# Patient Record
Sex: FEMALE | Race: WHITE | NOT HISPANIC OR LATINO
[De-identification: names, ages, dates, MRNs, and addresses within clinical notes are randomized per-mention and may not be internally consistent; named-entity substitution may affect disease eponyms.]

---

## 2019-09-23 ENCOUNTER — APPOINTMENT (OUTPATIENT)
Dept: INTERNAL MEDICINE | Facility: CLINIC | Age: 34
End: 2019-09-23

## 2020-08-10 PROBLEM — Z00.00 ENCOUNTER FOR PREVENTIVE HEALTH EXAMINATION: Status: ACTIVE | Noted: 2020-08-10

## 2020-08-20 ENCOUNTER — APPOINTMENT (OUTPATIENT)
Dept: FAMILY MEDICINE | Facility: CLINIC | Age: 35
End: 2020-08-20
Payer: COMMERCIAL

## 2020-08-20 ENCOUNTER — NON-APPOINTMENT (OUTPATIENT)
Age: 35
End: 2020-08-20

## 2020-08-20 VITALS
SYSTOLIC BLOOD PRESSURE: 100 MMHG | WEIGHT: 160.56 LBS | TEMPERATURE: 99.1 F | RESPIRATION RATE: 17 BRPM | DIASTOLIC BLOOD PRESSURE: 74 MMHG | HEIGHT: 62 IN | HEART RATE: 93 BPM | BODY MASS INDEX: 29.55 KG/M2 | OXYGEN SATURATION: 98 %

## 2020-08-20 DIAGNOSIS — E66.3 OVERWEIGHT: ICD-10-CM

## 2020-08-20 DIAGNOSIS — J30.2 OTHER SEASONAL ALLERGIC RHINITIS: ICD-10-CM

## 2020-08-20 DIAGNOSIS — Z78.9 OTHER SPECIFIED HEALTH STATUS: ICD-10-CM

## 2020-08-20 PROCEDURE — 93000 ELECTROCARDIOGRAM COMPLETE: CPT

## 2020-08-20 PROCEDURE — 99385 PREV VISIT NEW AGE 18-39: CPT | Mod: 25

## 2020-08-20 NOTE — HEALTH RISK ASSESSMENT
[Very Good] : ~his/her~ current health as very good [] : Yes [11-15] : 11-15 [2 - 4 times a month (2 pts)] : 2-4 times a month (2 points) [Yes] : Yes [1 or 2 (0 pts)] : 1 or 2 (0 points) [Never (0 pts)] : Never (0 points) [No falls in past year] : Patient reported no falls in the past year [0] : 2) Feeling down, depressed, or hopeless: Not at all (0) [Patient reported mammogram was normal] : Patient reported mammogram was normal [Patient reported PAP Smear was abnormal] : Patient reported PAP Smear was abnormal [HIV test declined] : HIV test declined [Hepatitis C test declined] : Hepatitis C test declined [None] : None [Employed] : employed [With Family] : lives with family [Single] : single [Unemployed] : unemployed [] :  [# Of Children ___] : has [unfilled] children [Sexually Active] : sexually active [Feels Safe at Home] : Feels safe at home [Fully functional (bathing, dressing, toileting, transferring, walking, feeding)] : Fully functional (bathing, dressing, toileting, transferring, walking, feeding) [Fully functional (using the telephone, shopping, preparing meals, housekeeping, doing laundry, using] : Fully functional and needs no help or supervision to perform IADLs (using the telephone, shopping, preparing meals, housekeeping, doing laundry, using transportation, managing medications and managing finances) [Smoke Detector] : smoke detector [Carbon Monoxide Detector] : carbon monoxide detector [Seat Belt] :  uses seat belt [Sunscreen] : uses sunscreen [With Patient/Caregiver] : With Patient/Caregiver [Aggressive treatment] : aggressive treatment [de-identified] : No [FreeTextEntry1] : none [de-identified] : Quit 11/19  1ppd - for 15 years  [Audit-CScore] : 2 [de-identified] : None [IUN1Dgvak] : 0 [de-identified] : walking [de-identified] : regular  [Change in mental status noted] : No change in mental status noted [Language] : denies difficulty with language [Reports changes in vision] : Reports no changes in vision [Reports changes in hearing] : Reports no changes in hearing [MammogramDate] : 04/19 [Reports changes in dental health] : Reports no changes in dental health [BoneDensityDate] : NA [PapSmearComments] : close f/u  [PapSmearDate] : 03/19 [ColonoscopyDate] : NA [AdvancecareDate] : 08/20

## 2020-08-20 NOTE — PHYSICAL EXAM
[No Edema] : there was no peripheral edema [Declined Breast Exam] : declined breast exam  [Normal] : normal gait, coordination grossly intact, no focal deficits and deep tendon reflexes were 2+ and symmetric [de-identified] : done by GYN

## 2020-08-20 NOTE — HISTORY OF PRESENT ILLNESS
[de-identified] : Patient came to establish herself as a pt, needs physical. Denies CP,SOB,Abd pain, no N,V,C,D.Pt moved to  from [facility]  [FreeTextEntry1] : cc: new pt, physical

## 2020-09-01 LAB
25(OH)D3 SERPL-MCNC: 55 NG/ML
ALBUMIN SERPL ELPH-MCNC: 4.1 G/DL
ALP BLD-CCNC: 65 U/L
ALT SERPL-CCNC: 15 U/L
ANION GAP SERPL CALC-SCNC: 12 MMOL/L
APPEARANCE: CLEAR
AST SERPL-CCNC: 16 U/L
BASOPHILS # BLD AUTO: 0.03 K/UL
BASOPHILS NFR BLD AUTO: 0.4 %
BILIRUB SERPL-MCNC: 0.6 MG/DL
BILIRUBIN URINE: NEGATIVE
BLOOD URINE: NEGATIVE
BUN SERPL-MCNC: 14 MG/DL
CALCIUM SERPL-MCNC: 9.2 MG/DL
CHLORIDE SERPL-SCNC: 102 MMOL/L
CHOLEST SERPL-MCNC: 206 MG/DL
CHOLEST/HDLC SERPL: 6.1 RATIO
CO2 SERPL-SCNC: 23 MMOL/L
COLOR: COLORLESS
CREAT SERPL-MCNC: 0.65 MG/DL
EOSINOPHIL # BLD AUTO: 0.09 K/UL
EOSINOPHIL NFR BLD AUTO: 1.2 %
ESTIMATED AVERAGE GLUCOSE: 105 MG/DL
FOLATE SERPL-MCNC: >20 NG/ML
GLUCOSE QUALITATIVE U: NEGATIVE
GLUCOSE SERPL-MCNC: 115 MG/DL
HBA1C MFR BLD HPLC: 5.3 %
HCT VFR BLD CALC: 39.9 %
HDLC SERPL-MCNC: 34 MG/DL
HGB BLD-MCNC: 13.1 G/DL
IMM GRANULOCYTES NFR BLD AUTO: 0.4 %
KETONES URINE: NEGATIVE
LDLC SERPL CALC-MCNC: 134 MG/DL
LEUKOCYTE ESTERASE URINE: NEGATIVE
LYMPHOCYTES # BLD AUTO: 2.38 K/UL
LYMPHOCYTES NFR BLD AUTO: 30.7 %
MAN DIFF?: NORMAL
MCHC RBC-ENTMCNC: 32 PG
MCHC RBC-ENTMCNC: 32.8 GM/DL
MCV RBC AUTO: 97.3 FL
MONOCYTES # BLD AUTO: 0.62 K/UL
MONOCYTES NFR BLD AUTO: 8 %
NEUTROPHILS # BLD AUTO: 4.61 K/UL
NEUTROPHILS NFR BLD AUTO: 59.3 %
NITRITE URINE: NEGATIVE
PH URINE: 7
PLATELET # BLD AUTO: 290 K/UL
POTASSIUM SERPL-SCNC: 4.6 MMOL/L
PROT SERPL-MCNC: 6.4 G/DL
PROTEIN URINE: NEGATIVE
RBC # BLD: 4.1 M/UL
RBC # FLD: 12.4 %
SARS-COV-2 IGG SERPL IA-ACNC: 0.08 INDEX
SARS-COV-2 IGG SERPL QL IA: NEGATIVE
SODIUM SERPL-SCNC: 138 MMOL/L
SPECIFIC GRAVITY URINE: 1.01
TRIGL SERPL-MCNC: 192 MG/DL
TSH SERPL-ACNC: 3.16 UIU/ML
UROBILINOGEN URINE: NORMAL
VIT B12 SERPL-MCNC: 414 PG/ML
WBC # FLD AUTO: 7.76 K/UL

## 2021-10-19 ENCOUNTER — NON-APPOINTMENT (OUTPATIENT)
Age: 36
End: 2021-10-19

## 2021-10-19 ENCOUNTER — APPOINTMENT (OUTPATIENT)
Dept: FAMILY MEDICINE | Facility: CLINIC | Age: 36
End: 2021-10-19
Payer: COMMERCIAL

## 2021-10-19 VITALS
DIASTOLIC BLOOD PRESSURE: 90 MMHG | SYSTOLIC BLOOD PRESSURE: 122 MMHG | BODY MASS INDEX: 29.81 KG/M2 | TEMPERATURE: 97.8 F | OXYGEN SATURATION: 98 % | WEIGHT: 162 LBS | HEIGHT: 62 IN | RESPIRATION RATE: 16 BRPM | HEART RATE: 85 BPM

## 2021-10-19 DIAGNOSIS — Z23 ENCOUNTER FOR IMMUNIZATION: ICD-10-CM

## 2021-10-19 PROCEDURE — 99395 PREV VISIT EST AGE 18-39: CPT | Mod: 25

## 2021-10-19 PROCEDURE — 99406 BEHAV CHNG SMOKING 3-10 MIN: CPT

## 2021-10-19 PROCEDURE — G0444 DEPRESSION SCREEN ANNUAL: CPT | Mod: 59

## 2021-10-19 PROCEDURE — 93000 ELECTROCARDIOGRAM COMPLETE: CPT | Mod: 59

## 2021-10-19 NOTE — HEALTH RISK ASSESSMENT
[Very Good] : ~his/her~  mood as very good [] : Yes [Yes] : Yes [2 - 4 times a month (2 pts)] : 2-4 times a month (2 points) [1 or 2 (0 pts)] : 1 or 2 (0 points) [Never (0 pts)] : Never (0 points) [No falls in past year] : Patient reported no falls in the past year [0] : 2) Feeling down, depressed, or hopeless: Not at all (0) [Patient reported mammogram was normal] : Patient reported mammogram was normal [Patient reported PAP Smear was abnormal] : Patient reported PAP Smear was abnormal [HIV test declined] : HIV test declined [Hepatitis C test declined] : Hepatitis C test declined [None] : None [With Family] : lives with family [Employed] : employed [Unemployed] : unemployed [Single] : single [] :  [# Of Children ___] : has [unfilled] children [Sexually Active] : sexually active [Feels Safe at Home] : Feels safe at home [Fully functional (bathing, dressing, toileting, transferring, walking, feeding)] : Fully functional (bathing, dressing, toileting, transferring, walking, feeding) [Fully functional (using the telephone, shopping, preparing meals, housekeeping, doing laundry, using] : Fully functional and needs no help or supervision to perform IADLs (using the telephone, shopping, preparing meals, housekeeping, doing laundry, using transportation, managing medications and managing finances) [Smoke Detector] : smoke detector [Carbon Monoxide Detector] : carbon monoxide detector [Seat Belt] :  uses seat belt [Sunscreen] : uses sunscreen [With Patient/Caregiver] : , with patient/caregiver [Aggressive treatment] : aggressive treatment [PHQ-2 Negative - No further assessment needed] : PHQ-2 Negative - No further assessment needed [FreeTextEntry1] : none [de-identified] : No [de-identified] : None [de-identified] : Quit 11/19  1ppd - for 15 years , 1 year ag started to smoke again 0.5 - 1PPD  [de-identified] : walking [Audit-CScore] : 2 [de-identified] : regular  [RJI2Csvil] : 0 [Change in mental status noted] : No change in mental status noted [Language] : denies difficulty with language [Learning/Retaining New Information] : denies difficulty learning/retaining new information [Reports changes in hearing] : Reports no changes in hearing [Reports changes in vision] : Reports no changes in vision [Reports changes in dental health] : Reports no changes in dental health [MammogramDate] : 04/19 [PapSmearDate] : 03/19 [PapSmearComments] : close f/u  [BoneDensityDate] : NA [ColonoscopyDate] : NA [AdvancecareDate] : 10/21

## 2021-10-19 NOTE — COUNSELING
[Good understanding] : Patient has a good understanding of lifestyle changes and steps needed to achieve self management goal [Risk of tobacco use and health benefits of smoking cessation discussed] : Risk of tobacco use and health benefits of smoking cessation discussed [Cessation strategies including cessation program discussed] : Cessation strategies including cessation program discussed [Use of nicotine replacement therapies and other medications discussed] : Use of nicotine replacement therapies and other medications discussed [Encouraged to pick a quit date and identify support needed to quit] : Encouraged to pick a quit date and identify support needed to quit [Willing to Quit Smoking] : Willing to quit smoking [Tobacco Use Cessation Intermediate Greater Than 3 Minutes Up to 10 Minutes] : Tobacco Use Cessation Intermediate Greater Than 3 Minutes Up to 10 Minutes [FreeTextEntry1] : 4

## 2021-10-19 NOTE — PHYSICAL EXAM
[No Edema] : there was no peripheral edema [Declined Breast Exam] : declined breast exam  [Normal] : affect was normal and insight and judgment were intact [de-identified] : done by GYN

## 2021-10-19 NOTE — HISTORY OF PRESENT ILLNESS
[FreeTextEntry1] : cc: physical  [de-identified] : Patient presented for  physical. She denies CP,SOB,Abd pain, no N,V,C,D.Pt refused FLu and Tdap.Pt smokes, smoking cessation completed - she wants to quit,Chantix was RXed \par

## 2021-10-19 NOTE — DATA REVIEWED
[FreeTextEntry1] : last blood work d/w the pt at length\par \par \par EKG - NSR at 78 bpm, no new changes

## 2021-10-26 LAB
25(OH)D3 SERPL-MCNC: 32.9 NG/ML
ALBUMIN SERPL ELPH-MCNC: 4.5 G/DL
ALP BLD-CCNC: 83 U/L
ALT SERPL-CCNC: 16 U/L
ANION GAP SERPL CALC-SCNC: 13 MMOL/L
APPEARANCE: ABNORMAL
AST SERPL-CCNC: 20 U/L
BASOPHILS # BLD AUTO: 0.06 K/UL
BASOPHILS NFR BLD AUTO: 0.5 %
BILIRUB SERPL-MCNC: 0.4 MG/DL
BILIRUBIN URINE: NEGATIVE
BLOOD URINE: NEGATIVE
BUN SERPL-MCNC: 8 MG/DL
CALCIUM SERPL-MCNC: 9.5 MG/DL
CHLORIDE SERPL-SCNC: 101 MMOL/L
CHOLEST SERPL-MCNC: 222 MG/DL
CO2 SERPL-SCNC: 23 MMOL/L
COLOR: NORMAL
CREAT SERPL-MCNC: 0.49 MG/DL
EOSINOPHIL # BLD AUTO: 0.03 K/UL
EOSINOPHIL NFR BLD AUTO: 0.3 %
FOLATE SERPL-MCNC: 18.2 NG/ML
GLUCOSE QUALITATIVE U: NEGATIVE
GLUCOSE SERPL-MCNC: 91 MG/DL
HCT VFR BLD CALC: 43.8 %
HDLC SERPL-MCNC: 46 MG/DL
HGB BLD-MCNC: 14.7 G/DL
IMM GRANULOCYTES NFR BLD AUTO: 0.3 %
KETONES URINE: NEGATIVE
LDLC SERPL CALC-MCNC: 130 MG/DL
LEUKOCYTE ESTERASE URINE: NEGATIVE
LYMPHOCYTES # BLD AUTO: 3.69 K/UL
LYMPHOCYTES NFR BLD AUTO: 31.3 %
MAN DIFF?: NORMAL
MCHC RBC-ENTMCNC: 33.6 GM/DL
MCHC RBC-ENTMCNC: 33.6 PG
MCV RBC AUTO: 100 FL
MONOCYTES # BLD AUTO: 0.61 K/UL
MONOCYTES NFR BLD AUTO: 5.2 %
NEUTROPHILS # BLD AUTO: 7.37 K/UL
NEUTROPHILS NFR BLD AUTO: 62.4 %
NITRITE URINE: NEGATIVE
NONHDLC SERPL-MCNC: 176 MG/DL
PH URINE: 6
PLATELET # BLD AUTO: 335 K/UL
POTASSIUM SERPL-SCNC: 4.2 MMOL/L
PROT SERPL-MCNC: 7.1 G/DL
PROTEIN URINE: NEGATIVE
RBC # BLD: 4.38 M/UL
RBC # FLD: 12.9 %
SODIUM SERPL-SCNC: 136 MMOL/L
SPECIFIC GRAVITY URINE: 1.01
TRIGL SERPL-MCNC: 226 MG/DL
TSH SERPL-ACNC: 2.65 UIU/ML
URATE SERPL-MCNC: 3.7 MG/DL
UROBILINOGEN URINE: NORMAL
VIT B12 SERPL-MCNC: 360 PG/ML
WBC # FLD AUTO: 11.8 K/UL

## 2021-11-16 ENCOUNTER — APPOINTMENT (OUTPATIENT)
Dept: FAMILY MEDICINE | Facility: CLINIC | Age: 36
End: 2021-11-16
Payer: COMMERCIAL

## 2021-11-16 ENCOUNTER — TRANSCRIPTION ENCOUNTER (OUTPATIENT)
Age: 36
End: 2021-11-16

## 2021-11-16 VITALS
HEART RATE: 108 BPM | WEIGHT: 161 LBS | OXYGEN SATURATION: 98 % | RESPIRATION RATE: 15 BRPM | HEIGHT: 62 IN | SYSTOLIC BLOOD PRESSURE: 122 MMHG | DIASTOLIC BLOOD PRESSURE: 86 MMHG | BODY MASS INDEX: 29.63 KG/M2 | TEMPERATURE: 99 F

## 2021-11-16 DIAGNOSIS — R51.9 HEADACHE, UNSPECIFIED: ICD-10-CM

## 2021-11-16 PROCEDURE — 99214 OFFICE O/P EST MOD 30 MIN: CPT

## 2021-11-16 NOTE — HISTORY OF PRESENT ILLNESS
[Cold Symptoms] : cold symptoms [Severe] : severe [___ Days ago] : [unfilled] days ago [Gradual] : gradually [Constant] : constant [Congestion] : congestion [Cough] : cough [Fatigue] : fatigue [Headache] : headache [At Night] : at night [In Morning] : in the morning [Worsening] : worsening [Sore Throat] : no sore throat [Wheezing] : no wheezing [Chills] : no chills [Anorexia] : no anorexia [Shortness Of Breath] : no shortness of breath [Earache] : no earache [Fever] : no fever [de-identified] : ear full ness [FreeTextEntry2] : ear fullness [FreeTextEntry5] : flonase, zyrtec d, mucinex [FreeTextEntry8] : Ms Kellee Arcos is a 36 female presents today for symptoms listed below, continues to worsen. Pt tested COVID-19 which was negative.

## 2021-11-16 NOTE — REVIEW OF SYSTEMS
[Fatigue] : fatigue [Shortness Of Breath] : no shortness of breath [Wheezing] : no wheezing [Cough] : cough [Dyspnea on Exertion] : no dyspnea on exertion [Negative] : Heme/Lymph [FreeTextEntry4] : nasal sinus congestion, headaches

## 2021-11-16 NOTE — PHYSICAL EXAM
[No Acute Distress] : no acute distress [Well Nourished] : well nourished [EOMI] : extraocular movements intact [No Respiratory Distress] : no respiratory distress  [Clear to Auscultation] : lungs were clear to auscultation bilaterally [Normal S1, S2] : normal S1 and S2 [Non Tender] : non-tender [No Rash] : no rash [Normal Gait] : normal gait [Normal Affect] : the affect was normal [de-identified] : edematous boggy nasal turbinates, B/L TM WNL, sinus pressure and tenderness in ethmoidal and maxillary sinuses

## 2021-11-16 NOTE — ASSESSMENT
[FreeTextEntry1] : advised to start the Augmentin BID for 10 days and flonase prescribed by Urgent Care\par will Medrol dose pack for the immense congestion, headaches, slight dizziness\par continue with symptomatic management\par plenty of warm hydration, warm humidified air\par COVID-19 vaccine up to date. Advised to get the booster vaccine when available and feeling better. \par

## 2022-08-18 ENCOUNTER — APPOINTMENT (OUTPATIENT)
Dept: FAMILY MEDICINE | Facility: CLINIC | Age: 37
End: 2022-08-18

## 2023-02-01 ENCOUNTER — APPOINTMENT (OUTPATIENT)
Dept: FAMILY MEDICINE | Facility: CLINIC | Age: 38
End: 2023-02-01
Payer: COMMERCIAL

## 2023-02-01 VITALS
RESPIRATION RATE: 16 BRPM | OXYGEN SATURATION: 98 % | DIASTOLIC BLOOD PRESSURE: 90 MMHG | HEIGHT: 62 IN | WEIGHT: 165 LBS | BODY MASS INDEX: 30.36 KG/M2 | SYSTOLIC BLOOD PRESSURE: 126 MMHG | HEART RATE: 100 BPM | TEMPERATURE: 97 F

## 2023-02-01 VITALS — DIASTOLIC BLOOD PRESSURE: 90 MMHG | SYSTOLIC BLOOD PRESSURE: 118 MMHG

## 2023-02-01 DIAGNOSIS — F17.200 NICOTINE DEPENDENCE, UNSPECIFIED, UNCOMPLICATED: ICD-10-CM

## 2023-02-01 PROCEDURE — 99406 BEHAV CHNG SMOKING 3-10 MIN: CPT

## 2023-02-01 PROCEDURE — G0444 DEPRESSION SCREEN ANNUAL: CPT | Mod: 59

## 2023-02-01 PROCEDURE — 99215 OFFICE O/P EST HI 40 MIN: CPT | Mod: 25

## 2023-02-01 RX ORDER — METHYLPREDNISOLONE 4 MG/1
4 TABLET ORAL
Qty: 1 | Refills: 0 | Status: COMPLETED | COMMUNITY
Start: 2021-11-16 | End: 2023-02-01

## 2023-02-01 NOTE — COUNSELING
[Fall prevention counseling provided] : Fall prevention counseling provided [Adequate lighting] : Adequate lighting [No throw rugs] : No throw rugs [Use proper foot wear] : Use proper foot wear [Use recommended devices] : Use recommended devices [Cessation strategies including cessation program discussed] : Cessation strategies including cessation program discussed [Use of nicotine replacement therapies and other medications discussed] : Use of nicotine replacement therapies and other medications discussed [Smoking Cessation Program Referral] : Smoking Cessation Program Referral  [Encouraged to pick a quit date and identify support needed to quit] : Encouraged to pick a quit date and identify support needed to quit [Yes] : Willing to quit smoking [FreeTextEntry1] : 3 [Potential consequences of obesity discussed] : Potential consequences of obesity discussed [Benefits of weight loss discussed] : Benefits of weight loss discussed [Encouraged to maintain food diary] : Encouraged to maintain food diary [Encouraged to increase physical activity] : Encouraged to increase physical activity [Encouraged to use exercise tracking device] : Encouraged to use exercise tracking device [Weigh Self Weekly] : weigh self weekly [Decrease Portions] : decrease portions [____ min/wk Activity] : [unfilled] min/wk activity [Keep Food Diary] : keep food diary [Good understanding] : Patient has a good understanding of lifestyle changes and steps needed to achieve self management goal

## 2023-02-01 NOTE — PHYSICAL EXAM
[No Acute Distress] : no acute distress [No Edema] : there was no peripheral edema [Normal] : no joint swelling and grossly normal strength and tone

## 2023-02-01 NOTE — HEALTH RISK ASSESSMENT
[Yes] : Yes [2 - 3 times a week (3 pts)] : 2 - 3  times a week (3 points) [3 or 4 (1 pt)] : 3 or 4  (1 point) [Never (0 pts)] : Never (0 points) [No] : In the past 12 months have you used drugs other than those required for medical reasons? No [No falls in past year] : Patient reported no falls in the past year [0] : 2) Feeling down, depressed, or hopeless: Not at all (0) [PHQ-2 Negative - No further assessment needed] : PHQ-2 Negative - No further assessment needed [Current] : Current [Audit-CScore] : 4 [de-identified] : Rosalinda  [de-identified] : regular  [VVD1Jdwpn] : 0 [de-identified] : 3/4 ppd

## 2023-02-01 NOTE — HISTORY OF PRESENT ILLNESS
[FreeTextEntry1] : cc: f/u pressure, thyroid, quit smoking  [de-identified] : Pt cmae to f/u on her BP - no HTN , last visit was elevated - today also pt just had stressful conversation with her .She denies CP,SOB,abd pain, Pt just stopped trying to get pregnant with help og fertility clinic - was on thyroid hormones,Now she stopped, started smoking , wants to quit again She [place] CP,SOB,abd pain. ,

## 2023-03-08 LAB
ALBUMIN SERPL ELPH-MCNC: 4.2 G/DL
ALP BLD-CCNC: 82 U/L
ALT SERPL-CCNC: 23 U/L
ANION GAP SERPL CALC-SCNC: 10 MMOL/L
APPEARANCE: CLEAR
AST SERPL-CCNC: 27 U/L
BASOPHILS # BLD AUTO: 0.05 K/UL
BASOPHILS NFR BLD AUTO: 0.4 %
BILIRUB SERPL-MCNC: 0.3 MG/DL
BILIRUBIN URINE: NEGATIVE
BLOOD URINE: NEGATIVE
BUN SERPL-MCNC: 7 MG/DL
CALCIUM SERPL-MCNC: 9.7 MG/DL
CHLORIDE SERPL-SCNC: 104 MMOL/L
CHOLEST SERPL-MCNC: 196 MG/DL
CO2 SERPL-SCNC: 24 MMOL/L
COLOR: NORMAL
CREAT SERPL-MCNC: 0.6 MG/DL
EGFR: 118 ML/MIN/1.73M2
EOSINOPHIL # BLD AUTO: 0.06 K/UL
EOSINOPHIL NFR BLD AUTO: 0.4 %
ESTIMATED AVERAGE GLUCOSE: 103 MG/DL
GLUCOSE QUALITATIVE U: NEGATIVE
GLUCOSE SERPL-MCNC: 104 MG/DL
HBA1C MFR BLD HPLC: 5.2 %
HCT VFR BLD CALC: 41.9 %
HDLC SERPL-MCNC: 31 MG/DL
HGB BLD-MCNC: 13.9 G/DL
IMM GRANULOCYTES NFR BLD AUTO: 0.3 %
KETONES URINE: NEGATIVE
LDLC SERPL CALC-MCNC: 135 MG/DL
LEUKOCYTE ESTERASE URINE: NEGATIVE
LYMPHOCYTES # BLD AUTO: 2.19 K/UL
LYMPHOCYTES NFR BLD AUTO: 15.9 %
MAN DIFF?: NORMAL
MCHC RBC-ENTMCNC: 33.2 GM/DL
MCHC RBC-ENTMCNC: 34 PG
MCV RBC AUTO: 102.4 FL
MONOCYTES # BLD AUTO: 0.88 K/UL
MONOCYTES NFR BLD AUTO: 6.4 %
NEUTROPHILS # BLD AUTO: 10.52 K/UL
NEUTROPHILS NFR BLD AUTO: 76.6 %
NITRITE URINE: NEGATIVE
NONHDLC SERPL-MCNC: 165 MG/DL
PH URINE: 6
PLATELET # BLD AUTO: 362 K/UL
POTASSIUM SERPL-SCNC: 5 MMOL/L
PROT SERPL-MCNC: 7.5 G/DL
PROTEIN URINE: NEGATIVE
RBC # BLD: 4.09 M/UL
RBC # FLD: 12.9 %
SODIUM SERPL-SCNC: 138 MMOL/L
SPECIFIC GRAVITY URINE: 1.01
TRIGL SERPL-MCNC: 148 MG/DL
TSH SERPL-ACNC: 2.39 UIU/ML
UROBILINOGEN URINE: NORMAL
WBC # FLD AUTO: 13.74 K/UL

## 2023-04-03 ENCOUNTER — APPOINTMENT (OUTPATIENT)
Dept: FAMILY MEDICINE | Facility: CLINIC | Age: 38
End: 2023-04-03
Payer: COMMERCIAL

## 2023-04-03 ENCOUNTER — NON-APPOINTMENT (OUTPATIENT)
Age: 38
End: 2023-04-03

## 2023-04-03 VITALS
SYSTOLIC BLOOD PRESSURE: 112 MMHG | HEIGHT: 62 IN | BODY MASS INDEX: 30.73 KG/M2 | RESPIRATION RATE: 15 BRPM | OXYGEN SATURATION: 96 % | WEIGHT: 167 LBS | TEMPERATURE: 97.3 F | HEART RATE: 86 BPM | DIASTOLIC BLOOD PRESSURE: 80 MMHG

## 2023-04-03 DIAGNOSIS — F17.200 NICOTINE DEPENDENCE, UNSPECIFIED, UNCOMPLICATED: ICD-10-CM

## 2023-04-03 PROCEDURE — 93000 ELECTROCARDIOGRAM COMPLETE: CPT

## 2023-04-03 PROCEDURE — 99395 PREV VISIT EST AGE 18-39: CPT | Mod: 25

## 2023-04-03 NOTE — HISTORY OF PRESENT ILLNESS
[FreeTextEntry1] : cc: physical  [de-identified] : Patient presented for  physical. She denies CP,SOB,Abd pain, no N,V,C,D, Elevated WBC  Asx

## 2023-04-03 NOTE — DATA REVIEWED
[FreeTextEntry1] : last blood work d/w the pt at length\par \par \par EKG - NSR at 86 bpm, no acute changes

## 2023-04-03 NOTE — PHYSICAL EXAM
[No Edema] : there was no peripheral edema [Normal] : affect was normal and insight and judgment were intact [Normal Appearance] : normal in appearance [No Masses] : no palpable masses [No Nipple Discharge] : no nipple discharge [No Axillary Lymphadenopathy] : no axillary lymphadenopathy

## 2023-04-03 NOTE — COUNSELING
[Good understanding] : Patient has a good understanding of lifestyle changes and steps needed to achieve self management goal [FreeTextEntry1] : 4

## 2023-04-04 LAB
BASOPHILS # BLD AUTO: 0.05 K/UL
BASOPHILS NFR BLD AUTO: 0.5 %
EOSINOPHIL # BLD AUTO: 0.08 K/UL
EOSINOPHIL NFR BLD AUTO: 0.7 %
FOLATE SERPL-MCNC: >20 NG/ML
HCT VFR BLD CALC: 41.8 %
HGB BLD-MCNC: 14 G/DL
IMM GRANULOCYTES NFR BLD AUTO: 0.4 %
LYMPHOCYTES # BLD AUTO: 2.66 K/UL
LYMPHOCYTES NFR BLD AUTO: 24.4 %
MAN DIFF?: NORMAL
MCHC RBC-ENTMCNC: 32.9 PG
MCHC RBC-ENTMCNC: 33.5 GM/DL
MCV RBC AUTO: 98.4 FL
MONOCYTES # BLD AUTO: 0.73 K/UL
MONOCYTES NFR BLD AUTO: 6.7 %
NEUTROPHILS # BLD AUTO: 7.33 K/UL
NEUTROPHILS NFR BLD AUTO: 67.3 %
PLATELET # BLD AUTO: 335 K/UL
RBC # BLD: 4.25 M/UL
RBC # FLD: 12.5 %
VIT B12 SERPL-MCNC: 554 PG/ML
WBC # FLD AUTO: 10.89 K/UL

## 2023-06-05 ENCOUNTER — TRANSCRIPTION ENCOUNTER (OUTPATIENT)
Age: 38
End: 2023-06-05

## 2023-06-06 ENCOUNTER — INPATIENT (INPATIENT)
Facility: HOSPITAL | Age: 38
LOS: 19 days | Discharge: NURSING, REHAB OR LONG-TERM CARE | DRG: 23 | End: 2023-06-26
Attending: NEUROLOGICAL SURGERY | Admitting: NEUROLOGICAL SURGERY
Payer: COMMERCIAL

## 2023-06-06 ENCOUNTER — TRANSCRIPTION ENCOUNTER (OUTPATIENT)
Age: 38
End: 2023-06-06

## 2023-06-06 ENCOUNTER — RESULT REVIEW (OUTPATIENT)
Age: 38
End: 2023-06-06

## 2023-06-06 VITALS
SYSTOLIC BLOOD PRESSURE: 135 MMHG | OXYGEN SATURATION: 100 % | DIASTOLIC BLOOD PRESSURE: 112 MMHG | TEMPERATURE: 99 F | RESPIRATION RATE: 20 BRPM | HEART RATE: 153 BPM

## 2023-06-06 DIAGNOSIS — I61.9 NONTRAUMATIC INTRACEREBRAL HEMORRHAGE, UNSPECIFIED: ICD-10-CM

## 2023-06-06 LAB
ALBUMIN SERPL ELPH-MCNC: 3.5 G/DL — SIGNIFICANT CHANGE UP (ref 3.3–5)
ALBUMIN SERPL ELPH-MCNC: 4.5 G/DL — SIGNIFICANT CHANGE UP (ref 3.3–5)
ALP SERPL-CCNC: 55 U/L — SIGNIFICANT CHANGE UP (ref 40–120)
ALP SERPL-CCNC: 73 U/L — SIGNIFICANT CHANGE UP (ref 40–120)
ALT FLD-CCNC: 11 U/L — SIGNIFICANT CHANGE UP (ref 10–45)
ALT FLD-CCNC: 14 U/L — SIGNIFICANT CHANGE UP (ref 10–45)
ANION GAP SERPL CALC-SCNC: 14 MMOL/L — SIGNIFICANT CHANGE UP (ref 5–17)
ANION GAP SERPL CALC-SCNC: 19 MMOL/L — HIGH (ref 5–17)
APAP SERPL-MCNC: <15 UG/ML — SIGNIFICANT CHANGE UP (ref 10–30)
APTT BLD: 25.8 SEC — LOW (ref 27.5–35.5)
APTT BLD: 26.5 SEC — LOW (ref 27.5–35.5)
AST SERPL-CCNC: 23 U/L — SIGNIFICANT CHANGE UP (ref 10–40)
AST SERPL-CCNC: 34 U/L — SIGNIFICANT CHANGE UP (ref 10–40)
BASE EXCESS BLDV CALC-SCNC: -7.9 MMOL/L — LOW (ref -2–3)
BASOPHILS # BLD AUTO: 0 K/UL — SIGNIFICANT CHANGE UP (ref 0–0.2)
BASOPHILS NFR BLD AUTO: 0 % — SIGNIFICANT CHANGE UP (ref 0–2)
BILIRUB SERPL-MCNC: 0.2 MG/DL — SIGNIFICANT CHANGE UP (ref 0.2–1.2)
BILIRUB SERPL-MCNC: 0.4 MG/DL — SIGNIFICANT CHANGE UP (ref 0.2–1.2)
BLD GP AB SCN SERPL QL: NEGATIVE — SIGNIFICANT CHANGE UP
BUN SERPL-MCNC: 10 MG/DL — SIGNIFICANT CHANGE UP (ref 7–23)
BUN SERPL-MCNC: 6 MG/DL — LOW (ref 7–23)
CA-I SERPL-SCNC: 1.18 MMOL/L — SIGNIFICANT CHANGE UP (ref 1.15–1.33)
CALCIUM SERPL-MCNC: 7.7 MG/DL — LOW (ref 8.4–10.5)
CALCIUM SERPL-MCNC: 9.2 MG/DL — SIGNIFICANT CHANGE UP (ref 8.4–10.5)
CHLORIDE BLDV-SCNC: 100 MMOL/L — SIGNIFICANT CHANGE UP (ref 96–108)
CHLORIDE SERPL-SCNC: 100 MMOL/L — SIGNIFICANT CHANGE UP (ref 96–108)
CHLORIDE SERPL-SCNC: 103 MMOL/L — SIGNIFICANT CHANGE UP (ref 96–108)
CO2 BLDV-SCNC: 20 MMOL/L — LOW (ref 22–26)
CO2 SERPL-SCNC: 16 MMOL/L — LOW (ref 22–31)
CO2 SERPL-SCNC: 18 MMOL/L — LOW (ref 22–31)
CREAT SERPL-MCNC: 0.42 MG/DL — LOW (ref 0.5–1.3)
CREAT SERPL-MCNC: 0.52 MG/DL — SIGNIFICANT CHANGE UP (ref 0.5–1.3)
EGFR: 121 ML/MIN/1.73M2 — SIGNIFICANT CHANGE UP
EGFR: 128 ML/MIN/1.73M2 — SIGNIFICANT CHANGE UP
EOSINOPHIL # BLD AUTO: 0 K/UL — SIGNIFICANT CHANGE UP (ref 0–0.5)
EOSINOPHIL NFR BLD AUTO: 0 % — SIGNIFICANT CHANGE UP (ref 0–6)
ETHANOL SERPL-MCNC: <10 MG/DL — SIGNIFICANT CHANGE UP (ref 0–10)
GAS PNL BLDA: SIGNIFICANT CHANGE UP
GAS PNL BLDV: 133 MMOL/L — LOW (ref 136–145)
GAS PNL BLDV: SIGNIFICANT CHANGE UP
GAS PNL BLDV: SIGNIFICANT CHANGE UP
GLUCOSE BLDV-MCNC: 214 MG/DL — HIGH (ref 70–99)
GLUCOSE SERPL-MCNC: 190 MG/DL — HIGH (ref 70–99)
GLUCOSE SERPL-MCNC: 215 MG/DL — HIGH (ref 70–99)
HCG SERPL-ACNC: <2 MIU/ML — SIGNIFICANT CHANGE UP
HCO3 BLDV-SCNC: 19 MMOL/L — LOW (ref 22–29)
HCT VFR BLD CALC: 29.5 % — LOW (ref 34.5–45)
HCT VFR BLD CALC: 39 % — SIGNIFICANT CHANGE UP (ref 34.5–45)
HCT VFR BLDA CALC: 41 % — SIGNIFICANT CHANGE UP (ref 34.5–46.5)
HGB BLD CALC-MCNC: 13.5 G/DL — SIGNIFICANT CHANGE UP (ref 11.7–16.1)
HGB BLD-MCNC: 10.3 G/DL — LOW (ref 11.5–15.5)
HGB BLD-MCNC: 13.3 G/DL — SIGNIFICANT CHANGE UP (ref 11.5–15.5)
INR BLD: 1.03 RATIO — SIGNIFICANT CHANGE UP (ref 0.88–1.16)
INR BLD: 1.11 RATIO — SIGNIFICANT CHANGE UP (ref 0.88–1.16)
LACTATE BLDV-MCNC: 4.5 MMOL/L — CRITICAL HIGH (ref 0.5–2)
LYMPHOCYTES # BLD AUTO: 0.84 K/UL — LOW (ref 1–3.3)
LYMPHOCYTES # BLD AUTO: 3.3 % — LOW (ref 13–44)
MAGNESIUM SERPL-MCNC: 1.5 MG/DL — LOW (ref 1.6–2.6)
MANUAL SMEAR VERIFICATION: SIGNIFICANT CHANGE UP
MCHC RBC-ENTMCNC: 32.9 PG — SIGNIFICANT CHANGE UP (ref 27–34)
MCHC RBC-ENTMCNC: 33.3 PG — SIGNIFICANT CHANGE UP (ref 27–34)
MCHC RBC-ENTMCNC: 34.1 GM/DL — SIGNIFICANT CHANGE UP (ref 32–36)
MCHC RBC-ENTMCNC: 34.9 GM/DL — SIGNIFICANT CHANGE UP (ref 32–36)
MCV RBC AUTO: 95.5 FL — SIGNIFICANT CHANGE UP (ref 80–100)
MCV RBC AUTO: 96.5 FL — SIGNIFICANT CHANGE UP (ref 80–100)
MONOCYTES # BLD AUTO: 0.61 K/UL — SIGNIFICANT CHANGE UP (ref 0–0.9)
MONOCYTES NFR BLD AUTO: 2.4 % — SIGNIFICANT CHANGE UP (ref 2–14)
NEUTROPHILS # BLD AUTO: 23.87 K/UL — HIGH (ref 1.8–7.4)
NEUTROPHILS NFR BLD AUTO: 91 % — HIGH (ref 43–77)
NEUTS BAND # BLD: 3.3 % — SIGNIFICANT CHANGE UP (ref 0–8)
NRBC # BLD: 0 /100 WBCS — SIGNIFICANT CHANGE UP (ref 0–0)
OSMOLALITY SERPL: 290 MOSMOL/KG — SIGNIFICANT CHANGE UP (ref 275–300)
PCO2 BLDV: 42 MMHG — SIGNIFICANT CHANGE UP (ref 39–42)
PH BLDV: 7.26 — LOW (ref 7.32–7.43)
PHOSPHATE SERPL-MCNC: 3.8 MG/DL — SIGNIFICANT CHANGE UP (ref 2.5–4.5)
PLAT MORPH BLD: NORMAL — SIGNIFICANT CHANGE UP
PLATELET # BLD AUTO: 288 K/UL — SIGNIFICANT CHANGE UP (ref 150–400)
PLATELET # BLD AUTO: 305 K/UL — SIGNIFICANT CHANGE UP (ref 150–400)
PO2 BLDV: 55 MMHG — HIGH (ref 25–45)
POTASSIUM BLDV-SCNC: 3 MMOL/L — LOW (ref 3.5–5.1)
POTASSIUM SERPL-MCNC: 3.1 MMOL/L — LOW (ref 3.5–5.3)
POTASSIUM SERPL-MCNC: 3.8 MMOL/L — SIGNIFICANT CHANGE UP (ref 3.5–5.3)
POTASSIUM SERPL-SCNC: 3.1 MMOL/L — LOW (ref 3.5–5.3)
POTASSIUM SERPL-SCNC: 3.8 MMOL/L — SIGNIFICANT CHANGE UP (ref 3.5–5.3)
PROT SERPL-MCNC: 5.7 G/DL — LOW (ref 6–8.3)
PROT SERPL-MCNC: 7.5 G/DL — SIGNIFICANT CHANGE UP (ref 6–8.3)
PROTHROM AB SERPL-ACNC: 11.8 SEC — SIGNIFICANT CHANGE UP (ref 10.5–13.4)
PROTHROM AB SERPL-ACNC: 12.9 SEC — SIGNIFICANT CHANGE UP (ref 10.5–13.4)
RAPID RVP RESULT: SIGNIFICANT CHANGE UP
RBC # BLD: 3.09 M/UL — LOW (ref 3.8–5.2)
RBC # BLD: 4.04 M/UL — SIGNIFICANT CHANGE UP (ref 3.8–5.2)
RBC # FLD: 12.5 % — SIGNIFICANT CHANGE UP (ref 10.3–14.5)
RBC # FLD: 12.6 % — SIGNIFICANT CHANGE UP (ref 10.3–14.5)
RBC BLD AUTO: SIGNIFICANT CHANGE UP
RH IG SCN BLD-IMP: POSITIVE — SIGNIFICANT CHANGE UP
SALICYLATES SERPL-MCNC: <2 MG/DL — LOW (ref 15–30)
SAO2 % BLDV: 84.7 % — SIGNIFICANT CHANGE UP (ref 67–88)
SARS-COV-2 RNA SPEC QL NAA+PROBE: SIGNIFICANT CHANGE UP
SODIUM SERPL-SCNC: 135 MMOL/L — SIGNIFICANT CHANGE UP (ref 135–145)
SODIUM SERPL-SCNC: 135 MMOL/L — SIGNIFICANT CHANGE UP (ref 135–145)
TROPONIN T, HIGH SENSITIVITY RESULT: 91 NG/L — HIGH (ref 0–51)
TSH SERPL-MCNC: 1.18 UIU/ML — SIGNIFICANT CHANGE UP (ref 0.27–4.2)
WBC # BLD: 24.52 K/UL — HIGH (ref 3.8–10.5)
WBC # BLD: 25.31 K/UL — HIGH (ref 3.8–10.5)
WBC # FLD AUTO: 24.52 K/UL — HIGH (ref 3.8–10.5)
WBC # FLD AUTO: 25.31 K/UL — HIGH (ref 3.8–10.5)

## 2023-06-06 PROCEDURE — 31500 INSERT EMERGENCY AIRWAY: CPT

## 2023-06-06 PROCEDURE — 61322 CRNEC/CRNOT DCMPRV W/O LOBEC: CPT

## 2023-06-06 PROCEDURE — 99291 CRITICAL CARE FIRST HOUR: CPT

## 2023-06-06 PROCEDURE — 99291 CRITICAL CARE FIRST HOUR: CPT | Mod: 25

## 2023-06-06 PROCEDURE — 72125 CT NECK SPINE W/O DYE: CPT | Mod: 26

## 2023-06-06 PROCEDURE — 88342 IMHCHEM/IMCYTCHM 1ST ANTB: CPT | Mod: 26

## 2023-06-06 PROCEDURE — 70450 CT HEAD/BRAIN W/O DYE: CPT | Mod: 26

## 2023-06-06 PROCEDURE — 88304 TISSUE EXAM BY PATHOLOGIST: CPT | Mod: 26

## 2023-06-06 PROCEDURE — 70250 X-RAY EXAM OF SKULL: CPT | Mod: 26

## 2023-06-06 PROCEDURE — 71045 X-RAY EXAM CHEST 1 VIEW: CPT | Mod: 26

## 2023-06-06 DEVICE — CATH VENT LG: Type: IMPLANTABLE DEVICE | Site: RIGHT | Status: FUNCTIONAL

## 2023-06-06 DEVICE — MATRIX DURAGEN PLUS DURAL REGENERATION 4X5IN: Type: IMPLANTABLE DEVICE | Site: RIGHT | Status: FUNCTIONAL

## 2023-06-06 DEVICE — SURGIFOAM PAD 8CM X 12.5CM X 10MM (100): Type: IMPLANTABLE DEVICE | Site: RIGHT | Status: FUNCTIONAL

## 2023-06-06 DEVICE — SURGIFLO MATRIX WITH THROMBIN KIT: Type: IMPLANTABLE DEVICE | Site: RIGHT | Status: FUNCTIONAL

## 2023-06-06 RX ORDER — PROPOFOL 10 MG/ML
20 INJECTION, EMULSION INTRAVENOUS ONCE
Refills: 0 | Status: COMPLETED | OUTPATIENT
Start: 2023-06-06 | End: 2023-06-06

## 2023-06-06 RX ORDER — PHENYLEPHRINE HYDROCHLORIDE 10 MG/ML
0.1 INJECTION INTRAVENOUS
Qty: 40 | Refills: 0 | Status: DISCONTINUED | OUTPATIENT
Start: 2023-06-06 | End: 2023-06-09

## 2023-06-06 RX ORDER — SUGAMMADEX 100 MG/ML
800 INJECTION, SOLUTION INTRAVENOUS ONCE
Refills: 0 | Status: COMPLETED | OUTPATIENT
Start: 2023-06-06 | End: 2023-06-06

## 2023-06-06 RX ORDER — NICARDIPINE HYDROCHLORIDE 30 MG/1
5 CAPSULE, EXTENDED RELEASE ORAL
Qty: 40 | Refills: 0 | Status: DISCONTINUED | OUTPATIENT
Start: 2023-06-06 | End: 2023-06-06

## 2023-06-06 RX ORDER — PANTOPRAZOLE SODIUM 20 MG/1
40 TABLET, DELAYED RELEASE ORAL DAILY
Refills: 0 | Status: DISCONTINUED | OUTPATIENT
Start: 2023-06-06 | End: 2023-06-14

## 2023-06-06 RX ORDER — SENNA PLUS 8.6 MG/1
2 TABLET ORAL AT BEDTIME
Refills: 0 | Status: DISCONTINUED | OUTPATIENT
Start: 2023-06-06 | End: 2023-06-06

## 2023-06-06 RX ORDER — CHLORHEXIDINE GLUCONATE 213 G/1000ML
15 SOLUTION TOPICAL EVERY 12 HOURS
Refills: 0 | Status: DISCONTINUED | OUTPATIENT
Start: 2023-06-06 | End: 2023-06-13

## 2023-06-06 RX ORDER — POLYETHYLENE GLYCOL 3350 17 G/17G
17 POWDER, FOR SOLUTION ORAL DAILY
Refills: 0 | Status: DISCONTINUED | OUTPATIENT
Start: 2023-06-06 | End: 2023-06-08

## 2023-06-06 RX ORDER — SODIUM CHLORIDE 5 G/100ML
1000 INJECTION, SOLUTION INTRAVENOUS
Refills: 0 | Status: DISCONTINUED | OUTPATIENT
Start: 2023-06-06 | End: 2023-06-07

## 2023-06-06 RX ORDER — DEXMEDETOMIDINE HYDROCHLORIDE IN 0.9% SODIUM CHLORIDE 4 UG/ML
0.2 INJECTION INTRAVENOUS
Qty: 200 | Refills: 0 | Status: DISCONTINUED | OUTPATIENT
Start: 2023-06-06 | End: 2023-06-06

## 2023-06-06 RX ORDER — LEVETIRACETAM 250 MG/1
1000 TABLET, FILM COATED ORAL ONCE
Refills: 0 | Status: COMPLETED | OUTPATIENT
Start: 2023-06-06 | End: 2023-06-06

## 2023-06-06 RX ORDER — FENTANYL CITRATE 50 UG/ML
0.5 INJECTION INTRAVENOUS
Qty: 2500 | Refills: 0 | Status: DISCONTINUED | OUTPATIENT
Start: 2023-06-06 | End: 2023-06-06

## 2023-06-06 RX ORDER — SODIUM CHLORIDE 9 MG/ML
1000 INJECTION INTRAMUSCULAR; INTRAVENOUS; SUBCUTANEOUS ONCE
Refills: 0 | Status: COMPLETED | OUTPATIENT
Start: 2023-06-06 | End: 2023-06-06

## 2023-06-06 RX ORDER — CEFAZOLIN SODIUM 1 G
2000 VIAL (EA) INJECTION EVERY 8 HOURS
Refills: 0 | Status: COMPLETED | OUTPATIENT
Start: 2023-06-06 | End: 2023-06-07

## 2023-06-06 RX ORDER — INSULIN LISPRO 100/ML
VIAL (ML) SUBCUTANEOUS EVERY 6 HOURS
Refills: 0 | Status: DISCONTINUED | OUTPATIENT
Start: 2023-06-06 | End: 2023-06-08

## 2023-06-06 RX ORDER — CHLORHEXIDINE GLUCONATE 213 G/1000ML
15 SOLUTION TOPICAL EVERY 12 HOURS
Refills: 0 | Status: DISCONTINUED | OUTPATIENT
Start: 2023-06-06 | End: 2023-06-06

## 2023-06-06 RX ORDER — MIDAZOLAM HYDROCHLORIDE 1 MG/ML
2 INJECTION, SOLUTION INTRAMUSCULAR; INTRAVENOUS ONCE
Refills: 0 | Status: DISCONTINUED | OUTPATIENT
Start: 2023-06-06 | End: 2023-06-06

## 2023-06-06 RX ORDER — ROCURONIUM BROMIDE 10 MG/ML
100 VIAL (ML) INTRAVENOUS ONCE
Refills: 0 | Status: COMPLETED | OUTPATIENT
Start: 2023-06-06 | End: 2023-06-06

## 2023-06-06 RX ORDER — SODIUM CHLORIDE 9 MG/ML
1000 INJECTION, SOLUTION INTRAVENOUS
Refills: 0 | Status: DISCONTINUED | OUTPATIENT
Start: 2023-06-06 | End: 2023-06-06

## 2023-06-06 RX ORDER — ETOMIDATE 2 MG/ML
20 INJECTION INTRAVENOUS ONCE
Refills: 0 | Status: COMPLETED | OUTPATIENT
Start: 2023-06-06 | End: 2023-06-06

## 2023-06-06 RX ORDER — FENTANYL CITRATE 50 UG/ML
0.5 INJECTION INTRAVENOUS
Qty: 5000 | Refills: 0 | Status: DISCONTINUED | OUTPATIENT
Start: 2023-06-06 | End: 2023-06-06

## 2023-06-06 RX ORDER — PROPOFOL 10 MG/ML
15 INJECTION, EMULSION INTRAVENOUS
Qty: 1000 | Refills: 0 | Status: DISCONTINUED | OUTPATIENT
Start: 2023-06-06 | End: 2023-06-06

## 2023-06-06 RX ORDER — SENNA PLUS 8.6 MG/1
2 TABLET ORAL AT BEDTIME
Refills: 0 | Status: DISCONTINUED | OUTPATIENT
Start: 2023-06-06 | End: 2023-06-15

## 2023-06-06 RX ORDER — LEVETIRACETAM 250 MG/1
500 TABLET, FILM COATED ORAL EVERY 12 HOURS
Refills: 0 | Status: DISCONTINUED | OUTPATIENT
Start: 2023-06-06 | End: 2023-06-08

## 2023-06-06 RX ORDER — MANNITOL
75 POWDER (GRAM) MISCELLANEOUS ONCE
Refills: 0 | Status: DISCONTINUED | OUTPATIENT
Start: 2023-06-06 | End: 2023-06-06

## 2023-06-06 RX ORDER — ONDANSETRON 8 MG/1
4 TABLET, FILM COATED ORAL EVERY 6 HOURS
Refills: 0 | Status: DISCONTINUED | OUTPATIENT
Start: 2023-06-06 | End: 2023-06-26

## 2023-06-06 RX ORDER — SUGAMMADEX 100 MG/ML
1200 INJECTION, SOLUTION INTRAVENOUS ONCE
Refills: 0 | Status: DISCONTINUED | OUTPATIENT
Start: 2023-06-06 | End: 2023-06-06

## 2023-06-06 RX ORDER — DEXMEDETOMIDINE HYDROCHLORIDE IN 0.9% SODIUM CHLORIDE 4 UG/ML
0.2 INJECTION INTRAVENOUS
Qty: 200 | Refills: 0 | Status: DISCONTINUED | OUTPATIENT
Start: 2023-06-06 | End: 2023-06-09

## 2023-06-06 RX ORDER — FENTANYL CITRATE 50 UG/ML
50 INJECTION INTRAVENOUS ONCE
Refills: 0 | Status: DISCONTINUED | OUTPATIENT
Start: 2023-06-06 | End: 2023-06-06

## 2023-06-06 RX ORDER — MANNITOL
75 POWDER (GRAM) MISCELLANEOUS ONCE
Refills: 0 | Status: COMPLETED | OUTPATIENT
Start: 2023-06-06 | End: 2023-06-06

## 2023-06-06 RX ADMIN — FENTANYL CITRATE 1.63 MICROGRAM(S)/KG/HR: 50 INJECTION INTRAVENOUS at 15:45

## 2023-06-06 RX ADMIN — ETOMIDATE 20 MILLIGRAM(S): 2 INJECTION INTRAVENOUS at 14:09

## 2023-06-06 RX ADMIN — FENTANYL CITRATE 50 MICROGRAM(S): 50 INJECTION INTRAVENOUS at 14:20

## 2023-06-06 RX ADMIN — PROPOFOL 20 MILLIGRAM(S): 10 INJECTION, EMULSION INTRAVENOUS at 14:56

## 2023-06-06 RX ADMIN — SODIUM CHLORIDE 2000 MILLILITER(S): 9 INJECTION INTRAMUSCULAR; INTRAVENOUS; SUBCUTANEOUS at 19:13

## 2023-06-06 RX ADMIN — MIDAZOLAM HYDROCHLORIDE 2 MILLIGRAM(S): 1 INJECTION, SOLUTION INTRAMUSCULAR; INTRAVENOUS at 14:14

## 2023-06-06 RX ADMIN — DEXMEDETOMIDINE HYDROCHLORIDE IN 0.9% SODIUM CHLORIDE 3.82 MICROGRAM(S)/KG/HR: 4 INJECTION INTRAVENOUS at 23:37

## 2023-06-06 RX ADMIN — PROPOFOL 5.85 MICROGRAM(S)/KG/MIN: 10 INJECTION, EMULSION INTRAVENOUS at 15:46

## 2023-06-06 RX ADMIN — Medication 100 MILLIGRAM(S): at 14:09

## 2023-06-06 RX ADMIN — SUGAMMADEX 800 MILLIGRAM(S): 100 INJECTION, SOLUTION INTRAVENOUS at 14:54

## 2023-06-06 RX ADMIN — Medication 100 MILLIGRAM(S): at 22:49

## 2023-06-06 RX ADMIN — SODIUM CHLORIDE 1000 MILLILITER(S): 9 INJECTION INTRAMUSCULAR; INTRAVENOUS; SUBCUTANEOUS at 22:51

## 2023-06-06 RX ADMIN — SODIUM CHLORIDE 50 MILLILITER(S): 5 INJECTION, SOLUTION INTRAVENOUS at 23:39

## 2023-06-06 RX ADMIN — NICARDIPINE HYDROCHLORIDE 25 MG/HR: 30 CAPSULE, EXTENDED RELEASE ORAL at 15:46

## 2023-06-06 RX ADMIN — LEVETIRACETAM 1000 MILLIGRAM(S): 250 TABLET, FILM COATED ORAL at 14:55

## 2023-06-06 RX ADMIN — Medication 1125 GRAM(S): at 15:11

## 2023-06-06 NOTE — ED PROVIDER NOTE - OBJECTIVE STATEMENT
39 yo F presenting to ED via [employer], found on ground obtunded by family at home. Unknown down time. Intubation attempted in field w/o success. VS stable during transport, tachycardic, adequate O2 sats per EMS. Obtunded on arrival to ED. S/p etomidate and 10 mg versed in field. Hx limited per pt and due to acuity of illness.

## 2023-06-06 NOTE — DISCHARGE NOTE NURSING/CASE MANAGEMENT/SOCIAL WORK - NSDCPEFALRISK_GEN_ALL_CORE
For information on Fall & Injury Prevention, visit: [email or web address] OR  [email or web address] OR  https://www.cdc.gov/steadi/patient.html

## 2023-06-06 NOTE — ED PROVIDER NOTE - PROGRESS NOTE DETAILS
Mallampati 2
dw wife ands sister severity of prognosis - not on ivf cureently no drug use- lmp current

## 2023-06-06 NOTE — DISCHARGE NOTE NURSING/CASE MANAGEMENT/SOCIAL WORK - PATIENT PORTAL LINK FT
You can access the FollowMyHealth Patient Portal offered by [facility] by registering at the following website: [email or web address]. By joining "[employer]’s FollowMyHealth portal, you will also be able to view your health information using other applications (apps) compatible with our system.

## 2023-06-06 NOTE — DISCHARGE NOTE NURSING/CASE MANAGEMENT/SOCIAL WORK - NSDCPECAREEDUMAT _GEN_ALL_CORE
Spoke with mother about needing appointment  One refill given    Adriana Casanova,RN BSN  [facility]  [phone]     Stroke

## 2023-06-06 NOTE — ED PROVIDER NOTE - PHYSICAL EXAMINATION
Gen: Obtunded, sonorous respirations.   HEENT: Scant blood to anterior oropharynx, no active bleeding. Otherwise atraumatic. Mucous membranes moist.  CV: Tachy. No significant LE edema.   Resp: Spontaneous respirations. CTAB. No tracheal deviation.   GI: Abdomen non distended, soft.  Skin/MSK: Thorax atraumatic. T and L spine w/o deformity/step off. Pelvis stable. No crepitus on exam. No obvious extremity trauma. DP/PT and radial pulses 2+ bilat.   Neuro: Pupils ERRL. Unable to follow commands. Extensor posturing. No response to painful stimuli. GCS 4.  Psych: Obtunded.

## 2023-06-06 NOTE — ED ADULT NURSE NOTE - OBJECTIVE STATEMENT
39 y/o female brought to [facility] viz helicopter. As per EMS patient LKW was 11pm on 6/5/2023, found down by her family around 1pm on 6/6. As per EMS 10 of versed and 20 of etomidate was given by EMS prior to helicopter arrival, patient was unresponsive and had posturing noted at scene, code stroke activated at 1401. Unknown PMH. No obvious trauma to patient's body. PERRL. Patient unresponsive to pain.     Intubated at 1411, 20 at the lip, positive color change, b/l breath sounds.

## 2023-06-06 NOTE — DISCHARGE NOTE NURSING/CASE MANAGEMENT/SOCIAL WORK - NSTOBACCONEVERSMOKERY/N_GEN_A
Pediatric Protocol: Asthma Assessment      Patient  Dewayne Sommer     12 y.o.   female     [birth year]2018  4:14 AM    Breath Sounds Pre Procedure: Right Breath Sounds: Coarse                               Left Breath Sounds: Coarse    Breath Sounds Post Procedure: Right Breath Sounds: Coarse                                 Left Breath Sounds: Coarse    Breathing pattern: Pre procedure Breathing Pattern: Regular          Post procedure Breathing Pattern: Regular    Heart Rate: Pre procedure Pulse: 104           Post procedure Pulse: 110    Resp Rate: Pre procedure Respirations: 20           Post procedure Respirations: 22    MCAS Score: ASSESSMENT  Assessment : MCAS  Air Exchange: Normal  Accessory Muscle: None  Wheeze: None  Dyspnea: None  I:E Ratio (MCAS Only): Normal  Total: 0      Peak Flow: Pre bronchodilator             Post bronchodilator       Incentive Spirometry:             Cough: Pre procedure Cough: Non-productive               Post procedure Cough: Non-productive    Suctioned: NO    Sputum: Pre procedure                   Post procedure      Oxygen: . O2 Device: Room air   FiO2 (%) 21%     Changed: NO    SpO2: Pre procedure SpO2: 99 %   without oxygen              Post procedure SpO2: 96 %  without oxygen    Nebulizer Therapy: Current medications Aerosolized Medications: Albuterol      Changed: NO    Problem List:   Patient Active Problem List   Diagnosis Code    Asthma with status asthmaticus J45.902    Hypokalemia E87.6         Respiratory Therapist: Felipe Rosenberg RT Yes

## 2023-06-06 NOTE — PROGRESS NOTE ADULT - SUBJECTIVE AND OBJECTIVE BOX
HPI:38F unknown PMHx found down at home. LKN possibly 11AM when family spoke with pt via phone. Code aviation and code stroke called for unresponsiveness and ?Right gaze palsy in the field. Pt immediately intubated and sedated upon arrival. Pt received Versed and rocuronium prior to intubation. CTH revealed massive Right-sided hemorrhage.    she was taken for emergent decompressive craniectomy     SURGERY: Decompressive craniectomy        ICU Vital Signs Last 24 Hrs  T(C): 36.5 (06 Jun 2023 18:30), Max: 37.2 (06 Jun 2023 14:01)  T(F): 97.7 (06 Jun 2023 18:30), Max: 99 (06 Jun 2023 14:01)  HR: 77 (06 Jun 2023 18:47) (75 - 153)  BP: 148/130 (06 Jun 2023 16:13) (135/112 - 148/130)  BP(mean): --  ABP: 117/68 (06 Jun 2023 18:45) (116/67 - 122/72)  ABP(mean): 84 (06 Jun 2023 18:45) (84 - 92)  RR: 12 (06 Jun 2023 18:45) (12 - 23)  SpO2: 100% (06 Jun 2023 18:47) (91% - 100%)    O2 Parameters below as of 06 Jun 2023 14:16  Patient On (Oxygen Delivery Method): ventilator           06-06 @ 07:01  -  06-06 @ 19:38  --------------------------------------------------------  IN: 75 mL / OUT: 140 mL / NET: -65 mL                             13.3   25.31 )-----------( 305      ( 06 Jun 2023 14:24 )             39.0    06-06    135  |  100  |  10  ----------------------------<  215<H>  3.1<L>   |  16<L>  |  0.52    Ca    9.2      06 Jun 2023 14:24    TPro  7.5  /  Alb  4.5  /  TBili  0.4  /  DBili  x   /  AST  23  /  ALT  14  /  AlkPhos  73  06-06   ABG - ( 06 Jun 2023 16:01 )  pH, Arterial: 7.40  pH, Blood: x     /  pCO2: 33    /  pO2: 230   / HCO3: 20    / Base Excess: -3.6  /  SaO2: 99.8                     PHYSICAL EXAM:    General: No Acute Distress     Neurological: SERGIO, has gag and cough, eyes midline     Pulmonary: Clear to Auscultation, No Rales, No Rhonchi, No Wheezes     Cardiovascular: S1, S2, Regular Rate and Rhythm     Gastrointestinal: Soft, Nontender, Nondistended     Extremities: No calf tenderness     Incision:       MEDICATIONS:  Antibiotics:   ceFAZolin   IVPB 2000 milliGRAM(s) IV Intermittent every 8 hours     Neurological:   levETIRAcetam  IVPB 500 milliGRAM(s) IV Intermittent every 12 hours  ondansetron Injectable 4 milliGRAM(s) IV Push every 6 hours PRN    Cardiac:   phenylephrine    Infusion 0.1 MICROgram(s)/kG/Min IV Continuous <Continuous>    Pulm:    Heme:     Other:   bisacodyl 5 milliGRAM(s) Oral daily PRN Constipation  bisacodyl Suppository 10 milliGRAM(s) Rectal once PRN Constipation  chlorhexidine 0.12% Liquid 15 milliLiter(s) Oral Mucosa every 12 hours  lactated ringers. 1000 milliLiter(s) IV Continuous <Continuous>  pantoprazole  Injectable 40 milliGRAM(s) IV Push daily  polyethylene glycol 3350 17 Gram(s) Oral daily  senna 2 Tablet(s) Oral at bedtime       DEVICES: [] Restraints [] HAROON/HMV []LD [] ET tube [] Trach [] Chest Tube [] A-line [] Trevino [] NGT [] Rectal Tube   Mode: AC/ CMV (Assist Control/ Continuous Mandatory Ventilation)  RR (machine): 12  TV (machine): 450  FiO2: 40  PEEP: 5  ITime: 1  MAP: 8  PIP: 18      LABS:  Na: 135 (06-06 @ 14:24)  K: 3.1 (06-06 @ 14:24)  Cl: 100 (06-06 @ 14:24)  CO2: 16 (06-06 @ 14:24)  BUN: 10 (06-06 @ 14:24)  Cr: 0.52 (06-06 @ 14:24)  Glu: 215(06-06 @ 14:24)    Hgb: 13.3 (06-06 @ 14:24)  Hct: 39.0 (06-06 @ 14:24)  WBC: 25.31 (06-06 @ 14:24)  Plt: 305 (06-06 @ 14:24)    INR: 1.03 06-06-23 @ 14:24  PTT: 26.5 06-06-23 @ 14:24            A/P:  right ICH with midline shift and brain compression s/p emergent decompressive craniectomy POD 0  CTA no vascular malformation, might need an angiogram   ICH score 4  Neuro: neuro checks q 1 hr  EVD at      , keep ICP< 22 mmhg, CPP> 60   brain edema start 2% at 50 ml /hr, BMP q 6 hr   CT post op improved midline shift   keppra 500 mg BID for seizure prophylaxis   Respiratory: acute respiratory failure, intubated   will get chest xray   Mode: AC/ CMV (Assist Control/ Continuous Mandatory Ventilation)  RR (machine): 12  TV (machine): 450  FiO2: 40  PEEP: 5  ITime: 1  MAP: 8  PIP: 18  CV: SBP goal 100-150 mmhg  Endocrine: sugar goal 120-180  Heme/Onc:    will get cbc post-op   Renal: see neuro  ID: perio - op ABX   GI: NG, NPO, pantoprazole while intubated   Social/Family: no family at bedside   Discharge planning:     Code Status: [x] Full Code [] DNR [] DNI [] Goals of Care:   Disposition: [x] ICU [] Stroke Unit [] RCU []PCU []Floor [] Discharge Home     Patient at high risk for neurologic deterioration, critical care time, excluding procedures: 45 minutes                    HPI:38F unknown PMHx found down at home. LKN possibly 11AM when family spoke with pt via phone. Code aviation and code stroke called for unresponsiveness and ?Right gaze palsy in the field. Pt immediately intubated and sedated upon arrival. Pt received Versed and rocuronium prior to intubation. CTH revealed massive Right-sided hemorrhage.    she was taken for emergent decompressive craniectomy     SURGERY: Decompressive craniectomy        ICU Vital Signs Last 24 Hrs  T(C): 36.5 (06 Jun 2023 18:30), Max: 37.2 (06 Jun 2023 14:01)  T(F): 97.7 (06 Jun 2023 18:30), Max: 99 (06 Jun 2023 14:01)  HR: 77 (06 Jun 2023 18:47) (75 - 153)  BP: 148/130 (06 Jun 2023 16:13) (135/112 - 148/130)  BP(mean): --  ABP: 117/68 (06 Jun 2023 18:45) (116/67 - 122/72)  ABP(mean): 84 (06 Jun 2023 18:45) (84 - 92)  RR: 12 (06 Jun 2023 18:45) (12 - 23)  SpO2: 100% (06 Jun 2023 18:47) (91% - 100%)    O2 Parameters below as of 06 Jun 2023 14:16  Patient On (Oxygen Delivery Method): ventilator           06-06 @ 07:01  -  06-06 @ 19:38  --------------------------------------------------------  IN: 75 mL / OUT: 140 mL / NET: -65 mL                             13.3   25.31 )-----------( 305      ( 06 Jun 2023 14:24 )             39.0    06-06    135  |  100  |  10  ----------------------------<  215<H>  3.1<L>   |  16<L>  |  0.52    Ca    9.2      06 Jun 2023 14:24    TPro  7.5  /  Alb  4.5  /  TBili  0.4  /  DBili  x   /  AST  23  /  ALT  14  /  AlkPhos  73  06-06   ABG - ( 06 Jun 2023 16:01 )  pH, Arterial: 7.40  pH, Blood: x     /  pCO2: 33    /  pO2: 230   / HCO3: 20    / Base Excess: -3.6  /  SaO2: 99.8                     PHYSICAL EXAM:    General: No Acute Distress     Neurological: SERGIO, has gag and cough, eyes midline , localizes in EU, extends in LE     Pulmonary: Clear to Auscultation, No Rales, No Rhonchi, No Wheezes     Cardiovascular: S1, S2, Regular Rate and Rhythm     Gastrointestinal: Soft, Nontender, Nondistended     Extremities: No calf tenderness     Incision:       MEDICATIONS:  Antibiotics:   ceFAZolin   IVPB 2000 milliGRAM(s) IV Intermittent every 8 hours     Neurological:   levETIRAcetam  IVPB 500 milliGRAM(s) IV Intermittent every 12 hours  ondansetron Injectable 4 milliGRAM(s) IV Push every 6 hours PRN    Cardiac:   phenylephrine    Infusion 0.1 MICROgram(s)/kG/Min IV Continuous <Continuous>    Pulm:    Heme:     Other:   bisacodyl 5 milliGRAM(s) Oral daily PRN Constipation  bisacodyl Suppository 10 milliGRAM(s) Rectal once PRN Constipation  chlorhexidine 0.12% Liquid 15 milliLiter(s) Oral Mucosa every 12 hours  lactated ringers. 1000 milliLiter(s) IV Continuous <Continuous>  pantoprazole  Injectable 40 milliGRAM(s) IV Push daily  polyethylene glycol 3350 17 Gram(s) Oral daily  senna 2 Tablet(s) Oral at bedtime       DEVICES: [] Restraints [] HAROON/HMV []LD [] ET tube [] Trach [] Chest Tube [] A-line [] Trevino [] NGT [] Rectal Tube   Mode: AC/ CMV (Assist Control/ Continuous Mandatory Ventilation)  RR (machine): 12  TV (machine): 450  FiO2: 40  PEEP: 5  ITime: 1  MAP: 8  PIP: 18      LABS:  Na: 135 (06-06 @ 14:24)  K: 3.1 (06-06 @ 14:24)  Cl: 100 (06-06 @ 14:24)  CO2: 16 (06-06 @ 14:24)  BUN: 10 (06-06 @ 14:24)  Cr: 0.52 (06-06 @ 14:24)  Glu: 215(06-06 @ 14:24)    Hgb: 13.3 (06-06 @ 14:24)  Hct: 39.0 (06-06 @ 14:24)  WBC: 25.31 (06-06 @ 14:24)  Plt: 305 (06-06 @ 14:24)    INR: 1.03 06-06-23 @ 14:24  PTT: 26.5 06-06-23 @ 14:24            A/P:  right ICH with midline shift and brain compression s/p emergent decompressive craniectomy POD 0  CTA no vascular malformation, might need an angiogram   ICH score 4  Neuro: neuro checks q 1 hr  EVD at 10 cm water, minimal output      , keep ICP< 22 mmhg, CPP> 60   brain edema start 2% at 50 ml /hr, BMP q 6 hr , sodium goal 140-145 in the coming 24 hrs   CT post op improved midline shift   keppra 500 mg BID for seizure prophylaxis   has a cervical collar, CT cervical spine pending official report , has a cervical collar, needs to be cleared by NS   CT head tomorrow   precedex for vent synchrony   Respiratory: acute respiratory failure, intubated   chest xray ET, NG in good position   Mode: AC/ CMV (Assist Control/ Continuous Mandatory Ventilation)  RR (machine): 12  TV (machine): 450  FiO2: 40  PEEP: 5  ITime: 1  MAP: 8  PIP: 18  CV: SBP goal 100-160 mmhg  on Phenylephine  will NS 1 L bolus   ECG    Endocrine: sugar goal 120-180  finger sticks and ISS   HBA1 c   Heme/Onc:   hgb slightly dropped will monitor   Renal: see neuro  ID: perio - op ABX   GI: NG, NPO, pantoprazole while intubated   Social/Family: no family at bedside   Discharge planning:     Code Status: [x] Full Code [] DNR [] DNI [] Goals of Care:   Disposition: [x] ICU [] Stroke Unit [] RCU []PCU []Floor [] Discharge Home     Patient at high risk for neurologic deterioration, critical care time, excluding procedures: 45 minutes                    HPI:38F unknown PMHx found down at home. LKN possibly 11AM when family spoke with pt via phone. Code aviation and code stroke called for unresponsiveness and ?Right gaze palsy in the field. Pt immediately intubated and sedated upon arrival. Pt received Versed and rocuronium prior to intubation. CTH revealed massive Right-sided hemorrhage.    she was taken for emergent decompressive craniectomy     SURGERY: Decompressive craniectomy        ICU Vital Signs Last 24 Hrs  T(C): 36.5 (06 Jun 2023 18:30), Max: 37.2 (06 Jun 2023 14:01)  T(F): 97.7 (06 Jun 2023 18:30), Max: 99 (06 Jun 2023 14:01)  HR: 77 (06 Jun 2023 18:47) (75 - 153)  BP: 148/130 (06 Jun 2023 16:13) (135/112 - 148/130)  BP(mean): --  ABP: 117/68 (06 Jun 2023 18:45) (116/67 - 122/72)  ABP(mean): 84 (06 Jun 2023 18:45) (84 - 92)  RR: 12 (06 Jun 2023 18:45) (12 - 23)  SpO2: 100% (06 Jun 2023 18:47) (91% - 100%)    O2 Parameters below as of 06 Jun 2023 14:16  Patient On (Oxygen Delivery Method): ventilator           06-06 @ 07:01  -  06-06 @ 19:38  --------------------------------------------------------  IN: 75 mL / OUT: 140 mL / NET: -65 mL                             13.3   25.31 )-----------( 305      ( 06 Jun 2023 14:24 )             39.0    06-06    135  |  100  |  10  ----------------------------<  215<H>  3.1<L>   |  16<L>  |  0.52    Ca    9.2      06 Jun 2023 14:24    TPro  7.5  /  Alb  4.5  /  TBili  0.4  /  DBili  x   /  AST  23  /  ALT  14  /  AlkPhos  73  06-06   ABG - ( 06 Jun 2023 16:01 )  pH, Arterial: 7.40  pH, Blood: x     /  pCO2: 33    /  pO2: 230   / HCO3: 20    / Base Excess: -3.6  /  SaO2: 99.8                     PHYSICAL EXAM:    General: No Acute Distress     Neurological: SERGIO, has gag and cough, eyes midline , localizes in right UE, extends left UE, , extends in LE     Pulmonary: Clear to Auscultation, No Rales, No Rhonchi, No Wheezes     Cardiovascular: S1, S2, Regular Rate and Rhythm     Gastrointestinal: Soft, Nontender, Nondistended     Extremities: No calf tenderness     Incision:       MEDICATIONS:  Antibiotics:   ceFAZolin   IVPB 2000 milliGRAM(s) IV Intermittent every 8 hours     Neurological:   levETIRAcetam  IVPB 500 milliGRAM(s) IV Intermittent every 12 hours  ondansetron Injectable 4 milliGRAM(s) IV Push every 6 hours PRN    Cardiac:   phenylephrine    Infusion 0.1 MICROgram(s)/kG/Min IV Continuous <Continuous>    Pulm:    Heme:     Other:   bisacodyl 5 milliGRAM(s) Oral daily PRN Constipation  bisacodyl Suppository 10 milliGRAM(s) Rectal once PRN Constipation  chlorhexidine 0.12% Liquid 15 milliLiter(s) Oral Mucosa every 12 hours  lactated ringers. 1000 milliLiter(s) IV Continuous <Continuous>  pantoprazole  Injectable 40 milliGRAM(s) IV Push daily  polyethylene glycol 3350 17 Gram(s) Oral daily  senna 2 Tablet(s) Oral at bedtime       DEVICES: [] Restraints [] HAROON/HMV []LD [] ET tube [] Trach [] Chest Tube [] A-line [] Trevino [] NGT [] Rectal Tube   Mode: AC/ CMV (Assist Control/ Continuous Mandatory Ventilation)  RR (machine): 12  TV (machine): 450  FiO2: 40  PEEP: 5  ITime: 1  MAP: 8  PIP: 18      LABS:  Na: 135 (06-06 @ 14:24)  K: 3.1 (06-06 @ 14:24)  Cl: 100 (06-06 @ 14:24)  CO2: 16 (06-06 @ 14:24)  BUN: 10 (06-06 @ 14:24)  Cr: 0.52 (06-06 @ 14:24)  Glu: 215(06-06 @ 14:24)    Hgb: 13.3 (06-06 @ 14:24)  Hct: 39.0 (06-06 @ 14:24)  WBC: 25.31 (06-06 @ 14:24)  Plt: 305 (06-06 @ 14:24)    INR: 1.03 06-06-23 @ 14:24  PTT: 26.5 06-06-23 @ 14:24            A/P:  right ICH with midline shift and brain compression s/p emergent decompressive craniectomy POD 0  CTA no vascular malformation, might need an angiogram   ICH score 4  Neuro: neuro checks q 1 hr  EVD at 10 cm water, minimal output      , keep ICP< 22 mmhg, CPP> 60   brain edema start 2% at 50 ml /hr, BMP q 6 hr , sodium goal 140-145 in the coming 24 hrs   CT post op improved midline shift   keppra 500 mg BID for seizure prophylaxis   has a cervical collar, CT cervical spine pending official report , has a cervical collar, needs to be cleared by NS   CT head tomorrow   precedex for vent synchrony   Respiratory: acute respiratory failure, intubated   chest xray ET, NG in good position   Mode: AC/ CMV (Assist Control/ Continuous Mandatory Ventilation)  RR (machine): 12  TV (machine): 450  FiO2: 40  PEEP: 5  ITime: 1  MAP: 8  PIP: 18  CV: SBP goal 100-160 mmhg  on Phenylephine  will NS 1 L bolus   ECG    Endocrine: sugar goal 120-180  finger sticks and ISS   HBA1 c   Heme/Onc:   hgb slightly dropped will monitor   Renal: see neuro  ID: perio - op ABX   GI: NG, NPO, pantoprazole while intubated   Social/Family: no family at bedside   Discharge planning:     Code Status: [x] Full Code [] DNR [] DNI [] Goals of Care:   Disposition: [x] ICU [] Stroke Unit [] RCU []PCU []Floor [] Discharge Home     Patient at high risk for neurologic deterioration, critical care time, excluding procedures: 45 minutes

## 2023-06-06 NOTE — CONSULT NOTE ADULT - ASSESSMENT
LKN unknown but possibly 1100 6/6/23   NIHSS 19  ICH SCORE 4  mRS 0    IMPRESSION:    RECOMMENDATIONS:   38F unknown PMHx found down at home. CBC notable for WBC 25.31. CMP showing potassium 3.1. Lactate 4.5. CTH showing very large Right frontal parenchymal hemorrhage with intraventricular extension and mild left-sided hydrocephalus, midline shift to Left. CTA w/o aneurysms or AVM. Pt not a candidate for tenecteplase due to hemorrhage and not a candidate for thrombectomy due to no LVO see on CTA H/N.     LKN unknown but possibly 1100 6/6/23   NIHSS 19  ICH score 4  mRS 0    IMPRESSION: Acute change in level of consciousness after being found down at home likely 2/2 massive right intraparenchymal hemorrhage due to unknown etiology. Consider vascular etiology such as aneurysms, AVM, hemorrhagic stroke     RECOMMENDATIONS:  [] Strict BP control goal <160/90 w/ Cardene drip if needed  [] NSx consult  [] Repeat CTH in 24hrs or prn for worsening mental status or neuro exam  [] Neurochecks and vitals Q2H  [] Hold AC/AP, use mechanical DVT prophylaxis for now  [] Tele-monitoring  [] MRI brain w/ and w/o cont to look for underlying lesions, malformations  [] MRA brain w/o contrast MRA neck w/ contrast  [] Check HgA1c, Lipid profile  [] PT/OT  [] TTE  [] Monitor for signs of increased intracranial pressure or herniation (stupor/coma, nausea, vomiting, acute hypertension with bradycardia, unilateral dilated pupil)  [] Keep T< 38.0° C (100.1° F) with acetaminophen and/or cooling blanket  [] BG  ml/dL  [] Hypertonic saline (2% or 3%) to target serum sodium 145-155mEq/L  [] Elevate head of bed to 30º      Case d/w stroke fellow.

## 2023-06-06 NOTE — ED PROCEDURE NOTE - NS ED PROCEUDURE1 POST INTUBATION REVIEW
Appropriate capnography/Breath sounds bilateral/Positive end tidal Co2 noted
Appropriate capnography/Breath sounds bilateral/Positive end tidal Co2 noted

## 2023-06-06 NOTE — H&P ADULT - ASSESSMENT
Critical, Ramila  38F Hx hypothyroid, smoker, undergoing in vitro fertilization found down [facility] 11PM 6/5/2023. Got rocuronium for intubation. CTH w/large R IPH w/IVH, CTA negative for aneurysms. Propofol paused and sugammadex ordered for better exam, currently: pupils 3RR b/l, no dolls eyes, no cough, no gag, no overbreath, no corneals, flaccid paralysis x4  -stat sugammadex was given to assess for neurologic function, upon re-assessment the patient was found to have 3RR pupils, +cough/gag/overbreaths and was extensor posturing x 4  -discussion was had with family regarding the high likelihood of a poor neurologic outcome including the risks and benefits and the family decided to proceed with emergent decompressive hemicraniectomy

## 2023-06-06 NOTE — ED ADULT NURSE NOTE - NSFALLRISKINTERV_ED_ALL_ED

## 2023-06-06 NOTE — ED PROVIDER NOTE - CLINICAL SUMMARY MEDICAL DECISION MAKING FREE TEXT BOX
dyllan - 39 f found unrespoinsiove in vomit at home as per ems attempt failed for intubatiuon in field arrived code aviatoin- informed that there was gaze prefoerence and posturing in field pt received versed and etomidate in attempt - no trauma no recent illness recent ivf- - on exam unresponisve gcs - postures to noxxious timuli no gaze preference no pupilary asymtery- - intubated with glidescope on 1st attemtp started on propofol for sewdation direct ot ct - with large ich with intreaventric invt- nsx at bedside wants ramiro reversed fro exam - -likely operative intervention

## 2023-06-06 NOTE — ED PROCEDURE NOTE - CPROC ED INDICATIONS1
airway protection/critical patient/mental status change
airway protection/critical patient/respiratory failure

## 2023-06-06 NOTE — CONSULT NOTE ADULT - SUBJECTIVE AND OBJECTIVE BOX
Neurology - Consult Note    Spectra: [phone] ([facility]), [phone] ([facility])    HPI:  38F unknown PMHx found down at home. LKN possibly 11AM when family spoke with pt. Code aviation and code stroke called for unresponsiveness and ?Right gaze palsy in the field.     Review of Systems: unable to obtain due to pt mental status       Allergies:  Allergy Status Unknown      PMHx/PSHx/Family Hx: As above, otherwise see below       Social Hx:  Never smoker; no current use of tobacco, alcohol, or illicit drugs  Lives with ***; occupation ***, baseline functional status is ***    Medications:  MEDICATIONS  (STANDING):    MEDICATIONS  (PRN):      Vitals:  T(C): 37.2 (06-06-23 @ 14:16), Max: 37.2 (06-06-23 @ 14:16)  HR: 126 (06-06-23 @ 14:10) (126 - 153)  BP: 148/130 (06-06-23 @ 14:16) (148/130 - 148/130)  RR: 23 (06-06-23 @ 14:10) (14 - 23)  SpO2: 100% (06-06-23 @ 14:16) (91% - 100%)    Physical Examination: INCOMPLETE  General - non-toxic appearing male/female in no acute distress  Cardiovascular - peripheral pulses palpable, no edema  Respiratory - breathing comfortably with no increased work of breathing    Neurologic Exam:  Mental status - Awake, Alert, Oriented to person, place, and time. Speech fluent, repetition and naming intact. Follows simple and complex commands. Attention/concentration, recent and remote memory (including registration 3/3 and recall 3/3), and fund of knowledge intact    Cranial nerves - PERRLA (4mm -> 3mm b/l), VFF, EOMI, face sensation (V1-V3) intact b/l, facial strength intact without asymmetry b/l, hearing intact b/l, palate with symmetric elevation, trapezius OR sternocleidomastiod 5/5 strength b/l, tongue midline on protrusion with full lateral movement    Motor - Normal bulk and tone throughout. No pronator drift.    Strength testing            Deltoid      Biceps      Triceps     Wrist Extension    Wrist Flexion     Interossei         R            5                 5               5                     5                              5                        5                 5  L             5                 5               5                     5                              5                        5                 5              Hip Flexion    Hip Extension    Knee Flexion    Knee Extension    Dorsiflexion    Plantar Flexion  R              5                         5                       5                           5                            5                          5  L              5                         5                        5                           5                            5                          5    Sensation - Light touch/temperature OR pain/vibration intact throughout    DTR's -             Biceps      Triceps     Brachioradialis      Patellar    Ankle    Toes/plantar response  R             2+             2+                  2+                       2+            2+                 Down  L              2+             2+                 2+                        2+           2+                 Down    Coordination - Finger to Nose intact b/l. No tremors appreciated    Gait and station - Normal casual gait. Romberg (-)    Labs:          CAPILLARY BLOOD GLUCOSE      POCT Blood Glucose.: 200 mg/dL (06 Jun 2023 14:08)          CSF:                  Radiology:     Neurology - Consult Note    Spectra: [phone] ([facility]), [phone] ([facility])    HPI:  38F unknown PMHx found down at home. LKN possibly 11AM when family spoke with pt. Code aviation and code stroke called for unresponsiveness and ?Right gaze palsy in the field.     Review of Systems: unable to obtain due to pt mental status     Allergies:  Allergy Status Unknown    PMHx/PSHx/Family Hx: As above, otherwise see below       Social Hx:  unknown     Medications:  MEDICATIONS  (STANDING):    MEDICATIONS  (PRN):    Vitals:  T(C): 37.2 (06-06-23 @ 14:16), Max: 37.2 (06-06-23 @ 14:16)  HR: 126 (06-06-23 @ 14:10) (126 - 153)  BP: 148/130 (06-06-23 @ 14:16) (148/130 - 148/130)  RR: 23 (06-06-23 @ 14:10) (14 - 23)  SpO2: 100% (06-06-23 @ 14:16) (91% - 100%)    Physical Examination:  General - intubated and sedated   Cardiovascular - peripheral pulses palpable, no edema  Respiratory - intubated on ventilator     Neurologic Exam:  Mental status - Eyes closed and does not respond to verbal, tactile, and noxious stimuli. Currently sedated and intubated.     Cranial nerves - PERRL, VFF, EOMI, face sensation (V1-V3) intact b/l, facial strength intact without asymmetry b/l, hearing intact b/l, palate with symmetric elevation, trapezius OR sternocleidomastiod 5/5 strength b/l, tongue midline on protrusion with full lateral movement    Motor - Normal bulk and tone throughout. No pronator drift.    Strength testing            Deltoid      Biceps      Triceps     Wrist Extension    Wrist Flexion     Interossei         R            5                 5               5                     5                              5                        5                 5  L             5                 5               5                     5                              5                        5                 5              Hip Flexion    Hip Extension    Knee Flexion    Knee Extension    Dorsiflexion    Plantar Flexion  R              5                         5                       5                           5                            5                          5  L              5                         5                        5                           5                            5                          5    Sensation - Light touch/temperature OR pain/vibration intact throughout    DTR's -             Biceps      Triceps     Brachioradialis      Patellar    Ankle    Toes/plantar response  R             2+             2+                  2+                       2+            2+                 Down  L              2+             2+                 2+                        2+           2+                 Down    Coordination - Finger to Nose intact b/l. No tremors appreciated    Gait and station - Normal casual gait. Romberg (-)    Labs:          CAPILLARY BLOOD GLUCOSE      POCT Blood Glucose.: 200 mg/dL (06 Jun 2023 14:08)          CSF:                  Radiology:     Neurology - Consult Note    Spectra: [phone] ([facility]), [phone] ([facility])    HPI:  38F unknown PMHx found down at home. LKN possibly 11AM when family spoke with pt via phone. Code aviation and code stroke called for unresponsiveness and ?Right gaze palsy in the field. Pt immediately intubated and sedated upon arrival. Pt received Versed and rocuronium prior to intubation. CTH revealed massive Right-sided hemorrhage.        Allergies:  Allergy Status Unknown    PMHx/PSHx/Family Hx: As above, otherwise see below     Social Hx:  unknown     Medications:  MEDICATIONS  (STANDING):    MEDICATIONS  (PRN):    Vitals:  T(C): 37.2 (06-06-23 @ 14:16), Max: 37.2 (06-06-23 @ 14:16)  HR: 126 (06-06-23 @ 14:10) (126 - 153)  BP: 148/130 (06-06-23 @ 14:16) (148/130 - 148/130)  RR: 23 (06-06-23 @ 14:10) (14 - 23)  SpO2: 100% (06-06-23 @ 14:16) (91% - 100%)    Physical Examination:  General - intubated and sedated   Cardiovascular - peripheral pulses palpable, no edema  Respiratory - intubated on ventilator     Neurologic Exam:  Mental status - Eyes closed and does not respond to verbal, tactile, and noxious stimuli. Currently sedated and intubated.     Cranial nerves - PERRL (sluggishly reactive), no BTT, no overt facial asymmetry     Motor - Normal bulk but flaccid tone throughout     Sensation - Does not grimace to painful stimuli     Coordination - unable to assess due to pt mental status     Gait and station - unable to assess due to pt mental status       Labs:                       13.3   25.31 )-----------( 305      ( 06 Jun 2023 14:24 )             39.0     06-06    135  |  100  |  10  ----------------------------<  215<H>  3.1<L>   |  16<L>  |  0.52    Ca    9.2      06 Jun 2023 14:24    TPro  7.5  /  Alb  4.5  /  TBili  0.4  /  DBili  x   /  AST  23  /  ALT  14  /  AlkPhos  73  06-06    PT/INR - ( 06 Jun 2023 14:24 )   PT: 11.8 sec;   INR: 1.03 ratio      PTT - ( 06 Jun 2023 14:24 )  PTT:26.5 sec    CAPILLARY BLOOD GLUCOSE  POCT Blood Glucose.: 200 mg/dL (06 Jun 2023 14:08)    Radiology:  < from: CT Brain Stroke Protocol (06.06.23 @ 14:53) >  IMPRESSION: Very large right frontal parenchymal hemorrhage with   intraventricular extension and mild left-sided hydrocephalus, midline   shift to the left. CTA of the head and neck reveals no aneurysms or AVM.   However the large parenchymal hemorrhage may compress an underlying   vascular malformation or fistula.    < end of copied text >

## 2023-06-07 LAB
A1C WITH ESTIMATED AVERAGE GLUCOSE RESULT: 5.3 % — SIGNIFICANT CHANGE UP (ref 4–5.6)
ANION GAP SERPL CALC-SCNC: 11 MMOL/L — SIGNIFICANT CHANGE UP (ref 5–17)
APPEARANCE UR: ABNORMAL
BACTERIA # UR AUTO: NEGATIVE — SIGNIFICANT CHANGE UP
BILIRUB UR-MCNC: NEGATIVE — SIGNIFICANT CHANGE UP
BUN SERPL-MCNC: 4 MG/DL — LOW (ref 7–23)
BUN SERPL-MCNC: 5 MG/DL — LOW (ref 7–23)
BUN SERPL-MCNC: 5 MG/DL — LOW (ref 7–23)
CALCIUM SERPL-MCNC: 7.8 MG/DL — LOW (ref 8.4–10.5)
CALCIUM SERPL-MCNC: 7.9 MG/DL — LOW (ref 8.4–10.5)
CALCIUM SERPL-MCNC: 8.1 MG/DL — LOW (ref 8.4–10.5)
CHLORIDE SERPL-SCNC: 107 MMOL/L — SIGNIFICANT CHANGE UP (ref 96–108)
CHLORIDE SERPL-SCNC: 108 MMOL/L — SIGNIFICANT CHANGE UP (ref 96–108)
CHLORIDE SERPL-SCNC: 115 MMOL/L — HIGH (ref 96–108)
CO2 SERPL-SCNC: 20 MMOL/L — LOW (ref 22–31)
CO2 SERPL-SCNC: 20 MMOL/L — LOW (ref 22–31)
CO2 SERPL-SCNC: 21 MMOL/L — LOW (ref 22–31)
COLOR SPEC: SIGNIFICANT CHANGE UP
CREAT SERPL-MCNC: 0.41 MG/DL — LOW (ref 0.5–1.3)
CREAT SERPL-MCNC: 0.43 MG/DL — LOW (ref 0.5–1.3)
CREAT SERPL-MCNC: 0.46 MG/DL — LOW (ref 0.5–1.3)
DIFF PNL FLD: ABNORMAL
EGFR: 125 ML/MIN/1.73M2 — SIGNIFICANT CHANGE UP
EGFR: 127 ML/MIN/1.73M2 — SIGNIFICANT CHANGE UP
EGFR: 128 ML/MIN/1.73M2 — SIGNIFICANT CHANGE UP
EPI CELLS # UR: 4 — SIGNIFICANT CHANGE UP
ESTIMATED AVERAGE GLUCOSE: 105 MG/DL — SIGNIFICANT CHANGE UP (ref 68–114)
GLUCOSE BLDC GLUCOMTR-MCNC: 117 MG/DL — HIGH (ref 70–99)
GLUCOSE BLDC GLUCOMTR-MCNC: 136 MG/DL — HIGH (ref 70–99)
GLUCOSE BLDC GLUCOMTR-MCNC: 142 MG/DL — HIGH (ref 70–99)
GLUCOSE BLDC GLUCOMTR-MCNC: 169 MG/DL — HIGH (ref 70–99)
GLUCOSE SERPL-MCNC: 134 MG/DL — HIGH (ref 70–99)
GLUCOSE SERPL-MCNC: 134 MG/DL — HIGH (ref 70–99)
GLUCOSE SERPL-MCNC: 150 MG/DL — HIGH (ref 70–99)
GLUCOSE UR QL: ABNORMAL
HYALINE CASTS # UR AUTO: 4 /LPF — HIGH (ref 0–2)
KETONES UR-MCNC: SIGNIFICANT CHANGE UP
LEUKOCYTE ESTERASE UR-ACNC: NEGATIVE — SIGNIFICANT CHANGE UP
MAGNESIUM SERPL-MCNC: 1.6 MG/DL — SIGNIFICANT CHANGE UP (ref 1.6–2.6)
MAGNESIUM SERPL-MCNC: 2 MG/DL — SIGNIFICANT CHANGE UP (ref 1.6–2.6)
MAGNESIUM SERPL-MCNC: 2.2 MG/DL — SIGNIFICANT CHANGE UP (ref 1.6–2.6)
MRSA PCR RESULT.: SIGNIFICANT CHANGE UP
NITRITE UR-MCNC: NEGATIVE — SIGNIFICANT CHANGE UP
PH UR: 8.5 — HIGH (ref 5–8)
PHOSPHATE SERPL-MCNC: 1 MG/DL — CRITICAL LOW (ref 2.5–4.5)
PHOSPHATE SERPL-MCNC: 1.9 MG/DL — LOW (ref 2.5–4.5)
PHOSPHATE SERPL-MCNC: 2.7 MG/DL — SIGNIFICANT CHANGE UP (ref 2.5–4.5)
POTASSIUM SERPL-MCNC: 3.6 MMOL/L — SIGNIFICANT CHANGE UP (ref 3.5–5.3)
POTASSIUM SERPL-SCNC: 3.6 MMOL/L — SIGNIFICANT CHANGE UP (ref 3.5–5.3)
PROT UR-MCNC: ABNORMAL
RBC CASTS # UR COMP ASSIST: 8 /HPF — SIGNIFICANT CHANGE UP (ref 0–4)
S AUREUS DNA NOSE QL NAA+PROBE: SIGNIFICANT CHANGE UP
SODIUM SERPL-SCNC: 139 MMOL/L — SIGNIFICANT CHANGE UP (ref 135–145)
SODIUM SERPL-SCNC: 139 MMOL/L — SIGNIFICANT CHANGE UP (ref 135–145)
SODIUM SERPL-SCNC: 146 MMOL/L — HIGH (ref 135–145)
SP GR SPEC: 1.02 — SIGNIFICANT CHANGE UP (ref 1.01–1.02)
UROBILINOGEN FLD QL: NEGATIVE — SIGNIFICANT CHANGE UP
WBC UR QL: 5 /HPF — SIGNIFICANT CHANGE UP (ref 0–5)

## 2023-06-07 PROCEDURE — 93970 EXTREMITY STUDY: CPT | Mod: 26

## 2023-06-07 PROCEDURE — 99291 CRITICAL CARE FIRST HOUR: CPT

## 2023-06-07 PROCEDURE — 99223 1ST HOSP IP/OBS HIGH 75: CPT

## 2023-06-07 PROCEDURE — 70450 CT HEAD/BRAIN W/O DYE: CPT | Mod: 26

## 2023-06-07 RX ORDER — SODIUM CHLORIDE 5 G/100ML
1000 INJECTION, SOLUTION INTRAVENOUS
Refills: 0 | Status: DISCONTINUED | OUTPATIENT
Start: 2023-06-07 | End: 2023-06-07

## 2023-06-07 RX ORDER — ACETAMINOPHEN 500 MG
1000 TABLET ORAL ONCE
Refills: 0 | Status: COMPLETED | OUTPATIENT
Start: 2023-06-07 | End: 2023-06-07

## 2023-06-07 RX ORDER — CHLORHEXIDINE GLUCONATE 213 G/1000ML
1 SOLUTION TOPICAL DAILY
Refills: 0 | Status: DISCONTINUED | OUTPATIENT
Start: 2023-06-07 | End: 2023-06-19

## 2023-06-07 RX ORDER — POTASSIUM CHLORIDE 20 MEQ
40 PACKET (EA) ORAL ONCE
Refills: 0 | Status: COMPLETED | OUTPATIENT
Start: 2023-06-07 | End: 2023-06-07

## 2023-06-07 RX ORDER — MAGNESIUM SULFATE 500 MG/ML
2 VIAL (ML) INJECTION ONCE
Refills: 0 | Status: COMPLETED | OUTPATIENT
Start: 2023-06-07 | End: 2023-06-07

## 2023-06-07 RX ORDER — LEVOTHYROXINE SODIUM 125 MCG
75 TABLET ORAL DAILY
Refills: 0 | Status: DISCONTINUED | OUTPATIENT
Start: 2023-06-07 | End: 2023-06-26

## 2023-06-07 RX ORDER — NICOTINE POLACRILEX 2 MG
1 GUM BUCCAL DAILY
Refills: 0 | Status: DISCONTINUED | OUTPATIENT
Start: 2023-06-07 | End: 2023-06-26

## 2023-06-07 RX ORDER — OXYCODONE HYDROCHLORIDE 5 MG/1
5 TABLET ORAL EVERY 4 HOURS
Refills: 0 | Status: DISCONTINUED | OUTPATIENT
Start: 2023-06-07 | End: 2023-06-13

## 2023-06-07 RX ORDER — CALCIUM GLUCONATE 100 MG/ML
2 VIAL (ML) INTRAVENOUS ONCE
Refills: 0 | Status: COMPLETED | OUTPATIENT
Start: 2023-06-07 | End: 2023-06-07

## 2023-06-07 RX ORDER — POTASSIUM PHOSPHATE, MONOBASIC POTASSIUM PHOSPHATE, DIBASIC 236; 224 MG/ML; MG/ML
30 INJECTION, SOLUTION INTRAVENOUS ONCE
Refills: 0 | Status: COMPLETED | OUTPATIENT
Start: 2023-06-07 | End: 2023-06-07

## 2023-06-07 RX ORDER — SODIUM CHLORIDE 5 G/100ML
1000 INJECTION, SOLUTION INTRAVENOUS
Refills: 0 | Status: DISCONTINUED | OUTPATIENT
Start: 2023-06-07 | End: 2023-06-08

## 2023-06-07 RX ADMIN — Medication 40 MILLIEQUIVALENT(S): at 03:07

## 2023-06-07 RX ADMIN — POLYETHYLENE GLYCOL 3350 17 GRAM(S): 17 POWDER, FOR SOLUTION ORAL at 12:07

## 2023-06-07 RX ADMIN — OXYCODONE HYDROCHLORIDE 5 MILLIGRAM(S): 5 TABLET ORAL at 21:46

## 2023-06-07 RX ADMIN — Medication 25 GRAM(S): at 03:07

## 2023-06-07 RX ADMIN — OXYCODONE HYDROCHLORIDE 5 MILLIGRAM(S): 5 TABLET ORAL at 22:30

## 2023-06-07 RX ADMIN — SODIUM CHLORIDE 50 MILLILITER(S): 5 INJECTION, SOLUTION INTRAVENOUS at 11:00

## 2023-06-07 RX ADMIN — Medication 1: at 17:23

## 2023-06-07 RX ADMIN — Medication 1 PATCH: at 12:08

## 2023-06-07 RX ADMIN — CHLORHEXIDINE GLUCONATE 15 MILLILITER(S): 213 SOLUTION TOPICAL at 05:47

## 2023-06-07 RX ADMIN — Medication 400 MILLIGRAM(S): at 11:00

## 2023-06-07 RX ADMIN — DEXMEDETOMIDINE HYDROCHLORIDE IN 0.9% SODIUM CHLORIDE 3.82 MICROGRAM(S)/KG/HR: 4 INJECTION INTRAVENOUS at 07:13

## 2023-06-07 RX ADMIN — POTASSIUM PHOSPHATE, MONOBASIC POTASSIUM PHOSPHATE, DIBASIC 83.33 MILLIMOLE(S): 236; 224 INJECTION, SOLUTION INTRAVENOUS at 15:00

## 2023-06-07 RX ADMIN — LEVETIRACETAM 400 MILLIGRAM(S): 250 TABLET, FILM COATED ORAL at 05:47

## 2023-06-07 RX ADMIN — Medication 400 MILLIGRAM(S): at 18:00

## 2023-06-07 RX ADMIN — PHENYLEPHRINE HYDROCHLORIDE 2.87 MICROGRAM(S)/KG/MIN: 10 INJECTION INTRAVENOUS at 07:13

## 2023-06-07 RX ADMIN — SENNA PLUS 2 TABLET(S): 8.6 TABLET ORAL at 21:46

## 2023-06-07 RX ADMIN — LEVETIRACETAM 400 MILLIGRAM(S): 250 TABLET, FILM COATED ORAL at 17:22

## 2023-06-07 RX ADMIN — PANTOPRAZOLE SODIUM 40 MILLIGRAM(S): 20 TABLET, DELAYED RELEASE ORAL at 12:11

## 2023-06-07 RX ADMIN — CHLORHEXIDINE GLUCONATE 1 APPLICATION(S): 213 SOLUTION TOPICAL at 12:01

## 2023-06-07 RX ADMIN — Medication 1 PATCH: at 19:00

## 2023-06-07 RX ADMIN — Medication 200 GRAM(S): at 03:07

## 2023-06-07 RX ADMIN — Medication 100 MILLIGRAM(S): at 05:47

## 2023-06-07 RX ADMIN — CHLORHEXIDINE GLUCONATE 15 MILLILITER(S): 213 SOLUTION TOPICAL at 17:23

## 2023-06-07 RX ADMIN — SODIUM CHLORIDE 50 MILLILITER(S): 5 INJECTION, SOLUTION INTRAVENOUS at 07:13

## 2023-06-07 NOTE — PROGRESS NOTE ADULT - SUBJECTIVE AND OBJECTIVE BOX
Patient seen and examined at bedside.    --Anticoagulation--    T(C): 36.6 (06-07-23 @ 03:00), Max: 37.2 (06-06-23 @ 14:01)  HR: 71 (06-07-23 @ 04:15) (61 - 153)  BP: 98/68 (06-06-23 @ 19:00) (98/68 - 148/130)  RR: 14 (06-07-23 @ 04:15) (12 - 23)  SpO2: 100% (06-07-23 @ 04:15) (91% - 100%)  Wt(kg): --    Exam: Intubated, no EO, pupils 4R, has cough/gag/corneals, RUE localizes, LUE extends, BLE withdraw

## 2023-06-07 NOTE — SPEECH LANGUAGE PATHOLOGY EVALUATION - COMMENTS
hx con't  6/6 s/p emergent hemicraniectomy with placement of EVD. Pt intubated for surgery.  6/7 plan for cerebral angiogram in AM. Per neuro rounds, awaiting helmet, and moving R side.     CTH 6/7  This exams compare prior head CT performed on June 6, 2023. This exam is somewhat limited by motion and streak artifact from staples.. Postop changes compatible with a right temporal frontal parietal  craniectomy is identified. There is evidence of right frontal shunt catheter again seen and unchanged in position. Right frontal extra-axial air is identified which is related postop changes. Acute subarachnoid hemorrhage in the postoperative region as well as the interpeduncular cistern region are again identified and unchanged. The overall size and configuration of ventricles appear unchanged. No hydrocephalus seen. Intraventricular hemorrhage is seen. Extra-axial soft tissue swelling and small foci of air seen in postoperative region. Small acute subdural hematoma seen in postoperative region which measures approximately 0.5 cm widest diameter. This finding is new when compared prior exam. Extra calvarial staples seen involving the high right temporal/posterior fossa and right frontal and parietal region.    No prior SLP exams in [facility] or [facility] in PACS

## 2023-06-07 NOTE — CHART NOTE - NSCHARTNOTEFT_GEN_A_CORE
CAPRINI SCORE [CLOT] Score on Admission for     AGE RELATED RISK FACTORS                                                       MOBILITY RELATED FACTORS  [ ] Age 41-60 years                                            (1 Point)                  [ ] Bed rest                                                        (1 Point)  [ ] Age: 61-74 years                                           (2 Points)                 [ ] Plaster cast                                                   (2 Points)  [ ] Age= 75 years                                              (3 Points)                 [ ] Bed bound for more than 72 hours                 (2 Points)    DISEASE RELATED RISK FACTORS                                               GENDER SPECIFIC FACTORS  [ ] Edema in the lower extremities                       (1 Point)                  [ ] Pregnancy                                                     (1 Point)  [ ] Varicose veins                                               (1 Point)                  [ ] Post-partum < 6 weeks                                   (1 Point)             [x ] BMI > 25 Kg/m2                                            (1 Point)                  [ ] Hormonal therapy  or oral contraception          (1 Point)                 [ ] Sepsis (in the previous month)                        (1 Point)                  [ ] History of pregnancy complications                 (1 point)  [ ] Pneumonia or serious lung disease                                               [ ] Unexplained or recurrent                     (1 Point)           (in the previous month)                               (1 Point)  [ ] Abnormal pulmonary function test                     (1 Point)                 SURGERY RELATED RISK FACTORS (include planned surgeries)  [ ] Acute myocardial infarction                              (1 Point)                 [ ]  Section                                             (1 Point)  [ ] Congestive heart failure (in the previous month)  (1 Point)         [ ] Minor surgery                                                  (1 Point)   [ ] Inflammatory bowel disease                             (1 Point)                 [ ] Arthroscopic surgery                                        (2 Points)  [ ] Central venous access                                      (2 Points)                [x ] General surgery lasting more than 45 minutes   (2 Points)       [ ] Stroke (in the previous month)                          (5 Points)               [ ] Elective arthroplasty                                         (5 Points)            [ ] current or past malignancy                              (2 Points)                                                                                                       HEMATOLOGY RELATED FACTORS                                                 TRAUMA RELATED RISK FACTORS  [ ] Prior episodes of VTE                                     (3 Points)                [ ] Fracture of the hip, pelvis, or leg                       (5 Points)  [ ] Positive family history for VTE                         (3 Points)                 [ ] Acute spinal cord injury (in the previous month)  (5 Points)  [ ] Prothrombin 55093 A                                     (3 Points)                 [ ] Paralysis  (less than 1 month)                             (5 Points)  [ ] Factor V Leiden                                             (3 Points)                  [ ] Multiple Trauma within 1 month                        (5 Points)  [ ] Lupus anticoagulants                                     (3 Points)                                                           [ ] Anticardiolipin antibodies                               (3 Points)                                                       [ ] High homocysteine in the blood                      (3 Points)                                             [ ] Other congenital or acquired thrombophilia      (3 Points)                                                [ ] Heparin induced thrombocytopenia                  (3 Points)                                          Total Score [     3     ]    Risk:  Very low 0   Low 1 to 2   Moderate 3 to 4   High =5       VTE Prophylasix Recommednations:  [ x] mechanical pneumatic compression devices                                      [ ] contraindicated: _____________________  [ ] chemo prophylasix                                                                                   [ x] contraindicated _____________________    **** HIGH LIKELIHOOD DVT PRESENT ON ADMISSION  [ ] (please order LE dopplers within 24 hours of admission)

## 2023-06-07 NOTE — PROGRESS NOTE ADULT - ASSESSMENT
right ICH with midline shift and brain compression s/p emergent decompressive craniectomy POD 0  CTA no vascular malformation, might need an angiogram   ICH score 4  Neuro: neuro checks q 1 hr  EVD at 10 cm water, minimal output      , keep ICP< 22 mmhg, CPP> 60   brain edema start 3% at 5-/hr BMP q 6 hr , sodium goal 145-155   CT post op improved midline shift but significant edema   keppra 500 mg BID for seizure prophylaxis   has a cervical collar, CT cervical spine pending official report , has a cervical collar, needs to be cleared by NS   CT head tomorrow   precedex for vent synchrony   Respiratory: acute respiratory failure, intubated   chest xray ET, NG in good position   Endocrine: sugar goal 120-180  finger sticks and ISS   HBA1 c   Heme/Onc:   hgb slightly dropped will monitor   Renal: see neuro  ID: perio - op ABX   GI: NG, NPO, pantoprazole while intubated   Social/Family: no family at bedside   Discharge planning:     Code Status: [x] Full Code [] DNR [] DNI [] Goals of Care:   Disposition: [x] ICU [] Stroke Unit [] RCU []PCU []Floor [] Discharge Home

## 2023-06-07 NOTE — PROGRESS NOTE ADULT - ASSESSMENT
Patient requesting a Covid swab so he can be cleared to travel. Patient denies all symptoms. -POD1 right hemicrani w/ EVD placement  -EVD at 10  -CT in AM  -Preop for Angio in AM

## 2023-06-07 NOTE — PROGRESS NOTE ADULT - SUBJECTIVE AND OBJECTIVE BOX
HPI:    SURGERY: Decompressive craniectomy      PAST MEDICAL HISTORY:   PAST SURGICAL HISTORY:   FAMILY HISTORY:    ALLERGIES: No Known Allergies    **************************************  **************************************    OVERNIGHT EVENTS: [] None    ROS  Unobtainable due to mental status[] Negative []  Positives:    ADMISSION SCORES: GCS: HH: MF: NIHSS: RASS: CAM-ICU: ICP:    ICU Vital Signs Last 24 Hrs  T(C): 38 (07 Jun 2023 16:00), Max: 38.7 (07 Jun 2023 11:00)  T(F): 100.4 (07 Jun 2023 16:00), Max: 101.7 (07 Jun 2023 11:00)  HR: 73 (07 Jun 2023 16:00) (61 - 97)  BP: 98/68 (06 Jun 2023 19:00) (98/68 - 114/98)  BP(mean): 75 (06 Jun 2023 19:00) (75 - 102)  ABP: 137/81 (07 Jun 2023 16:00) (70/36 - 144/80)  ABP(mean): 104 (07 Jun 2023 16:00) (47 - 107)  RR: 17 (07 Jun 2023 16:00) (12 - 23)  SpO2: 100% (07 Jun 2023 16:00) (98% - 100%)    O2 Parameters below as of 07 Jun 2023 07:00  Patient On (Oxygen Delivery Method): ventilator           06-06 @ 07:01  -  06-07 @ 07:00  --------------------------------------------------------  IN: 2178 mL / OUT: 1659 mL / NET: 519 mL    06-07 @ 07:01 - 06-07 @ 16:23  --------------------------------------------------------  IN: 713.9 mL / OUT: 746 mL / NET: -32.1 mL       Mode: CPAP with PS  FiO2: 40  PEEP: 5  PS: 10  MAP: 8  PIP: 18      DEVICES: [] Restraints [] HAROON/HMV []LD [] ET tube [] Trach [] Chest Tube [] A-line [] Trevino [] NGT [] Rectal Tube [] EVD [] CVL  [] ICP/LiCOx    NEUROIMAGING:     EEG REPORT:     MEDICATIONS:  bisacodyl Suppository 10 milliGRAM(s) Rectal once PRN  chlorhexidine 0.12% Liquid 15 milliLiter(s) Oral Mucosa every 12 hours  chlorhexidine 4% Liquid 1 Application(s) Topical daily  dexMEDEtomidine Infusion 0.2 MICROgram(s)/kG/Hr IV Continuous <Continuous>  insulin lispro (ADMELOG) corrective regimen sliding scale   SubCutaneous every 6 hours  levETIRAcetam  IVPB 500 milliGRAM(s) IV Intermittent every 12 hours  levothyroxine 75 MICROGram(s) Oral daily  nicotine - 21 mG/24Hr(s) Patch 1 Patch Transdermal daily  ondansetron Injectable 4 milliGRAM(s) IV Push every 6 hours PRN  pantoprazole  Injectable 40 milliGRAM(s) IV Push daily  phenylephrine    Infusion 0.1 MICROgram(s)/kG/Min IV Continuous <Continuous>  polyethylene glycol 3350 17 Gram(s) Oral daily  senna 2 Tablet(s) Oral at bedtime  sodium chloride 3% + sodium acetate 50:50 1000 milliLiter(s) IV Continuous <Continuous>      PHYSICAL EXAM:    General: No Acute Distress     Neurological: SERGIO, has gag and cough, eyes midline , localizes in right UE-to command squeezes alma rosa hand and wiggles right toes, extends left UE, , extends in LE     Pulmonary: Clear to Auscultation, No Rales, No Rhonchi, No Wheezes     Cardiovascular: S1, S2, Regular Rate and Rhythm     Gastrointestinal: Soft, Nontender, Nondistended     Extremities: No calf tenderness       LABS:                        10.3   24.52 )-----------( 288      ( 06 Jun 2023 20:14 )             29.5    06-07    139  |  108  |  4<L>  ----------------------------<  150<H>  3.6   |  20<L>  |  0.46<L>    Ca    7.8<L>      07 Jun 2023 10:18  Phos  1.9     06-07  Mg     2.0     06-07    TPro  5.7<L>  /  Alb  3.5  /  TBili  0.2  /  DBili  x   /  AST  34  /  ALT  11  /  AlkPhos  55  06-06   ABG - ( 06 Jun 2023 16:01 )  pH, Arterial: 7.40  pH, Blood: x     /  pCO2: 33    /  pO2: 230   / HCO3: 20    / Base Excess: -3.6  /  SaO2: 99.8             Lipids and LFTs 06-06 @ 20:14  --  --  --  --  --  11  3.5  55  34  --  0.2  --  5.7

## 2023-06-07 NOTE — PROVIDER CONTACT NOTE (OTHER) - ACTION/TREATMENT ORDERED:
No further interventions at this time as per MD Arzola, continue to monitor. No further interventions at this time as per PA Ness, continue to monitor.

## 2023-06-07 NOTE — SPEECH LANGUAGE PATHOLOGY EVALUATION - SLP DIAGNOSIS
Orders received and appreciated. Per neuro rounds and chart review, pt currently intubated, awaiting cerebral angio, and not cleared for evaluation at this time. Will continue to follow and evaluate when medically appropriate/cleared. Speech-language exam order received and appreciated. Per neuro rounds and chart review, pt currently intubated, s/p emergent R hemicrani w/ EVD, awaiting cerebral angio, and not cleared for evaluation at this time. Will continue to follow as medically appropriate.

## 2023-06-08 LAB
ANION GAP SERPL CALC-SCNC: 10 MMOL/L — SIGNIFICANT CHANGE UP (ref 5–17)
ANION GAP SERPL CALC-SCNC: 11 MMOL/L — SIGNIFICANT CHANGE UP (ref 5–17)
ANION GAP SERPL CALC-SCNC: 12 MMOL/L — SIGNIFICANT CHANGE UP (ref 5–17)
ANION GAP SERPL CALC-SCNC: 9 MMOL/L — SIGNIFICANT CHANGE UP (ref 5–17)
ANION GAP SERPL CALC-SCNC: 9 MMOL/L — SIGNIFICANT CHANGE UP (ref 5–17)
APTT BLD: 30 SEC — SIGNIFICANT CHANGE UP (ref 27.5–35.5)
BASE EXCESS BLDV CALC-SCNC: 1.3 MMOL/L — SIGNIFICANT CHANGE UP (ref -2–3)
BUN SERPL-MCNC: 6 MG/DL — LOW (ref 7–23)
BUN SERPL-MCNC: 7 MG/DL — SIGNIFICANT CHANGE UP (ref 7–23)
BUN SERPL-MCNC: 7 MG/DL — SIGNIFICANT CHANGE UP (ref 7–23)
CALCIUM SERPL-MCNC: 7.6 MG/DL — LOW (ref 8.4–10.5)
CALCIUM SERPL-MCNC: 7.6 MG/DL — LOW (ref 8.4–10.5)
CALCIUM SERPL-MCNC: 7.8 MG/DL — LOW (ref 8.4–10.5)
CALCIUM SERPL-MCNC: 7.9 MG/DL — LOW (ref 8.4–10.5)
CALCIUM SERPL-MCNC: 8.2 MG/DL — LOW (ref 8.4–10.5)
CHLORIDE SERPL-SCNC: 118 MMOL/L — HIGH (ref 96–108)
CHLORIDE SERPL-SCNC: 120 MMOL/L — HIGH (ref 96–108)
CHLORIDE SERPL-SCNC: 121 MMOL/L — HIGH (ref 96–108)
CHLORIDE SERPL-SCNC: 122 MMOL/L — HIGH (ref 96–108)
CHLORIDE SERPL-SCNC: 123 MMOL/L — HIGH (ref 96–108)
CO2 BLDV-SCNC: 27 MMOL/L — HIGH (ref 22–26)
CO2 SERPL-SCNC: 20 MMOL/L — LOW (ref 22–31)
CO2 SERPL-SCNC: 20 MMOL/L — LOW (ref 22–31)
CO2 SERPL-SCNC: 21 MMOL/L — LOW (ref 22–31)
CO2 SERPL-SCNC: 22 MMOL/L — SIGNIFICANT CHANGE UP (ref 22–31)
CO2 SERPL-SCNC: 23 MMOL/L — SIGNIFICANT CHANGE UP (ref 22–31)
CREAT SERPL-MCNC: 0.35 MG/DL — LOW (ref 0.5–1.3)
CREAT SERPL-MCNC: 0.36 MG/DL — LOW (ref 0.5–1.3)
CREAT SERPL-MCNC: 0.37 MG/DL — LOW (ref 0.5–1.3)
CREAT SERPL-MCNC: 0.4 MG/DL — LOW (ref 0.5–1.3)
CREAT SERPL-MCNC: 0.4 MG/DL — LOW (ref 0.5–1.3)
EGFR: 129 ML/MIN/1.73M2 — SIGNIFICANT CHANGE UP
EGFR: 129 ML/MIN/1.73M2 — SIGNIFICANT CHANGE UP
EGFR: 131 ML/MIN/1.73M2 — SIGNIFICANT CHANGE UP
EGFR: 132 ML/MIN/1.73M2 — SIGNIFICANT CHANGE UP
EGFR: 133 ML/MIN/1.73M2 — SIGNIFICANT CHANGE UP
FIBRINOGEN PPP-MCNC: 742 MG/DL — HIGH (ref 200–445)
GAS PNL BLDA: SIGNIFICANT CHANGE UP
GLUCOSE BLDC GLUCOMTR-MCNC: 149 MG/DL — HIGH (ref 70–99)
GLUCOSE BLDC GLUCOMTR-MCNC: 163 MG/DL — HIGH (ref 70–99)
GLUCOSE BLDC GLUCOMTR-MCNC: 171 MG/DL — HIGH (ref 70–99)
GLUCOSE BLDC GLUCOMTR-MCNC: 231 MG/DL — HIGH (ref 70–99)
GLUCOSE SERPL-MCNC: 153 MG/DL — HIGH (ref 70–99)
GLUCOSE SERPL-MCNC: 159 MG/DL — HIGH (ref 70–99)
GLUCOSE SERPL-MCNC: 181 MG/DL — HIGH (ref 70–99)
GLUCOSE SERPL-MCNC: 186 MG/DL — HIGH (ref 70–99)
GLUCOSE SERPL-MCNC: 219 MG/DL — HIGH (ref 70–99)
HCO3 BLDV-SCNC: 26 MMOL/L — SIGNIFICANT CHANGE UP (ref 22–29)
HCT VFR BLD CALC: 18.5 % — CRITICAL LOW (ref 34.5–45)
HCT VFR BLD CALC: 22.6 % — LOW (ref 34.5–45)
HCT VFR BLD CALC: 22.9 % — LOW (ref 34.5–45)
HCT VFR BLD CALC: 22.9 % — LOW (ref 34.5–45)
HGB BLD-MCNC: 6.2 G/DL — CRITICAL LOW (ref 11.5–15.5)
HGB BLD-MCNC: 7.6 G/DL — LOW (ref 11.5–15.5)
HGB BLD-MCNC: 7.7 G/DL — LOW (ref 11.5–15.5)
HGB BLD-MCNC: 7.7 G/DL — LOW (ref 11.5–15.5)
INR BLD: 1.21 RATIO — HIGH (ref 0.88–1.16)
LDH SERPL L TO P-CCNC: 171 U/L — SIGNIFICANT CHANGE UP (ref 50–242)
MAGNESIUM SERPL-MCNC: 2.1 MG/DL — SIGNIFICANT CHANGE UP (ref 1.6–2.6)
MAGNESIUM SERPL-MCNC: 2.1 MG/DL — SIGNIFICANT CHANGE UP (ref 1.6–2.6)
MAGNESIUM SERPL-MCNC: 2.2 MG/DL — SIGNIFICANT CHANGE UP (ref 1.6–2.6)
MCHC RBC-ENTMCNC: 32.9 PG — SIGNIFICANT CHANGE UP (ref 27–34)
MCHC RBC-ENTMCNC: 33.2 PG — SIGNIFICANT CHANGE UP (ref 27–34)
MCHC RBC-ENTMCNC: 33.5 GM/DL — SIGNIFICANT CHANGE UP (ref 32–36)
MCHC RBC-ENTMCNC: 33.5 PG — SIGNIFICANT CHANGE UP (ref 27–34)
MCHC RBC-ENTMCNC: 33.6 GM/DL — SIGNIFICANT CHANGE UP (ref 32–36)
MCHC RBC-ENTMCNC: 33.7 PG — SIGNIFICANT CHANGE UP (ref 27–34)
MCV RBC AUTO: 100.5 FL — HIGH (ref 80–100)
MCV RBC AUTO: 97.9 FL — SIGNIFICANT CHANGE UP (ref 80–100)
MCV RBC AUTO: 98.7 FL — SIGNIFICANT CHANGE UP (ref 80–100)
MCV RBC AUTO: 99.6 FL — SIGNIFICANT CHANGE UP (ref 80–100)
NRBC # BLD: 0 /100 WBCS — SIGNIFICANT CHANGE UP (ref 0–0)
PCO2 BLDV: 40 MMHG — SIGNIFICANT CHANGE UP (ref 39–42)
PH BLDV: 7.42 — SIGNIFICANT CHANGE UP (ref 7.32–7.43)
PHOSPHATE SERPL-MCNC: 1 MG/DL — CRITICAL LOW (ref 2.5–4.5)
PHOSPHATE SERPL-MCNC: 1.7 MG/DL — LOW (ref 2.5–4.5)
PHOSPHATE SERPL-MCNC: 2.3 MG/DL — LOW (ref 2.5–4.5)
PHOSPHATE SERPL-MCNC: 2.6 MG/DL — SIGNIFICANT CHANGE UP (ref 2.5–4.5)
PHOSPHATE SERPL-MCNC: 2.9 MG/DL — SIGNIFICANT CHANGE UP (ref 2.5–4.5)
PLATELET # BLD AUTO: 166 K/UL — SIGNIFICANT CHANGE UP (ref 150–400)
PLATELET # BLD AUTO: 212 K/UL — SIGNIFICANT CHANGE UP (ref 150–400)
PLATELET # BLD AUTO: 213 K/UL — SIGNIFICANT CHANGE UP (ref 150–400)
PLATELET # BLD AUTO: 221 K/UL — SIGNIFICANT CHANGE UP (ref 150–400)
PO2 BLDV: 33 MMHG — SIGNIFICANT CHANGE UP (ref 25–45)
POTASSIUM SERPL-MCNC: 3.5 MMOL/L — SIGNIFICANT CHANGE UP (ref 3.5–5.3)
POTASSIUM SERPL-MCNC: 3.5 MMOL/L — SIGNIFICANT CHANGE UP (ref 3.5–5.3)
POTASSIUM SERPL-MCNC: 3.6 MMOL/L — SIGNIFICANT CHANGE UP (ref 3.5–5.3)
POTASSIUM SERPL-MCNC: 3.7 MMOL/L — SIGNIFICANT CHANGE UP (ref 3.5–5.3)
POTASSIUM SERPL-MCNC: 4 MMOL/L — SIGNIFICANT CHANGE UP (ref 3.5–5.3)
POTASSIUM SERPL-SCNC: 3.5 MMOL/L — SIGNIFICANT CHANGE UP (ref 3.5–5.3)
POTASSIUM SERPL-SCNC: 3.5 MMOL/L — SIGNIFICANT CHANGE UP (ref 3.5–5.3)
POTASSIUM SERPL-SCNC: 3.6 MMOL/L — SIGNIFICANT CHANGE UP (ref 3.5–5.3)
POTASSIUM SERPL-SCNC: 3.7 MMOL/L — SIGNIFICANT CHANGE UP (ref 3.5–5.3)
POTASSIUM SERPL-SCNC: 4 MMOL/L — SIGNIFICANT CHANGE UP (ref 3.5–5.3)
PROTHROM AB SERPL-ACNC: 14 SEC — HIGH (ref 10.5–13.4)
RBC # BLD: 1.84 M/UL — LOW (ref 3.8–5.2)
RBC # BLD: 2.27 M/UL — LOW (ref 3.8–5.2)
RBC # BLD: 2.32 M/UL — LOW (ref 3.8–5.2)
RBC # BLD: 2.34 M/UL — LOW (ref 3.8–5.2)
RBC # FLD: 13.1 % — SIGNIFICANT CHANGE UP (ref 10.3–14.5)
RBC # FLD: 13.2 % — SIGNIFICANT CHANGE UP (ref 10.3–14.5)
RBC # FLD: 13.2 % — SIGNIFICANT CHANGE UP (ref 10.3–14.5)
RBC # FLD: 13.6 % — SIGNIFICANT CHANGE UP (ref 10.3–14.5)
SAO2 % BLDV: 62.2 % — LOW (ref 67–88)
SODIUM SERPL-SCNC: 150 MMOL/L — HIGH (ref 135–145)
SODIUM SERPL-SCNC: 151 MMOL/L — HIGH (ref 135–145)
SODIUM SERPL-SCNC: 152 MMOL/L — HIGH (ref 135–145)
SODIUM SERPL-SCNC: 153 MMOL/L — HIGH (ref 135–145)
SODIUM SERPL-SCNC: 155 MMOL/L — HIGH (ref 135–145)
WBC # BLD: 14.04 K/UL — HIGH (ref 3.8–10.5)
WBC # BLD: 18.24 K/UL — HIGH (ref 3.8–10.5)
WBC # BLD: 18.55 K/UL — HIGH (ref 3.8–10.5)
WBC # BLD: 19 K/UL — HIGH (ref 3.8–10.5)
WBC # FLD AUTO: 14.04 K/UL — HIGH (ref 3.8–10.5)
WBC # FLD AUTO: 18.24 K/UL — HIGH (ref 3.8–10.5)
WBC # FLD AUTO: 18.55 K/UL — HIGH (ref 3.8–10.5)
WBC # FLD AUTO: 19 K/UL — HIGH (ref 3.8–10.5)

## 2023-06-08 PROCEDURE — 36410 VNPNXR 3YR/> PHY/QHP DX/THER: CPT

## 2023-06-08 PROCEDURE — 74176 CT ABD & PELVIS W/O CONTRAST: CPT | Mod: 26

## 2023-06-08 PROCEDURE — 70450 CT HEAD/BRAIN W/O DYE: CPT | Mod: 26

## 2023-06-08 PROCEDURE — 71045 X-RAY EXAM CHEST 1 VIEW: CPT | Mod: 26

## 2023-06-08 PROCEDURE — 76937 US GUIDE VASCULAR ACCESS: CPT | Mod: 26

## 2023-06-08 PROCEDURE — 71250 CT THORAX DX C-: CPT | Mod: 26

## 2023-06-08 PROCEDURE — 99291 CRITICAL CARE FIRST HOUR: CPT

## 2023-06-08 PROCEDURE — 93306 TTE W/DOPPLER COMPLETE: CPT | Mod: 26

## 2023-06-08 RX ORDER — INSULIN LISPRO 100/ML
VIAL (ML) SUBCUTANEOUS EVERY 6 HOURS
Refills: 0 | Status: DISCONTINUED | OUTPATIENT
Start: 2023-06-08 | End: 2023-06-19

## 2023-06-08 RX ORDER — ACETAMINOPHEN 500 MG
1000 TABLET ORAL ONCE
Refills: 0 | Status: COMPLETED | OUTPATIENT
Start: 2023-06-08 | End: 2023-06-08

## 2023-06-08 RX ORDER — SODIUM CHLORIDE 5 G/100ML
1000 INJECTION, SOLUTION INTRAVENOUS
Refills: 0 | Status: DISCONTINUED | OUTPATIENT
Start: 2023-06-08 | End: 2023-06-09

## 2023-06-08 RX ORDER — POLYETHYLENE GLYCOL 3350 17 G/17G
17 POWDER, FOR SOLUTION ORAL EVERY 12 HOURS
Refills: 0 | Status: DISCONTINUED | OUTPATIENT
Start: 2023-06-08 | End: 2023-06-15

## 2023-06-08 RX ORDER — LEVETIRACETAM 250 MG/1
500 TABLET, FILM COATED ORAL
Refills: 0 | Status: DISCONTINUED | OUTPATIENT
Start: 2023-06-08 | End: 2023-06-09

## 2023-06-08 RX ORDER — ACETAMINOPHEN 500 MG
650 TABLET ORAL EVERY 6 HOURS
Refills: 0 | Status: DISCONTINUED | OUTPATIENT
Start: 2023-06-08 | End: 2023-06-08

## 2023-06-08 RX ORDER — POTASSIUM CHLORIDE 20 MEQ
40 PACKET (EA) ORAL ONCE
Refills: 0 | Status: COMPLETED | OUTPATIENT
Start: 2023-06-08 | End: 2023-06-08

## 2023-06-08 RX ORDER — POLYETHYLENE GLYCOL 3350 17 G/17G
17 POWDER, FOR SOLUTION ORAL ONCE
Refills: 0 | Status: DISCONTINUED | OUTPATIENT
Start: 2023-06-08 | End: 2023-06-09

## 2023-06-08 RX ORDER — SODIUM CHLORIDE 5 G/100ML
1000 INJECTION, SOLUTION INTRAVENOUS
Refills: 0 | Status: DISCONTINUED | OUTPATIENT
Start: 2023-06-08 | End: 2023-06-08

## 2023-06-08 RX ORDER — ENOXAPARIN SODIUM 100 MG/ML
40 INJECTION SUBCUTANEOUS
Refills: 0 | Status: DISCONTINUED | OUTPATIENT
Start: 2023-06-08 | End: 2023-06-17

## 2023-06-08 RX ORDER — ACETAMINOPHEN 500 MG
650 TABLET ORAL EVERY 6 HOURS
Refills: 0 | Status: DISCONTINUED | OUTPATIENT
Start: 2023-06-08 | End: 2023-06-11

## 2023-06-08 RX ORDER — POTASSIUM PHOSPHATE, MONOBASIC POTASSIUM PHOSPHATE, DIBASIC 236; 224 MG/ML; MG/ML
30 INJECTION, SOLUTION INTRAVENOUS ONCE
Refills: 0 | Status: COMPLETED | OUTPATIENT
Start: 2023-06-08 | End: 2023-06-08

## 2023-06-08 RX ADMIN — POTASSIUM PHOSPHATE, MONOBASIC POTASSIUM PHOSPHATE, DIBASIC 83.33 MILLIMOLE(S): 236; 224 INJECTION, SOLUTION INTRAVENOUS at 05:33

## 2023-06-08 RX ADMIN — CHLORHEXIDINE GLUCONATE 1 APPLICATION(S): 213 SOLUTION TOPICAL at 13:19

## 2023-06-08 RX ADMIN — Medication 1000 MILLIGRAM(S): at 01:00

## 2023-06-08 RX ADMIN — DEXMEDETOMIDINE HYDROCHLORIDE IN 0.9% SODIUM CHLORIDE 3.82 MICROGRAM(S)/KG/HR: 4 INJECTION INTRAVENOUS at 19:46

## 2023-06-08 RX ADMIN — Medication 2: at 06:06

## 2023-06-08 RX ADMIN — Medication 1 PATCH: at 11:00

## 2023-06-08 RX ADMIN — LEVETIRACETAM 400 MILLIGRAM(S): 250 TABLET, FILM COATED ORAL at 05:34

## 2023-06-08 RX ADMIN — Medication 650 MILLIGRAM(S): at 09:43

## 2023-06-08 RX ADMIN — Medication 650 MILLIGRAM(S): at 16:58

## 2023-06-08 RX ADMIN — Medication 2: at 11:59

## 2023-06-08 RX ADMIN — PHENYLEPHRINE HYDROCHLORIDE 2.87 MICROGRAM(S)/KG/MIN: 10 INJECTION INTRAVENOUS at 19:46

## 2023-06-08 RX ADMIN — OXYCODONE HYDROCHLORIDE 5 MILLIGRAM(S): 5 TABLET ORAL at 14:10

## 2023-06-08 RX ADMIN — LEVETIRACETAM 500 MILLIGRAM(S): 250 TABLET, FILM COATED ORAL at 17:29

## 2023-06-08 RX ADMIN — POLYETHYLENE GLYCOL 3350 17 GRAM(S): 17 POWDER, FOR SOLUTION ORAL at 21:38

## 2023-06-08 RX ADMIN — ENOXAPARIN SODIUM 40 MILLIGRAM(S): 100 INJECTION SUBCUTANEOUS at 17:30

## 2023-06-08 RX ADMIN — SODIUM CHLORIDE 50 MILLILITER(S): 5 INJECTION, SOLUTION INTRAVENOUS at 19:46

## 2023-06-08 RX ADMIN — SENNA PLUS 2 TABLET(S): 8.6 TABLET ORAL at 21:38

## 2023-06-08 RX ADMIN — DEXMEDETOMIDINE HYDROCHLORIDE IN 0.9% SODIUM CHLORIDE 3.82 MICROGRAM(S)/KG/HR: 4 INJECTION INTRAVENOUS at 07:19

## 2023-06-08 RX ADMIN — Medication 75 MICROGRAM(S): at 05:34

## 2023-06-08 RX ADMIN — Medication 1: at 00:26

## 2023-06-08 RX ADMIN — POTASSIUM PHOSPHATE, MONOBASIC POTASSIUM PHOSPHATE, DIBASIC 83.33 MILLIMOLE(S): 236; 224 INJECTION, SOLUTION INTRAVENOUS at 16:20

## 2023-06-08 RX ADMIN — Medication 40 MILLIEQUIVALENT(S): at 17:28

## 2023-06-08 RX ADMIN — PANTOPRAZOLE SODIUM 40 MILLIGRAM(S): 20 TABLET, DELAYED RELEASE ORAL at 11:01

## 2023-06-08 RX ADMIN — CHLORHEXIDINE GLUCONATE 15 MILLILITER(S): 213 SOLUTION TOPICAL at 17:29

## 2023-06-08 RX ADMIN — PHENYLEPHRINE HYDROCHLORIDE 2.87 MICROGRAM(S)/KG/MIN: 10 INJECTION INTRAVENOUS at 07:20

## 2023-06-08 RX ADMIN — Medication 40 MILLIEQUIVALENT(S): at 05:34

## 2023-06-08 RX ADMIN — SODIUM CHLORIDE 50 MILLILITER(S): 5 INJECTION, SOLUTION INTRAVENOUS at 07:20

## 2023-06-08 RX ADMIN — Medication 1 PATCH: at 19:58

## 2023-06-08 RX ADMIN — Medication 650 MILLIGRAM(S): at 16:28

## 2023-06-08 RX ADMIN — OXYCODONE HYDROCHLORIDE 5 MILLIGRAM(S): 5 TABLET ORAL at 13:40

## 2023-06-08 RX ADMIN — Medication 400 MILLIGRAM(S): at 00:19

## 2023-06-08 RX ADMIN — CHLORHEXIDINE GLUCONATE 15 MILLILITER(S): 213 SOLUTION TOPICAL at 05:34

## 2023-06-08 RX ADMIN — Medication 650 MILLIGRAM(S): at 09:13

## 2023-06-08 RX ADMIN — Medication 1 PATCH: at 07:36

## 2023-06-08 NOTE — DIETITIAN INITIAL EVALUATION ADULT - NS FNS DIET ORDER
Diet, NPO with Tube Feed:   Tube Feeding Modality: Nasogastric  Starting Tube Feed Rate {mL per Hour}: 20  Increase Tube Feed Rate by (mL): 10     Every 4 hours  Until Goal Tube Feed Rate (mL per Hour): 65  Tube Feed Duration (in Hours): 24 (06-07-23 @ 10:24)

## 2023-06-08 NOTE — PROGRESS NOTE ADULT - ASSESSMENT
right ICH with midline shift and brain compression s/p emergent decompressive craniectomy  CTA no vascular malformation  ICH score 4      Neuro:   neuro checks q 2 hr  EVD at 10 cm water, minimal output, keep ICP< 22 mmhg, CPP> 60   brain edema 3% at 5-/hr BMP q 6 hr, sodium goal 140-150    keppra 500 mg BID for seizure prophylaxis   precedex for vent synchrony   Angio tomorrow    Respiratory:   acute respiratory failure, intubated   chest xray ET daily  ABG  CPAP 8/5 tolerating    Endocrine:   sugar goal 120-180  finger sticks and ISS     Heme/Onc:     hgb slightly dropped will monitor   DVT PPX   h/h trending down ~6 point drop, though HDS with no s/s active bleeding, will repeat cbc now, ig Hgb stable, will repeat tomorrow, if continues to downtrend will order transfusion +/- CTAP to r/o heme. no melena noted    Renal:   see neuro    ID:   monitor for fevers  f/u panculture    GI:   NG, NPO MN for angio, pantoprazole while intubated

## 2023-06-08 NOTE — PHYSICAL THERAPY INITIAL EVALUATION ADULT - GENERAL OBSERVATIONS, REHAB EVAL
Pt rec'd in supine, in NAD, family at bedside. +IV, +NGT, +ETT, +tele, +purewick, +rad A-line, +EVD clamped by RN, +sequentials BLE, +B wrist restraints.

## 2023-06-08 NOTE — CHART NOTE - NSCHARTNOTEFT_GEN_A_CORE
Due to difficult access PIV was placed under US guidance, patient tolerated procedure w/o complications, no significant blood loss, venous sample sent for further confirmatio of placement.

## 2023-06-08 NOTE — PROGRESS NOTE ADULT - SUBJECTIVE AND OBJECTIVE BOX
NSCU EVENING NOTE -- ADDITIONAL PROGRESS NOTE    Nighttime rounds were performed -- please refer to earlier Progress Note for HPI details.    T(C): 37.6 (06-07-23 @ 23:00), Max: 38.7 (06-07-23 @ 11:00)  HR: 69 (06-07-23 @ 23:00) (56 - 97)  BP: --  RR: 18 (06-07-23 @ 23:00) (12 - 21)  SpO2: 100% (06-07-23 @ 23:00) (94% - 100%)  Wt(kg): --    Relevant labwork and imaging reviewed.    Patient remains critically ill.      Additional 30 minutes of critical care time.

## 2023-06-08 NOTE — DIETITIAN INITIAL EVALUATION ADULT - PERTINENT LABORATORY DATA
06-08    151<H>  |  120<H>  |  6<L>  ----------------------------<  219<H>  3.6   |  20<L>  |  0.35<L>    Ca    7.9<L>      08 Jun 2023 05:51  Phos  1.0     06-08  Mg     2.2     06-08    TPro  5.7<L>  /  Alb  3.5  /  TBili  0.2  /  DBili  x   /  AST  34  /  ALT  11  /  AlkPhos  55  06-06  POCT Blood Glucose.: 231 mg/dL (06-08-23 @ 05:37)  A1C with Estimated Average Glucose Result: 5.3 % (06-07-23 @ 05:09)

## 2023-06-08 NOTE — PHYSICAL THERAPY INITIAL EVALUATION ADULT - PERTINENT HX OF CURRENT PROBLEM, REHAB EVAL
Pt is a 37 y/o F w/ right ICH with midline shift and brain compression s/p emergent decompressive craniectomy . CTA no vascular malformation, might need an angiogram.    Imagin/8 CT Head: Right hemicraniectomy. Right sided ventricular catheter with its tip in the left basal ganglia region. Right frontal parenchymal hematoma with air and edema . No significant interval change compared with the prior allowing for slight better visualization of the ventricles. No   hydrocephalus   Duplex BLE: No evidence of deep venous thrombosis in either lower extremity.    CTA Brain: Very large right frontal parenchymal hemorrhage with intraventricular extension and mild left-sided hydrocephalus, midline shift to the left. CTA of the head and neck reveals no aneurysms or AVM. However the large parenchymal hemorrhage may compress an underlying vascular malformation or fistula.   CT cervical: No acute fracture or traumatic subluxation. No prevertebral soft tissue swelling. No appreciable spinal canal stenosis or neural foraminal narrowing  XRC: Clear lungs.

## 2023-06-08 NOTE — PROGRESS NOTE ADULT - ASSESSMENT
right ICH with midline shift and brain compression s/p emergent decompressive craniectomy POD 1  CTA no vascular malformation, might need an angiogram   ICH score 4      Neuro:   neuro checks q 1 hr  EVD at 10 cm water, minimal output, keep ICP< 22 mmhg, CPP> 60   brain edema 3% at 5-/hr BMP q 6 hr, sodium goal 140-150    keppra 500 mg BID for seizure prophylaxis   CT head tomorrow   precedex for vent synchrony     Respiratory:   acute respiratory failure, intubated   chest xray ET daily  ABG    Endocrine:   sugar goal 120-180  finger sticks and ISS     Heme/Onc:     hgb slightly dropped will monitor   start DVT PPX tomorrow per nsg    Renal:   see neuro    ID:   monitor for fevers  f/u panculture    GI:   NG, NPO, pantoprazole while intubated    right ICH with midline shift and brain compression s/p emergent decompressive craniectomy POD 1  CTA no vascular malformation, might need an angiogram   ICH score 4      Neuro:   neuro checks q 1 hr  EVD at 10 cm water, minimal output, keep ICP< 22 mmhg, CPP> 60   brain edema 3% at 5-/hr BMP q 6 hr, sodium goal 140-150    keppra 500 mg BID for seizure prophylaxis   CT head tomorrow   precedex for vent synchrony     Respiratory:   acute respiratory failure, intubated   chest xray ET daily  ABG    Endocrine:   sugar goal 120-180  finger sticks and ISS     Heme/Onc:     hgb slightly dropped will monitor   start DVT PPX tomorrow per nsg if h/h stable  h/h trending down ~6 point drop, though HDS with no s/s active bleeding, will repeat cbc now, ig Hgb stable, will repeat tomorrow, if continues to downtrend will order transfusion +/- CTAP to r/o heme. no melena noted    Renal:   see neuro    ID:   monitor for fevers  f/u panculture    GI:   NG, NPO, pantoprazole while intubated

## 2023-06-08 NOTE — DIETITIAN INITIAL EVALUATION ADULT - PERTINENT MEDS FT
MEDICATIONS  (STANDING):  chlorhexidine 0.12% Liquid 15 milliLiter(s) Oral Mucosa every 12 hours  chlorhexidine 4% Liquid 1 Application(s) Topical daily  dexMEDEtomidine Infusion 0.2 MICROgram(s)/kG/Hr (3.82 mL/Hr) IV Continuous <Continuous>  insulin lispro (ADMELOG) corrective regimen sliding scale   SubCutaneous every 6 hours  levETIRAcetam  IVPB 500 milliGRAM(s) IV Intermittent every 12 hours  levothyroxine 75 MICROGram(s) Oral daily  nicotine - 21 mG/24Hr(s) Patch 1 Patch Transdermal daily  pantoprazole  Injectable 40 milliGRAM(s) IV Push daily  phenylephrine    Infusion 0.1 MICROgram(s)/kG/Min (2.87 mL/Hr) IV Continuous <Continuous>  polyethylene glycol 3350 17 Gram(s) Oral daily  senna 2 Tablet(s) Oral at bedtime  sodium chloride 3% + sodium acetate 50:50 1000 milliLiter(s) (50 mL/Hr) IV Continuous <Continuous>    MEDICATIONS  (PRN):  acetaminophen     Tablet .. 650 milliGRAM(s) Oral every 6 hours PRN Mild Pain (1 - 3)  bisacodyl Suppository 10 milliGRAM(s) Rectal once PRN Constipation  ondansetron Injectable 4 milliGRAM(s) IV Push every 6 hours PRN Nausea and/or Vomiting  oxyCODONE    Solution 5 milliGRAM(s) Oral every 4 hours PRN Moderate Pain (4 - 6)

## 2023-06-08 NOTE — PROGRESS NOTE ADULT - SUBJECTIVE AND OBJECTIVE BOX
NSCU EVENING NOTE -- ADDITIONAL PROGRESS NOTE    Nighttime rounds were performed -- please refer to earlier Progress Note for HPI details.    24 HOUR INTERVAL:  - h/h trending down, CTAP neg for heme; pressor requirements decreasing      Vitals/Labs reviewed    Relevant labwork and imaging reviewed.    Patient remains critically ill.      Additional 30 minutes of critical care time.

## 2023-06-08 NOTE — DIETITIAN INITIAL EVALUATION ADULT - ETIOLOGY
humana referral was made for dr romero, who is in the same office as dr cortez. This shouldn't have been a problem but I will make new referral and send it in   increased demand for nutrient in the setting of brain injury, neurosurgical intervention, wound healing

## 2023-06-08 NOTE — DIETITIAN INITIAL EVALUATION ADULT - ORAL INTAKE PTA/DIET HISTORY
-Pt intubated/sedated with no family present at bedside at time of RD visit. Baseline tolerance to chewing/swallowing, and baseline provision of energy/protein intake unclear. No documented food allergies. No indication of prior vitamins/supplement use, however unclear.   -Team trending electrolytes and repleting PRN. Currently ordered for potassium phosphate; s/p magnesium sulfate 6/7.

## 2023-06-08 NOTE — DIETITIAN INITIAL EVALUATION ADULT - ADD RECOMMEND
1. Obtain subjective wt/diet history from pt/family PRN.  2. Monitor GI tolerance. RD to remain available to adjust EN formulary, volume/rate PRN.   3. Recommend multivitamin (if no medication contraindications) to aid in prevention of micronutrient deficiencies.   4. Monitor wt trends/labs/skin integrity/hydration status/bowel regularity.   5. Team to trend electrolytes and replete PRN.

## 2023-06-08 NOTE — DIETITIAN INITIAL EVALUATION ADULT - REASON FOR ADMISSION
Pt is 37 yo F admitted with right ICH with midline shift and brain compression s/p emergent decompressive craniectomy. EVD in place. Intubated. CTA with no vascular malformation.

## 2023-06-08 NOTE — PHYSICAL THERAPY INITIAL EVALUATION ADULT - ADDITIONAL COMMENTS
Pt non-verbal at this time. per wife, pt lives in a house w/ wife, 2 steps to enter (+) L handrail, 1st fl set-up. Pt reports being independent w/ all ADLs/IADLs, indep w/ amb w/o AD. Pt denies history of falls in past 12 months.

## 2023-06-08 NOTE — PHYSICAL THERAPY INITIAL EVALUATION ADULT - MANUAL MUSCLE TESTING RESULTS, REHAB EVAL
pt not able to follow commands. Pt noted to spontaneously moving BUE(elbow flex/ext, knee flex/ext)/BLE, able to squeeze R hand however, unable to release on command, able to wiggle R toes intermittently/not tested due to

## 2023-06-08 NOTE — DIETITIAN INITIAL EVALUATION ADULT - FACTORS AFF FOOD INTAKE
EN feeds prescribed via NT: Jevity 1.5 at 65ml/hr x 24 hrs. Initiated 6/7 and infusing at goal rate. See below for updated EN recommendations.

## 2023-06-08 NOTE — DIETITIAN INITIAL EVALUATION ADULT - NSFNSGIIOFT_GEN_A_CORE
-HAROON drain output (6/7): 100ml.  -Continues on NaCl 3% with sodium acetate 50:50 in setting of brain edema; infusing at 50ml/hr.   -Prescribed bowel regimen (senna, Miralax, Dulcolax). No documented BM's recorded thus far.   -Receiving insulin lispro sliding scale to aid in management of BG. A1c 5.3%.   -Remains sedated on Precedex.   -Prescribed oral Synthroid.

## 2023-06-08 NOTE — PROGRESS NOTE ADULT - SUBJECTIVE AND OBJECTIVE BOX
HPI:    SURGERY: Decompressive craniectomy      PAST MEDICAL HISTORY:   PAST SURGICAL HISTORY:   FAMILY HISTORY:    ALLERGIES: No Known Allergies    **************************************  **************************************    OVERNIGHT EVENTS: [] None    ROS  Unobtainable due to mental status[] Negative []  Positives:    ADMISSION SCORES: GCS: HH: MF: NIHSS: RASS: CAM-ICU: ICP:    ICU Vital Signs Last 24 Hrs  T(C): 37.7 (08 Jun 2023 11:00), Max: 38.4 (07 Jun 2023 18:00)  T(F): 99.9 (08 Jun 2023 11:00), Max: 101.1 (07 Jun 2023 18:00)  HR: 97 (08 Jun 2023 12:45) (56 - 101)  BP: 140/84 (08 Jun 2023 04:45) (130/80 - 158/95)  BP(mean): 97 (08 Jun 2023 04:45) (94 - 110)  ABP: 161/84 (08 Jun 2023 12:45) (98/51 - 172/101)  ABP(mean): 112 (08 Jun 2023 12:45) (70 - 133)  RR: 16 (08 Jun 2023 12:45) (12 - 21)  SpO2: 100% (08 Jun 2023 12:45) (94% - 100%)    O2 Parameters below as of 08 Jun 2023 07:00  Patient On (Oxygen Delivery Method): ventilator    O2 Concentration (%): 40       06-07 @ 07:01  -  06-08 @ 07:00  --------------------------------------------------------  IN: 3869.3 mL / OUT: 1633 mL / NET: 2236.3 mL    06-08 @ 07:01  -  06-08 @ 12:54  --------------------------------------------------------  IN: 692.7 mL / OUT: 222 mL / NET: 470.7 mL       Mode: CPAP with PS  FiO2: 40  PEEP: 5  PS: 10  MAP: 9  PIP: 17      DEVICES: [] Restraints [] HAROON/HMV []LD [] ET tube [] Trach [] Chest Tube [] A-line [] Trevino [] NGT [] Rectal Tube [] EVD [] CVL  [] ICP/LiCOx    NEUROIMAGING:     EEG REPORT:     MEDICATIONS:  acetaminophen     Tablet .. 650 milliGRAM(s) Oral every 6 hours PRN  bisacodyl Suppository 10 milliGRAM(s) Rectal once PRN  chlorhexidine 0.12% Liquid 15 milliLiter(s) Oral Mucosa every 12 hours  chlorhexidine 4% Liquid 1 Application(s) Topical daily  dexMEDEtomidine Infusion 0.2 MICROgram(s)/kG/Hr IV Continuous <Continuous>  enoxaparin Injectable 40 milliGRAM(s) SubCutaneous <User Schedule>  insulin lispro (ADMELOG) corrective regimen sliding scale   SubCutaneous every 6 hours  levETIRAcetam  Solution 500 milliGRAM(s) Oral two times a day  levothyroxine 75 MICROGram(s) Oral daily  nicotine - 21 mG/24Hr(s) Patch 1 Patch Transdermal daily  ondansetron Injectable 4 milliGRAM(s) IV Push every 6 hours PRN  oxyCODONE    Solution 5 milliGRAM(s) Oral every 4 hours PRN  pantoprazole  Injectable 40 milliGRAM(s) IV Push daily  phenylephrine    Infusion 0.1 MICROgram(s)/kG/Min IV Continuous <Continuous>  polyethylene glycol 3350 17 Gram(s) Oral once PRN  potassium phosphate IVPB 30 milliMole(s) IV Intermittent once  senna 2 Tablet(s) Oral at bedtime  sodium chloride 3% + sodium acetate 50:50 1000 milliLiter(s) IV Continuous <Continuous>      PHYSICAL EXAM:  General: No Acute Distress     Neurological: SERGIO, has gag and cough, eyes midline , localizes in right UE-to command squeezes alma rosa hand and wiggles right toes, extends left UE, , extends in LE     Pulmonary: Clear to Auscultation, No Rales, No Rhonchi, No Wheezes     Cardiovascular: S1, S2, Regular Rate and Rhythm     Gastrointestinal: Soft, Nontender, Nondistended     Extremities: No calf tenderness         LABS:                        7.6    18.24 )-----------( 213      ( 08 Jun 2023 05:51 )             22.6    06-08    152<H>  |  121<H>  |  6<L>  ----------------------------<  153<H>  3.5   |  21<L>  |  0.37<L>    Ca    8.2<L>      08 Jun 2023 11:38  Phos  2.3     06-08  Mg     2.1     06-08    TPro  5.7<L>  /  Alb  3.5  /  TBili  0.2  /  DBili  x   /  AST  34  /  ALT  11  /  AlkPhos  55  06-06   ABG - ( 06 Jun 2023 16:01 )  pH, Arterial: 7.40  pH, Blood: x     /  pCO2: 33    /  pO2: 230   / HCO3: 20    / Base Excess: -3.6  /  SaO2: 99.8

## 2023-06-08 NOTE — DIETITIAN INITIAL EVALUATION ADULT - ENTERAL
Recommend change EN feeds to reflect estimated energy and protein needs, considering PO Synthroid. Consider GOAL Jevity 1.5 at 85ml/hr x 18 hrs + No Carb Prosource TF 1x daily. Titrate as tolerated until goal rate met and tolerated. To provide (based on 76.4kg): 1530ml, 2335kcal (30.6kcak/kg) and 108g protein (1.4g protein/kg).

## 2023-06-08 NOTE — PROGRESS NOTE ADULT - ASSESSMENT
right ICH with midline shift and brain compression s/p emergent decompressive craniectomy POD 0  CTA no vascular malformation, angio tomorrow  ICH score 4  Neuro: neuro checks q 1 hr  EVD at 10 cm water, minimal output      , keep ICP< 22 mmhg, CPP> 60   brain edema start 3% at 5-/hr BMP q 6 hr , sodium goal 145-155   CT post op improved midline shift but significant edema   keppra 500 mg BID for seizure prophylaxis   has a cervical collar, CT cervical spine pending official report , has a cervical collar, needs to be cleared by NS   precedex for vent synchrony   Respiratory: acute respiratory failure, intubated   chest xray ET, NG in good position   Endocrine: sugar goal 120-180  finger sticks and ISS   HBA1 c   Heme/Onc:   hgb slightly dropped will monitor   Renal: see neuro  ID: perio - op ABX   GI: NG, NPO, pantoprazole while intubated   Social/Family: no family at bedside   Discharge planning:     Code Status: [x] Full Code [] DNR [] DNI [] Goals of Care:   Disposition: [x] ICU [] Stroke Unit [] RCU []PCU []Floor [] Discharge

## 2023-06-09 ENCOUNTER — APPOINTMENT (OUTPATIENT)
Dept: NEUROSURGERY | Facility: HOSPITAL | Age: 38
End: 2023-06-09

## 2023-06-09 LAB
ANION GAP SERPL CALC-SCNC: 11 MMOL/L — SIGNIFICANT CHANGE UP (ref 5–17)
ANION GAP SERPL CALC-SCNC: 11 MMOL/L — SIGNIFICANT CHANGE UP (ref 5–17)
ANION GAP SERPL CALC-SCNC: 12 MMOL/L — SIGNIFICANT CHANGE UP (ref 5–17)
ANION GAP SERPL CALC-SCNC: 9 MMOL/L — SIGNIFICANT CHANGE UP (ref 5–17)
APPEARANCE CSF: ABNORMAL
APPEARANCE UR: CLEAR — SIGNIFICANT CHANGE UP
BACTERIA # UR AUTO: NEGATIVE — SIGNIFICANT CHANGE UP
BASOPHILS # BLD AUTO: 0.13 K/UL — SIGNIFICANT CHANGE UP (ref 0–0.2)
BASOPHILS NFR BLD AUTO: 0.9 % — SIGNIFICANT CHANGE UP (ref 0–2)
BILIRUB UR-MCNC: NEGATIVE — SIGNIFICANT CHANGE UP
BUN SERPL-MCNC: 10 MG/DL — SIGNIFICANT CHANGE UP (ref 7–23)
BUN SERPL-MCNC: 7 MG/DL — SIGNIFICANT CHANGE UP (ref 7–23)
BUN SERPL-MCNC: 8 MG/DL — SIGNIFICANT CHANGE UP (ref 7–23)
BUN SERPL-MCNC: 8 MG/DL — SIGNIFICANT CHANGE UP (ref 7–23)
CA-I BLDA-SCNC: 1.18 MMOL/L — SIGNIFICANT CHANGE UP (ref 1.15–1.33)
CALCIUM SERPL-MCNC: 7.7 MG/DL — LOW (ref 8.4–10.5)
CALCIUM SERPL-MCNC: 7.8 MG/DL — LOW (ref 8.4–10.5)
CALCIUM SERPL-MCNC: 7.9 MG/DL — LOW (ref 8.4–10.5)
CALCIUM SERPL-MCNC: 8.2 MG/DL — LOW (ref 8.4–10.5)
CHLORIDE SERPL-SCNC: 124 MMOL/L — HIGH (ref 96–108)
CHLORIDE SERPL-SCNC: 124 MMOL/L — HIGH (ref 96–108)
CHLORIDE SERPL-SCNC: 125 MMOL/L — HIGH (ref 96–108)
CHLORIDE SERPL-SCNC: 126 MMOL/L — HIGH (ref 96–108)
CO2 SERPL-SCNC: 19 MMOL/L — LOW (ref 22–31)
CO2 SERPL-SCNC: 19 MMOL/L — LOW (ref 22–31)
CO2 SERPL-SCNC: 20 MMOL/L — LOW (ref 22–31)
CO2 SERPL-SCNC: 21 MMOL/L — LOW (ref 22–31)
COLOR CSF: ABNORMAL
COLOR SPEC: SIGNIFICANT CHANGE UP
CREAT SERPL-MCNC: 0.39 MG/DL — LOW (ref 0.5–1.3)
CREAT SERPL-MCNC: 0.4 MG/DL — LOW (ref 0.5–1.3)
CREAT SERPL-MCNC: 0.45 MG/DL — LOW (ref 0.5–1.3)
CREAT SERPL-MCNC: 0.46 MG/DL — LOW (ref 0.5–1.3)
D DIMER BLD IA.RAPID-MCNC: 237 NG/ML DDU — HIGH
DIFF PNL FLD: NEGATIVE — SIGNIFICANT CHANGE UP
EGFR: 125 ML/MIN/1.73M2 — SIGNIFICANT CHANGE UP
EGFR: 125 ML/MIN/1.73M2 — SIGNIFICANT CHANGE UP
EGFR: 129 ML/MIN/1.73M2 — SIGNIFICANT CHANGE UP
EGFR: 130 ML/MIN/1.73M2 — SIGNIFICANT CHANGE UP
EOSINOPHIL # BLD AUTO: 0 K/UL — SIGNIFICANT CHANGE UP (ref 0–0.5)
EOSINOPHIL NFR BLD AUTO: 0 % — SIGNIFICANT CHANGE UP (ref 0–6)
EPI CELLS # UR: 2 /HPF — SIGNIFICANT CHANGE UP
GAS PNL BLDA: SIGNIFICANT CHANGE UP
GAS PNL BLDA: SIGNIFICANT CHANGE UP
GIANT PLATELETS BLD QL SMEAR: PRESENT — SIGNIFICANT CHANGE UP
GLUCOSE BLDC GLUCOMTR-MCNC: 133 MG/DL — HIGH (ref 70–99)
GLUCOSE BLDC GLUCOMTR-MCNC: 133 MG/DL — HIGH (ref 70–99)
GLUCOSE BLDC GLUCOMTR-MCNC: 156 MG/DL — HIGH (ref 70–99)
GLUCOSE BLDC GLUCOMTR-MCNC: 158 MG/DL — HIGH (ref 70–99)
GLUCOSE BLDC GLUCOMTR-MCNC: 160 MG/DL — HIGH (ref 70–99)
GLUCOSE CSF-MCNC: 88 MG/DL — HIGH (ref 40–70)
GLUCOSE SERPL-MCNC: 140 MG/DL — HIGH (ref 70–99)
GLUCOSE SERPL-MCNC: 142 MG/DL — HIGH (ref 70–99)
GLUCOSE SERPL-MCNC: 147 MG/DL — HIGH (ref 70–99)
GLUCOSE SERPL-MCNC: 155 MG/DL — HIGH (ref 70–99)
GLUCOSE UR QL: ABNORMAL
GRAM STN FLD: SIGNIFICANT CHANGE UP
HAPTOGLOB SERPL-MCNC: 304 MG/DL — HIGH (ref 34–200)
HCT VFR BLD CALC: 22.3 % — LOW (ref 34.5–45)
HCT VFR BLD CALC: 22.3 % — LOW (ref 34.5–45)
HCT VFR BLD CALC: 26.3 % — LOW (ref 34.5–45)
HCT VFR BLD CALC: 26.4 % — LOW (ref 34.5–45)
HCT VFR BLD CALC: 27.1 % — LOW (ref 34.5–45)
HGB BLD-MCNC: 7.3 G/DL — LOW (ref 11.5–15.5)
HGB BLD-MCNC: 7.4 G/DL — LOW (ref 11.5–15.5)
HGB BLD-MCNC: 8.8 G/DL — LOW (ref 11.5–15.5)
HGB BLD-MCNC: 9.1 G/DL — LOW (ref 11.5–15.5)
HGB BLD-MCNC: 9.3 G/DL — LOW (ref 11.5–15.5)
HYALINE CASTS # UR AUTO: 1 /LPF — SIGNIFICANT CHANGE UP (ref 0–2)
KETONES UR-MCNC: ABNORMAL
LACTATE CSF-MCNC: 4.1 MMOL/L — HIGH (ref 1.1–2.4)
LEUKOCYTE ESTERASE UR-ACNC: NEGATIVE — SIGNIFICANT CHANGE UP
LYMPHOCYTES # BLD AUTO: 1.73 K/UL — SIGNIFICANT CHANGE UP (ref 1–3.3)
LYMPHOCYTES # BLD AUTO: 12.3 % — LOW (ref 13–44)
LYMPHOCYTES # CSF: 8 % — LOW (ref 40–80)
MAGNESIUM SERPL-MCNC: 2.2 MG/DL — SIGNIFICANT CHANGE UP (ref 1.6–2.6)
MAGNESIUM SERPL-MCNC: 2.3 MG/DL — SIGNIFICANT CHANGE UP (ref 1.6–2.6)
MAGNESIUM SERPL-MCNC: 2.3 MG/DL — SIGNIFICANT CHANGE UP (ref 1.6–2.6)
MANUAL SMEAR VERIFICATION: SIGNIFICANT CHANGE UP
MCHC RBC-ENTMCNC: 31.7 PG — SIGNIFICANT CHANGE UP (ref 27–34)
MCHC RBC-ENTMCNC: 32 PG — SIGNIFICANT CHANGE UP (ref 27–34)
MCHC RBC-ENTMCNC: 32.3 PG — SIGNIFICANT CHANGE UP (ref 27–34)
MCHC RBC-ENTMCNC: 32.6 PG — SIGNIFICANT CHANGE UP (ref 27–34)
MCHC RBC-ENTMCNC: 32.7 GM/DL — SIGNIFICANT CHANGE UP (ref 32–36)
MCHC RBC-ENTMCNC: 32.9 PG — SIGNIFICANT CHANGE UP (ref 27–34)
MCHC RBC-ENTMCNC: 33.2 GM/DL — SIGNIFICANT CHANGE UP (ref 32–36)
MCHC RBC-ENTMCNC: 33.3 GM/DL — SIGNIFICANT CHANGE UP (ref 32–36)
MCHC RBC-ENTMCNC: 34.3 GM/DL — SIGNIFICANT CHANGE UP (ref 32–36)
MCHC RBC-ENTMCNC: 34.6 GM/DL — SIGNIFICANT CHANGE UP (ref 32–36)
MCV RBC AUTO: 94.9 FL — SIGNIFICANT CHANGE UP (ref 80–100)
MCV RBC AUTO: 95 FL — SIGNIFICANT CHANGE UP (ref 80–100)
MCV RBC AUTO: 95.1 FL — SIGNIFICANT CHANGE UP (ref 80–100)
MCV RBC AUTO: 97.4 FL — SIGNIFICANT CHANGE UP (ref 80–100)
MCV RBC AUTO: 97.8 FL — SIGNIFICANT CHANGE UP (ref 80–100)
MONOCYTES # BLD AUTO: 0.24 K/UL — SIGNIFICANT CHANGE UP (ref 0–0.9)
MONOCYTES NFR BLD AUTO: 1.7 % — LOW (ref 2–14)
MONOS+MACROS NFR CSF: 7 % — LOW (ref 15–45)
NEUTROPHILS # BLD AUTO: 11.95 K/UL — HIGH (ref 1.8–7.4)
NEUTROPHILS # CSF: 85 % — HIGH (ref 0–6)
NEUTROPHILS NFR BLD AUTO: 85.1 % — HIGH (ref 43–77)
NIGHT BLUE STAIN TISS: SIGNIFICANT CHANGE UP
NITRITE UR-MCNC: NEGATIVE — SIGNIFICANT CHANGE UP
NRBC # BLD: 0 /100 WBCS — SIGNIFICANT CHANGE UP (ref 0–0)
NRBC NFR CSF: 67 /UL — HIGH (ref 0–5)
OB PNL STL: NEGATIVE — SIGNIFICANT CHANGE UP
PH UR: 8 — SIGNIFICANT CHANGE UP (ref 5–8)
PHOSPHATE SERPL-MCNC: 2 MG/DL — LOW (ref 2.5–4.5)
PHOSPHATE SERPL-MCNC: 2.2 MG/DL — LOW (ref 2.5–4.5)
PHOSPHATE SERPL-MCNC: 2.5 MG/DL — SIGNIFICANT CHANGE UP (ref 2.5–4.5)
PLAT MORPH BLD: NORMAL — SIGNIFICANT CHANGE UP
PLATELET # BLD AUTO: 146 K/UL — LOW (ref 150–400)
PLATELET # BLD AUTO: 146 K/UL — LOW (ref 150–400)
PLATELET # BLD AUTO: 167 K/UL — SIGNIFICANT CHANGE UP (ref 150–400)
PLATELET # BLD AUTO: 176 K/UL — SIGNIFICANT CHANGE UP (ref 150–400)
PLATELET # BLD AUTO: 178 K/UL — SIGNIFICANT CHANGE UP (ref 150–400)
POTASSIUM SERPL-MCNC: 3.7 MMOL/L — SIGNIFICANT CHANGE UP (ref 3.5–5.3)
POTASSIUM SERPL-MCNC: 3.7 MMOL/L — SIGNIFICANT CHANGE UP (ref 3.5–5.3)
POTASSIUM SERPL-MCNC: 3.9 MMOL/L — SIGNIFICANT CHANGE UP (ref 3.5–5.3)
POTASSIUM SERPL-MCNC: 4.2 MMOL/L — SIGNIFICANT CHANGE UP (ref 3.5–5.3)
POTASSIUM SERPL-SCNC: 3.7 MMOL/L — SIGNIFICANT CHANGE UP (ref 3.5–5.3)
POTASSIUM SERPL-SCNC: 3.7 MMOL/L — SIGNIFICANT CHANGE UP (ref 3.5–5.3)
POTASSIUM SERPL-SCNC: 3.9 MMOL/L — SIGNIFICANT CHANGE UP (ref 3.5–5.3)
POTASSIUM SERPL-SCNC: 4.2 MMOL/L — SIGNIFICANT CHANGE UP (ref 3.5–5.3)
PROT CSF-MCNC: 172 MG/DL — HIGH (ref 15–45)
PROT UR-MCNC: ABNORMAL
RBC # BLD: 2.28 M/UL — LOW (ref 3.8–5.2)
RBC # BLD: 2.29 M/UL — LOW (ref 3.8–5.2)
RBC # BLD: 2.77 M/UL — LOW (ref 3.8–5.2)
RBC # BLD: 2.78 M/UL — LOW (ref 3.8–5.2)
RBC # BLD: 2.85 M/UL — LOW (ref 3.8–5.2)
RBC # CSF: HIGH /UL (ref 0–0)
RBC # FLD: 14.6 % — HIGH (ref 10.3–14.5)
RBC # FLD: 14.6 % — HIGH (ref 10.3–14.5)
RBC # FLD: 15.6 % — HIGH (ref 10.3–14.5)
RBC # FLD: 15.8 % — HIGH (ref 10.3–14.5)
RBC # FLD: 15.9 % — HIGH (ref 10.3–14.5)
RBC BLD AUTO: SIGNIFICANT CHANGE UP
RBC CASTS # UR COMP ASSIST: 2 /HPF — SIGNIFICANT CHANGE UP (ref 0–4)
SODIUM SERPL-SCNC: 155 MMOL/L — HIGH (ref 135–145)
SODIUM SERPL-SCNC: 156 MMOL/L — HIGH (ref 135–145)
SP GR SPEC: 1.03 — HIGH (ref 1.01–1.02)
SPECIMEN SOURCE: SIGNIFICANT CHANGE UP
SPECIMEN SOURCE: SIGNIFICANT CHANGE UP
SURGICAL PATHOLOGY STUDY: SIGNIFICANT CHANGE UP
TUBE TYPE: SIGNIFICANT CHANGE UP
UROBILINOGEN FLD QL: NEGATIVE — SIGNIFICANT CHANGE UP
WBC # BLD: 11.91 K/UL — HIGH (ref 3.8–10.5)
WBC # BLD: 12.19 K/UL — HIGH (ref 3.8–10.5)
WBC # BLD: 12.34 K/UL — HIGH (ref 3.8–10.5)
WBC # BLD: 13.64 K/UL — HIGH (ref 3.8–10.5)
WBC # BLD: 13.8 K/UL — HIGH (ref 3.8–10.5)
WBC # FLD AUTO: 11.91 K/UL — HIGH (ref 3.8–10.5)
WBC # FLD AUTO: 12.19 K/UL — HIGH (ref 3.8–10.5)
WBC # FLD AUTO: 12.34 K/UL — HIGH (ref 3.8–10.5)
WBC # FLD AUTO: 13.64 K/UL — HIGH (ref 3.8–10.5)
WBC # FLD AUTO: 13.8 K/UL — HIGH (ref 3.8–10.5)
WBC UR QL: 4 /HPF — SIGNIFICANT CHANGE UP (ref 0–5)

## 2023-06-09 PROCEDURE — 71045 X-RAY EXAM CHEST 1 VIEW: CPT | Mod: 26

## 2023-06-09 PROCEDURE — 99291 CRITICAL CARE FIRST HOUR: CPT

## 2023-06-09 PROCEDURE — 71045 X-RAY EXAM CHEST 1 VIEW: CPT | Mod: 26,77

## 2023-06-09 PROCEDURE — 36227 PLACE CATH XTRNL CAROTID: CPT | Mod: 50

## 2023-06-09 PROCEDURE — 70450 CT HEAD/BRAIN W/O DYE: CPT | Mod: 26

## 2023-06-09 PROCEDURE — 36226 PLACE CATH VERTEBRAL ART: CPT | Mod: 50,78

## 2023-06-09 PROCEDURE — 36224 PLACE CATH CAROTD ART: CPT | Mod: 50

## 2023-06-09 RX ORDER — CALCIUM GLUCONATE 100 MG/ML
1 VIAL (ML) INTRAVENOUS ONCE
Refills: 0 | Status: COMPLETED | OUTPATIENT
Start: 2023-06-09 | End: 2023-06-09

## 2023-06-09 RX ORDER — POTASSIUM CHLORIDE 20 MEQ
40 PACKET (EA) ORAL ONCE
Refills: 0 | Status: COMPLETED | OUTPATIENT
Start: 2023-06-09 | End: 2023-06-09

## 2023-06-09 RX ORDER — ACETAMINOPHEN 500 MG
1000 TABLET ORAL ONCE
Refills: 0 | Status: COMPLETED | OUTPATIENT
Start: 2023-06-09 | End: 2023-06-09

## 2023-06-09 RX ORDER — POTASSIUM PHOSPHATE, MONOBASIC POTASSIUM PHOSPHATE, DIBASIC 236; 224 MG/ML; MG/ML
30 INJECTION, SOLUTION INTRAVENOUS ONCE
Refills: 0 | Status: COMPLETED | OUTPATIENT
Start: 2023-06-09 | End: 2023-06-09

## 2023-06-09 RX ORDER — POTASSIUM CHLORIDE 20 MEQ
40 PACKET (EA) ORAL EVERY 4 HOURS
Refills: 0 | Status: COMPLETED | OUTPATIENT
Start: 2023-06-09 | End: 2023-06-09

## 2023-06-09 RX ORDER — FENTANYL CITRATE 50 UG/ML
25 INJECTION INTRAVENOUS ONCE
Refills: 0 | Status: DISCONTINUED | OUTPATIENT
Start: 2023-06-09 | End: 2023-06-09

## 2023-06-09 RX ORDER — DEXMEDETOMIDINE HYDROCHLORIDE IN 0.9% SODIUM CHLORIDE 4 UG/ML
0.2 INJECTION INTRAVENOUS
Qty: 200 | Refills: 0 | Status: DISCONTINUED | OUTPATIENT
Start: 2023-06-09 | End: 2023-06-14

## 2023-06-09 RX ORDER — HYDRALAZINE HCL 50 MG
5 TABLET ORAL ONCE
Refills: 0 | Status: COMPLETED | OUTPATIENT
Start: 2023-06-09 | End: 2023-06-09

## 2023-06-09 RX ORDER — FENTANYL CITRATE 50 UG/ML
25 INJECTION INTRAVENOUS
Refills: 0 | Status: DISCONTINUED | OUTPATIENT
Start: 2023-06-09 | End: 2023-06-13

## 2023-06-09 RX ORDER — LEVETIRACETAM 250 MG/1
1000 TABLET, FILM COATED ORAL
Refills: 0 | Status: DISCONTINUED | OUTPATIENT
Start: 2023-06-09 | End: 2023-06-14

## 2023-06-09 RX ORDER — DEXMEDETOMIDINE HYDROCHLORIDE IN 0.9% SODIUM CHLORIDE 4 UG/ML
0.2 INJECTION INTRAVENOUS
Qty: 200 | Refills: 0 | Status: DISCONTINUED | OUTPATIENT
Start: 2023-06-09 | End: 2023-06-09

## 2023-06-09 RX ORDER — LEVETIRACETAM 250 MG/1
1500 TABLET, FILM COATED ORAL ONCE
Refills: 0 | Status: COMPLETED | OUTPATIENT
Start: 2023-06-09 | End: 2023-06-09

## 2023-06-09 RX ORDER — SODIUM CHLORIDE 9 MG/ML
1000 INJECTION INTRAMUSCULAR; INTRAVENOUS; SUBCUTANEOUS
Refills: 0 | Status: DISCONTINUED | OUTPATIENT
Start: 2023-06-09 | End: 2023-06-11

## 2023-06-09 RX ADMIN — Medication 2: at 06:03

## 2023-06-09 RX ADMIN — DEXMEDETOMIDINE HYDROCHLORIDE IN 0.9% SODIUM CHLORIDE 3.82 MICROGRAM(S)/KG/HR: 4 INJECTION INTRAVENOUS at 19:00

## 2023-06-09 RX ADMIN — Medication 1 PATCH: at 11:10

## 2023-06-09 RX ADMIN — DEXMEDETOMIDINE HYDROCHLORIDE IN 0.9% SODIUM CHLORIDE 3.82 MICROGRAM(S)/KG/HR: 4 INJECTION INTRAVENOUS at 07:36

## 2023-06-09 RX ADMIN — SODIUM CHLORIDE 30 MILLILITER(S): 5 INJECTION, SOLUTION INTRAVENOUS at 00:27

## 2023-06-09 RX ADMIN — Medication 100 GRAM(S): at 17:41

## 2023-06-09 RX ADMIN — Medication 40 MILLIEQUIVALENT(S): at 01:23

## 2023-06-09 RX ADMIN — FENTANYL CITRATE 25 MICROGRAM(S): 50 INJECTION INTRAVENOUS at 03:40

## 2023-06-09 RX ADMIN — Medication 650 MILLIGRAM(S): at 17:34

## 2023-06-09 RX ADMIN — SENNA PLUS 2 TABLET(S): 8.6 TABLET ORAL at 22:55

## 2023-06-09 RX ADMIN — FENTANYL CITRATE 25 MICROGRAM(S): 50 INJECTION INTRAVENOUS at 21:15

## 2023-06-09 RX ADMIN — SODIUM CHLORIDE 30 MILLILITER(S): 5 INJECTION, SOLUTION INTRAVENOUS at 19:00

## 2023-06-09 RX ADMIN — FENTANYL CITRATE 25 MICROGRAM(S): 50 INJECTION INTRAVENOUS at 21:55

## 2023-06-09 RX ADMIN — FENTANYL CITRATE 25 MICROGRAM(S): 50 INJECTION INTRAVENOUS at 17:05

## 2023-06-09 RX ADMIN — Medication 2 MILLIGRAM(S): at 21:14

## 2023-06-09 RX ADMIN — LEVETIRACETAM 500 MILLIGRAM(S): 250 TABLET, FILM COATED ORAL at 17:38

## 2023-06-09 RX ADMIN — Medication 650 MILLIGRAM(S): at 17:04

## 2023-06-09 RX ADMIN — POLYETHYLENE GLYCOL 3350 17 GRAM(S): 17 POWDER, FOR SOLUTION ORAL at 05:20

## 2023-06-09 RX ADMIN — CHLORHEXIDINE GLUCONATE 15 MILLILITER(S): 213 SOLUTION TOPICAL at 17:38

## 2023-06-09 RX ADMIN — Medication 2: at 00:17

## 2023-06-09 RX ADMIN — Medication 75 MICROGRAM(S): at 05:20

## 2023-06-09 RX ADMIN — SODIUM CHLORIDE 30 MILLILITER(S): 5 INJECTION, SOLUTION INTRAVENOUS at 07:35

## 2023-06-09 RX ADMIN — Medication 650 MILLIGRAM(S): at 01:24

## 2023-06-09 RX ADMIN — OXYCODONE HYDROCHLORIDE 5 MILLIGRAM(S): 5 TABLET ORAL at 05:20

## 2023-06-09 RX ADMIN — FENTANYL CITRATE 25 MICROGRAM(S): 50 INJECTION INTRAVENOUS at 03:25

## 2023-06-09 RX ADMIN — Medication 1 PATCH: at 07:00

## 2023-06-09 RX ADMIN — LEVETIRACETAM 500 MILLIGRAM(S): 250 TABLET, FILM COATED ORAL at 05:20

## 2023-06-09 RX ADMIN — Medication 1000 MILLIGRAM(S): at 23:10

## 2023-06-09 RX ADMIN — OXYCODONE HYDROCHLORIDE 5 MILLIGRAM(S): 5 TABLET ORAL at 06:20

## 2023-06-09 RX ADMIN — Medication 400 MILLIGRAM(S): at 22:55

## 2023-06-09 RX ADMIN — FENTANYL CITRATE 25 MICROGRAM(S): 50 INJECTION INTRAVENOUS at 17:20

## 2023-06-09 RX ADMIN — FENTANYL CITRATE 25 MICROGRAM(S): 50 INJECTION INTRAVENOUS at 22:10

## 2023-06-09 RX ADMIN — SODIUM CHLORIDE 70 MILLILITER(S): 9 INJECTION INTRAMUSCULAR; INTRAVENOUS; SUBCUTANEOUS at 21:00

## 2023-06-09 RX ADMIN — FENTANYL CITRATE 25 MICROGRAM(S): 50 INJECTION INTRAVENOUS at 21:00

## 2023-06-09 RX ADMIN — Medication 1 PATCH: at 13:24

## 2023-06-09 RX ADMIN — Medication 1 PATCH: at 19:00

## 2023-06-09 RX ADMIN — FENTANYL CITRATE 50 MICROGRAM(S): 50 INJECTION INTRAVENOUS at 19:00

## 2023-06-09 RX ADMIN — Medication 5 MILLIGRAM(S): at 20:17

## 2023-06-09 RX ADMIN — PANTOPRAZOLE SODIUM 40 MILLIGRAM(S): 20 TABLET, DELAYED RELEASE ORAL at 13:24

## 2023-06-09 RX ADMIN — Medication 40 MILLIEQUIVALENT(S): at 17:04

## 2023-06-09 RX ADMIN — ENOXAPARIN SODIUM 40 MILLIGRAM(S): 100 INJECTION SUBCUTANEOUS at 17:39

## 2023-06-09 RX ADMIN — LEVETIRACETAM 400 MILLIGRAM(S): 250 TABLET, FILM COATED ORAL at 21:15

## 2023-06-09 RX ADMIN — CHLORHEXIDINE GLUCONATE 15 MILLILITER(S): 213 SOLUTION TOPICAL at 05:20

## 2023-06-09 RX ADMIN — CHLORHEXIDINE GLUCONATE 1 APPLICATION(S): 213 SOLUTION TOPICAL at 14:30

## 2023-06-09 RX ADMIN — Medication 40 MILLIEQUIVALENT(S): at 13:56

## 2023-06-09 NOTE — PROGRESS NOTE ADULT - SUBJECTIVE AND OBJECTIVE BOX
NSCU EVENING NOTE -- ADDITIONAL PROGRESS NOTE    Nighttime rounds were performed -- please refer to earlier Progress Note for HPI details.    24 HOUR INTERVAL:  - h/h trending down, CTAP neg for heme; pressor requirements decreasing  - s/p 1uprbc overnight, responded appropriately  - s/p angio today, negative for vascular abnormalities        Vitals/Labs reviewed    Relevant labwork and imaging reviewed.    Patient remains critically ill.      Additional 30 minutes of critical care time. NSCU EVENING NOTE -- ADDITIONAL PROGRESS NOTE    Nighttime rounds were performed -- please refer to earlier Progress Note for HPI details.    24 HOUR INTERVAL:  - h/h trending down, CTAP neg for heme; pressor requirements decreasing  - s/p 1uprbc overnight, responded appropriately  - s/p angio today, negative for vascular abnormalities  - during rounds, Lt gaze, stopped following commands, given 2mg ativan and loaded 1500 keppra and increased dose; NSG aware, ordered for CTH and plan to place on eeg. post ativan FC and midline gaze        Vitals/Labs reviewed    Relevant labwork and imaging reviewed.    Patient remains critically ill.      Additional 30 minutes of critical care time.

## 2023-06-09 NOTE — PROGRESS NOTE ADULT - SUBJECTIVE AND OBJECTIVE BOX
HPI:    SURGERY: Decompressive craniectomy      PAST MEDICAL HISTORY:   PAST SURGICAL HISTORY:   FAMILY HISTORY:    ALLERGIES: No Known Allergies    **************************************  **************************************    OVERNIGHT EVENTS: [] None    ROS  Unobtainable due to mental status[] Negative []  Positives:    ADMISSION SCORES: GCS: HH: MF: NIHSS: RASS: CAM-ICU: ICP:    ICU Vital Signs Last 24 Hrs  T(C): 37.3 (09 Jun 2023 11:10), Max: 38.3 (09 Jun 2023 01:00)  T(F): 99.1 (09 Jun 2023 11:10), Max: 100.9 (09 Jun 2023 01:00)  HR: 79 (09 Jun 2023 14:01) (58 - 99)  BP: 140/84 (09 Jun 2023 10:18) (140/84 - 140/84)  BP(mean): 97 (09 Jun 2023 10:18) (97 - 97)  ABP: 163/85 (09 Jun 2023 14:00) (103/59 - 192/112)  ABP(mean): 117 (09 Jun 2023 14:00) (79 - 146)  RR: 14 (09 Jun 2023 14:00) (12 - 20)  SpO2: 100% (09 Jun 2023 14:01) (95% - 100%)    O2 Parameters below as of 09 Jun 2023 10:18      O2 Concentration (%): 30       06-08 @ 07:01  -  06-09 @ 07:00  --------------------------------------------------------  IN: 3370.4 mL / OUT: 1199 mL / NET: 2171.4 mL    06-09 @ 07:01  -  06-09 @ 14:50  --------------------------------------------------------  IN: 331.8 mL / OUT: 227 mL / NET: 104.8 mL       Mode: CPAP with PS  FiO2: 40  PEEP: 5  PS: 10  MAP: 8  PIP: 16      DEVICES: [] Restraints [] HAROON/HMV []LD [] ET tube [] Trach [] Chest Tube [] A-line [] Trevino [] NGT [] Rectal Tube [] EVD [] CVL  [] ICP/LiCOx    NEUROIMAGING:     EEG REPORT:     MEDICATIONS:  acetaminophen     Tablet .. 650 milliGRAM(s) Oral every 6 hours PRN  bisacodyl Suppository 10 milliGRAM(s) Rectal once PRN  chlorhexidine 0.12% Liquid 15 milliLiter(s) Oral Mucosa every 12 hours  chlorhexidine 4% Liquid 1 Application(s) Topical daily  dexMEDEtomidine Infusion 0.2 MICROgram(s)/kG/Hr IV Continuous <Continuous>  enoxaparin Injectable 40 milliGRAM(s) SubCutaneous <User Schedule>  insulin lispro (ADMELOG) corrective regimen sliding scale   SubCutaneous every 6 hours  levETIRAcetam  Solution 500 milliGRAM(s) Oral two times a day  levothyroxine 75 MICROGram(s) Oral daily  nicotine - 21 mG/24Hr(s) Patch 1 Patch Transdermal daily  ondansetron Injectable 4 milliGRAM(s) IV Push every 6 hours PRN  oxyCODONE    Solution 5 milliGRAM(s) Oral every 4 hours PRN  pantoprazole  Injectable 40 milliGRAM(s) IV Push daily  phenylephrine    Infusion 0.1 MICROgram(s)/kG/Min IV Continuous <Continuous>  polyethylene glycol 3350 17 Gram(s) Oral once PRN  polyethylene glycol 3350 17 Gram(s) Oral every 12 hours  potassium chloride   Solution 40 milliEquivalent(s) Oral every 4 hours  senna 2 Tablet(s) Oral at bedtime  sodium chloride 2% + sodium acetate 50:50 1000 milliLiter(s) IV Continuous <Continuous>      PHYSICAL EXAM:  General: No Acute Distress     Neurological: SERGIO, has gag and cough, eyes midline , localizes in right UE-to command squeezes alma rosa hand and wiggles right toes, extends left UE, , extends in LE     Pulmonary: Clear to Auscultation, No Rales, No Rhonchi, No Wheezes     Cardiovascular: S1, S2, Regular Rate and Rhythm     Gastrointestinal: Soft, Nontender, Nondistended     Extremities: No calf tenderness       LABS:                        8.8    12.34 )-----------( 167      ( 09 Jun 2023 13:15 )             26.4    06-09    155<H>  |  124<H>  |  8   ----------------------------<  147<H>  3.7   |  20<L>  |  0.40<L>    Ca    7.8<L>      09 Jun 2023 13:15  Phos  2.2     06-09  Mg     2.3     06-09     ABG - ( 08 Jun 2023 22:14 )  pH, Arterial: 7.45  pH, Blood: x     /  pCO2: 33    /  pO2: 194   / HCO3: 23    / Base Excess: -1.0  /  SaO2: 100.0

## 2023-06-09 NOTE — ADVANCED PRACTICE NURSE CONSULT - ASSESSMENT
arrived on unit, patient was found lying in a low air loss pressure redistribution support surface style bed. Patient sedated with endotracheal tube . patient  is unable to turn independently and staff assistance x 2 was provided. Once turned,  urinary incontinence was apparent and pericare was provided. Purewick external urinary catheter was place.   On bilateral  buttocks/sacral skin there is red discoloration with hyperpigmentation noted to measure approximately 6cm x 4cm x 0cm.  which   is consistent with deep tissue injury  . present on admission  right heel  there is  non- blanchable red  discoloration and hyperpigmentation  noted to measure approximately 3cm x 3cm x 0cm.  which  is consistent with deep tissue injuries. present on admission  left  heel  there is  non- blanchable red  discoloration and hyperpigmentation  noted to measure approximately 3cm x 3cm x 0cm.  which  is consistent with deep tissue injuries. present on admission.  plan of care reviewed with JASWANT Pardo arrived on unit, patient was found lying in a low air loss pressure redistribution support surface style bed. Patient sedated with endotracheal tube . patient  is unable to turn independently and staff assistance x 2 was provided. Once turned,  urinary incontinence was apparent and pericare was provided. Purewick external urinary catheter was place.   On bilateral  buttocks/sacral skin there is red discoloration with hyperpigmentation noted to measure approximately 6cm x 4cm x 0cm.  which   is consistent with deep tissue injury  . present on admission  right heel  there is  non- blanchable red  discoloration and hyperpigmentation  noted to measure approximately 3cm x 3cm x 0cm.  which  is consistent with deep tissue injuries. present on admission  left  heel  there is  non- blanchable red  discoloration and hyperpigmentation  noted to measure approximately 3cm x 3cm x 0cm.  which  is consistent with deep tissue injuries. present on admission.  plan of care reviewed with JASWANT silva

## 2023-06-09 NOTE — PROVIDER CONTACT NOTE (OTHER) - ACTION/TREATMENT ORDERED:
No intervention at this time,. swelling is expected post op, and post trauma, repeat cbc at 5am and continue to monitor pupils and eye throughout the shift

## 2023-06-09 NOTE — ADVANCED PRACTICE NURSE CONSULT - RECOMMEDATIONS
impression   bilateral sacral / buttocks deep tissue injury present on admission   right heel tissue injury present on admission   left heel tissue injury present on admission      impression   bilateral sacral / buttocks deep tissue injury present on admission   right heel tissue injury present on admission   left heel tissue injury present on admission     Recommendations   1. Bilateral  , sacral / buttocks  deep tissue injury  with evolutionTopical therapy- sacral/bilateral buttocks- cleanse w/incontinent cleanser, pat dry & apply dary moisture barrier cream   twice daily & prn soiling .  monitor    for changes .  2. Right and left heel  deep tissue injury( A).  cleans  with normal saline pat dry and apply no sting barrier film ( Cavilon ) daily and monitor for changes .  (B)Elevate heels; apply Complete Cair air fluidized boots; ensure that the soles of the feet are not resting on the foot board of the bed.  3  Incontinent management - incontinent cleanser, pads,  chelly care  BID  4. Maintain on an alternating air with low air loss surface   5. Turn & reposition every 2 hr; Use positioning pillow to turn and reposition, soft pillow between bony prominences; continue measures to decrease friction/shear/pressure.  6. Nutrition optimization.  7.  waffle cushion when out of bed to chair .   plan of care reviewed with JASWANT silva     impression   bilateral sacral / buttocks deep tissue injury present on admission   right heel tissue injury present on admission   left heel tissue injury present on admission     Recommendations   1. Bilateral  , sacral / buttocks  deep tissue injury  with Topical therapy- sacral/bilateral buttocks- cleanse w/incontinent cleanser, pat dry & apply dary moisture barrier cream   twice daily & prn soiling .  monitor    for changes .  2. Right and left heel  deep tissue injury( A).  cleans  with normal saline pat dry and apply no sting barrier film ( Cavilon ) daily and monitor for changes .  (B)Elevate heels; apply Complete Cair air fluidized boots; ensure that the soles of the feet are not resting on the foot board of the bed.  3  Incontinent management - incontinent cleanser, pads,  chelly care  BID  4. Maintain on an alternating air with low air loss surface   5. Turn & reposition every 2 hr; Use positioning pillow to turn and reposition, soft pillow between bony prominences; continue measures to decrease friction/shear/pressure.  6. Nutrition optimization.  7.  waffle cushion when out of bed to chair .   plan of care reviewed with JASWANT silva

## 2023-06-09 NOTE — CHART NOTE - NSCHARTNOTEFT_GEN_A_CORE
Interventional Neuro- Radiology   Procedure Note      Procedure: Selective Cerebral Angiography   Pre- Procedure Diagnosis: right ICA   Post- Procedure Diagnosis: no vascular malformation     : Dr. Anette MD  Fellow: Dr. Elan MD   Physician Assistant: Mojgan Barnes PA-C    RN: Tucker   Tech: Louis/ Jose Francisco     Anesthesia: Dr. Millard    (general anesthesia)    I/Os:  Fluids: 100cc   Trevino: DTV   Contrast: 96cc   Estimated Blood Loss: <10cc    Preliminary Report:  Under general anesthesia, using a 5Fr short sheath to the right femoral artery examination of left vertebral artery/ left internal carotid artery/ left external carotid artery/ right vertebral artery/ right internal carotid artery/ right external carotid artery via selective cerebral angiography demonstrates no vascular malformation. ( Official note to follow).    Patient tolerated procedure well, vital signs stable, hemodynamically stable, no change in neurological status compared to baseline. Results discussed with neurosurgery/ patient's family. Groin sheath d/c'ed, manual compression held to hemostasis, no active bleeding, no hematoma, Vascade device applied, quick clot and safeguard balloon dressing applied at 1040h. Patient transferred to NSCU for further care/ monitoring.     Mojgan Barnes PA-C  [phone]

## 2023-06-09 NOTE — PROGRESS NOTE ADULT - ASSESSMENT
right ICH with midline shift and brain compression s/p emergent decompressive craniectomy  CTA no vascular malformation  ICH score 4      Neuro:   neuro checks q2 hr  EVD at 10 cm water, minimal output, keep ICP< 22 mmhg, CPP> 60   brain edema hold HTS today  keppra 500 mg BID for seizure prophylaxis   precedex for vent synchrony       Respiratory:   acute respiratory failure, intubated   chest xray ET daily  ABG  CPAP 10/5 tolerating  wean to to extubate    Endocrine:   sugar goal 120-180  finger sticks and ISS   synthroid    Heme/Onc:     hgb slightly dropped will monitor   DVT PPX   h/h trending down ~6 point drop, though HDS with no s/s active bleeding, will repeat cbc now, ig Hgb stable, will repeat tomorrow, if continues to downtrend will order transfusion +/- CTAP to r/o heme. no melena noted    Renal:   NS 75cc/h while NPO, DC HTS today  see neuro  Na goal 145-155    ID:   monitor for fevers  f/u panculture  febrile today, pan cultured    GI:   NG, NPO MN for angio, pantoprazole while intubated    right ICH with midline shift and brain compression s/p emergent decompressive craniectomy  CTA no vascular malformation  ICH score 4      Neuro:   neuro checks q2 hr  EVD at 10 cm water, minimal output, keep ICP< 22 mmhg, CPP> 60   brain edema hold HTS today  Keppra load 1500 + 1g BID (6/9) for clinical seizure (Lt gaze stopped FC)  placed on EEG for sz  precedex for vent synchrony       Respiratory:   acute respiratory failure, intubated   chest xray ET daily  ABG  CPAP 10/5 tolerating  wean to to extubate    Endocrine:   sugar goal 120-180  finger sticks and ISS   synthroid    Heme/Onc:     hgb slightly dropped will monitor   DVT PPX   h/h trending down ~6 point drop, though HDS with no s/s active bleeding, will repeat cbc now, ig Hgb stable, will repeat tomorrow, if continues to downtrend will order transfusion +/- CTAP to r/o heme. no melena noted    Renal:   NS 75cc/h while NPO, DC HTS today  see neuro  Na goal 145-155    ID:   monitor for fevers  f/u panculture  febrile today, pan cultured    GI:   NG, NPO MN for angio, pantoprazole while intubated

## 2023-06-09 NOTE — PROGRESS NOTE ADULT - ASSESSMENT
right ICH with midline shift and brain compression s/p emergent decompressive craniectomy POD 0  Angio today negative  ICH score 4  Neuro: neuro checks q 1 hr  EVD at 10 cm water, minimal output      , keep ICP< 22 mmhg, CPP> 60   brain edema start 3% at 5-/hr BMP q 6 hr , sodium goal 145-155   CT post op improved midline shift but significant edema   keppra 500 mg BID for seizure prophylaxis   has a cervical collar, CT cervical spine pending official report , has a cervical collar, needs to be cleared by NS   precedex for vent synchrony   Respiratory: acute respiratory failure, intubated -will wean to extubate as tolerated  chest xray ET, NG in good position   Endocrine: sugar goal 120-180  finger sticks and ISS   HBA1 c   Heme/Onc:   hgb dropping-was tx 1 uprbcs. will f/u . CT was done without conttrast negative for bleeding. if continues to drop will repeat with contrast  Renal: see neuro  ID: febrile. will check csf  GI: NG, , pantoprazole while intubated   Social/Family: family updated  Discharge planning:     Code Status: [x] Full Code [] DNR [] DNI [] Goals of Care:   Disposition: [x] ICU [] Stroke Unit [] RCU []PCU []Floor [] Discharge

## 2023-06-09 NOTE — CHART NOTE - NSCHARTNOTEFT_GEN_A_CORE
Interventional Neuro Radiology  Pre-Procedure Note    This is a 37yo female right ICH with midline shift and brain compression s/p emergent decompressive craniectomy POD 2, CTA no vascular malformation.     Neurological: SERGIO, has gag and cough, eyes midline , localizes in right UE-to command squeezes alma rosa hand and wiggles right toes, extends left UE, , extends in LE     PAST MEDICAL & SURGICAL HISTORY:  IVF     Social History:   Denies tobacco use    FAMILY HISTORY:  No pertinent family history    Allergies:   No Known Allergies      Current Medications:   acetaminophen     Tablet .. 650 milliGRAM(s) Oral every 6 hours PRN  bisacodyl Suppository 10 milliGRAM(s) Rectal once PRN  chlorhexidine 0.12% Liquid 15 milliLiter(s) Oral Mucosa every 12 hours  chlorhexidine 4% Liquid 1 Application(s) Topical daily  dexMEDEtomidine Infusion 0.2 MICROgram(s)/kG/Hr IV Continuous <Continuous>  enoxaparin Injectable 40 milliGRAM(s) SubCutaneous <User Schedule>  insulin lispro (ADMELOG) corrective regimen sliding scale   SubCutaneous every 6 hours  levETIRAcetam  Solution 500 milliGRAM(s) Oral two times a day  levothyroxine 75 MICROGram(s) Oral daily  nicotine - 21 mG/24Hr(s) Patch 1 Patch Transdermal daily  ondansetron Injectable 4 milliGRAM(s) IV Push every 6 hours PRN  oxyCODONE    Solution 5 milliGRAM(s) Oral every 4 hours PRN  pantoprazole  Injectable 40 milliGRAM(s) IV Push daily  phenylephrine    Infusion 0.1 MICROgram(s)/kG/Min IV Continuous <Continuous>  polyethylene glycol 3350 17 Gram(s) Oral once PRN  polyethylene glycol 3350 17 Gram(s) Oral every 12 hours  potassium chloride   Solution 40 milliEquivalent(s) Oral every 4 hours  senna 2 Tablet(s) Oral at bedtime  sodium chloride 2% + sodium acetate 50:50 1000 milliLiter(s) IV Continuous <Continuous>      Labs:                         7.4    12.19 )-----------( 146      ( 09 Jun 2023 05:20 )             22.3       06-09    155<H>  |  125<H>  |  7   ----------------------------<  142<H>  3.7   |  21<L>  |  0.39<L>    Ca    7.7<L>      09 Jun 2023 05:20  Phos  2.9     06-08  Mg     2.2     06-08    TPro  5.7<L>  /  Alb  3.5  /  TBili  0.2  /  DBili  x   /  AST  34  /  ALT  11  /  AlkPhos  55  06-06      HCG: negative     Blood Bank: 06-08-23  O  --  Positive      Assessment/Plan:   This is a 37yo female  presents with right ICH. Patient presents to neuro-IR for selective cerebral angiography. Procedure/ risks/ benefits/ goals/ alternatives were explained. Risks include but are not limited to stroke/ vessel injury/ hemorrhage/ groin hematoma. All questions answered. Informed content obtained from patient's wife. Consent placed in chart.     Mojgan Barnes PA-C  [phone]

## 2023-06-10 LAB
ANION GAP SERPL CALC-SCNC: 11 MMOL/L — SIGNIFICANT CHANGE UP (ref 5–17)
ANION GAP SERPL CALC-SCNC: 12 MMOL/L — SIGNIFICANT CHANGE UP (ref 5–17)
ANION GAP SERPL CALC-SCNC: 13 MMOL/L — SIGNIFICANT CHANGE UP (ref 5–17)
BUN SERPL-MCNC: 10 MG/DL — SIGNIFICANT CHANGE UP (ref 7–23)
BUN SERPL-MCNC: 10 MG/DL — SIGNIFICANT CHANGE UP (ref 7–23)
BUN SERPL-MCNC: 13 MG/DL — SIGNIFICANT CHANGE UP (ref 7–23)
CALCIUM SERPL-MCNC: 8.2 MG/DL — LOW (ref 8.4–10.5)
CALCIUM SERPL-MCNC: 8.2 MG/DL — LOW (ref 8.4–10.5)
CALCIUM SERPL-MCNC: 8.3 MG/DL — LOW (ref 8.4–10.5)
CHLORIDE SERPL-SCNC: 122 MMOL/L — HIGH (ref 96–108)
CHLORIDE SERPL-SCNC: 122 MMOL/L — HIGH (ref 96–108)
CHLORIDE SERPL-SCNC: 124 MMOL/L — HIGH (ref 96–108)
CO2 SERPL-SCNC: 18 MMOL/L — LOW (ref 22–31)
CO2 SERPL-SCNC: 18 MMOL/L — LOW (ref 22–31)
CO2 SERPL-SCNC: 19 MMOL/L — LOW (ref 22–31)
CREAT SERPL-MCNC: 0.43 MG/DL — LOW (ref 0.5–1.3)
CREAT SERPL-MCNC: 0.44 MG/DL — LOW (ref 0.5–1.3)
CREAT SERPL-MCNC: 0.47 MG/DL — LOW (ref 0.5–1.3)
EGFR: 124 ML/MIN/1.73M2 — SIGNIFICANT CHANGE UP
EGFR: 126 ML/MIN/1.73M2 — SIGNIFICANT CHANGE UP
EGFR: 127 ML/MIN/1.73M2 — SIGNIFICANT CHANGE UP
GAS PNL BLDA: SIGNIFICANT CHANGE UP
GAS PNL BLDA: SIGNIFICANT CHANGE UP
GLUCOSE BLDC GLUCOMTR-MCNC: 115 MG/DL — HIGH (ref 70–99)
GLUCOSE BLDC GLUCOMTR-MCNC: 138 MG/DL — HIGH (ref 70–99)
GLUCOSE BLDC GLUCOMTR-MCNC: 141 MG/DL — HIGH (ref 70–99)
GLUCOSE BLDC GLUCOMTR-MCNC: 142 MG/DL — HIGH (ref 70–99)
GLUCOSE SERPL-MCNC: 140 MG/DL — HIGH (ref 70–99)
GLUCOSE SERPL-MCNC: 143 MG/DL — HIGH (ref 70–99)
GLUCOSE SERPL-MCNC: 145 MG/DL — HIGH (ref 70–99)
MAGNESIUM SERPL-MCNC: 2.1 MG/DL — SIGNIFICANT CHANGE UP (ref 1.6–2.6)
MAGNESIUM SERPL-MCNC: 2.3 MG/DL — SIGNIFICANT CHANGE UP (ref 1.6–2.6)
PHOSPHATE SERPL-MCNC: 2.8 MG/DL — SIGNIFICANT CHANGE UP (ref 2.5–4.5)
PHOSPHATE SERPL-MCNC: 3.6 MG/DL — SIGNIFICANT CHANGE UP (ref 2.5–4.5)
POTASSIUM SERPL-MCNC: 3.7 MMOL/L — SIGNIFICANT CHANGE UP (ref 3.5–5.3)
POTASSIUM SERPL-MCNC: 4 MMOL/L — SIGNIFICANT CHANGE UP (ref 3.5–5.3)
POTASSIUM SERPL-MCNC: 4.1 MMOL/L — SIGNIFICANT CHANGE UP (ref 3.5–5.3)
POTASSIUM SERPL-SCNC: 3.7 MMOL/L — SIGNIFICANT CHANGE UP (ref 3.5–5.3)
POTASSIUM SERPL-SCNC: 4 MMOL/L — SIGNIFICANT CHANGE UP (ref 3.5–5.3)
POTASSIUM SERPL-SCNC: 4.1 MMOL/L — SIGNIFICANT CHANGE UP (ref 3.5–5.3)
SODIUM SERPL-SCNC: 152 MMOL/L — HIGH (ref 135–145)
SODIUM SERPL-SCNC: 153 MMOL/L — HIGH (ref 135–145)
SODIUM SERPL-SCNC: 154 MMOL/L — HIGH (ref 135–145)

## 2023-06-10 PROCEDURE — 95720 EEG PHY/QHP EA INCR W/VEEG: CPT

## 2023-06-10 PROCEDURE — 99291 CRITICAL CARE FIRST HOUR: CPT

## 2023-06-10 RX ORDER — LACOSAMIDE 50 MG/1
100 TABLET ORAL
Refills: 0 | Status: DISCONTINUED | OUTPATIENT
Start: 2023-06-10 | End: 2023-06-11

## 2023-06-10 RX ORDER — LACOSAMIDE 50 MG/1
200 TABLET ORAL ONCE
Refills: 0 | Status: DISCONTINUED | OUTPATIENT
Start: 2023-06-10 | End: 2023-06-10

## 2023-06-10 RX ORDER — HYDRALAZINE HCL 50 MG
5 TABLET ORAL ONCE
Refills: 0 | Status: COMPLETED | OUTPATIENT
Start: 2023-06-10 | End: 2023-06-10

## 2023-06-10 RX ADMIN — POLYETHYLENE GLYCOL 3350 17 GRAM(S): 17 POWDER, FOR SOLUTION ORAL at 17:02

## 2023-06-10 RX ADMIN — SENNA PLUS 2 TABLET(S): 8.6 TABLET ORAL at 22:20

## 2023-06-10 RX ADMIN — Medication 1 PATCH: at 07:00

## 2023-06-10 RX ADMIN — FENTANYL CITRATE 25 MICROGRAM(S): 50 INJECTION INTRAVENOUS at 14:15

## 2023-06-10 RX ADMIN — FENTANYL CITRATE 25 MICROGRAM(S): 50 INJECTION INTRAVENOUS at 14:00

## 2023-06-10 RX ADMIN — Medication 1 PATCH: at 11:43

## 2023-06-10 RX ADMIN — Medication 5 MILLIGRAM(S): at 22:20

## 2023-06-10 RX ADMIN — Medication 650 MILLIGRAM(S): at 19:00

## 2023-06-10 RX ADMIN — Medication 1 PATCH: at 19:00

## 2023-06-10 RX ADMIN — DEXMEDETOMIDINE HYDROCHLORIDE IN 0.9% SODIUM CHLORIDE 3.82 MICROGRAM(S)/KG/HR: 4 INJECTION INTRAVENOUS at 07:30

## 2023-06-10 RX ADMIN — Medication 5 MILLIGRAM(S): at 23:30

## 2023-06-10 RX ADMIN — DEXMEDETOMIDINE HYDROCHLORIDE IN 0.9% SODIUM CHLORIDE 3.82 MICROGRAM(S)/KG/HR: 4 INJECTION INTRAVENOUS at 14:27

## 2023-06-10 RX ADMIN — ENOXAPARIN SODIUM 40 MILLIGRAM(S): 100 INJECTION SUBCUTANEOUS at 17:02

## 2023-06-10 RX ADMIN — LACOSAMIDE 100 MILLIGRAM(S): 50 TABLET ORAL at 17:02

## 2023-06-10 RX ADMIN — LACOSAMIDE 200 MILLIGRAM(S): 50 TABLET ORAL at 09:25

## 2023-06-10 RX ADMIN — Medication 1 PATCH: at 12:30

## 2023-06-10 RX ADMIN — FENTANYL CITRATE 25 MICROGRAM(S): 50 INJECTION INTRAVENOUS at 18:20

## 2023-06-10 RX ADMIN — Medication 650 MILLIGRAM(S): at 18:30

## 2023-06-10 RX ADMIN — POTASSIUM PHOSPHATE, MONOBASIC POTASSIUM PHOSPHATE, DIBASIC 83.33 MILLIMOLE(S): 236; 224 INJECTION, SOLUTION INTRAVENOUS at 01:30

## 2023-06-10 RX ADMIN — SODIUM CHLORIDE 70 MILLILITER(S): 9 INJECTION INTRAMUSCULAR; INTRAVENOUS; SUBCUTANEOUS at 19:00

## 2023-06-10 RX ADMIN — SODIUM CHLORIDE 70 MILLILITER(S): 9 INJECTION INTRAMUSCULAR; INTRAVENOUS; SUBCUTANEOUS at 07:30

## 2023-06-10 RX ADMIN — LEVETIRACETAM 1000 MILLIGRAM(S): 250 TABLET, FILM COATED ORAL at 05:50

## 2023-06-10 RX ADMIN — PANTOPRAZOLE SODIUM 40 MILLIGRAM(S): 20 TABLET, DELAYED RELEASE ORAL at 11:42

## 2023-06-10 RX ADMIN — CHLORHEXIDINE GLUCONATE 1 APPLICATION(S): 213 SOLUTION TOPICAL at 11:42

## 2023-06-10 RX ADMIN — Medication 5 MILLIGRAM(S): at 00:20

## 2023-06-10 RX ADMIN — POLYETHYLENE GLYCOL 3350 17 GRAM(S): 17 POWDER, FOR SOLUTION ORAL at 05:50

## 2023-06-10 RX ADMIN — LEVETIRACETAM 1000 MILLIGRAM(S): 250 TABLET, FILM COATED ORAL at 17:02

## 2023-06-10 RX ADMIN — CHLORHEXIDINE GLUCONATE 15 MILLILITER(S): 213 SOLUTION TOPICAL at 05:50

## 2023-06-10 RX ADMIN — DEXMEDETOMIDINE HYDROCHLORIDE IN 0.9% SODIUM CHLORIDE 3.82 MICROGRAM(S)/KG/HR: 4 INJECTION INTRAVENOUS at 19:00

## 2023-06-10 RX ADMIN — CHLORHEXIDINE GLUCONATE 15 MILLILITER(S): 213 SOLUTION TOPICAL at 17:02

## 2023-06-10 RX ADMIN — Medication 75 MICROGRAM(S): at 05:50

## 2023-06-10 RX ADMIN — FENTANYL CITRATE 25 MICROGRAM(S): 50 INJECTION INTRAVENOUS at 18:05

## 2023-06-10 NOTE — PROGRESS NOTE ADULT - SUBJECTIVE AND OBJECTIVE BOX
Patient seen and examined at bedside.    --Anticoagulation--  enoxaparin Injectable 40 milliGRAM(s) SubCutaneous <User Schedule>    T(C): 37.1 (06-09-23 @ 23:00), Max: 38.3 (06-09-23 @ 01:00)  HR: 70 (06-10-23 @ 00:00) (58 - 117)  BP: 140/84 (06-09-23 @ 10:18) (140/84 - 140/84)  RR: 17 (06-10-23 @ 00:00) (12 - 24)  SpO2: 98% (06-10-23 @ 00:00) (95% - 100%)  Wt(kg): --    Exam: Intubated, PERRL, Left gaze cannot overcome, Right side stopped FC, Right side withdraws, LUE extends, LLE withdraws

## 2023-06-10 NOTE — EEG REPORT - NS EEG TEXT BOX
BROOKE CAIN [id number]   Location: [place] [address]  Provider: Jean Carlos Bernstein      Study Start Date: 0519 6/10/23   Study End Date:  0800 6/10/23	  Duration x Hours:  1 hr 52 min   --------------------------------------------------------------------------------------------------    History:  CC/ HPI Patient is a 38y old  Female who presents with a chief complaint of IPH (10 Adrian 2023 00:44)    MEDICATIONS  (STANDING):  dexMEDEtomidine Infusion 0.2 MICROgram(s)/kG/Hr (3.82 mL/Hr) IV Continuous <Continuous>, 06-09 @ 02:17  levETIRAcetam  Solution 1000 milliGRAM(s) Oral two times a day, 06-09 @ 21:22      Medication Changes 24hours   fentaNYL    Injectable 25 MICROGram(s) every 2 hours, Routine PRN  levETIRAcetam  Solution 1000 milliGRAM(s) two times a day,       --------------------------------------------------------------------------------------------------  Study Interpretation:    [[[Abbreviation Key:  PDR=alpha rhythm/posterior dominant rhythm. A-P=anterior posterior.  Amplitude: ‘very low’:<20; ‘low’:20-49; ‘medium’:; ‘high’:>150uV.  Persistence for periodic/rhythmic patterns (% of epoch) ‘rare’:<1%; ‘occasional’:1-10%; ‘frequent’:10-50%; ‘abundant’:50-90%; ‘continuous’:>90%.  Persistence for sporadic discharges: ‘rare’:<1/hr; ‘occasional’:1/min-1/hr; ‘frequent’:>1/min; ‘abundant’:>1/10 sec.  RPP=rhythmic and periodic patterns; GRDA=generalized rhythmic delta activity; FIRDA=frontal intermittent GRDA; LRDA=lateralized rhythmic delta activity; TIRDA=temporal intermittent rhythmic delta activity;  LPD=PLED=lateralized periodic discharges; GPD=generalized periodic discharges; BIPDs =bilateral independent periodic discharges; Mf=multifocal; SIRPDs=stimulus induced rhythmic, periodic, or ictal appearing discharges; BIRDs=brief potentially ictal rhythmic discharges >4 Hz, lasting .5-10s; PFA (paroxysmal bursts >13 Hz or =8 Hz <10s).  Modifiers: +F=with fast component; +S=with spike component; +R=with rhythmic component.  S-B=burst suppression pattern.  Max=maximal. N1-drowsy; N2-stage II sleep; N3-slow wave sleep. SSS/BETS=small sharp spikes/benign epileptiform transients of sleep. HV=hyperventilation; PS=photic stimulation]]]    Daily EEG Visual Analysis    FINDINGS:      Background:  Continuous: continuous  Symmetry: asymmetric  PDR: none   Reactivity: present  Voltage: normal, mostly 20-150uV  Anterior Posterior Gradient: present  Other background findings: none  Breach: right hemisphere     Background Slowing:  Generalized slowing: Diffuse theta/ polymorphic delta slowing.   Focal slowing: Continuous polymorphic delta (with or w/o theta) slowing, focal, right hemisphere     State Changes:   Present but without N2 sleep sleep transients.     Sporadic Epileptiform Discharges:    Continuous lateralized periodic discharges from the frontal midline (Fz) fluctuating <0.5 - 1 Hz   Continuous independent lateralized periodic dischrages from the right temporal region (T8 max) <0.5-1 Hz     Rhythmic and Periodic Patterns (RPPs):  None     Electrographic and Electroclinical seizures:    Five electrographic seizures from the right posterior region, typical in onset & evolution, characterized by evolving mixture of frequencies over the right posterior region gaining amplitude and evolving into 2-4 Hz sharps spreading to right central and parietal regions decelerating and offsetting in the right central area. No gross clinical correlate, forced left gaze & unresponsiveness documented by bedside observers. Duration 2-4 minutes.         d1 06:15:23		Seizure onset  d1 06:15:24		right parietal-occipital quasiperiodic evolving sharp theta & faster frequencies  d1 06:16:16		gaining amplitude  d1 06:16:20		RN care  d1 06:16:22		2 -4 Hz sharps right temporoparietal T8 / P8  d1 06:16:24		brief bilateral leg extention  d1 06:16:47		propagating to centroparietal region (C4/P4)  d1 06:16:50		RN suction, coughing  d1 06:17:12		decreasing amplitude  d1 06:17:28		decelaration of frequencies   d1 06:17:49		Patient Event  d1 06:17:57		Patient event button due to persyst  d1 06:18:16		offset at C4  d1 06:18:48		offset (electrographic)  d1 06:29:54		sz #2  d1 06:55:33		sz #3  d1 07:15:08		Sz #4  d1 07:38:48		Sz #5      Other Clinical Events:  None    Activation Procedures:   Hyperventilation was not performed.    Photic stimulation was not performed.    Artifacts:  Intermittent myogenic and movement artifacts were noted.    ECG:  Off     ---------------------------------------------------------------------------------------------------------------------------------------------------------      EEG Classification / Summary:  Abnormal  EEG in a comatose patient      - Five electrographic seizures, focal, originating from the right posterior region, subclinical.   - LPDs, focal, independent, right temporal  - LPDs, focal, independent, frontal midline   - Continuous polymorphic delta (with or w/o theta) slowing, focal, right hemisphere   - Background slowing, generalized, moderate   - Breach right hemisphere      ---------------------------------------------------------------------------------------------------------------------------------------------------------      Clinical Impression:  Abnormal EEG study.    Five focal subclinical seizures from the right parietal-occipital region, as described above last captured 07:38 AM. There is also evidence of cortical hyperexcitability and structural abnormality within this region.  Superimposed mild to moderate nonspecific diffuse or multifocal cerebral dysfunction.       ---------------------------------------------------------------------------------------------------------------------------------------------------------    This is a preliminary report     Dana Hernandez M.D.   Epilepsy fellow    -------------------------------------------------------------------------------------------------------  [facility] EEG Reading Room Ph#: [phone]  Epilepsy Answering Service after 5PM and before 8:30AM: Ph#: [phone]     BROOKE CAIN [id number]   Location: [place] [address]  Provider: Jean Carlos Bernstein      Study Start Date: 0519 6/10/23   Study End Date:  0800 6/10/23	  Duration x Hours:  1 hr 52 min   --------------------------------------------------------------------------------------------------    History:  CC/ HPI Patient is a 38y old  Female who presents with a chief complaint of IPH (10 Adrian 2023 00:44)    MEDICATIONS  (STANDING):  dexMEDEtomidine Infusion 0.2 MICROgram(s)/kG/Hr (3.82 mL/Hr) IV Continuous <Continuous>, 06-09 @ 02:17  levETIRAcetam  Solution 1000 milliGRAM(s) Oral two times a day, 06-09 @ 21:22      Medication Changes 24hours   fentaNYL    Injectable 25 MICROGram(s) every 2 hours, Routine PRN  levETIRAcetam  Solution 1000 milliGRAM(s) two times a day,       --------------------------------------------------------------------------------------------------  Study Interpretation:    [[[Abbreviation Key:  PDR=alpha rhythm/posterior dominant rhythm. A-P=anterior posterior.  Amplitude: ‘very low’:<20; ‘low’:20-49; ‘medium’:; ‘high’:>150uV.  Persistence for periodic/rhythmic patterns (% of epoch) ‘rare’:<1%; ‘occasional’:1-10%; ‘frequent’:10-50%; ‘abundant’:50-90%; ‘continuous’:>90%.  Persistence for sporadic discharges: ‘rare’:<1/hr; ‘occasional’:1/min-1/hr; ‘frequent’:>1/min; ‘abundant’:>1/10 sec.  RPP=rhythmic and periodic patterns; GRDA=generalized rhythmic delta activity; FIRDA=frontal intermittent GRDA; LRDA=lateralized rhythmic delta activity; TIRDA=temporal intermittent rhythmic delta activity;  LPD=PLED=lateralized periodic discharges; GPD=generalized periodic discharges; BIPDs =bilateral independent periodic discharges; Mf=multifocal; SIRPDs=stimulus induced rhythmic, periodic, or ictal appearing discharges; BIRDs=brief potentially ictal rhythmic discharges >4 Hz, lasting .5-10s; PFA (paroxysmal bursts >13 Hz or =8 Hz <10s).  Modifiers: +F=with fast component; +S=with spike component; +R=with rhythmic component.  S-B=burst suppression pattern.  Max=maximal. N1-drowsy; N2-stage II sleep; N3-slow wave sleep. SSS/BETS=small sharp spikes/benign epileptiform transients of sleep. HV=hyperventilation; PS=photic stimulation]]]    Daily EEG Visual Analysis    FINDINGS:      Background:  Continuous: continuous  Symmetry: asymmetric  PDR: none   Reactivity: present  Voltage: normal, mostly 20-150uV  Anterior Posterior Gradient: present  Other background findings: none  Breach: right hemisphere     Background Slowing:  Generalized slowing: Diffuse theta/ polymorphic delta slowing.   Focal slowing: Continuous polymorphic delta (with or w/o theta) slowing, focal, right hemisphere     State Changes:   Present but without N2 sleep sleep transients.     Sporadic Epileptiform Discharges:    Continuous lateralized periodic discharges from the frontal midline (Fz) fluctuating <0.5 - 1 Hz   Continuous independent lateralized periodic dischrages from the right temporal region (T8 max) <0.5-1 Hz     Rhythmic and Periodic Patterns (RPPs):  None     Electrographic and Electroclinical seizures:    Five electrographic seizures from the right posterior region, typical in onset & evolution, characterized by evolving mixture of frequencies over the right posterior region gaining amplitude and evolving into 2-4 Hz sharps spreading to right central and parietal regions decelerating and offsetting in the right central area. No gross clinical correlate, forced left gaze & unresponsiveness documented by bedside observers. Duration 2-4 minutes.         d1 06:15:23		Seizure onset  d1 06:15:24		right parietal-occipital quasiperiodic evolving sharp theta & faster frequencies  d1 06:16:16		gaining amplitude  d1 06:16:20		RN care  d1 06:16:22		2 -4 Hz sharps right temporoparietal T8 / P8  d1 06:16:24		brief bilateral leg extention  d1 06:16:47		propagating to centroparietal region (C4/P4)  d1 06:16:50		RN suction, coughing  d1 06:17:12		decreasing amplitude  d1 06:17:28		decelaration of frequencies   d1 06:17:49		Patient Event  d1 06:17:57		Patient event button due to persyst  d1 06:18:16		offset at C4  d1 06:18:48		offset (electrographic)  d1 06:29:54		sz #2  d1 06:55:33		sz #3  d1 07:15:08		Sz #4  d1 07:38:48		Sz #5      Other Clinical Events:  None    Activation Procedures:   Hyperventilation was not performed.    Photic stimulation was not performed.    Artifacts:  Intermittent myogenic and movement artifacts were noted.    ECG:  Off     ---------------------------------------------------------------------------------------------------------------------------------------------------------      EEG Classification / Summary:  Abnormal  EEG in a comatose patient      - Five electrographic seizures, focal, originating from the right posterior region, subclinical.   - LPDs, focal, independent, right temporal  - LPDs, focal, independent, frontal midline   - Continuous polymorphic delta (with or w/o theta) slowing, focal, right hemisphere   - Background slowing, generalized, moderate   - Breach right hemisphere      ---------------------------------------------------------------------------------------------------------------------------------------------------------      Clinical Impression:  Abnormal EEG study.    Five focal subclinical seizures from the right parietal-occipital region, as described above last captured 07:38 AM. There is also evidence of cortical hyperexcitability and structural abnormality within this region.  Superimposed mild to moderate nonspecific diffuse or multifocal cerebral dysfunction.       Primary team notified.     ---------------------------------------------------------------------------------------------------------------------------------------------------------    This is a preliminary report     Dana Hernandez M.D.   Epilepsy fellow    -------------------------------------------------------------------------------------------------------  [facility] EEG Reading Room Ph#: [phone]  Epilepsy Answering Service after 5PM and before 8:30AM: Ph#: [phone]     BROOKE CAIN [id number]   Location: [place] [address]  Provider: eJan Carlos Bernstein      Study Start Date: 0519 6/10/23   Study End Date:  0800 6/10/23	  Duration x Hours:  1 hr 52 min   --------------------------------------------------------------------------------------------------    History:  CC/ HPI Patient is a 38y old  Female who presents with a chief complaint of IPH (10 Adrian 2023 00:44)    MEDICATIONS  (STANDING):  dexMEDEtomidine Infusion 0.2 MICROgram(s)/kG/Hr (3.82 mL/Hr) IV Continuous <Continuous>, 06-09 @ 02:17  levETIRAcetam  Solution 1000 milliGRAM(s) Oral two times a day, 06-09 @ 21:22    Medication Changes 24hours   fentaNYL    Injectable 25 MICROGram(s) every 2 hours, Routine PRN  levETIRAcetam  Solution 1000 milliGRAM(s) two times a day,     --------------------------------------------------------------------------------------------------  Study Interpretation:    [[[Abbreviation Key:  PDR=alpha rhythm/posterior dominant rhythm. A-P=anterior posterior.  Amplitude: ‘very low’:<20; ‘low’:20-49; ‘medium’:; ‘high’:>150uV.  Persistence for periodic/rhythmic patterns (% of epoch) ‘rare’:<1%; ‘occasional’:1-10%; ‘frequent’:10-50%; ‘abundant’:50-90%; ‘continuous’:>90%.  Persistence for sporadic discharges: ‘rare’:<1/hr; ‘occasional’:1/min-1/hr; ‘frequent’:>1/min; ‘abundant’:>1/10 sec.  RPP=rhythmic and periodic patterns; GRDA=generalized rhythmic delta activity; FIRDA=frontal intermittent GRDA; LRDA=lateralized rhythmic delta activity; TIRDA=temporal intermittent rhythmic delta activity;  LPD=PLED=lateralized periodic discharges; GPD=generalized periodic discharges; BIPDs =bilateral independent periodic discharges; Mf=multifocal; SIRPDs=stimulus induced rhythmic, periodic, or ictal appearing discharges; BIRDs=brief potentially ictal rhythmic discharges >4 Hz, lasting .5-10s; PFA (paroxysmal bursts >13 Hz or =8 Hz <10s).  Modifiers: +F=with fast component; +S=with spike component; +R=with rhythmic component.  S-B=burst suppression pattern.  Max=maximal. N1-drowsy; N2-stage II sleep; N3-slow wave sleep. SSS/BETS=small sharp spikes/benign epileptiform transients of sleep. HV=hyperventilation; PS=photic stimulation]]]    Daily EEG Visual Analysis    FINDINGS:      Background:  Continuous: continuous  Symmetry: asymmetric  PDR: none   Reactivity: present  Voltage: normal, mostly 20-150uV  Anterior Posterior Gradient: present  Other background findings: none  Breach: right hemisphere     Background Slowing:  Generalized slowing: Diffuse theta/ polymorphic delta slowing.   Focal slowing: Continuous polymorphic delta (with or w/o theta) slowing, focal, right hemisphere     State Changes:   Present but without N2 sleep sleep transients.     Sporadic Epileptiform Discharges:    Continuous lateralized periodic discharges from the frontal midline (Fz) fluctuating <0.5 - 1 Hz   Continuous independent lateralized periodic dischrages from the right temporal region (T8 max) <0.5-1 Hz     Rhythmic and Periodic Patterns (RPPs):  None     Electrographic and Electroclinical seizures:    Five electrographic seizures from the right posterior region, typical in onset & evolution, characterized by evolving mixture of frequencies over the right posterior region gaining amplitude and evolving into 2-4 Hz sharps spreading to right central and parietal regions decelerating and offsetting in the right central area. No gross clinical correlate, forced left gaze & unresponsiveness documented by bedside observers. Duration 2-4 minutes.       d1 06:15:23		Seizure onset  d1 06:15:24		right parietal-occipital quasiperiodic evolving sharp theta & faster frequencies  d1 06:16:16		gaining amplitude  d1 06:16:20		RN care  d1 06:16:22		2 -4 Hz sharps right temporoparietal T8 / P8  d1 06:16:24		brief bilateral leg extention  d1 06:16:47		propagating to centroparietal region (C4/P4)  d1 06:16:50		RN suction, coughing  d1 06:17:12		decreasing amplitude  d1 06:17:28		decelaration of frequencies   d1 06:17:49		Patient Event  d1 06:17:57		Patient event button due to persyst  d1 06:18:16		offset at C4  d1 06:18:48		offset (electrographic)    d1 06:29:54		sz #2  d1 06:55:33		sz #3  d1 07:15:08		Sz #4  d1 07:38:48		Sz #5      Other Clinical Events:  None    Activation Procedures:   Hyperventilation was not performed.    Photic stimulation was not performed.    Artifacts:  Intermittent myogenic and movement artifacts were noted.    ECG:  Off     ---------------------------------------------------------------------------------------------------------------------------------------------------------      EEG Classification / Summary:  Abnormal  EEG in a comatose patient  - Five electrographic seizures, focal, originating from the right posterior region, subclinical.   - LPDs, focal, independent, right temporal  - LPDs, focal, independent, frontal midline   - Continuous polymorphic delta (with or w/o theta) slowing, focal, right hemisphere   - Background slowing, generalized, moderate   - Breach right hemisphere      ---------------------------------------------------------------------------------------------------------------------------------------------------------      Clinical Impression:  Abnormal EEG study.    Five focal subclinical seizures from the right parietal-occipital region, as described above last captured 07:38 AM. There is also evidence of cortical hyperexcitability and structural abnormality within this region.  Superimposed mild to moderate nonspecific diffuse or multifocal cerebral dysfunction.       Primary team notified.     ---------------------------------------------------------------------------------------------------------------------------------------------------------      Dana Hernandez M.D.   Epilepsy fellow    -------------------------------------------------------------------------------------------------------  [facility] EEG Reading Room Ph#: [phone]  Epilepsy Answering Service after 5PM and before 8:30AM: Ph#: [phone]

## 2023-06-10 NOTE — CHART NOTE - NSCHARTNOTEFT_GEN_A_CORE
BROOKE [id number]      Drain type: []SD [x]SG [] HAROON [] HMV [] Lumbar drain [] EVD [] ICP [place] [] Abd drain [] Central Line _________    Patient's position while drain removed: Supine    [x] Patient tolerated well [x] No complications [] complications:     Exit Site secured with: [x] _3_ staples [] __ suture (please specify how many of each): [] occlusive dressing    Additional Info:

## 2023-06-10 NOTE — PROGRESS NOTE ADULT - SUBJECTIVE AND OBJECTIVE BOX
HPI:    SURGERY: Decompressive craniectomy      PAST MEDICAL HISTORY:   PAST SURGICAL HISTORY:   FAMILY HISTORY:    ALLERGIES: No Known Allergies    **************************************  **************************************    OVERNIGHT EVENTS: [] None    ROS  Unobtainable due to mental status[] Negative []  Positives:    ADMISSION SCORES: GCS: HH: MF: NIHSS: RASS: CAM-ICU: ICP:    ICU Vital Signs Last 24 Hrs  T(C): 37.5 (10 Adrian 2023 11:00), Max: 38.3 (09 Jun 2023 17:00)  T(F): 99.5 (10 Adrian 2023 11:00), Max: 100.9 (09 Jun 2023 17:00)  HR: 65 (10 Adrian 2023 14:00) (63 - 117)  BP: --  BP(mean): --  ABP: 159/89 (10 Adrian 2023 14:00) (139/71 - 183/101)  ABP(mean): 116 (10 Adrian 2023 14:00) (102 - 338)  RR: 17 (10 Adrian 2023 14:00) (14 - 32)  SpO2: 96% (10 Adrian 2023 14:00) (96% - 100%)    O2 Parameters below as of 10 Adrian 2023 07:00  Patient On (Oxygen Delivery Method): ventilator    O2 Concentration (%): 30       06-09 @ 07:01  -  06-10 @ 07:00  --------------------------------------------------------  IN: 2432.2 mL / OUT: 1381 mL / NET: 1051.2 mL    06-10 @ 07:01  -  06-10 @ 14:19  --------------------------------------------------------  IN: 428.3 mL / OUT: 23 mL / NET: 405.3 mL       Mode: AC/ CMV (Assist Control/ Continuous Mandatory Ventilation)  RR (machine): 12  TV (machine): 450  FiO2: 30  PEEP: 5  ITime: 0.9  MAP: 10  PIP: 20      DEVICES: [] Restraints [] HAROON/HMV []LD [] ET tube [] Trach [] Chest Tube [] A-line [] Trevino [] NGT [] Rectal Tube [] EVD [] CVL  [] ICP/LiCOx    NEUROIMAGING:     EEG REPORT:     MEDICATIONS:  acetaminophen     Tablet .. 650 milliGRAM(s) Oral every 6 hours PRN  bisacodyl Suppository 10 milliGRAM(s) Rectal once PRN  chlorhexidine 0.12% Liquid 15 milliLiter(s) Oral Mucosa every 12 hours  chlorhexidine 4% Liquid 1 Application(s) Topical daily  dexMEDEtomidine Infusion 0.2 MICROgram(s)/kG/Hr IV Continuous <Continuous>  enoxaparin Injectable 40 milliGRAM(s) SubCutaneous <User Schedule>  fentaNYL    Injectable 25 MICROGram(s) IV Push every 2 hours PRN  insulin lispro (ADMELOG) corrective regimen sliding scale   SubCutaneous every 6 hours  lacosamide 100 milliGRAM(s) Oral two times a day  levETIRAcetam  Solution 1000 milliGRAM(s) Oral two times a day  levothyroxine 75 MICROGram(s) Oral daily  nicotine - 21 mG/24Hr(s) Patch 1 Patch Transdermal daily  ondansetron Injectable 4 milliGRAM(s) IV Push every 6 hours PRN  oxyCODONE    Solution 5 milliGRAM(s) Oral every 4 hours PRN  pantoprazole  Injectable 40 milliGRAM(s) IV Push daily  polyethylene glycol 3350 17 Gram(s) Oral every 12 hours  senna 2 Tablet(s) Oral at bedtime  sodium chloride 0.9%. 1000 milliLiter(s) IV Continuous <Continuous>      PHYSICAL EXAM:  intubated, pupils=;  +cough/gag  follows on the righ-shows two fingers, wiggles toesleft arm flexes      LABS:                        9.1    13.80 )-----------( 178      ( 09 Jun 2023 21:29 )             26.3    06-10    152<H>  |  122<H>  |  10  ----------------------------<  140<H>  4.0   |  18<L>  |  0.43<L>    Ca    8.2<L>      10 Adrian 2023 05:57  Phos  3.6     06-10  Mg     2.1     06-10     ABG - ( 10 Adrian 2023 05:50 )  pH, Arterial: 7.49  pH, Blood: x     /  pCO2: 28    /  pO2: 110   / HCO3: 21    / Base Excess: -1.4  /  SaO2: 99.8               [facility] 06-09 @ 21:02  --  [id number]  88  172  4.1

## 2023-06-10 NOTE — PROGRESS NOTE ADULT - ASSESSMENT
ASSESSMENT/PLAN:  on EEg pt with 5 subclinical seziures since placement at 5am. will add vimpat in addition to keppra. will cont EEG  hold on extubation until further seizures are absent. if seizures recur will consider additon of VPA.  family at bedside and updated    [] Patient is at high risk of neurologic deterioration/death due to:     Time seen:  Time spent: ___ [] critical care minutes

## 2023-06-11 LAB
ANION GAP SERPL CALC-SCNC: 14 MMOL/L — SIGNIFICANT CHANGE UP (ref 5–17)
ANION GAP SERPL CALC-SCNC: 14 MMOL/L — SIGNIFICANT CHANGE UP (ref 5–17)
APTT BLD: 31.2 SEC — SIGNIFICANT CHANGE UP (ref 27.5–35.5)
BUN SERPL-MCNC: 11 MG/DL — SIGNIFICANT CHANGE UP (ref 7–23)
BUN SERPL-MCNC: 14 MG/DL — SIGNIFICANT CHANGE UP (ref 7–23)
CALCIUM SERPL-MCNC: 7.4 MG/DL — LOW (ref 8.4–10.5)
CALCIUM SERPL-MCNC: 8 MG/DL — LOW (ref 8.4–10.5)
CHLORIDE SERPL-SCNC: 122 MMOL/L — HIGH (ref 96–108)
CHLORIDE SERPL-SCNC: 123 MMOL/L — HIGH (ref 96–108)
CO2 SERPL-SCNC: 14 MMOL/L — LOW (ref 22–31)
CO2 SERPL-SCNC: 17 MMOL/L — LOW (ref 22–31)
CREAT SERPL-MCNC: 0.31 MG/DL — LOW (ref 0.5–1.3)
CREAT SERPL-MCNC: 0.42 MG/DL — LOW (ref 0.5–1.3)
EGFR: 128 ML/MIN/1.73M2 — SIGNIFICANT CHANGE UP
EGFR: 137 ML/MIN/1.73M2 — SIGNIFICANT CHANGE UP
GAS PNL BLDA: SIGNIFICANT CHANGE UP
GAS PNL BLDA: SIGNIFICANT CHANGE UP
GLUCOSE BLDC GLUCOMTR-MCNC: 117 MG/DL — HIGH (ref 70–99)
GLUCOSE BLDC GLUCOMTR-MCNC: 119 MG/DL — HIGH (ref 70–99)
GLUCOSE BLDC GLUCOMTR-MCNC: 119 MG/DL — HIGH (ref 70–99)
GLUCOSE BLDC GLUCOMTR-MCNC: 144 MG/DL — HIGH (ref 70–99)
GLUCOSE BLDC GLUCOMTR-MCNC: 182 MG/DL — HIGH (ref 70–99)
GLUCOSE SERPL-MCNC: 102 MG/DL — HIGH (ref 70–99)
GLUCOSE SERPL-MCNC: 122 MG/DL — HIGH (ref 70–99)
HCT VFR BLD CALC: 24.6 % — LOW (ref 34.5–45)
HGB BLD-MCNC: 8 G/DL — LOW (ref 11.5–15.5)
INR BLD: 1.28 RATIO — HIGH (ref 0.88–1.16)
MAGNESIUM SERPL-MCNC: 1.9 MG/DL — SIGNIFICANT CHANGE UP (ref 1.6–2.6)
MAGNESIUM SERPL-MCNC: 2.2 MG/DL — SIGNIFICANT CHANGE UP (ref 1.6–2.6)
MCHC RBC-ENTMCNC: 31.7 PG — SIGNIFICANT CHANGE UP (ref 27–34)
MCHC RBC-ENTMCNC: 32.5 GM/DL — SIGNIFICANT CHANGE UP (ref 32–36)
MCV RBC AUTO: 97.6 FL — SIGNIFICANT CHANGE UP (ref 80–100)
NRBC # BLD: 0 /100 WBCS — SIGNIFICANT CHANGE UP (ref 0–0)
PHOSPHATE SERPL-MCNC: 2.7 MG/DL — SIGNIFICANT CHANGE UP (ref 2.5–4.5)
PHOSPHATE SERPL-MCNC: 3.2 MG/DL — SIGNIFICANT CHANGE UP (ref 2.5–4.5)
PLATELET # BLD AUTO: 212 K/UL — SIGNIFICANT CHANGE UP (ref 150–400)
POTASSIUM SERPL-MCNC: 3.2 MMOL/L — LOW (ref 3.5–5.3)
POTASSIUM SERPL-MCNC: 3.6 MMOL/L — SIGNIFICANT CHANGE UP (ref 3.5–5.3)
POTASSIUM SERPL-SCNC: 3.2 MMOL/L — LOW (ref 3.5–5.3)
POTASSIUM SERPL-SCNC: 3.6 MMOL/L — SIGNIFICANT CHANGE UP (ref 3.5–5.3)
PROTHROM AB SERPL-ACNC: 14.7 SEC — HIGH (ref 10.5–13.4)
RBC # BLD: 2.52 M/UL — LOW (ref 3.8–5.2)
RBC # FLD: 15.3 % — HIGH (ref 10.3–14.5)
SODIUM SERPL-SCNC: 151 MMOL/L — HIGH (ref 135–145)
SODIUM SERPL-SCNC: 153 MMOL/L — HIGH (ref 135–145)
WBC # BLD: 11 K/UL — HIGH (ref 3.8–10.5)
WBC # FLD AUTO: 11 K/UL — HIGH (ref 3.8–10.5)

## 2023-06-11 PROCEDURE — 95720 EEG PHY/QHP EA INCR W/VEEG: CPT

## 2023-06-11 PROCEDURE — 99291 CRITICAL CARE FIRST HOUR: CPT

## 2023-06-11 PROCEDURE — 71045 X-RAY EXAM CHEST 1 VIEW: CPT | Mod: 26

## 2023-06-11 PROCEDURE — 99231 SBSQ HOSP IP/OBS SF/LOW 25: CPT

## 2023-06-11 RX ORDER — ACETAMINOPHEN 500 MG
1000 TABLET ORAL ONCE
Refills: 0 | Status: COMPLETED | OUTPATIENT
Start: 2023-06-11 | End: 2023-06-11

## 2023-06-11 RX ORDER — DEXAMETHASONE 0.5 MG/5ML
4 ELIXIR ORAL EVERY 6 HOURS
Refills: 0 | Status: COMPLETED | OUTPATIENT
Start: 2023-06-11 | End: 2023-06-12

## 2023-06-11 RX ORDER — MAGNESIUM SULFATE 500 MG/ML
1 VIAL (ML) INJECTION ONCE
Refills: 0 | Status: COMPLETED | OUTPATIENT
Start: 2023-06-11 | End: 2023-06-11

## 2023-06-11 RX ORDER — POTASSIUM CHLORIDE 20 MEQ
40 PACKET (EA) ORAL EVERY 4 HOURS
Refills: 0 | Status: COMPLETED | OUTPATIENT
Start: 2023-06-11 | End: 2023-06-11

## 2023-06-11 RX ORDER — NICARDIPINE HYDROCHLORIDE 30 MG/1
5 CAPSULE, EXTENDED RELEASE ORAL
Qty: 40 | Refills: 0 | Status: DISCONTINUED | OUTPATIENT
Start: 2023-06-11 | End: 2023-06-11

## 2023-06-11 RX ORDER — POTASSIUM CHLORIDE 20 MEQ
30 PACKET (EA) ORAL ONCE
Refills: 0 | Status: COMPLETED | OUTPATIENT
Start: 2023-06-11 | End: 2023-06-11

## 2023-06-11 RX ORDER — LACOSAMIDE 50 MG/1
100 TABLET ORAL EVERY 12 HOURS
Refills: 0 | Status: DISCONTINUED | OUTPATIENT
Start: 2023-06-11 | End: 2023-06-12

## 2023-06-11 RX ORDER — ACETAMINOPHEN 500 MG
650 TABLET ORAL EVERY 6 HOURS
Refills: 0 | Status: DISCONTINUED | OUTPATIENT
Start: 2023-06-11 | End: 2023-06-26

## 2023-06-11 RX ADMIN — DEXMEDETOMIDINE HYDROCHLORIDE IN 0.9% SODIUM CHLORIDE 3.82 MICROGRAM(S)/KG/HR: 4 INJECTION INTRAVENOUS at 07:49

## 2023-06-11 RX ADMIN — LACOSAMIDE 100 MILLIGRAM(S): 50 TABLET ORAL at 05:30

## 2023-06-11 RX ADMIN — Medication 1 PATCH: at 11:21

## 2023-06-11 RX ADMIN — LEVETIRACETAM 1000 MILLIGRAM(S): 250 TABLET, FILM COATED ORAL at 05:30

## 2023-06-11 RX ADMIN — PANTOPRAZOLE SODIUM 40 MILLIGRAM(S): 20 TABLET, DELAYED RELEASE ORAL at 11:07

## 2023-06-11 RX ADMIN — Medication 40 MILLIEQUIVALENT(S): at 14:32

## 2023-06-11 RX ADMIN — FENTANYL CITRATE 25 MICROGRAM(S): 50 INJECTION INTRAVENOUS at 21:15

## 2023-06-11 RX ADMIN — Medication 1000 MILLIGRAM(S): at 01:15

## 2023-06-11 RX ADMIN — Medication 400 MILLIGRAM(S): at 01:00

## 2023-06-11 RX ADMIN — Medication 7.5 MILLIGRAM(S): at 05:30

## 2023-06-11 RX ADMIN — Medication 10 MILLIGRAM(S): at 21:42

## 2023-06-11 RX ADMIN — FENTANYL CITRATE 25 MICROGRAM(S): 50 INJECTION INTRAVENOUS at 16:09

## 2023-06-11 RX ADMIN — ENOXAPARIN SODIUM 40 MILLIGRAM(S): 100 INJECTION SUBCUTANEOUS at 18:01

## 2023-06-11 RX ADMIN — FENTANYL CITRATE 25 MICROGRAM(S): 50 INJECTION INTRAVENOUS at 16:30

## 2023-06-11 RX ADMIN — FENTANYL CITRATE 25 MICROGRAM(S): 50 INJECTION INTRAVENOUS at 21:00

## 2023-06-11 RX ADMIN — Medication 100 GRAM(S): at 12:59

## 2023-06-11 RX ADMIN — Medication 40 MILLIEQUIVALENT(S): at 17:36

## 2023-06-11 RX ADMIN — DEXMEDETOMIDINE HYDROCHLORIDE IN 0.9% SODIUM CHLORIDE 3.82 MICROGRAM(S)/KG/HR: 4 INJECTION INTRAVENOUS at 12:59

## 2023-06-11 RX ADMIN — Medication 75 MICROGRAM(S): at 05:30

## 2023-06-11 RX ADMIN — LEVETIRACETAM 1000 MILLIGRAM(S): 250 TABLET, FILM COATED ORAL at 17:37

## 2023-06-11 RX ADMIN — Medication 2: at 23:50

## 2023-06-11 RX ADMIN — SODIUM CHLORIDE 70 MILLILITER(S): 9 INJECTION INTRAMUSCULAR; INTRAVENOUS; SUBCUTANEOUS at 07:50

## 2023-06-11 RX ADMIN — Medication 1 PATCH: at 12:14

## 2023-06-11 RX ADMIN — Medication 650 MILLIGRAM(S): at 11:03

## 2023-06-11 RX ADMIN — Medication 2.5 MILLIGRAM(S): at 01:50

## 2023-06-11 RX ADMIN — POLYETHYLENE GLYCOL 3350 17 GRAM(S): 17 POWDER, FOR SOLUTION ORAL at 17:38

## 2023-06-11 RX ADMIN — CHLORHEXIDINE GLUCONATE 1 APPLICATION(S): 213 SOLUTION TOPICAL at 11:07

## 2023-06-11 RX ADMIN — POLYETHYLENE GLYCOL 3350 17 GRAM(S): 17 POWDER, FOR SOLUTION ORAL at 05:30

## 2023-06-11 RX ADMIN — Medication 1 PATCH: at 19:30

## 2023-06-11 RX ADMIN — LACOSAMIDE 120 MILLIGRAM(S): 50 TABLET ORAL at 18:01

## 2023-06-11 RX ADMIN — CHLORHEXIDINE GLUCONATE 15 MILLILITER(S): 213 SOLUTION TOPICAL at 17:39

## 2023-06-11 RX ADMIN — Medication 4 MILLIGRAM(S): at 17:39

## 2023-06-11 RX ADMIN — NICARDIPINE HYDROCHLORIDE 25 MG/HR: 30 CAPSULE, EXTENDED RELEASE ORAL at 12:58

## 2023-06-11 RX ADMIN — Medication 1 PATCH: at 07:00

## 2023-06-11 RX ADMIN — SENNA PLUS 2 TABLET(S): 8.6 TABLET ORAL at 21:47

## 2023-06-11 RX ADMIN — Medication 30 MILLIEQUIVALENT(S): at 01:25

## 2023-06-11 RX ADMIN — Medication 4 MILLIGRAM(S): at 11:04

## 2023-06-11 RX ADMIN — CHLORHEXIDINE GLUCONATE 15 MILLILITER(S): 213 SOLUTION TOPICAL at 05:30

## 2023-06-11 NOTE — EEG REPORT - NS EEG TEXT BOX
BROOKE CAIN MRN-[id number]   Location: [place] [address]  Provider: Jean Carlos Bernstein      Study Start Date: 0800 6/10/23   Study End Date:  0800 6/11/23	  Duration x Hours:  1 hr 52 min   --------------------------------------------------------------------------------------------------    History:  CC/ HPI Patient is a 38y old  Female who presents with a chief complaint of IPH (10 Adrian 2023 00:44)    MEDICATIONS  (STANDING):  dexMEDEtomidine Infusion 0.2 MICROgram(s)/kG/Hr (3.82 mL/Hr) IV Continuous <Continuous>, 06-09 @ 02:17  lacosamide 100 milliGRAM(s) Oral two times a day, 06-10 @ 09:12  levETIRAcetam  Solution 1000 milliGRAM(s) Oral two times a day, 06-09 @ 21:22      Medication Changes 24hours   lacosamide 100 milliGRAM(s) two times a day, Routine, Stop order after: 7 Days    --------------------------------------------------------------------------------------------------  Study Interpretation:    [[[Abbreviation Key:  PDR=alpha rhythm/posterior dominant rhythm. A-P=anterior posterior.  Amplitude: ‘very low’:<20; ‘low’:20-49; ‘medium’:; ‘high’:>150uV.  Persistence for periodic/rhythmic patterns (% of epoch) ‘rare’:<1%; ‘occasional’:1-10%; ‘frequent’:10-50%; ‘abundant’:50-90%; ‘continuous’:>90%.  Persistence for sporadic discharges: ‘rare’:<1/hr; ‘occasional’:1/min-1/hr; ‘frequent’:>1/min; ‘abundant’:>1/10 sec.  RPP=rhythmic and periodic patterns; GRDA=generalized rhythmic delta activity; FIRDA=frontal intermittent GRDA; LRDA=lateralized rhythmic delta activity; TIRDA=temporal intermittent rhythmic delta activity;  LPD=PLED=lateralized periodic discharges; GPD=generalized periodic discharges; BIPDs =bilateral independent periodic discharges; Mf=multifocal; SIRPDs=stimulus induced rhythmic, periodic, or ictal appearing discharges; BIRDs=brief potentially ictal rhythmic discharges >4 Hz, lasting .5-10s; PFA (paroxysmal bursts >13 Hz or =8 Hz <10s).  Modifiers: +F=with fast component; +S=with spike component; +R=with rhythmic component.  S-B=burst suppression pattern.  Max=maximal. N1-drowsy; N2-stage II sleep; N3-slow wave sleep. SSS/BETS=small sharp spikes/benign epileptiform transients of sleep. HV=hyperventilation; PS=photic stimulation]]]    Daily EEG Visual Analysis    FINDINGS:      Background:  Continuous: continuous  Symmetry: asymmetric  PDR: none   Reactivity: present  Voltage: normal, mostly 20-150uV  Anterior Posterior Gradient: present  Other background findings: none  Breach: right hemisphere     Background Slowing:  Generalized slowing: Diffuse theta/ polymorphic delta slowing.   Focal slowing: Continuous polymorphic delta (with or w/o theta) slowing, focal, right hemisphere     State Changes:   Present but without N2 sleep sleep transients.     Sporadic Epileptiform Discharges:    Continuous lateralized periodic discharges from the frontal midline (Fz) fluctuating <0.5 - 1 Hz   Continuous independent lateralized periodic dischrages from the right temporal region (T8 max) <0.5-1 Hz   Abundant sharp wave discharges from the right mid-temporal region (T8 max) at times seen brief 1 Hz clusters       Rhythmic and Periodic Patterns (RPPs):  None     Electrographic and Electroclinical seizures:    Four electrographic seizures from the right posterior region, different at onset,     Seizure #1 & 2 are characterized with evolving sharp wave discharges from the right posterior temporal (P8 maximal) region gaining amplitude and evolving into 2-4 Hz sharps spreading to right central and parietal regions decelerating and offsetting in the right central area. During seizure #2, occasional low amplitude generalized myoclonic movements are seen. Duration 2-4 minutes.     Seizure #3 & 4 are characterized by 1-2 hz evolving sharp wave activity at Fz evolving into sharp theta & alpha as seizure spreads to right frontal chains, low amplitude myoclonic movements of left lower extremity is seen, seizures subside and offset over the right frontal chains. Duration 3-4 minutes.       d1 08:06:20		Seizure onset #1  d1 08:06:21		evolving sharp waves from the right posterior temporal (P8 maximal)  d1 08:06:35		Accelarating & spreading to entire right temporal chains  d1 08:06:51		propagating to right central  d1 08:07:07		Spread to entire right hemisphere with frontal sparing  d1 08:08:35		offset at C4  d1 08:08:41		offset (electrographic)    d1 08:39:54		Seizure onset #2  d1 08:39:54		Onset P8 maximal  d1 08:39:55		Similar to seizure #1    d1 08:40:09		Seizure onset #3  d1 16:35:26		Fz onset  d1 16:35:33		Spread to right frontal  d1 16:35:33		Occasional generalized myoclonus is seen.     d1 19:24:17		LPDs C4, T8, FZ/Cz    d2 02:33:02		Seizure onset #4  d2 02:33:03		1-2 hz evolving sharp wave activity at Fz  d2 02:33:09		evolving into sharp theta & alpha  d2 02:33:14		Spreading right frontal chains  d2 02:33:18		left leg twitch  d2 02:33:48		Left frontal rhythmic delta  d2 02:34:38		offset Fz  d2 02:35:31		RN Care  d2 02:35:46		ongoing ictal at F4  d2 02:36:50		decelerating  d2 02:37:06		offset (electrographic)      Other Clinical Events:  None    Activation Procedures:   Hyperventilation was not performed.    Photic stimulation was not performed.    Artifacts:  Intermittent myogenic and movement artifacts were noted.    ECG:  Off     ---------------------------------------------------------------------------------------------------------------------------------------------------------      EEG Classification / Summary:  Abnormal  EEG in a comatose patient  - Two electrographic seizures, focal, right posterior temporal region, generalized myoclonus   -  Two electrographic seizures, focal, midline frontal region, left leg myoclonus   - LPDs, focal, independent, right temporal  - LPDs, focal, independent, frontal midline   - Sharp waves, focal, right mid-temporal  - Continuous polymorphic delta (with or w/o theta) slowing, focal, right hemisphere   - Background slowing, generalized, moderate   - Breach right hemisphere      ---------------------------------------------------------------------------------------------------------------------------------------------------------      Clinical Impression:  Abnormal EEG study.    Four focal motor seizures originating from two distinctive seizure focuses, right posterior temporal & mildine frontal, at times clinically manifesting as generalized myoclonus or left leg myoclonus there is also evidence of cortical irritability and structural abnormalities within these regions. Superimposed mild to moderate nonspecific diffuse or multifocal cerebral dysfunction.  Last seizure was captured 02:37 6/11.     ---------------------------------------------------------------------------------------------------------------------------------------------------------      aDna Hernandez M.D.   Epilepsy fellow    -------------------------------------------------------------------------------------------------------  [facility] EEG Reading Room Ph#: [phone]  Epilepsy Answering Service after 5PM and before 8:30AM: Ph#: [phone]     BROOKE CAIN [id number]   Location: [place] [address]  Provider: Jean Carlos Bernstein      Study Start Date: 0800 6/10/23   Study End Date:  0800 6/11/23	  Duration x Hours:  22 hr   --------------------------------------------------------------------------------------------------    History:  CC/ HPI Patient is a 38y old  Female who presents with a chief complaint of IPH (10 Adrian 2023 00:44)    MEDICATIONS  (STANDING):  dexMEDEtomidine Infusion 0.2 MICROgram(s)/kG/Hr (3.82 mL/Hr) IV Continuous <Continuous>, 06-09 @ 02:17  lacosamide 100 milliGRAM(s) Oral two times a day, 06-10 @ 09:12  levETIRAcetam  Solution 1000 milliGRAM(s) Oral two times a day, 06-09 @ 21:22      Medication Changes 24hours   lacosamide 100 milliGRAM(s) two times a day, Routine, Stop order after: 7 Days    --------------------------------------------------------------------------------------------------  Study Interpretation:    [[[Abbreviation Key:  PDR=alpha rhythm/posterior dominant rhythm. A-P=anterior posterior.  Amplitude: ‘very low’:<20; ‘low’:20-49; ‘medium’:; ‘high’:>150uV.  Persistence for periodic/rhythmic patterns (% of epoch) ‘rare’:<1%; ‘occasional’:1-10%; ‘frequent’:10-50%; ‘abundant’:50-90%; ‘continuous’:>90%.  Persistence for sporadic discharges: ‘rare’:<1/hr; ‘occasional’:1/min-1/hr; ‘frequent’:>1/min; ‘abundant’:>1/10 sec.  RPP=rhythmic and periodic patterns; GRDA=generalized rhythmic delta activity; FIRDA=frontal intermittent GRDA; LRDA=lateralized rhythmic delta activity; TIRDA=temporal intermittent rhythmic delta activity;  LPD=PLED=lateralized periodic discharges; GPD=generalized periodic discharges; BIPDs =bilateral independent periodic discharges; Mf=multifocal; SIRPDs=stimulus induced rhythmic, periodic, or ictal appearing discharges; BIRDs=brief potentially ictal rhythmic discharges >4 Hz, lasting .5-10s; PFA (paroxysmal bursts >13 Hz or =8 Hz <10s).  Modifiers: +F=with fast component; +S=with spike component; +R=with rhythmic component.  S-B=burst suppression pattern.  Max=maximal. N1-drowsy; N2-stage II sleep; N3-slow wave sleep. SSS/BETS=small sharp spikes/benign epileptiform transients of sleep. HV=hyperventilation; PS=photic stimulation]]]    Daily EEG Visual Analysis    FINDINGS:      Background:  Continuous: continuous  Symmetry: asymmetric  PDR: none   Reactivity: present  Voltage: normal, mostly 20-150uV  Anterior Posterior Gradient: present  Other background findings: none  Breach: right hemisphere     Background Slowing:  Generalized slowing: Diffuse theta/ polymorphic delta slowing.   Focal slowing: Continuous polymorphic delta (with or w/o theta) slowing, focal, right hemisphere     State Changes:   Present but without N2 sleep sleep transients.     Sporadic Epileptiform Discharges:    Continuous lateralized periodic discharges from the frontal midline (Fz) fluctuating <0.5 - 1 Hz   Continuous independent lateralized periodic dischrages from the right temporal region (T8 max) <0.5-1 Hz   Abundant sharp wave discharges from the right mid-temporal region (T8 max) at times seen brief 1 Hz clusters       Rhythmic and Periodic Patterns (RPPs):  None     Electrographic and Electroclinical seizures:    Four electrographic seizures from the right posterior region, different at onset,     Seizure #1 & 2 are characterized with evolving sharp wave discharges from the right posterior temporal (P8 maximal) region gaining amplitude and evolving into 2-4 Hz sharps spreading to right central and parietal regions decelerating and offsetting in the right central area. During seizure #2, occasional low amplitude generalized myoclonic movements are seen. Duration 2-4 minutes.     Seizure #3 & 4 are characterized by 1-2 hz evolving sharp wave activity at Fz evolving into sharp theta & alpha as seizure spreads to right frontal chains, low amplitude myoclonic movements of left lower extremity is seen, seizures subside and offset over the right frontal chains. Duration 3-4 minutes.       d1 08:06:20		Seizure onset #1  d1 08:06:21		evolving sharp waves from the right posterior temporal (P8 maximal)  d1 08:06:35		Accelarating & spreading to entire right temporal chains  d1 08:06:51		propagating to right central  d1 08:07:07		Spread to entire right hemisphere with frontal sparing  d1 08:08:35		offset at C4  d1 08:08:41		offset (electrographic)    d1 08:39:54		Seizure onset #2  d1 08:39:54		Onset P8 maximal  d1 08:39:55		Similar to seizure #1    d1 08:40:09		Seizure onset #3  d1 16:35:26		Fz onset  d1 16:35:33		Spread to right frontal  d1 16:35:33		Occasional generalized myoclonus is seen.     d1 19:24:17		LPDs C4, T8, FZ/Cz    d2 02:33:02		Seizure onset #4  d2 02:33:03		1-2 hz evolving sharp wave activity at Fz  d2 02:33:09		evolving into sharp theta & alpha  d2 02:33:14		Spreading right frontal chains  d2 02:33:18		left leg twitch  d2 02:33:48		Left frontal rhythmic delta  d2 02:34:38		offset Fz  d2 02:35:31		RN Care  d2 02:35:46		ongoing ictal at F4  d2 02:36:50		decelerating  d2 02:37:06		offset (electrographic)      Other Clinical Events:  None    Activation Procedures:   Hyperventilation was not performed.    Photic stimulation was not performed.    Artifacts:  Intermittent myogenic and movement artifacts were noted.    ECG:  Off     ---------------------------------------------------------------------------------------------------------------------------------------------------------      EEG Classification / Summary:  Abnormal  EEG in a comatose patient  - Two electrographic seizures, focal, right posterior temporal region, generalized myoclonus   -  Two electrographic seizures, focal, midline frontal region, left leg myoclonus   - LPDs, focal, independent, right temporal  - LPDs, focal, independent, frontal midline   - Sharp waves, focal, right mid-temporal  - Continuous polymorphic delta (with or w/o theta) slowing, focal, right hemisphere   - Background slowing, generalized, moderate   - Breach right hemisphere      ---------------------------------------------------------------------------------------------------------------------------------------------------------      Clinical Impression:  Abnormal EEG study.    Four focal motor seizures originating from two distinctive seizure focuses, right posterior temporal & mildine frontal, at times clinically manifesting as generalized myoclonus or left leg myoclonus there is also evidence of cortical irritability and structural abnormalities within these regions. Superimposed mild to moderate nonspecific diffuse or multifocal cerebral dysfunction.  Last seizure was captured 02:37 6/11.     ---------------------------------------------------------------------------------------------------------------------------------------------------------      Dana Hernandez M.D.   Epilepsy fellow    -------------------------------------------------------------------------------------------------------  [facility] EEG Reading Room Ph#: [phone]  Epilepsy Answering Service after 5PM and before 8:30AM: Ph#: [phone]     BROOKE CAIN [id number]   Location: [place] [address]  Provider: Jean Carlos Bernstein      Study Start Date: 0800 6/10/23   Study End Date:  0800 6/11/23	  Duration x Hours:  22 hr   --------------------------------------------------------------------------------------------------    History:  CC/ HPI Patient is a 38y old  Female who presents with a chief complaint of IPH (10 Adrian 2023 00:44)    MEDICATIONS  (STANDING):  dexMEDEtomidine Infusion 0.2 MICROgram(s)/kG/Hr (3.82 mL/Hr) IV Continuous <Continuous>, 06-09 @ 02:17  lacosamide 100 milliGRAM(s) Oral two times a day, 06-10 @ 09:12  levETIRAcetam  Solution 1000 milliGRAM(s) Oral two times a day, 06-09 @ 21:22      Medication Changes 24hours   lacosamide 100 milliGRAM(s) two times a day, Routine, Stop order after: 7 Days    --------------------------------------------------------------------------------------------------  Study Interpretation:    [[[Abbreviation Key:  PDR=alpha rhythm/posterior dominant rhythm. A-P=anterior posterior.  Amplitude: ‘very low’:<20; ‘low’:20-49; ‘medium’:; ‘high’:>150uV.  Persistence for periodic/rhythmic patterns (% of epoch) ‘rare’:<1%; ‘occasional’:1-10%; ‘frequent’:10-50%; ‘abundant’:50-90%; ‘continuous’:>90%.  Persistence for sporadic discharges: ‘rare’:<1/hr; ‘occasional’:1/min-1/hr; ‘frequent’:>1/min; ‘abundant’:>1/10 sec.  RPP=rhythmic and periodic patterns; GRDA=generalized rhythmic delta activity; FIRDA=frontal intermittent GRDA; LRDA=lateralized rhythmic delta activity; TIRDA=temporal intermittent rhythmic delta activity;  LPD=PLED=lateralized periodic discharges; GPD=generalized periodic discharges; BIPDs =bilateral independent periodic discharges; Mf=multifocal; SIRPDs=stimulus induced rhythmic, periodic, or ictal appearing discharges; BIRDs=brief potentially ictal rhythmic discharges >4 Hz, lasting .5-10s; PFA (paroxysmal bursts >13 Hz or =8 Hz <10s).  Modifiers: +F=with fast component; +S=with spike component; +R=with rhythmic component.  S-B=burst suppression pattern.  Max=maximal. N1-drowsy; N2-stage II sleep; N3-slow wave sleep. SSS/BETS=small sharp spikes/benign epileptiform transients of sleep. HV=hyperventilation; PS=photic stimulation]]]    Daily EEG Visual Analysis    FINDINGS:      Background:  Continuous: continuous  Symmetry: asymmetric  PDR: none   Reactivity: present  Voltage: normal, mostly 20-150uV  Anterior Posterior Gradient: present  Other background findings: none  Breach: right hemisphere     Background Slowing:  Generalized slowing: Diffuse theta/ polymorphic delta slowing.   Focal slowing: Continuous polymorphic delta (with or w/o theta) slowing, focal, right hemisphere     State Changes:   Present but without N2 sleep sleep transients.     Sporadic Epileptiform Discharges:    Continuous lateralized periodic discharges from the frontal midline (Fz) fluctuating <0.5 - 1 Hz   Continuous independent lateralized periodic dischrages from the right temporal region (T8 max) <0.5-1 Hz   Abundant sharp wave discharges from the right mid-temporal region (T8 max) at times seen brief 1 Hz clusters       Rhythmic and Periodic Patterns (RPPs):  None     Electrographic and Electroclinical seizures:    Four electrographic seizures from the right posterior region, different at onset,     Seizure #1 & 2 are characterized with evolving sharp wave discharges from the right posterior temporal (P8 maximal) region gaining amplitude and evolving into 2-4 Hz sharps spreading to right central and parietal regions decelerating and offsetting in the right central area. During seizure #2, occasional low amplitude generalized myoclonic movements are seen. Duration 2-4 minutes.     Seizure #3 & 4 are characterized by 1-2 hz evolving sharp wave activity in midline frontal region(Fz) evolving into sharp theta & alpha as seizure spreads to right frontal chains, low amplitude myoclonic movements of left lower extremity is seen, seizures subside and offset over the right frontal chains. Duration 3-4 minutes.       d1 08:06:20		Seizure onset #1  d1 08:06:21		evolving sharp waves from the right posterior temporal (P8 maximal)  d1 08:06:35		Accelarating & spreading to entire right temporal chains  d1 08:06:51		propagating to right central  d1 08:07:07		Spread to entire right hemisphere with frontal sparing  d1 08:08:35		offset at C4  d1 08:08:41		offset (electrographic)    d1 08:39:54		Seizure onset #2  d1 08:39:54		Onset P8 maximal  d1 08:39:55		Similar to seizure #1    d1 08:40:09		Seizure onset #3  d1 16:35:26		Fz onset  d1 16:35:33		Spread to right frontal  d1 16:35:33		Occasional generalized myoclonus is seen.     d1 19:24:17		LPDs C4, T8, FZ/Cz    d2 02:33:02		Seizure onset #4  d2 02:33:03		1-2 hz evolving sharp wave activity at Fz  d2 02:33:09		evolving into sharp theta & alpha  d2 02:33:14		Spreading right frontal chains  d2 02:33:18		left leg twitch  d2 02:33:48		Left frontal rhythmic delta  d2 02:34:38		offset Fz  d2 02:35:31		RN Care  d2 02:35:46		ongoing ictal at F4  d2 02:36:50		decelerating  d2 02:37:06		offset (electrographic)      Other Clinical Events:  None    Activation Procedures:   Hyperventilation was not performed.    Photic stimulation was not performed.    Artifacts:  Intermittent myogenic and movement artifacts were noted.    ECG:  Off     ---------------------------------------------------------------------------------------------------------------------------------------------------------      EEG Classification / Summary:  Abnormal  EEG in a comatose patient  - Two electrographic seizures, focal, right posterior temporal region, generalized myoclonus   -  Two electrographic seizures, focal, midline frontal region, left leg myoclonus   - LPDs, focal, independent, right temporal  - LPDs, focal, independent, frontal midline   - Sharp waves, focal, right mid-temporal  - Continuous polymorphic delta (with or w/o theta) slowing, focal, right hemisphere   - Background slowing, generalized, moderate   - Breach right hemisphere      ---------------------------------------------------------------------------------------------------------------------------------------------------------      Clinical Impression:  Abnormal EEG study.    Four focal motor seizures originating from two distinctive seizure focuses, right posterior temporal & mildine frontal, at times clinically manifesting as generalized myoclonus or left leg myoclonus there is also evidence of cortical irritability and structural abnormalities within these regions. Superimposed mild to moderate nonspecific diffuse or multifocal cerebral dysfunction.  Last seizure was captured 02:37 6/11.     ---------------------------------------------------------------------------------------------------------------------------------------------------------      Dana Hernandez M.D.   Epilepsy fellow    -------------------------------------------------------------------------------------------------------  [facility] EEG Reading Room Ph#: [phone]  Epilepsy Answering Service after 5PM and before 8:30AM: Ph#: [phone]     BROOKE CAIN MRN-[id number]   Location: [place] [address]  Provider: Jean Carlos Bernstein      Study Start Date: 0800 6/10/23   Study End Date:  0800 6/11/23	  Duration x Hours:  22 hr   --------------------------------------------------------------------------------------------------    History:  CC/ HPI Patient is a 38y old  Female who presents with a chief complaint of IPH (10 Adrian 2023 00:44)    MEDICATIONS  (STANDING):  dexMEDEtomidine Infusion 0.2 MICROgram(s)/kG/Hr (3.82 mL/Hr) IV Continuous <Continuous>, 06-09 @ 02:17  lacosamide 100 milliGRAM(s) Oral two times a day, 06-10 @ 09:12  levETIRAcetam  Solution 1000 milliGRAM(s) Oral two times a day, 06-09 @ 21:22      Medication Changes 24hours   lacosamide 100 milliGRAM(s) two times a day, Routine, Stop order after: 7 Days    --------------------------------------------------------------------------------------------------  Study Interpretation:    [[[Abbreviation Key:  PDR=alpha rhythm/posterior dominant rhythm. A-P=anterior posterior.  Amplitude: ‘very low’:<20; ‘low’:20-49; ‘medium’:; ‘high’:>150uV.  Persistence for periodic/rhythmic patterns (% of epoch) ‘rare’:<1%; ‘occasional’:1-10%; ‘frequent’:10-50%; ‘abundant’:50-90%; ‘continuous’:>90%.  Persistence for sporadic discharges: ‘rare’:<1/hr; ‘occasional’:1/min-1/hr; ‘frequent’:>1/min; ‘abundant’:>1/10 sec.  RPP=rhythmic and periodic patterns; GRDA=generalized rhythmic delta activity; FIRDA=frontal intermittent GRDA; LRDA=lateralized rhythmic delta activity; TIRDA=temporal intermittent rhythmic delta activity;  LPD=PLED=lateralized periodic discharges; GPD=generalized periodic discharges; BIPDs =bilateral independent periodic discharges; Mf=multifocal; SIRPDs=stimulus induced rhythmic, periodic, or ictal appearing discharges; BIRDs=brief potentially ictal rhythmic discharges >4 Hz, lasting .5-10s; PFA (paroxysmal bursts >13 Hz or =8 Hz <10s).  Modifiers: +F=with fast component; +S=with spike component; +R=with rhythmic component.  S-B=burst suppression pattern.  Max=maximal. N1-drowsy; N2-stage II sleep; N3-slow wave sleep. SSS/BETS=small sharp spikes/benign epileptiform transients of sleep. HV=hyperventilation; PS=photic stimulation]]]    Daily EEG Visual Analysis    FINDINGS:      Background:  Continuous: continuous  Symmetry: asymmetric  PDR: none   Reactivity: present  Voltage: normal, mostly 20-150uV  Anterior Posterior Gradient: present  Other background findings: none  Breach: right hemisphere     Background Slowing:  Generalized slowing: Diffuse theta/ polymorphic delta slowing.   Focal slowing: Continuous polymorphic delta (with or w/o theta) slowing, focal, right hemisphere     State Changes:   Present but without N2 sleep sleep transients.     Sporadic Epileptiform Discharges/Rhythmic and Periodic Patterns (RPPs):  Continuous lateralized periodic discharges from the frontal midline (Fz > F4) fluctuating <0.5 - 1 Hz   Continuous independent lateralized periodic dischrages from the right temporal region (T8 max) <0.5-1 Hz   Abundant sharp wave discharges from the right centro-temporal region (C4 T8 max) at times seen brief 1 Hz clusters     Electrographic and Electroclinical seizures:    Four electrographic seizures from the right posterior region, different at onset,     Seizure #1 & 2 are characterized with evolving sharp wave discharges from the right posterior temporal (P8 maximal) region gaining amplitude and evolving into 2-4 Hz sharps spreading to right central and parietal regions decelerating and offsetting in the right central area. During seizure #2, occasional low amplitude generalized myoclonic movements are seen. Duration 2-4 minutes.     Seizure #3 & 4 are characterized by 1-2 hz evolving sharp wave activity in midline frontal region(Fz) evolving into sharp theta & alpha as seizure spreads to right frontal chains, low amplitude myoclonic movements of left lower extremity is seen, seizures subside and offset over the right frontal chains. Duration 3-4 minutes.       d1 08:06:20		Seizure onset #1  d1 08:06:21		evolving sharp waves from the right posterior temporal (P8 maximal)  d1 08:06:35		Accelarating & spreading to entire right temporal chains  d1 08:06:51		propagating to right central  d1 08:07:07		Spread to entire right hemisphere with frontal sparing  d1 08:08:35		offset at C4  d1 08:08:41		offset (electrographic)    d1 08:39:54		Seizure onset #2  d1 08:39:54		Onset P8 maximal  d1 08:39:55		Similar to seizure #1    d1 08:40:09		Seizure onset #3  d1 16:35:26		Fz onset  d1 16:35:33		Spread to right frontal  d1 16:35:33		Occasional generalized myoclonus is seen.     d1 19:24:17		LPDs C4, T8, FZ/Cz    d2 02:33:02		Seizure onset #4  d2 02:33:03		1-2 hz evolving sharp wave activity at Fz  d2 02:33:09		evolving into sharp theta & alpha  d2 02:33:14		Spreading right frontal chains  d2 02:33:18		left leg twitch  d2 02:33:48		Left frontal rhythmic delta  d2 02:34:38		offset Fz  d2 02:35:31		RN Care  d2 02:35:46		ongoing ictal at F4  d2 02:36:50		decelerating  d2 02:37:06		offset (electrographic)      Other Clinical Events:  None    Activation Procedures:   Hyperventilation was not performed.    Photic stimulation was not performed.    Artifacts:  Intermittent myogenic and movement artifacts were noted.    ECG:  Off     ---------------------------------------------------------------------------------------------------------------------------------------------------------      EEG Classification / Summary:  Abnormal  EEG in a comatose patient  - Two electrographic seizures, focal, right posterior temporal region, generalized clonus   - Two electrographic seizures, focal, midline frontal region, left leg myoclonus   - LPDs, focal, independent, right anterior temporal  - LPDs, focal, independent, frontal midline   - Sharp waves, focal, right centro-temporal  - Continuous polymorphic delta (with or w/o theta) slowing, focal, right hemisphere   - Background slowing, generalized, moderate   - Breach right hemisphere      ---------------------------------------------------------------------------------------------------------------------------------------------------------      Clinical Impression:  Abnormal EEG study.  Four focal motor seizures originating from two distinctive seizure focuses, right posterior centrotemporal & midline frontal, at times clinically manifesting as generalized myoclonus or left leg myoclonus there is also evidence of multifocal hemispheric cortical irritability and functional abnormalities within these regions. Superimposed mild to moderate nonspecific diffuse or multifocal cerebral dysfunction.      Last seizure was captured 02:37 6/11.     ---------------------------------------------------------------------------------------------------------------------------------------------------------      Dana Hernandez M.D.   Epilepsy fellow    -------------------------------------------------------------------------------------------------------  [facility] EEG Reading Room Ph#: [phone]  Epilepsy Answering Service after 5PM and before 8:30AM: Ph#: [phone]

## 2023-06-11 NOTE — PROGRESS NOTE ADULT - ASSESSMENT
right ICH with midline shift and brain compression s/p emergent decompressive craniectomy  CTA no vascular malformation  ICH score 4      Neuro:   neuro checks q2 hr  EVD at 10 cm water, minimal output, keep ICP< 22 mmhg, CPP> 60   brain edema hold HTS today  Keppra load 1500 + 1g BID (6/9) for clinical seizure (Lt gaze stopped FC); c/w vimpa  EEG for sz  precedex for vent synchrony       Respiratory:   acute respiratory failure, intubated   chest xray ET daily  ABG  CPAP 10/5 tolerating  wean to to extubate today    Endocrine:   sugar goal 120-180  finger sticks and ISS   synthroid    Heme/Onc:     hgb slightly dropped will monitor   DVT PPX   h/h trending down, monitor, CTAP neg    Renal:   75cc/h NS while npo  see neuro  Na goal 145-155    ID:   monitor for fevers  f/u panculture      GI:   NG, NPO for possible extubation  PPI   right ICH with midline shift and brain compression s/p emergent decompressive craniectomy  CTA no vascular malformation  ICH score 4      Neuro:   neuro checks q2 hr  EVD at 10 cm water, minimal output, keep ICP< 22 mmhg, CPP> 60   brain edema hold HTS today  Keppra load 1g bid (6/9) for clinical seizure (Lt gaze stopped FC); c/w vimpat (6/10); if eeg with seizures will add additional agent today VPA  EEG for sz  precedex for vent synchrony       Respiratory:   acute respiratory failure, intubated   ABG  CPAP 10/5 tolerating  [id number] for no cufff leak on 6/11, wean to extubate after    Endocrine:   sugar goal 120-180  finger sticks and ISS   synthroid    Heme/Onc:     hgb slightly dropped will monitor   DVT PPX   h/h trending down, monitor, CTAP neg    Renal:   IVL  see neuro  Na goal 145-155    ID:   febrile 6/11  f/u panculture      GI:   NG, NPO MN for possible extubation  PPI

## 2023-06-11 NOTE — PROGRESS NOTE ADULT - SUBJECTIVE AND OBJECTIVE BOX
O: HAVING MULTIPLE ELECTROGRAPHIC SEZIURES     T(C): 37.4 (06-11-23 @ 19:00), Max: 38.2 (06-11-23 @ 11:00)  HR: 62 (06-11-23 @ 19:00) (56 - 122)  BP: --  RR: 18 (06-11-23 @ 19:00) (16 - 38)  SpO2: 99% (06-11-23 @ 19:00) (93% - 100%)  06-10-23 @ 07:01  -  06-11-23 @ 07:00  --------------------------------------------------------  IN: 2382.2 mL / OUT: 906 mL / NET: 1476.2 mL    06-11-23 @ 07:01  -  06-11-23 @ 19:38  --------------------------------------------------------  IN: 1160.6 mL / OUT: 927 mL / NET: 233.6 mL    acetaminophen   Oral Liquid .. 650 milliGRAM(s) Oral every 6 hours PRN  bisacodyl Suppository 10 milliGRAM(s) Rectal once PRN  chlorhexidine 0.12% Liquid 15 milliLiter(s) Oral Mucosa every 12 hours  chlorhexidine 4% Liquid 1 Application(s) Topical daily  dexAMETHasone  Injectable 4 milliGRAM(s) IV Push every 6 hours  dexMEDEtomidine Infusion 0.2 MICROgram(s)/kG/Hr IV Continuous <Continuous>  enalapril 10 milliGRAM(s) Oral daily  enalaprilat Injectable 2.5 milliGRAM(s) IV Push once  enoxaparin Injectable 40 milliGRAM(s) SubCutaneous <User Schedule>  fentaNYL    Injectable 25 MICROGram(s) IV Push every 2 hours PRN  insulin lispro (ADMELOG) corrective regimen sliding scale   SubCutaneous every 6 hours  lacosamide IVPB 100 milliGRAM(s) IV Intermittent every 12 hours  levETIRAcetam  Solution 1000 milliGRAM(s) Oral two times a day  levothyroxine 75 MICROGram(s) Oral daily  nicotine - 21 mG/24Hr(s) Patch 1 Patch Transdermal daily  ondansetron Injectable 4 milliGRAM(s) IV Push every 6 hours PRN  oxyCODONE    Solution 5 milliGRAM(s) Oral every 4 hours PRN  pantoprazole  Injectable 40 milliGRAM(s) IV Push daily  polyethylene glycol 3350 17 Gram(s) Oral every 12 hours  senna 2 Tablet(s) Oral at bedtime  Mode: AC/ CMV (Assist Control/ Continuous Mandatory Ventilation), RR (machine): 12, TV (machine): 450, FiO2: 30, PEEP: 5, ITime: 1, MAP: 8, PIP: 19  PHYSICAL EXAM:    General: No Acute Distress     Neurological: SERGIO, has gag and cough, eyes midline , localizes in right UE, extends left UE, , extends in LE     Pulmonary: Clear to Auscultation, No Rales, No Rhonchi, No Wheezes     Cardiovascular: S1, S2, Regular Rate and Rhythm     Gastrointestinal: Soft, Nontender, Nondistended     Extremities: No calf tenderness     Incision:       LABS:  Na: 151 (06-11 @ 08:22), 153 (06-11 @ 00:25), 153 (06-10 @ 17:52), 152 (06-10 @ 05:57), 154 (06-10 @ 00:06), 156 (06-09 @ 21:29), 155 (06-09 @ 17:37), 155 (06-09 @ 13:15), 155 (06-09 @ 05:20), 155 (06-08 @ 22:33)  K: 3.2 (06-11 @ 08:22), 3.6 (06-11 @ 00:25), 3.7 (06-10 @ 17:52), 4.0 (06-10 @ 05:57), 4.1 (06-10 @ 00:06), 4.2 (06-09 @ 21:29), 3.9 (06-09 @ 17:37), 3.7 (06-09 @ 13:15), 3.7 (06-09 @ 05:20), 3.5 (06-08 @ 22:33)  Cl: 123 (06-11 @ 08:22), 122 (06-11 @ 00:25), 122 (06-10 @ 17:52), 122 (06-10 @ 05:57), 124 (06-10 @ 00:06), 126 (06-09 @ 21:29), 124 (06-09 @ 17:37), 124 (06-09 @ 13:15), 125 (06-09 @ 05:20), 123 (06-08 @ 22:33)  CO2: 14 (06-11 @ 08:22), 17 (06-11 @ 00:25), 18 (06-10 @ 17:52), 18 (06-10 @ 05:57), 19 (06-10 @ 00:06), 19 (06-09 @ 21:29), 19 (06-09 @ 17:37), 20 (06-09 @ 13:15), 21 (06-09 @ 05:20), 23 (06-08 @ 22:33)  BUN: 11 (06-11 @ 08:22), 14 (06-11 @ 00:25), 13 (06-10 @ 17:52), 10 (06-10 @ 05:57), 10 (06-10 @ 00:06), 10 (06-09 @ 21:29), 8 (06-09 @ 17:37), 8 (06-09 @ 13:15), 7 (06-09 @ 05:20), 7 (06-08 @ 22:33)  Cr: 0.31 (06-11 @ 08:22), 0.42 (06-11 @ 00:25), 0.44 (06-10 @ 17:52), 0.43 (06-10 @ 05:57), 0.47 (06-10 @ 00:06), 0.46 (06-09 @ 21:29), 0.45 (06-09 @ 17:37), 0.40 (06-09 @ 13:15), 0.39 (06-09 @ 05:20), 0.40 (06-08 @ 22:33)  Glu: 102(06-11 @ 08:22), 122(06-11 @ 00:25), 143(06-10 @ 17:52), 140(06-10 @ 05:57), 145(06-10 @ 00:06), 155(06-09 @ 21:29), 140(06-09 @ 17:37), 147(06-09 @ 13:15), 142(06-09 @ 05:20), 181(06-08 @ 22:33)    Hgb: 8.0 (06-11 @ 00:25), 9.1 (06-09 @ 21:29), 9.3 (06-09 @ 15:09), 8.8 (06-09 @ 13:15), 7.4 (06-09 @ 05:20), 7.3 (06-09 @ 02:15), 6.2 (06-08 @ 22:33)  Hct: 24.6 (06-11 @ 00:25), 26.3 (06-09 @ 21:29), 27.1 (06-09 @ 15:09), 26.4 (06-09 @ 13:15), 22.3 (06-09 @ 05:20), 22.3 (06-09 @ 02:15), 18.5 (06-08 @ 22:33)  WBC: 11.00 (06-11 @ 00:25), 13.80 (06-09 @ 21:29), 13.64 (06-09 @ 15:09), 12.34 (06-09 @ 13:15), 12.19 (06-09 @ 05:20), 11.91 (06-09 @ 02:15), 14.04 (06-08 @ 22:33)  Plt: 212 (06-11 @ 00:25), 178 (06-09 @ 21:29), 176 (06-09 @ 15:09), 167 (06-09 @ 13:15), 146 (06-09 @ 05:20), 146 (06-09 @ 02:15), 166 (06-08 @ 22:33)    INR: 1.28 06-11-23 @ 00:25, 1.21 06-08-23 @ 22:33  PTT: 31.2 06-11-23 @ 00:25, 30.0 06-08-23 @ 22:33                  A/P:  right ICH with midline shift and brain compression s/p emergent decompressive craniectomy   Neuro: neuro checks q 1 hr  status epilepticus on vimpat 100 mg bid and keppra 1 g BID, monitor EEG and adjust antiepileptics accordingly   EVD at 10 cm water, 77 ml in 24 hr , would consider fast weaning    brain edema ssodium goal 145-155    Respiratory: acute respiratory failure, intubated   chest xray ET, NG in good position   Mode: AC/ CMV (Assist Control/ Continuous Mandatory Ventilation), RR (machine): 12, TV (machine): 450, FiO2: 30, PEEP: 5, ITime: 1, MAP: 8, PIP: 19  no cuff leak, started on decadron, will place on PS and attempt extubation once we have a cuff leak   CV: SBP goal 100-160 mmhg  Endocrine: sugar goal 120-180  Heme/Onc:   hgb stable    Renal: IVL   ID: afebrile , on n oABX   GI: NG, NPO afetr midnight for possible extubation , pantoprazole while intubated   Social/Family: no family at bedside   Discharge planning:     Code Status: [x] Full Code [] DNR [] DNI [] Goals of Care:   Disposition: [x] ICU [] Stroke Unit [] RCU []PCU []Floor [] Discharge Home     Patient at high risk for neurologic deterioration, critical care time, excluding procedures: 60  minutes   O: HAVING MULTIPLE ELECTROGRAPHIC SEZIURES     T(C): 37.4 (06-11-23 @ 19:00), Max: 38.2 (06-11-23 @ 11:00)  HR: 62 (06-11-23 @ 19:00) (56 - 122)  BP: --  RR: 18 (06-11-23 @ 19:00) (16 - 38)  SpO2: 99% (06-11-23 @ 19:00) (93% - 100%)  06-10-23 @ 07:01  -  06-11-23 @ 07:00  --------------------------------------------------------  IN: 2382.2 mL / OUT: 906 mL / NET: 1476.2 mL    06-11-23 @ 07:01  -  06-11-23 @ 19:38  --------------------------------------------------------  IN: 1160.6 mL / OUT: 927 mL / NET: 233.6 mL    acetaminophen   Oral Liquid .. 650 milliGRAM(s) Oral every 6 hours PRN  bisacodyl Suppository 10 milliGRAM(s) Rectal once PRN  chlorhexidine 0.12% Liquid 15 milliLiter(s) Oral Mucosa every 12 hours  chlorhexidine 4% Liquid 1 Application(s) Topical daily  dexAMETHasone  Injectable 4 milliGRAM(s) IV Push every 6 hours  dexMEDEtomidine Infusion 0.2 MICROgram(s)/kG/Hr IV Continuous <Continuous>  enalapril 10 milliGRAM(s) Oral daily  enalaprilat Injectable 2.5 milliGRAM(s) IV Push once  enoxaparin Injectable 40 milliGRAM(s) SubCutaneous <User Schedule>  fentaNYL    Injectable 25 MICROGram(s) IV Push every 2 hours PRN  insulin lispro (ADMELOG) corrective regimen sliding scale   SubCutaneous every 6 hours  lacosamide IVPB 100 milliGRAM(s) IV Intermittent every 12 hours  levETIRAcetam  Solution 1000 milliGRAM(s) Oral two times a day  levothyroxine 75 MICROGram(s) Oral daily  nicotine - 21 mG/24Hr(s) Patch 1 Patch Transdermal daily  ondansetron Injectable 4 milliGRAM(s) IV Push every 6 hours PRN  oxyCODONE    Solution 5 milliGRAM(s) Oral every 4 hours PRN  pantoprazole  Injectable 40 milliGRAM(s) IV Push daily  polyethylene glycol 3350 17 Gram(s) Oral every 12 hours  senna 2 Tablet(s) Oral at bedtime  Mode: AC/ CMV (Assist Control/ Continuous Mandatory Ventilation), RR (machine): 12, TV (machine): 450, FiO2: 30, PEEP: 5, ITime: 1, MAP: 8, PIP: 19  PHYSICAL EXAM:    General: No Acute Distress     Neurological: SERGIO, has gag and cough, eyes midline , follows commands on the right side, antigravity on the right side, left side Extension posturing      Pulmonary: Clear to Auscultation, No Rales, No Rhonchi, No Wheezes     Cardiovascular: S1, S2, Regular Rate and Rhythm     Gastrointestinal: Soft, Nontender, Nondistended     Extremities: No calf tenderness     Incision:       LABS:  Na: 151 (06-11 @ 08:22), 153 (06-11 @ 00:25), 153 (06-10 @ 17:52), 152 (06-10 @ 05:57), 154 (06-10 @ 00:06), 156 (06-09 @ 21:29), 155 (06-09 @ 17:37), 155 (06-09 @ 13:15), 155 (06-09 @ 05:20), 155 (06-08 @ 22:33)  K: 3.2 (06-11 @ 08:22), 3.6 (06-11 @ 00:25), 3.7 (06-10 @ 17:52), 4.0 (06-10 @ 05:57), 4.1 (06-10 @ 00:06), 4.2 (06-09 @ 21:29), 3.9 (06-09 @ 17:37), 3.7 (06-09 @ 13:15), 3.7 (06-09 @ 05:20), 3.5 (06-08 @ 22:33)  Cl: 123 (06-11 @ 08:22), 122 (06-11 @ 00:25), 122 (06-10 @ 17:52), 122 (06-10 @ 05:57), 124 (06-10 @ 00:06), 126 (06-09 @ 21:29), 124 (06-09 @ 17:37), 124 (06-09 @ 13:15), 125 (06-09 @ 05:20), 123 (06-08 @ 22:33)  CO2: 14 (06-11 @ 08:22), 17 (06-11 @ 00:25), 18 (06-10 @ 17:52), 18 (06-10 @ 05:57), 19 (06-10 @ 00:06), 19 (06-09 @ 21:29), 19 (06-09 @ 17:37), 20 (06-09 @ 13:15), 21 (06-09 @ 05:20), 23 (06-08 @ 22:33)  BUN: 11 (06-11 @ 08:22), 14 (06-11 @ 00:25), 13 (06-10 @ 17:52), 10 (06-10 @ 05:57), 10 (06-10 @ 00:06), 10 (06-09 @ 21:29), 8 (06-09 @ 17:37), 8 (06-09 @ 13:15), 7 (06-09 @ 05:20), 7 (06-08 @ 22:33)  Cr: 0.31 (06-11 @ 08:22), 0.42 (06-11 @ 00:25), 0.44 (06-10 @ 17:52), 0.43 (06-10 @ 05:57), 0.47 (06-10 @ 00:06), 0.46 (06-09 @ 21:29), 0.45 (06-09 @ 17:37), 0.40 (06-09 @ 13:15), 0.39 (06-09 @ 05:20), 0.40 (06-08 @ 22:33)  Glu: 102(06-11 @ 08:22), 122(06-11 @ 00:25), 143(06-10 @ 17:52), 140(06-10 @ 05:57), 145(06-10 @ 00:06), 155(06-09 @ 21:29), 140(06-09 @ 17:37), 147(06-09 @ 13:15), 142(06-09 @ 05:20), 181(06-08 @ 22:33)    Hgb: 8.0 (06-11 @ 00:25), 9.1 (06-09 @ 21:29), 9.3 (06-09 @ 15:09), 8.8 (06-09 @ 13:15), 7.4 (06-09 @ 05:20), 7.3 (06-09 @ 02:15), 6.2 (06-08 @ 22:33)  Hct: 24.6 (06-11 @ 00:25), 26.3 (06-09 @ 21:29), 27.1 (06-09 @ 15:09), 26.4 (06-09 @ 13:15), 22.3 (06-09 @ 05:20), 22.3 (06-09 @ 02:15), 18.5 (06-08 @ 22:33)  WBC: 11.00 (06-11 @ 00:25), 13.80 (06-09 @ 21:29), 13.64 (06-09 @ 15:09), 12.34 (06-09 @ 13:15), 12.19 (06-09 @ 05:20), 11.91 (06-09 @ 02:15), 14.04 (06-08 @ 22:33)  Plt: 212 (06-11 @ 00:25), 178 (06-09 @ 21:29), 176 (06-09 @ 15:09), 167 (06-09 @ 13:15), 146 (06-09 @ 05:20), 146 (06-09 @ 02:15), 166 (06-08 @ 22:33)    INR: 1.28 06-11-23 @ 00:25, 1.21 06-08-23 @ 22:33  PTT: 31.2 06-11-23 @ 00:25, 30.0 06-08-23 @ 22:33                  A/P:  right ICH with midline shift and brain compression s/p emergent decompressive craniectomy   Neuro: neuro checks q 1 hr  status epilepticus on vimpat 100 mg bid and keppra 1 g BID, monitor EEG and adjust antiepileptics accordingly   EVD at 10 cm water, 77 ml in 24 hr , would consider fast weaning    brain edema sodium goal 145-155    Respiratory: acute respiratory failure, intubated   chest xray ET, NG in good position   Mode: AC/ CMV (Assist Control/ Continuous Mandatory Ventilation), RR (machine): 12, TV (machine): 450, FiO2: 30, PEEP: 5, ITime: 1, MAP: 8, PIP: 19  no cuff leak, started on decadron, will place on PS and attempt extubation once we have a cuff leak   PS in am   CV: SBP goal 100-160 mmhg  HTN on enalapril 10  Endocrine: sugar goal 120-180  Heme/Onc:   hgb stable    Renal: IVL   ID: afebrile , on no ABX   GI: NG, NPO after midnight for possible extubation , pantoprazole while intubated   Social/Family: no family at bedside   Discharge planning:     Code Status: [x] Full Code [] DNR [] DNI [] Goals of Care:   Disposition: [x] ICU [] Stroke Unit [] RCU []PCU []Floor [] Discharge Home     Patient at high risk for neurologic deterioration, critical care time, excluding procedures: 60  minutes

## 2023-06-11 NOTE — PROGRESS NOTE ADULT - SUBJECTIVE AND OBJECTIVE BOX
NSCU Progress Note    Assessment/Hospital Course:        24 Hour Events/Subjective:  -       REVIEW OF SYSTEMS:  - negative except as above    VITALS:   - T(C): 37.4 (06-11-23 @ 07:00), Max: 38.3 (06-10-23 @ 18:30)  T(F): 99.3 (06-11-23 @ 07:00), Max: 100.9 (06-10-23 @ 18:30)  HR: 93 (06-11-23 @ 08:00) (56 - 93)  BP: --  ABP: 200/112 (06-11-23 @ 08:00) (140/82 - 200/112)  ABP(mean): 146 (06-11-23 @ 08:00) (105 - 146)  RR: 27 (06-11-23 @ 08:00) (16 - 27)  SpO2: 98% (06-11-23 @ 08:00) (95% - 100%)      IMAGING/DATA:   - Reviewed          PHYSICAL EXAM:    General: calm  CVS: RRR  Pulm: CTAB  GI: Soft, NTND  Extremities: No LE Edema  Neuro: intubated, pupils 3mm reactive, fc on Rt side   NSCU Progress Note    Assessment/Hospital Course:        24 Hour Events/Subjective:  - Last seizure was captured 02:37 6/11, continued same AED  - no cuff leak, started on ccs      REVIEW OF SYSTEMS:  - negative except as above    VITALS:   - T(C): 37.4 (06-11-23 @ 07:00), Max: 38.3 (06-10-23 @ 18:30)  T(F): 99.3 (06-11-23 @ 07:00), Max: 100.9 (06-10-23 @ 18:30)  HR: 93 (06-11-23 @ 08:00) (56 - 93)  BP: --  ABP: 200/112 (06-11-23 @ 08:00) (140/82 - 200/112)  ABP(mean): 146 (06-11-23 @ 08:00) (105 - 146)  RR: 27 (06-11-23 @ 08:00) (16 - 27)  SpO2: 98% (06-11-23 @ 08:00) (95% - 100%)      IMAGING/DATA:   - Reviewed          PHYSICAL EXAM:    General: calm  CVS: RRR  Pulm: CTAB  GI: Soft, NTND  Extremities: No LE Edema  Neuro: intubated, pupils 3mm reactive, fc on Rt side

## 2023-06-12 LAB
ANION GAP SERPL CALC-SCNC: 11 MMOL/L — SIGNIFICANT CHANGE UP (ref 5–17)
ANION GAP SERPL CALC-SCNC: 12 MMOL/L — SIGNIFICANT CHANGE UP (ref 5–17)
ANION GAP SERPL CALC-SCNC: 12 MMOL/L — SIGNIFICANT CHANGE UP (ref 5–17)
BUN SERPL-MCNC: 11 MG/DL — SIGNIFICANT CHANGE UP (ref 7–23)
BUN SERPL-MCNC: 11 MG/DL — SIGNIFICANT CHANGE UP (ref 7–23)
BUN SERPL-MCNC: 14 MG/DL — SIGNIFICANT CHANGE UP (ref 7–23)
CALCIUM SERPL-MCNC: 8.2 MG/DL — LOW (ref 8.4–10.5)
CALCIUM SERPL-MCNC: 8.5 MG/DL — SIGNIFICANT CHANGE UP (ref 8.4–10.5)
CALCIUM SERPL-MCNC: 8.5 MG/DL — SIGNIFICANT CHANGE UP (ref 8.4–10.5)
CHLORIDE SERPL-SCNC: 112 MMOL/L — HIGH (ref 96–108)
CHLORIDE SERPL-SCNC: 112 MMOL/L — HIGH (ref 96–108)
CHLORIDE SERPL-SCNC: 115 MMOL/L — HIGH (ref 96–108)
CO2 SERPL-SCNC: 18 MMOL/L — LOW (ref 22–31)
CO2 SERPL-SCNC: 20 MMOL/L — LOW (ref 22–31)
CO2 SERPL-SCNC: 20 MMOL/L — LOW (ref 22–31)
CREAT SERPL-MCNC: 0.34 MG/DL — LOW (ref 0.5–1.3)
CREAT SERPL-MCNC: 0.35 MG/DL — LOW (ref 0.5–1.3)
CREAT SERPL-MCNC: 0.43 MG/DL — LOW (ref 0.5–1.3)
CULTURE RESULTS: SIGNIFICANT CHANGE UP
CULTURE RESULTS: SIGNIFICANT CHANGE UP
EGFR: 127 ML/MIN/1.73M2 — SIGNIFICANT CHANGE UP
EGFR: 133 ML/MIN/1.73M2 — SIGNIFICANT CHANGE UP
EGFR: 134 ML/MIN/1.73M2 — SIGNIFICANT CHANGE UP
GLUCOSE BLDC GLUCOMTR-MCNC: 141 MG/DL — HIGH (ref 70–99)
GLUCOSE BLDC GLUCOMTR-MCNC: 158 MG/DL — HIGH (ref 70–99)
GLUCOSE BLDC GLUCOMTR-MCNC: 172 MG/DL — HIGH (ref 70–99)
GLUCOSE BLDC GLUCOMTR-MCNC: 192 MG/DL — HIGH (ref 70–99)
GLUCOSE SERPL-MCNC: 148 MG/DL — HIGH (ref 70–99)
GLUCOSE SERPL-MCNC: 156 MG/DL — HIGH (ref 70–99)
GLUCOSE SERPL-MCNC: 206 MG/DL — HIGH (ref 70–99)
HCT VFR BLD CALC: 26.8 % — LOW (ref 34.5–45)
HGB BLD-MCNC: 8.9 G/DL — LOW (ref 11.5–15.5)
MAGNESIUM SERPL-MCNC: 2.1 MG/DL — SIGNIFICANT CHANGE UP (ref 1.6–2.6)
MAGNESIUM SERPL-MCNC: 2.3 MG/DL — SIGNIFICANT CHANGE UP (ref 1.6–2.6)
MAGNESIUM SERPL-MCNC: 2.3 MG/DL — SIGNIFICANT CHANGE UP (ref 1.6–2.6)
MCHC RBC-ENTMCNC: 31.4 PG — SIGNIFICANT CHANGE UP (ref 27–34)
MCHC RBC-ENTMCNC: 33.2 GM/DL — SIGNIFICANT CHANGE UP (ref 32–36)
MCV RBC AUTO: 94.7 FL — SIGNIFICANT CHANGE UP (ref 80–100)
NRBC # BLD: 0 /100 WBCS — SIGNIFICANT CHANGE UP (ref 0–0)
PHOSPHATE SERPL-MCNC: 3.2 MG/DL — SIGNIFICANT CHANGE UP (ref 2.5–4.5)
PHOSPHATE SERPL-MCNC: 3.6 MG/DL — SIGNIFICANT CHANGE UP (ref 2.5–4.5)
PHOSPHATE SERPL-MCNC: 3.8 MG/DL — SIGNIFICANT CHANGE UP (ref 2.5–4.5)
PLATELET # BLD AUTO: 231 K/UL — SIGNIFICANT CHANGE UP (ref 150–400)
POTASSIUM SERPL-MCNC: 3.8 MMOL/L — SIGNIFICANT CHANGE UP (ref 3.5–5.3)
POTASSIUM SERPL-MCNC: 4 MMOL/L — SIGNIFICANT CHANGE UP (ref 3.5–5.3)
POTASSIUM SERPL-MCNC: 4.2 MMOL/L — SIGNIFICANT CHANGE UP (ref 3.5–5.3)
POTASSIUM SERPL-SCNC: 3.8 MMOL/L — SIGNIFICANT CHANGE UP (ref 3.5–5.3)
POTASSIUM SERPL-SCNC: 4 MMOL/L — SIGNIFICANT CHANGE UP (ref 3.5–5.3)
POTASSIUM SERPL-SCNC: 4.2 MMOL/L — SIGNIFICANT CHANGE UP (ref 3.5–5.3)
RBC # BLD: 2.83 M/UL — LOW (ref 3.8–5.2)
RBC # FLD: 14 % — SIGNIFICANT CHANGE UP (ref 10.3–14.5)
SODIUM SERPL-SCNC: 143 MMOL/L — SIGNIFICANT CHANGE UP (ref 135–145)
SODIUM SERPL-SCNC: 144 MMOL/L — SIGNIFICANT CHANGE UP (ref 135–145)
SODIUM SERPL-SCNC: 145 MMOL/L — SIGNIFICANT CHANGE UP (ref 135–145)
SPECIMEN SOURCE: SIGNIFICANT CHANGE UP
SPECIMEN SOURCE: SIGNIFICANT CHANGE UP
WBC # BLD: 9.37 K/UL — SIGNIFICANT CHANGE UP (ref 3.8–10.5)
WBC # FLD AUTO: 9.37 K/UL — SIGNIFICANT CHANGE UP (ref 3.8–10.5)

## 2023-06-12 PROCEDURE — 99291 CRITICAL CARE FIRST HOUR: CPT

## 2023-06-12 PROCEDURE — 93010 ELECTROCARDIOGRAM REPORT: CPT

## 2023-06-12 PROCEDURE — 71045 X-RAY EXAM CHEST 1 VIEW: CPT | Mod: 26,76

## 2023-06-12 PROCEDURE — 95720 EEG PHY/QHP EA INCR W/VEEG: CPT

## 2023-06-12 RX ORDER — HYDRALAZINE HCL 50 MG
5 TABLET ORAL ONCE
Refills: 0 | Status: COMPLETED | OUTPATIENT
Start: 2023-06-12 | End: 2023-06-12

## 2023-06-12 RX ORDER — DEXAMETHASONE 0.5 MG/5ML
10 ELIXIR ORAL EVERY 8 HOURS
Refills: 0 | Status: DISCONTINUED | OUTPATIENT
Start: 2023-06-12 | End: 2023-06-12

## 2023-06-12 RX ORDER — LISINOPRIL 2.5 MG/1
40 TABLET ORAL DAILY
Refills: 0 | Status: DISCONTINUED | OUTPATIENT
Start: 2023-06-13 | End: 2023-06-21

## 2023-06-12 RX ORDER — LACOSAMIDE 50 MG/1
150 TABLET ORAL EVERY 12 HOURS
Refills: 0 | Status: DISCONTINUED | OUTPATIENT
Start: 2023-06-12 | End: 2023-06-26

## 2023-06-12 RX ORDER — LACOSAMIDE 50 MG/1
100 TABLET ORAL ONCE
Refills: 0 | Status: DISCONTINUED | OUTPATIENT
Start: 2023-06-12 | End: 2023-06-12

## 2023-06-12 RX ORDER — POTASSIUM CHLORIDE 20 MEQ
30 PACKET (EA) ORAL ONCE
Refills: 0 | Status: COMPLETED | OUTPATIENT
Start: 2023-06-12 | End: 2023-06-13

## 2023-06-12 RX ORDER — DEXAMETHASONE 0.5 MG/5ML
10 ELIXIR ORAL EVERY 8 HOURS
Refills: 0 | Status: COMPLETED | OUTPATIENT
Start: 2023-06-12 | End: 2023-06-13

## 2023-06-12 RX ADMIN — FENTANYL CITRATE 25 MICROGRAM(S): 50 INJECTION INTRAVENOUS at 00:45

## 2023-06-12 RX ADMIN — Medication 4 MILLIGRAM(S): at 00:24

## 2023-06-12 RX ADMIN — Medication 2: at 17:56

## 2023-06-12 RX ADMIN — Medication 1 PATCH: at 19:15

## 2023-06-12 RX ADMIN — CHLORHEXIDINE GLUCONATE 15 MILLILITER(S): 213 SOLUTION TOPICAL at 04:45

## 2023-06-12 RX ADMIN — ENOXAPARIN SODIUM 40 MILLIGRAM(S): 100 INJECTION SUBCUTANEOUS at 17:53

## 2023-06-12 RX ADMIN — CHLORHEXIDINE GLUCONATE 15 MILLILITER(S): 213 SOLUTION TOPICAL at 17:53

## 2023-06-12 RX ADMIN — LEVETIRACETAM 1000 MILLIGRAM(S): 250 TABLET, FILM COATED ORAL at 17:53

## 2023-06-12 RX ADMIN — FENTANYL CITRATE 25 MICROGRAM(S): 50 INJECTION INTRAVENOUS at 16:50

## 2023-06-12 RX ADMIN — Medication 1 PATCH: at 11:58

## 2023-06-12 RX ADMIN — FENTANYL CITRATE 25 MICROGRAM(S): 50 INJECTION INTRAVENOUS at 01:00

## 2023-06-12 RX ADMIN — LACOSAMIDE 120 MILLIGRAM(S): 50 TABLET ORAL at 05:19

## 2023-06-12 RX ADMIN — Medication 10 MILLIGRAM(S): at 04:45

## 2023-06-12 RX ADMIN — Medication 10 MILLIGRAM(S): at 17:52

## 2023-06-12 RX ADMIN — PANTOPRAZOLE SODIUM 40 MILLIGRAM(S): 20 TABLET, DELAYED RELEASE ORAL at 11:48

## 2023-06-12 RX ADMIN — Medication 102 MILLIGRAM(S): at 14:05

## 2023-06-12 RX ADMIN — Medication 75 MICROGRAM(S): at 04:42

## 2023-06-12 RX ADMIN — FENTANYL CITRATE 25 MICROGRAM(S): 50 INJECTION INTRAVENOUS at 12:18

## 2023-06-12 RX ADMIN — CHLORHEXIDINE GLUCONATE 1 APPLICATION(S): 213 SOLUTION TOPICAL at 11:50

## 2023-06-12 RX ADMIN — Medication 4 MILLIGRAM(S): at 04:45

## 2023-06-12 RX ADMIN — FENTANYL CITRATE 25 MICROGRAM(S): 50 INJECTION INTRAVENOUS at 16:35

## 2023-06-12 RX ADMIN — FENTANYL CITRATE 25 MICROGRAM(S): 50 INJECTION INTRAVENOUS at 14:31

## 2023-06-12 RX ADMIN — Medication 1 PATCH: at 11:48

## 2023-06-12 RX ADMIN — Medication 1 PATCH: at 07:30

## 2023-06-12 RX ADMIN — Medication 2: at 05:04

## 2023-06-12 RX ADMIN — LACOSAMIDE 120 MILLIGRAM(S): 50 TABLET ORAL at 12:25

## 2023-06-12 RX ADMIN — LEVETIRACETAM 1000 MILLIGRAM(S): 250 TABLET, FILM COATED ORAL at 04:42

## 2023-06-12 RX ADMIN — LACOSAMIDE 130 MILLIGRAM(S): 50 TABLET ORAL at 17:53

## 2023-06-12 RX ADMIN — FENTANYL CITRATE 25 MICROGRAM(S): 50 INJECTION INTRAVENOUS at 12:03

## 2023-06-12 RX ADMIN — FENTANYL CITRATE 25 MICROGRAM(S): 50 INJECTION INTRAVENOUS at 14:46

## 2023-06-12 RX ADMIN — Medication 2: at 23:25

## 2023-06-12 RX ADMIN — Medication 102 MILLIGRAM(S): at 21:37

## 2023-06-12 RX ADMIN — DEXMEDETOMIDINE HYDROCHLORIDE IN 0.9% SODIUM CHLORIDE 3.82 MICROGRAM(S)/KG/HR: 4 INJECTION INTRAVENOUS at 07:43

## 2023-06-12 RX ADMIN — Medication 5 MILLIGRAM(S): at 10:06

## 2023-06-12 NOTE — OCCUPATIONAL THERAPY INITIAL EVALUATION ADULT - PAIN SENSATION, RLE, REHAB EVAL
Attended delivery of this infant. No resuscitation was necessary according to NRP guidelines. I  Have evaluated and examined Baby Richard Kwan and I agree with the history, exam and medical decision making as documented by the  nurse practitioner.     Ida Wang MD I reviewed plan of care with mom  Anticipate discharge in 1 day(s). Electronically signed by: Tacy Fabry Isom Snell, NNP-BC 01/11/18 8:12 AM absent sensation within normal limits

## 2023-06-12 NOTE — OCCUPATIONAL THERAPY INITIAL EVALUATION ADULT - SHORT TERM MEMORY, REHAB EVAL
not appropriate to assess at this time not appropriate to assess at this time / SBT not assessed 2* not appropriate/ETT not appropriate to assess at this time / SBT not assessed 2* not appropriate

## 2023-06-12 NOTE — OCCUPATIONAL THERAPY INITIAL EVALUATION ADULT - VISUAL ACUITY
eyes closed throughout session despite max verbal cues pt able to open L eye with pain and with increased cues

## 2023-06-12 NOTE — CHART NOTE - NSCHARTNOTEFT_GEN_A_CORE
EEG preliminary read (not final) on the initial recording hour(s) = x 11 hrs    No seizures recorded.  Moderate slowing noted, nonspecific.  Final report to follow tomorrow morning after completion of study.    [facility] EEG Reading Room Ph#: [phone]  Epilepsy Answering Service after 5PM and before 8:30AM: Ph#: [phone]

## 2023-06-12 NOTE — OCCUPATIONAL THERAPY INITIAL EVALUATION ADULT - ADL RETRAINING, OT EVAL
GOAL: pt will completed LB dressing tasks with mod A in 4 weeks to increase independence in daily tasks.

## 2023-06-12 NOTE — OCCUPATIONAL THERAPY INITIAL EVALUATION ADULT - GENERAL OBSERVATIONS, REHAB EVAL
pt received semisupine in bed+ICU monitoring, NG tube, ETT to vent, a-line, IV, tiffany wrist restraints, EVD(clamped by RN) pt received semisupine in bed+ICU monitoring, NG tube, HFNC, a-line, IV, tiffany wrist restraints, EVD(clamped by RN)

## 2023-06-12 NOTE — PROGRESS NOTE ADULT - SUBJECTIVE AND OBJECTIVE BOX
NSCU ATTENDING -- ADDITIONAL PROGRESS NOTE    Nighttime rounds were performed -- please refer to earlier Progress Note for HPI details.    T(C): 37.6 (06-12-23 @ 19:00), Max: 37.6 (06-12-23 @ 19:00)  HR: 69 (06-12-23 @ 20:00) (53 - 117)  BP: --  RR: 15 (06-12-23 @ 20:00) (12 - 20)  SpO2: 100% (06-12-23 @ 20:00) (98% - 100%)  Wt(kg): --    Relevant labwork and imaging reviewed.    CHIEF COMPLAINT: ICH    NPO after midnight, CPAP in AM, on decadron for no cuff leak.  EVD open @ 10, ICPs wnl, single digits output for the most part.

## 2023-06-12 NOTE — OCCUPATIONAL THERAPY INITIAL EVALUATION ADULT - NS ASR FOLLOW COMMAND OT EVAL
non-verbal 2* ETT, Pt able to follow commands RUE/LE and respond to Y/N questions via hand squeeze (Y=1, N=2 squeezes)/100% of the time Pt able to follow commands RUE/LE and respond to Y/N questions via hand squeeze (Y=1, N=2 squeezes)/100% of the time

## 2023-06-12 NOTE — OCCUPATIONAL THERAPY INITIAL EVALUATION ADULT - LIVES WITH, PROFILE
spouse as per chart, Pt non-verbal at this time. per wife, pt lives in a house w/ wife, 2 steps to enter (+) L handrail, 1st fl set-up. Pt reports being independent w/ all ADLs/IADLs, indep w/ amb w/o AD. Pt denies history of falls in past 12 months./spouse

## 2023-06-12 NOTE — OCCUPATIONAL THERAPY INITIAL EVALUATION ADULT - ASR WT BEARING STATUS EVAL
(no helmet received yet)/no weight-bearing restrictions OOB with helmet/no weight-bearing restrictions

## 2023-06-12 NOTE — OCCUPATIONAL THERAPY INITIAL EVALUATION ADULT - COGNITIVE, VISUAL PERCEPTUAL, OT EVAL
pt will follow 100% single-step commands to complete ADL/task of choice in 4 weeks / pt will be A+Ox4 75% of the time in 4 weeks

## 2023-06-12 NOTE — ADVANCED PRACTICE NURSE CONSULT - ASSESSMENT
PICC Line Insertion Note  Patient or patient representative educated about central line associated blood stream infection prevention practices.  Catheter type: 5 F,  DL Picc  : Bard  Power injectable: Yes  LOT# [id number]    Informed consent obtained by covering floor team.  Procedure assisted by: MARIA EUGENIA Toro RN  Time out was preformed, confirming the patient's first and last name, date of birth, procedure, and correct site prior to start of procedure.    Patient was placed with HOB 30 degrees. Patient placement site was prepped with chlorhexidine solution, then draped using maximum sterile barrier protection. The area was injected with 2 ml of 1% lidocaine. Using the Bard Site Rite 8, the catheter was placed using the Modified Seldinger Technique. Strict adherence to outline aseptic technique including handwashing, glove and gown, utilizing mask and cap, plus draping the patient with a sterile drape was observed. Upon completion of line placement, the insertion site was covered with a sterile CHG dressing. Pt tolerated procedure well.   Pre procedure v/s: /98, , TEMP 37.3C, O2sat 100%  Post procedure v/s: /100, , TEMP 37.3C, O2sat 100%    All materials used for catheter insertion, including the intact guide wires, were accounted for at the end of the procedure.  Number of attempts: 1  Complications/Comments: None    Emergency Placement: No  Site: New  Anatomical Site of insertion: R Basilic vein  Catheter size/length: 5F, 37cm  US guided Bard Double lumen power picc placed    Post procedure verification with chest Xray as per orders.

## 2023-06-12 NOTE — PROGRESS NOTE ADULT - SUBJECTIVE AND OBJECTIVE BOX
Interval events:  EVD output  Cuff leak     VITALS:  T(C): , Max: 38.2 (06-11-23 @ 11:00)  HR:  (55 - 122)  BP: --  ABP:  (95/53 - 200/112)  RR:  (16 - 38)  SpO2:  (93% - 100%)  Wt(kg): --  Device: Avea, Mode: AC/ CMV (Assist Control/ Continuous Mandatory Ventilation), RR (machine): 12, TV (machine): 450, FiO2: 30, PEEP: 5, ITime: 1, MAP: 9, PIP: 16    06-11-23 @ 07:01  -  06-12-23 @ 07:00  --------------------------------------------------------  IN: 1978 mL / OUT: 1587 mL / NET: 391 mL      LABS:  Na: 145 (06-12 @ 04:20), 151 (06-11 @ 08:22), 153 (06-11 @ 00:25), 153 (06-10 @ 17:52), 152 (06-10 @ 05:57), 154 (06-10 @ 00:06), 156 (06-09 @ 21:29), 155 (06-09 @ 17:37), 155 (06-09 @ 13:15)  K: 4.2 (06-12 @ 04:20), 3.2 (06-11 @ 08:22), 3.6 (06-11 @ 00:25), 3.7 (06-10 @ 17:52), 4.0 (06-10 @ 05:57), 4.1 (06-10 @ 00:06), 4.2 (06-09 @ 21:29), 3.9 (06-09 @ 17:37), 3.7 (06-09 @ 13:15)  Cl: 115 (06-12 @ 04:20), 123 (06-11 @ 08:22), 122 (06-11 @ 00:25), 122 (06-10 @ 17:52), 122 (06-10 @ 05:57), 124 (06-10 @ 00:06), 126 (06-09 @ 21:29), 124 (06-09 @ 17:37), 124 (06-09 @ 13:15)  CO2: 18 (06-12 @ 04:20), 14 (06-11 @ 08:22), 17 (06-11 @ 00:25), 18 (06-10 @ 17:52), 18 (06-10 @ 05:57), 19 (06-10 @ 00:06), 19 (06-09 @ 21:29), 19 (06-09 @ 17:37), 20 (06-09 @ 13:15)  BUN: 11 (06-12 @ 04:20), 11 (06-11 @ 08:22), 14 (06-11 @ 00:25), 13 (06-10 @ 17:52), 10 (06-10 @ 05:57), 10 (06-10 @ 00:06), 10 (06-09 @ 21:29), 8 (06-09 @ 17:37), 8 (06-09 @ 13:15)  Cr: 0.35 (06-12 @ 04:20), 0.31 (06-11 @ 08:22), 0.42 (06-11 @ 00:25), 0.44 (06-10 @ 17:52), 0.43 (06-10 @ 05:57), 0.47 (06-10 @ 00:06), 0.46 (06-09 @ 21:29), 0.45 (06-09 @ 17:37), 0.40 (06-09 @ 13:15)  Glu: 148(06-12 @ 04:20), 102(06-11 @ 08:22), 122(06-11 @ 00:25), 143(06-10 @ 17:52), 140(06-10 @ 05:57), 145(06-10 @ 00:06), 155(06-09 @ 21:29), 140(06-09 @ 17:37), 147(06-09 @ 13:15)    Hgb: 8.9 (06-12 @ 04:20), 8.0 (06-11 @ 00:25), 9.1 (06-09 @ 21:29), 9.3 (06-09 @ 15:09), 8.8 (06-09 @ 13:15)  Hct: 26.8 (06-12 @ 04:20), 24.6 (06-11 @ 00:25), 26.3 (06-09 @ 21:29), 27.1 (06-09 @ 15:09), 26.4 (06-09 @ 13:15)  WBC: 9.37 (06-12 @ 04:20), 11.00 (06-11 @ 00:25), 13.80 (06-09 @ 21:29), 13.64 (06-09 @ 15:09), 12.34 (06-09 @ 13:15)  Plt: 231 (06-12 @ 04:20), 212 (06-11 @ 00:25), 178 (06-09 @ 21:29), 176 (06-09 @ 15:09), 167 (06-09 @ 13:15)    INR: 1.28 06-11-23 @ 00:25  PTT: 31.2 06-11-23 @ 00:25    MEDICATIONS:  acetaminophen   Oral Liquid .. 650 milliGRAM(s) Oral every 6 hours PRN  bisacodyl Suppository 10 milliGRAM(s) Rectal once PRN  chlorhexidine 0.12% Liquid 15 milliLiter(s) Oral Mucosa every 12 hours  chlorhexidine 4% Liquid 1 Application(s) Topical daily  dexMEDEtomidine Infusion 0.2 MICROgram(s)/kG/Hr IV Continuous <Continuous>  enalapril 10 milliGRAM(s) Oral daily  enalaprilat Injectable 2.5 milliGRAM(s) IV Push once  enoxaparin Injectable 40 milliGRAM(s) SubCutaneous <User Schedule>  fentaNYL    Injectable 25 MICROGram(s) IV Push every 2 hours PRN  insulin lispro (ADMELOG) corrective regimen sliding scale   SubCutaneous every 6 hours  lacosamide IVPB 100 milliGRAM(s) IV Intermittent every 12 hours  levETIRAcetam  Solution 1000 milliGRAM(s) Oral two times a day  levothyroxine 75 MICROGram(s) Oral daily  nicotine - 21 mG/24Hr(s) Patch 1 Patch Transdermal daily  ondansetron Injectable 4 milliGRAM(s) IV Push every 6 hours PRN  oxyCODONE    Solution 5 milliGRAM(s) Oral every 4 hours PRN  pantoprazole  Injectable 40 milliGRAM(s) IV Push daily  polyethylene glycol 3350 17 Gram(s) Oral every 12 hours  senna 2 Tablet(s) Oral at bedtime    EXAMINATION:  General:  in NAD  HEENT:  MMM  Neuro:  awake, alert, oriented x 3, follows commands, EOMI, face symmetric, no PD, DF 5/5   Cards:  RRR  Respiratory:  no respiratory distress  Abdomen:  soft  Extremities:  no LE edema    Assessment/Plan:   39 yo woman with h/o HTN, admitted 5/6 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 CTA and angiogram 6/9 with no vascular malformation. ICH score 4.   Hospital course c/b seizures on VEEG.     Neuro:   neuro checks q2 hr  EVD at 10 cm water, minimal output, keep ICP< 22 mmhg, CPP> 60. EVD with minimal output???  brain edema hold HTS today  Keppra load 1g bid (6/9) for clinical seizure (Lt gaze stopped FC); c/w vimpat (6/10); if eeg with seizures will add additional agent today VPA  EEG for sz  precedex for vent synchrony       Respiratory:   acute respiratory failure, intubated   ABG  CPAP 10/5 tolerating  [id number] for no cufff leak on 6/11, wean to extubate after    Endocrine:   sugar goal 120-180  finger sticks and ISS   synthroid    Heme/Onc:     hgb slightly dropped will monitor   DVT PPX   h/h trending down, monitor, CTAP neg    Renal:   IVL  see neuro  Na goal 145-155    ID:   febrile 6/11  f/u panculture      GI:   NG, NPO MN for possible extubation  PPI    Patient seen and examined by attending on 6/12/2023.    Patient is critically ill due to ? and at high risk for neurological deterioration or death due to:   Interval events:  EVD output  Cuff leak     VITALS:  T(C): , Max: 38.2 (06-11-23 @ 11:00)  HR:  (55 - 122)  BP: --  ABP:  (95/53 - 200/112)  RR:  (16 - 38)  SpO2:  (93% - 100%)  Wt(kg): --  Device: Avea, Mode: AC/ CMV (Assist Control/ Continuous Mandatory Ventilation), RR (machine): 12, TV (machine): 450, FiO2: 30, PEEP: 5, ITime: 1, MAP: 9, PIP: 16    06-11-23 @ 07:01  -  06-12-23 @ 07:00  --------------------------------------------------------  IN: 1978 mL / OUT: 1587 mL / NET: 391 mL      LABS:  Na: 145 (06-12 @ 04:20), 151 (06-11 @ 08:22), 153 (06-11 @ 00:25), 153 (06-10 @ 17:52), 152 (06-10 @ 05:57), 154 (06-10 @ 00:06), 156 (06-09 @ 21:29), 155 (06-09 @ 17:37), 155 (06-09 @ 13:15)  K: 4.2 (06-12 @ 04:20), 3.2 (06-11 @ 08:22), 3.6 (06-11 @ 00:25), 3.7 (06-10 @ 17:52), 4.0 (06-10 @ 05:57), 4.1 (06-10 @ 00:06), 4.2 (06-09 @ 21:29), 3.9 (06-09 @ 17:37), 3.7 (06-09 @ 13:15)  Cl: 115 (06-12 @ 04:20), 123 (06-11 @ 08:22), 122 (06-11 @ 00:25), 122 (06-10 @ 17:52), 122 (06-10 @ 05:57), 124 (06-10 @ 00:06), 126 (06-09 @ 21:29), 124 (06-09 @ 17:37), 124 (06-09 @ 13:15)  CO2: 18 (06-12 @ 04:20), 14 (06-11 @ 08:22), 17 (06-11 @ 00:25), 18 (06-10 @ 17:52), 18 (06-10 @ 05:57), 19 (06-10 @ 00:06), 19 (06-09 @ 21:29), 19 (06-09 @ 17:37), 20 (06-09 @ 13:15)  BUN: 11 (06-12 @ 04:20), 11 (06-11 @ 08:22), 14 (06-11 @ 00:25), 13 (06-10 @ 17:52), 10 (06-10 @ 05:57), 10 (06-10 @ 00:06), 10 (06-09 @ 21:29), 8 (06-09 @ 17:37), 8 (06-09 @ 13:15)  Cr: 0.35 (06-12 @ 04:20), 0.31 (06-11 @ 08:22), 0.42 (06-11 @ 00:25), 0.44 (06-10 @ 17:52), 0.43 (06-10 @ 05:57), 0.47 (06-10 @ 00:06), 0.46 (06-09 @ 21:29), 0.45 (06-09 @ 17:37), 0.40 (06-09 @ 13:15)  Glu: 148(06-12 @ 04:20), 102(06-11 @ 08:22), 122(06-11 @ 00:25), 143(06-10 @ 17:52), 140(06-10 @ 05:57), 145(06-10 @ 00:06), 155(06-09 @ 21:29), 140(06-09 @ 17:37), 147(06-09 @ 13:15)    Hgb: 8.9 (06-12 @ 04:20), 8.0 (06-11 @ 00:25), 9.1 (06-09 @ 21:29), 9.3 (06-09 @ 15:09), 8.8 (06-09 @ 13:15)  Hct: 26.8 (06-12 @ 04:20), 24.6 (06-11 @ 00:25), 26.3 (06-09 @ 21:29), 27.1 (06-09 @ 15:09), 26.4 (06-09 @ 13:15)  WBC: 9.37 (06-12 @ 04:20), 11.00 (06-11 @ 00:25), 13.80 (06-09 @ 21:29), 13.64 (06-09 @ 15:09), 12.34 (06-09 @ 13:15)  Plt: 231 (06-12 @ 04:20), 212 (06-11 @ 00:25), 178 (06-09 @ 21:29), 176 (06-09 @ 15:09), 167 (06-09 @ 13:15)    INR: 1.28 06-11-23 @ 00:25  PTT: 31.2 06-11-23 @ 00:25    MEDICATIONS:  acetaminophen   Oral Liquid .. 650 milliGRAM(s) Oral every 6 hours PRN  bisacodyl Suppository 10 milliGRAM(s) Rectal once PRN  chlorhexidine 0.12% Liquid 15 milliLiter(s) Oral Mucosa every 12 hours  chlorhexidine 4% Liquid 1 Application(s) Topical daily  dexMEDEtomidine Infusion 0.2 MICROgram(s)/kG/Hr IV Continuous <Continuous>  enalapril 10 milliGRAM(s) Oral daily  enalaprilat Injectable 2.5 milliGRAM(s) IV Push once  enoxaparin Injectable 40 milliGRAM(s) SubCutaneous <User Schedule>  fentaNYL    Injectable 25 MICROGram(s) IV Push every 2 hours PRN  insulin lispro (ADMELOG) corrective regimen sliding scale   SubCutaneous every 6 hours  lacosamide IVPB 100 milliGRAM(s) IV Intermittent every 12 hours  levETIRAcetam  Solution 1000 milliGRAM(s) Oral two times a day  levothyroxine 75 MICROGram(s) Oral daily  nicotine - 21 mG/24Hr(s) Patch 1 Patch Transdermal daily  ondansetron Injectable 4 milliGRAM(s) IV Push every 6 hours PRN  oxyCODONE    Solution 5 milliGRAM(s) Oral every 4 hours PRN  pantoprazole  Injectable 40 milliGRAM(s) IV Push daily  polyethylene glycol 3350 17 Gram(s) Oral every 12 hours  senna 2 Tablet(s) Oral at bedtime    EXAMINATION:  General:  in NAD  HEENT:  MMM  Neuro:  awake, alert, oriented x 3, follows commands, EOMI, face symmetric, no PD, DF 5/5   Cards:  RRR  Respiratory:  no respiratory distress  Abdomen:  soft  Extremities:  no LE edema    Assessment/Plan:   39 yo woman with h/o HTN, admitted 5/6 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 CTA and angiogram 6/9 with no vascular malformation. ICH score 4.   Hospital course c/b seizures on VEEG.     Neuro:   neuro checks q2 hr  EVD at 10 cm water, minimal output, keep ICP< 22 mmhg, CPP> 60. EVD with minimal output???  brain edema hold HTS today  Keppra load 1g bid (6/9) for clinical seizure (Lt gaze stopped FC); c/w vimpat (6/10); if eeg with seizures will add additional agent today VPA  EEG for sz  precedex for vent synchrony   MRI brain w/wo    Respiratory:   acute respiratory failure, intubated   ABG  CPAP 10/5 tolerating  [id number] for no cufff leak on 6/11, wean to extubate after    Endocrine:   sugar goal 120-180  finger sticks and ISS   synthroid    Heme/Onc:     hgb slightly dropped will monitor   DVT PPX   h/h trending down, monitor, CTAP neg    Renal:   IVL  see neuro  Na goal 145-155    ID:   febrile 6/11  f/u panculture      GI:   NG, NPO MN for possible extubation  PPI    Patient seen and examined by attending on 6/12/2023.    Patient is critically ill due to ? and at high risk for neurological deterioration or death due to:   Interval events:  NAEO.   VEEG overnight with 1 electrographic focal seizure with onset in the right frontal region.   Afebrile.   EVD at 10cm H2O, output 137cc for 24 hours.   No cuff leak this AM.     VITALS:  T(C): , Max: 38.2 (06-11-23 @ 11:00)  HR:  (55 - 122)  BP: --  ABP:  (95/53 - 200/112)  RR:  (16 - 38)  SpO2:  (93% - 100%)  Wt(kg): --  Device: Avea, Mode: AC/ CMV (Assist Control/ Continuous Mandatory Ventilation), RR (machine): 12, TV (machine): 450, FiO2: 30, PEEP: 5, ITime: 1, MAP: 9, PIP: 16    06-11-23 @ 07:01  -  06-12-23 @ 07:00  --------------------------------------------------------  IN: 1978 mL / OUT: 1587 mL / NET: 391 mL      LABS:  Na: 145 (06-12 @ 04:20), 151 (06-11 @ 08:22), 153 (06-11 @ 00:25), 153 (06-10 @ 17:52), 152 (06-10 @ 05:57), 154 (06-10 @ 00:06), 156 (06-09 @ 21:29), 155 (06-09 @ 17:37), 155 (06-09 @ 13:15)  K: 4.2 (06-12 @ 04:20), 3.2 (06-11 @ 08:22), 3.6 (06-11 @ 00:25), 3.7 (06-10 @ 17:52), 4.0 (06-10 @ 05:57), 4.1 (06-10 @ 00:06), 4.2 (06-09 @ 21:29), 3.9 (06-09 @ 17:37), 3.7 (06-09 @ 13:15)  Cl: 115 (06-12 @ 04:20), 123 (06-11 @ 08:22), 122 (06-11 @ 00:25), 122 (06-10 @ 17:52), 122 (06-10 @ 05:57), 124 (06-10 @ 00:06), 126 (06-09 @ 21:29), 124 (06-09 @ 17:37), 124 (06-09 @ 13:15)  CO2: 18 (06-12 @ 04:20), 14 (06-11 @ 08:22), 17 (06-11 @ 00:25), 18 (06-10 @ 17:52), 18 (06-10 @ 05:57), 19 (06-10 @ 00:06), 19 (06-09 @ 21:29), 19 (06-09 @ 17:37), 20 (06-09 @ 13:15)  BUN: 11 (06-12 @ 04:20), 11 (06-11 @ 08:22), 14 (06-11 @ 00:25), 13 (06-10 @ 17:52), 10 (06-10 @ 05:57), 10 (06-10 @ 00:06), 10 (06-09 @ 21:29), 8 (06-09 @ 17:37), 8 (06-09 @ 13:15)  Cr: 0.35 (06-12 @ 04:20), 0.31 (06-11 @ 08:22), 0.42 (06-11 @ 00:25), 0.44 (06-10 @ 17:52), 0.43 (06-10 @ 05:57), 0.47 (06-10 @ 00:06), 0.46 (06-09 @ 21:29), 0.45 (06-09 @ 17:37), 0.40 (06-09 @ 13:15)  Glu: 148(06-12 @ 04:20), 102(06-11 @ 08:22), 122(06-11 @ 00:25), 143(06-10 @ 17:52), 140(06-10 @ 05:57), 145(06-10 @ 00:06), 155(06-09 @ 21:29), 140(06-09 @ 17:37), 147(06-09 @ 13:15)    Hgb: 8.9 (06-12 @ 04:20), 8.0 (06-11 @ 00:25), 9.1 (06-09 @ 21:29), 9.3 (06-09 @ 15:09), 8.8 (06-09 @ 13:15)  Hct: 26.8 (06-12 @ 04:20), 24.6 (06-11 @ 00:25), 26.3 (06-09 @ 21:29), 27.1 (06-09 @ 15:09), 26.4 (06-09 @ 13:15)  WBC: 9.37 (06-12 @ 04:20), 11.00 (06-11 @ 00:25), 13.80 (06-09 @ 21:29), 13.64 (06-09 @ 15:09), 12.34 (06-09 @ 13:15)  Plt: 231 (06-12 @ 04:20), 212 (06-11 @ 00:25), 178 (06-09 @ 21:29), 176 (06-09 @ 15:09), 167 (06-09 @ 13:15)    INR: 1.28 06-11-23 @ 00:25  PTT: 31.2 06-11-23 @ 00:25    MEDICATIONS:  acetaminophen   Oral Liquid .. 650 milliGRAM(s) Oral every 6 hours PRN  bisacodyl Suppository 10 milliGRAM(s) Rectal once PRN  chlorhexidine 0.12% Liquid 15 milliLiter(s) Oral Mucosa every 12 hours  chlorhexidine 4% Liquid 1 Application(s) Topical daily  dexMEDEtomidine Infusion 0.2 MICROgram(s)/kG/Hr IV Continuous <Continuous>  enalapril 10 milliGRAM(s) Oral daily  enalaprilat Injectable 2.5 milliGRAM(s) IV Push once  enoxaparin Injectable 40 milliGRAM(s) SubCutaneous <User Schedule>  fentaNYL    Injectable 25 MICROGram(s) IV Push every 2 hours PRN  insulin lispro (ADMELOG) corrective regimen sliding scale   SubCutaneous every 6 hours  lacosamide IVPB 100 milliGRAM(s) IV Intermittent every 12 hours  levETIRAcetam  Solution 1000 milliGRAM(s) Oral two times a day  levothyroxine 75 MICROGram(s) Oral daily  nicotine - 21 mG/24Hr(s) Patch 1 Patch Transdermal daily  ondansetron Injectable 4 milliGRAM(s) IV Push every 6 hours PRN  oxyCODONE    Solution 5 milliGRAM(s) Oral every 4 hours PRN  pantoprazole  Injectable 40 milliGRAM(s) IV Push daily  polyethylene glycol 3350 17 Gram(s) Oral every 12 hours  senna 2 Tablet(s) Oral at bedtime    EXAMINATION:  General:  in NAD  HEENT: intubated   Neuro: lethargic, with eyelid opening apraxia, follows commands including showing 2 fingers on the R, gaze midline, face symmetric, able to lift R arm AG to elbow, able to lift R leg if knee supported by examiner, L arm flexes, L leg withdraws   Cards:  RRR  Respiratory:  no respiratory distress  Abdomen:  soft  Extremities:  no LE edema    Assessment/Plan:   37 yo woman with h/o HTN, admitted 5/6 with 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 CTA and angiogram 6/9 with no vascular malformation. ICH score 4.   Hospital course c/b seizures on VEEG with onset from R frontal region.     Neuro:   neuro checks q2h  EVD at 10 cm water, minimal output, keep ICP< 22 mmhg, CPP> 60. Consider raising vs clamping EVD  c/w VEEG  c/w Keppra 1g BID and increase Vimpat to 150 mg BID (get EKG for AK interval)   precedex as needed for sedation   MRI brain w/wo    CV:  SBP goal 100-160, start lisinopril 40 mg in the AM    Respiratory:   CPAP 7/5 as tolerated   start dex 10 mg q8h for 3 doses for lack of cuff leak     GI:   NG, restart tube feeds, NPO after MN for potential extubation  PPI  senna and miralax, LBM 6/11    Renal:   IVL  Na goal normonatremia     Heme/Onc:     SCDs, Lov ppx     Endocrine:   FS goal 120-180, medium ISS  synthroid    ID: afebrile   monitor off abx     Patient seen and examined by attending on 6/12/2023.    Patient is critically ill due to ICH and at high risk for neurological deterioration or death due to: EVD for CSF diversion, respiratory failure 2/2 neurologic injury requiring mechanical ventilation.    Interval events:  NAEO.   VEEG overnight with 1 electrographic focal seizure with onset in the right frontal region.   Afebrile.   EVD at 10cm H2O, output 137cc for 24 hours.   No cuff leak this AM.     VITALS:  T(C): , Max: 38.2 (06-11-23 @ 11:00)  HR:  (55 - 122)  BP: --  ABP:  (95/53 - 200/112)  RR:  (16 - 38)  SpO2:  (93% - 100%)  Wt(kg): --  Device: Avea, Mode: AC/ CMV (Assist Control/ Continuous Mandatory Ventilation), RR (machine): 12, TV (machine): 450, FiO2: 30, PEEP: 5, ITime: 1, MAP: 9, PIP: 16    06-11-23 @ 07:01  -  06-12-23 @ 07:00  --------------------------------------------------------  IN: 1978 mL / OUT: 1587 mL / NET: 391 mL      LABS:  Na: 145 (06-12 @ 04:20), 151 (06-11 @ 08:22), 153 (06-11 @ 00:25), 153 (06-10 @ 17:52), 152 (06-10 @ 05:57), 154 (06-10 @ 00:06), 156 (06-09 @ 21:29), 155 (06-09 @ 17:37), 155 (06-09 @ 13:15)  K: 4.2 (06-12 @ 04:20), 3.2 (06-11 @ 08:22), 3.6 (06-11 @ 00:25), 3.7 (06-10 @ 17:52), 4.0 (06-10 @ 05:57), 4.1 (06-10 @ 00:06), 4.2 (06-09 @ 21:29), 3.9 (06-09 @ 17:37), 3.7 (06-09 @ 13:15)  Cl: 115 (06-12 @ 04:20), 123 (06-11 @ 08:22), 122 (06-11 @ 00:25), 122 (06-10 @ 17:52), 122 (06-10 @ 05:57), 124 (06-10 @ 00:06), 126 (06-09 @ 21:29), 124 (06-09 @ 17:37), 124 (06-09 @ 13:15)  CO2: 18 (06-12 @ 04:20), 14 (06-11 @ 08:22), 17 (06-11 @ 00:25), 18 (06-10 @ 17:52), 18 (06-10 @ 05:57), 19 (06-10 @ 00:06), 19 (06-09 @ 21:29), 19 (06-09 @ 17:37), 20 (06-09 @ 13:15)  BUN: 11 (06-12 @ 04:20), 11 (06-11 @ 08:22), 14 (06-11 @ 00:25), 13 (06-10 @ 17:52), 10 (06-10 @ 05:57), 10 (06-10 @ 00:06), 10 (06-09 @ 21:29), 8 (06-09 @ 17:37), 8 (06-09 @ 13:15)  Cr: 0.35 (06-12 @ 04:20), 0.31 (06-11 @ 08:22), 0.42 (06-11 @ 00:25), 0.44 (06-10 @ 17:52), 0.43 (06-10 @ 05:57), 0.47 (06-10 @ 00:06), 0.46 (06-09 @ 21:29), 0.45 (06-09 @ 17:37), 0.40 (06-09 @ 13:15)  Glu: 148(06-12 @ 04:20), 102(06-11 @ 08:22), 122(06-11 @ 00:25), 143(06-10 @ 17:52), 140(06-10 @ 05:57), 145(06-10 @ 00:06), 155(06-09 @ 21:29), 140(06-09 @ 17:37), 147(06-09 @ 13:15)    Hgb: 8.9 (06-12 @ 04:20), 8.0 (06-11 @ 00:25), 9.1 (06-09 @ 21:29), 9.3 (06-09 @ 15:09), 8.8 (06-09 @ 13:15)  Hct: 26.8 (06-12 @ 04:20), 24.6 (06-11 @ 00:25), 26.3 (06-09 @ 21:29), 27.1 (06-09 @ 15:09), 26.4 (06-09 @ 13:15)  WBC: 9.37 (06-12 @ 04:20), 11.00 (06-11 @ 00:25), 13.80 (06-09 @ 21:29), 13.64 (06-09 @ 15:09), 12.34 (06-09 @ 13:15)  Plt: 231 (06-12 @ 04:20), 212 (06-11 @ 00:25), 178 (06-09 @ 21:29), 176 (06-09 @ 15:09), 167 (06-09 @ 13:15)    INR: 1.28 06-11-23 @ 00:25  PTT: 31.2 06-11-23 @ 00:25    MEDICATIONS:  acetaminophen   Oral Liquid .. 650 milliGRAM(s) Oral every 6 hours PRN  bisacodyl Suppository 10 milliGRAM(s) Rectal once PRN  chlorhexidine 0.12% Liquid 15 milliLiter(s) Oral Mucosa every 12 hours  chlorhexidine 4% Liquid 1 Application(s) Topical daily  dexMEDEtomidine Infusion 0.2 MICROgram(s)/kG/Hr IV Continuous <Continuous>  enalapril 10 milliGRAM(s) Oral daily  enalaprilat Injectable 2.5 milliGRAM(s) IV Push once  enoxaparin Injectable 40 milliGRAM(s) SubCutaneous <User Schedule>  fentaNYL    Injectable 25 MICROGram(s) IV Push every 2 hours PRN  insulin lispro (ADMELOG) corrective regimen sliding scale   SubCutaneous every 6 hours  lacosamide IVPB 100 milliGRAM(s) IV Intermittent every 12 hours  levETIRAcetam  Solution 1000 milliGRAM(s) Oral two times a day  levothyroxine 75 MICROGram(s) Oral daily  nicotine - 21 mG/24Hr(s) Patch 1 Patch Transdermal daily  ondansetron Injectable 4 milliGRAM(s) IV Push every 6 hours PRN  oxyCODONE    Solution 5 milliGRAM(s) Oral every 4 hours PRN  pantoprazole  Injectable 40 milliGRAM(s) IV Push daily  polyethylene glycol 3350 17 Gram(s) Oral every 12 hours  senna 2 Tablet(s) Oral at bedtime    EXAMINATION:  General:  in NAD  HEENT: intubated   Neuro: lethargic, with eyelid opening apraxia, follows commands including showing 2 fingers on the R, gaze midline, face symmetric, able to lift R arm AG to elbow, able to lift R leg if knee supported by examiner, L arm flexes, L leg withdraws   Cards:  RRR  Respiratory:  no respiratory distress  Abdomen:  soft  Extremities:  no LE edema    Assessment/Plan:   37 yo woman with h/o HTN, admitted 6/6 with 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 CTA and angiogram 6/9 with no vascular malformation. ICH score 4.   Hospital course c/b seizures on VEEG with onset from R frontal region.     Neuro:   neuro checks q2h  EVD at 10 cm water, minimal output, keep ICP< 22 mmhg, CPP> 60. Consider raising vs clamping EVD  c/w VEEG  c/w Keppra 1g BID and increase Vimpat to 150 mg BID (get EKG for FL interval)   precedex as needed for sedation   MRI brain w/wo    CV:  SBP goal 100-160, start lisinopril 40 mg in the AM    Respiratory:   CPAP 7/5 as tolerated   start dex 10 mg q8h for 3 doses for lack of cuff leak     GI:   NG, restart tube feeds, NPO after MN for potential extubation  PPI  senna and miralax, LBM 6/11    Renal:   IVL  Na goal normonatremia     Heme/Onc:     SCDs, Lov ppx     Endocrine:   FS goal 120-180, medium ISS  synthroid    ID: afebrile   monitor off abx     Patient seen and examined by attending on 6/12/2023.    Patient is critically ill due to ICH and at high risk for neurological deterioration or death due to: EVD for CSF diversion, respiratory failure 2/2 neurologic injury requiring mechanical ventilation.

## 2023-06-12 NOTE — ADVANCED PRACTICE NURSE CONSULT - REASON FOR CONSULT
Bedside picc order placed.   Indication: DL picc placement for access.  
Consult to assess patient skin initiated by RN pressure injury recommendations   History of Present Illness:  Reason for Admission: IPH  · Subjective and Objective:   HPI:38F unknown PMHx found down at home. LKN possibly 11AM when family spoke with pt via phone. Code aviation and code stroke called for unresponsiveness and ?Right gaze palsy in the field. Pt immediately intubated and sedated upon arrival. Pt received Versed and rocuronium prior to intubation. CTH revealed massive Right-sided hemorrhage.    she was taken for emergent decompressive craniectomy     SURGERY: Decompressive craniectomy

## 2023-06-12 NOTE — OCCUPATIONAL THERAPY INITIAL EVALUATION ADULT - STRENGTHENING, PT EVAL
GOAL: Pt will increase strength by 1/2 muscle grade in 4 weeks to improve ability to complete ADLs and functional tasks.

## 2023-06-12 NOTE — PROGRESS NOTE ADULT - ASSESSMENT
-POD6 Right hemicrani w/ EVD placement  -POD3 negative Angio  -EVD at 10  -Na goal 140-145  -vEEG for c/f seizures, on Keppra 1000BID and Vimpat 100BID

## 2023-06-12 NOTE — PROGRESS NOTE ADULT - SUBJECTIVE AND OBJECTIVE BOX
Patient seen and examined at bedside.    --Anticoagulation--  enoxaparin Injectable 40 milliGRAM(s) SubCutaneous <User Schedule>    T(C): 37.4 (06-11-23 @ 19:00), Max: 38.2 (06-11-23 @ 11:00)  HR: 70 (06-12-23 @ 00:00) (56 - 122)  BP: --  RR: 18 (06-12-23 @ 00:00) (17 - 38)  SpO2: 100% (06-12-23 @ 00:00) (93% - 100%)  Wt(kg): --    Exam: Intubated, PERRL, has cough/gag, Right side shows 2 fingers and wiggles toes to command, LUE extends, LLE TF

## 2023-06-12 NOTE — OCCUPATIONAL THERAPY INITIAL EVALUATION ADULT - PLANNED THERAPY INTERVENTIONS, OT EVAL
balance training/bed mobility training/cognitive, visual perceptual ADL retraining/balance training/bed mobility training/cognitive, visual perceptual/strengthening

## 2023-06-12 NOTE — OCCUPATIONAL THERAPY INITIAL EVALUATION ADULT - PERTINENT HX OF CURRENT PROBLEM, REHAB EVAL
39 yo woman with h/o HTN, admitted 5/6 with 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 CTA and angiogram 6/9 with no vascular malformation. ICH score 4. Hospital course c/b seizures on VEEG with onset from R frontal region.

## 2023-06-12 NOTE — EEG REPORT - NS EEG TEXT BOX
[facility]   COMPREHENSIVE EPILEPSY CENTER   REPORT OF CONTINUOUS VIDEO EEG     [facility]: [address] , [address], [place] [address], Ph#: [phone]  [facility]: [address], [place] [address], Ph#: [phone]  Office: [address] [place] [address], [place] [address], [place] [address] Ph#: [phone]    Patient Name: BROOKE CAIN  Age and : 38y ([birth year])  MRN #: [id number]  Location: [place] [address]  Referring Physician: Jean Carlos Bernstein    Study Date: 23 - 23  Duration: 24 hr 28 min  _____________________________________________________________  STUDY INFORMATION    EEG Recording Technique:  The patient underwent continuous Video-EEG monitoring, using Telemetry System hardware on the XLTek Digital System. EEG and video data were stored on a computer hard drive with important events saved in digital archive files. The material was reviewed by a physician (electroencephalographer / epileptologist) on a daily basis. Alejandro and seizure detection algorithms were utilized and reviewed. An EEG Technician attended to the patient, and was available throughout daytime work hours.  The epilepsy center neurologist was available in person or on call 24-hours per day.    EEG Placement and Labeling of Electrodes:  The EEG was performed utilizing 20 channel referential EEG connections (coronal over temporal over parasagittal montage) using all standard 10-20 electrode placements with EKG, with additional electrodes placed in the inferior temporal region using the modified 10-10 montage electrode placements for elective admissions, or if deemed necessary. Recording was at a sampling rate of 256 samples per second per channel. Time synchronized digital video recording was done simultaneously with EEG recording. A low light infrared camera was used for low light recording.     _____________________________________________________________  HISTORY    Patient is a 38y old  Female who presents with a chief complaint of IPH (2023 07:48)      PERTINENT MEDICATION:  MEDICATIONS  (STANDING):  chlorhexidine 0.12% Liquid 15 milliLiter(s) Oral Mucosa every 12 hours  chlorhexidine 4% Liquid 1 Application(s) Topical daily  dexMEDEtomidine Infusion 0.2 MICROgram(s)/kG/Hr (3.82 mL/Hr) IV Continuous <Continuous>  enalapril 10 milliGRAM(s) Oral daily  enalaprilat Injectable 2.5 milliGRAM(s) IV Push once  enoxaparin Injectable 40 milliGRAM(s) SubCutaneous <User Schedule>  insulin lispro (ADMELOG) corrective regimen sliding scale   SubCutaneous every 6 hours  lacosamide IVPB 100 milliGRAM(s) IV Intermittent every 12 hours  levETIRAcetam  Solution 1000 milliGRAM(s) Oral two times a day  levothyroxine 75 MICROGram(s) Oral daily  nicotine - 21 mG/24Hr(s) Patch 1 Patch Transdermal daily  pantoprazole  Injectable 40 milliGRAM(s) IV Push daily  polyethylene glycol 3350 17 Gram(s) Oral every 12 hours  senna 2 Tablet(s) Oral at bedtime    _____________________________________________________________  INTERPRETATION    Findings: The background was continuous, spontaneously variable and reactive. During wakefulness, the posterior dominant rhythm consisted of an asymmetric (seen in left posterior quadrant), poorly-modulated 7-8 Hz activity, with amplitude to 30 uV, that attenuated to eye opening.  Low amplitude frontal beta was noted in wakefulness.    Background Slowing:  -Near continuous diffuse theta and polymorphic delta slowing.  -Posterior dominant background slowing    Focal Slowing:   -Continuous polymorphic delta and theta slowing in the right hemisphere    Sleep Background:  Stage II sleep transients were not recorded.  Drowsiness and stage II sleep transients were not recorded.    Other Non-Epileptiform Findings:  None were present.    Interictal Epileptiform Activity:   None were present.    Events:  -One electrographic focal seizure recorded with onset in the right frontal region (at 02:57 on 23, duration 2 min 22 sec), with rhythmic sharp waves developing maximum at F4/Fp2, frequency of 1-2 hz and evolving into rhythmic mixed frequencies in same region.  Patient was off of camera during seizure.    Artifacts:  Intermittent myogenic and movement artifacts were noted.    ECG:  The heart rate on single channel ECG was predominantly between 60-80 BPM.    _____________________________________________________________  EEG SUMMARY/CLASSIFICATION  Abnormal EEG in an encephalopathic patient.  -One electrographic focal seizure recorded with onset in the right frontal region  -Continuous polymorphic delta and theta slowing in the right hemisphere  -Near continuous diffuse theta and polymorphic delta slowing.  -Posterior dominant background slowing  _____________________________________________________________  EEG IMPRESSION/CLINICAL CORRELATE  Abnormal EEG study.  1. One electrographic focal seizure recorded with onset in the right frontal region (at 02:57 on 23, duration 2 min 22 sec), patient was off of camera during the seizure.  2. Increased risk of focal seizures with onset in the right frontal region.  3. Structural abnormality in the right hemisphere.  4. Mild-moderate nonspecific diffuse or multifocal cerebral dysfunction.     Jaylon Carty MD  Neurology Attending Physician

## 2023-06-13 DIAGNOSIS — J96.90 RESPIRATORY FAILURE, UNSPECIFIED, UNSPECIFIED WHETHER WITH HYPOXIA OR HYPERCAPNIA: ICD-10-CM

## 2023-06-13 DIAGNOSIS — J96.00 ACUTE RESPIRATORY FAILURE, UNSPECIFIED WHETHER WITH HYPOXIA OR HYPERCAPNIA: ICD-10-CM

## 2023-06-13 LAB
ANION GAP SERPL CALC-SCNC: 15 MMOL/L — SIGNIFICANT CHANGE UP (ref 5–17)
BUN SERPL-MCNC: 14 MG/DL — SIGNIFICANT CHANGE UP (ref 7–23)
CALCIUM SERPL-MCNC: 8.5 MG/DL — SIGNIFICANT CHANGE UP (ref 8.4–10.5)
CHLORIDE SERPL-SCNC: 112 MMOL/L — HIGH (ref 96–108)
CO2 SERPL-SCNC: 18 MMOL/L — LOW (ref 22–31)
CREAT SERPL-MCNC: 0.4 MG/DL — LOW (ref 0.5–1.3)
EGFR: 129 ML/MIN/1.73M2 — SIGNIFICANT CHANGE UP
GAS PNL BLDA: SIGNIFICANT CHANGE UP
GLUCOSE BLDC GLUCOMTR-MCNC: 134 MG/DL — HIGH (ref 70–99)
GLUCOSE BLDC GLUCOMTR-MCNC: 140 MG/DL — HIGH (ref 70–99)
GLUCOSE BLDC GLUCOMTR-MCNC: 149 MG/DL — HIGH (ref 70–99)
GLUCOSE BLDC GLUCOMTR-MCNC: 179 MG/DL — HIGH (ref 70–99)
GLUCOSE SERPL-MCNC: 161 MG/DL — HIGH (ref 70–99)
MAGNESIUM SERPL-MCNC: 2.2 MG/DL — SIGNIFICANT CHANGE UP (ref 1.6–2.6)
PHOSPHATE SERPL-MCNC: 3.8 MG/DL — SIGNIFICANT CHANGE UP (ref 2.5–4.5)
POTASSIUM SERPL-MCNC: 3.8 MMOL/L — SIGNIFICANT CHANGE UP (ref 3.5–5.3)
POTASSIUM SERPL-SCNC: 3.8 MMOL/L — SIGNIFICANT CHANGE UP (ref 3.5–5.3)
SODIUM SERPL-SCNC: 145 MMOL/L — SIGNIFICANT CHANGE UP (ref 135–145)

## 2023-06-13 PROCEDURE — ZZZZZ: CPT

## 2023-06-13 PROCEDURE — 70553 MRI BRAIN STEM W/O & W/DYE: CPT | Mod: 26

## 2023-06-13 PROCEDURE — 31575 DIAGNOSTIC LARYNGOSCOPY: CPT

## 2023-06-13 PROCEDURE — 71045 X-RAY EXAM CHEST 1 VIEW: CPT | Mod: 26

## 2023-06-13 PROCEDURE — 99291 CRITICAL CARE FIRST HOUR: CPT

## 2023-06-13 PROCEDURE — 70548 MR ANGIOGRAPHY NECK W/DYE: CPT | Mod: 26

## 2023-06-13 PROCEDURE — 70544 MR ANGIOGRAPHY HEAD W/O DYE: CPT | Mod: 26,59

## 2023-06-13 PROCEDURE — 95720 EEG PHY/QHP EA INCR W/VEEG: CPT

## 2023-06-13 PROCEDURE — 99024 POSTOP FOLLOW-UP VISIT: CPT

## 2023-06-13 RX ORDER — NICARDIPINE HYDROCHLORIDE 30 MG/1
5 CAPSULE, EXTENDED RELEASE ORAL
Qty: 40 | Refills: 0 | Status: DISCONTINUED | OUTPATIENT
Start: 2023-06-13 | End: 2023-06-14

## 2023-06-13 RX ORDER — OXYCODONE HYDROCHLORIDE 5 MG/1
10 TABLET ORAL EVERY 6 HOURS
Refills: 0 | Status: DISCONTINUED | OUTPATIENT
Start: 2023-06-13 | End: 2023-06-18

## 2023-06-13 RX ORDER — FENTANYL CITRATE 50 UG/ML
25 INJECTION INTRAVENOUS
Refills: 0 | Status: DISCONTINUED | OUTPATIENT
Start: 2023-06-13 | End: 2023-06-14

## 2023-06-13 RX ORDER — POTASSIUM CHLORIDE 20 MEQ
40 PACKET (EA) ORAL ONCE
Refills: 0 | Status: COMPLETED | OUTPATIENT
Start: 2023-06-13 | End: 2023-06-13

## 2023-06-13 RX ORDER — OXYCODONE HYDROCHLORIDE 5 MG/1
10 TABLET ORAL ONCE
Refills: 0 | Status: DISCONTINUED | OUTPATIENT
Start: 2023-06-13 | End: 2023-06-13

## 2023-06-13 RX ORDER — MAGNESIUM SULFATE 500 MG/ML
2 VIAL (ML) INJECTION ONCE
Refills: 0 | Status: COMPLETED | OUTPATIENT
Start: 2023-06-13 | End: 2023-06-13

## 2023-06-13 RX ORDER — IPRATROPIUM/ALBUTEROL SULFATE 18-103MCG
3 AEROSOL WITH ADAPTER (GRAM) INHALATION EVERY 6 HOURS
Refills: 0 | Status: DISCONTINUED | OUTPATIENT
Start: 2023-06-13 | End: 2023-06-18

## 2023-06-13 RX ORDER — HYDRALAZINE HCL 50 MG
10 TABLET ORAL ONCE
Refills: 0 | Status: COMPLETED | OUTPATIENT
Start: 2023-06-13 | End: 2023-06-13

## 2023-06-13 RX ORDER — FENTANYL CITRATE 50 UG/ML
25 INJECTION INTRAVENOUS ONCE
Refills: 0 | Status: DISCONTINUED | OUTPATIENT
Start: 2023-06-13 | End: 2023-06-13

## 2023-06-13 RX ORDER — ACETAMINOPHEN 500 MG
1000 TABLET ORAL ONCE
Refills: 0 | Status: COMPLETED | OUTPATIENT
Start: 2023-06-13 | End: 2023-06-13

## 2023-06-13 RX ORDER — SODIUM CHLORIDE 9 MG/ML
1000 INJECTION, SOLUTION INTRAVENOUS
Refills: 0 | Status: DISCONTINUED | OUTPATIENT
Start: 2023-06-13 | End: 2023-06-16

## 2023-06-13 RX ORDER — LABETALOL HCL 100 MG
10 TABLET ORAL ONCE
Refills: 0 | Status: COMPLETED | OUTPATIENT
Start: 2023-06-13 | End: 2023-06-13

## 2023-06-13 RX ADMIN — OXYCODONE HYDROCHLORIDE 10 MILLIGRAM(S): 5 TABLET ORAL at 21:00

## 2023-06-13 RX ADMIN — POLYETHYLENE GLYCOL 3350 17 GRAM(S): 17 POWDER, FOR SOLUTION ORAL at 17:41

## 2023-06-13 RX ADMIN — FENTANYL CITRATE 25 MICROGRAM(S): 50 INJECTION INTRAVENOUS at 04:00

## 2023-06-13 RX ADMIN — Medication 2: at 05:18

## 2023-06-13 RX ADMIN — LACOSAMIDE 130 MILLIGRAM(S): 50 TABLET ORAL at 05:17

## 2023-06-13 RX ADMIN — Medication 40 MILLIEQUIVALENT(S): at 17:40

## 2023-06-13 RX ADMIN — FENTANYL CITRATE 25 MICROGRAM(S): 50 INJECTION INTRAVENOUS at 22:15

## 2023-06-13 RX ADMIN — FENTANYL CITRATE 25 MICROGRAM(S): 50 INJECTION INTRAVENOUS at 01:40

## 2023-06-13 RX ADMIN — PANTOPRAZOLE SODIUM 40 MILLIGRAM(S): 20 TABLET, DELAYED RELEASE ORAL at 11:39

## 2023-06-13 RX ADMIN — CHLORHEXIDINE GLUCONATE 15 MILLILITER(S): 213 SOLUTION TOPICAL at 05:16

## 2023-06-13 RX ADMIN — OXYCODONE HYDROCHLORIDE 5 MILLIGRAM(S): 5 TABLET ORAL at 17:33

## 2023-06-13 RX ADMIN — Medication 1 PATCH: at 07:00

## 2023-06-13 RX ADMIN — Medication 1 PATCH: at 11:38

## 2023-06-13 RX ADMIN — OXYCODONE HYDROCHLORIDE 5 MILLIGRAM(S): 5 TABLET ORAL at 16:03

## 2023-06-13 RX ADMIN — FENTANYL CITRATE 25 MICROGRAM(S): 50 INJECTION INTRAVENOUS at 11:10

## 2023-06-13 RX ADMIN — LACOSAMIDE 130 MILLIGRAM(S): 50 TABLET ORAL at 17:41

## 2023-06-13 RX ADMIN — Medication 3 MILLILITER(S): at 17:05

## 2023-06-13 RX ADMIN — FENTANYL CITRATE 25 MICROGRAM(S): 50 INJECTION INTRAVENOUS at 19:45

## 2023-06-13 RX ADMIN — FENTANYL CITRATE 25 MICROGRAM(S): 50 INJECTION INTRAVENOUS at 04:15

## 2023-06-13 RX ADMIN — FENTANYL CITRATE 25 MICROGRAM(S): 50 INJECTION INTRAVENOUS at 01:25

## 2023-06-13 RX ADMIN — FENTANYL CITRATE 25 MICROGRAM(S): 50 INJECTION INTRAVENOUS at 22:00

## 2023-06-13 RX ADMIN — LISINOPRIL 40 MILLIGRAM(S): 2.5 TABLET ORAL at 05:16

## 2023-06-13 RX ADMIN — SODIUM CHLORIDE 50 MILLILITER(S): 9 INJECTION, SOLUTION INTRAVENOUS at 11:00

## 2023-06-13 RX ADMIN — Medication 3 MILLILITER(S): at 23:37

## 2023-06-13 RX ADMIN — FENTANYL CITRATE 25 MICROGRAM(S): 50 INJECTION INTRAVENOUS at 13:00

## 2023-06-13 RX ADMIN — NICARDIPINE HYDROCHLORIDE 25 MG/HR: 30 CAPSULE, EXTENDED RELEASE ORAL at 17:59

## 2023-06-13 RX ADMIN — Medication 1 PATCH: at 11:00

## 2023-06-13 RX ADMIN — Medication 25 GRAM(S): at 17:35

## 2023-06-13 RX ADMIN — FENTANYL CITRATE 25 MICROGRAM(S): 50 INJECTION INTRAVENOUS at 19:30

## 2023-06-13 RX ADMIN — Medication 10 MILLIGRAM(S): at 14:25

## 2023-06-13 RX ADMIN — LEVETIRACETAM 1000 MILLIGRAM(S): 250 TABLET, FILM COATED ORAL at 17:40

## 2023-06-13 RX ADMIN — FENTANYL CITRATE 25 MICROGRAM(S): 50 INJECTION INTRAVENOUS at 09:32

## 2023-06-13 RX ADMIN — Medication 10 MILLIGRAM(S): at 16:38

## 2023-06-13 RX ADMIN — Medication 400 MILLIGRAM(S): at 18:03

## 2023-06-13 RX ADMIN — Medication 1 PATCH: at 19:26

## 2023-06-13 RX ADMIN — LEVETIRACETAM 1000 MILLIGRAM(S): 250 TABLET, FILM COATED ORAL at 05:16

## 2023-06-13 RX ADMIN — Medication 75 MICROGRAM(S): at 05:16

## 2023-06-13 RX ADMIN — Medication 1000 MILLIGRAM(S): at 18:30

## 2023-06-13 RX ADMIN — OXYCODONE HYDROCHLORIDE 10 MILLIGRAM(S): 5 TABLET ORAL at 20:25

## 2023-06-13 RX ADMIN — Medication 30 MILLIEQUIVALENT(S): at 02:41

## 2023-06-13 RX ADMIN — ENOXAPARIN SODIUM 40 MILLIGRAM(S): 100 INJECTION SUBCUTANEOUS at 17:41

## 2023-06-13 RX ADMIN — FENTANYL CITRATE 25 MICROGRAM(S): 50 INJECTION INTRAVENOUS at 19:20

## 2023-06-13 RX ADMIN — CHLORHEXIDINE GLUCONATE 1 APPLICATION(S): 213 SOLUTION TOPICAL at 11:38

## 2023-06-13 RX ADMIN — Medication 102 MILLIGRAM(S): at 05:17

## 2023-06-13 NOTE — CONSULT NOTE ADULT - PROBLEM SELECTOR RECOMMENDATION 9
- clear to extubate from ENT standpoint.   - no further ENT intervention needed.   - please reconsult PRN. - Patient noted to have cuff leak when cuff deflated on laryngoscopy no gross swelling of vocal folds. If cleared from lower airway standpoint patient is clear to extubate from ENT standpoint.   - no further ENT intervention needed.   - please reconsult PRN.

## 2023-06-13 NOTE — CONSULT NOTE ADULT - SUBJECTIVE AND OBJECTIVE BOX
.CC: cuff leak eval    HPI: 38 year old, Female, with history of HTN, was found unresponsive by family at home, brought in via helicopter with CODE stroke activation.  Pt was found to have massive right ICH with midline shift, intubated at ED.  Status post emergent decompressive craniectomy and hematoma evacuation. Course complicated by new onset of seizure. Primary team plans to extubated pt today. ENT consulted to evaluate cuff leak. Pt is sedated. Unable to obtain further pertinent info.     PAST MEDICAL & SURGICAL HISTORY:    Allergies    No Known Allergies    Intolerances    MEDICATIONS  (STANDING):  chlorhexidine 0.12% Liquid 15 milliLiter(s) Oral Mucosa every 12 hours  chlorhexidine 4% Liquid 1 Application(s) Topical daily  dexMEDEtomidine Infusion 0.2 MICROgram(s)/kG/Hr (3.82 mL/Hr) IV Continuous <Continuous>  enoxaparin Injectable 40 milliGRAM(s) SubCutaneous <User Schedule>  insulin lispro (ADMELOG) corrective regimen sliding scale   SubCutaneous every 6 hours  lacosamide IVPB 150 milliGRAM(s) IV Intermittent every 12 hours  levETIRAcetam  Solution 1000 milliGRAM(s) Oral two times a day  levothyroxine 75 MICROGram(s) Oral daily  lisinopril 40 milliGRAM(s) Enteral Tube daily  multiple electrolytes Injection Type 1 1000 milliLiter(s) (50 mL/Hr) IV Continuous <Continuous>  nicotine - 21 mG/24Hr(s) Patch 1 Patch Transdermal daily  pantoprazole  Injectable 40 milliGRAM(s) IV Push daily  polyethylene glycol 3350 17 Gram(s) Oral every 12 hours  senna 2 Tablet(s) Oral at bedtime    MEDICATIONS  (PRN):  acetaminophen   Oral Liquid .. 650 milliGRAM(s) Oral every 6 hours PRN Temp greater or equal to 38C (100.4F), Mild Pain (1 - 3)  bisacodyl Suppository 10 milliGRAM(s) Rectal once PRN Constipation  fentaNYL    Injectable 25 MICROGram(s) IV Push every 2 hours PRN vent synchroncy  ondansetron Injectable 4 milliGRAM(s) IV Push every 6 hours PRN Nausea and/or Vomiting  oxyCODONE    Solution 5 milliGRAM(s) Oral every 4 hours PRN Moderate Pain (4 - 6)    Family history: no pertinent family history    ROS:   unable to obtain due to pt's mental status     Vital Signs Last 24 Hrs  T(C): 36.9 (13 Jun 2023 07:00), Max: 37.7 (12 Jun 2023 23:00)  T(F): 98.4 (13 Jun 2023 07:00), Max: 99.9 (12 Jun 2023 23:00)  HR: 59 (13 Jun 2023 10:00) (56 - 117)  BP: --  BP(mean): --  RR: 16 (13 Jun 2023 10:00) (14 - 20)  SpO2: 98% (13 Jun 2023 10:00) (98% - 100%)    Parameters below as of 13 Jun 2023 07:00  Patient On (Oxygen Delivery Method): ventilator                          8.9    9.37  )-----------( 231      ( 12 Jun 2023 04:20 )             26.8    06-13    145  |  112<H>  |  14  ----------------------------<  161<H>  3.8   |  18<L>  |  0.40<L>    Ca    8.5      13 Jun 2023 10:23  Phos  3.8     06-13  Mg     2.2     06-13      PHYSICAL EXAM:  Gen: NAD  Skin: No rashes, bruises, or lesions  Head: head wrapped in gauze.   Face: no edema, erythema, or fluctuance.   Eyes: close  Nose: Right NGT in place. Nares bilaterally patent, no discharge  Mouth: ET tube in place. Cuff deflated, positive cuff leak.   Neck: Flat, supple, no lymphadenopathy, trachea midline, no masses  Lymphatic: No lymphadenopathy  Resp: vented.   CV: no peripheral edema/cyanosis  GI: nondistended   Peripheral vascular: +1 edema in b/l LE   Neuro: sedated     .Fiberoptic Indirect Laryngoscopy (Ambu scope used):    Reason for Laryngoscopy: cuff leak evaluation     Patient was unable to cooperate with mirror.  NGT in right nare. pooling of secretions diffuse throughout larynx. Cuff deflated, positive cuff leak seen and heard. Nasopharynx, oropharynx, and hypopharynx clear, no bleeding. Tongue base, posterior pharyngeal wall, epiglottis, appear normal. No masses or lesions.   arytenoids,and aryepiglottic folds appear normal bilaterally.

## 2023-06-13 NOTE — PROGRESS NOTE ADULT - SUBJECTIVE AND OBJECTIVE BOX
NSCU ATTENDING -- ADDITIONAL PROGRESS NOTE    Nighttime rounds were performed -- please refer to earlier Progress Note for HPI details.    T(C): 37.3 (06-13-23 @ 19:00), Max: 37.7 (06-12-23 @ 23:00)  HR: 99 (06-13-23 @ 21:00) (56 - 135)  BP: --  RR: 17 (06-13-23 @ 21:00) (13 - 22)  SpO2: 100% (06-13-23 @ 21:00) (96% - 100%)  Wt(kg): --    Relevant labwork and imaging reviewed.    CHIEF COMPLAINT: ICH    extubated.  prolonged expiratory phase, s/p duonebs, decadron, on hi-flow.  repeat ABG now

## 2023-06-13 NOTE — AIRWAY REMOVAL NOTE  ADULT & PEDS - ARTIFICAL AIRWAY REMOVAL COMMENTS
Written order for extubation verified.The patient was identified by full name and birth date compared to the identification band. Present during the procedure was Geovanna Carmona)

## 2023-06-13 NOTE — PROGRESS NOTE ADULT - SUBJECTIVE AND OBJECTIVE BOX
Interval events:  NAEO.   VEEG overnight with 1 electrographic focal seizure with onset in the right frontal region.   Afebrile.   EVD at 10cm H2O, output 137cc for 24 hours.   No cuff leak this AM.     VITALS:  T(C): , Max: 37.7 (06-12-23 @ 23:00)  HR:  (53 - 117)  BP: --  ABP:  (107/60 - 185/108)  RR:  (12 - 20)  SpO2:  (98% - 100%)  Wt(kg): --  Device: Avea, Mode: AC/ CMV (Assist Control/ Continuous Mandatory Ventilation), RR (machine): 12, TV (machine): 450, FiO2: 30, PEEP: 5, ITime: 1, MAP: 8, PIP: 16    06-12-23 @ 07:01  -  06-13-23 @ 07:00  --------------------------------------------------------  IN: 1196.4 mL / OUT: 2482 mL / NET: -1285.6 mL      LABS:  Na: 144 (06-12 @ 21:12), 143 (06-12 @ 08:45), 145 (06-12 @ 04:20), 151 (06-11 @ 08:22), 153 (06-11 @ 00:25), 153 (06-10 @ 17:52)  K: 3.8 (06-12 @ 21:12), 4.0 (06-12 @ 08:45), 4.2 (06-12 @ 04:20), 3.2 (06-11 @ 08:22), 3.6 (06-11 @ 00:25), 3.7 (06-10 @ 17:52)  Cl: 112 (06-12 @ 21:12), 112 (06-12 @ 08:45), 115 (06-12 @ 04:20), 123 (06-11 @ 08:22), 122 (06-11 @ 00:25), 122 (06-10 @ 17:52)  CO2: 20 (06-12 @ 21:12), 20 (06-12 @ 08:45), 18 (06-12 @ 04:20), 14 (06-11 @ 08:22), 17 (06-11 @ 00:25), 18 (06-10 @ 17:52)  BUN: 14 (06-12 @ 21:12), 11 (06-12 @ 08:45), 11 (06-12 @ 04:20), 11 (06-11 @ 08:22), 14 (06-11 @ 00:25), 13 (06-10 @ 17:52)  Cr: 0.43 (06-12 @ 21:12), 0.34 (06-12 @ 08:45), 0.35 (06-12 @ 04:20), 0.31 (06-11 @ 08:22), 0.42 (06-11 @ 00:25), 0.44 (06-10 @ 17:52)  Glu: 206(06-12 @ 21:12), 156(06-12 @ 08:45), 148(06-12 @ 04:20), 102(06-11 @ 08:22), 122(06-11 @ 00:25), 143(06-10 @ 17:52)    Hgb: 8.9 (06-12 @ 04:20), 8.0 (06-11 @ 00:25)  Hct: 26.8 (06-12 @ 04:20), 24.6 (06-11 @ 00:25)  WBC: 9.37 (06-12 @ 04:20), 11.00 (06-11 @ 00:25)  Plt: 231 (06-12 @ 04:20), 212 (06-11 @ 00:25)    INR: 1.28 06-11-23 @ 00:25  PTT: 31.2 06-11-23 @ 00:25    MEDICATIONS:  acetaminophen   Oral Liquid .. 650 milliGRAM(s) Oral every 6 hours PRN  bisacodyl Suppository 10 milliGRAM(s) Rectal once PRN  chlorhexidine 0.12% Liquid 15 milliLiter(s) Oral Mucosa every 12 hours  chlorhexidine 4% Liquid 1 Application(s) Topical daily  dexMEDEtomidine Infusion 0.2 MICROgram(s)/kG/Hr IV Continuous <Continuous>  enoxaparin Injectable 40 milliGRAM(s) SubCutaneous <User Schedule>  fentaNYL    Injectable 25 MICROGram(s) IV Push every 2 hours PRN  insulin lispro (ADMELOG) corrective regimen sliding scale   SubCutaneous every 6 hours  lacosamide IVPB 150 milliGRAM(s) IV Intermittent every 12 hours  levETIRAcetam  Solution 1000 milliGRAM(s) Oral two times a day  levothyroxine 75 MICROGram(s) Oral daily  lisinopril 40 milliGRAM(s) Enteral Tube daily  nicotine - 21 mG/24Hr(s) Patch 1 Patch Transdermal daily  ondansetron Injectable 4 milliGRAM(s) IV Push every 6 hours PRN  oxyCODONE    Solution 5 milliGRAM(s) Oral every 4 hours PRN  pantoprazole  Injectable 40 milliGRAM(s) IV Push daily  polyethylene glycol 3350 17 Gram(s) Oral every 12 hours  senna 2 Tablet(s) Oral at bedtime    EXAMINATION:  General:  in NAD  HEENT: intubated   Neuro: lethargic, with eyelid opening apraxia, follows commands including showing 2 fingers on the R, gaze midline, face symmetric, able to lift R arm AG to elbow, able to lift R leg if knee supported by examiner, L arm flexes, L leg withdraws   Cards:  RRR  Respiratory:  no respiratory distress  Abdomen:  soft  Extremities:  no LE edema    Assessment/Plan:   39 yo woman with h/o HTN, admitted 6/6 with 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 CTA and angiogram 6/9 with no vascular malformation. ICH score 4.   Hospital course c/b seizures on VEEG with onset from R frontal region.     Neuro:   neuro checks q2h  EVD at 10 cm water, minimal output, keep ICP< 22 mmhg, CPP> 60. Consider raising vs clamping EVD  c/w VEEG  c/w Keppra 1g BID and increase Vimpat to 150 mg BID (get EKG for ID interval)   precedex as needed for sedation   MRI brain w/wo    CV:  SBP goal 100-160, start lisinopril 40 mg in the AM    Respiratory:   CPAP 7/5 as tolerated   start dex 10 mg q8h for 3 doses for lack of cuff leak     GI:   NG, restart tube feeds, NPO after MN for potential extubation  PPI  senna and miralax, LBM 6/11    Renal:   IVL  Na goal normonatremia     Heme/Onc:     SCDs, Lov ppx     Endocrine:   FS goal 120-180, medium ISS  synthroid    ID: afebrile   monitor off abx     Patient seen and examined by attending on 6/13/2023.    Patient is critically ill due to ICH and at high risk for neurological deterioration or death due to: EVD for CSF diversion, respiratory failure 2/2 neurologic injury requiring mechanical ventilation.    Interval events:  NAEO.   VEEG with no seizure overnight.   Afebrile.   EVD at 10cm H2O, output 83cc for 24 hours.   Minimal cuff leak this AM.     VITALS:  T(C): , Max: 37.7 (06-12-23 @ 23:00)  HR:  (53 - 117)  BP: --  ABP:  (107/60 - 185/108)  RR:  (12 - 20)  SpO2:  (98% - 100%)  Wt(kg): --  Device: Avea, Mode: AC/ CMV (Assist Control/ Continuous Mandatory Ventilation), RR (machine): 12, TV (machine): 450, FiO2: 30, PEEP: 5, ITime: 1, MAP: 8, PIP: 16    06-12-23 @ 07:01  -  06-13-23 @ 07:00  --------------------------------------------------------  IN: 1196.4 mL / OUT: 2482 mL / NET: -1285.6 mL      LABS:  Na: 144 (06-12 @ 21:12), 143 (06-12 @ 08:45), 145 (06-12 @ 04:20), 151 (06-11 @ 08:22), 153 (06-11 @ 00:25), 153 (06-10 @ 17:52)  K: 3.8 (06-12 @ 21:12), 4.0 (06-12 @ 08:45), 4.2 (06-12 @ 04:20), 3.2 (06-11 @ 08:22), 3.6 (06-11 @ 00:25), 3.7 (06-10 @ 17:52)  Cl: 112 (06-12 @ 21:12), 112 (06-12 @ 08:45), 115 (06-12 @ 04:20), 123 (06-11 @ 08:22), 122 (06-11 @ 00:25), 122 (06-10 @ 17:52)  CO2: 20 (06-12 @ 21:12), 20 (06-12 @ 08:45), 18 (06-12 @ 04:20), 14 (06-11 @ 08:22), 17 (06-11 @ 00:25), 18 (06-10 @ 17:52)  BUN: 14 (06-12 @ 21:12), 11 (06-12 @ 08:45), 11 (06-12 @ 04:20), 11 (06-11 @ 08:22), 14 (06-11 @ 00:25), 13 (06-10 @ 17:52)  Cr: 0.43 (06-12 @ 21:12), 0.34 (06-12 @ 08:45), 0.35 (06-12 @ 04:20), 0.31 (06-11 @ 08:22), 0.42 (06-11 @ 00:25), 0.44 (06-10 @ 17:52)  Glu: 206(06-12 @ 21:12), 156(06-12 @ 08:45), 148(06-12 @ 04:20), 102(06-11 @ 08:22), 122(06-11 @ 00:25), 143(06-10 @ 17:52)    Hgb: 8.9 (06-12 @ 04:20), 8.0 (06-11 @ 00:25)  Hct: 26.8 (06-12 @ 04:20), 24.6 (06-11 @ 00:25)  WBC: 9.37 (06-12 @ 04:20), 11.00 (06-11 @ 00:25)  Plt: 231 (06-12 @ 04:20), 212 (06-11 @ 00:25)    INR: 1.28 06-11-23 @ 00:25  PTT: 31.2 06-11-23 @ 00:25    MEDICATIONS:  acetaminophen   Oral Liquid .. 650 milliGRAM(s) Oral every 6 hours PRN  bisacodyl Suppository 10 milliGRAM(s) Rectal once PRN  chlorhexidine 0.12% Liquid 15 milliLiter(s) Oral Mucosa every 12 hours  chlorhexidine 4% Liquid 1 Application(s) Topical daily  dexMEDEtomidine Infusion 0.2 MICROgram(s)/kG/Hr IV Continuous <Continuous>  enoxaparin Injectable 40 milliGRAM(s) SubCutaneous <User Schedule>  fentaNYL    Injectable 25 MICROGram(s) IV Push every 2 hours PRN  insulin lispro (ADMELOG) corrective regimen sliding scale   SubCutaneous every 6 hours  lacosamide IVPB 150 milliGRAM(s) IV Intermittent every 12 hours  levETIRAcetam  Solution 1000 milliGRAM(s) Oral two times a day  levothyroxine 75 MICROGram(s) Oral daily  lisinopril 40 milliGRAM(s) Enteral Tube daily  nicotine - 21 mG/24Hr(s) Patch 1 Patch Transdermal daily  ondansetron Injectable 4 milliGRAM(s) IV Push every 6 hours PRN  oxyCODONE    Solution 5 milliGRAM(s) Oral every 4 hours PRN  pantoprazole  Injectable 40 milliGRAM(s) IV Push daily  polyethylene glycol 3350 17 Gram(s) Oral every 12 hours  senna 2 Tablet(s) Oral at bedtime    EXAMINATION: Precedex briefly off   General:  in NAD  HEENT: intubated   Neuro: lethargic, opens eyes to command, follows commands including showing 2 fingers on the R, gaze midline, face symmetric, able to lift R arm briefly AG, able to lift R leg if knee supported by examiner, L arm extends to noxious, L leg withdraws   Cards:  RRR  Respiratory:  no respiratory distress  Abdomen:  soft  Extremities:  no LE edema    Assessment/Plan:   39 yo woman with h/o HTN, admitted 6/6 with 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 CTA and angiogram 6/9 with no vascular malformation. ICH score 4.   Hospital course c/b seizures on VEEG with onset from R frontal region, now resolved.     Neuro:   neuro checks q2h  EVD at 10 cm H2O, minimal output, keep ICP< 22 mmhg, CPP> 60. Consider raising vs clamping EVD  c/w VEEG  c/w Keppra 1g BID and Vimpat to 150 mg BID  precedex as needed for sedation   MRI brain w/wo today     CV:  SBP goal 100-160, c/w lisinopril 40 mg daily     Respiratory:   CPAP 5/5 as tolerated  send ABG  c/w dex 10 mg q8h  ENT consulted to scope for lack of cuff leak     GI:   NG, NPO for potential extubation   PPI  senna and miralax, LBM 6/13    Renal:   start IVF while NPO  Na goal normonatremia     Heme/Onc:     SCDs, Lov ppx     Endocrine:   FS goal 120-180, medium ISS  synthroid    ID: afebrile   monitor off abx     Patient seen and examined by attending on 6/13/2023.    Patient is critically ill due to ICH and at high risk for neurological deterioration or death due to: EVD for CSF diversion, respiratory failure 2/2 neurologic injury requiring mechanical ventilation.

## 2023-06-13 NOTE — CHART NOTE - NSCHARTNOTEFT_GEN_A_CORE
Patient required MRI and needed constant observation and monitoring of vitals as in critical condition that required a provider for critical decision making.    REVIEW OF SYSTEMS:    VITALS: [x] Reviewed  IVF FLUIDS/MEDICATIONS: [x] Reviewed    EXAMINATION: Intubated, EO apraxia, purposeful and following commands on the Rt, LUE extending, LLE withdrawal      Plan:  Exam remained stable.  Vitals remained stable         Patient is critically ill, requiring critical care services.   I have personally and independently provided 60 minutes of critical care services and close monitoring of the patient and management of drips and airway.

## 2023-06-13 NOTE — EEG REPORT - NS EEG TEXT BOX
BROOKE CAIN [id number]   Location: [place] [address]  Provider: Jean Carlos Bernstein      Study Start Date: 0800 6/12/23   Study End Date:  0800 6/13/23	  Duration x Hours:  24 hr   --------------------------------------------------------------------------------------------------    History:  CC/ HPI Patient is a 38y old  Female who presents with a chief complaint of IPH (10 Adrian 2023 00:44)    MEDICATIONS  (STANDING):  dexMEDEtomidine Infusion 0.2 MICROgram(s)/kG/Hr (3.82 mL/Hr) IV Continuous <Continuous>, 06-09 @ 02:17  lacosamide 100 milliGRAM(s) Oral two times a day, 06-10 @ 09:12  levETIRAcetam  Solution 1000 milliGRAM(s) Oral two times a day, 06-09 @ 21:22      Medication Changes 24hours   lacosamide 100 milliGRAM(s) two times a day, Routine, Stop order after: 7 Days    --------------------------------------------------------------------------------------------------  Study Interpretation:    [[[Abbreviation Key:  PDR=alpha rhythm/posterior dominant rhythm. A-P=anterior posterior.  Amplitude: ‘very low’:<20; ‘low’:20-49; ‘medium’:; ‘high’:>150uV.  Persistence for periodic/rhythmic patterns (% of epoch) ‘rare’:<1%; ‘occasional’:1-10%; ‘frequent’:10-50%; ‘abundant’:50-90%; ‘continuous’:>90%.  Persistence for sporadic discharges: ‘rare’:<1/hr; ‘occasional’:1/min-1/hr; ‘frequent’:>1/min; ‘abundant’:>1/10 sec.  RPP=rhythmic and periodic patterns; GRDA=generalized rhythmic delta activity; FIRDA=frontal intermittent GRDA; LRDA=lateralized rhythmic delta activity; TIRDA=temporal intermittent rhythmic delta activity;  LPD=PLED=lateralized periodic discharges; GPD=generalized periodic discharges; BIPDs =bilateral independent periodic discharges; Mf=multifocal; SIRPDs=stimulus induced rhythmic, periodic, or ictal appearing discharges; BIRDs=brief potentially ictal rhythmic discharges >4 Hz, lasting .5-10s; PFA (paroxysmal bursts >13 Hz or =8 Hz <10s).  Modifiers: +F=with fast component; +S=with spike component; +R=with rhythmic component.  S-B=burst suppression pattern.  Max=maximal. N1-drowsy; N2-stage II sleep; N3-slow wave sleep. SSS/BETS=small sharp spikes/benign epileptiform transients of sleep. HV=hyperventilation; PS=photic stimulation]]]    Daily EEG Visual Analysis    FINDINGS:      Background:  Continuous: continuous  Symmetry: asymmetric  PDR: 7hz on left, poorly visualized on right  Reactivity: present  Voltage: normal, mostly 20-150uV  Anterior Posterior Gradient: present  Other background findings: none  Breach: none    Background Slowing:  Generalized slowing: Diffuse theta/ polymorphic delta slowing.   Focal slowing: Continuous polymorphic delta (with or w/o theta) slowing, focal, right hemisphere     State Changes:   Present but without N2 sleep sleep transients.     Sporadic Epileptiform Discharges/Rhythmic and Periodic Patterns (RPPs):    Occasional right central sharp wave discharges, max C4/P4  Occasional right parietotemporal sharp wave discharges, max P4/P8/T8    Electrographic and Electroclinical seizures:  None    Other Clinical Events:  None    Activation Procedures:   Hyperventilation was not performed.    Photic stimulation was not performed.    Artifacts:  Intermittent myogenic and movement artifacts were noted.    ECG:  Single channel EKG showed HR 70-80 BPM    ---------------------------------------------------------------------------------------------------------------------------------------------------------      EEG Classification / Summary:  Abnormal  EEG in a comatose patient  - Occasional right central sharp wave discharges, max C4/P4  - Occasional right parietotemporal sharp wave discharges, max P4/P8/T8  - Continuous right hemispheric slowing  - Background slowing, generalized, moderate    ---------------------------------------------------------------------------------------------------------------------------------------------------------      Clinical Impression:  Abnormal EEG study.    Evidence for increased risk for seizures from the right central and right parietotemporal regions  Evidence for right hemispheric structural/functional cerebral dysfunction  Moderate diffuse/multifocal cerebral dysfunction, not specific as to etiology  No seizures recorded. Last seizure 02:57 on 6/12/23    This is a prelim report only, pending review with attending prior to finalization.     Adan Goodman MD     Fellow, [facility]     ------------------------------------     EEG Reading Room: [phone]     On Call Service After Hours: [phone]      BROOKE CAIN [id number]   Location: [place] [address]  Provider: Jean Carlos Bernstein      Study Start Date: 0800 6/12/23   Study End Date:  0800 6/13/23	  Duration x Hours:  24 hr   --------------------------------------------------------------------------------------------------    History:  CC/ HPI Patient is a 38y old  Female who presents with a chief complaint of IPH (10 Adrian 2023 00:44)    MEDICATIONS  (STANDING):  dexMEDEtomidine Infusion 0.2 MICROgram(s)/kG/Hr (3.82 mL/Hr) IV Continuous <Continuous>, 06-09 @ 02:17  lacosamide 100 milliGRAM(s) Oral two times a day, 06-10 @ 09:12  levETIRAcetam  Solution 1000 milliGRAM(s) Oral two times a day, 06-09 @ 21:22      Medication Changes 24hours   lacosamide 100 milliGRAM(s) two times a day, Routine, Stop order after: 7 Days    --------------------------------------------------------------------------------------------------  Study Interpretation:    [[[Abbreviation Key:  PDR=alpha rhythm/posterior dominant rhythm. A-P=anterior posterior.  Amplitude: ‘very low’:<20; ‘low’:20-49; ‘medium’:; ‘high’:>150uV.  Persistence for periodic/rhythmic patterns (% of epoch) ‘rare’:<1%; ‘occasional’:1-10%; ‘frequent’:10-50%; ‘abundant’:50-90%; ‘continuous’:>90%.  Persistence for sporadic discharges: ‘rare’:<1/hr; ‘occasional’:1/min-1/hr; ‘frequent’:>1/min; ‘abundant’:>1/10 sec.  RPP=rhythmic and periodic patterns; GRDA=generalized rhythmic delta activity; FIRDA=frontal intermittent GRDA; LRDA=lateralized rhythmic delta activity; TIRDA=temporal intermittent rhythmic delta activity;  LPD=PLED=lateralized periodic discharges; GPD=generalized periodic discharges; BIPDs =bilateral independent periodic discharges; Mf=multifocal; SIRPDs=stimulus induced rhythmic, periodic, or ictal appearing discharges; BIRDs=brief potentially ictal rhythmic discharges >4 Hz, lasting .5-10s; PFA (paroxysmal bursts >13 Hz or =8 Hz <10s).  Modifiers: +F=with fast component; +S=with spike component; +R=with rhythmic component.  S-B=burst suppression pattern.  Max=maximal. N1-drowsy; N2-stage II sleep; N3-slow wave sleep. SSS/BETS=small sharp spikes/benign epileptiform transients of sleep. HV=hyperventilation; PS=photic stimulation]]]    Daily EEG Visual Analysis    FINDINGS:      Background:  Continuous: continuous  Symmetry: asymmetric  PDR: 7hz on left, poorly visualized on right  Reactivity: present  Voltage: normal, mostly 20-150uV  Anterior Posterior Gradient: present  Other background findings: none  Breach: none    Background Slowing:  Generalized slowing: Diffuse theta/ polymorphic delta slowing.   Focal slowing: Continuous polymorphic delta (with or w/o theta) slowing, focal, right hemisphere     State Changes:   Present but without N2 sleep sleep transients.     Sporadic Epileptiform Discharges/Rhythmic and Periodic Patterns (RPPs):  None    Electrographic and Electroclinical seizures:  None    Other Clinical Events:  None    Activation Procedures:   Hyperventilation was not performed.    Photic stimulation was not performed.    Artifacts:  Intermittent myogenic and movement artifacts were noted.    ECG:  Single channel EKG showed HR 70-80 BPM    ---------------------------------------------------------------------------------------------------------------------------------------------------------      EEG Classification / Summary:  Abnormal  EEG in a comatose patient  - Continuous right hemispheric slowing  - Background slowing, generalized, moderate    ---------------------------------------------------------------------------------------------------------------------------------------------------------      Clinical Impression:  Abnormal EEG study.    Evidence for right hemispheric structural/functional cerebral dysfunction  Moderate diffuse/multifocal cerebral dysfunction, not specific as to etiology  No seizures recorded. Last seizure 02:57 on 6/12/23    Adan Goodman MD     Fellow, [facility]     ------------------------------------     EEG Reading Room: [phone]     On Call Service After Hours: [phone]     Jaylon Carty MD  Neurology Attending Physician

## 2023-06-13 NOTE — PROGRESS NOTE ADULT - SUBJECTIVE AND OBJECTIVE BOX
Patient 38 Y F evaluated  measured and fitted  for protective cranial helmet as ordered by Neurosurgery  for OBB use delivered by [facility] [phone]

## 2023-06-13 NOTE — CONSULT NOTE ADULT - ASSESSMENT
38F, w/hx of uncontrolled HTN, found unresponsive by family at home, found to have massive right ICH with midline shift intubated at ED.  S/p emergent decompressive craniectomy and hematoma evacuation. Course complicated by new onset of seizure. Primary team plans to extubated with no cuff leak. Bedside indirect laryngoscopy was unremarkable. Cuff was deflated with positive leak.

## 2023-06-14 LAB
ANION GAP SERPL CALC-SCNC: 12 MMOL/L — SIGNIFICANT CHANGE UP (ref 5–17)
ANION GAP SERPL CALC-SCNC: 13 MMOL/L — SIGNIFICANT CHANGE UP (ref 5–17)
ANION GAP SERPL CALC-SCNC: 15 MMOL/L — SIGNIFICANT CHANGE UP (ref 5–17)
BUN SERPL-MCNC: 10 MG/DL — SIGNIFICANT CHANGE UP (ref 7–23)
BUN SERPL-MCNC: 11 MG/DL — SIGNIFICANT CHANGE UP (ref 7–23)
BUN SERPL-MCNC: 8 MG/DL — SIGNIFICANT CHANGE UP (ref 7–23)
CALCIUM SERPL-MCNC: 8.1 MG/DL — LOW (ref 8.4–10.5)
CALCIUM SERPL-MCNC: 8.2 MG/DL — LOW (ref 8.4–10.5)
CALCIUM SERPL-MCNC: 8.6 MG/DL — SIGNIFICANT CHANGE UP (ref 8.4–10.5)
CHLORIDE SERPL-SCNC: 107 MMOL/L — SIGNIFICANT CHANGE UP (ref 96–108)
CHLORIDE SERPL-SCNC: 108 MMOL/L — SIGNIFICANT CHANGE UP (ref 96–108)
CHLORIDE SERPL-SCNC: 108 MMOL/L — SIGNIFICANT CHANGE UP (ref 96–108)
CO2 SERPL-SCNC: 21 MMOL/L — LOW (ref 22–31)
CO2 SERPL-SCNC: 23 MMOL/L — SIGNIFICANT CHANGE UP (ref 22–31)
CO2 SERPL-SCNC: 23 MMOL/L — SIGNIFICANT CHANGE UP (ref 22–31)
CREAT SERPL-MCNC: 0.4 MG/DL — LOW (ref 0.5–1.3)
CREAT SERPL-MCNC: 0.43 MG/DL — LOW (ref 0.5–1.3)
CREAT SERPL-MCNC: 0.45 MG/DL — LOW (ref 0.5–1.3)
CULTURE RESULTS: SIGNIFICANT CHANGE UP
CULTURE RESULTS: SIGNIFICANT CHANGE UP
EGFR: 125 ML/MIN/1.73M2 — SIGNIFICANT CHANGE UP
EGFR: 127 ML/MIN/1.73M2 — SIGNIFICANT CHANGE UP
EGFR: 129 ML/MIN/1.73M2 — SIGNIFICANT CHANGE UP
GAS PNL BLDA: SIGNIFICANT CHANGE UP
GAS PNL BLDA: SIGNIFICANT CHANGE UP
GLUCOSE BLDC GLUCOMTR-MCNC: 118 MG/DL — HIGH (ref 70–99)
GLUCOSE BLDC GLUCOMTR-MCNC: 134 MG/DL — HIGH (ref 70–99)
GLUCOSE BLDC GLUCOMTR-MCNC: 138 MG/DL — HIGH (ref 70–99)
GLUCOSE BLDC GLUCOMTR-MCNC: 98 MG/DL — SIGNIFICANT CHANGE UP (ref 70–99)
GLUCOSE SERPL-MCNC: 114 MG/DL — HIGH (ref 70–99)
GLUCOSE SERPL-MCNC: 115 MG/DL — HIGH (ref 70–99)
GLUCOSE SERPL-MCNC: 142 MG/DL — HIGH (ref 70–99)
HCT VFR BLD CALC: 32.3 % — LOW (ref 34.5–45)
HGB BLD-MCNC: 10.5 G/DL — LOW (ref 11.5–15.5)
MAGNESIUM SERPL-MCNC: 2.1 MG/DL — SIGNIFICANT CHANGE UP (ref 1.6–2.6)
MAGNESIUM SERPL-MCNC: 2.2 MG/DL — SIGNIFICANT CHANGE UP (ref 1.6–2.6)
MAGNESIUM SERPL-MCNC: 2.4 MG/DL — SIGNIFICANT CHANGE UP (ref 1.6–2.6)
MCHC RBC-ENTMCNC: 32.1 PG — SIGNIFICANT CHANGE UP (ref 27–34)
MCHC RBC-ENTMCNC: 32.5 GM/DL — SIGNIFICANT CHANGE UP (ref 32–36)
MCV RBC AUTO: 98.8 FL — SIGNIFICANT CHANGE UP (ref 80–100)
NRBC # BLD: 0 /100 WBCS — SIGNIFICANT CHANGE UP (ref 0–0)
PHOSPHATE SERPL-MCNC: 2.8 MG/DL — SIGNIFICANT CHANGE UP (ref 2.5–4.5)
PHOSPHATE SERPL-MCNC: 2.9 MG/DL — SIGNIFICANT CHANGE UP (ref 2.5–4.5)
PHOSPHATE SERPL-MCNC: 2.9 MG/DL — SIGNIFICANT CHANGE UP (ref 2.5–4.5)
PLATELET # BLD AUTO: 423 K/UL — HIGH (ref 150–400)
POTASSIUM SERPL-MCNC: 3.5 MMOL/L — SIGNIFICANT CHANGE UP (ref 3.5–5.3)
POTASSIUM SERPL-MCNC: 3.5 MMOL/L — SIGNIFICANT CHANGE UP (ref 3.5–5.3)
POTASSIUM SERPL-MCNC: 4.1 MMOL/L — SIGNIFICANT CHANGE UP (ref 3.5–5.3)
POTASSIUM SERPL-SCNC: 3.5 MMOL/L — SIGNIFICANT CHANGE UP (ref 3.5–5.3)
POTASSIUM SERPL-SCNC: 3.5 MMOL/L — SIGNIFICANT CHANGE UP (ref 3.5–5.3)
POTASSIUM SERPL-SCNC: 4.1 MMOL/L — SIGNIFICANT CHANGE UP (ref 3.5–5.3)
RBC # BLD: 3.27 M/UL — LOW (ref 3.8–5.2)
RBC # FLD: 14.2 % — SIGNIFICANT CHANGE UP (ref 10.3–14.5)
SODIUM SERPL-SCNC: 143 MMOL/L — SIGNIFICANT CHANGE UP (ref 135–145)
SODIUM SERPL-SCNC: 143 MMOL/L — SIGNIFICANT CHANGE UP (ref 135–145)
SODIUM SERPL-SCNC: 144 MMOL/L — SIGNIFICANT CHANGE UP (ref 135–145)
SPECIMEN SOURCE: SIGNIFICANT CHANGE UP
SPECIMEN SOURCE: SIGNIFICANT CHANGE UP
WBC # BLD: 17.91 K/UL — HIGH (ref 3.8–10.5)
WBC # FLD AUTO: 17.91 K/UL — HIGH (ref 3.8–10.5)

## 2023-06-14 PROCEDURE — 99292 CRITICAL CARE ADDL 30 MIN: CPT

## 2023-06-14 PROCEDURE — 99232 SBSQ HOSP IP/OBS MODERATE 35: CPT

## 2023-06-14 PROCEDURE — 99291 CRITICAL CARE FIRST HOUR: CPT

## 2023-06-14 PROCEDURE — 95720 EEG PHY/QHP EA INCR W/VEEG: CPT

## 2023-06-14 PROCEDURE — 71045 X-RAY EXAM CHEST 1 VIEW: CPT | Mod: 26

## 2023-06-14 PROCEDURE — 93970 EXTREMITY STUDY: CPT | Mod: 26

## 2023-06-14 PROCEDURE — 70450 CT HEAD/BRAIN W/O DYE: CPT | Mod: 26

## 2023-06-14 RX ORDER — FENTANYL CITRATE 50 UG/ML
12.5 INJECTION INTRAVENOUS ONCE
Refills: 0 | Status: DISCONTINUED | OUTPATIENT
Start: 2023-06-14 | End: 2023-06-14

## 2023-06-14 RX ORDER — ACETYLCYSTEINE 200 MG/ML
4 VIAL (ML) MISCELLANEOUS EVERY 12 HOURS
Refills: 0 | Status: DISCONTINUED | OUTPATIENT
Start: 2023-06-14 | End: 2023-06-18

## 2023-06-14 RX ORDER — HYDROMORPHONE HYDROCHLORIDE 2 MG/ML
0.25 INJECTION INTRAMUSCULAR; INTRAVENOUS; SUBCUTANEOUS EVERY 4 HOURS
Refills: 0 | Status: DISCONTINUED | OUTPATIENT
Start: 2023-06-14 | End: 2023-06-16

## 2023-06-14 RX ORDER — BRIVARACETAM 25 MG/1
100 TABLET, FILM COATED ORAL
Refills: 0 | Status: DISCONTINUED | OUTPATIENT
Start: 2023-06-14 | End: 2023-06-20

## 2023-06-14 RX ORDER — CALCIUM GLUCONATE 100 MG/ML
1 VIAL (ML) INTRAVENOUS ONCE
Refills: 0 | Status: COMPLETED | OUTPATIENT
Start: 2023-06-14 | End: 2023-06-14

## 2023-06-14 RX ORDER — POTASSIUM CHLORIDE 20 MEQ
40 PACKET (EA) ORAL ONCE
Refills: 0 | Status: COMPLETED | OUTPATIENT
Start: 2023-06-14 | End: 2023-06-14

## 2023-06-14 RX ORDER — HYDRALAZINE HCL 50 MG
10 TABLET ORAL ONCE
Refills: 0 | Status: COMPLETED | OUTPATIENT
Start: 2023-06-14 | End: 2023-06-14

## 2023-06-14 RX ORDER — HYDROMORPHONE HYDROCHLORIDE 2 MG/ML
0.25 INJECTION INTRAMUSCULAR; INTRAVENOUS; SUBCUTANEOUS ONCE
Refills: 0 | Status: DISCONTINUED | OUTPATIENT
Start: 2023-06-14 | End: 2023-06-14

## 2023-06-14 RX ORDER — BROMOCRIPTINE MESYLATE 5 MG/1
5 CAPSULE ORAL EVERY 8 HOURS
Refills: 0 | Status: DISCONTINUED | OUTPATIENT
Start: 2023-06-14 | End: 2023-06-14

## 2023-06-14 RX ORDER — HYDROMORPHONE HYDROCHLORIDE 2 MG/ML
0.25 INJECTION INTRAMUSCULAR; INTRAVENOUS; SUBCUTANEOUS EVERY 4 HOURS
Refills: 0 | Status: DISCONTINUED | OUTPATIENT
Start: 2023-06-14 | End: 2023-06-14

## 2023-06-14 RX ORDER — ACETAMINOPHEN 500 MG
1000 TABLET ORAL ONCE
Refills: 0 | Status: COMPLETED | OUTPATIENT
Start: 2023-06-14 | End: 2023-06-14

## 2023-06-14 RX ADMIN — Medication 40 MILLIEQUIVALENT(S): at 18:42

## 2023-06-14 RX ADMIN — FENTANYL CITRATE 25 MICROGRAM(S): 50 INJECTION INTRAVENOUS at 05:00

## 2023-06-14 RX ADMIN — ENOXAPARIN SODIUM 40 MILLIGRAM(S): 100 INJECTION SUBCUTANEOUS at 18:43

## 2023-06-14 RX ADMIN — FENTANYL CITRATE 25 MICROGRAM(S): 50 INJECTION INTRAVENOUS at 04:45

## 2023-06-14 RX ADMIN — HYDROMORPHONE HYDROCHLORIDE 0.25 MILLIGRAM(S): 2 INJECTION INTRAMUSCULAR; INTRAVENOUS; SUBCUTANEOUS at 14:30

## 2023-06-14 RX ADMIN — Medication 400 MILLIGRAM(S): at 10:15

## 2023-06-14 RX ADMIN — FENTANYL CITRATE 25 MICROGRAM(S): 50 INJECTION INTRAVENOUS at 03:00

## 2023-06-14 RX ADMIN — Medication 100 GRAM(S): at 18:43

## 2023-06-14 RX ADMIN — Medication 10 MILLIGRAM(S): at 16:21

## 2023-06-14 RX ADMIN — OXYCODONE HYDROCHLORIDE 10 MILLIGRAM(S): 5 TABLET ORAL at 07:00

## 2023-06-14 RX ADMIN — Medication 3 MILLILITER(S): at 11:26

## 2023-06-14 RX ADMIN — HYDROMORPHONE HYDROCHLORIDE 0.25 MILLIGRAM(S): 2 INJECTION INTRAMUSCULAR; INTRAVENOUS; SUBCUTANEOUS at 05:15

## 2023-06-14 RX ADMIN — LEVETIRACETAM 1000 MILLIGRAM(S): 250 TABLET, FILM COATED ORAL at 05:30

## 2023-06-14 RX ADMIN — PANTOPRAZOLE SODIUM 40 MILLIGRAM(S): 20 TABLET, DELAYED RELEASE ORAL at 12:17

## 2023-06-14 RX ADMIN — LACOSAMIDE 130 MILLIGRAM(S): 50 TABLET ORAL at 05:30

## 2023-06-14 RX ADMIN — Medication 75 MICROGRAM(S): at 05:30

## 2023-06-14 RX ADMIN — LISINOPRIL 40 MILLIGRAM(S): 2.5 TABLET ORAL at 05:31

## 2023-06-14 RX ADMIN — SODIUM CHLORIDE 50 MILLILITER(S): 9 INJECTION, SOLUTION INTRAVENOUS at 19:29

## 2023-06-14 RX ADMIN — Medication 1 PATCH: at 12:17

## 2023-06-14 RX ADMIN — OXYCODONE HYDROCHLORIDE 10 MILLIGRAM(S): 5 TABLET ORAL at 00:06

## 2023-06-14 RX ADMIN — Medication 3 MILLILITER(S): at 18:27

## 2023-06-14 RX ADMIN — FENTANYL CITRATE 25 MICROGRAM(S): 50 INJECTION INTRAVENOUS at 02:45

## 2023-06-14 RX ADMIN — BROMOCRIPTINE MESYLATE 5 MILLIGRAM(S): 5 CAPSULE ORAL at 15:40

## 2023-06-14 RX ADMIN — BRIVARACETAM 100 MILLIGRAM(S): 25 TABLET, FILM COATED ORAL at 19:19

## 2023-06-14 RX ADMIN — OXYCODONE HYDROCHLORIDE 10 MILLIGRAM(S): 5 TABLET ORAL at 01:02

## 2023-06-14 RX ADMIN — Medication 100 GRAM(S): at 05:49

## 2023-06-14 RX ADMIN — OXYCODONE HYDROCHLORIDE 10 MILLIGRAM(S): 5 TABLET ORAL at 13:00

## 2023-06-14 RX ADMIN — OXYCODONE HYDROCHLORIDE 10 MILLIGRAM(S): 5 TABLET ORAL at 06:10

## 2023-06-14 RX ADMIN — OXYCODONE HYDROCHLORIDE 10 MILLIGRAM(S): 5 TABLET ORAL at 12:17

## 2023-06-14 RX ADMIN — HYDROMORPHONE HYDROCHLORIDE 0.25 MILLIGRAM(S): 2 INJECTION INTRAMUSCULAR; INTRAVENOUS; SUBCUTANEOUS at 05:45

## 2023-06-14 RX ADMIN — Medication 4 MILLILITER(S): at 18:28

## 2023-06-14 RX ADMIN — Medication 1000 MILLIGRAM(S): at 10:45

## 2023-06-14 RX ADMIN — FENTANYL CITRATE 12.5 MICROGRAM(S): 50 INJECTION INTRAVENOUS at 11:15

## 2023-06-14 RX ADMIN — Medication 40 MILLIEQUIVALENT(S): at 03:11

## 2023-06-14 RX ADMIN — LACOSAMIDE 130 MILLIGRAM(S): 50 TABLET ORAL at 18:43

## 2023-06-14 RX ADMIN — Medication 0.2 MILLIGRAM(S): at 18:42

## 2023-06-14 RX ADMIN — Medication 1 PATCH: at 10:12

## 2023-06-14 RX ADMIN — Medication 3 MILLILITER(S): at 05:51

## 2023-06-14 RX ADMIN — FENTANYL CITRATE 12.5 MICROGRAM(S): 50 INJECTION INTRAVENOUS at 11:30

## 2023-06-14 RX ADMIN — Medication 1 PATCH: at 19:30

## 2023-06-14 RX ADMIN — Medication 1 PATCH: at 11:00

## 2023-06-14 NOTE — PROGRESS NOTE ADULT - ASSESSMENT
Ms. Arcos is a 38F unknown PMHx found down at home. CBC notable for WBC 25.31. CMP showing potassium 3.1. Lactate 4.5. CTH showing very large Right frontal parenchymal hemorrhage with intraventricular extension and mild left-sided hydrocephalus, midline shift to Left. CTA w/o aneurysms or AVM. Pt not a candidate for tenecteplase due to hemorrhage and not a candidate for thrombectomy due to no LVO see on CTA H/N.     LKN unknown but possibly 1100 6/6/23   NIHSS 19  ICH score 4  mRS 0    Impression: Decreased level of consciousness due to large right intraparenchymal hemorrhage of uncertain etiology.     Recommendations:   [] CT chest, abdomen and pelvis w/ contrast   [] Continue LEV 1000mg BID & LAC 150mg BID as per NSCU  [x] MRI brain w/ and w/o cont: Large R IPH.  [x] MRA brain w/o contrast MRA neck w/ contrast: No underlying lesions/malformations. Patent vessels.  [x] rCTH x24H: s/p hemicrani, improved from initial, stable  [x] Strict BP goal <160/90  [x] Hypertonic saline (2% or 3%) to target serum sodium 145-155mEq/L  [] HgA1c (5.3), Lipid profile  [x] Hold AC/AP, use mechanical DVT prophylaxis for now  [/] PT/OT  [x] TTE 6/8: EF 57%, regional WMA c/w ischemic heart disease    Seen and discussed with vascular neurology attending.

## 2023-06-14 NOTE — PROGRESS NOTE ADULT - SUBJECTIVE AND OBJECTIVE BOX
Night rounds   Patient seen and examined    T(C): 36.9 (06-14-23 @ 12:00), Max: 37.4 (06-14-23 @ 03:00)  HR: 102 (06-14-23 @ 18:31) (66 - 127)  BP: --  RR: 17 (06-14-23 @ 17:00) (12 - 20)  SpO2: 100% (06-14-23 @ 18:31) (93% - 100%)  06-13-23 @ 07:01  -  06-14-23 @ 07:00  --------------------------------------------------------  IN: 1078.2 mL / OUT: 2464 mL / NET: -1385.8 mL    06-14-23 @ 07:01  -  06-14-23 @ 18:34  --------------------------------------------------------  IN: 400 mL / OUT: 19 mL / NET: 381 mL    acetaminophen   Oral Liquid .. 650 milliGRAM(s) Oral every 6 hours PRN  acetylcysteine 20%  Inhalation 4 milliLiter(s) Inhalation every 12 hours  albuterol/ipratropium for Nebulization 3 milliLiter(s) Nebulizer every 6 hours  bisacodyl Suppository 10 milliGRAM(s) Rectal once PRN  brivaracetam 100 milliGRAM(s) Oral two times a day  calcium gluconate IVPB 1 Gram(s) IV Intermittent once  chlorhexidine 4% Liquid 1 Application(s) Topical daily  cloNIDine 0.2 milliGRAM(s) Oral every 8 hours  enoxaparin Injectable 40 milliGRAM(s) SubCutaneous <User Schedule>  HYDROmorphone  Injectable 0.25 milliGRAM(s) IV Push every 4 hours PRN  insulin lispro (ADMELOG) corrective regimen sliding scale   SubCutaneous every 6 hours  lacosamide IVPB 150 milliGRAM(s) IV Intermittent every 12 hours  levothyroxine 75 MICROGram(s) Oral daily  lisinopril 40 milliGRAM(s) Enteral Tube daily  multiple electrolytes Injection Type 1 1000 milliLiter(s) IV Continuous <Continuous>  nicotine - 21 mG/24Hr(s) Patch 1 Patch Transdermal daily  ondansetron Injectable 4 milliGRAM(s) IV Push every 6 hours PRN  oxyCODONE    Solution 5 milliGRAM(s) Oral every 4 hours PRN  oxyCODONE    Solution 10 milliGRAM(s) Oral every 6 hours PRN  polyethylene glycol 3350 17 Gram(s) Oral every 12 hours  potassium chloride   Solution 40 milliEquivalent(s) Oral once  senna 2 Tablet(s) Oral at bedtime        Exam unchanged from day rounds     labs and imaging reviewed     Continue same management   no seizures   rhonchi  npo   EVD minimal jay Pham NSCU attending  Night rounds   Patient seen and examined    T(C): 36.9 (06-14-23 @ 12:00), Max: 37.4 (06-14-23 @ 03:00)  HR: 102 (06-14-23 @ 18:31) (66 - 127)  BP: --  RR: 17 (06-14-23 @ 17:00) (12 - 20)  SpO2: 100% (06-14-23 @ 18:31) (93% - 100%)  06-13-23 @ 07:01  -  06-14-23 @ 07:00  --------------------------------------------------------  IN: 1078.2 mL / OUT: 2464 mL / NET: -1385.8 mL    06-14-23 @ 07:01  -  06-14-23 @ 18:34  --------------------------------------------------------  IN: 400 mL / OUT: 19 mL / NET: 381 mL    acetaminophen   Oral Liquid .. 650 milliGRAM(s) Oral every 6 hours PRN  acetylcysteine 20%  Inhalation 4 milliLiter(s) Inhalation every 12 hours  albuterol/ipratropium for Nebulization 3 milliLiter(s) Nebulizer every 6 hours  bisacodyl Suppository 10 milliGRAM(s) Rectal once PRN  brivaracetam 100 milliGRAM(s) Oral two times a day  calcium gluconate IVPB 1 Gram(s) IV Intermittent once  chlorhexidine 4% Liquid 1 Application(s) Topical daily  cloNIDine 0.2 milliGRAM(s) Oral every 8 hours  enoxaparin Injectable 40 milliGRAM(s) SubCutaneous <User Schedule>  HYDROmorphone  Injectable 0.25 milliGRAM(s) IV Push every 4 hours PRN  insulin lispro (ADMELOG) corrective regimen sliding scale   SubCutaneous every 6 hours  lacosamide IVPB 150 milliGRAM(s) IV Intermittent every 12 hours  levothyroxine 75 MICROGram(s) Oral daily  lisinopril 40 milliGRAM(s) Enteral Tube daily  multiple electrolytes Injection Type 1 1000 milliLiter(s) IV Continuous <Continuous>  nicotine - 21 mG/24Hr(s) Patch 1 Patch Transdermal daily  ondansetron Injectable 4 milliGRAM(s) IV Push every 6 hours PRN  oxyCODONE    Solution 5 milliGRAM(s) Oral every 4 hours PRN  oxyCODONE    Solution 10 milliGRAM(s) Oral every 6 hours PRN  polyethylene glycol 3350 17 Gram(s) Oral every 12 hours  potassium chloride   Solution 40 milliEquivalent(s) Oral once  senna 2 Tablet(s) Oral at bedtime        Exam unchanged from day rounds     labs and imaging reviewed       LABS:  Na: 143 (06-14 @ 15:11), 144 (06-14 @ 02:14), 145 (06-13 @ 10:23), 144 (06-12 @ 21:12), 143 (06-12 @ 08:45), 145 (06-12 @ 04:20)  K: 3.5 (06-14 @ 15:11), 3.5 (06-14 @ 02:14), 3.8 (06-13 @ 10:23), 3.8 (06-12 @ 21:12), 4.0 (06-12 @ 08:45), 4.2 (06-12 @ 04:20)  Cl: 108 (06-14 @ 15:11), 108 (06-14 @ 02:14), 112 (06-13 @ 10:23), 112 (06-12 @ 21:12), 112 (06-12 @ 08:45), 115 (06-12 @ 04:20)  CO2: 23 (06-14 @ 15:11), 23 (06-14 @ 02:14), 18 (06-13 @ 10:23), 20 (06-12 @ 21:12), 20 (06-12 @ 08:45), 18 (06-12 @ 04:20)  BUN: 10 (06-14 @ 15:11), 11 (06-14 @ 02:14), 14 (06-13 @ 10:23), 14 (06-12 @ 21:12), 11 (06-12 @ 08:45), 11 (06-12 @ 04:20)  Cr: 0.43 (06-14 @ 15:11), 0.45 (06-14 @ 02:14), 0.40 (06-13 @ 10:23), 0.43 (06-12 @ 21:12), 0.34 (06-12 @ 08:45), 0.35 (06-12 @ 04:20)  Glu: 114(06-14 @ 15:11), 142(06-14 @ 02:14), 161(06-13 @ 10:23), 206(06-12 @ 21:12), 156(06-12 @ 08:45), 148(06-12 @ 04:20)    Hgb: 8.9 (06-12 @ 04:20)  Hct: 26.8 (06-12 @ 04:20)  WBC: 9.37 (06-12 @ 04:20)  Plt: 231 (06-12 @ 04:20)    INR:   PTT:               A/P:  right ICH with midline shift and brain compression s/p emergent decompressive craniectomy   Neuro: neuro checks q 2 hr  status epilepticus on vimpat 100 mg bid and breviact , EEG no seizures   EVD at 10 cm water, 44 ml in 24 hr , would consider fast weaning    brain edema sodium goal 145-155    CV SBP goal 100-160 mmh   HTN on clonidine 0.2 q 8 hr, lisinopril 40 mg   Pulm extubated yesterday has rhonchi  npo for possible reintubation         [facility] Pham [facility]  Night rounds   Patient seen and examined    T(C): 36.9 (06-14-23 @ 12:00), Max: 37.4 (06-14-23 @ 03:00)  HR: 102 (06-14-23 @ 18:31) (66 - 127)  BP: --  RR: 17 (06-14-23 @ 17:00) (12 - 20)  SpO2: 100% (06-14-23 @ 18:31) (93% - 100%)  06-13-23 @ 07:01  -  06-14-23 @ 07:00  --------------------------------------------------------  IN: 1078.2 mL / OUT: 2464 mL / NET: -1385.8 mL    06-14-23 @ 07:01  -  06-14-23 @ 18:34  --------------------------------------------------------  IN: 400 mL / OUT: 19 mL / NET: 381 mL    acetaminophen   Oral Liquid .. 650 milliGRAM(s) Oral every 6 hours PRN  acetylcysteine 20%  Inhalation 4 milliLiter(s) Inhalation every 12 hours  albuterol/ipratropium for Nebulization 3 milliLiter(s) Nebulizer every 6 hours  bisacodyl Suppository 10 milliGRAM(s) Rectal once PRN  brivaracetam 100 milliGRAM(s) Oral two times a day  calcium gluconate IVPB 1 Gram(s) IV Intermittent once  chlorhexidine 4% Liquid 1 Application(s) Topical daily  cloNIDine 0.2 milliGRAM(s) Oral every 8 hours  enoxaparin Injectable 40 milliGRAM(s) SubCutaneous <User Schedule>  HYDROmorphone  Injectable 0.25 milliGRAM(s) IV Push every 4 hours PRN  insulin lispro (ADMELOG) corrective regimen sliding scale   SubCutaneous every 6 hours  lacosamide IVPB 150 milliGRAM(s) IV Intermittent every 12 hours  levothyroxine 75 MICROGram(s) Oral daily  lisinopril 40 milliGRAM(s) Enteral Tube daily  multiple electrolytes Injection Type 1 1000 milliLiter(s) IV Continuous <Continuous>  nicotine - 21 mG/24Hr(s) Patch 1 Patch Transdermal daily  ondansetron Injectable 4 milliGRAM(s) IV Push every 6 hours PRN  oxyCODONE    Solution 5 milliGRAM(s) Oral every 4 hours PRN  oxyCODONE    Solution 10 milliGRAM(s) Oral every 6 hours PRN  polyethylene glycol 3350 17 Gram(s) Oral every 12 hours  potassium chloride   Solution 40 milliEquivalent(s) Oral once  senna 2 Tablet(s) Oral at bedtime        Exam unchanged from day rounds    lethargic, opens eyes to command, follows commands including showing 2 fingers on the R, gaze midline,  able to lift R arm briefly AG, able to lift R leg if knee supported by examiner, L arm extends to noxious, L leg withdraws    labs and imaging reviewed       LABS:  Na: 143 (06-14 @ 15:11), 144 (06-14 @ 02:14), 145 (06-13 @ 10:23), 144 (06-12 @ 21:12), 143 (06-12 @ 08:45), 145 (06-12 @ 04:20)  K: 3.5 (06-14 @ 15:11), 3.5 (06-14 @ 02:14), 3.8 (06-13 @ 10:23), 3.8 (06-12 @ 21:12), 4.0 (06-12 @ 08:45), 4.2 (06-12 @ 04:20)  Cl: 108 (06-14 @ 15:11), 108 (06-14 @ 02:14), 112 (06-13 @ 10:23), 112 (06-12 @ 21:12), 112 (06-12 @ 08:45), 115 (06-12 @ 04:20)  CO2: 23 (06-14 @ 15:11), 23 (06-14 @ 02:14), 18 (06-13 @ 10:23), 20 (06-12 @ 21:12), 20 (06-12 @ 08:45), 18 (06-12 @ 04:20)  BUN: 10 (06-14 @ 15:11), 11 (06-14 @ 02:14), 14 (06-13 @ 10:23), 14 (06-12 @ 21:12), 11 (06-12 @ 08:45), 11 (06-12 @ 04:20)  Cr: 0.43 (06-14 @ 15:11), 0.45 (06-14 @ 02:14), 0.40 (06-13 @ 10:23), 0.43 (06-12 @ 21:12), 0.34 (06-12 @ 08:45), 0.35 (06-12 @ 04:20)  Glu: 114(06-14 @ 15:11), 142(06-14 @ 02:14), 161(06-13 @ 10:23), 206(06-12 @ 21:12), 156(06-12 @ 08:45), 148(06-12 @ 04:20)    Hgb: 8.9 (06-12 @ 04:20)  Hct: 26.8 (06-12 @ 04:20)  WBC: 9.37 (06-12 @ 04:20)  Plt: 231 (06-12 @ 04:20)    INR:   PTT:               A/P:  right ICH with midline shift and brain compression s/p emergent decompressive craniectomy   Neuro: neuro checks q 2 hr  status epilepticus on vimpat 100 mg bid and breviact , EEG no seizures   EVD at 10 cm water, 44 ml in 24 hr , would consider fast weaning  , no ouput last few hrs, NS informed, would consider clamping EVD  , her exam has been stable , keep ICP< 22 mmhg, CPP 65-75   brain edema sodium goal 145-155    CV SBP goal 100-160 mmhg  HTN on clonidine 0.2 q 8 hr, lisinopril 40 mg   Pulm extubated yesterday has rhonchi,  respiratory distress is on high flow, chest PT aggressive suctioning   npo for possible reintubation   plasmalyte at 50 ml/hr while NPO   lovenox 40 mg sc qhs for chemoprophylaxis       60 critical care time   [facility] Ph[facility] attending

## 2023-06-14 NOTE — PROGRESS NOTE ADULT - SUBJECTIVE AND OBJECTIVE BOX
SUBJECTIVE: Patient seen and examined at bedside with vascular neurology attending and team. She was extubated yesterday. Wife at bedside this AM, and provides additional information. She states the patient has a history of hypothyroidism for which she took synthroid, but does not have a prior history of spontaneous bleeding or blood clots.     INTERVAL HISTORY:  S/p emergent decompressive craniectomy 6/6. Cerebral angio performed 6/9 did not show evidence of vascular malformation. NSCU course c/b seizures 6/9 for which EEG was placed and noted show electroclinical and electrographic seizures form the the R frontal and posterior temporal regions. Patient currently on LEV 1g BID and LAC 150mg BID with no seizures recorded since 6/12/23 02:57.       MEDICATIONS:  MEDICATIONS  (STANDING):  albuterol/ipratropium for Nebulization 3 milliLiter(s) Nebulizer every 6 hours  bromocriptine Capsule 5 milliGRAM(s) Oral every 8 hours  chlorhexidine 4% Liquid 1 Application(s) Topical daily  enoxaparin Injectable 40 milliGRAM(s) SubCutaneous <User Schedule>  insulin lispro (ADMELOG) corrective regimen sliding scale   SubCutaneous every 6 hours  lacosamide IVPB 150 milliGRAM(s) IV Intermittent every 12 hours  levETIRAcetam  Solution 1000 milliGRAM(s) Oral two times a day  levothyroxine 75 MICROGram(s) Oral daily  lisinopril 40 milliGRAM(s) Enteral Tube daily  multiple electrolytes Injection Type 1 1000 milliLiter(s) (50 mL/Hr) IV Continuous <Continuous>  nicotine - 21 mG/24Hr(s) Patch 1 Patch Transdermal daily  pantoprazole  Injectable 40 milliGRAM(s) IV Push daily  polyethylene glycol 3350 17 Gram(s) Oral every 12 hours  senna 2 Tablet(s) Oral at bedtime    MEDICATIONS  (PRN):  acetaminophen   Oral Liquid .. 650 milliGRAM(s) Oral every 6 hours PRN Temp greater or equal to 38C (100.4F), Mild Pain (1 - 3)  bisacodyl Suppository 10 milliGRAM(s) Rectal once PRN Constipation  HYDROmorphone  Injectable 0.25 milliGRAM(s) IV Push every 4 hours PRN Pain  ondansetron Injectable 4 milliGRAM(s) IV Push every 6 hours PRN Nausea and/or Vomiting  oxyCODONE    Solution 10 milliGRAM(s) Oral every 6 hours PRN Severe Pain (7 - 10)  oxyCODONE    Solution 5 milliGRAM(s) Oral every 4 hours PRN Moderate Pain (4 - 6)      VITALS & EXAMINATION:  Vital Signs Last 24 Hrs  T(C): 36.9 (14 Jun 2023 12:00), Max: 37.4 (14 Jun 2023 03:00)  T(F): 98.4 (14 Jun 2023 12:00), Max: 99.3 (14 Jun 2023 03:00)  HR: 68 (14 Jun 2023 15:00) (68 - 135)  RR: 14 (14 Jun 2023 15:00) (12 - 22)  SpO2: 98% (14 Jun 2023 15:00) (93% - 100%)    Parameters below as of 14 Jun 2023 11:37  Patient On (Oxygen Delivery Method): nasal cannula, high flow    General - Laying in bed, NAD.   Cardiovascular - peripheral pulses palpable, no edema  Respiratory - on HFNC.     Neurologic Exam:  Mental status - Eyes initially closed, open to voice, after which is awake and attentive to examiner. Follows verbal commands.   Cranial nerves - PERRL (4->3mm OU), EOMs grossly intact, no gross facial asymmetry however difficult to assess with HFNC, hearing grossly intact to conversation.   Motor - Moves all extremities to noxious stimulation.   Sensation - Grimaces to noxious stimulation.   Coordination/Gait - unable to assess.      LABS:  CBC   Chem 06-14    143  |  108  |  10  ----------------------------<  114<H>  3.5   |  23  |  0.43<L>    Ca    8.2<L>      14 Jun 2023 15:11  Phos  2.9     06-14  Mg     2.2     06-14        STUDIES & IMAGING:    CT Brain Stroke Protocol (06.06.23 @ 14:53): Very large right frontal parenchymal hemorrhage with intraventricular extension and mild left-sided hydrocephalus, midline shift to the left. CTA of the head and neck reveals no aneurysms or AVM. However the large parenchymal hemorrhage may compress an underlying vascular malformation or fistula.    CT Head No Cont:  (08 Jun 2023 08:39): Right hemicraniectomy. Right sided ventricular catheter with its tip in the left basal ganglia region. Right frontal parenchymal hematoma with air and edema . No significant interval change compared with the prior allowing for slight better visualization of the ventricles. No hydrocephalus    CT Chest, Abdomen and Pelvis No cont (6/8): No evidence of acute traumatic pathology in the chest, abdomen, or pelvis within the limitations of noncontrast exam.    CT Head No Cont:  (09 Jun 2023 21:56): Decrease in pneumocephalus. Otherwise No significant interval change    TTE (6/8): EF 57%, regional WMA c/w IHD (Basal and mid inferior septum, basal and mid inferior wall, and mid inferolateral segment are abnormal).    CTH (6/9): Similar-appearing R frontal IPH w/ surrounding edema and mildly decreased pneumocephalus. Redemonstration of layering IVH, predominantly seen in the occipital horn of the L lateral ventricle. Leftward midline shift measures 3mm, decreased from prior and measured 3.5mm.    MRA H/N 6/13: Patent cervical vasculature. Mild to moderate intracranial atherosclerosis of the PCAs.     MRI brain 6/13: Large R IPH s/p evacuation and craniectomy. Superficial surgical drain in place. Ventricular catheter in place. No adverse interval change CT head 6/9/2023.    CT Head No Cont:  (14 Jun 2023 08:57): Significantly limited by motion streak artifact. Interval resolution of small hemorrhage in the region of the left foramen of Alejandro and anterior third ventricle. Otherwise no significant interval change. Ventricles similar in size. No hydrocephalus.

## 2023-06-14 NOTE — PROGRESS NOTE ADULT - SUBJECTIVE AND OBJECTIVE BOX
Interval events:  NAEO.   VEEG with no seizure overnight.   Afebrile.   EVD at 10cm H2O, output 83cc for 24 hours.     VITALS:  T(C): , Max: 37.4 (06-14-23 @ 03:00)  HR:  (57 - 135)  BP: --  ABP:  (103/56 - 208/114)  RR:  (13 - 22)  SpO2:  (96% - 100%)  Wt(kg): --      06-13-23 @ 07:01  -  06-14-23 @ 07:00  --------------------------------------------------------  IN: 1078.2 mL / OUT: 2461 mL / NET: -1382.8 mL      LABS:  Na: 144 (06-14 @ 02:14), 145 (06-13 @ 10:23), 144 (06-12 @ 21:12), 143 (06-12 @ 08:45), 145 (06-12 @ 04:20), 151 (06-11 @ 08:22)  K: 3.5 (06-14 @ 02:14), 3.8 (06-13 @ 10:23), 3.8 (06-12 @ 21:12), 4.0 (06-12 @ 08:45), 4.2 (06-12 @ 04:20), 3.2 (06-11 @ 08:22)  Cl: 108 (06-14 @ 02:14), 112 (06-13 @ 10:23), 112 (06-12 @ 21:12), 112 (06-12 @ 08:45), 115 (06-12 @ 04:20), 123 (06-11 @ 08:22)  CO2: 23 (06-14 @ 02:14), 18 (06-13 @ 10:23), 20 (06-12 @ 21:12), 20 (06-12 @ 08:45), 18 (06-12 @ 04:20), 14 (06-11 @ 08:22)  BUN: 11 (06-14 @ 02:14), 14 (06-13 @ 10:23), 14 (06-12 @ 21:12), 11 (06-12 @ 08:45), 11 (06-12 @ 04:20), 11 (06-11 @ 08:22)  Cr: 0.45 (06-14 @ 02:14), 0.40 (06-13 @ 10:23), 0.43 (06-12 @ 21:12), 0.34 (06-12 @ 08:45), 0.35 (06-12 @ 04:20), 0.31 (06-11 @ 08:22)  Glu: 142(06-14 @ 02:14), 161(06-13 @ 10:23), 206(06-12 @ 21:12), 156(06-12 @ 08:45), 148(06-12 @ 04:20), 102(06-11 @ 08:22)    Hgb: 8.9 (06-12 @ 04:20)  Hct: 26.8 (06-12 @ 04:20)  WBC: 9.37 (06-12 @ 04:20)  Plt: 231 (06-12 @ 04:20)    MEDICATIONS:  acetaminophen   Oral Liquid .. 650 milliGRAM(s) Oral every 6 hours PRN  albuterol/ipratropium for Nebulization 3 milliLiter(s) Nebulizer every 6 hours  bisacodyl Suppository 10 milliGRAM(s) Rectal once PRN  chlorhexidine 4% Liquid 1 Application(s) Topical daily  dexMEDEtomidine Infusion 0.2 MICROgram(s)/kG/Hr IV Continuous <Continuous>  enoxaparin Injectable 40 milliGRAM(s) SubCutaneous <User Schedule>  insulin lispro (ADMELOG) corrective regimen sliding scale   SubCutaneous every 6 hours  lacosamide IVPB 150 milliGRAM(s) IV Intermittent every 12 hours  levETIRAcetam  Solution 1000 milliGRAM(s) Oral two times a day  levothyroxine 75 MICROGram(s) Oral daily  lisinopril 40 milliGRAM(s) Enteral Tube daily  multiple electrolytes Injection Type 1 1000 milliLiter(s) IV Continuous <Continuous>  niCARdipine Infusion 5 mG/Hr IV Continuous <Continuous>  nicotine - 21 mG/24Hr(s) Patch 1 Patch Transdermal daily  ondansetron Injectable 4 milliGRAM(s) IV Push every 6 hours PRN  oxyCODONE    Solution 10 milliGRAM(s) Oral every 6 hours PRN  oxyCODONE    Solution 5 milliGRAM(s) Oral every 4 hours PRN  pantoprazole  Injectable 40 milliGRAM(s) IV Push daily  polyethylene glycol 3350 17 Gram(s) Oral every 12 hours  senna 2 Tablet(s) Oral at bedtime    EXAMINATION: Precedex briefly off   General:  in NAD  HEENT: intubated   Neuro: lethargic, opens eyes to command, follows commands including showing 2 fingers on the R, gaze midline, face symmetric, able to lift R arm briefly AG, able to lift R leg if knee supported by examiner, L arm extends to noxious, L leg withdraws   Cards:  RRR  Respiratory:  no respiratory distress  Abdomen:  soft  Extremities:  no LE edema    Assessment/Plan:   37 yo woman with h/o HTN, admitted 6/6 with 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 CTA and angiogram 6/9 with no vascular malformation. ICH score 4.   Hospital course c/b seizures on VEEG with onset from R frontal region, now resolved.     Neuro:   neuro checks q2h  EVD at 10 cm H2O, minimal output, keep ICP< 22 mmhg, CPP> 60. Consider raising vs clamping EVD  c/w VEEG  c/w Keppra 1g BID and Vimpat to 150 mg BID  precedex as needed for sedation   MRI brain w/wo today     CV:  SBP goal 100-160, c/w lisinopril 40 mg daily     Respiratory:   CPAP 5/5 as tolerated  send ABG  c/w dex 10 mg q8h  ENT consulted to scope for lack of cuff leak     GI:   NG, NPO for potential extubation   PPI  senna and miralax, LBM 6/13    Renal:   start IVF while NPO  Na goal normonatremia     Heme/Onc:     SCDs, Lov ppx     Endocrine:   FS goal 120-180, medium ISS  synthroid    ID: afebrile   monitor off abx     Patient seen and examined by attending on 6/14/2023.    Patient is critically ill due to ICH and at high risk for neurological deterioration or death due to: EVD for CSF diversion, respiratory failure 2/2 neurologic injury requiring mechanical ventilation.    Interval events:  NAEO.   VEEG with no seizure overnight.   Afebrile but intermittently tachycardic and hypertensive.   EVD at 10cm H2O, output 64cc for 24 hours.     VITALS:  T(C): , Max: 37.4 (06-14-23 @ 03:00)  HR:  (57 - 135)  BP: --  ABP:  (103/56 - 208/114)  RR:  (13 - 22)  SpO2:  (96% - 100%)  Wt(kg): --      06-13-23 @ 07:01  -  06-14-23 @ 07:00  --------------------------------------------------------  IN: 1078.2 mL / OUT: 2461 mL / NET: -1382.8 mL      LABS:  Na: 144 (06-14 @ 02:14), 145 (06-13 @ 10:23), 144 (06-12 @ 21:12), 143 (06-12 @ 08:45), 145 (06-12 @ 04:20), 151 (06-11 @ 08:22)  K: 3.5 (06-14 @ 02:14), 3.8 (06-13 @ 10:23), 3.8 (06-12 @ 21:12), 4.0 (06-12 @ 08:45), 4.2 (06-12 @ 04:20), 3.2 (06-11 @ 08:22)  Cl: 108 (06-14 @ 02:14), 112 (06-13 @ 10:23), 112 (06-12 @ 21:12), 112 (06-12 @ 08:45), 115 (06-12 @ 04:20), 123 (06-11 @ 08:22)  CO2: 23 (06-14 @ 02:14), 18 (06-13 @ 10:23), 20 (06-12 @ 21:12), 20 (06-12 @ 08:45), 18 (06-12 @ 04:20), 14 (06-11 @ 08:22)  BUN: 11 (06-14 @ 02:14), 14 (06-13 @ 10:23), 14 (06-12 @ 21:12), 11 (06-12 @ 08:45), 11 (06-12 @ 04:20), 11 (06-11 @ 08:22)  Cr: 0.45 (06-14 @ 02:14), 0.40 (06-13 @ 10:23), 0.43 (06-12 @ 21:12), 0.34 (06-12 @ 08:45), 0.35 (06-12 @ 04:20), 0.31 (06-11 @ 08:22)  Glu: 142(06-14 @ 02:14), 161(06-13 @ 10:23), 206(06-12 @ 21:12), 156(06-12 @ 08:45), 148(06-12 @ 04:20), 102(06-11 @ 08:22)    Hgb: 8.9 (06-12 @ 04:20)  Hct: 26.8 (06-12 @ 04:20)  WBC: 9.37 (06-12 @ 04:20)  Plt: 231 (06-12 @ 04:20)    MEDICATIONS:  acetaminophen   Oral Liquid .. 650 milliGRAM(s) Oral every 6 hours PRN  albuterol/ipratropium for Nebulization 3 milliLiter(s) Nebulizer every 6 hours  bisacodyl Suppository 10 milliGRAM(s) Rectal once PRN  chlorhexidine 4% Liquid 1 Application(s) Topical daily  dexMEDEtomidine Infusion 0.2 MICROgram(s)/kG/Hr IV Continuous <Continuous>  enoxaparin Injectable 40 milliGRAM(s) SubCutaneous <User Schedule>  insulin lispro (ADMELOG) corrective regimen sliding scale   SubCutaneous every 6 hours  lacosamide IVPB 150 milliGRAM(s) IV Intermittent every 12 hours  levETIRAcetam  Solution 1000 milliGRAM(s) Oral two times a day  levothyroxine 75 MICROGram(s) Oral daily  lisinopril 40 milliGRAM(s) Enteral Tube daily  multiple electrolytes Injection Type 1 1000 milliLiter(s) IV Continuous <Continuous>  niCARdipine Infusion 5 mG/Hr IV Continuous <Continuous>  nicotine - 21 mG/24Hr(s) Patch 1 Patch Transdermal daily  ondansetron Injectable 4 milliGRAM(s) IV Push every 6 hours PRN  oxyCODONE    Solution 10 milliGRAM(s) Oral every 6 hours PRN  oxyCODONE    Solution 5 milliGRAM(s) Oral every 4 hours PRN  pantoprazole  Injectable 40 milliGRAM(s) IV Push daily  polyethylene glycol 3350 17 Gram(s) Oral every 12 hours  senna 2 Tablet(s) Oral at bedtime    EXAMINATION: Precedex briefly off   General:  in NAD  HEENT: intubated   Neuro: lethargic, opens eyes to command, follows commands including showing 2 fingers on the R, gaze midline, face symmetric, able to lift R arm briefly AG, able to lift R leg if knee supported by examiner, L arm extends to noxious, L leg withdraws   Cards:  RRR  Respiratory:  with slight tachypnea and rhonchi R>L   Abdomen:  soft  Extremities:  no LE edema    Assessment/Plan:   37 yo woman with h/o HTN, admitted 6/6 with 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 CTA and angiogram 6/9 with no vascular malformation. MRI brain 6/13 without any brain underlying brain mass. ICH score 4.   Hospital course c/b seizures on VEEG with onset from R frontal region, now resolved.   The tachycardia and hypertension could be 2/2 Precedex withdrawal, will start clonidine taper.     Neuro:   neuro checks q2h  EVD at 10 cm H2O, minimal output, keep ICP< 22 mmhg, CPP> 60. Consider raising vs clamping EVD  c/w VEEG  change Keppra to Briviact 100 gm BID for anxiety, c/w Vimpat to 150 mg BID  clonidine 0.2 gm q8h for concern for Precedex withdrawal   tylenol, oxy and Dilaudid for pain   CT CAP per stroke for CH w/u     CV:  SBP goal 100-160, c/w lisinopril 40 mg daily     Respiratory:   CPAP 5/5 as tolerated  send ABG  c/w dex 10 mg q8h  ENT consulted to scope for lack of cuff leak     GI:   NG, NPO until tomorrow for respiratory status   PPI  senna and miralax, LBM 6/13    Renal:   Ns at 50c/hr while NPO   Na goal normonatremia     Heme/Onc:     SCDs, Lov ppx     Endocrine:   FS goal 120-180, medium ISS  synthroid    ID: afebrile   monitor off abx     Patient seen and examined by attending on 6/14/2023.    Patient is critically ill due to ICH and at high risk for neurological deterioration or death due to: EVD for CSF diversion, respiratory failure 2/2 neurologic injury requiring mechanical ventilation.    Interval events:  NAEO.   VEEG with no seizure overnight.   Afebrile but intermittently tachycardic and hypertensive.   EVD at 10cm H2O, output 64cc for 24 hours.     VITALS:  T(C): , Max: 37.4 (06-14-23 @ 03:00)  HR:  (57 - 135)  BP: --  ABP:  (103/56 - 208/114)  RR:  (13 - 22)  SpO2:  (96% - 100%)  Wt(kg): --      06-13-23 @ 07:01  -  06-14-23 @ 07:00  --------------------------------------------------------  IN: 1078.2 mL / OUT: 2461 mL / NET: -1382.8 mL      LABS:  Na: 144 (06-14 @ 02:14), 145 (06-13 @ 10:23), 144 (06-12 @ 21:12), 143 (06-12 @ 08:45), 145 (06-12 @ 04:20), 151 (06-11 @ 08:22)  K: 3.5 (06-14 @ 02:14), 3.8 (06-13 @ 10:23), 3.8 (06-12 @ 21:12), 4.0 (06-12 @ 08:45), 4.2 (06-12 @ 04:20), 3.2 (06-11 @ 08:22)  Cl: 108 (06-14 @ 02:14), 112 (06-13 @ 10:23), 112 (06-12 @ 21:12), 112 (06-12 @ 08:45), 115 (06-12 @ 04:20), 123 (06-11 @ 08:22)  CO2: 23 (06-14 @ 02:14), 18 (06-13 @ 10:23), 20 (06-12 @ 21:12), 20 (06-12 @ 08:45), 18 (06-12 @ 04:20), 14 (06-11 @ 08:22)  BUN: 11 (06-14 @ 02:14), 14 (06-13 @ 10:23), 14 (06-12 @ 21:12), 11 (06-12 @ 08:45), 11 (06-12 @ 04:20), 11 (06-11 @ 08:22)  Cr: 0.45 (06-14 @ 02:14), 0.40 (06-13 @ 10:23), 0.43 (06-12 @ 21:12), 0.34 (06-12 @ 08:45), 0.35 (06-12 @ 04:20), 0.31 (06-11 @ 08:22)  Glu: 142(06-14 @ 02:14), 161(06-13 @ 10:23), 206(06-12 @ 21:12), 156(06-12 @ 08:45), 148(06-12 @ 04:20), 102(06-11 @ 08:22)    Hgb: 8.9 (06-12 @ 04:20)  Hct: 26.8 (06-12 @ 04:20)  WBC: 9.37 (06-12 @ 04:20)  Plt: 231 (06-12 @ 04:20)    MEDICATIONS:  acetaminophen   Oral Liquid .. 650 milliGRAM(s) Oral every 6 hours PRN  albuterol/ipratropium for Nebulization 3 milliLiter(s) Nebulizer every 6 hours  bisacodyl Suppository 10 milliGRAM(s) Rectal once PRN  chlorhexidine 4% Liquid 1 Application(s) Topical daily  dexMEDEtomidine Infusion 0.2 MICROgram(s)/kG/Hr IV Continuous <Continuous>  enoxaparin Injectable 40 milliGRAM(s) SubCutaneous <User Schedule>  insulin lispro (ADMELOG) corrective regimen sliding scale   SubCutaneous every 6 hours  lacosamide IVPB 150 milliGRAM(s) IV Intermittent every 12 hours  levETIRAcetam  Solution 1000 milliGRAM(s) Oral two times a day  levothyroxine 75 MICROGram(s) Oral daily  lisinopril 40 milliGRAM(s) Enteral Tube daily  multiple electrolytes Injection Type 1 1000 milliLiter(s) IV Continuous <Continuous>  niCARdipine Infusion 5 mG/Hr IV Continuous <Continuous>  nicotine - 21 mG/24Hr(s) Patch 1 Patch Transdermal daily  ondansetron Injectable 4 milliGRAM(s) IV Push every 6 hours PRN  oxyCODONE    Solution 10 milliGRAM(s) Oral every 6 hours PRN  oxyCODONE    Solution 5 milliGRAM(s) Oral every 4 hours PRN  pantoprazole  Injectable 40 milliGRAM(s) IV Push daily  polyethylene glycol 3350 17 Gram(s) Oral every 12 hours  senna 2 Tablet(s) Oral at bedtime    EXAMINATION: Precedex briefly off   General:  in NAD  HEENT: intubated   Neuro: lethargic, opens eyes to command, follows commands including showing 2 fingers on the R, gaze midline, face symmetric except for R eye ptosis, able to lift R arm briefly AG, able to lift R leg if knee supported by examiner, L arm extends to noxious, L leg withdraws   Cards:  RRR  Respiratory:  with slight tachypnea and rhonchi R>L   Abdomen:  soft  Extremities:  no LE edema    Assessment/Plan:   37 yo woman with h/o HTN, admitted 6/6 with 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 CTA and angiogram 6/9 with no vascular malformation. MRI brain 6/13 without any brain underlying brain mass. ICH score 4.   Hospital course c/b seizures on VEEG with onset from R frontal region, now resolved.   The tachycardia and hypertension could be 2/2 Precedex withdrawal, will start clonidine taper.     Neuro:   neuro checks q2h  EVD at 10 cm H2O, minimal output, keep ICP< 22 mmhg, CPP> 60. Consider raising vs clamping EVD tomorrow   d/c VEEG  change Keppra to Briviact 100 mg BID for anxiety, c/w Vimpat to 150 mg BID  clonidine 0.2 gm q8h for concern for Precedex withdrawal   tylenol, oxy and Dilaudid for pain   CT CAP per stroke for ICH w/u     CV:  SBP goal 100-160, c/w lisinopril 40 mg daily     Respiratory:   on HFNC, wean as tolerated   c/w duo nebs, add mucomyst     GI:   NG, NPO until tomorrow for respiratory status   d/c PPI  senna and miralax, LBM 6/13    Renal:   NS at 50c/hr while NPO   Na goal normonatremia     Heme/Onc:     SCDs, Lov ppx     Endocrine:   FS goal 120-180, medium ISS  synthroid    ID: afebrile   monitor off abx     I updated the patient's wife at bedside today.     Patient seen and examined by attending on 6/14/2023.    Patient is critically ill due to ICH and at high risk for neurological deterioration or death due to: EVD for CSF diversion, respiratory failure 2/2 neurologic injury requiring HFNC.

## 2023-06-14 NOTE — EEG REPORT - NS EEG TEXT BOX
[facility]   COMPREHENSIVE EPILEPSY CENTER   REPORT OF CONTINUOUS VIDEO EEG     [facility]: [address] , [address], [place] [address], Ph#: [phone]  [facility]: [id number], [place] [address], Ph#: [phone]  Office: [address] [place] [address], [place] [address], [place] [address] Ph#: [phone]    Patient Name: BROOKE CAIN  Age and : 38y ([birth year])  MRN #: [id number]  Location: [place] [address]  Referring Physician: Jean Carlos Bernstein    Study Date: 23 - 23  Duration: 20 hr 37 min  _____________________________________________________________  STUDY INFORMATION    EEG Recording Technique:  The patient underwent continuous Video-EEG monitoring, using Telemetry System hardware on the XLTek Digital System. EEG and video data were stored on a computer hard drive with important events saved in digital archive files. The material was reviewed by a physician (electroencephalographer / epileptologist) on a daily basis. Alejandro and seizure detection algorithms were utilized and reviewed. An EEG Technician attended to the patient, and was available throughout daytime work hours.  The epilepsy center neurologist was available in person or on call 24-hours per day.    EEG Placement and Labeling of Electrodes:  The EEG was performed utilizing 20 channel referential EEG connections (coronal over temporal over parasagittal montage) using all standard 10-20 electrode placements with EKG, with additional electrodes placed in the inferior temporal region using the modified 10-10 montage electrode placements for elective admissions, or if deemed necessary. Recording was at a sampling rate of 256 samples per second per channel. Time synchronized digital video recording was done simultaneously with EEG recording. A low light infrared camera was used for low light recording.     _____________________________________________________________  HISTORY    Patient is a 38y old  Female who presents with a chief complaint of IPH (2023 07:48)      PERTINENT MEDICATION:  MEDICATIONS  (STANDING):  chlorhexidine 0.12% Liquid 15 milliLiter(s) Oral Mucosa every 12 hours  chlorhexidine 4% Liquid 1 Application(s) Topical daily  dexMEDEtomidine Infusion 0.2 MICROgram(s)/kG/Hr (3.82 mL/Hr) IV Continuous <Continuous>  enalapril 10 milliGRAM(s) Oral daily  enalaprilat Injectable 2.5 milliGRAM(s) IV Push once  enoxaparin Injectable 40 milliGRAM(s) SubCutaneous <User Schedule>  insulin lispro (ADMELOG) corrective regimen sliding scale   SubCutaneous every 6 hours  lacosamide IVPB 100 milliGRAM(s) IV Intermittent every 12 hours  levETIRAcetam  Solution 1000 milliGRAM(s) Oral two times a day  levothyroxine 75 MICROGram(s) Oral daily  nicotine - 21 mG/24Hr(s) Patch 1 Patch Transdermal daily  pantoprazole  Injectable 40 milliGRAM(s) IV Push daily  polyethylene glycol 3350 17 Gram(s) Oral every 12 hours  senna 2 Tablet(s) Oral at bedtime    _____________________________________________________________  INTERPRETATION    Findings: The background was continuous, spontaneously variable and reactive. During wakefulness, the posterior dominant rhythm consisted of an asymmetric (seen in left posterior quadrant), poorly-modulated 7-8 Hz activity, with amplitude to 30 uV, that attenuated to eye opening.  Low amplitude frontal beta was noted in wakefulness.    Background Slowing:  -Posterior dominant background slowing    Focal Slowing:   -Continuous polymorphic delta and theta slowing in the right hemisphere    Sleep Background:  Stage II sleep transients were not recorded.  Drowsiness and stage II sleep transients were not recorded.    Other Non-Epileptiform Findings:  None were present.    Interictal Epileptiform Activity:   None were present.    Events:  None    Artifacts:  Intermittent myogenic and movement artifacts were noted.    ECG:  The heart rate on single channel ECG was predominantly between 60-80 BPM.    _____________________________________________________________  EEG SUMMARY/CLASSIFICATION  Abnormal EEG in an encephalopathic patient.  -Continuous polymorphic delta and theta slowing in the right hemisphere  -Posterior dominant background slowing  _____________________________________________________________  EEG IMPRESSION/CLINICAL CORRELATE  Abnormal EEG study.  1. Structural abnormality in the right hemisphere.  2. Mild-moderate nonspecific diffuse or multifocal cerebral dysfunction.   3. No seizures were recorded.      Jaylon Carty MD  Neurology Attending Physician

## 2023-06-15 LAB
ANION GAP SERPL CALC-SCNC: 14 MMOL/L — SIGNIFICANT CHANGE UP (ref 5–17)
BUN SERPL-MCNC: 7 MG/DL — SIGNIFICANT CHANGE UP (ref 7–23)
CALCIUM SERPL-MCNC: 8.4 MG/DL — SIGNIFICANT CHANGE UP (ref 8.4–10.5)
CHLORIDE SERPL-SCNC: 107 MMOL/L — SIGNIFICANT CHANGE UP (ref 96–108)
CO2 SERPL-SCNC: 23 MMOL/L — SIGNIFICANT CHANGE UP (ref 22–31)
CREAT SERPL-MCNC: 0.42 MG/DL — LOW (ref 0.5–1.3)
EGFR: 128 ML/MIN/1.73M2 — SIGNIFICANT CHANGE UP
GLUCOSE BLDC GLUCOMTR-MCNC: 113 MG/DL — HIGH (ref 70–99)
GLUCOSE BLDC GLUCOMTR-MCNC: 116 MG/DL — HIGH (ref 70–99)
GLUCOSE BLDC GLUCOMTR-MCNC: 118 MG/DL — HIGH (ref 70–99)
GLUCOSE BLDC GLUCOMTR-MCNC: 120 MG/DL — HIGH (ref 70–99)
GLUCOSE SERPL-MCNC: 123 MG/DL — HIGH (ref 70–99)
MAGNESIUM SERPL-MCNC: 2 MG/DL — SIGNIFICANT CHANGE UP (ref 1.6–2.6)
PHOSPHATE SERPL-MCNC: 3.1 MG/DL — SIGNIFICANT CHANGE UP (ref 2.5–4.5)
POTASSIUM SERPL-MCNC: 3.7 MMOL/L — SIGNIFICANT CHANGE UP (ref 3.5–5.3)
POTASSIUM SERPL-SCNC: 3.7 MMOL/L — SIGNIFICANT CHANGE UP (ref 3.5–5.3)
SODIUM SERPL-SCNC: 144 MMOL/L — SIGNIFICANT CHANGE UP (ref 135–145)

## 2023-06-15 PROCEDURE — 99291 CRITICAL CARE FIRST HOUR: CPT

## 2023-06-15 PROCEDURE — 95718 EEG PHYS/QHP 2-12 HR W/VEEG: CPT

## 2023-06-15 PROCEDURE — 71045 X-RAY EXAM CHEST 1 VIEW: CPT | Mod: 26

## 2023-06-15 PROCEDURE — 71260 CT THORAX DX C+: CPT | Mod: 26

## 2023-06-15 PROCEDURE — 74177 CT ABD & PELVIS W/CONTRAST: CPT | Mod: 26

## 2023-06-15 RX ORDER — AMLODIPINE BESYLATE 2.5 MG/1
5 TABLET ORAL DAILY
Refills: 0 | Status: DISCONTINUED | OUTPATIENT
Start: 2023-06-16 | End: 2023-06-21

## 2023-06-15 RX ORDER — LABETALOL HCL 100 MG
100 TABLET ORAL EVERY 8 HOURS
Refills: 0 | Status: DISCONTINUED | OUTPATIENT
Start: 2023-06-15 | End: 2023-06-15

## 2023-06-15 RX ORDER — LABETALOL HCL 100 MG
5 TABLET ORAL ONCE
Refills: 0 | Status: COMPLETED | OUTPATIENT
Start: 2023-06-15 | End: 2023-06-15

## 2023-06-15 RX ORDER — POTASSIUM CHLORIDE 20 MEQ
40 PACKET (EA) ORAL ONCE
Refills: 0 | Status: COMPLETED | OUTPATIENT
Start: 2023-06-15 | End: 2023-06-15

## 2023-06-15 RX ADMIN — Medication 3 MILLILITER(S): at 05:24

## 2023-06-15 RX ADMIN — BRIVARACETAM 100 MILLIGRAM(S): 25 TABLET, FILM COATED ORAL at 18:58

## 2023-06-15 RX ADMIN — BRIVARACETAM 100 MILLIGRAM(S): 25 TABLET, FILM COATED ORAL at 05:09

## 2023-06-15 RX ADMIN — Medication 0.2 MILLIGRAM(S): at 06:00

## 2023-06-15 RX ADMIN — OXYCODONE HYDROCHLORIDE 10 MILLIGRAM(S): 5 TABLET ORAL at 02:00

## 2023-06-15 RX ADMIN — OXYCODONE HYDROCHLORIDE 10 MILLIGRAM(S): 5 TABLET ORAL at 01:30

## 2023-06-15 RX ADMIN — Medication 0.2 MILLIGRAM(S): at 21:38

## 2023-06-15 RX ADMIN — Medication 0.2 MILLIGRAM(S): at 15:42

## 2023-06-15 RX ADMIN — Medication 3 MILLILITER(S): at 11:25

## 2023-06-15 RX ADMIN — LISINOPRIL 40 MILLIGRAM(S): 2.5 TABLET ORAL at 05:09

## 2023-06-15 RX ADMIN — Medication 75 MICROGRAM(S): at 05:09

## 2023-06-15 RX ADMIN — Medication 1 PATCH: at 19:20

## 2023-06-15 RX ADMIN — Medication 5 MILLIGRAM(S): at 23:33

## 2023-06-15 RX ADMIN — Medication 4 MILLILITER(S): at 05:30

## 2023-06-15 RX ADMIN — SODIUM CHLORIDE 50 MILLILITER(S): 9 INJECTION, SOLUTION INTRAVENOUS at 21:38

## 2023-06-15 RX ADMIN — OXYCODONE HYDROCHLORIDE 10 MILLIGRAM(S): 5 TABLET ORAL at 23:58

## 2023-06-15 RX ADMIN — CHLORHEXIDINE GLUCONATE 1 APPLICATION(S): 213 SOLUTION TOPICAL at 21:51

## 2023-06-15 RX ADMIN — Medication 3 MILLILITER(S): at 23:33

## 2023-06-15 RX ADMIN — Medication 3 MILLILITER(S): at 17:21

## 2023-06-15 RX ADMIN — LACOSAMIDE 130 MILLIGRAM(S): 50 TABLET ORAL at 18:55

## 2023-06-15 RX ADMIN — ENOXAPARIN SODIUM 40 MILLIGRAM(S): 100 INJECTION SUBCUTANEOUS at 18:53

## 2023-06-15 RX ADMIN — Medication 3 MILLILITER(S): at 00:03

## 2023-06-15 RX ADMIN — Medication 40 MILLIEQUIVALENT(S): at 19:20

## 2023-06-15 RX ADMIN — Medication 1 PATCH: at 12:59

## 2023-06-15 RX ADMIN — CHLORHEXIDINE GLUCONATE 1 APPLICATION(S): 213 SOLUTION TOPICAL at 01:30

## 2023-06-15 RX ADMIN — Medication 4 MILLILITER(S): at 17:21

## 2023-06-15 RX ADMIN — LACOSAMIDE 130 MILLIGRAM(S): 50 TABLET ORAL at 05:09

## 2023-06-15 RX ADMIN — Medication 1 PATCH: at 07:00

## 2023-06-15 RX ADMIN — Medication 1 PATCH: at 13:00

## 2023-06-15 NOTE — PROGRESS NOTE ADULT - SUBJECTIVE AND OBJECTIVE BOX
Interval events:  NAEO.   VEEG with no seizure overnight.   Afebrile but intermittently tachycardic and hypertensive.   EVD at 10cm H2O, output 64cc for 24 hours.     VITALS:  T(C): , Max: 37.3 (06-14-23 @ 19:00)  HR:  (66 - 122)  BP: --  ABP:  (107/64 - 195/104)  RR:  (12 - 20)  SpO2:  (93% - 100%)  Wt(kg): --      06-14-23 @ 07:01  -  06-15-23 @ 07:00  --------------------------------------------------------  IN: 1280 mL / OUT: 1882 mL / NET: -602 mL      LABS:  Na: 143 (06-14 @ 22:49), 143 (06-14 @ 15:11), 144 (06-14 @ 02:14), 145 (06-13 @ 10:23), 144 (06-12 @ 21:12), 143 (06-12 @ 08:45)  K: 4.1 (06-14 @ 22:49), 3.5 (06-14 @ 15:11), 3.5 (06-14 @ 02:14), 3.8 (06-13 @ 10:23), 3.8 (06-12 @ 21:12), 4.0 (06-12 @ 08:45)  Cl: 107 (06-14 @ 22:49), 108 (06-14 @ 15:11), 108 (06-14 @ 02:14), 112 (06-13 @ 10:23), 112 (06-12 @ 21:12), 112 (06-12 @ 08:45)  CO2: 21 (06-14 @ 22:49), 23 (06-14 @ 15:11), 23 (06-14 @ 02:14), 18 (06-13 @ 10:23), 20 (06-12 @ 21:12), 20 (06-12 @ 08:45)  BUN: 8 (06-14 @ 22:49), 10 (06-14 @ 15:11), 11 (06-14 @ 02:14), 14 (06-13 @ 10:23), 14 (06-12 @ 21:12), 11 (06-12 @ 08:45)  Cr: 0.40 (06-14 @ 22:49), 0.43 (06-14 @ 15:11), 0.45 (06-14 @ 02:14), 0.40 (06-13 @ 10:23), 0.43 (06-12 @ 21:12), 0.34 (06-12 @ 08:45)  Glu: 115(06-14 @ 22:49), 114(06-14 @ 15:11), 142(06-14 @ 02:14), 161(06-13 @ 10:23), 206(06-12 @ 21:12), 156(06-12 @ 08:45)    Hgb: 10.5 (06-14 @ 22:49)  Hct: 32.3 (06-14 @ 22:49)  WBC: 17.91 (06-14 @ 22:49)  Plt: 423 (06-14 @ 22:49)    MEDICATIONS:  acetaminophen   Oral Liquid .. 650 milliGRAM(s) Oral every 6 hours PRN  acetylcysteine 20%  Inhalation 4 milliLiter(s) Inhalation every 12 hours  albuterol/ipratropium for Nebulization 3 milliLiter(s) Nebulizer every 6 hours  bisacodyl Suppository 10 milliGRAM(s) Rectal once PRN  brivaracetam 100 milliGRAM(s) Oral two times a day  chlorhexidine 4% Liquid 1 Application(s) Topical daily  cloNIDine 0.2 milliGRAM(s) Oral every 8 hours  enoxaparin Injectable 40 milliGRAM(s) SubCutaneous <User Schedule>  HYDROmorphone  Injectable 0.25 milliGRAM(s) IV Push every 4 hours PRN  insulin lispro (ADMELOG) corrective regimen sliding scale   SubCutaneous every 6 hours  lacosamide IVPB 150 milliGRAM(s) IV Intermittent every 12 hours  levothyroxine 75 MICROGram(s) Oral daily  lisinopril 40 milliGRAM(s) Enteral Tube daily  multiple electrolytes Injection Type 1 1000 milliLiter(s) IV Continuous <Continuous>  nicotine - 21 mG/24Hr(s) Patch 1 Patch Transdermal daily  ondansetron Injectable 4 milliGRAM(s) IV Push every 6 hours PRN  oxyCODONE    Solution 10 milliGRAM(s) Oral every 6 hours PRN  polyethylene glycol 3350 17 Gram(s) Oral every 12 hours  senna 2 Tablet(s) Oral at bedtime    EXAMINATION: Precedex briefly off   General:  in NAD  HEENT: intubated   Neuro: lethargic, opens eyes to command, follows commands including showing 2 fingers on the R, gaze midline, face symmetric except for R eye ptosis, able to lift R arm briefly AG, able to lift R leg if knee supported by examiner, L arm extends to noxious, L leg withdraws   Cards:  RRR  Respiratory:  with slight tachypnea and rhonchi R>L   Abdomen:  soft  Extremities:  no LE edema    Assessment/Plan:   39 yo woman with h/o HTN, admitted 6/6 with 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 CTA and angiogram 6/9 with no vascular malformation. MRI brain 6/13 without any brain underlying brain mass. ICH score 4.   Hospital course c/b seizures on VEEG with onset from R frontal region, now resolved.   The tachycardia and hypertension could be 2/2 Precedex withdrawal, will start clonidine taper.     Neuro:   neuro checks q2h  EVD at 10 cm H2O, minimal output, keep ICP< 22 mmhg, CPP> 60. Consider raising vs clamping EVD tomorrow   d/c VEEG  change Keppra to Briviact 100 mg BID for anxiety, c/w Vimpat to 150 mg BID  clonidine 0.2 gm q8h for concern for Precedex withdrawal   tylenol, oxy and Dilaudid for pain   CT CAP per stroke for ICH w/u     CV:  SBP goal 100-160, c/w lisinopril 40 mg daily     Respiratory:   on HFNC, wean as tolerated   c/w duo nebs, add mucomyst     GI:   NG, NPO until tomorrow for respiratory status   d/c PPI  senna and miralax, LBM 6/13    Renal:   NS at 50c/hr while NPO   Na goal normonatremia     Heme/Onc:     SCDs, Lov ppx     Endocrine:   FS goal 120-180, medium ISS  synthroid    ID: afebrile   monitor off abx     I updated the patient's wife at bedside today.     Patient seen and examined by attending on 6/15/2023.    Patient is critically ill due to ICH and at high risk for neurological deterioration or death due to: EVD for CSF diversion, respiratory failure 2/2 neurologic injury requiring HFNC.      Interval events:  NAEO.   Afebrile. Tachycardia and hypertension improved with clonidine.   EVD clamped this AM.     VITALS:  T(C): , Max: 37.3 (06-14-23 @ 19:00)  HR:  (66 - 122)  BP: --  ABP:  (107/64 - 195/104)  RR:  (12 - 20)  SpO2:  (93% - 100%)  Wt(kg): --      06-14-23 @ 07:01  -  06-15-23 @ 07:00  --------------------------------------------------------  IN: 1280 mL / OUT: 1882 mL / NET: -602 mL      LABS:  Na: 143 (06-14 @ 22:49), 143 (06-14 @ 15:11), 144 (06-14 @ 02:14), 145 (06-13 @ 10:23), 144 (06-12 @ 21:12), 143 (06-12 @ 08:45)  K: 4.1 (06-14 @ 22:49), 3.5 (06-14 @ 15:11), 3.5 (06-14 @ 02:14), 3.8 (06-13 @ 10:23), 3.8 (06-12 @ 21:12), 4.0 (06-12 @ 08:45)  Cl: 107 (06-14 @ 22:49), 108 (06-14 @ 15:11), 108 (06-14 @ 02:14), 112 (06-13 @ 10:23), 112 (06-12 @ 21:12), 112 (06-12 @ 08:45)  CO2: 21 (06-14 @ 22:49), 23 (06-14 @ 15:11), 23 (06-14 @ 02:14), 18 (06-13 @ 10:23), 20 (06-12 @ 21:12), 20 (06-12 @ 08:45)  BUN: 8 (06-14 @ 22:49), 10 (06-14 @ 15:11), 11 (06-14 @ 02:14), 14 (06-13 @ 10:23), 14 (06-12 @ 21:12), 11 (06-12 @ 08:45)  Cr: 0.40 (06-14 @ 22:49), 0.43 (06-14 @ 15:11), 0.45 (06-14 @ 02:14), 0.40 (06-13 @ 10:23), 0.43 (06-12 @ 21:12), 0.34 (06-12 @ 08:45)  Glu: 115(06-14 @ 22:49), 114(06-14 @ 15:11), 142(06-14 @ 02:14), 161(06-13 @ 10:23), 206(06-12 @ 21:12), 156(06-12 @ 08:45)    Hgb: 10.5 (06-14 @ 22:49)  Hct: 32.3 (06-14 @ 22:49)  WBC: 17.91 (06-14 @ 22:49)  Plt: 423 (06-14 @ 22:49)    MEDICATIONS:  acetaminophen   Oral Liquid .. 650 milliGRAM(s) Oral every 6 hours PRN  acetylcysteine 20%  Inhalation 4 milliLiter(s) Inhalation every 12 hours  albuterol/ipratropium for Nebulization 3 milliLiter(s) Nebulizer every 6 hours  bisacodyl Suppository 10 milliGRAM(s) Rectal once PRN  brivaracetam 100 milliGRAM(s) Oral two times a day  chlorhexidine 4% Liquid 1 Application(s) Topical daily  cloNIDine 0.2 milliGRAM(s) Oral every 8 hours  enoxaparin Injectable 40 milliGRAM(s) SubCutaneous <User Schedule>  HYDROmorphone  Injectable 0.25 milliGRAM(s) IV Push every 4 hours PRN  insulin lispro (ADMELOG) corrective regimen sliding scale   SubCutaneous every 6 hours  lacosamide IVPB 150 milliGRAM(s) IV Intermittent every 12 hours  levothyroxine 75 MICROGram(s) Oral daily  lisinopril 40 milliGRAM(s) Enteral Tube daily  multiple electrolytes Injection Type 1 1000 milliLiter(s) IV Continuous <Continuous>  nicotine - 21 mG/24Hr(s) Patch 1 Patch Transdermal daily  ondansetron Injectable 4 milliGRAM(s) IV Push every 6 hours PRN  oxyCODONE    Solution 10 milliGRAM(s) Oral every 6 hours PRN  polyethylene glycol 3350 17 Gram(s) Oral every 12 hours  senna 2 Tablet(s) Oral at bedtime    EXAMINATION: off all sedation   General:  in NAD  HEENT: intubated   Neuro: lethargic, keeps eyes closed, follows commands including showing 2 fingers on the R, gaze midline, face symmetric except for R eye ptosis, able to lift R arm briefly AG to elbow, able to lift R leg if knee supported by examiner, L arm extends to noxious, L leg withdraws   Cards:  RRR  Respiratory: with wheezing in the R lung and diminished L lung sounds.   Abdomen:  soft  Extremities:  no LE edema    Assessment/Plan:   39 yo woman with h/o HTN, admitted 6/6 with 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 CTA and angiogram 6/9 with no vascular malformation. MRI brain 6/13 without any brain underlying brain mass. ICH score 4.   Hospital course c/b seizures on VEEG with onset from R frontal region, now resolved.   Tachycardia and hypertension improved with clonidine, likely 2/2 Precedex withdrawal.     Neuro:   neuro checks q1h  EVD clamped this AM, ICP goal < 22 mmhg.  d/c VEEG  c/w Briviact 100 mg BID and Vimpat to 150 mg BID  clonidine 0.2 gm q8h for concern for Precedex withdrawal, taper as tolerated   tylenol, oxy and Dilaudid for pain   CT CAP w/wo per stroke for ICH w/u     CV:  SBP goal 100-160, c/w lisinopril 40 mg daily     Respiratory:   wean  HFNC to NC  c/w duo nebs, Mucomyst and chest PT    GI:   NG, restart TF  senna and miralax, LBM 6/15    Renal:   NS at 50c/hr, d/c when TF at goal   Na goal normonatremia     Heme/Onc:     SCDs, Lov ppx     Endocrine:   FS goal 120-180, medium ISS  synthroid    ID: afebrile   monitor off abx     I updated the patient's wife at bedside today.     Patient seen and examined by attending on 6/15/2023.    Patient is critically ill due to ICH and at high risk for neurological deterioration or death due to: with EVD for CSF diversion, undergoing a clamp trial at risk for hydrocephalus.

## 2023-06-15 NOTE — PROVIDER CONTACT NOTE (OTHER) - SITUATION
Unable to assess pt R eye luis to sever pupil edema
Patient with visibly noticeable periorbital left eye swelling getting progressively worst throughout the shift
Patient had no output from the EVD for the hour

## 2023-06-15 NOTE — PROVIDER CONTACT NOTE (OTHER) - REASON
Unable to assess pt R eye luis to sever pupil edema
Left eye swelling
Patient had no output from the EVD for the hour

## 2023-06-15 NOTE — PROVIDER CONTACT NOTE (OTHER) - BACKGROUND
Pt s/p R craniectomy for IPH and IVH w/ R EVD
Admitted for IPH
Patient with history of hypothyroid, smoker, undergoing in vitro fertilization found down

## 2023-06-15 NOTE — SPEECH LANGUAGE PATHOLOGY EVALUATION - COMMENTS
Language: 1. Pt will improve expressive language skills for functional communication.  2. Pt will improve receptive language skills for functional communication.  3. Family/caregiver will demonstrate understanding/carryover of strategies to improve patient’s communication of wants/needs  Cognition: 1. Pt will improve cognitive-linguistic skills for functional communication. Pt non-verbal N/A; nonverbal N/A;nonverbal Pt able to follow directives to show 2x R fingers, initiate dry swallow, close mouth, and squeeze hands, and move R toes. Pt with reduced alertness Pt unable to move head. Does not move head toward auditory stimuli/startle. Does have visual startle to light with eyes blinking in response to bright light. hx con't  hx con't  6/6 intubated and s/p emergent hemicraniectomy with placement of EVD.   6/9 s/p cerebral angio with no evidence of vascular malformation  6/13 extubated (8 days) to HFNC 40L/40%  6/15 weaned to 6L NC. EVD clamped in AM. DUSTIN Jarrell reporting minimal secretions.     CTH 6/14 Redemonstration of right hemicraniectomy. Redemonstration of right frontal approach ventriculostomy catheter in unchanged position. Similar appearing right frontal pericatheter hemorrhage and edema. Similar hemorrhage in the left occipital horn. Interval resolution of small hemorrhage in the region of the left foramen  of Alejandro and anterior third ventricle.Ventricles are similar in size. No hydrocephalus. Similar leftward midline shift of 5 mm. Basal cisterns are well visualized.    No prior SLP exams in [facility] or [facility] in PACS

## 2023-06-15 NOTE — SPEECH LANGUAGE PATHOLOGY EVALUATION - SLP DIAGNOSIS
38F withPMHx of hypothyroid, smoker, undergoing in-vitro fertilization, found with large R IPH, s/p R hemicrani +EVD. Pt presents with reduced arousal. Eyes closed during exam, but noted with reflexive eye squeeze/blink in response to light stimuli. Pt with no audible verbal output/phonation, however, noted with attempts to voice as evidenced by attempts to breathe louder. Pt with no observed tracking or fixation to auditory stimuli. Pt able to follow some simple directives for lifting up R fingers, squeezing hand, moving R toes, closing mouth. Will continue to closely follow for ongoing communication assessment and therapy.

## 2023-06-15 NOTE — PROGRESS NOTE ADULT - ASSESSMENT
Assessment/Plan:   39 yo woman with h/o HTN, admitted 6/6 with 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 CTA and angiogram 6/9 with no vascular malformation. MRI brain 6/13 without any brain underlying brain mass. ICH score 4.   Hospital course c/b seizures on VEEG with onset from R frontal region, now resolved.   Tachycardia and hypertension improved with clonidine, likely 2/2 Precedex withdrawal.     Neuro:   - neuro checks q1h  - EVD clamped this AM, ICP goal < 22 mmhg.  - d/c VEEG  - c/w Briviact 100 mg BID and Vimpat to 150 mg BID  - clonidine 0.2 gm q8h for concern for Precedex withdrawal, taper as tolerated   - tylenol, oxy and Dilaudid for pain   - CT CAP w/wo per stroke for ICH w/u     CV:  - SBP goal 100-160  - c/w lisinopril 40 mg daily     Respiratory:   - NC 6L  - c/w duo nebs, Mucomyst and chest PT    GI:   - NG, restart TF  - senna and miralax, LBM 6/15    Renal:   - NS at 50c/hr, d/c when TF at goal   - Na goal normonatremia     Endocrine:   - FS goal 120-180, medium ISS  - synthroid    ID:   - afebrile   - monitor off abx     Heme/Onc:     - SCDs, Lov ppx     Dr. Whitt updated the patient's wife at bedside today.     Patient seen and examined by attending/PA on 6/15/2023.    Patient is critically ill due to ICH and at high risk for neurological deterioration or death due to: with EVD for CSF diversion, undergoing a clamp trial at risk for hydrocephalus.    Assessment/Plan:   39 yo woman with h/o HTN, admitted 6/6 with 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 CTA and angiogram 6/9 with no vascular malformation. MRI brain 6/13 without any brain underlying brain mass. ICH score 4.   Hospital course c/b seizures on VEEG with onset from R frontal region, now resolved.   Tachycardia and hypertension improved with clonidine, likely 2/2 Precedex withdrawal.     Neuro:   - neuro checks q1h  - EVD clamped this 0700 AM, ICP goal < 22 mmhg.  - d/c VEEG  - c/w Briviact 100 mg BID and Vimpat to 150 mg BID  - decreased clonidine 0.1 mg q8h for concern for Precedex withdrawal, taper as tolerated   - Tylenol oxy and Dilaudid for pain   - CT CAP w/wo- f/u final read per stroke for ICH w/u     CV:  - SBP goal 100-160  - c/w lisinopril 40 mg daily and clonidine 0.1mg q8hr  - started amlodipine 5mg daily     Respiratory:   - NC 4L  - c/w duo nebs, Mucomyst and chest PT    GI:   - NG, restart TF Glucerna 1.2  - senna and miralax, rectal tube LBM 6/15    Renal:   - plasma-lyte at 50c/hr, d/c when TF at goal   - Na goal normonatremia     Endocrine:   - FS goal 120-180, medium ISS  - synthroid    ID:   - afebrile   - monitor off abx     Heme/Onc:     - SCDs, Lov ppx     Dr. Whitt updated the patient's wife at bedside today.     Patient seen and examined by attending/PA on 6/15/2023.    Patient is critically ill due to ICH and at high risk for neurological deterioration or death due to: with EVD for CSF diversion, undergoing a clamp trial at risk for hydrocephalus.

## 2023-06-15 NOTE — SPEECH LANGUAGE PATHOLOGY EVALUATION - SPECIFY REASON(S)
to assess speech production, expressive and receptive language skills, and cognitive-communication skills
to assess speech production, expressive and receptive language skills, and cognitive-communication skills

## 2023-06-15 NOTE — SPEECH LANGUAGE PATHOLOGY EVALUATION - SLP GENERAL OBSERVATIONS
Pt received reclined in bed +NGT +6L O2 via NC. Spouse Ena, mother and friend present at bedside. Oral cavity noted with white cast on lingual surface, teeth intact, dry lips; JASWANT Horne informed (covering JASWANT Bah). Pt Aox0, able to follow some simple directives, nonverbal.

## 2023-06-15 NOTE — EEG REPORT - NS EEG TEXT BOX
[facility]   COMPREHENSIVE EPILEPSY CENTER   REPORT OF CONTINUOUS VIDEO EEG     [facility]: [address] , [address], [place] [address], Ph#: [phone]  [facility]: [address], [place] [address], Ph#: [phone]  Office: [address] [place] [address], [place] [address], [place] [address] Ph#: [phone]    Patient Name: BROOKE CAIN  Age and : 38y ([birth year])  MRN #: [id number]  Location: [place] [address]  Referring Physician: Jean Carlos Bernstein    Study Date: 23 - 06-15-23  Duration: 23 hr 51 min  _____________________________________________________________  STUDY INFORMATION    EEG Recording Technique:  The patient underwent continuous Video-EEG monitoring, using Telemetry System hardware on the XLTek Digital System. EEG and video data were stored on a computer hard drive with important events saved in digital archive files. The material was reviewed by a physician (electroencephalographer / epileptologist) on a daily basis. Alejandro and seizure detection algorithms were utilized and reviewed. An EEG Technician attended to the patient, and was available throughout daytime work hours.  The epilepsy center neurologist was available in person or on call 24-hours per day.    EEG Placement and Labeling of Electrodes:  The EEG was performed utilizing 20 channel referential EEG connections (coronal over temporal over parasagittal montage) using all standard 10-20 electrode placements with EKG, with additional electrodes placed in the inferior temporal region using the modified 10-10 montage electrode placements for elective admissions, or if deemed necessary. Recording was at a sampling rate of 256 samples per second per channel. Time synchronized digital video recording was done simultaneously with EEG recording. A low light infrared camera was used for low light recording.     _____________________________________________________________  HISTORY    Patient is a 38y old  Female who presents with a chief complaint of IPH (2023 07:48)      PERTINENT MEDICATION:  MEDICATIONS  (STANDING):  chlorhexidine 0.12% Liquid 15 milliLiter(s) Oral Mucosa every 12 hours  chlorhexidine 4% Liquid 1 Application(s) Topical daily  dexMEDEtomidine Infusion 0.2 MICROgram(s)/kG/Hr (3.82 mL/Hr) IV Continuous <Continuous>  enalapril 10 milliGRAM(s) Oral daily  enalaprilat Injectable 2.5 milliGRAM(s) IV Push once  enoxaparin Injectable 40 milliGRAM(s) SubCutaneous <User Schedule>  insulin lispro (ADMELOG) corrective regimen sliding scale   SubCutaneous every 6 hours  lacosamide IVPB 100 milliGRAM(s) IV Intermittent every 12 hours  levETIRAcetam  Solution 1000 milliGRAM(s) Oral two times a day  levothyroxine 75 MICROGram(s) Oral daily  nicotine - 21 mG/24Hr(s) Patch 1 Patch Transdermal daily  pantoprazole  Injectable 40 milliGRAM(s) IV Push daily  polyethylene glycol 3350 17 Gram(s) Oral every 12 hours  senna 2 Tablet(s) Oral at bedtime    _____________________________________________________________  INTERPRETATION    Findings: The background was continuous, spontaneously variable and reactive. During wakefulness, the posterior dominant rhythm consisted of an asymmetric (seen in left posterior quadrant), poorly-modulated 7-8 Hz activity, with amplitude to 30 uV, that attenuated to eye opening.  Low amplitude frontal beta was noted in wakefulness.    Background Slowing:  -Posterior dominant background slowing    Focal Slowing:   -Continuous polymorphic delta and theta slowing in the right hemisphere    Sleep Background:  Stage II sleep transients were not recorded.  Drowsiness and stage II sleep transients were not recorded.    Other Non-Epileptiform Findings:  None were present.    Interictal Epileptiform Activity:   None were present.    Events:  None    Artifacts:  Intermittent myogenic and movement artifacts were noted.    ECG:  The heart rate on single channel ECG was predominantly between 60-80 BPM.    _____________________________________________________________  EEG SUMMARY/CLASSIFICATION  Abnormal EEG in an encephalopathic patient.  -Continuous polymorphic delta and theta slowing in the right hemisphere  -Posterior dominant background slowing  _____________________________________________________________  EEG IMPRESSION/CLINICAL CORRELATE  Abnormal EEG study.  1. Structural abnormality in the right hemisphere.  2. Mild-moderate nonspecific diffuse or multifocal cerebral dysfunction.   3. No seizures were recorded.      Jaylon Carty MD  Neurology Attending Physician   [facility]   COMPREHENSIVE EPILEPSY CENTER   REPORT OF CONTINUOUS VIDEO EEG     [facility]: [address] , [address], [place] [address], Ph#: [phone]  [facility]: [address], [place] [address], Ph#: [phone]  Office: [address] [place] [address], [place] [address], [place] [address] Ph#: [phone]    Patient Name: BROOKE CAIN  Age and : 38y (83)  MRN #: [id number]  Location: [place] [address]  Referring Physician: Jean Carlos Bernstein    Study Date: 23 - 06-15-23  Duration: 28 hr 5 min  _____________________________________________________________  STUDY INFORMATION    EEG Recording Technique:  The patient underwent continuous Video-EEG monitoring, using Telemetry System hardware on the XLTek Digital System. EEG and video data were stored on a computer hard drive with important events saved in digital archive files. The material was reviewed by a physician (electroencephalographer / epileptologist) on a daily basis. Alejandro and seizure detection algorithms were utilized and reviewed. An EEG Technician attended to the patient, and was available throughout daytime work hours.  The epilepsy center neurologist was available in person or on call 24-hours per day.    EEG Placement and Labeling of Electrodes:  The EEG was performed utilizing 20 channel referential EEG connections (coronal over temporal over parasagittal montage) using all standard 10-20 electrode placements with EKG, with additional electrodes placed in the inferior temporal region using the modified 10-10 montage electrode placements for elective admissions, or if deemed necessary. Recording was at a sampling rate of 256 samples per second per channel. Time synchronized digital video recording was done simultaneously with EEG recording. A low light infrared camera was used for low light recording.     _____________________________________________________________  HISTORY    Patient is a 38y old  Female who presents with a chief complaint of IPH (2023 07:48)      PERTINENT MEDICATION:  MEDICATIONS  (STANDING):  chlorhexidine 0.12% Liquid 15 milliLiter(s) Oral Mucosa every 12 hours  chlorhexidine 4% Liquid 1 Application(s) Topical daily  dexMEDEtomidine Infusion 0.2 MICROgram(s)/kG/Hr (3.82 mL/Hr) IV Continuous <Continuous>  enalapril 10 milliGRAM(s) Oral daily  enalaprilat Injectable 2.5 milliGRAM(s) IV Push once  enoxaparin Injectable 40 milliGRAM(s) SubCutaneous <User Schedule>  insulin lispro (ADMELOG) corrective regimen sliding scale   SubCutaneous every 6 hours  lacosamide IVPB 100 milliGRAM(s) IV Intermittent every 12 hours  levETIRAcetam  Solution 1000 milliGRAM(s) Oral two times a day  levothyroxine 75 MICROGram(s) Oral daily  nicotine - 21 mG/24Hr(s) Patch 1 Patch Transdermal daily  pantoprazole  Injectable 40 milliGRAM(s) IV Push daily  polyethylene glycol 3350 17 Gram(s) Oral every 12 hours  senna 2 Tablet(s) Oral at bedtime    _____________________________________________________________  INTERPRETATION    Findings: The background was continuous, spontaneously variable and reactive. During wakefulness, the posterior dominant rhythm consisted of an asymmetric (seen in left posterior quadrant), poorly-modulated 7-8 Hz activity, with amplitude to 30 uV, that attenuated to eye opening.  Low amplitude frontal beta was noted in wakefulness.    Background Slowing:  -Posterior dominant background slowing    Focal Slowing:   -Continuous polymorphic delta and theta slowing in the right hemisphere    Sleep Background:  Stage II sleep transients were not recorded.  Drowsiness and stage II sleep transients were not recorded.    Other Non-Epileptiform Findings:  None were present.    Interictal Epileptiform Activity:   None were present.    Events:  None    Artifacts:  Intermittent myogenic and movement artifacts were noted.    ECG:  The heart rate on single channel ECG was predominantly between 60-80 BPM.    _____________________________________________________________  EEG SUMMARY/CLASSIFICATION  Abnormal EEG in an encephalopathic patient.  -Continuous polymorphic delta and theta slowing in the right hemisphere  -Posterior dominant background slowing  _____________________________________________________________  EEG IMPRESSION/CLINICAL CORRELATE  Abnormal EEG study.  1. Structural abnormality in the right hemisphere.  2. Mild-moderate nonspecific diffuse or multifocal cerebral dysfunction.   3. No seizures were recorded.      Jaylon Carty MD  Neurology Attending Physician

## 2023-06-15 NOTE — PROGRESS NOTE ADULT - SUBJECTIVE AND OBJECTIVE BOX
Interval events:  Day :NAEO. Afebrile. Tachycardia and hypertension improved with clonidine. EVD clamped this 6/15 AM.   Night: no acute events overnight    ICU Vital Signs Last 24 Hrs  T(C): 37.2 (15 Adrian 2023 19:00), Max: 37.8 (15 Adrian 2023 15:00)  T(F): 99 (15 Adrian 2023 19:00), Max: 100 (15 Adrian 2023 15:00)  HR: 90 (15 Adrian 2023 20:00) (77 - 111)  BP: --  BP(mean): --  ABP: 140/85 (15 Adrian 2023 20:00) (107/64 - 170/105)  ABP(mean): 108 (15 Adrian 2023 20:00) (81 - 133)  RR: 17 (15 Adrian 2023 20:00) (12 - 24)  SpO2: 95% (15 Adrian 2023 20:00) (93% - 100%)    O2 Parameters below as of 15 Adrian 2023 19:00  Patient On (Oxygen Delivery Method): nasal cannula  O2 Flow (L/min): 6      LABS:  Na: 143 (06-14 @ 22:49), 143 (06-14 @ 15:11), 144 (06-14 @ 02:14), 145 (06-13 @ 10:23), 144 (06-12 @ 21:12), 143 (06-12 @ 08:45)  K: 4.1 (06-14 @ 22:49), 3.5 (06-14 @ 15:11), 3.5 (06-14 @ 02:14), 3.8 (06-13 @ 10:23), 3.8 (06-12 @ 21:12), 4.0 (06-12 @ 08:45)  Cl: 107 (06-14 @ 22:49), 108 (06-14 @ 15:11), 108 (06-14 @ 02:14), 112 (06-13 @ 10:23), 112 (06-12 @ 21:12), 112 (06-12 @ 08:45)  CO2: 21 (06-14 @ 22:49), 23 (06-14 @ 15:11), 23 (06-14 @ 02:14), 18 (06-13 @ 10:23), 20 (06-12 @ 21:12), 20 (06-12 @ 08:45)  BUN: 8 (06-14 @ 22:49), 10 (06-14 @ 15:11), 11 (06-14 @ 02:14), 14 (06-13 @ 10:23), 14 (06-12 @ 21:12), 11 (06-12 @ 08:45)  Cr: 0.40 (06-14 @ 22:49), 0.43 (06-14 @ 15:11), 0.45 (06-14 @ 02:14), 0.40 (06-13 @ 10:23), 0.43 (06-12 @ 21:12), 0.34 (06-12 @ 08:45)  Glu: 115(06-14 @ 22:49), 114(06-14 @ 15:11), 142(06-14 @ 02:14), 161(06-13 @ 10:23), 206(06-12 @ 21:12), 156(06-12 @ 08:45)    Hgb: 10.5 (06-14 @ 22:49)  Hct: 32.3 (06-14 @ 22:49)  WBC: 17.91 (06-14 @ 22:49)  Plt: 423 (06-14 @ 22:49)    MEDICATIONS:  acetaminophen   Oral Liquid .. 650 milliGRAM(s) Oral every 6 hours PRN  acetylcysteine 20%  Inhalation 4 milliLiter(s) Inhalation every 12 hours  albuterol/ipratropium for Nebulization 3 milliLiter(s) Nebulizer every 6 hours  bisacodyl Suppository 10 milliGRAM(s) Rectal once PRN  brivaracetam 100 milliGRAM(s) Oral two times a day  chlorhexidine 4% Liquid 1 Application(s) Topical daily  cloNIDine 0.2 milliGRAM(s) Oral every 8 hours  enoxaparin Injectable 40 milliGRAM(s) SubCutaneous <User Schedule>  HYDROmorphone  Injectable 0.25 milliGRAM(s) IV Push every 4 hours PRN  insulin lispro (ADMELOG) corrective regimen sliding scale   SubCutaneous every 6 hours  lacosamide IVPB 150 milliGRAM(s) IV Intermittent every 12 hours  levothyroxine 75 MICROGram(s) Oral daily  lisinopril 40 milliGRAM(s) Enteral Tube daily  multiple electrolytes Injection Type 1 1000 milliLiter(s) IV Continuous <Continuous>  nicotine - 21 mG/24Hr(s) Patch 1 Patch Transdermal daily  ondansetron Injectable 4 milliGRAM(s) IV Push every 6 hours PRN  oxyCODONE    Solution 10 milliGRAM(s) Oral every 6 hours PRN  polyethylene glycol 3350 17 Gram(s) Oral every 12 hours  senna 2 Tablet(s) Oral at bedtime    EXAMINATION: off all sedation   General:  in NAD  HEENT: intubated   Neuro: lethargic, keeps eyes closed, follows commands including showing 2 fingers on the R, gaze midline, face symmetric except for R eye ptosis, able to lift R arm briefly AG to elbow, able to lift R leg if knee supported by examiner, L arm extends to noxious, L leg withdraws   Cards:  RRR  Respiratory: with wheezing in the R lung and diminished L lung sounds.   Abdomen:  soft  Extremities:  no LE edema     Interval events:  Day: NAEO. Afebrile. Tachycardia and hypertension improved with clonidine. EVD clamped this 6/15 AM.   Night: no acute events overnight    ICU Vital Signs Last 24 Hrs  T(C): 37.2 (15 Adrian 2023 19:00), Max: 37.8 (15 Adrian 2023 15:00)  T(F): 99 (15 Adrian 2023 19:00), Max: 100 (15 Adrian 2023 15:00)  HR: 90 (15 Adrian 2023 20:00) (77 - 111)  BP: --  BP(mean): --  ABP: 140/85 (15 Adrian 2023 20:00) (107/64 - 170/105)  ABP(mean): 108 (15 Adrian 2023 20:00) (81 - 133)  RR: 17 (15 Adrian 2023 20:00) (12 - 24)  SpO2: 95% (15 Adrian 2023 20:00) (93% - 100%)    O2 Parameters below as of 15 Adrian 2023 19:00  Patient On (Oxygen Delivery Method): nasal cannula  O2 Flow (L/min): 6      LABS:  Na: 143 (06-14 @ 22:49), 143 (06-14 @ 15:11), 144 (06-14 @ 02:14), 145 (06-13 @ 10:23), 144 (06-12 @ 21:12), 143 (06-12 @ 08:45)  K: 4.1 (06-14 @ 22:49), 3.5 (06-14 @ 15:11), 3.5 (06-14 @ 02:14), 3.8 (06-13 @ 10:23), 3.8 (06-12 @ 21:12), 4.0 (06-12 @ 08:45)  Cl: 107 (06-14 @ 22:49), 108 (06-14 @ 15:11), 108 (06-14 @ 02:14), 112 (06-13 @ 10:23), 112 (06-12 @ 21:12), 112 (06-12 @ 08:45)  CO2: 21 (06-14 @ 22:49), 23 (06-14 @ 15:11), 23 (06-14 @ 02:14), 18 (06-13 @ 10:23), 20 (06-12 @ 21:12), 20 (06-12 @ 08:45)  BUN: 8 (06-14 @ 22:49), 10 (06-14 @ 15:11), 11 (06-14 @ 02:14), 14 (06-13 @ 10:23), 14 (06-12 @ 21:12), 11 (06-12 @ 08:45)  Cr: 0.40 (06-14 @ 22:49), 0.43 (06-14 @ 15:11), 0.45 (06-14 @ 02:14), 0.40 (06-13 @ 10:23), 0.43 (06-12 @ 21:12), 0.34 (06-12 @ 08:45)  Glu: 115(06-14 @ 22:49), 114(06-14 @ 15:11), 142(06-14 @ 02:14), 161(06-13 @ 10:23), 206(06-12 @ 21:12), 156(06-12 @ 08:45)    Hgb: 10.5 (06-14 @ 22:49)  Hct: 32.3 (06-14 @ 22:49)  WBC: 17.91 (06-14 @ 22:49)  Plt: 423 (06-14 @ 22:49)    MEDICATIONS:  acetaminophen   Oral Liquid .. 650 milliGRAM(s) Oral every 6 hours PRN  acetylcysteine 20%  Inhalation 4 milliLiter(s) Inhalation every 12 hours  albuterol/ipratropium for Nebulization 3 milliLiter(s) Nebulizer every 6 hours  bisacodyl Suppository 10 milliGRAM(s) Rectal once PRN  brivaracetam 100 milliGRAM(s) Oral two times a day  chlorhexidine 4% Liquid 1 Application(s) Topical daily  cloNIDine 0.2 milliGRAM(s) Oral every 8 hours  enoxaparin Injectable 40 milliGRAM(s) SubCutaneous <User Schedule>  HYDROmorphone  Injectable 0.25 milliGRAM(s) IV Push every 4 hours PRN  insulin lispro (ADMELOG) corrective regimen sliding scale   SubCutaneous every 6 hours  lacosamide IVPB 150 milliGRAM(s) IV Intermittent every 12 hours  levothyroxine 75 MICROGram(s) Oral daily  lisinopril 40 milliGRAM(s) Enteral Tube daily  multiple electrolytes Injection Type 1 1000 milliLiter(s) IV Continuous <Continuous>  nicotine - 21 mG/24Hr(s) Patch 1 Patch Transdermal daily  ondansetron Injectable 4 milliGRAM(s) IV Push every 6 hours PRN  oxyCODONE    Solution 10 milliGRAM(s) Oral every 6 hours PRN  polyethylene glycol 3350 17 Gram(s) Oral every 12 hours  senna 2 Tablet(s) Oral at bedtime    EXAMINATION: off all sedation   General:  in NAD  HEENT: intubated   Neuro: lethargic, keeps eyes closed, follows commands including showing 2 fingers on the R, gaze midline, face symmetric except for R eye ptosis, able to lift R arm briefly AG to elbow, able to lift R leg if knee supported by examiner, L arm extends to noxious, L leg withdraws   Cards:  RRR  Respiratory: with wheezing in the R lung and diminished L lung sounds.   Abdomen:  soft  Extremities:  no LE edema

## 2023-06-15 NOTE — DIETITIAN NUTRITION RISK NOTIFICATION - TREATMENT: THE FOLLOWING DIET HAS BEEN RECOMMENDED
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                
Diet, NPO:   Except Medications (06-14-23 @ 16:39) [Active]

## 2023-06-15 NOTE — SPEECH LANGUAGE PATHOLOGY EVALUATION - H & P REVIEW
38F withPMHx  of hypothyroid, smoker, undergoing in-vitro fertilization, found down at home. CBC notable for WBC 25.31. CMP showing potassium 3.1. Lactate 4.5. CTH showing very large Right frontal parenchymal hemorrhage with intraventricular extension and mild left-sided hydrocephalus, midline shift to Left. CTA w/o aneurysms or AVM. Pt not a candidate for tenecteplase due to hemorrhage and not a candidate for thrombectomy due to no LVO see on CTA H/N.  LKN unknown but possibly 1100 6/6/23. NIHSS 19. ICH score 4. mRS 0. Neuro IMPRESSION: Acute change in level of consciousness after being found down at home likely 2/2 massive right intraparenchymal hemorrhage due to unknown etiology. Consider vascular etiology such as aneurysms, AVM, hemorrhagic stroke./yes
38F withPMHx of hypothyroid, smoker, undergoing in-vitro fertilization, found down at home. CTH showing very large Right frontal parenchymal hemorrhage with intraventricular extension and mild left-sided hydrocephalus, midline shift to Left. CTA w/o aneurysms or AVM. Pt not a candidate for tenecteplase due to hemorrhage and not a candidate for thrombectomy due to no LVO see on CTA H/N. NIHSS 19. ICH score 4. mRS 0. Neuro IMPRESSION: Acute change in level of consciousness after being found down at home likely 2/2 massive right intraparenchymal hemorrhage due to unknown etiology. Consider vascular etiology such as aneurysms, AVM, hemorrhagic stroke. Pt with concern for seizures post op, +vEEG, and now stable./yes

## 2023-06-15 NOTE — PROVIDER CONTACT NOTE (OTHER) - ASSESSMENT
Neuro checks unchanged. EVD unclamped and patent.
Unable to assess pt R eye luis to sever pupil edema. Pt remain neurological unchanged
Patient following commands on the right upper and lower extremity. Left periorbital eye swelling and dropping H/H s/p 1 unit of blood

## 2023-06-16 LAB
ANION GAP SERPL CALC-SCNC: 9 MMOL/L — SIGNIFICANT CHANGE UP (ref 5–17)
BUN SERPL-MCNC: 9 MG/DL — SIGNIFICANT CHANGE UP (ref 7–23)
CALCIUM SERPL-MCNC: 8.3 MG/DL — LOW (ref 8.4–10.5)
CHLORIDE SERPL-SCNC: 106 MMOL/L — SIGNIFICANT CHANGE UP (ref 96–108)
CO2 SERPL-SCNC: 24 MMOL/L — SIGNIFICANT CHANGE UP (ref 22–31)
CREAT SERPL-MCNC: 0.44 MG/DL — LOW (ref 0.5–1.3)
EGFR: 126 ML/MIN/1.73M2 — SIGNIFICANT CHANGE UP
GAS PNL BLDA: SIGNIFICANT CHANGE UP
GLUCOSE BLDC GLUCOMTR-MCNC: 113 MG/DL — HIGH (ref 70–99)
GLUCOSE BLDC GLUCOMTR-MCNC: 122 MG/DL — HIGH (ref 70–99)
GLUCOSE BLDC GLUCOMTR-MCNC: 125 MG/DL — HIGH (ref 70–99)
GLUCOSE BLDC GLUCOMTR-MCNC: 127 MG/DL — HIGH (ref 70–99)
GLUCOSE BLDC GLUCOMTR-MCNC: 141 MG/DL — HIGH (ref 70–99)
GLUCOSE SERPL-MCNC: 133 MG/DL — HIGH (ref 70–99)
HCT VFR BLD CALC: 30.8 % — LOW (ref 34.5–45)
HCT VFR BLD CALC: 35.6 % — SIGNIFICANT CHANGE UP (ref 34.5–45)
HGB BLD-MCNC: 10.1 G/DL — LOW (ref 11.5–15.5)
HGB BLD-MCNC: 11.6 G/DL — SIGNIFICANT CHANGE UP (ref 11.5–15.5)
MAGNESIUM SERPL-MCNC: 2.2 MG/DL — SIGNIFICANT CHANGE UP (ref 1.6–2.6)
MCHC RBC-ENTMCNC: 31.5 PG — SIGNIFICANT CHANGE UP (ref 27–34)
MCHC RBC-ENTMCNC: 32.2 PG — SIGNIFICANT CHANGE UP (ref 27–34)
MCHC RBC-ENTMCNC: 32.6 GM/DL — SIGNIFICANT CHANGE UP (ref 32–36)
MCHC RBC-ENTMCNC: 32.8 GM/DL — SIGNIFICANT CHANGE UP (ref 32–36)
MCV RBC AUTO: 96.7 FL — SIGNIFICANT CHANGE UP (ref 80–100)
MCV RBC AUTO: 98.1 FL — SIGNIFICANT CHANGE UP (ref 80–100)
NRBC # BLD: 0 /100 WBCS — SIGNIFICANT CHANGE UP (ref 0–0)
NRBC # BLD: 0 /100 WBCS — SIGNIFICANT CHANGE UP (ref 0–0)
PHOSPHATE SERPL-MCNC: 3.6 MG/DL — SIGNIFICANT CHANGE UP (ref 2.5–4.5)
PLATELET # BLD AUTO: 375 K/UL — SIGNIFICANT CHANGE UP (ref 150–400)
PLATELET # BLD AUTO: 486 K/UL — HIGH (ref 150–400)
POTASSIUM SERPL-MCNC: 4 MMOL/L — SIGNIFICANT CHANGE UP (ref 3.5–5.3)
POTASSIUM SERPL-SCNC: 4 MMOL/L — SIGNIFICANT CHANGE UP (ref 3.5–5.3)
RBC # BLD: 3.14 M/UL — LOW (ref 3.8–5.2)
RBC # BLD: 3.68 M/UL — LOW (ref 3.8–5.2)
RBC # FLD: 14.1 % — SIGNIFICANT CHANGE UP (ref 10.3–14.5)
RBC # FLD: 14.4 % — SIGNIFICANT CHANGE UP (ref 10.3–14.5)
SODIUM SERPL-SCNC: 139 MMOL/L — SIGNIFICANT CHANGE UP (ref 135–145)
WBC # BLD: 16.16 K/UL — HIGH (ref 3.8–10.5)
WBC # BLD: 21.35 K/UL — HIGH (ref 3.8–10.5)
WBC # FLD AUTO: 16.16 K/UL — HIGH (ref 3.8–10.5)
WBC # FLD AUTO: 21.35 K/UL — HIGH (ref 3.8–10.5)

## 2023-06-16 PROCEDURE — 99291 CRITICAL CARE FIRST HOUR: CPT

## 2023-06-16 PROCEDURE — 71045 X-RAY EXAM CHEST 1 VIEW: CPT | Mod: 26

## 2023-06-16 PROCEDURE — 99233 SBSQ HOSP IP/OBS HIGH 50: CPT

## 2023-06-16 RX ORDER — SODIUM CHLORIDE 9 MG/ML
500 INJECTION, SOLUTION INTRAVENOUS ONCE
Refills: 0 | Status: COMPLETED | OUTPATIENT
Start: 2023-06-16 | End: 2023-06-16

## 2023-06-16 RX ADMIN — Medication 3 MILLILITER(S): at 23:20

## 2023-06-16 RX ADMIN — Medication 1 PATCH: at 12:02

## 2023-06-16 RX ADMIN — BRIVARACETAM 100 MILLIGRAM(S): 25 TABLET, FILM COATED ORAL at 17:12

## 2023-06-16 RX ADMIN — LISINOPRIL 40 MILLIGRAM(S): 2.5 TABLET ORAL at 05:36

## 2023-06-16 RX ADMIN — Medication 1 PATCH: at 12:08

## 2023-06-16 RX ADMIN — SODIUM CHLORIDE 1000 MILLILITER(S): 9 INJECTION, SOLUTION INTRAVENOUS at 01:33

## 2023-06-16 RX ADMIN — Medication 3 MILLILITER(S): at 11:31

## 2023-06-16 RX ADMIN — Medication 75 MICROGRAM(S): at 05:36

## 2023-06-16 RX ADMIN — LACOSAMIDE 130 MILLIGRAM(S): 50 TABLET ORAL at 17:11

## 2023-06-16 RX ADMIN — Medication 4 MILLILITER(S): at 17:08

## 2023-06-16 RX ADMIN — Medication 1 PATCH: at 07:00

## 2023-06-16 RX ADMIN — LACOSAMIDE 130 MILLIGRAM(S): 50 TABLET ORAL at 05:35

## 2023-06-16 RX ADMIN — Medication 1 PATCH: at 19:00

## 2023-06-16 RX ADMIN — ENOXAPARIN SODIUM 40 MILLIGRAM(S): 100 INJECTION SUBCUTANEOUS at 18:06

## 2023-06-16 RX ADMIN — Medication 4 MILLILITER(S): at 05:22

## 2023-06-16 RX ADMIN — Medication 0.1 MILLIGRAM(S): at 21:17

## 2023-06-16 RX ADMIN — Medication 3 MILLILITER(S): at 05:22

## 2023-06-16 RX ADMIN — AMLODIPINE BESYLATE 5 MILLIGRAM(S): 2.5 TABLET ORAL at 05:38

## 2023-06-16 RX ADMIN — SODIUM CHLORIDE 50 MILLILITER(S): 9 INJECTION, SOLUTION INTRAVENOUS at 21:17

## 2023-06-16 RX ADMIN — OXYCODONE HYDROCHLORIDE 10 MILLIGRAM(S): 5 TABLET ORAL at 00:58

## 2023-06-16 RX ADMIN — BRIVARACETAM 100 MILLIGRAM(S): 25 TABLET, FILM COATED ORAL at 05:36

## 2023-06-16 RX ADMIN — CHLORHEXIDINE GLUCONATE 1 APPLICATION(S): 213 SOLUTION TOPICAL at 11:46

## 2023-06-16 RX ADMIN — Medication 3 MILLILITER(S): at 17:08

## 2023-06-16 RX ADMIN — Medication 0.1 MILLIGRAM(S): at 13:43

## 2023-06-16 NOTE — PROGRESS NOTE ADULT - ASSESSMENT
Assessment/Plan:   39 yo woman with h/o HTN, admitted 6/6 with 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 CTA and angiogram 6/9 with no vascular malformation. MRI brain 6/13 without any brain underlying brain mass. ICH score 4.   Hospital course c/b seizures on VEEG with onset from R frontal region, now resolved.   Tachycardia and hypertension improved with clonidine, likely 2/2 Precedex withdrawal.     Neuro:   - neuro checks q1h  - EVD clamped this 0700 AM, ICP goal < 22 mmhg.  - CTH AM for 48hr clamp trial ends 06/17 0700, possible d/c 06/17  - d/c VEEG  - c/w Briviact 100 mg BID and Vimpat to 150 mg BID  - decreased clonidine 0.1 mg q8h for concern for Precedex withdrawal, taper as tolerated   - Tylenol oxy and Dilaudid for pain   - CT CAP w/wo- f/u final read per stroke for ICH w/u     CV:  - SBP goal 100-160  - c/w lisinopril 40 mg daily and clonidine 0.1mg q8hr  - started amlodipine 5mg daily     Respiratory:   - NC 4L  - c/w duo nebs, Mucomyst and chest PT    GI:   - NG, restart TF Glucerna 1.2  - senna and miralax, rectal tube LBM 6/15    Renal:   - plasma-lyte at 50c/hr, d/c when TF at goal   - Na goal normonatremia     Endocrine:   - FS goal 120-180, medium ISS  - synthroid    ID:   - afebrile   - monitor off abx     Heme/Onc:     - SCDs, Lov ppx     Dr. Whitt updated the patient's wife at bedside 6/15     Patient seen and examined by attending/PA on 6/16/2023.    Patient is critically ill due to ICH and at high risk for neurological deterioration or death due to: with EVD for CSF diversion, undergoing a clamp trial at risk for hydrocephalus.    Assessment/Plan:   39 yo woman with h/o HTN, admitted 6/6 with 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 CTA and angiogram 6/9 with no vascular malformation. MRI brain 6/13 without any brain underlying brain mass. ICH score 4.   Hospital course c/b seizures on VEEG with onset from R frontal region, now resolved.   Tachycardia and hypertension improved with clonidine, likely 2/2 Precedex withdrawal.     Neuro:   - neuro checks q1h  - EVD clamped this 0700 AM, ICP goal < 22 mmhg.  - CTH AM for 48hr clamp trial ends 06/17 0700, possible d/c 06/17  - d/c VEEG  - c/w Briviact 100 mg BID and Vimpat to 150 mg BID  - decreased clonidine 0.1 mg q8h for concern for Precedex withdrawal, taper as tolerated   - Tylenol oxy and Dilaudid for pain   - CT CAP w/wo- no malignancy     CV:  - SBP goal 100-160  - c/w lisinopril 40 mg daily and clonidine 0.1mg q8hr  - c/w amlodipine 5mg daily     Respiratory:   - NC 4L  - c/w duo nebs, Mucomyst and chest PT    GI:   - NG, TF Glucerna 1.2  - senna and miralax, rectal tube LBM 6/15    Renal:   - IVL  - Na goal normonatremia     Endocrine:   - FS goal 120-180, medium ISS  - synthroid    ID:   - afebrile   - monitor off abx     Heme/Onc:     - SCDs, Lov ppx     Dr. Whitt updated the patient's wife at bedside 6/15     Patient seen and examined by attending/PA on 6/16/2023.    Patient is critically ill due to ICH and at high risk for neurological deterioration or death due to: with EVD for CSF diversion, undergoing a clamp trial at risk for hydrocephalus.

## 2023-06-16 NOTE — PROGRESS NOTE ADULT - SUBJECTIVE AND OBJECTIVE BOX
Interval events:  Day: no acute events  Night: no acute events overnight    ICU Vital Signs Last 24 Hrs  T(C): 37.2 (15 Adrian 2023 19:00), Max: 37.8 (15 Adrian 2023 15:00)  T(F): 99 (15 Adrian 2023 19:00), Max: 100 (15 Adrian 2023 15:00)  HR: 90 (15 Adrian 2023 20:00) (77 - 111)  BP: --  BP(mean): --  ABP: 140/85 (15 Adrian 2023 20:00) (107/64 - 170/105)  ABP(mean): 108 (15 Adrian 2023 20:00) (81 - 133)  RR: 17 (15 Adrian 2023 20:00) (12 - 24)  SpO2: 95% (15 Adrian 2023 20:00) (93% - 100%)    O2 Parameters below as of 15 Adrian 2023 19:00  Patient On (Oxygen Delivery Method): nasal cannula  O2 Flow (L/min): 6      LABS:  Na: 143 (06-14 @ 22:49), 143 (06-14 @ 15:11), 144 (06-14 @ 02:14), 145 (06-13 @ 10:23), 144 (06-12 @ 21:12), 143 (06-12 @ 08:45)  K: 4.1 (06-14 @ 22:49), 3.5 (06-14 @ 15:11), 3.5 (06-14 @ 02:14), 3.8 (06-13 @ 10:23), 3.8 (06-12 @ 21:12), 4.0 (06-12 @ 08:45)  Cl: 107 (06-14 @ 22:49), 108 (06-14 @ 15:11), 108 (06-14 @ 02:14), 112 (06-13 @ 10:23), 112 (06-12 @ 21:12), 112 (06-12 @ 08:45)  CO2: 21 (06-14 @ 22:49), 23 (06-14 @ 15:11), 23 (06-14 @ 02:14), 18 (06-13 @ 10:23), 20 (06-12 @ 21:12), 20 (06-12 @ 08:45)  BUN: 8 (06-14 @ 22:49), 10 (06-14 @ 15:11), 11 (06-14 @ 02:14), 14 (06-13 @ 10:23), 14 (06-12 @ 21:12), 11 (06-12 @ 08:45)  Cr: 0.40 (06-14 @ 22:49), 0.43 (06-14 @ 15:11), 0.45 (06-14 @ 02:14), 0.40 (06-13 @ 10:23), 0.43 (06-12 @ 21:12), 0.34 (06-12 @ 08:45)  Glu: 115(06-14 @ 22:49), 114(06-14 @ 15:11), 142(06-14 @ 02:14), 161(06-13 @ 10:23), 206(06-12 @ 21:12), 156(06-12 @ 08:45)    Hgb: 10.5 (06-14 @ 22:49)  Hct: 32.3 (06-14 @ 22:49)  WBC: 17.91 (06-14 @ 22:49)  Plt: 423 (06-14 @ 22:49)    MEDICATIONS:  acetaminophen   Oral Liquid .. 650 milliGRAM(s) Oral every 6 hours PRN  acetylcysteine 20%  Inhalation 4 milliLiter(s) Inhalation every 12 hours  albuterol/ipratropium for Nebulization 3 milliLiter(s) Nebulizer every 6 hours  bisacodyl Suppository 10 milliGRAM(s) Rectal once PRN  brivaracetam 100 milliGRAM(s) Oral two times a day  chlorhexidine 4% Liquid 1 Application(s) Topical daily  cloNIDine 0.2 milliGRAM(s) Oral every 8 hours  enoxaparin Injectable 40 milliGRAM(s) SubCutaneous <User Schedule>  HYDROmorphone  Injectable 0.25 milliGRAM(s) IV Push every 4 hours PRN  insulin lispro (ADMELOG) corrective regimen sliding scale   SubCutaneous every 6 hours  lacosamide IVPB 150 milliGRAM(s) IV Intermittent every 12 hours  levothyroxine 75 MICROGram(s) Oral daily  lisinopril 40 milliGRAM(s) Enteral Tube daily  multiple electrolytes Injection Type 1 1000 milliLiter(s) IV Continuous <Continuous>  nicotine - 21 mG/24Hr(s) Patch 1 Patch Transdermal daily  ondansetron Injectable 4 milliGRAM(s) IV Push every 6 hours PRN  oxyCODONE    Solution 10 milliGRAM(s) Oral every 6 hours PRN  polyethylene glycol 3350 17 Gram(s) Oral every 12 hours  senna 2 Tablet(s) Oral at bedtime    EXAMINATION: off all sedation   General:  in NAD  HEENT: intubated   Neuro: lethargic, keeps eyes closed, follows commands including showing 2 fingers on the R, gaze midline, face symmetric except for R eye ptosis, able to lift R arm briefly AG to elbow, able to lift R leg if knee supported by examiner, L arm extends to noxious, L leg withdraws   Cards:  RRR  Respiratory: with wheezing in the R lung and diminished L lung sounds.   Abdomen:  soft  Extremities:  no LE edema     Subjective:  Pt seen at bedside, complains of h/a.    Interval events:  Day: no acute events  Night: no acute events overnight    ICU Vital Signs Last 24 Hrs  T(C): 37.2 (15 Adrian 2023 19:00), Max: 37.8 (15 Adrian 2023 15:00)  T(F): 99 (15 Adrian 2023 19:00), Max: 100 (15 Adrian 2023 15:00)  HR: 90 (15 Adrian 2023 20:00) (77 - 111)  BP: --  BP(mean): --  ABP: 140/85 (15 Adrian 2023 20:00) (107/64 - 170/105)  ABP(mean): 108 (15 Adrian 2023 20:00) (81 - 133)  RR: 17 (15 Adrian 2023 20:00) (12 - 24)  SpO2: 95% (15 Adrian 2023 20:00) (93% - 100%)    O2 Parameters below as of 15 Adrian 2023 19:00  Patient On (Oxygen Delivery Method): nasal cannula  O2 Flow (L/min): 6      LABS:  Na: 143 (06-14 @ 22:49), 143 (06-14 @ 15:11), 144 (06-14 @ 02:14), 145 (06-13 @ 10:23), 144 (06-12 @ 21:12), 143 (06-12 @ 08:45)  K: 4.1 (06-14 @ 22:49), 3.5 (06-14 @ 15:11), 3.5 (06-14 @ 02:14), 3.8 (06-13 @ 10:23), 3.8 (06-12 @ 21:12), 4.0 (06-12 @ 08:45)  Cl: 107 (06-14 @ 22:49), 108 (06-14 @ 15:11), 108 (06-14 @ 02:14), 112 (06-13 @ 10:23), 112 (06-12 @ 21:12), 112 (06-12 @ 08:45)  CO2: 21 (06-14 @ 22:49), 23 (06-14 @ 15:11), 23 (06-14 @ 02:14), 18 (06-13 @ 10:23), 20 (06-12 @ 21:12), 20 (06-12 @ 08:45)  BUN: 8 (06-14 @ 22:49), 10 (06-14 @ 15:11), 11 (06-14 @ 02:14), 14 (06-13 @ 10:23), 14 (06-12 @ 21:12), 11 (06-12 @ 08:45)  Cr: 0.40 (06-14 @ 22:49), 0.43 (06-14 @ 15:11), 0.45 (06-14 @ 02:14), 0.40 (06-13 @ 10:23), 0.43 (06-12 @ 21:12), 0.34 (06-12 @ 08:45)  Glu: 115(06-14 @ 22:49), 114(06-14 @ 15:11), 142(06-14 @ 02:14), 161(06-13 @ 10:23), 206(06-12 @ 21:12), 156(06-12 @ 08:45)    Hgb: 10.5 (06-14 @ 22:49)  Hct: 32.3 (06-14 @ 22:49)  WBC: 17.91 (06-14 @ 22:49)  Plt: 423 (06-14 @ 22:49)    MEDICATIONS:  acetaminophen   Oral Liquid .. 650 milliGRAM(s) Oral every 6 hours PRN  acetylcysteine 20%  Inhalation 4 milliLiter(s) Inhalation every 12 hours  albuterol/ipratropium for Nebulization 3 milliLiter(s) Nebulizer every 6 hours  bisacodyl Suppository 10 milliGRAM(s) Rectal once PRN  brivaracetam 100 milliGRAM(s) Oral two times a day  chlorhexidine 4% Liquid 1 Application(s) Topical daily  cloNIDine 0.2 milliGRAM(s) Oral every 8 hours  enoxaparin Injectable 40 milliGRAM(s) SubCutaneous <User Schedule>  HYDROmorphone  Injectable 0.25 milliGRAM(s) IV Push every 4 hours PRN  insulin lispro (ADMELOG) corrective regimen sliding scale   SubCutaneous every 6 hours  lacosamide IVPB 150 milliGRAM(s) IV Intermittent every 12 hours  levothyroxine 75 MICROGram(s) Oral daily  lisinopril 40 milliGRAM(s) Enteral Tube daily  multiple electrolytes Injection Type 1 1000 milliLiter(s) IV Continuous <Continuous>  nicotine - 21 mG/24Hr(s) Patch 1 Patch Transdermal daily  ondansetron Injectable 4 milliGRAM(s) IV Push every 6 hours PRN  oxyCODONE    Solution 10 milliGRAM(s) Oral every 6 hours PRN  polyethylene glycol 3350 17 Gram(s) Oral every 12 hours  senna 2 Tablet(s) Oral at bedtime    EXAMINATION: off all sedation   General:  in NAD  HEENT: intubated   Neuro: lethargic, keeps eyes closed, follows commands including showing 2 fingers on the R, gaze midline, face symmetric except for R eye ptosis, able to lift R arm briefly AG to elbow, able to lift R leg if knee supported by examiner, L arm localize, L leg withdraws   Cards:  RRR  Respiratory: with wheezing in the R lung and diminished L lung sounds.   Abdomen:  soft  Extremities:  no LE edema

## 2023-06-16 NOTE — PROGRESS NOTE ADULT - SUBJECTIVE AND OBJECTIVE BOX
HPI:    SURGERY: Decompressive craniectomy      PAST MEDICAL HISTORY:   PAST SURGICAL HISTORY:   FAMILY HISTORY:    ALLERGIES: No Known Allergies    **************************************  **************************************    OVERNIGHT EVENTS: [] None    ROS  Unobtainable due to mental status[] Negative []  Positives:    ADMISSION SCORES: GCS: HH: MF: NIHSS: RASS: CAM-ICU: ICP:    ICU Vital Signs Last 24 Hrs  T(C): 37.2 (16 Jun 2023 11:00), Max: 37.8 (15 Adrian 2023 15:00)  T(F): 99 (16 Jun 2023 11:00), Max: 100 (15 Adrian 2023 15:00)  HR: 91 (16 Jun 2023 12:00) (67 - 109)  BP: --  BP(mean): --  ABP: 123/69 (16 Jun 2023 12:00) (96/58 - 177/109)  ABP(mean): 90 (16 Jun 2023 12:00) (73 - 136)  RR: 21 (16 Jun 2023 12:00) (11 - 24)  SpO2: 97% (16 Jun 2023 12:00) (94% - 100%)    O2 Parameters below as of 16 Jun 2023 11:30  Patient On (Oxygen Delivery Method): nasal cannula           06-15 @ 07:01  -  06-16 @ 07:00  --------------------------------------------------------  IN: 2200 mL / OUT: 2700 mL / NET: -500 mL    06-16 @ 07:01  -  06-16 @ 12:43  --------------------------------------------------------  IN: 480 mL / OUT: 950 mL / NET: -470 mL           DEVICES: [] Restraints [] HAROON/HMV []LD [] ET tube [] Trach [] Chest Tube [] A-line [] Trevino [] NGT [] Rectal Tube [] EVD [] CVL  [] ICP/LiCOx    NEUROIMAGING:     EEG REPORT:     MEDICATIONS:  acetaminophen   Oral Liquid .. 650 milliGRAM(s) Oral every 6 hours PRN  acetylcysteine 20%  Inhalation 4 milliLiter(s) Inhalation every 12 hours  albuterol/ipratropium for Nebulization 3 milliLiter(s) Nebulizer every 6 hours  amLODIPine   Tablet 5 milliGRAM(s) Oral daily  brivaracetam 100 milliGRAM(s) Oral two times a day  chlorhexidine 4% Liquid 1 Application(s) Topical daily  cloNIDine 0.1 milliGRAM(s) Oral every 8 hours  enoxaparin Injectable 40 milliGRAM(s) SubCutaneous <User Schedule>  insulin lispro (ADMELOG) corrective regimen sliding scale   SubCutaneous every 6 hours  lacosamide IVPB 150 milliGRAM(s) IV Intermittent every 12 hours  levothyroxine 75 MICROGram(s) Oral daily  lisinopril 40 milliGRAM(s) Enteral Tube daily  multiple electrolytes Injection Type 1 1000 milliLiter(s) IV Continuous <Continuous>  nicotine - 21 mG/24Hr(s) Patch 1 Patch Transdermal daily  ondansetron Injectable 4 milliGRAM(s) IV Push every 6 hours PRN  oxyCODONE    Solution 10 milliGRAM(s) Oral every 6 hours PRN      PHYSICAL EXAM:  aphasic, pupils= follows motor commands on right left HP      LABS:                        10.1   16.16 )-----------( 375      ( 16 Jun 2023 02:05 )             30.8    06-16    139  |  106  |  9   ----------------------------<  133<H>  4.0   |  24  |  0.44<L>    Ca    8.3<L>      16 Jun 2023 02:05  Phos  3.6     06-16  Mg     2.2     06-16     ABG - ( 14 Jun 2023 22:38 )  pH, Arterial: 7.46  pH, Blood: x     /  pCO2: 35    /  pO2: 111   / HCO3: 25    / Base Excess: 1.3   /  SaO2: 99.1

## 2023-06-16 NOTE — PROGRESS NOTE ADULT - ASSESSMENT
ASSESSMENT/PLAN:  EVD clamped for 24 hrs-possible DC tomorrow    [] Patient is at high risk of neurologic deterioration/death due to:     Time seen:  Time spent: ___ [] critical care minutes

## 2023-06-16 NOTE — PHARMACOTHERAPY INTERVENTION NOTE - INTERVENTION TYPE RECOOMEND
Route of Administration Change
Therapy Recommended - Drug indicated but not ordered

## 2023-06-16 NOTE — PHARMACOTHERAPY INTERVENTION NOTE - COMMENTS
Reviewed appropriate routes of medication administration   MEDICATIONS  (STANDING):  chlorhexidine 0.12% Liquid 15 milliLiter(s) Oral Mucosa every 12 hours  chlorhexidine 4% Liquid 1 Application(s) Topical daily  dexMEDEtomidine Infusion 0.2 MICROgram(s)/kG/Hr (3.82 mL/Hr) IV Continuous <Continuous>  enoxaparin Injectable 40 milliGRAM(s) SubCutaneous <User Schedule>  insulin lispro (ADMELOG) corrective regimen sliding scale   SubCutaneous every 6 hours  levETIRAcetam  Solution 500 milliGRAM(s) Oral two times a day  levothyroxine 75 MICROGram(s) Oral daily  nicotine - 21 mG/24Hr(s) Patch 1 Patch Transdermal daily  pantoprazole  Injectable 40 milliGRAM(s) IV Push daily  phenylephrine    Infusion 0.1 MICROgram(s)/kG/Min (2.87 mL/Hr) IV Continuous <Continuous>  senna 2 Tablet(s) Oral at bedtime  sodium chloride 3% + sodium acetate 50:50 1000 milliLiter(s) (50 mL/Hr) IV Continuous <Continuous>    MEDICATIONS  (PRN):  acetaminophen     Tablet .. 650 milliGRAM(s) Oral every 6 hours PRN Mild Pain (1 - 3)  bisacodyl Suppository 10 milliGRAM(s) Rectal once PRN Constipation  ondansetron Injectable 4 milliGRAM(s) IV Push every 6 hours PRN Nausea and/or Vomiting  oxyCODONE    Solution 5 milliGRAM(s) Oral every 4 hours PRN Moderate Pain (4 - 6)  polyethylene glycol 3350 17 Gram(s) Oral once PRN Constipation  
Reviewed appropriate routes of medication administration  MEDICATIONS  (STANDING):  chlorhexidine 0.12% Liquid 15 milliLiter(s) Oral Mucosa every 12 hours  chlorhexidine 4% Liquid 1 Application(s) Topical daily  dexMEDEtomidine Infusion 0.2 MICROgram(s)/kG/Hr (3.82 mL/Hr) IV Continuous <Continuous>  enoxaparin Injectable 40 milliGRAM(s) SubCutaneous <User Schedule>  insulin lispro (ADMELOG) corrective regimen sliding scale   SubCutaneous every 6 hours  levETIRAcetam  Solution 500 milliGRAM(s) Oral two times a day  levothyroxine 75 MICROGram(s) Oral daily  nicotine - 21 mG/24Hr(s) Patch 1 Patch Transdermal daily  pantoprazole  Injectable 40 milliGRAM(s) IV Push daily  phenylephrine    Infusion 0.1 MICROgram(s)/kG/Min (2.87 mL/Hr) IV Continuous <Continuous>  polyethylene glycol 3350 17 Gram(s) Oral every 12 hours  potassium chloride   Solution 40 milliEquivalent(s) Oral every 4 hours  senna 2 Tablet(s) Oral at bedtime  sodium chloride 2% + sodium acetate 50:50 1000 milliLiter(s) (30 mL/Hr) IV Continuous <Continuous>    MEDICATIONS  (PRN):  acetaminophen     Tablet .. 650 milliGRAM(s) Oral every 6 hours PRN Temp greater or equal to 38C (100.4F), Mild Pain (1 - 3)  bisacodyl Suppository 10 milliGRAM(s) Rectal once PRN Constipation  ondansetron Injectable 4 milliGRAM(s) IV Push every 6 hours PRN Nausea and/or Vomiting  oxyCODONE    Solution 5 milliGRAM(s) Oral every 4 hours PRN Moderate Pain (4 - 6)  polyethylene glycol 3350 17 Gram(s) Oral once PRN Constipation  
Confirmed with patient's spouse she is on chantix and levothyroxine at home. Confirmed NKDA 
Recommended to start nicotine patch 21 mg and levothyroxine 75 mcg daily. Per patient's spouse she spokes 1 packet of cigarettes per day and recently starting quitting 
Reviewed appropriate routes of medication administration  MEDICATIONS  (STANDING):  acetylcysteine 20%  Inhalation 4 milliLiter(s) Inhalation every 12 hours  albuterol/ipratropium for Nebulization 3 milliLiter(s) Nebulizer every 6 hours  amLODIPine   Tablet 5 milliGRAM(s) Oral daily  brivaracetam 100 milliGRAM(s) Oral two times a day  chlorhexidine 4% Liquid 1 Application(s) Topical daily  cloNIDine 0.1 milliGRAM(s) Oral every 8 hours  enoxaparin Injectable 40 milliGRAM(s) SubCutaneous <User Schedule>  insulin lispro (ADMELOG) corrective regimen sliding scale   SubCutaneous every 6 hours  lacosamide IVPB 150 milliGRAM(s) IV Intermittent every 12 hours  levothyroxine 75 MICROGram(s) Oral daily  lisinopril 40 milliGRAM(s) Enteral Tube daily  multiple electrolytes Injection Type 1 1000 milliLiter(s) (50 mL/Hr) IV Continuous <Continuous>  nicotine - 21 mG/24Hr(s) Patch 1 Patch Transdermal daily    MEDICATIONS  (PRN):  acetaminophen   Oral Liquid .. 650 milliGRAM(s) Oral every 6 hours PRN Temp greater or equal to 38C (100.4F), Mild Pain (1 - 3)  ondansetron Injectable 4 milliGRAM(s) IV Push every 6 hours PRN Nausea and/or Vomiting  oxyCODONE    Solution 10 milliGRAM(s) Oral every 6 hours PRN Severe Pain (7 - 10)  
Reviewed appropriate routes of medication administration  MEDICATIONS  (STANDING):  acetylcysteine 20%  Inhalation 4 milliLiter(s) Inhalation every 12 hours  albuterol/ipratropium for Nebulization 3 milliLiter(s) Nebulizer every 6 hours  brivaracetam 100 milliGRAM(s) Oral two times a day  chlorhexidine 4% Liquid 1 Application(s) Topical daily  cloNIDine 0.2 milliGRAM(s) Oral every 8 hours  enoxaparin Injectable 40 milliGRAM(s) SubCutaneous <User Schedule>  insulin lispro (ADMELOG) corrective regimen sliding scale   SubCutaneous every 6 hours  lacosamide IVPB 150 milliGRAM(s) IV Intermittent every 12 hours  levothyroxine 75 MICROGram(s) Oral daily  lisinopril 40 milliGRAM(s) Enteral Tube daily  multiple electrolytes Injection Type 1 1000 milliLiter(s) (50 mL/Hr) IV Continuous <Continuous>  nicotine - 21 mG/24Hr(s) Patch 1 Patch Transdermal daily    MEDICATIONS  (PRN):  acetaminophen   Oral Liquid .. 650 milliGRAM(s) Oral every 6 hours PRN Temp greater or equal to 38C (100.4F), Mild Pain (1 - 3)  HYDROmorphone  Injectable 0.25 milliGRAM(s) IV Push every 4 hours PRN Moderate Pain (4 - 6)  ondansetron Injectable 4 milliGRAM(s) IV Push every 6 hours PRN Nausea and/or Vomiting  oxyCODONE    Solution 10 milliGRAM(s) Oral every 6 hours PRN Severe Pain (7 - 10)

## 2023-06-17 LAB
ANION GAP SERPL CALC-SCNC: 14 MMOL/L — SIGNIFICANT CHANGE UP (ref 5–17)
BUN SERPL-MCNC: 11 MG/DL — SIGNIFICANT CHANGE UP (ref 7–23)
CALCIUM SERPL-MCNC: 8.8 MG/DL — SIGNIFICANT CHANGE UP (ref 8.4–10.5)
CHLORIDE SERPL-SCNC: 107 MMOL/L — SIGNIFICANT CHANGE UP (ref 96–108)
CO2 SERPL-SCNC: 23 MMOL/L — SIGNIFICANT CHANGE UP (ref 22–31)
CREAT SERPL-MCNC: 0.42 MG/DL — LOW (ref 0.5–1.3)
EGFR: 128 ML/MIN/1.73M2 — SIGNIFICANT CHANGE UP
GLUCOSE BLDC GLUCOMTR-MCNC: 124 MG/DL — HIGH (ref 70–99)
GLUCOSE BLDC GLUCOMTR-MCNC: 124 MG/DL — HIGH (ref 70–99)
GLUCOSE BLDC GLUCOMTR-MCNC: 137 MG/DL — HIGH (ref 70–99)
GLUCOSE SERPL-MCNC: 134 MG/DL — HIGH (ref 70–99)
MAGNESIUM SERPL-MCNC: 2.2 MG/DL — SIGNIFICANT CHANGE UP (ref 1.6–2.6)
PHOSPHATE SERPL-MCNC: 4.1 MG/DL — SIGNIFICANT CHANGE UP (ref 2.5–4.5)
POTASSIUM SERPL-MCNC: 4.1 MMOL/L — SIGNIFICANT CHANGE UP (ref 3.5–5.3)
POTASSIUM SERPL-SCNC: 4.1 MMOL/L — SIGNIFICANT CHANGE UP (ref 3.5–5.3)
SODIUM SERPL-SCNC: 144 MMOL/L — SIGNIFICANT CHANGE UP (ref 135–145)

## 2023-06-17 PROCEDURE — 99233 SBSQ HOSP IP/OBS HIGH 50: CPT

## 2023-06-17 PROCEDURE — 99024 POSTOP FOLLOW-UP VISIT: CPT

## 2023-06-17 PROCEDURE — 70450 CT HEAD/BRAIN W/O DYE: CPT | Mod: 26

## 2023-06-17 PROCEDURE — 70450 CT HEAD/BRAIN W/O DYE: CPT | Mod: 26,77

## 2023-06-17 RX ORDER — LABETALOL HCL 100 MG
5 TABLET ORAL ONCE
Refills: 0 | Status: COMPLETED | OUTPATIENT
Start: 2023-06-17 | End: 2023-06-17

## 2023-06-17 RX ADMIN — AMLODIPINE BESYLATE 5 MILLIGRAM(S): 2.5 TABLET ORAL at 05:32

## 2023-06-17 RX ADMIN — Medication 0.1 MILLIGRAM(S): at 13:45

## 2023-06-17 RX ADMIN — Medication 1 PATCH: at 12:30

## 2023-06-17 RX ADMIN — CHLORHEXIDINE GLUCONATE 1 APPLICATION(S): 213 SOLUTION TOPICAL at 12:20

## 2023-06-17 RX ADMIN — Medication 3 MILLILITER(S): at 23:30

## 2023-06-17 RX ADMIN — LACOSAMIDE 130 MILLIGRAM(S): 50 TABLET ORAL at 05:31

## 2023-06-17 RX ADMIN — Medication 75 MICROGRAM(S): at 05:30

## 2023-06-17 RX ADMIN — LISINOPRIL 40 MILLIGRAM(S): 2.5 TABLET ORAL at 05:30

## 2023-06-17 RX ADMIN — Medication 650 MILLIGRAM(S): at 05:31

## 2023-06-17 RX ADMIN — LACOSAMIDE 130 MILLIGRAM(S): 50 TABLET ORAL at 17:01

## 2023-06-17 RX ADMIN — Medication 1 PATCH: at 12:11

## 2023-06-17 RX ADMIN — Medication 4 MILLILITER(S): at 17:13

## 2023-06-17 RX ADMIN — Medication 5 MILLIGRAM(S): at 03:18

## 2023-06-17 RX ADMIN — Medication 3 MILLILITER(S): at 17:13

## 2023-06-17 RX ADMIN — Medication 4 MILLILITER(S): at 05:44

## 2023-06-17 RX ADMIN — Medication 0.1 MILLIGRAM(S): at 05:30

## 2023-06-17 RX ADMIN — Medication 650 MILLIGRAM(S): at 06:31

## 2023-06-17 RX ADMIN — OXYCODONE HYDROCHLORIDE 10 MILLIGRAM(S): 5 TABLET ORAL at 16:36

## 2023-06-17 RX ADMIN — BRIVARACETAM 100 MILLIGRAM(S): 25 TABLET, FILM COATED ORAL at 17:01

## 2023-06-17 RX ADMIN — BRIVARACETAM 100 MILLIGRAM(S): 25 TABLET, FILM COATED ORAL at 05:30

## 2023-06-17 RX ADMIN — Medication 3 MILLILITER(S): at 05:44

## 2023-06-17 RX ADMIN — Medication 0.1 MILLIGRAM(S): at 22:06

## 2023-06-17 RX ADMIN — Medication 1 PATCH: at 07:00

## 2023-06-17 RX ADMIN — OXYCODONE HYDROCHLORIDE 10 MILLIGRAM(S): 5 TABLET ORAL at 15:36

## 2023-06-17 RX ADMIN — Medication 1 PATCH: at 18:08

## 2023-06-17 RX ADMIN — Medication 3 MILLILITER(S): at 11:44

## 2023-06-17 NOTE — PROGRESS NOTE ADULT - SUBJECTIVE AND OBJECTIVE BOX
Patient seen and examined at bedside.    --Anticoagulation--    T(C): 36.8 (06-16-23 @ 23:00), Max: 37.7 (06-16-23 @ 07:00)  HR: 102 (06-17-23 @ 00:00) (67 - 106)  BP: --  RR: 20 (06-17-23 @ 00:00) (11 - 25)  SpO2: 97% (06-17-23 @ 00:00) (92% - 100%)  Wt(kg): --    Exam: AOx1, PERRL, Right side shows 2 fingers and lifts leg to command, LUE lodalize, LLE withdraws

## 2023-06-17 NOTE — PROGRESS NOTE ADULT - ASSESSMENT
ASSESSMENT/PLAN:    CT brain this am stable. will DC EVD. check post pull CT. Possible tx to floor today      [] Patient is at high risk of neurologic deterioration/death due to:     Time seen:  Time spent: ___ [] critical care minutes

## 2023-06-17 NOTE — PROVIDER CONTACT NOTE (CHANGE IN STATUS NOTIFICATION) - ASSESSMENT
Pt on precedex .7 mcg/kg/hr- pt tachycardic 120s. SBP 180s  - SBP goal 100-160- as per NSCU team titrate nicardipine gtt to SBP goal but only remains on nicardipine gtt short time before titrating the gtt off-
Lethargic able to state name only, garbled speech and not following on right lower extremity. Received OXy at 1536 and Lacrosomide evening time

## 2023-06-17 NOTE — PROGRESS NOTE ADULT - ASSESSMENT
Assessment/Plan:   37 yo woman with h/o HTN, admitted 6/6 with 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 CTA and angiogram 6/9 with no vascular malformation. MRI brain 6/13 without any brain underlying brain mass. ICH score 4.   Hospital course c/b seizures on VEEG with onset from R frontal region, now resolved.   Tachycardia and hypertension improved with clonidine, likely 2/2 Precedex withdrawal.     Neuro:   - neuro checks q1h  - EVD removed 6/17, post pull EVD CTH AM  - d/c VEEG  - c/w Briviact 100 mg BID and Vimpat to 150 mg BID  - decreased clonidine 0.1 mg q8h for concern for Precedex withdrawal, taper as tolerated   - Tylenol oxy and Dilaudid for pain   - CT CAP w/wo- no malignancy     CV:  - SBP goal 100-160  - c/w lisinopril 40 mg daily and clonidine 0.1mg q8hr  - c/w amlodipine 5mg daily     Respiratory:   - NC 4L  - c/w duo nebs, Mucomyst and chest PT    GI:   - NG, TF Glucerna 1.2  - senna and miralax, rectal tube LBM 6/15    Renal:   - IVL  - Na goal normonatremia     Endocrine:   - FS goal 120-180, medium ISS  - synthroid    ID:   - afebrile   - monitor off abx     Heme/Onc:     - SCDs, Lov ppx     Dr. Whitt updated the patient's wife at bedside 6/15     Patient seen and examined by attending/PA on 6/17/2023.    Patient is critically ill due to ICH and at high risk for neurological deterioration or death due to: with EVD for CSF diversion, undergoing a clamp trial at risk for hydrocephalus.    Assessment/Plan:   39 yo woman with h/o HTN, admitted 6/6 with 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 CTA and angiogram 6/9 with no vascular malformation. MRI brain 6/13 without any brain underlying brain mass. ICH score 4.   Hospital course c/b seizures on VEEG with onset from R frontal region, now resolved.   Tachycardia and hypertension improved with clonidine, likely 2/2 Precedex withdrawal.     Neuro:   - neuro checks q1h  - EVD removed 6/17, post pull EVD CTH AM  - d/c VEEG  - c/w Briviact 100 mg BID and Vimpat to 150 mg BID  - decreased clonidine 0.1 mg q8h for concern for Precedex withdrawal, taper as tolerated   - Tylenol oxy and Dilaudid for pain   - CT CAP w/wo- no malignancy     CV:  - SBP goal 100-160  - c/w lisinopril 40 mg daily and clonidine 0.1mg q8hr  - c/w amlodipine 5mg daily     Respiratory:   - NC 4L  - c/w duo nebs, Mucomyst and chest PT    GI:   - NG, TF Glucerna 1.2  - senna and miralax, rectal tube LBM 6/15    Renal:   - IVL  - Na goal normonatremia     Endocrine:   - FS goal 120-180, medium ISS  - synthroid    ID:   - afebrile   - monitor off abx     Heme/Onc:     - SCDs, Lov ppx     Dr. Whitt updated the patient's wife at bedside 6/15     Patient seen and examined by attending/PA on 6/17/2023.     Assessment/Plan:   37 yo woman with h/o HTN, admitted 6/6 with 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 CTA and angiogram 6/9 with no vascular malformation. MRI brain 6/13 without any brain underlying brain mass. ICH score 4.   Hospital course c/b seizures on VEEG with onset from R frontal region, now resolved.   Tachycardia and hypertension improved with clonidine, likely 2/2 Precedex withdrawal.     Neuro:   - neuro checks q1h  - CTH pending d/t being more lethargic and WD RLE  - EVD removed 6/17, post pull EVD CTH AM  - d/c VEEG  - c/w Briviact 100 mg BID and Vimpat to 150 mg BID  - decreased clonidine 0.1 mg q8h for concern for Precedex withdrawal, taper as tolerated   - Tylenol oxy and Dilaudid for pain   - CT CAP w/wo- no malignancy     CV:  - SBP goal 100-160  - c/w lisinopril 40 mg daily and clonidine 0.1mg q8hr  - c/w amlodipine 5mg daily     Respiratory:   - NC 4L  - c/w duo nebs, Mucomyst and chest PT    GI:   - NG, TF Glucerna 1.2  - senna and miralax, rectal tube LBM 6/15    Renal:   - IVL  - Na goal normonatremia     Endocrine:   - FS goal 120-180, medium ISS  - synthroid    ID:   - afebrile   - monitor off abx     Heme/Onc:     - SCDs, Lov ppx     Dr. Whitt updated the patient's wife at bedside 6/15     Patient seen and examined by attending/PA on 6/17/2023.

## 2023-06-17 NOTE — PROVIDER CONTACT NOTE (CHANGE IN STATUS NOTIFICATION) - ACTION/TREATMENT ORDERED:
Neurosurgery Max georges came to exam patient at the bedside, CT ordered pending transportation, continue to monitor exam at this time
As per NSCU DUSTIN Rivas, shut off nicardipine gtt - if pts SBP > 160 give 25 mcg fentanyl, continue to monitor

## 2023-06-17 NOTE — PROGRESS NOTE ADULT - SUBJECTIVE AND OBJECTIVE BOX
Subjective:  Pt seen and examined at bedside    Interval events:  Day: CTH stable, EVD removed  Night: no acute events overnight    ICU Vital Signs Last 24 Hrs  T(C): 37.2 (15 Adrian 2023 19:00), Max: 37.8 (15 Adrian 2023 15:00)  T(F): 99 (15 Adrian 2023 19:00), Max: 100 (15 Adrian 2023 15:00)  HR: 90 (15 Adrian 2023 20:00) (77 - 111)  BP: --  BP(mean): --  ABP: 140/85 (15 Adrian 2023 20:00) (107/64 - 170/105)  ABP(mean): 108 (15 Adrian 2023 20:00) (81 - 133)  RR: 17 (15 Adrian 2023 20:00) (12 - 24)  SpO2: 95% (15 Adrian 2023 20:00) (93% - 100%)    O2 Parameters below as of 15 Adrian 2023 19:00  Patient On (Oxygen Delivery Method): nasal cannula  O2 Flow (L/min): 6      LABS:  Na: 143 (06-14 @ 22:49), 143 (06-14 @ 15:11), 144 (06-14 @ 02:14), 145 (06-13 @ 10:23), 144 (06-12 @ 21:12), 143 (06-12 @ 08:45)  K: 4.1 (06-14 @ 22:49), 3.5 (06-14 @ 15:11), 3.5 (06-14 @ 02:14), 3.8 (06-13 @ 10:23), 3.8 (06-12 @ 21:12), 4.0 (06-12 @ 08:45)  Cl: 107 (06-14 @ 22:49), 108 (06-14 @ 15:11), 108 (06-14 @ 02:14), 112 (06-13 @ 10:23), 112 (06-12 @ 21:12), 112 (06-12 @ 08:45)  CO2: 21 (06-14 @ 22:49), 23 (06-14 @ 15:11), 23 (06-14 @ 02:14), 18 (06-13 @ 10:23), 20 (06-12 @ 21:12), 20 (06-12 @ 08:45)  BUN: 8 (06-14 @ 22:49), 10 (06-14 @ 15:11), 11 (06-14 @ 02:14), 14 (06-13 @ 10:23), 14 (06-12 @ 21:12), 11 (06-12 @ 08:45)  Cr: 0.40 (06-14 @ 22:49), 0.43 (06-14 @ 15:11), 0.45 (06-14 @ 02:14), 0.40 (06-13 @ 10:23), 0.43 (06-12 @ 21:12), 0.34 (06-12 @ 08:45)  Glu: 115(06-14 @ 22:49), 114(06-14 @ 15:11), 142(06-14 @ 02:14), 161(06-13 @ 10:23), 206(06-12 @ 21:12), 156(06-12 @ 08:45)    Hgb: 10.5 (06-14 @ 22:49)  Hct: 32.3 (06-14 @ 22:49)  WBC: 17.91 (06-14 @ 22:49)  Plt: 423 (06-14 @ 22:49)    MEDICATIONS:  acetaminophen   Oral Liquid .. 650 milliGRAM(s) Oral every 6 hours PRN  acetylcysteine 20%  Inhalation 4 milliLiter(s) Inhalation every 12 hours  albuterol/ipratropium for Nebulization 3 milliLiter(s) Nebulizer every 6 hours  bisacodyl Suppository 10 milliGRAM(s) Rectal once PRN  brivaracetam 100 milliGRAM(s) Oral two times a day  chlorhexidine 4% Liquid 1 Application(s) Topical daily  cloNIDine 0.2 milliGRAM(s) Oral every 8 hours  enoxaparin Injectable 40 milliGRAM(s) SubCutaneous <User Schedule>  HYDROmorphone  Injectable 0.25 milliGRAM(s) IV Push every 4 hours PRN  insulin lispro (ADMELOG) corrective regimen sliding scale   SubCutaneous every 6 hours  lacosamide IVPB 150 milliGRAM(s) IV Intermittent every 12 hours  levothyroxine 75 MICROGram(s) Oral daily  lisinopril 40 milliGRAM(s) Enteral Tube daily  multiple electrolytes Injection Type 1 1000 milliLiter(s) IV Continuous <Continuous>  nicotine - 21 mG/24Hr(s) Patch 1 Patch Transdermal daily  ondansetron Injectable 4 milliGRAM(s) IV Push every 6 hours PRN  oxyCODONE    Solution 10 milliGRAM(s) Oral every 6 hours PRN  polyethylene glycol 3350 17 Gram(s) Oral every 12 hours  senna 2 Tablet(s) Oral at bedtime    EXAMINATION: off all sedation   General:  in NAD  HEENT: intubated   Neuro: lethargic, keeps eyes closed, follows commands including showing 2 fingers on the R, gaze midline, face symmetric except for R eye ptosis, able to lift R arm briefly AG to elbow, able to lift R leg if knee supported by examiner, L arm localize, L leg withdraws   Cards:  RRR  Respiratory: with wheezing in the R lung and diminished L lung sounds.   Abdomen:  soft  Extremities:  no LE edema     Subjective:  Pt seen and examined at bedside, more lethargic    Interval events:  Day: CTH stable, EVD removed  Night: Pt more lethargic but FC, RLE WD, CTH pending    ICU Vital Signs Last 24 Hrs  T(C): 37.2 (15 Adrian 2023 19:00), Max: 37.8 (15 Adrian 2023 15:00)  T(F): 99 (15 Adrian 2023 19:00), Max: 100 (15 Adrian 2023 15:00)  HR: 90 (15 Adrian 2023 20:00) (77 - 111)  BP: --  BP(mean): --  ABP: 140/85 (15 Adrian 2023 20:00) (107/64 - 170/105)  ABP(mean): 108 (15 Adrian 2023 20:00) (81 - 133)  RR: 17 (15 Adrian 2023 20:00) (12 - 24)  SpO2: 95% (15 Adrian 2023 20:00) (93% - 100%)    O2 Parameters below as of 15 Adrian 2023 19:00  Patient On (Oxygen Delivery Method): nasal cannula  O2 Flow (L/min): 6      LABS:  Na: 143 (06-14 @ 22:49), 143 (06-14 @ 15:11), 144 (06-14 @ 02:14), 145 (06-13 @ 10:23), 144 (06-12 @ 21:12), 143 (06-12 @ 08:45)  K: 4.1 (06-14 @ 22:49), 3.5 (06-14 @ 15:11), 3.5 (06-14 @ 02:14), 3.8 (06-13 @ 10:23), 3.8 (06-12 @ 21:12), 4.0 (06-12 @ 08:45)  Cl: 107 (06-14 @ 22:49), 108 (06-14 @ 15:11), 108 (06-14 @ 02:14), 112 (06-13 @ 10:23), 112 (06-12 @ 21:12), 112 (06-12 @ 08:45)  CO2: 21 (06-14 @ 22:49), 23 (06-14 @ 15:11), 23 (06-14 @ 02:14), 18 (06-13 @ 10:23), 20 (06-12 @ 21:12), 20 (06-12 @ 08:45)  BUN: 8 (06-14 @ 22:49), 10 (06-14 @ 15:11), 11 (06-14 @ 02:14), 14 (06-13 @ 10:23), 14 (06-12 @ 21:12), 11 (06-12 @ 08:45)  Cr: 0.40 (06-14 @ 22:49), 0.43 (06-14 @ 15:11), 0.45 (06-14 @ 02:14), 0.40 (06-13 @ 10:23), 0.43 (06-12 @ 21:12), 0.34 (06-12 @ 08:45)  Glu: 115(06-14 @ 22:49), 114(06-14 @ 15:11), 142(06-14 @ 02:14), 161(06-13 @ 10:23), 206(06-12 @ 21:12), 156(06-12 @ 08:45)    Hgb: 10.5 (06-14 @ 22:49)  Hct: 32.3 (06-14 @ 22:49)  WBC: 17.91 (06-14 @ 22:49)  Plt: 423 (06-14 @ 22:49)    MEDICATIONS:  acetaminophen   Oral Liquid .. 650 milliGRAM(s) Oral every 6 hours PRN  acetylcysteine 20%  Inhalation 4 milliLiter(s) Inhalation every 12 hours  albuterol/ipratropium for Nebulization 3 milliLiter(s) Nebulizer every 6 hours  bisacodyl Suppository 10 milliGRAM(s) Rectal once PRN  brivaracetam 100 milliGRAM(s) Oral two times a day  chlorhexidine 4% Liquid 1 Application(s) Topical daily  cloNIDine 0.2 milliGRAM(s) Oral every 8 hours  enoxaparin Injectable 40 milliGRAM(s) SubCutaneous <User Schedule>  HYDROmorphone  Injectable 0.25 milliGRAM(s) IV Push every 4 hours PRN  insulin lispro (ADMELOG) corrective regimen sliding scale   SubCutaneous every 6 hours  lacosamide IVPB 150 milliGRAM(s) IV Intermittent every 12 hours  levothyroxine 75 MICROGram(s) Oral daily  lisinopril 40 milliGRAM(s) Enteral Tube daily  multiple electrolytes Injection Type 1 1000 milliLiter(s) IV Continuous <Continuous>  nicotine - 21 mG/24Hr(s) Patch 1 Patch Transdermal daily  ondansetron Injectable 4 milliGRAM(s) IV Push every 6 hours PRN  oxyCODONE    Solution 10 milliGRAM(s) Oral every 6 hours PRN  polyethylene glycol 3350 17 Gram(s) Oral every 12 hours  senna 2 Tablet(s) Oral at bedtime    EXAMINATION: off all sedation   General:  in NAD  HEENT: intubated   Neuro: lethargic, keeps eyes closed, follows commands including showing 2 fingers on the R, gaze midline, face symmetric except for R eye ptosis, able to lift R arm briefly AG to elbow, able to lift R leg if knee supported by examiner, L arm localize, L leg withdraws   Cards:  RRR  Respiratory: with wheezing in the R lung and diminished L lung sounds.   Abdomen:  soft  Extremities:  no LE edema

## 2023-06-17 NOTE — CHART NOTE - NSCHARTNOTEFT_GEN_A_CORE
EVD drain cleared for removal per neurosurgeon Dr. Bernstein. Drain was clamped for 48H prior and CT obtained before removal which was reviewed, stable exam.  EVD removed without complication. Maulik applied for hemostasis.  Patient tolerated procedure well.

## 2023-06-17 NOTE — PROVIDER CONTACT NOTE (CHANGE IN STATUS NOTIFICATION) - BACKGROUND
S/p right ICH with midline shift and brain compression s/p emergent decompressive craniectomy  CTA no vascular malformation
IVH and ICH S/P craniotomy and EVD placement

## 2023-06-17 NOTE — PROVIDER CONTACT NOTE (CHANGE IN STATUS NOTIFICATION) - SITUATION
Patient more lethargic up on assessment and no right lower extremity movement
Pt febrile 380.0, tachycardic 120s. SBP 180s  - SBP goal 100-160

## 2023-06-17 NOTE — PROGRESS NOTE ADULT - ASSESSMENT
-POD11 Right hemicrani w/ intraop EVD placement, POD8 from negative cerebral angiogram   -EVD clamped  -CT in AM before potentially clamp trial on 6/17 at 7AM

## 2023-06-18 LAB
ANION GAP SERPL CALC-SCNC: 13 MMOL/L — SIGNIFICANT CHANGE UP (ref 5–17)
ANION GAP SERPL CALC-SCNC: 14 MMOL/L — SIGNIFICANT CHANGE UP (ref 5–17)
APPEARANCE UR: ABNORMAL
BACTERIA # UR AUTO: NEGATIVE — SIGNIFICANT CHANGE UP
BILIRUB UR-MCNC: NEGATIVE — SIGNIFICANT CHANGE UP
BUN SERPL-MCNC: 16 MG/DL — SIGNIFICANT CHANGE UP (ref 7–23)
BUN SERPL-MCNC: 17 MG/DL — SIGNIFICANT CHANGE UP (ref 7–23)
CALCIUM SERPL-MCNC: 9.1 MG/DL — SIGNIFICANT CHANGE UP (ref 8.4–10.5)
CALCIUM SERPL-MCNC: 9.3 MG/DL — SIGNIFICANT CHANGE UP (ref 8.4–10.5)
CHLORIDE SERPL-SCNC: 105 MMOL/L — SIGNIFICANT CHANGE UP (ref 96–108)
CHLORIDE SERPL-SCNC: 105 MMOL/L — SIGNIFICANT CHANGE UP (ref 96–108)
CO2 SERPL-SCNC: 23 MMOL/L — SIGNIFICANT CHANGE UP (ref 22–31)
CO2 SERPL-SCNC: 24 MMOL/L — SIGNIFICANT CHANGE UP (ref 22–31)
COLOR SPEC: SIGNIFICANT CHANGE UP
CREAT SERPL-MCNC: 0.47 MG/DL — LOW (ref 0.5–1.3)
CREAT SERPL-MCNC: 0.49 MG/DL — LOW (ref 0.5–1.3)
DIFF PNL FLD: NEGATIVE — SIGNIFICANT CHANGE UP
EGFR: 123 ML/MIN/1.73M2 — SIGNIFICANT CHANGE UP
EGFR: 124 ML/MIN/1.73M2 — SIGNIFICANT CHANGE UP
EPI CELLS # UR: 2 /HPF — SIGNIFICANT CHANGE UP
GLUCOSE BLDC GLUCOMTR-MCNC: 123 MG/DL — HIGH (ref 70–99)
GLUCOSE BLDC GLUCOMTR-MCNC: 125 MG/DL — HIGH (ref 70–99)
GLUCOSE BLDC GLUCOMTR-MCNC: 128 MG/DL — HIGH (ref 70–99)
GLUCOSE BLDC GLUCOMTR-MCNC: 151 MG/DL — HIGH (ref 70–99)
GLUCOSE BLDC GLUCOMTR-MCNC: 158 MG/DL — HIGH (ref 70–99)
GLUCOSE SERPL-MCNC: 138 MG/DL — HIGH (ref 70–99)
GLUCOSE SERPL-MCNC: 151 MG/DL — HIGH (ref 70–99)
GLUCOSE UR QL: NEGATIVE — SIGNIFICANT CHANGE UP
HCT VFR BLD CALC: 37.4 % — SIGNIFICANT CHANGE UP (ref 34.5–45)
HCT VFR BLD CALC: 37.6 % — SIGNIFICANT CHANGE UP (ref 34.5–45)
HGB BLD-MCNC: 11.9 G/DL — SIGNIFICANT CHANGE UP (ref 11.5–15.5)
HGB BLD-MCNC: 12.1 G/DL — SIGNIFICANT CHANGE UP (ref 11.5–15.5)
HYALINE CASTS # UR AUTO: 3 /LPF — HIGH (ref 0–2)
KETONES UR-MCNC: NEGATIVE — SIGNIFICANT CHANGE UP
LEUKOCYTE ESTERASE UR-ACNC: NEGATIVE — SIGNIFICANT CHANGE UP
MAGNESIUM SERPL-MCNC: 2.2 MG/DL — SIGNIFICANT CHANGE UP (ref 1.6–2.6)
MAGNESIUM SERPL-MCNC: 2.4 MG/DL — SIGNIFICANT CHANGE UP (ref 1.6–2.6)
MCHC RBC-ENTMCNC: 31.6 PG — SIGNIFICANT CHANGE UP (ref 27–34)
MCHC RBC-ENTMCNC: 31.8 GM/DL — LOW (ref 32–36)
MCHC RBC-ENTMCNC: 31.9 PG — SIGNIFICANT CHANGE UP (ref 27–34)
MCHC RBC-ENTMCNC: 32.2 GM/DL — SIGNIFICANT CHANGE UP (ref 32–36)
MCV RBC AUTO: 99.2 FL — SIGNIFICANT CHANGE UP (ref 80–100)
MCV RBC AUTO: 99.2 FL — SIGNIFICANT CHANGE UP (ref 80–100)
NITRITE UR-MCNC: NEGATIVE — SIGNIFICANT CHANGE UP
NRBC # BLD: 0 /100 WBCS — SIGNIFICANT CHANGE UP (ref 0–0)
NRBC # BLD: 0 /100 WBCS — SIGNIFICANT CHANGE UP (ref 0–0)
PH UR: 7 — SIGNIFICANT CHANGE UP (ref 5–8)
PHOSPHATE SERPL-MCNC: 3.8 MG/DL — SIGNIFICANT CHANGE UP (ref 2.5–4.5)
PHOSPHATE SERPL-MCNC: 3.8 MG/DL — SIGNIFICANT CHANGE UP (ref 2.5–4.5)
PLATELET # BLD AUTO: 491 K/UL — HIGH (ref 150–400)
PLATELET # BLD AUTO: 503 K/UL — HIGH (ref 150–400)
POTASSIUM SERPL-MCNC: 3.9 MMOL/L — SIGNIFICANT CHANGE UP (ref 3.5–5.3)
POTASSIUM SERPL-MCNC: 4 MMOL/L — SIGNIFICANT CHANGE UP (ref 3.5–5.3)
POTASSIUM SERPL-SCNC: 3.9 MMOL/L — SIGNIFICANT CHANGE UP (ref 3.5–5.3)
POTASSIUM SERPL-SCNC: 4 MMOL/L — SIGNIFICANT CHANGE UP (ref 3.5–5.3)
PROCALCITONIN SERPL-MCNC: 0.04 NG/ML — SIGNIFICANT CHANGE UP (ref 0.02–0.1)
PROT UR-MCNC: NEGATIVE — SIGNIFICANT CHANGE UP
RBC # BLD: 3.77 M/UL — LOW (ref 3.8–5.2)
RBC # BLD: 3.79 M/UL — LOW (ref 3.8–5.2)
RBC # FLD: 14.6 % — HIGH (ref 10.3–14.5)
RBC # FLD: 14.6 % — HIGH (ref 10.3–14.5)
RBC CASTS # UR COMP ASSIST: 5 /HPF — HIGH (ref 0–4)
SODIUM SERPL-SCNC: 141 MMOL/L — SIGNIFICANT CHANGE UP (ref 135–145)
SODIUM SERPL-SCNC: 143 MMOL/L — SIGNIFICANT CHANGE UP (ref 135–145)
SP GR SPEC: 1.02 — SIGNIFICANT CHANGE UP (ref 1.01–1.02)
UROBILINOGEN FLD QL: NEGATIVE — SIGNIFICANT CHANGE UP
WBC # BLD: 18.43 K/UL — HIGH (ref 3.8–10.5)
WBC # BLD: 23.1 K/UL — HIGH (ref 3.8–10.5)
WBC # FLD AUTO: 18.43 K/UL — HIGH (ref 3.8–10.5)
WBC # FLD AUTO: 23.1 K/UL — HIGH (ref 3.8–10.5)
WBC UR QL: 1 /HPF — SIGNIFICANT CHANGE UP (ref 0–5)

## 2023-06-18 PROCEDURE — 71045 X-RAY EXAM CHEST 1 VIEW: CPT | Mod: 26

## 2023-06-18 PROCEDURE — 99291 CRITICAL CARE FIRST HOUR: CPT

## 2023-06-18 RX ORDER — OXYCODONE HYDROCHLORIDE 5 MG/1
5 TABLET ORAL EVERY 6 HOURS
Refills: 0 | Status: DISCONTINUED | OUTPATIENT
Start: 2023-06-18 | End: 2023-06-21

## 2023-06-18 RX ORDER — OXYCODONE HYDROCHLORIDE 5 MG/1
10 TABLET ORAL EVERY 6 HOURS
Refills: 0 | Status: DISCONTINUED | OUTPATIENT
Start: 2023-06-18 | End: 2023-06-18

## 2023-06-18 RX ORDER — POTASSIUM CHLORIDE 20 MEQ
20 PACKET (EA) ORAL ONCE
Refills: 0 | Status: COMPLETED | OUTPATIENT
Start: 2023-06-18 | End: 2023-06-18

## 2023-06-18 RX ORDER — ENOXAPARIN SODIUM 100 MG/ML
40 INJECTION SUBCUTANEOUS
Refills: 0 | Status: DISCONTINUED | OUTPATIENT
Start: 2023-06-18 | End: 2023-06-20

## 2023-06-18 RX ADMIN — Medication 1 PATCH: at 07:56

## 2023-06-18 RX ADMIN — Medication 3 MILLILITER(S): at 05:36

## 2023-06-18 RX ADMIN — CHLORHEXIDINE GLUCONATE 1 APPLICATION(S): 213 SOLUTION TOPICAL at 13:03

## 2023-06-18 RX ADMIN — Medication 650 MILLIGRAM(S): at 09:55

## 2023-06-18 RX ADMIN — Medication 1 PATCH: at 13:02

## 2023-06-18 RX ADMIN — BRIVARACETAM 100 MILLIGRAM(S): 25 TABLET, FILM COATED ORAL at 05:46

## 2023-06-18 RX ADMIN — LISINOPRIL 40 MILLIGRAM(S): 2.5 TABLET ORAL at 05:45

## 2023-06-18 RX ADMIN — Medication 650 MILLIGRAM(S): at 11:45

## 2023-06-18 RX ADMIN — Medication 0.1 MILLIGRAM(S): at 05:46

## 2023-06-18 RX ADMIN — Medication 2: at 00:50

## 2023-06-18 RX ADMIN — Medication 1 PATCH: at 19:00

## 2023-06-18 RX ADMIN — Medication 0.1 MILLIGRAM(S): at 13:03

## 2023-06-18 RX ADMIN — ENOXAPARIN SODIUM 40 MILLIGRAM(S): 100 INJECTION SUBCUTANEOUS at 17:20

## 2023-06-18 RX ADMIN — Medication 1 PATCH: at 13:00

## 2023-06-18 RX ADMIN — LACOSAMIDE 130 MILLIGRAM(S): 50 TABLET ORAL at 05:45

## 2023-06-18 RX ADMIN — Medication 0.1 MILLIGRAM(S): at 21:00

## 2023-06-18 RX ADMIN — Medication 2: at 06:39

## 2023-06-18 RX ADMIN — AMLODIPINE BESYLATE 5 MILLIGRAM(S): 2.5 TABLET ORAL at 05:45

## 2023-06-18 RX ADMIN — Medication 75 MICROGRAM(S): at 05:45

## 2023-06-18 RX ADMIN — LACOSAMIDE 130 MILLIGRAM(S): 50 TABLET ORAL at 17:20

## 2023-06-18 RX ADMIN — Medication 4 MILLILITER(S): at 05:37

## 2023-06-18 RX ADMIN — BRIVARACETAM 100 MILLIGRAM(S): 25 TABLET, FILM COATED ORAL at 17:21

## 2023-06-18 RX ADMIN — Medication 20 MILLIEQUIVALENT(S): at 05:49

## 2023-06-18 NOTE — PROGRESS NOTE ADULT - SUBJECTIVE AND OBJECTIVE BOX
NSCU Progress Note    Assessment/Hospital Course:        24 Hour Events/Subjective:  - cth ordered overnight for RLE weakness, stable  - evd dc'ed yesterday      REVIEW OF SYSTEMS:  - negative except as above    VITALS:   - T(C): 37.2 (06-17-23 @ 23:00), Max: 37.8 (06-17-23 @ 22:00)  T(F): 99 (06-17-23 @ 23:00), Max: 100 (06-17-23 @ 22:00)  HR: 99 (06-18-23 @ 04:00) (82 - 105)  BP: --  ABP: 149/90 (06-18-23 @ 04:00) (100/54 - 165/102)  ABP(mean): 113 (06-18-23 @ 04:00) (71 - 127)  RR: 21 (06-18-23 @ 04:00) (16 - 22)  SpO2: 100% (06-18-23 @ 04:00) (94% - 100%)      IMAGING/DATA:   - Reviewed          PHYSICAL EXAM:    General: calm  CVS: RRR  Pulm: CTAB  GI: Soft, NTND  Extremities: No LE Edema  Neuro: somnolent, keeps eyes closed, follows commands including showing 2 fingers on the R, gaze midline, face symmetric except for R eye ptosis, able to lift R arm briefly AG to elbow, able to lift R leg if knee supported, L arm localize, L leg withdraws    NSCU Progress Note    Assessment/Hospital Course:        24 Hour Events/Subjective:  - cth ordered overnight for RLE weakness, stable  - evd dc'ed yesterday  - uptrending leukocytosis, afebrile      REVIEW OF SYSTEMS:  - negative except as above    VITALS:   - T(C): 37.2 (06-17-23 @ 23:00), Max: 37.8 (06-17-23 @ 22:00)  T(F): 99 (06-17-23 @ 23:00), Max: 100 (06-17-23 @ 22:00)  HR: 99 (06-18-23 @ 04:00) (82 - 105)  BP: --  ABP: 149/90 (06-18-23 @ 04:00) (100/54 - 165/102)  ABP(mean): 113 (06-18-23 @ 04:00) (71 - 127)  RR: 21 (06-18-23 @ 04:00) (16 - 22)  SpO2: 100% (06-18-23 @ 04:00) (94% - 100%)      IMAGING/DATA:   - Reviewed          PHYSICAL EXAM:    General: calm  CVS: RRR  Pulm: CTAB  GI: Soft, NTND  Extremities: No LE Edema  Neuro: somnolent, keeps eyes closed, follows commands including showing 2 fingers on the R, gaze midline, face symmetric except for R eye ptosis, able to lift R arm briefly AG to elbow, able to lift R leg if knee supported, L arm localize, L leg withdraws

## 2023-06-18 NOTE — PROGRESS NOTE ADULT - ASSESSMENT
39 yo woman with h/o HTN, admitted 6/6 with 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 CTA and angiogram 6/9 with no vascular malformation. MRI brain 6/13 without any brain underlying brain mass. ICH score 4.   Hospital course c/b seizures on VEEG with onset from R frontal region, now resolved.   Tachycardia and hypertension improved with clonidine, likely 2/2 Precedex withdrawal.     Neuro:   - neuro checks q2h  - CTH pending d/t being more lethargic and WD RLE  - EVD removed 6/17, post pull EVD CTH AM  - d/c VEEG  - c/w Briviact 100 mg BID and Vimpat to 150 mg BID  - decreased clonidine 0.1 mg q8h for concern for Precedex withdrawal, taper as tolerated   - Tylenol oxy and Dilaudid for pain   - CT CAP w/wo- no malignancy     CV:  - SBP goal 100-160  - c/w lisinopril 40 mg daily and clonidine 0.1mg q8hr  - c/w amlodipine 5mg daily     Respiratory:   - NC 4L  - c/w duo nebs, Mucomyst and chest PT    GI:   - NG, TF Glucerna 1.2  - senna and miralax, rectal tube LBM 6/15    Renal:   - IVL  - Na goal normonatremia     Endocrine:   - FS goal 120-180, medium ISS  - synthroid    ID:   - afebrile   - monitor off abx     Heme/Onc:     - SCDs, Lov ppx  37 yo woman with h/o HTN, admitted 6/6 with 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 CTA and angiogram 6/9 with no vascular malformation. MRI brain 6/13 without any brain underlying brain mass. ICH score 4.   Hospital course c/b seizures on VEEG with onset from R frontal region, now resolved.   Tachycardia and hypertension improved with clonidine, likely 2/2 Precedex withdrawal.     Neuro:   - neuro checks q2h  - CTH pending d/t being more lethargic and WD RLE  - EVD removed 6/17, post pull EVD CTH AM  - d/c VEEG  - c/w Briviact 100 mg BID and Vimpat to 150 mg BID  - decreased clonidine 0.1 mg q8h for concern for Precedex withdrawal, taper as tolerated   - Tylenol oxy and Dilaudid for pain   - CT CAP w/wo- no malignancy     CV:  - SBP goal 100-160  - c/w lisinopril 40 mg daily and clonidine 0.1mg q8hr  - c/w amlodipine 5mg daily     Respiratory:   - NC 4L  - c/w duo nebs, Mucomyst and chest PT    GI:   - NG, TF Glucerna 1.2  - senna and miralax, rectal tube LBM 6/15    Renal:   - IVL  - Na goal normonatremia     Endocrine:   - FS goal 120-180, medium ISS  - synthroid    ID:   - afebrile   - monitor off abx   - uptrending leukocytosis, monitor for fevers    Heme/Onc:     - SCDs, Lov ppx

## 2023-06-18 NOTE — PROGRESS NOTE ADULT - SUBJECTIVE AND OBJECTIVE BOX
NSCU ATTENDING -- ADDITIONAL PROGRESS NOTE    Nighttime rounds were performed -- please refer to earlier Progress Note for HPI details.    T(C): 36.9 (06-18-23 @ 19:00), Max: 37.3 (06-18-23 @ 07:00)  HR: 100 (06-18-23 @ 21:00) (81 - 105)  BP: --  RR: 20 (06-18-23 @ 21:00) (13 - 26)  SpO2: 100% (06-18-23 @ 21:00) (94% - 100%)  Wt(kg): --    Relevant labwork and imaging reviewed.    drain out.  likely transfer out o NSCU in AM.

## 2023-06-19 LAB
ANION GAP SERPL CALC-SCNC: 14 MMOL/L — SIGNIFICANT CHANGE UP (ref 5–17)
ANION GAP SERPL CALC-SCNC: 5 MMOL/L — SIGNIFICANT CHANGE UP (ref 5–17)
BUN SERPL-MCNC: 16 MG/DL — SIGNIFICANT CHANGE UP (ref 7–23)
BUN SERPL-MCNC: 18 MG/DL — SIGNIFICANT CHANGE UP (ref 7–23)
CALCIUM SERPL-MCNC: 8.3 MG/DL — LOW (ref 8.4–10.5)
CALCIUM SERPL-MCNC: 9.3 MG/DL — SIGNIFICANT CHANGE UP (ref 8.4–10.5)
CHLORIDE SERPL-SCNC: 105 MMOL/L — SIGNIFICANT CHANGE UP (ref 96–108)
CHLORIDE SERPL-SCNC: 111 MMOL/L — HIGH (ref 96–108)
CO2 SERPL-SCNC: 25 MMOL/L — SIGNIFICANT CHANGE UP (ref 22–31)
CO2 SERPL-SCNC: 32 MMOL/L — HIGH (ref 22–31)
CREAT SERPL-MCNC: 0.46 MG/DL — LOW (ref 0.5–1.3)
CREAT SERPL-MCNC: 0.5 MG/DL — SIGNIFICANT CHANGE UP (ref 0.5–1.3)
EGFR: 122 ML/MIN/1.73M2 — SIGNIFICANT CHANGE UP
EGFR: 125 ML/MIN/1.73M2 — SIGNIFICANT CHANGE UP
GLUCOSE BLDC GLUCOMTR-MCNC: 149 MG/DL — HIGH (ref 70–99)
GLUCOSE BLDC GLUCOMTR-MCNC: 155 MG/DL — HIGH (ref 70–99)
GLUCOSE SERPL-MCNC: 152 MG/DL — HIGH (ref 70–99)
GLUCOSE SERPL-MCNC: 211 MG/DL — HIGH (ref 70–99)
HCT VFR BLD CALC: 28.1 % — LOW (ref 34.5–45)
HCT VFR BLD CALC: 37.3 % — SIGNIFICANT CHANGE UP (ref 34.5–45)
HGB BLD-MCNC: 11.9 G/DL — SIGNIFICANT CHANGE UP (ref 11.5–15.5)
HGB BLD-MCNC: 9.1 G/DL — LOW (ref 11.5–15.5)
MAGNESIUM SERPL-MCNC: 1.9 MG/DL — SIGNIFICANT CHANGE UP (ref 1.6–2.6)
MAGNESIUM SERPL-MCNC: 2.4 MG/DL — SIGNIFICANT CHANGE UP (ref 1.6–2.6)
MCHC RBC-ENTMCNC: 28.3 PG — SIGNIFICANT CHANGE UP (ref 27–34)
MCHC RBC-ENTMCNC: 31.7 PG — SIGNIFICANT CHANGE UP (ref 27–34)
MCHC RBC-ENTMCNC: 31.9 GM/DL — LOW (ref 32–36)
MCHC RBC-ENTMCNC: 32.4 GM/DL — SIGNIFICANT CHANGE UP (ref 32–36)
MCV RBC AUTO: 87.3 FL — SIGNIFICANT CHANGE UP (ref 80–100)
MCV RBC AUTO: 99.5 FL — SIGNIFICANT CHANGE UP (ref 80–100)
NRBC # BLD: 0 /100 WBCS — SIGNIFICANT CHANGE UP (ref 0–0)
NRBC # BLD: 0 /100 WBCS — SIGNIFICANT CHANGE UP (ref 0–0)
PHOSPHATE SERPL-MCNC: 2.1 MG/DL — LOW (ref 2.5–4.5)
PHOSPHATE SERPL-MCNC: 3.8 MG/DL — SIGNIFICANT CHANGE UP (ref 2.5–4.5)
PLATELET # BLD AUTO: 249 K/UL — SIGNIFICANT CHANGE UP (ref 150–400)
PLATELET # BLD AUTO: 484 K/UL — HIGH (ref 150–400)
POTASSIUM SERPL-MCNC: 4.1 MMOL/L — SIGNIFICANT CHANGE UP (ref 3.5–5.3)
POTASSIUM SERPL-MCNC: 4.3 MMOL/L — SIGNIFICANT CHANGE UP (ref 3.5–5.3)
POTASSIUM SERPL-SCNC: 4.1 MMOL/L — SIGNIFICANT CHANGE UP (ref 3.5–5.3)
POTASSIUM SERPL-SCNC: 4.3 MMOL/L — SIGNIFICANT CHANGE UP (ref 3.5–5.3)
RBC # BLD: 3.22 M/UL — LOW (ref 3.8–5.2)
RBC # BLD: 3.75 M/UL — LOW (ref 3.8–5.2)
RBC # FLD: 13 % — SIGNIFICANT CHANGE UP (ref 10.3–14.5)
RBC # FLD: 14.5 % — SIGNIFICANT CHANGE UP (ref 10.3–14.5)
SODIUM SERPL-SCNC: 144 MMOL/L — SIGNIFICANT CHANGE UP (ref 135–145)
SODIUM SERPL-SCNC: 148 MMOL/L — HIGH (ref 135–145)
WBC # BLD: 17.95 K/UL — HIGH (ref 3.8–10.5)
WBC # BLD: 8.41 K/UL — SIGNIFICANT CHANGE UP (ref 3.8–10.5)
WBC # FLD AUTO: 17.95 K/UL — HIGH (ref 3.8–10.5)
WBC # FLD AUTO: 8.41 K/UL — SIGNIFICANT CHANGE UP (ref 3.8–10.5)

## 2023-06-19 PROCEDURE — 99233 SBSQ HOSP IP/OBS HIGH 50: CPT

## 2023-06-19 RX ORDER — MAGNESIUM SULFATE 500 MG/ML
2 VIAL (ML) INJECTION ONCE
Refills: 0 | Status: DISCONTINUED | OUTPATIENT
Start: 2023-06-19 | End: 2023-06-19

## 2023-06-19 RX ORDER — POTASSIUM PHOSPHATE, MONOBASIC POTASSIUM PHOSPHATE, DIBASIC 236; 224 MG/ML; MG/ML
30 INJECTION, SOLUTION INTRAVENOUS ONCE
Refills: 0 | Status: DISCONTINUED | OUTPATIENT
Start: 2023-06-19 | End: 2023-06-19

## 2023-06-19 RX ADMIN — AMLODIPINE BESYLATE 5 MILLIGRAM(S): 2.5 TABLET ORAL at 06:10

## 2023-06-19 RX ADMIN — BRIVARACETAM 100 MILLIGRAM(S): 25 TABLET, FILM COATED ORAL at 06:10

## 2023-06-19 RX ADMIN — Medication 1 PATCH: at 13:06

## 2023-06-19 RX ADMIN — Medication 0.1 MILLIGRAM(S): at 21:00

## 2023-06-19 RX ADMIN — Medication 0.1 MILLIGRAM(S): at 13:01

## 2023-06-19 RX ADMIN — Medication 1 PATCH: at 19:00

## 2023-06-19 RX ADMIN — Medication 75 MICROGRAM(S): at 06:09

## 2023-06-19 RX ADMIN — LISINOPRIL 40 MILLIGRAM(S): 2.5 TABLET ORAL at 06:09

## 2023-06-19 RX ADMIN — LACOSAMIDE 130 MILLIGRAM(S): 50 TABLET ORAL at 17:00

## 2023-06-19 RX ADMIN — CHLORHEXIDINE GLUCONATE 1 APPLICATION(S): 213 SOLUTION TOPICAL at 13:01

## 2023-06-19 RX ADMIN — Medication 1 PATCH: at 07:22

## 2023-06-19 RX ADMIN — BRIVARACETAM 100 MILLIGRAM(S): 25 TABLET, FILM COATED ORAL at 17:01

## 2023-06-19 RX ADMIN — LACOSAMIDE 130 MILLIGRAM(S): 50 TABLET ORAL at 06:10

## 2023-06-19 RX ADMIN — Medication 0.1 MILLIGRAM(S): at 06:10

## 2023-06-19 RX ADMIN — ENOXAPARIN SODIUM 40 MILLIGRAM(S): 100 INJECTION SUBCUTANEOUS at 17:00

## 2023-06-19 RX ADMIN — Medication 1 PATCH: at 13:00

## 2023-06-19 NOTE — PROGRESS NOTE ADULT - ASSESSMENT
ASSESSMENT/PLAN:  WBC downtrending; afebrile. D/W need for long term enteral nutrition while in recovery phased. agreeable to PEG. will consult GI  Tx to floor today    [] Patient is at high risk of neurologic deterioration/death due to:     Time seen:  Time spent: ___ [] critical care minutes

## 2023-06-19 NOTE — PROGRESS NOTE ADULT - SUBJECTIVE AND OBJECTIVE BOX
HPI:    SURGERY: Decompressive craniectomy      PAST MEDICAL HISTORY:   PAST SURGICAL HISTORY:   FAMILY HISTORY:    ALLERGIES: No Known Allergies    **************************************  **************************************    OVERNIGHT EVENTS: [] None    ROS  Unobtainable due to mental status[] Negative []  Positives:    ADMISSION SCORES: GCS: HH: MF: NIHSS: RASS: CAM-ICU: ICP:    ICU Vital Signs Last 24 Hrs  T(C): 37.2 (19 Jun 2023 11:00), Max: 37.3 (18 Jun 2023 15:00)  T(F): 99 (19 Jun 2023 11:00), Max: 99.1 (18 Jun 2023 15:00)  HR: 99 (19 Jun 2023 11:00) (81 - 103)  BP: --  BP(mean): --  ABP: 126/82 (19 Jun 2023 11:00) (99/95 - 160/97)  ABP(mean): 99 (19 Jun 2023 11:00) (79 - 123)  RR: 18 (19 Jun 2023 11:00) (13 - 26)  SpO2: 94% (19 Jun 2023 11:00) (94% - 100%)    O2 Parameters below as of 19 Jun 2023 07:00  Patient On (Oxygen Delivery Method): nasal cannula, 3L           06-18 @ 07:01 - 06-19 @ 07:00  --------------------------------------------------------  IN: 1685 mL / OUT: 1150 mL / NET: 535 mL    06-19 @ 07:01 - 06-19 @ 12:42  --------------------------------------------------------  IN: 200 mL / OUT: 0 mL / NET: 200 mL           DEVICES: [] Restraints [] HAROON/HMV []LD [] ET tube [] Trach [] Chest Tube [] A-line [] Trevino [] NGT [] Rectal Tube [] EVD [] CVL  [] ICP/LiCOx    NEUROIMAGING:     EEG REPORT:     MEDICATIONS:  acetaminophen   Oral Liquid .. 650 milliGRAM(s) Oral every 6 hours PRN  amLODIPine   Tablet 5 milliGRAM(s) Oral daily  brivaracetam 100 milliGRAM(s) Oral two times a day  chlorhexidine 4% Liquid 1 Application(s) Topical daily  cloNIDine 0.1 milliGRAM(s) Oral every 8 hours  enoxaparin Injectable 40 milliGRAM(s) SubCutaneous <User Schedule>  lacosamide IVPB 150 milliGRAM(s) IV Intermittent every 12 hours  levothyroxine 75 MICROGram(s) Oral daily  lisinopril 40 milliGRAM(s) Enteral Tube daily  nicotine - 21 mG/24Hr(s) Patch 1 Patch Transdermal daily  ondansetron Injectable 4 milliGRAM(s) IV Push every 6 hours PRN  oxyCODONE    IR 5 milliGRAM(s) Oral every 6 hours PRN      PHYSICAL EXAM:  eyes closed; follows commands on right; right side purposeful left UE localizes      LABS:                        11.9   17.95 )-----------( 484      ( 19 Jun 2023 00:41 )             37.3    06-19    144  |  105  |  18  ----------------------------<  152<H>  4.1   |  25  |  0.46<L>    Ca    9.3      19 Jun 2023 00:41  Phos  3.8     06-19  Mg     2.4     06-19

## 2023-06-19 NOTE — PROGRESS NOTE ADULT - SUBJECTIVE AND OBJECTIVE BOX
Patient seen and examined at bedside.    --Anticoagulation--  enoxaparin Injectable 40 milliGRAM(s) SubCutaneous <User Schedule>    T(C): 37.2 (06-18-23 @ 23:00), Max: 37.3 (06-18-23 @ 07:00)  HR: 103 (06-18-23 @ 23:00) (81 - 105)  BP: --  RR: 19 (06-18-23 @ 23:00) (13 - 26)  SpO2: 100% (06-18-23 @ 23:00) (94% - 100%)  Wt(kg): --    Exam: EOV, Ox3, Right side FC by showing 2 fingers and wiggling toes, LUE localizes, LLE withdraws

## 2023-06-19 NOTE — PROGRESS NOTE ADULT - ASSESSMENT
-POD13 Right hemicrani w/ intraop EVD placement, POD10 from negative cerebral angiogram   -EVD removed  -Possible pending txfer to Floor

## 2023-06-20 DIAGNOSIS — G40.909 EPILEPSY, UNSPECIFIED, NOT INTRACTABLE, WITHOUT STATUS EPILEPTICUS: ICD-10-CM

## 2023-06-20 DIAGNOSIS — E03.9 HYPOTHYROIDISM, UNSPECIFIED: ICD-10-CM

## 2023-06-20 DIAGNOSIS — Z29.9 ENCOUNTER FOR PROPHYLACTIC MEASURES, UNSPECIFIED: ICD-10-CM

## 2023-06-20 DIAGNOSIS — R50.9 FEVER, UNSPECIFIED: ICD-10-CM

## 2023-06-20 DIAGNOSIS — I10 ESSENTIAL (PRIMARY) HYPERTENSION: ICD-10-CM

## 2023-06-20 DIAGNOSIS — R13.10 DYSPHAGIA, UNSPECIFIED: ICD-10-CM

## 2023-06-20 DIAGNOSIS — R09.02 HYPOXEMIA: ICD-10-CM

## 2023-06-20 DIAGNOSIS — I61.9 NONTRAUMATIC INTRACEREBRAL HEMORRHAGE, UNSPECIFIED: ICD-10-CM

## 2023-06-20 LAB
ANION GAP SERPL CALC-SCNC: 12 MMOL/L — SIGNIFICANT CHANGE UP (ref 5–17)
APTT BLD: 30.2 SEC — SIGNIFICANT CHANGE UP (ref 27.5–35.5)
BLD GP AB SCN SERPL QL: NEGATIVE — SIGNIFICANT CHANGE UP
BUN SERPL-MCNC: 25 MG/DL — HIGH (ref 7–23)
CALCIUM SERPL-MCNC: 9.8 MG/DL — SIGNIFICANT CHANGE UP (ref 8.4–10.5)
CHLORIDE SERPL-SCNC: 105 MMOL/L — SIGNIFICANT CHANGE UP (ref 96–108)
CO2 SERPL-SCNC: 28 MMOL/L — SIGNIFICANT CHANGE UP (ref 22–31)
CREAT SERPL-MCNC: 0.59 MG/DL — SIGNIFICANT CHANGE UP (ref 0.5–1.3)
EGFR: 118 ML/MIN/1.73M2 — SIGNIFICANT CHANGE UP
GLUCOSE BLDC GLUCOMTR-MCNC: 160 MG/DL — HIGH (ref 70–99)
GLUCOSE SERPL-MCNC: 124 MG/DL — HIGH (ref 70–99)
HCT VFR BLD CALC: 37.9 % — SIGNIFICANT CHANGE UP (ref 34.5–45)
HGB BLD-MCNC: 12.1 G/DL — SIGNIFICANT CHANGE UP (ref 11.5–15.5)
INR BLD: 1.19 RATIO — HIGH (ref 0.88–1.16)
MCHC RBC-ENTMCNC: 31.9 GM/DL — LOW (ref 32–36)
MCHC RBC-ENTMCNC: 32.2 PG — SIGNIFICANT CHANGE UP (ref 27–34)
MCV RBC AUTO: 100.8 FL — HIGH (ref 80–100)
NRBC # BLD: 0 /100 WBCS — SIGNIFICANT CHANGE UP (ref 0–0)
PLATELET # BLD AUTO: 500 K/UL — HIGH (ref 150–400)
POTASSIUM SERPL-MCNC: 4.4 MMOL/L — SIGNIFICANT CHANGE UP (ref 3.5–5.3)
POTASSIUM SERPL-SCNC: 4.4 MMOL/L — SIGNIFICANT CHANGE UP (ref 3.5–5.3)
PROTHROM AB SERPL-ACNC: 13.7 SEC — HIGH (ref 10.5–13.4)
RBC # BLD: 3.76 M/UL — LOW (ref 3.8–5.2)
RBC # FLD: 14.4 % — SIGNIFICANT CHANGE UP (ref 10.3–14.5)
RH IG SCN BLD-IMP: POSITIVE — SIGNIFICANT CHANGE UP
SODIUM SERPL-SCNC: 145 MMOL/L — SIGNIFICANT CHANGE UP (ref 135–145)
WBC # BLD: 14.77 K/UL — HIGH (ref 3.8–10.5)
WBC # FLD AUTO: 14.77 K/UL — HIGH (ref 3.8–10.5)

## 2023-06-20 PROCEDURE — 99223 1ST HOSP IP/OBS HIGH 75: CPT

## 2023-06-20 PROCEDURE — 70450 CT HEAD/BRAIN W/O DYE: CPT | Mod: 26

## 2023-06-20 PROCEDURE — 95720 EEG PHY/QHP EA INCR W/VEEG: CPT

## 2023-06-20 PROCEDURE — 99233 SBSQ HOSP IP/OBS HIGH 50: CPT

## 2023-06-20 RX ORDER — ALBUTEROL 90 UG/1
2.5 AEROSOL, METERED ORAL EVERY 8 HOURS
Refills: 0 | Status: DISCONTINUED | OUTPATIENT
Start: 2023-06-20 | End: 2023-06-26

## 2023-06-20 RX ORDER — SENNA PLUS 8.6 MG/1
2 TABLET ORAL AT BEDTIME
Refills: 0 | Status: DISCONTINUED | OUTPATIENT
Start: 2023-06-20 | End: 2023-06-21

## 2023-06-20 RX ORDER — SODIUM CHLORIDE 9 MG/ML
1000 INJECTION INTRAMUSCULAR; INTRAVENOUS; SUBCUTANEOUS
Refills: 0 | Status: DISCONTINUED | OUTPATIENT
Start: 2023-06-20 | End: 2023-06-22

## 2023-06-20 RX ORDER — CHLORHEXIDINE GLUCONATE 213 G/1000ML
1 SOLUTION TOPICAL DAILY
Refills: 0 | Status: DISCONTINUED | OUTPATIENT
Start: 2023-06-20 | End: 2023-06-26

## 2023-06-20 RX ORDER — CEFAZOLIN SODIUM 1 G
2000 VIAL (EA) INJECTION ONCE
Refills: 0 | Status: DISCONTINUED | OUTPATIENT
Start: 2023-06-21 | End: 2023-06-21

## 2023-06-20 RX ORDER — POLYETHYLENE GLYCOL 3350 17 G/17G
17 POWDER, FOR SOLUTION ORAL DAILY
Refills: 0 | Status: DISCONTINUED | OUTPATIENT
Start: 2023-06-20 | End: 2023-06-21

## 2023-06-20 RX ORDER — BRIVARACETAM 25 MG/1
100 TABLET, FILM COATED ORAL
Refills: 0 | Status: DISCONTINUED | OUTPATIENT
Start: 2023-06-20 | End: 2023-06-21

## 2023-06-20 RX ADMIN — Medication 0.1 MILLIGRAM(S): at 05:06

## 2023-06-20 RX ADMIN — Medication 1 PATCH: at 19:00

## 2023-06-20 RX ADMIN — BRIVARACETAM 100 MILLIGRAM(S): 25 TABLET, FILM COATED ORAL at 05:05

## 2023-06-20 RX ADMIN — Medication 1 PATCH: at 12:28

## 2023-06-20 RX ADMIN — Medication 0.1 MILLIGRAM(S): at 22:30

## 2023-06-20 RX ADMIN — ALBUTEROL 2.5 MILLIGRAM(S): 90 AEROSOL, METERED ORAL at 22:32

## 2023-06-20 RX ADMIN — LACOSAMIDE 130 MILLIGRAM(S): 50 TABLET ORAL at 17:31

## 2023-06-20 RX ADMIN — POLYETHYLENE GLYCOL 3350 17 GRAM(S): 17 POWDER, FOR SOLUTION ORAL at 12:33

## 2023-06-20 RX ADMIN — Medication 75 MICROGRAM(S): at 05:05

## 2023-06-20 RX ADMIN — LISINOPRIL 40 MILLIGRAM(S): 2.5 TABLET ORAL at 05:06

## 2023-06-20 RX ADMIN — AMLODIPINE BESYLATE 5 MILLIGRAM(S): 2.5 TABLET ORAL at 05:06

## 2023-06-20 RX ADMIN — LACOSAMIDE 130 MILLIGRAM(S): 50 TABLET ORAL at 05:06

## 2023-06-20 RX ADMIN — Medication 1 PATCH: at 07:00

## 2023-06-20 RX ADMIN — SENNA PLUS 2 TABLET(S): 8.6 TABLET ORAL at 22:30

## 2023-06-20 RX ADMIN — Medication 1 PATCH: at 13:08

## 2023-06-20 RX ADMIN — BRIVARACETAM 100 MILLIGRAM(S): 25 TABLET, FILM COATED ORAL at 17:31

## 2023-06-20 NOTE — CONSULT NOTE ADULT - ASSESSMENT
38F Hx hypothyroid, smoker (off and on), undergoing in vitro fertilization found down by her mother. CT head showed large right frontal parenchymal hemorrhage with intraventricular extension and mild left-sided hydrocephalus, midline   shift to the left. CTA of the head and neck revealed no aneurysms or AVM. Patient underwent emergent decompressive right craniectomy with evacuation of IPH, EVD placement on 6/6 (removed 6/17). Cerebral angio negative on 6/9. Course c/b seizures, fevers, dysphagia s/p NG tube.

## 2023-06-20 NOTE — CONSULT NOTE ADULT - SUBJECTIVE AND OBJECTIVE BOX
[facility] Division of Hospital Medicine  Jojo Staley MD  Available on MS Teams  Spectra [phone]    Unable to obtain history from the patient due to mental status. History obtained from chart review and patient's family (spouse and brother at bedside).    HPI:    38F Hx hypothyroid, smoker (off and on), undergoing in vitro fertilization found down by her mother. CT head showed large right frontal parenchymal hemorrhage with intraventricular extension and mild left-sided hydrocephalus, midline   shift to the left. CTA of the head and neck revealed no aneurysms or AVM. Patient underwent emergent decompressive right craniectomy with evacuation of IPH, EVD placement on 6/6 (removed 6/17). Cerebral angio negative on 6/9. Course c/b seizures, fevers, dysphagia s/p NG tube.     PMD- unknown    PAST MEDICAL & SURGICAL HISTORY:  ovarian cyst removal  hypothyroid    ROS- unable to obtain ROS due to mental status      Allergies  No Known Allergies      Social History:   smoker (off and on)  no recreational drug use  social EtOH use      FAMILY HISTORY:  unknown    HOME MEDS:  chantix  synthroid ? 75mcg  prenatal vitamins    MEDICATIONS  (STANDING):  albuterol    0.083% 2.5 milliGRAM(s) Nebulizer every 8 hours  amLODIPine   Tablet 5 milliGRAM(s) Oral daily  brivaracetam 100 milliGRAM(s) Oral two times a day  chlorhexidine 2% Cloths 1 Application(s) Topical daily  cloNIDine 0.1 milliGRAM(s) Oral every 8 hours  lacosamide IVPB 150 milliGRAM(s) IV Intermittent every 12 hours  levothyroxine 75 MICROGram(s) Oral daily  lisinopril 40 milliGRAM(s) Enteral Tube daily  nicotine - 21 mG/24Hr(s) Patch 1 Patch Transdermal daily  polyethylene glycol 3350 17 Gram(s) Oral daily  senna 2 Tablet(s) Oral at bedtime  sodium chloride 0.9%. 1000 milliLiter(s) (75 mL/Hr) IV Continuous <Continuous>    MEDICATIONS  (PRN):  acetaminophen   Oral Liquid .. 650 milliGRAM(s) Oral every 6 hours PRN Temp greater or equal to 38C (100.4F), Mild Pain (1 - 3)  ondansetron Injectable 4 milliGRAM(s) IV Push every 6 hours PRN Nausea and/or Vomiting  oxyCODONE    IR 5 milliGRAM(s) Oral every 6 hours PRN Moderate Pain (4 - 6)        CAPILLARY BLOOD GLUCOSE      POCT Blood Glucose.: 160 mg/dL (20 Jun 2023 05:57)  POCT Blood Glucose.: 155 mg/dL (19 Jun 2023 23:26)    I&O's Summary    19 Jun 2023 07:01  -  20 Jun 2023 07:00  --------------------------------------------------------  IN: 770 mL / OUT: 1200 mL / NET: -430 mL        Physical Exam:  Vital Signs Last 24 Hrs  T(C): 36.3 (20 Jun 2023 13:10), Max: 37.2 (19 Jun 2023 19:00)  T(F): 97.4 (20 Jun 2023 13:10), Max: 99 (19 Jun 2023 19:00)  HR: 97 (20 Jun 2023 13:10) (93 - 102)  BP: 101/69 (20 Jun 2023 13:10) (101/69 - 162/71)  BP(mean): 101 (19 Jun 2023 21:42) (101 - 104)  RR: 20 (20 Jun 2023 13:10) (15 - 23)  SpO2: 94% (20 Jun 2023 13:10) (94% - 100%)    Parameters below as of 20 Jun 2023 13:10  Patient On (Oxygen Delivery Method): nasal cannula        CONSTITUTIONAL: NAD, well-groomed, + NG tube in place  RESPIRATORY: Normal respiratory effort; lungs are clear to auscultation bilaterally  CARDIOVASCULAR: normal S1 and S2, no murmur/rub/gallop; No lower extremity edema  ABDOMEN: Nontender to palpation, normoactive bowel sounds  MUSCULOSKELETAL:  no joint swelling or tenderness to palpation  PSYCH: eyes closed; calm  NEUROLOGY: ? tried to squeeze my hand with her right hand on command, otherwise nonverbal and not following other commands  SKIN: + staples in right craniectomy site (sunken)    LABS:                        12.1   14.77 )-----------( 500      ( 20 Jun 2023 10:12 )             37.9     06-20    145  |  105  |  25<H>  ----------------------------<  124<H>  4.4   |  28  |  0.59    Ca    9.8      20 Jun 2023 10:12  Phos  3.8     06-19  Mg     2.4     06-19                Culture - Blood (collected 18 Jun 2023 10:40)  Source: .Blood Blood  Preliminary Report (19 Jun 2023 19:02):    No growth to date.    Culture - Blood (collected 18 Jun 2023 10:35)  Source: .Blood Blood  Preliminary Report (19 Jun 2023 19:02):    No growth to date.      SARS-CoV-2: NotDetec (06 Jun 2023 15:18)      RADIOLOGY & ADDITIONAL TESTS:  New Results Reviewed Today:     < from: CT Head No Cont (06.20.23 @ 09:41) >  IMPRESSION:    Resolving right frontal hemorrhage with similar mass effect and unchanged   minimal leftward midline shift. No hydrocephalus.    < end of copied text >    < from: CT Chest w/ IV Cont (06.15.23 @ 21:15) >  IMPRESSION:  Bladder wall thickening suggests cystitis. Recommend urinalysis   correlation    No imaging evidence of primary malignancy or metastatic disease involving   the chest, abdomen or pelvis.    < end of copied text >    < from: [facility] Duplex Lower Ext Vein Scan, Bilat (06.14.23 @ 17:27) >  IMPRESSION:    No evidence for acute DVT in the bilateral lower extremity deep venous   systems.    < end of copied text >      < from: TTE W or WO Ultrasound Enhancing Agent (06.08.23 @ 11:17) >  CONCLUSIONS:      1. Technically difficult image quality.   2. Normal left ventricular cavity size. The left ventricular wall thickness is normal. The left ventricular systolic function is mildly decreased with an ejection fraction of 57 % by Thompson's method of disks. There are regional wall motion abnormalities consistent with ischemic heart disease.   3. Basal and mid inferior septum, basal and mid inferior wall, and mid inferolateral segment are abnormal.   4. Normal right ventricular cavity size and normal systolic function.   5. No prior echocardiogram is available for comparison.    < end of copied text >    Prior or Outpatient Records Reviewed Today: yes    COMMUNICATION:  Care Discussed with Consultants/Other Providers and Details of Discussion: neurosurgery PA(Rossy)  Discussions with Patient/Family: yes

## 2023-06-20 NOTE — SWALLOW BEDSIDE ASSESSMENT ADULT - SWALLOW EVAL: DIAGNOSIS
38F withPMHx of hypothyroid, smoker, undergoing in-vitro fertilization, found with large R IPH, s/p R hemicrani. Pt p/w a waxing/waning mental status that is slightly improving from prior exam, but still does not support P.O. at this time. With thermal stimulation to labial surface, pt with absent ability to lick lips or attempt oral preparation. Absent pharyngeal swallow trigger to verbal command on this exam. Given persistence of waxing/waning mentation and poor swallow profile, would recommend longer-term enteral feeding route than NGT as pt recovers at this time.

## 2023-06-20 NOTE — SWALLOW BEDSIDE ASSESSMENT ADULT - NS SPL SWALLOW CLINIC TRIAL FT
P.O. trials deferred given mentation. Ice chip placed on lips only, pt with slight muscle movement, suggestive of attempts to move oral musculature, however, with poor ability to lick/smack lips or attempt oral manipulation. Absent ability to trigger swallow upon command (although pt able to trigger swallow 1x last exam).

## 2023-06-20 NOTE — CONSULT NOTE ADULT - PROBLEM SELECTOR RECOMMENDATION 2
continue with NG tube feed  appreciate GI input  plan for PEG tomorrow  no absolute medical contraindication

## 2023-06-20 NOTE — CONSULT NOTE ADULT - ASSESSMENT
38F Hx hypothyroid, hx sinus infections, smoker, undergoing in vitro fertilization found down at home 6/5/2023. CTH w/large R IPH w/IVH, CTA negative for aneurysms. Pt not a candidate for tenecteplase due to hemorrhage and not a candidate for thrombectomy due to no LVO see on CTA H/N. S/p emergent decompressive craniectomy 6/6 (no bone flap) Cerebral Angio performed 6/9 did not show evidence of vascular malformation. NSCU course c/b seizures 6/9 for which EEG was placed and noted show electroclinical and electrographic seizures from the the R frontal and posterior temporal regions.     Extubated on 6/13, EVD removed 6/17. Downgraded to floors 6/20.   tolerating NGT feeds  GI asked to evaluate for PEG    #Dysphagia  Indications, risks, benefits, alternatives discussed with family at bedside (wife is HCP) who agree to proceed with PEG    -hold NGTF at [place]  -will order Ancef 2 gram on call for EGD + PEG tomorrow    Further care per primary team  Discussed with family at bedside( spouse and father); all questions answered  Discussed with Neurosurgery team  >45 minutes spent in direct pt care    Jhony Morales PA-C    [facility]  [phone]  Available on TEAMS Mon-Fri 8a-4p  After hours and weekend coverage [phone]    -

## 2023-06-20 NOTE — PROGRESS NOTE ADULT - ASSESSMENT
Ms. Arcos is a 38F unknown PMHx found down at home. CBC notable for WBC 25.31. CMP showing potassium 3.1. Lactate 4.5. CTH showing very large Right frontal parenchymal hemorrhage with intraventricular extension and mild left-sided hydrocephalus, midline shift to Left. CTA w/o aneurysms or AVM. Pt not a candidate for tenecteplase due to hemorrhage and not a candidate for thrombectomy due to no LVO see on CTA H/N. S/p emergent decompressive craniectomy 6/6. Cerebral angio performed 6/9 did not show evidence of vascular malformation. NSCU course c/b seizures 6/9 for which EEG was placed and noted show electroclinical and electrographic seizures form the the R frontal and posterior temporal regions. Patient currently on LEV 1g BID and LAC 150mg BID with no seizures recorded since 6/12/23 02:57. Extubated on 6/13, EVD removed 6/17. Downgraded to floors 6/20.     Impression: Decreased level of consciousness, improving, due to large right intraparenchymal hemorrhage of uncertain etiology.       Recommendations:   [x] CT chest, abdomen and pelvis w/ contrast: No evidence of malignancy   [x] Continue LEV 1000mg BID & LAC 150mg BID as per NSCU  [x] MRI brain w/ and w/o cont: Large R IPH.  [x] MRA brain w/o contrast MRA neck w/ contrast: No underlying lesions/malformations. Patent vessels.  [x] rCTH x24H: s/p hemicrani, improved from initial, stable  [x] Strict BP goal <160/90  [x] Hypertonic saline (2% or 3%) to target serum sodium 145-155mEq/L  [/] HgA1c (5.3), Lipid profile  [x] Hold AC/AP, use mechanical DVT prophylaxis for now  [/] PT/OT  [x] TTE 6/8: EF 57%, regional WMA c/w ischemic heart disease    Seen and discussed with vascular neurology attending.

## 2023-06-20 NOTE — PROGRESS NOTE ADULT - ATTENDING COMMENTS
I reviewed available diagnostic studies, and reviewed images personally. I agree with resident's history, exam, orders placed, and plan of care. Thirty minutes of critical care time was spent in caring for this patient who has concern of sudden and clinically significant deterioration requiring a high level of physician preparedness, management of brain supporting interventions, and frequent physician assessments.  Medical issues needing to be addressed include: Nontraumatic intracerebral hemorrhage w/ cerebral edema and brain compression, IVH, acute respiratory failure requiring intubation, HTN, smoking s/p hematoma evac. Pt wife at bedside provides hx - pt has a hx of oophorectomy w/ cyst/mass removal in the past. Would recommend CT CAP when able. sz- management per NSCU.
I saw and examined the patient with Dr. Frost on AM stroke rounds.   This morning the patient was briefly less responsive compared to yesterday.    She went for repeat CTH.   Upon return, she was more interactive and at or near her baseline according to family.   Family described her as having a good day yesterday, following commands, and lifting up the lower extremities against gravity.     ON exam:  HEENT: s/p R hemicraniectomy.  Incision is CDI.    Eyes are closed, she follows bulbar and appendicular commands.  She nods her head when asked if she is Kellee.  She did not speak.   PUpils 5-3mm, symmetric, disconjugate gaze, she counts fingers in bilateral visual fields, no clear facial weakness.   MOTOR: normal bulk and tone.  Upper extremities 3/5  strength on the R, and trace movement of arm on the L.   REFLEXES: trace to absent patellar, and achilles symmetric.  Toes bilaterally mute.      CTH images reviewed: R frontal craniotomy, s/p hematoma evacuation.  Stable residual hematoma and perilesional edema.      38 year old woman with no medical history, presenting with unresponsiveness and R gaze deviation, found to have a large R frontal lobar hemorrhage, now s/p evacuation and EVD.  On exam, eyes closed, following commands, does answer basic orientation questions.  Imaging with stable R frontal craniotomy and post evacuation hematoma.    Workup thus far without evidence of vasculopathy, systemic malignancy, or cardiac abnormalities.  Will need a repeat MRI in 2 months time to assess for any underlying lesion.    Repeat EEG due to fluctuations in level of consciousness.   PT/OT/SLP
Agree with above.

## 2023-06-20 NOTE — SWALLOW BEDSIDE ASSESSMENT ADULT - COMMENTS
hx con't  6/6 intubated and s/p emergent hemicraniectomy with placement of EVD.   6/9 s/p cerebral angio with no evidence of vascular malformation  6/13 extubated (8 days) to HFNC 40L/40%  6/15 weaned to 6L NC. EVD clamped in AM. DUSTIN Jarrell reporting minimal secretions.   6/17 EVD removed.  6/19 Bedside swallow exam placed. Team plan for PEG.  6/20 transfer to floors  [facility] 6/14 Redemonstration of right hemicraniectomy. Redemonstration of right frontal approach ventriculostomy catheter in unchanged position. Similar appearing right frontal pericatheter hemorrhage and edema. Similar hemorrhage in the left occipital horn. Interval resolution of small hemorrhage in the region of the left foramen  of Alejandro and anterior third ventricle.Ventricles are similar in size. No hydrocephalus. Similar leftward midline shift of 5 mm. Basal cisterns are well visualized.    Not known to this service prior to this admission. This admission, seen for speech-language exam on 6/15, pt noted with ability to follow some simple directives, however, pt with overall poor mentation and ability to open eyes/initiate movement.

## 2023-06-20 NOTE — SWALLOW BEDSIDE ASSESSMENT ADULT - H & P REVIEW
38F withPMHx of hypothyroid, smoker, undergoing in-vitro fertilization, found down at home. CTH showing very large Right frontal parenchymal hemorrhage with intraventricular extension and mild left-sided hydrocephalus, midline shift to Left. CTA w/o aneurysms or AVM. Pt not a candidate for tenecteplase due to hemorrhage and not a candidate for thrombectomy due to no LVO see on CTA H/N. NIHSS 19. ICH score 4. mRS 0. Neuro IMPRESSION: Acute change in level of consciousness after being found down at home likely 2/2 massive right intraparenchymal hemorrhage due to unknown etiology. Consider vascular etiology such as aneurysms, AVM, hemorrhagic stroke. Pt with concern for seizures post op, +vEEG, and now stable/yes

## 2023-06-20 NOTE — PROGRESS NOTE ADULT - SUBJECTIVE AND OBJECTIVE BOX
SUBJECTIVE: HPI: 38F Hx hypothyroid, smoker, undergoing in vitro fertilization found down [facility] 11PM 6/5/2023. Got rocuronium for intubation. CTH w/large R IPH w/IVH, CTA negative for aneurysms. Propofol paused and sugammadex ordered for better exam, currently: pupils 3RR b/l, no dolls eyes, no cough, no gag, no overbreath, no corneals, flaccid paralysis x4    OVERNIGHT EVENTS: [facility] [place] [facility] to [facility], patient seen and evaluated at bedside this morning, patient was not following commands this morning, though LOC RUE / WD RLE, her wife / HCP was at bedside and patient did not follow for her either at the time, CT Brain done which reveals no new stroke / no new heme, I saw patient again and she started to follow commands again on RUE - showed me 2 fingers. Patient previously had seizures on EEG and due to patient's exam inconsistency - will place her back on EEG. Of note she did not miss any doses of her AEDs on review of her chart. Spoke with wife Karma regarding a PEG for patient, she agreed so I consulted GI.     Vital Signs Last 24 Hrs  T(C): 37 (20 Jun 2023 08:58), Max: 37.4 (19 Jun 2023 15:00)  T(F): 98.6 (20 Jun 2023 08:58), Max: 99.3 (19 Jun 2023 15:00)  HR: 98 (20 Jun 2023 08:58) (93 - 102)  BP: 123/86 (20 Jun 2023 08:58) (123/86 - 162/71)  BP(mean): 101 (19 Jun 2023 21:42) (101 - 104)  RR: 23 (20 Jun 2023 08:58) (15 - 23)  SpO2: 97% (20 Jun 2023 08:58) (93% - 100%)    Parameters below as of 20 Jun 2023 08:58  Patient On (Oxygen Delivery Method): nasal cannula        PHYSICAL EXAM:    General: No Acute Distress     Neurological: Arousable to noxious, grimaces and groans to noxious, minimally EO to noxious, pupils 4mm reactive b/l, follows commands on RUE (two fingers), RLE spontaneous, LUE WDs, LLE WDs    Pulmonary: Clear to Auscultation, No Rales, No Rhonchi, No Wheezes     Cardiovascular: S1, S2, Regular Rate and Rhythm     Gastrointestinal: Soft, Nontender, Nondistended     Incision: Craniectomy incision w/ surgical staples in place intact, right craniectomy flap a little sunken / soft    LABS:                        11.9   17.95 )-----------( 484      ( 19 Jun 2023 00:41 )             37.3    06-19    144  |  105  |  18  ----------------------------<  152<H>  4.1   |  25  |  0.46<L>    Ca    9.3      19 Jun 2023 00:41  Phos  3.8     06-19  Mg     2.4     06-19 06-19 @ 07:01  -  06-20 @ 07:00  --------------------------------------------------------  IN: 770 mL / OUT: 1200 mL / NET: -430 mL      DRAINS: None    MEDICATIONS:  Antibiotics:    Neuro:  acetaminophen   Oral Liquid .. 650 milliGRAM(s) Oral every 6 hours PRN Temp greater or equal to 38C (100.4F), Mild Pain (1 - 3)  brivaracetam 100 milliGRAM(s) Oral two times a day  lacosamide IVPB 150 milliGRAM(s) IV Intermittent every 12 hours  ondansetron Injectable 4 milliGRAM(s) IV Push every 6 hours PRN Nausea and/or Vomiting  oxyCODONE    IR 5 milliGRAM(s) Oral every 6 hours PRN Moderate Pain (4 - 6)    Cardiac:  amLODIPine   Tablet 5 milliGRAM(s) Oral daily  cloNIDine 0.1 milliGRAM(s) Oral every 8 hours  lisinopril 40 milliGRAM(s) Enteral Tube daily    Pulm:    GI/:    Other:   chlorhexidine 2% Cloths 1 Application(s) Topical daily  enoxaparin Injectable 40 milliGRAM(s) SubCutaneous <User Schedule>  levothyroxine 75 MICROGram(s) Oral daily  nicotine - 21 mG/24Hr(s) Patch 1 Patch Transdermal daily    DIET: [] Regular [] CCD [] Renal [] Puree [] Dysphagia [x] Tube Feeds: Glucerna@50    IMAGING:   < from: CT Head No Cont (06.20.23 @ 09:41) >  IMPRESSION:    Resolving right frontal hemorrhage with similar mass effect and unchanged   minimal leftward midline shift. No hydrocephalus.    --- End of Report ---      DANNY SMALLWOOD MD; Attending Radiologist  This document has been electronically signed. Jun 20 2023  9:55AM    < end of copied text >

## 2023-06-20 NOTE — CONSULT NOTE ADULT - SUBJECTIVE AND OBJECTIVE BOX
Patient is a 38y old  Female who presents with a chief complaint of IPH (20 Jun 2023 13:05)      HPI:      PAST MEDICAL & SURGICAL HISTORY:      Allergies    No Known Allergies    Intolerances        MEDICATIONS  (STANDING):  amLODIPine   Tablet 5 milliGRAM(s) Oral daily  brivaracetam 100 milliGRAM(s) Oral two times a day  chlorhexidine 2% Cloths 1 Application(s) Topical daily  cloNIDine 0.1 milliGRAM(s) Oral every 8 hours  lacosamide IVPB 150 milliGRAM(s) IV Intermittent every 12 hours  levothyroxine 75 MICROGram(s) Oral daily  lisinopril 40 milliGRAM(s) Enteral Tube daily  nicotine - 21 mG/24Hr(s) Patch 1 Patch Transdermal daily  polyethylene glycol 3350 17 Gram(s) Oral daily  senna 2 Tablet(s) Oral at bedtime  sodium chloride 0.9%. 1000 milliLiter(s) (75 mL/Hr) IV Continuous <Continuous>    MEDICATIONS  (PRN):  acetaminophen   Oral Liquid .. 650 milliGRAM(s) Oral every 6 hours PRN Temp greater or equal to 38C (100.4F), Mild Pain (1 - 3)  ondansetron Injectable 4 milliGRAM(s) IV Push every 6 hours PRN Nausea and/or Vomiting  oxyCODONE    IR 5 milliGRAM(s) Oral every 6 hours PRN Moderate Pain (4 - 6)      Social History:    Marital Status:  (   )    (   ) Single    (   )    (  )   Occupation:   Lives with: (  ) alone  (  ) children   (  ) spouse   (  ) parents  (  ) other    Substance Use (street drugs): (  ) never used  (  ) other:  Tobacco Usage:  (   ) never smoked   (   ) former smoker   (   ) current smoker  (     ) pack year  (        ) last cigarette date  Alcohol Usage:  Sexual History:     Family History   IBD (  ) Yes   (  ) No  GI Malignancy (  )  Yes    (  ) No    Health Management     Last Colonoscopy -      (     ) Advanced Directives: (     ) None    (      ) DNR    (     ) DNI    (     ) Health Care Proxy:     Review of Systems:    General:  No wt loss, fevers, chills, night sweats,fatigue,   CV:  No pain, palpitatioins, hypo/hypertension  Resp:  No dyspnea, cough, tachypnea, wheezing  GI:  No pain, No nausea, No vomiting, No diarrhea, No constipatiion, No weight loss, No fever, No pruritis, No rectal bleeding, No tarry stools, No dysphagia,  :  No pain, bleeding, incontinence, nocturia  Muscle:  No pain, weakness  Neuro:  No weakness, tingling, memory problems  Psych:  No fatigue, insomnia, mood problems, depression  Endocrine:  No polyuria, polydypsia, cold/heat intolerance  Heme:  No petechiae, ecchymosis, easy bruisability  Skin:  No rash, tattoos, scars, edema      Vital Signs Last 24 Hrs  T(C): 36.3 (20 Jun 2023 13:10), Max: 37.4 (19 Jun 2023 15:00)  T(F): 97.4 (20 Jun 2023 13:10), Max: 99.3 (19 Jun 2023 15:00)  HR: 97 (20 Jun 2023 13:10) (93 - 102)  BP: 101/69 (20 Jun 2023 13:10) (101/69 - 162/71)  BP(mean): 101 (19 Jun 2023 21:42) (101 - 104)  RR: 20 (20 Jun 2023 13:10) (15 - 23)  SpO2: 94% (20 Jun 2023 13:10) (93% - 100%)    Parameters below as of 20 Jun 2023 13:10  Patient On (Oxygen Delivery Method): nasal cannula        PHYSICAL EXAM:    Constitutional: NAD, well-developed  Neck: No LAD, supple  Respiratory: CTA and P  Cardiovascular: S1 and S2, RRR, no M  Gastrointestinal: BS+, soft, NT/ND, neg HSM,  Extremities: No peripheral edema, neg clubing, cyanosis  Vascular: 2+ peripheral pulses  Neurological: A/O x 3, no focal deficits  Psychiatric: Normal mood, normal affect  Skin: No rashes        LABS:                        12.1   14.77 )-----------( 500      ( 20 Jun 2023 10:12 )             37.9     06-20    145  |  105  |  25<H>  ----------------------------<  124<H>  4.4   |  28  |  0.59    Ca    9.8      20 Jun 2023 10:12  Phos  3.8     06-19  Mg     2.4     06-19              RADIOLOGY & ADDITIONAL TESTS:       Patient is a 38y old  Female who presents with a chief complaint of IPH (20 Jun 2023 13:05)    seen in AM rounds    HPI:  38F Hx hypothyroid, hx sinus infections, smoker, undergoing in vitro fertilization found down at home 6/5/2023. CTH w/large R IPH w/IVH, CTA negative for aneurysms. Pt not a candidate for tenecteplase due to hemorrhage and not a candidate for thrombectomy due to no LVO see on CTA H/N. S/p emergent decompressive craniectomy 6/6 (no bone flap) Cerebral Angio performed 6/9 did not show evidence of vascular malformation. NSCU course c/b seizures 6/9 for which EEG was placed and noted show electroclinical and electrographic seizures from the the R frontal and posterior temporal regions.     Extubated on 6/13, EVD removed 6/17. Downgraded to floors 6/20.   tolerating NGT feeds  GI asked to evaluate for PEG    PAST MEDICAL & SURGICAL HISTORY:  hypothyroid  Tobacco use  sinus infections      Allergies  No Known Allergies        MEDICATIONS  (STANDING):  amLODIPine   Tablet 5 milliGRAM(s) Oral daily  brivaracetam 100 milliGRAM(s) Oral two times a day  chlorhexidine 2% Cloths 1 Application(s) Topical daily  cloNIDine 0.1 milliGRAM(s) Oral every 8 hours  lacosamide IVPB 150 milliGRAM(s) IV Intermittent every 12 hours  levothyroxine 75 MICROGram(s) Oral daily  lisinopril 40 milliGRAM(s) Enteral Tube daily  nicotine - 21 mG/24Hr(s) Patch 1 Patch Transdermal daily  polyethylene glycol 3350 17 Gram(s) Oral daily  senna 2 Tablet(s) Oral at bedtime  sodium chloride 0.9%. 1000 milliLiter(s) (75 mL/Hr) IV Continuous <Continuous>    MEDICATIONS  (PRN):  acetaminophen   Oral Liquid .. 650 milliGRAM(s) Oral every 6 hours PRN Temp greater or equal to 38C (100.4F), Mild Pain (1 - 3)  ondansetron Injectable 4 milliGRAM(s) IV Push every 6 hours PRN Nausea and/or Vomiting  oxyCODONE    IR 5 milliGRAM(s) Oral every 6 hours PRN Moderate Pain (4 - 6)      Social History:      lives with wife  +tobacco    Family History   IBD (  ) Yes   (X  ) No  GI Malignancy (  )  Yes    (X  ) No    Advanced Directives: (   X  ) None    (      ) DNR    (     ) DNI    (     ) Health Care Proxy:     Review of Systems:  unable to obtain from pt; history from chart review, discussion with team and family       Vital Signs Last 24 Hrs  T(C): 36.3 (20 Jun 2023 13:10), Max: 37.4 (19 Jun 2023 15:00)  T(F): 97.4 (20 Jun 2023 13:10), Max: 99.3 (19 Jun 2023 15:00)  HR: 97 (20 Jun 2023 13:10) (93 - 102)  BP: 101/69 (20 Jun 2023 13:10) (101/69 - 162/71)  BP(mean): 101 (19 Jun 2023 21:42) (101 - 104)  RR: 20 (20 Jun 2023 13:10) (15 - 23)  SpO2: 94% (20 Jun 2023 13:10) (93% - 100%)    Parameters below as of 20 Jun 2023 13:10  Patient On (Oxygen Delivery Method): nasal cannula      PHYSICAL EXAM:    Constitutional: drowsy, appears comfortable .  Right crani (sunken) + NGT with feeds. wife and father at bedside  Neck: No LAD, no JVD  Respiratory: good air entry bl no accessory muscle use  Cardiovascular: S1 and S2, RRR  Gastrointestinal: BS+, soft, NT/ND, neg HSM, no surgical scars  Extremities: No peripheral edema, neg clubbing, cyanosis  Vascular: 2+ peripheral pulses  Neurological: drowsy, not following commands  Skin: No rashes, anicteric        LABS:                        12.1   14.77 )-----------( 500      ( 20 Jun 2023 10:12 )             37.9     06-20    145  |  105  |  25<H>  ----------------------------<  124<H>  4.4   |  28  |  0.59    Ca    9.8      20 Jun 2023 10:12  Phos  3.8     06-19  Mg     2.4     06-19      Prothrombin Time and INR, Plasma in AM (06.11.23 @ 00:25)   Prothrombin Time, Plasma: 14.7 sec  INR: 1.28:     RADIOLOGY & ADDITIONAL TESTS:  ACC: [id number] EXAM:  CT BRAIN   ORDERED BY: HESHAM BOND     PROCEDURE DATE:  06/20/2023          INTERPRETATION:  Noncontrast CT of the brain.    CLINICAL INDICATION:  Status post right hemicraniectomy for hemorrhage    TECHNIQUE : Axial CT scanning of the brain was obtained from the skull   base to the vertex without the administration of intravenous contrast.    COMPARISON: Brain CT dated 6/17/2023    FINDINGS:  Redemonstrated right frontal parietal craniectomy. Resolving   right frontallobe hemorrhage with surrounding lucency. Continued mass   effect upon the right lateral ventricle and similar mild leftward midline   shift. No hydrocephalus..    The orbits are not remarkable in appearance. Almost complete   opacification of the right maxillary sinus.    IMPRESSION:    Resolving right frontal hemorrhage with similar mass effect and unchanged   minimal leftward midline shift. No hydrocephalus.    --- End of Report ---

## 2023-06-20 NOTE — CONSULT NOTE ADULT - PROBLEM SELECTOR RECOMMENDATION 3
per spouse, patient was told in the past that her BP was elevated but on follow up visit, she was told it was ok and never started on any BP medication  - continue with lisinopril 40mg, clonidine 0.1mg TID, amlodipine 5mg for now (started on this admission)  - TTE on 6/8 (technically difficult study):     - Normal left ventricular cavity size. The left ventricular wall thickness is normal. The left ventricular systolic function      is mildly decreased with an ejection fraction of 57 % by Thompson's method of disks. There are regional wall      motion abnormalities consistent with ischemic heart disease.     - Basal and mid inferior septum, basal and mid inferior wall, and mid inferolateral segment are abnormal.    - EKG (6/12) reviewed: NSR 89bpm with TWI in III (unchanged compared to prior)  - ? if above TTE findings may have been related to stress induced, check repeat TTE for comparison.

## 2023-06-20 NOTE — CONSULT NOTE ADULT - TIME BILLING
reviewing medical record, evaluating the patient, discussing with patient's family and neurosurgery, and documenting in EMR.
Patient status post hemicraniectomy with improvement in exam following simple commands. Moves the right arm antigravity and both legs at least 2/5. Etiology of hemorrhage unclear needs inflammatory work up, rule out malignancy, cardiac work up and work up for blood dyscrasias. Consider cerebral angiogram. Medical management as per NSCU.

## 2023-06-20 NOTE — SWALLOW BEDSIDE ASSESSMENT ADULT - SLP GENERAL OBSERVATIONS
Pt received sleeping at bedside; attempted to wake up with tactile stimulation with limited response, pt eventually spontaneously woke up briefly and waved hands, however, quickly fell back asleep. Pt with waxing/waning mentation, although is improving since last exam.

## 2023-06-20 NOTE — CONSULT NOTE ADULT - PROBLEM SELECTOR RECOMMENDATION 7
resume chemoprophylaxis post-PEG when deemed stable. Last LE duplex on 6/14 neg for DVT.  continue bowel regimen- last documented BM 6/18  O2 sat on room air stable in mid-90's, started albuterol neb, recent CXR (6/18) clear. monitor closely.  consider adding free water with tube feed but per discussion with neurosurgery, trying to keep serum Na elevated so will hold off for now.

## 2023-06-20 NOTE — CONSULT NOTE ADULT - PROBLEM SELECTOR RECOMMENDATION 9
with brain compression, course as above  postop management per neurosurgery  mental status was reportedly better yesterday, today minimally responsive. repeat CT head today with "resolving right frontal hemorrhage with similar mass effect and unchanged minimal leftward midline shift. No hydrocephalus."  repeat EEG pending  continue with AED per neuro

## 2023-06-20 NOTE — PROGRESS NOTE ADULT - SUBJECTIVE AND OBJECTIVE BOX
SUBJECTIVE/INTERVAL EVENTS: Patient seen and examined at bedside with vascular neurology attending. She was transferred to the floors today. No acute overnight events noted. Per family at bedside, she seemed more lethargic this AM, for which repeat CTH was obtained and reported to be stable. Per NSG provider, she will be hooked back up to EEG to r/o subclinical seizures.     INTERVAL HISTORY:  S/p emergent decompressive craniectomy 6/6. Cerebral angio performed 6/9 did not show evidence of vascular malformation. NSCU course c/b seizures 6/9 for which EEG was placed and noted show electroclinical and electrographic seizures form the the R frontal and posterior temporal regions. Patient currently on LEV 1g BID and LAC 150mg BID with no seizures recorded since 6/12/23 02:57. Extubated on 6/13, EVD removed 6/17.      MEDICATIONS:  MEDICATIONS  (STANDING):  amLODIPine   Tablet 5 milliGRAM(s) Oral daily  brivaracetam 100 milliGRAM(s) Oral two times a day  chlorhexidine 2% Cloths 1 Application(s) Topical daily  cloNIDine 0.1 milliGRAM(s) Oral every 8 hours  lacosamide IVPB 150 milliGRAM(s) IV Intermittent every 12 hours  levothyroxine 75 MICROGram(s) Oral daily  lisinopril 40 milliGRAM(s) Enteral Tube daily  nicotine - 21 mG/24Hr(s) Patch 1 Patch Transdermal daily  polyethylene glycol 3350 17 Gram(s) Oral daily  senna 2 Tablet(s) Oral at bedtime  sodium chloride 0.9%. 1000 milliLiter(s) (75 mL/Hr) IV Continuous <Continuous>    MEDICATIONS  (PRN):  acetaminophen   Oral Liquid .. 650 milliGRAM(s) Oral every 6 hours PRN Temp greater or equal to 38C (100.4F), Mild Pain (1 - 3)  ondansetron Injectable 4 milliGRAM(s) IV Push every 6 hours PRN Nausea and/or Vomiting  oxyCODONE    IR 5 milliGRAM(s) Oral every 6 hours PRN Moderate Pain (4 - 6)      VITALS & EXAMINATION:  Vital Signs Last 24 Hrs  T(C): 37 (20 Jun 2023 08:58), Max: 37.4 (19 Jun 2023 15:00)  T(F): 98.6 (20 Jun 2023 08:58), Max: 99.3 (19 Jun 2023 15:00)  HR: 98 (20 Jun 2023 08:58) (93 - 102)  BP: 123/86 (20 Jun 2023 08:58) (123/86 - 162/71)  BP(mean): 101 (19 Jun 2023 21:42) (101 - 104)  RR: 23 (20 Jun 2023 08:58) (15 - 23)  SpO2: 97% (20 Jun 2023 08:58) (93% - 100%)    Parameters below as of 20 Jun 2023 08:58  Patient On (Oxygen Delivery Method): nasal cannula    General - Laying in bed, NAD.   Cardiovascular - No edema.  Respiratory - No respiratory distress, on nasal cannula.    Neurologic Exam:  Mental status - Eyes initially closed, open to verbal command, however remains drowsy but arouseable. Follows verbal commands (show me two fingers) and nods/shakes head to answer simple questions.   Cranial nerves - PERRL (4->3mm OU), EOMs grossly intact, no gross facial asymmetry however difficult to assess due to positioning. Hearing grossly intact to conversation.   Motor - Normal tone. Moves RUE against gravity to command. Does not move the RLE extremities to command, however likely due to mental status as she intermittently drifts off to sleep.   Coordination/Gait - unable to assess.      LABS:  CBC                       12.1   14.77 )-----------( 500      ( 20 Jun 2023 10:12 )             37.9     Chem 06-20    145  |  105  |  25<H>  ----------------------------<  124<H>  4.4   |  28  |  0.59    Ca    9.8      20 Jun 2023 10:12  Phos  3.8     06-19  Mg     2.4     06-19      STUDIES & IMAGING:    Studies (EKG, EEG, EMG, etc):       Radiology (XR, CT, MR, U/S, TTE/VELMA):    CT Brain Stroke Protocol (06.06.23 @ 14:53): Very large right frontal parenchymal hemorrhage with intraventricular extension and mild left-sided hydrocephalus, midline shift to the left. CTA of the head and neck reveals no aneurysms or AVM. However the large parenchymal hemorrhage may compress an underlying vascular malformation or fistula.    CT Head No Cont:  (08 Jun 2023 08:39): Right hemicraniectomy. Right sided ventricular catheter with its tip in the left basal ganglia region. Right frontal parenchymal hematoma with air and edema. No significant interval change compared with the prior allowing for slight better visualization of the ventricles. No hydrocephalus    CT Chest, Abdomen and Pelvis No cont (6/8): No evidence of acute traumatic pathology in the chest, abdomen, or pelvis within the limitations of noncontrast exam.    CT Head No Cont:  (09 Jun 2023 21:56): Decrease in pneumocephalus. Otherwise No significant interval change    TTE (6/8): EF 57%, regional WMA c/w IHD (Basal and mid inferior septum, basal and mid inferior wall, and mid inferolateral segment are abnormal).    CTH (6/9): Similar-appearing R frontal IPH w/ surrounding edema and mildly decreased pneumocephalus. Redemonstration of layering IVH, predominantly seen in the occipital horn of the L lateral ventricle. Leftward midline shift measures 3mm, decreased from prior and measured 3.5mm.    MRA H/N 6/13: Patent cervical vasculature. Mild to moderate intracranial atherosclerosis of the PCAs.     MRI brain 6/13: Large R IPH s/p evacuation and craniectomy. Superficial surgical drain in place. Ventricular catheter in place. No adverse interval change CT head 6/9/2023.    CT Head No Cont:  (14 Jun 2023 08:57): Significantly limited by motion streak artifact. Interval resolution of small hemorrhage in the region of the left foramen of Alejandro and anterior third ventricle. Otherwise no significant interval change. Ventricles similar in size. No hydrocephalus.    CTH 6/14: Significantly limited by motion streak artifact. Interval resolution of small hemorrhage in the region of the left foramen of Alejandro and anterior third ventricle. Otherwise no significant interval change. Ventricles similar in size. No hydrocephalus.    CT CAP w/ IV Cont (6/15): Bladder wall thickening suggests cystitis. Recommend urinalysis  correlation. No imaging evidence of primary malignancy or metastatic disease involving the chest, abdomen or pelvis.    CT Head No Cont:  (17 Jun 2023 09:09): Slight increase in size of the lateral ventricles. Evolving areas of hemorrhage as described above.    CT Head No Cont:  (17 Jun 2023 22:04): After removing the right frontal ventricular catheter is no change in ventricular size or right frontal parenchymal hemorrhage since 8:27 AM    CT Head No Cont:  (20 Jun 2023 09:41): Resolving right frontal hemorrhage with similar mass effect and unchanged minimal leftward midline shift. No hydrocephalus.

## 2023-06-20 NOTE — PROGRESS NOTE ADULT - ASSESSMENT
ASSESSMENT AND PLAN:     NEURO:     PULM:     CV:    ENDO:     HEME/ONC:                      DVT ppx:     RENAL:     ID:     GI:      DISCHARGE PLANNING:       ASSESSMENT AND PLAN: 38F Hx hypothyroid, smoker, undergoing in vitro fertilization found down [facility] 11PM 6/5/2023. Got rocuronium for intubation. CTH w/large R IPH w/IVH, CTA negative for aneurysms. Propofol paused and sugammadex ordered for better exam, currently: pupils 3RR b/l, no dolls eyes, no cough, no gag, no overbreath, no corneals, flaccid paralysis x4    s/p R decompressive craniectomy w/ evacuation of IPH and placement of EVD, Bone Flap Discarded 6/6/23  s/p angio: negative 6/9/23  EVD removed on 6/17    NEURO:   - Continue neuro checks q 4  - CT Brain today shows resolving right frontal heme w/ similar ME & unchanged leftward MLS, no hydro  - Eventual cranioplasty with Dr. Sy  - Helmet whenever out of bed at all times  - Patient had seizures during her hospitalization - last recorded on 6/12 at 2:57am - currently on Vimpat 150mg BID and Briviact 100mg BID - EEG on 6/15 was negative  - Due to patient's waxing / waning exam will put her back on EEG to r/o further seizures  - Pain control w/ Tylenol prn and Oxycodone prn  - Nicotine patch for smoking withdrawal  - PT/OT: TBD    PULM:   - Was on 2L NC, O2Sat>93%  - Weaned off to room air this morning by Hospitalist, currently O2Sat>93% on pulse ox  - Encourage incentive spirometry    CV:  - -160  - Continue Norvasc and Clonidine for HTN  - Continue Lisinopril for HLD  - 6/8 TTE: EF 57%, left vent systolic fxn mildly decr, basal & mild inferior septum, basal & mild inferior wall & mild inferolateral segment abnormal, normal rt vent cavity size / normal systolic fxn    ENDO:   - A1c 5.3, fingersticks stable, on Glucerna TF  - Continue Synthroid for hypothyroidism    HEME/ONC:             6/20 CBC: Downtrending leukocytosis, from 17 to currently 14, afebrile         DVT ppx: SQL, SCDs, Le Dopp negative 6/14    RENAL:   - IVL  - 6/20 BMP stable, last Na 145  - Voiding via primafit catheter    ID:   - Afebrile  - Received post-op abx  - Has right basilic PICC placed on 6/12  - Last fever w/u 6/18 - BCX NGTD, UA neg  - 6/7 MRSA/MSSA neg    GI:    - Feeding via TF  - Spoke with wife Karma and patients mother at the bedside regarding PEG for patient, both agreed, I consulted GI for PEG  - Last BM 6/18, senna and miralax added    Consulted Hospitalist for medical co-management for patient.     DISCHARGE PLANNING:   PT/OT - TBD, needs PEG for dispo    Plan to be discussed w/ Dr. Bernstein    ASSESSMENT AND PLAN: 38F Hx hypothyroid, smoker, undergoing in vitro fertilization found down [facility] 11PM 6/5/2023. Got rocuronium for intubation. CTH w/large R IPH w/IVH, CTA negative for aneurysms. Propofol paused and sugammadex ordered for better exam, currently: pupils 3RR b/l, no dolls eyes, no cough, no gag, no overbreath, no corneals, flaccid paralysis x4    s/p R decompressive craniectomy w/ evacuation of IPH and placement of EVD, Bone Flap Discarded 6/6/23  s/p angio: negative 6/9/23  EVD removed on 6/17    NEURO:   - Continue neuro checks q 4  - CT Brain today shows resolving right frontal heme w/ similar ME & unchanged leftward MLS, no hydro  - Eventual cranioplasty with Dr. Sy  - Helmet whenever out of bed at all times  - Patient had seizures during her hospitalization - last recorded on 6/12 at 2:57am - currently on Vimpat 150mg BID and Briviact 100mg BID - EEG on 6/15 was negative  - Due to patient's waxing / waning exam will put her back on EEG to r/o further seizures  - Pain control w/ Tylenol prn and Oxycodone prn  - Nicotine patch for smoking withdrawal  - PT/OT: TBD    PULM:   - Was on 2L NC, O2Sat>93%  - Weaned off to room air this morning by Hospitalist, currently O2Sat>93% on pulse ox  - Encourage incentive spirometry    CV:  - -160  - Continue Norvasc and Clonidine for HTN  - Continue Lisinopril for HLD  - 6/8 TTE: EF 57%, left vent systolic fxn mildly decr, basal & mild inferior septum, basal & mild inferior wall & mild inferolateral segment abnormal, normal rt vent cavity size / normal systolic fxn    ENDO:   - A1c 5.3, fingersticks stable, on Glucerna TF  - Continue Synthroid for hypothyroidism    HEME/ONC:             6/20 CBC: Downtrending leukocytosis, from 17 to currently 14, afebrile         DVT ppx: SQL, SCDs, Le Dopp negative 6/14    RENAL:   - IVL  - 6/20 BMP stable, last Na 145  - Voiding via primafit catheter    ID:   - Afebrile  - Received post-op abx  - Has right basilic PICC placed on 6/12  - Last fever w/u 6/18 - BCX NGTD, UA neg  - 6/7 MRSA/MSSA neg    GI:    - Feeding via TF  - Spoke with wife Karma and patients mother at the bedside regarding PEG for patient, both agreed, I consulted GI for PEG who saw patient and she is on schedule for PEG tomorrow 6/21 (NPO after midnight, IVF, pre-op labs, d/c'd DVT ppx for pre-op)  - Last BM 6/18, senna and miralax added    Consulted Hospitalist for medical co-management for patient.     DISCHARGE PLANNING:   PT/OT - TBD    Plan to be discussed w/ Dr. Bernstein

## 2023-06-20 NOTE — SWALLOW BEDSIDE ASSESSMENT ADULT - SPECIFY REASON(S)
to clinically assess swallow safety/function; r/o dysphagia. consult reason "failed dysphagia screen"

## 2023-06-20 NOTE — CONSULT NOTE ADULT - NS ATTEND AMEND GEN_ALL_CORE FT
Amador Lindquist MD, FACP, FACG, AGAF  [facility]  [phone]     After hours and weekend coverage GI service : [phone]
ENT consulted for evaluation of upper air way. Patient admitted for massive right ICH with midline shift intubated at ED.  S/p emergent decompressive craniectomy and hematoma evacuation. Course complicated by new onset of seizure. Primary team plans to extubated with no cuff leak.     Bedside indirect laryngoscopy shows NGT in right nare. pooling of secretions diffuse throughout larynx. Cuff deflated, positive cuff leak seen and heard. Nasopharynx, oropharynx, and hypopharynx clear, no bleeding.     Recommend:  - Patient noted to have cuff leak when cuff deflated on laryngoscopy no gross swelling of vocal folds. If cleared from lower airway standpoint patient is clear to extubate from ENT standpoint.   - no further ENT intervention needed.   - please reconsult PRN.

## 2023-06-20 NOTE — CONSULT NOTE ADULT - PROBLEM SELECTOR RECOMMENDATION 4
episodes of fever while in NSCU  infectious work up unrevealing including negative Bcx/UA/CXR on 6/18 and unremarkable CT c/a/p on 6/15.  LE duplex negative on 6/14  leukocytosis trending down  continue to monitor

## 2023-06-21 PROCEDURE — 99233 SBSQ HOSP IP/OBS HIGH 50: CPT

## 2023-06-21 PROCEDURE — 95720 EEG PHY/QHP EA INCR W/VEEG: CPT

## 2023-06-21 DEVICE — PEG KT SAFETY 20FR: Type: IMPLANTABLE DEVICE | Status: FUNCTIONAL

## 2023-06-21 RX ORDER — LISINOPRIL 2.5 MG/1
40 TABLET ORAL DAILY
Refills: 0 | Status: DISCONTINUED | OUTPATIENT
Start: 2023-06-21 | End: 2023-06-22

## 2023-06-21 RX ORDER — BRIVARACETAM 25 MG/1
100 TABLET, FILM COATED ORAL
Refills: 0 | Status: DISCONTINUED | OUTPATIENT
Start: 2023-06-21 | End: 2023-06-21

## 2023-06-21 RX ORDER — AMLODIPINE BESYLATE 2.5 MG/1
5 TABLET ORAL DAILY
Refills: 0 | Status: DISCONTINUED | OUTPATIENT
Start: 2023-06-21 | End: 2023-06-22

## 2023-06-21 RX ORDER — POLYETHYLENE GLYCOL 3350 17 G/17G
17 POWDER, FOR SOLUTION ORAL DAILY
Refills: 0 | Status: DISCONTINUED | OUTPATIENT
Start: 2023-06-21 | End: 2023-06-26

## 2023-06-21 RX ORDER — ENOXAPARIN SODIUM 100 MG/ML
40 INJECTION SUBCUTANEOUS EVERY 24 HOURS
Refills: 0 | Status: DISCONTINUED | OUTPATIENT
Start: 2023-06-22 | End: 2023-06-22

## 2023-06-21 RX ORDER — OXYCODONE HYDROCHLORIDE 5 MG/1
5 TABLET ORAL EVERY 6 HOURS
Refills: 0 | Status: DISCONTINUED | OUTPATIENT
Start: 2023-06-21 | End: 2023-06-26

## 2023-06-21 RX ORDER — OXYCODONE HYDROCHLORIDE 5 MG/1
5 TABLET ORAL EVERY 6 HOURS
Refills: 0 | Status: DISCONTINUED | OUTPATIENT
Start: 2023-06-21 | End: 2023-06-21

## 2023-06-21 RX ORDER — SENNA PLUS 8.6 MG/1
2 TABLET ORAL AT BEDTIME
Refills: 0 | Status: DISCONTINUED | OUTPATIENT
Start: 2023-06-21 | End: 2023-06-26

## 2023-06-21 RX ORDER — BRIVARACETAM 25 MG/1
100 TABLET, FILM COATED ORAL
Refills: 0 | Status: DISCONTINUED | OUTPATIENT
Start: 2023-06-21 | End: 2023-06-26

## 2023-06-21 RX ADMIN — OXYCODONE HYDROCHLORIDE 5 MILLIGRAM(S): 5 TABLET ORAL at 16:24

## 2023-06-21 RX ADMIN — CHLORHEXIDINE GLUCONATE 1 APPLICATION(S): 213 SOLUTION TOPICAL at 21:30

## 2023-06-21 RX ADMIN — Medication 650 MILLIGRAM(S): at 15:51

## 2023-06-21 RX ADMIN — OXYCODONE HYDROCHLORIDE 5 MILLIGRAM(S): 5 TABLET ORAL at 16:54

## 2023-06-21 RX ADMIN — Medication 1 PATCH: at 07:00

## 2023-06-21 RX ADMIN — SENNA PLUS 2 TABLET(S): 8.6 TABLET ORAL at 22:00

## 2023-06-21 RX ADMIN — LACOSAMIDE 130 MILLIGRAM(S): 50 TABLET ORAL at 04:53

## 2023-06-21 RX ADMIN — Medication 75 MICROGRAM(S): at 04:53

## 2023-06-21 RX ADMIN — ALBUTEROL 2.5 MILLIGRAM(S): 90 AEROSOL, METERED ORAL at 04:54

## 2023-06-21 RX ADMIN — ALBUTEROL 2.5 MILLIGRAM(S): 90 AEROSOL, METERED ORAL at 21:30

## 2023-06-21 RX ADMIN — CHLORHEXIDINE GLUCONATE 1 APPLICATION(S): 213 SOLUTION TOPICAL at 06:59

## 2023-06-21 RX ADMIN — LACOSAMIDE 130 MILLIGRAM(S): 50 TABLET ORAL at 17:46

## 2023-06-21 RX ADMIN — BRIVARACETAM 100 MILLIGRAM(S): 25 TABLET, FILM COATED ORAL at 04:56

## 2023-06-21 RX ADMIN — BRIVARACETAM 100 MILLIGRAM(S): 25 TABLET, FILM COATED ORAL at 17:45

## 2023-06-21 RX ADMIN — SODIUM CHLORIDE 75 MILLILITER(S): 9 INJECTION INTRAMUSCULAR; INTRAVENOUS; SUBCUTANEOUS at 17:51

## 2023-06-21 RX ADMIN — Medication 0.1 MILLIGRAM(S): at 17:52

## 2023-06-21 RX ADMIN — Medication 650 MILLIGRAM(S): at 15:21

## 2023-06-21 NOTE — PROGRESS NOTE ADULT - PROBLEM SELECTOR PLAN 3
per spouse, patient was told in the past that her BP was elevated but on follow up visit, she was told it was ok and never started on any BP medication  - continue with lisinopril 40mg, amlodipine 5mg for now, change clonidine 0.1mg to BID (started on this admission). - monitor BP and if SBP remains in 100's consider decreasing lisinopril to 20mg or stop amlodipine 5mg.   - TTE on 6/8 (technically difficult study):     - Normal left ventricular cavity size. The left ventricular wall thickness is normal. The left ventricular systolic function       is mildly decreased with an ejection fraction of 57 % by Thompson's method of disks. There are regional wall               motion abnormalities consistent with ischemic heart disease.     - Basal and mid inferior septum, basal and mid inferior wall, and mid inferolateral segment are abnormal.  - ? if above TTE findings may have been related to stress induced, f/u repeat TTE for comparison. per spouse, patient was told in the past that her BP was elevated but on follow up visit, she was told it was ok and never started on any BP medication  - continue with lisinopril 40mg, amlodipine 5mg for now, change clonidine 0.1mg to BID (started on this admission). - monitor BP and if SBP remains in 100's consider decreasing lisinopril to 20mg or stop amlodipine 5mg.   - TTE on 6/8 (technically difficult study): Normal left ventricular cavity size. The left ventricular wall thickness is normal. The left ventricular systolic function is mildly decreased with an ejection fraction of 57 % by Thompson's method of disks. There are regional wall motion abnormalities consistent with ischemic heart disease. Basal and mid inferior septum, basal and mid inferior wall, and mid inferolateral segment are abnormal.  - ? if above TTE findings may have been related to stress induced, f/u repeat TTE for comparison.

## 2023-06-21 NOTE — PRE-ANESTHESIA EVALUATION ADULT - NSANTHPMHFT_GEN_ALL_CORE
pt emergently brought to OR for crani/decompression of ich. brief report from family and primary team. no sig pmh. smoker. encountered with ett in situ
H/o hypothyroidism, smoker, found down s/p emergent decompressive hemicraniectomy
hx of ICH with emergent hemicrani, course complicated by seizures, dysphagia  EF 57%  med/surg hx reviewed with patient's wife at bedside

## 2023-06-21 NOTE — PROGRESS NOTE ADULT - SUBJECTIVE AND OBJECTIVE BOX
[facility] Division of Hospital Medicine  Jojo Staley MD  Available via MS Teams  Spectra [phone]    SUBJECTIVE / OVERNIGHT EVENTS: patient seen and examined this morning. family at bedside reports she was able to squeeze hand this morning. unable to obtain ROS from the patient due to mental status.     ADDITIONAL REVIEW OF SYSTEMS:    MEDICATIONS  (STANDING):  albuterol    0.083% 2.5 milliGRAM(s) Nebulizer every 8 hours  amLODIPine   Tablet 5 milliGRAM(s) Oral daily  brivaracetam 100 milliGRAM(s) Oral two times a day  ceFAZolin   IVPB 2000 milliGRAM(s) IV Intermittent once  chlorhexidine 2% Cloths 1 Application(s) Topical daily  cloNIDine 0.1 milliGRAM(s) Oral every 8 hours  lacosamide IVPB 150 milliGRAM(s) IV Intermittent every 12 hours  levothyroxine 75 MICROGram(s) Oral daily  lisinopril 40 milliGRAM(s) Enteral Tube daily  nicotine - 21 mG/24Hr(s) Patch 1 Patch Transdermal daily  polyethylene glycol 3350 17 Gram(s) Oral daily  senna 2 Tablet(s) Oral at bedtime  sodium chloride 0.9%. 1000 milliLiter(s) (75 mL/Hr) IV Continuous <Continuous>    MEDICATIONS  (PRN):  acetaminophen   Oral Liquid .. 650 milliGRAM(s) Oral every 6 hours PRN Temp greater or equal to 38C (100.4F), Mild Pain (1 - 3)  ondansetron Injectable 4 milliGRAM(s) IV Push every 6 hours PRN Nausea and/or Vomiting  oxyCODONE    IR 5 milliGRAM(s) Oral every 6 hours PRN Moderate Pain (4 - 6)      I&O's Summary    20 Jun 2023 07:01  -  21 Jun 2023 07:00  --------------------------------------------------------  IN: 450 mL / OUT: 0 mL / NET: 450 mL        PHYSICAL EXAM:  Vital Signs Last 24 Hrs  T(C): 36.3 (21 Jun 2023 11:09), Max: 37.6 (20 Jun 2023 21:35)  T(F): 97.3 (21 Jun 2023 11:09), Max: 99.7 (20 Jun 2023 21:35)  HR: 92 (21 Jun 2023 12:21) (92 - 110)  BP: 117/65 (21 Jun 2023 12:21) (96/68 - 146/65)  BP(mean): 101 (21 Jun 2023 11:06) (101 - 101)  RR: 19 (21 Jun 2023 12:21) (16 - 24)  SpO2: 94% (21 Jun 2023 12:21) (94% - 99%)    Parameters below as of 21 Jun 2023 12:05  Patient On (Oxygen Delivery Method): room air      CONSTITUTIONAL: NAD, well-groomed, + NG tube in place  RESPIRATORY: Normal respiratory effort; lungs are clear to auscultation bilaterally  CARDIOVASCULAR: normal S1 and S2, no murmur/rub/gallop; No lower extremity edema  ABDOMEN: Nontender to palpation, normoactive bowel sounds  MUSCULOSKELETAL:  no joint swelling or tenderness to palpation  PSYCH: eyes closed; calm  NEUROLOGY: nonverbal and not following other commands  SKIN: + staples in right craniectomy site (sunken)    LABS:                        12.1   14.77 )-----------( 500      ( 20 Jun 2023 10:12 )             37.9     06-20    145  |  105  |  25<H>  ----------------------------<  124<H>  4.4   |  28  |  0.59    Ca    9.8      20 Jun 2023 10:12      PT/INR - ( 20 Jun 2023 15:51 )   PT: 13.7 sec;   INR: 1.19 ratio         PTT - ( 20 Jun 2023 15:51 )  PTT:30.2 sec          SARS-CoV-2: NotDetec (06 Jun 2023 15:18)          COMMUNICATION:  Care Discussed with Consultants/Other Providers and Details of Discussion: neurosurgery  Discussions with Patient/Family: yes  PCP Communication: A IVF clinic in [place]

## 2023-06-21 NOTE — EEG REPORT - NS EEG TEXT BOX
[facility]OO[facility]IN [facility]-[id number]     Study Date: 		06-20-23 17:03 to 06-21-23 08:00  Duration x Hours: 14:31  --------------------------------------------------------------------------------------------------  History:  CC/ HPI Patient is a 38y old  Female who presents with a chief complaint of IPH (20 Jun 2023 15:39)    MEDICATIONS  (STANDING):  albuterol    0.083% 2.5 milliGRAM(s) Nebulizer every 8 hours  amLODIPine   Tablet 5 milliGRAM(s) Oral daily  brivaracetam 100 milliGRAM(s) Oral two times a day  ceFAZolin   IVPB 2000 milliGRAM(s) IV Intermittent once  chlorhexidine 2% Cloths 1 Application(s) Topical daily  cloNIDine 0.1 milliGRAM(s) Oral every 8 hours  lacosamide IVPB 150 milliGRAM(s) IV Intermittent every 12 hours  levothyroxine 75 MICROGram(s) Oral daily  lisinopril 40 milliGRAM(s) Enteral Tube daily  nicotine - 21 mG/24Hr(s) Patch 1 Patch Transdermal daily  polyethylene glycol 3350 17 Gram(s) Oral daily  senna 2 Tablet(s) Oral at bedtime  sodium chloride 0.9%. 1000 milliLiter(s) (75 mL/Hr) IV Continuous <Continuous>    --------------------------------------------------------------------------------------------------  Study Interpretation:    [[[Abbreviation Key:  PDR=alpha rhythm/posterior dominant rhythm. A-P=anterior posterior.  Amplitude: ‘very low’:<20; ‘low’:20-49; ‘medium’:; ‘high’:>150uV.  Persistence for periodic/rhythmic patterns (% of epoch) ‘rare’:<1%; ‘occasional’:1-10%; ‘frequent’:10-50%; ‘abundant’:50-90%; ‘continuous’:>90%.  Persistence for sporadic discharges: ‘rare’:<1/hr; ‘occasional’:1/min-1/hr; ‘frequent’:>1/min; ‘abundant’:>1/10 sec.  RPP=rhythmic and periodic patterns; GRDA=generalized rhythmic delta activity; FIRDA=frontal intermittent GRDA; LRDA=lateralized rhythmic delta activity; TIRDA=temporal intermittent rhythmic delta activity;  LPD=PLED=lateralized periodic discharges; GPD=generalized periodic discharges; BIPDs =bilateral independent periodic discharges; Mf=multifocal; SIRPDs=stimulus induced rhythmic, periodic, or ictal appearing discharges; BIRDs=brief potentially ictal rhythmic discharges >4 Hz, lasting .5-10s; PFA (paroxysmal bursts >13 Hz or =8 Hz <10s).  Modifiers: +F=with fast component; +S=with spike component; +R=with rhythmic component.  S-B=burst suppression pattern.  Max=maximal. N1-drowsy; N2-stage II sleep; N3-slow wave sleep. SSS/BETS=small sharp spikes/benign epileptiform transients of sleep. HV=hyperventilation; PS=photic stimulation]]]    Daily EEG Visual Analysis    FINDINGS:      Background:  Continuous: continuous  Symmetry: symmetric  PDR: 8 Hz activity on left hemisphere, not well modulated on right, with amplitude to 40 uV, that attenuated to eye opening.  Low amplitude frontal beta noted in wakefulness.  Reactivity: present  Voltage: normal, mostly 20-150uV  Anterior Posterior Gradient: present  Other background findings: none  Breach: Persistently higher amplitude and sharper morphology over the right hemisphere, likely representing breach    Background Slowing:  Generalized slowing: Continuous diffuse delta and theta  Focal slowing: Continuous right hemispheric delta and theta    State Changes:   -Drowsiness noted with increased slowing, attenuation of fast activity, vertex transients.  -Fragmented spindles and K-complexes, better formed on left    Sporadic Epileptiform Discharges:    None    Rhythmic and Periodic Patterns (RPPs):  None     Electrographic and Electroclinical seizures:  None    Other Clinical Events:  None    Activation Procedures:   -Hyperventilation was not performed.    -Photic stimulation was not performed.    Artifacts:  Intermittent myogenic and movement artifacts were noted.    ECG:  The heart rate on single channel ECG was predominantly between  BPM with frequent PVCs.    EEG Classification / Summary:  Abnormal  EEG in the awake / drowsy / asleep state(s).    Continuous right hemispheric slowing  Background slowing, generalized, mild  Right hemispheric breach    Clinical Impression:    Evidence for right hemispheric cortical/subcortical structural lesion  Mild diffuse/multifocal cerebral dysfunction, not specific as to etiology  Right hemispheric skull defect  There were no epileptiform abnormalities recorded.      This is a prelim report only, pending review with attending prior to finalization.      -------------------------------------------------------------------------------------------------------  [facility] EEG Reading Room Ph#: [phone]  Epilepsy Answering Service after 5PM and before 8:30AM: Ph#: [phone]    Adan Goodman M.D.   Epilepsy fellow [facility]OO[facility] CAIN [facility]-[id number]     Study Date: 		06-20-23 17:03 to 06-21-23 09:50  Duration x Hours: 16:21  --------------------------------------------------------------------------------------------------  History:  CC/ HPI Patient is a 38y old  Female who presents with a chief complaint of IPH (20 Jun 2023 15:39)    MEDICATIONS  (STANDING):  albuterol    0.083% 2.5 milliGRAM(s) Nebulizer every 8 hours  amLODIPine   Tablet 5 milliGRAM(s) Oral daily  brivaracetam 100 milliGRAM(s) Oral two times a day  ceFAZolin   IVPB 2000 milliGRAM(s) IV Intermittent once  chlorhexidine 2% Cloths 1 Application(s) Topical daily  cloNIDine 0.1 milliGRAM(s) Oral every 8 hours  lacosamide IVPB 150 milliGRAM(s) IV Intermittent every 12 hours  levothyroxine 75 MICROGram(s) Oral daily  lisinopril 40 milliGRAM(s) Enteral Tube daily  nicotine - 21 mG/24Hr(s) Patch 1 Patch Transdermal daily  polyethylene glycol 3350 17 Gram(s) Oral daily  senna 2 Tablet(s) Oral at bedtime  sodium chloride 0.9%. 1000 milliLiter(s) (75 mL/Hr) IV Continuous <Continuous>    --------------------------------------------------------------------------------------------------  Study Interpretation:    [[[Abbreviation Key:  PDR=alpha rhythm/posterior dominant rhythm. A-P=anterior posterior.  Amplitude: ‘very low’:<20; ‘low’:20-49; ‘medium’:; ‘high’:>150uV.  Persistence for periodic/rhythmic patterns (% of epoch) ‘rare’:<1%; ‘occasional’:1-10%; ‘frequent’:10-50%; ‘abundant’:50-90%; ‘continuous’:>90%.  Persistence for sporadic discharges: ‘rare’:<1/hr; ‘occasional’:1/min-1/hr; ‘frequent’:>1/min; ‘abundant’:>1/10 sec.  RPP=rhythmic and periodic patterns; GRDA=generalized rhythmic delta activity; FIRDA=frontal intermittent GRDA; LRDA=lateralized rhythmic delta activity; TIRDA=temporal intermittent rhythmic delta activity;  LPD=PLED=lateralized periodic discharges; GPD=generalized periodic discharges; BIPDs =bilateral independent periodic discharges; Mf=multifocal; SIRPDs=stimulus induced rhythmic, periodic, or ictal appearing discharges; BIRDs=brief potentially ictal rhythmic discharges >4 Hz, lasting .5-10s; PFA (paroxysmal bursts >13 Hz or =8 Hz <10s).  Modifiers: +F=with fast component; +S=with spike component; +R=with rhythmic component.  S-B=burst suppression pattern.  Max=maximal. N1-drowsy; N2-stage II sleep; N3-slow wave sleep. SSS/BETS=small sharp spikes/benign epileptiform transients of sleep. HV=hyperventilation; PS=photic stimulation]]]    Daily EEG Visual Analysis    FINDINGS:      Background:  Continuous: continuous  Symmetry: symmetric  PDR: 8 Hz activity on left hemisphere, not well modulated on right, with amplitude to 40 uV, that attenuated to eye opening.  Low amplitude frontal beta noted in wakefulness.  Reactivity: present  Voltage: normal, mostly 20-150uV  Anterior Posterior Gradient: present  Other background findings: none  Breach: Persistently higher amplitude and sharper morphology over the right parietal region, likely representing breach    Background Slowing:  Generalized slowing: Continuous diffuse delta and theta  Focal slowing: Continuous right hemispheric delta and theta    State Changes:   -Drowsiness noted with increased slowing, attenuation of fast activity, vertex transients.  -Fragmented spindles and K-complexes, better formed on left    Sporadic Epileptiform Discharges:    None    Rhythmic and Periodic Patterns (RPPs):  None     Electrographic and Electroclinical seizures:  None    Other Clinical Events:  None    Activation Procedures:   -Hyperventilation was not performed.    -Photic stimulation was not performed.    Artifacts:  Intermittent myogenic and movement artifacts were noted.    ECG:  The heart rate on single channel ECG was predominantly between  BPM with frequent PVCs.    EEG Classification / Summary:  Abnormal  EEG in the awake / drowsy / asleep state(s).    Continuous right hemispheric slowing  Background slowing, generalized, mild  Right hemispheric parietal breach    Clinical Impression:  Evidence for right hemispheric cortical/subcortical structural lesion  Mild diffuse/multifocal cerebral dysfunction, not specific as to etiology  Right parietal region skull defect  There were no epileptiform abnormalities recorded.          -------------------------------------------------------------------------------------------------------  [facility] EEG Reading Room Ph#: [phone]  Epilepsy Answering Service after 5PM and before 8:30AM: Ph#: [phone]    Adan Goodman M.D.   Epilepsy fellow

## 2023-06-21 NOTE — PROGRESS NOTE ADULT - ASSESSMENT
HPI:    PROCEDURE: Adm 6/6 Rt Jose crani evac hematoma w/flap discarded and intraop EVD       POD#[id number]    PLAN:  Neuro: Preop for PEG placement today. NPO/UA Preg-P FU/Hold SQL for PEG. Can suspend VEEG until she return from PEG, Rt arm restraint on-DC now as pt going for PEG and will FU post PEG if restraints needed.  TTE held now as pt going for PEG, will do TTE  later. Off Dex, Leukocytosis trending downwards. INR normalizing. Plan for Cranioplasty later with neuro-plastic Dr Agustin Kwan.  Inc activity/OOB.     EEG Prelim rpt of 6/21-Clinical Impression:Evidence for right hemispheric cortical/subcortical structural lesion Mild diffuse/multifocal cerebral dysfunction, not specific as to etiology  Right hemispheric skull defect There were no epileptiform abnormalities recorded.  This is a prelim report only, pending review with attending prior to finalization.    Hosp note of 6/20- Problem/Recommendation - 2: ·  Problem: Dysphagia. ·  Recommendation: continue with NG tube feed appreciate GI input plan for PEG tomorrow no absolute medical contraindication. Problem/Recommendation - 3:·  Problem: Hypertension. ·  Recommendation: per spouse, patient was told in the past that her BP was elevated but on follow up visit, she was told it was ok and never started on any BP medication- continue with lisinopril 40mg, clonidine 0.1mg TID, amlodipine 5mg for now (started on this admission)  - TTE on 6/8 (technically difficult study):   - Normal left ventricular cavity size. The left ventricular wall thickness is normal. The left ventricular systolic function      is mildly decreased with an ejection fraction of 57 % by Thompson's method of disks. There are regional wall      motion abnormalities consistent with ischemic heart disease.   - Basal and mid inferior septum, basal and mid inferior wall, and mid inferolateral segment are abnormal.- EKG (6/12) reviewed: NSR 89bpm with TWI in III (unchanged compared to prior)- ? if above TTE findings may have been related to stress induced, check repeat TTE for comparison. Problem/Recommendation - 4:·  Problem: Fever. ·  Recommendation: episodes of fever while in NSCU  infectious work up unrevealing including negative Bcx/UA/CXR on 6/18 and unremarkable CT c/a/p on 6/15.LE duplex negative on 6/14 leukocytosis trending down continue to monitor.   Problem/Recommendation - 5:·  Problem: Seizure disorder. ·  Recommendation: continue with brivaracetam 100mg BID, lacosamide 150mg BID per neurorepeat EEG pending.   Problem/Recommendation - 6:·  Problem: Hypothyroid. ·  Recommendation: recent TSH normal (1.18) on 6/6 continue with synthroid 75mcg (need to confirm dose with spouse).   Problem/Recommendation - 7:·  Problem: Preventive measure. ·  Recommendation: resume chemoprophylaxis post-PEG when deemed stable. Last LE duplex on 6/14 neg for DVT.  continue bowel regimen- last documented BM 6/18O2 sat on room air stable in mid-90's, started albuterol neb, recent CXR (6/18) clear. monitor closely.consider adding free water with tube feed but per discussion with neurosurgery, trying to keep serum Na elevated so will hold off for now.    GI note of 6/20 seen-pt for PEG today FU.    Neuro note of 6/20 seen-keep Na 145-155 and BP goal <160/90    Respiratory: Patient instructed to use incentive spirometer [ X] YES [ ] NO              DVT ppx: [X ] SQL-Held for PEG [ ] SQH and Venodynes [ ] Left [ ] Right [ X] Bilateral    Discharge Planning:  The patient was evaluated by PT/OT and recommended TBD upon further assessment.  She was subsequently DC on >>> in stable condition.    More than 30 minutes spent on total encounter: more than 50% of the visit was spent on educating the patient and family regarding condition, medications, follow up plans, signs and symptoms to be concerned with, preparing paperwork, and questions answered regarding discharge.

## 2023-06-21 NOTE — PRE PROCEDURE NOTE - PRE PROCEDURE EVALUATION
Pre-Endoscopy Evaluation      Referring Physician:         Neurosurgery                           Procedure: EGD + PEG    Indication for Procedure: Dysphagia    Pertinent History:    38F Hx hypothyroid, hx sinus infections, smoker, undergoing in vitro fertilization found down at home 6/5/2023. CTH w/large R IPH w/IVH, CTA negative for aneurysms. Pt not a candidate for tenecteplase due to hemorrhage and not a candidate for thrombectomy due to no LVO see on CTA H/N. S/p emergent decompressive craniectomy 6/6 (no bone flap) Cerebral Angio performed 6/9 did not show evidence of vascular malformation. NSCU course c/b seizures 6/9 for which EEG was placed and noted show electroclinical and electrographic seizures from the the R frontal and posterior temporal regions.     Extubated on 6/13, EVD removed 6/17. Downgraded to floors 6/20.   tolerating NGT feeds  GI asked to evaluate for PEG    #Dysphagia  Indications, risks, benefits, alternatives discussed with family at bedside (wife is HCP) who agree to proceed with PEG  feeds held at MN  ANcef 2 gram on call for PEG  Sedation by Anesthesia [X ]    PAST MEDICAL & SURGICAL HISTORY:      PMH of Gastroparesis [ ]  Gastric Surgery [ ]  Gastric Outlet Obstruction [ ]    Allergies    No Known Allergies      Latex allergy: [ ] yes [X ] no    Medications:MEDICATIONS  (STANDING):  albuterol    0.083% 2.5 milliGRAM(s) Nebulizer every 8 hours  amLODIPine   Tablet 5 milliGRAM(s) Oral daily  brivaracetam 100 milliGRAM(s) Oral two times a day  ceFAZolin   IVPB 2000 milliGRAM(s) IV Intermittent once  chlorhexidine 2% Cloths 1 Application(s) Topical daily  cloNIDine 0.1 milliGRAM(s) Oral every 8 hours  lacosamide IVPB 150 milliGRAM(s) IV Intermittent every 12 hours  levothyroxine 75 MICROGram(s) Oral daily  lisinopril 40 milliGRAM(s) Enteral Tube daily  nicotine - 21 mG/24Hr(s) Patch 1 Patch Transdermal daily  polyethylene glycol 3350 17 Gram(s) Oral daily  senna 2 Tablet(s) Oral at bedtime  sodium chloride 0.9%. 1000 milliLiter(s) (75 mL/Hr) IV Continuous <Continuous>    MEDICATIONS  (PRN):  acetaminophen   Oral Liquid .. 650 milliGRAM(s) Oral every 6 hours PRN Temp greater or equal to 38C (100.4F), Mild Pain (1 - 3)  ondansetron Injectable 4 milliGRAM(s) IV Push every 6 hours PRN Nausea and/or Vomiting  oxyCODONE    IR 5 milliGRAM(s) Oral every 6 hours PRN Moderate Pain (4 - 6)      Smoking: [X ] yes  [ ] no    AICD/PPM: [ ] yes   [X ] no    Pertinent lab data:                        12.1   14.77 )-----------( 500      ( 20 Jun 2023 10:12 )             37.9     06-20    145  |  105  |  25<H>  ----------------------------<  124<H>  4.4   |  28  |  0.59    Ca    9.8      20 Jun 2023 10:12      PT/INR - ( 20 Jun 2023 15:51 )   PT: 13.7 sec;   INR: 1.19 ratio         PTT - ( 20 Jun 2023 15:51 )  PTT:30.2 sec        Physical Examination:  Daily     Daily   Vital Signs Last 24 Hrs  T(C): 37.1 (21 Jun 2023 09:12), Max: 37.6 (20 Jun 2023 21:35)  T(F): 98.8 (21 Jun 2023 09:12), Max: 99.7 (20 Jun 2023 21:35)  HR: 96 (21 Jun 2023 09:12) (96 - 110)  BP: 108/73 (21 Jun 2023 09:12) (96/68 - 127/82)  BP(mean): --  RR: 20 (21 Jun 2023 09:12) (19 - 20)  SpO2: 97% (21 Jun 2023 09:12) (94% - 97%)    Parameters below as of 21 Jun 2023 09:12  Patient On (Oxygen Delivery Method): room air        BP:                 HR:                  SPO2:               Temperature:    Constitutional: right crani, staples intact; sunken and soft    HEENT: s/p right crani  Neck:  No JVD    Respiratory: CTAB/L    Cardiovascular: S1 and S2    Gastrointestinal: BS+, soft, NT/ND    Extremities: No peripheral edema    Neurological: drowsy, not following commands    Psychiatric: calm    : No Trevino    Skin: No rashes    Comments:    ASA Class: I [ ]  II [ ]  III [X ]  IV [ ]    The patient is a suitable candidate for the planned procedure unless box checked [ ]  No, explain:

## 2023-06-21 NOTE — PROGRESS NOTE ADULT - SUBJECTIVE AND OBJECTIVE BOX
SUBJECTIVE: This is my initial visit with this pt. Seen with her wife/Karma/HCP at bedside. NAD. Appears comfortable w/VEEG on.     OVERNIGHT EVENTS: None    Vital Signs Last 24 Hrs  T(C): 37.1 (21 Jun 2023 09:12), Max: 37.6 (20 Jun 2023 21:35)  T(F): 98.8 (21 Jun 2023 09:12), Max: 99.7 (20 Jun 2023 21:35)  HR: 96 (21 Jun 2023 09:12) (96 - 110)  BP: 108/73 (21 Jun 2023 09:12) (96/68 - 127/82)  BP(mean): --  RR: 20 (21 Jun 2023 09:12) (19 - 20)  SpO2: 97% (21 Jun 2023 09:12) (94% - 97%)    Parameters below as of 21 Jun 2023 09:12  Patient On (Oxygen Delivery Method): room air    IVF: [ ] IVL [X ] NS@75/h  DIET: [ ] Regular [ ] CCD [ ] Renal [ ] Puree [ ] Dysphagia [ X] Tube Feeds: Glucerna 1.2 goal 50cc/H--NPO for PEG today  PCA: [ ] YES [ X] NO   HARRELL: [ ] YES [X ] NO [ ] VOID Incontinence  BM: [X ] YES-on 6/20 x2    DRAINS: None    PHYSICAL EXAM:    General: No Acute Distress     Neurological: Arousable to noxious, grimaces and groans to noxious, minimally EO to noxious, pupils 4mm reactive b/l, follows commands on RUE (two fingers), RLE spontaneous, LUE WDs, LLE WDs    Pulmonary: Clear to Auscultation, No Rales, No Rhonchi, No Wheezes     Cardiovascular: S1, S2, Regular Rate and Rhythm     Gastrointestinal: Soft, Nontender, Nondistended     Incision: VEEG on/Head dsg CDI    LABS:                        12.1   14.77 )-----------( 500      ( 20 Jun 2023 10:12 )             37.9    06-20    145  |  105  |  25<H>  ----------------------------<  124<H>  4.4   |  28  |  0.59    Ca    9.8      20 Jun 2023 10:12    PT/INR - ( 20 Jun 2023 15:51 )   PT: 13.7 sec;   INR: 1.19 ratio    PTT - ( 20 Jun 2023 15:51 )  PTT:30.2 sec    6/21 UA Preg-P FU      06-20 @ 07:01  -  06-21 @ 07:00  --------------------------------------------------------  IN: 450 mL / OUT: 0 mL / NET: 450 mL    6/21 TTE-P FU    IMAGING:   < from: CT Head No Cont (06.20.23 @ 09:41) >  Resolving right frontal hemorrhage with similar mass effect and unchanged   minimal leftward midline shift. No hydrocephalus.    < end of copied text >    < from: Xray Chest 1 View- PORTABLE-Urgent (Xray Chest 1 View- PORTABLE-Urgent .) (06.18.23 @ 11:45) >  Lines and tubes above.    No active pulmonary disease.    < end of copied text >    < from: [facility] Duplex Lower Ext Vein Scan, Bilat (06.14.23 @ 17:27) >  No evidence for acute DVT in the bilateral lower extremity deep venous   systems.    < end of copied text >    MEDICATIONS  (STANDING):  albuterol    0.083% 2.5 milliGRAM(s) Nebulizer every 8 hours  amLODIPine   Tablet 5 milliGRAM(s) Oral daily  brivaracetam 100 milliGRAM(s) Oral two times a day  ceFAZolin   IVPB 2000 milliGRAM(s) IV Intermittent once  chlorhexidine 2% Cloths 1 Application(s) Topical daily  cloNIDine 0.1 milliGRAM(s) Oral every 8 hours  lacosamide IVPB 150 milliGRAM(s) IV Intermittent every 12 hours  levothyroxine 75 MICROGram(s) Oral daily  lisinopril 40 milliGRAM(s) Enteral Tube daily  nicotine - 21 mG/24Hr(s) Patch 1 Patch Transdermal daily  polyethylene glycol 3350 17 Gram(s) Oral daily  senna 2 Tablet(s) Oral at bedtime  sodium chloride 0.9%. 1000 milliLiter(s) (75 mL/Hr) IV Continuous <Continuous>    MEDICATIONS  (PRN):  acetaminophen   Oral Liquid .. 650 milliGRAM(s) Oral every 6 hours PRN Temp greater or equal to 38C (100.4F), Mild Pain (1 - 3)  ondansetron Injectable 4 milliGRAM(s) IV Push every 6 hours PRN Nausea and/or Vomiting  oxyCODONE    IR 5 milliGRAM(s) Oral every 6 hours PRN Moderate Pain (4 - 6)

## 2023-06-21 NOTE — PRE-ANESTHESIA EVALUATION ADULT - NSANTHADDINFOFT_GEN_ALL_CORE
rba dw mother at bedside, including elevated risk of chelly-op morbidity/mortality given critical condition- understands + agrees with plan
All r/b/a discussed with patient's wife, all questions answered

## 2023-06-22 LAB
ANION GAP SERPL CALC-SCNC: 12 MMOL/L — SIGNIFICANT CHANGE UP (ref 5–17)
APPEARANCE UR: ABNORMAL
BACTERIA # UR AUTO: ABNORMAL
BASOPHILS # BLD AUTO: 0 K/UL — SIGNIFICANT CHANGE UP (ref 0–0.2)
BASOPHILS NFR BLD AUTO: 0 % — SIGNIFICANT CHANGE UP (ref 0–2)
BILIRUB UR-MCNC: NEGATIVE — SIGNIFICANT CHANGE UP
BUN SERPL-MCNC: 18 MG/DL — SIGNIFICANT CHANGE UP (ref 7–23)
CALCIUM SERPL-MCNC: 9.1 MG/DL — SIGNIFICANT CHANGE UP (ref 8.4–10.5)
CHLORIDE SERPL-SCNC: 112 MMOL/L — HIGH (ref 96–108)
CO2 SERPL-SCNC: 23 MMOL/L — SIGNIFICANT CHANGE UP (ref 22–31)
COLOR SPEC: YELLOW — SIGNIFICANT CHANGE UP
CREAT SERPL-MCNC: 0.5 MG/DL — SIGNIFICANT CHANGE UP (ref 0.5–1.3)
DIFF PNL FLD: NEGATIVE — SIGNIFICANT CHANGE UP
EGFR: 122 ML/MIN/1.73M2 — SIGNIFICANT CHANGE UP
EOSINOPHIL # BLD AUTO: 0.2 K/UL — SIGNIFICANT CHANGE UP (ref 0–0.5)
EOSINOPHIL NFR BLD AUTO: 0.9 % — SIGNIFICANT CHANGE UP (ref 0–6)
EPI CELLS # UR: 3 /HPF — SIGNIFICANT CHANGE UP
GLUCOSE SERPL-MCNC: 128 MG/DL — HIGH (ref 70–99)
GLUCOSE UR QL: NEGATIVE — SIGNIFICANT CHANGE UP
HCG UR QL: NEGATIVE — SIGNIFICANT CHANGE UP
HCT VFR BLD CALC: 34.4 % — LOW (ref 34.5–45)
HCT VFR BLD CALC: 35.1 % — SIGNIFICANT CHANGE UP (ref 34.5–45)
HGB BLD-MCNC: 10.7 G/DL — LOW (ref 11.5–15.5)
HGB BLD-MCNC: 11.1 G/DL — LOW (ref 11.5–15.5)
KETONES UR-MCNC: NEGATIVE — SIGNIFICANT CHANGE UP
LEUKOCYTE ESTERASE UR-ACNC: ABNORMAL
LYMPHOCYTES # BLD AUTO: 1.18 K/UL — SIGNIFICANT CHANGE UP (ref 1–3.3)
LYMPHOCYTES # BLD AUTO: 5.4 % — LOW (ref 13–44)
MANUAL SMEAR VERIFICATION: SIGNIFICANT CHANGE UP
MCHC RBC-ENTMCNC: 31.1 GM/DL — LOW (ref 32–36)
MCHC RBC-ENTMCNC: 31.6 GM/DL — LOW (ref 32–36)
MCHC RBC-ENTMCNC: 32.2 PG — SIGNIFICANT CHANGE UP (ref 27–34)
MCHC RBC-ENTMCNC: 32.3 PG — SIGNIFICANT CHANGE UP (ref 27–34)
MCV RBC AUTO: 102 FL — HIGH (ref 80–100)
MCV RBC AUTO: 103.6 FL — HIGH (ref 80–100)
MONOCYTES # BLD AUTO: 1.18 K/UL — HIGH (ref 0–0.9)
MONOCYTES NFR BLD AUTO: 5.4 % — SIGNIFICANT CHANGE UP (ref 2–14)
NEUTROPHILS # BLD AUTO: 19.22 K/UL — HIGH (ref 1.8–7.4)
NEUTROPHILS NFR BLD AUTO: 88.3 % — HIGH (ref 43–77)
NITRITE UR-MCNC: NEGATIVE — SIGNIFICANT CHANGE UP
NRBC # BLD: 0 /100 WBCS — SIGNIFICANT CHANGE UP (ref 0–0)
PH UR: 7 — SIGNIFICANT CHANGE UP (ref 5–8)
PLAT MORPH BLD: NORMAL — SIGNIFICANT CHANGE UP
PLATELET # BLD AUTO: 386 K/UL — SIGNIFICANT CHANGE UP (ref 150–400)
PLATELET # BLD AUTO: 413 K/UL — HIGH (ref 150–400)
POTASSIUM SERPL-MCNC: 3.7 MMOL/L — SIGNIFICANT CHANGE UP (ref 3.5–5.3)
POTASSIUM SERPL-SCNC: 3.7 MMOL/L — SIGNIFICANT CHANGE UP (ref 3.5–5.3)
PROT UR-MCNC: ABNORMAL
RAPID RVP RESULT: SIGNIFICANT CHANGE UP
RBC # BLD: 3.32 M/UL — LOW (ref 3.8–5.2)
RBC # BLD: 3.44 M/UL — LOW (ref 3.8–5.2)
RBC # FLD: 14 % — SIGNIFICANT CHANGE UP (ref 10.3–14.5)
RBC # FLD: 14.2 % — SIGNIFICANT CHANGE UP (ref 10.3–14.5)
RBC BLD AUTO: SIGNIFICANT CHANGE UP
RBC CASTS # UR COMP ASSIST: 2 /HPF — SIGNIFICANT CHANGE UP (ref 0–4)
SARS-COV-2 RNA SPEC QL NAA+PROBE: SIGNIFICANT CHANGE UP
SODIUM SERPL-SCNC: 147 MMOL/L — HIGH (ref 135–145)
SP GR SPEC: 1.03 — HIGH (ref 1.01–1.02)
UROBILINOGEN FLD QL: NEGATIVE — SIGNIFICANT CHANGE UP
WBC # BLD: 18.95 K/UL — HIGH (ref 3.8–10.5)
WBC # BLD: 21.77 K/UL — HIGH (ref 3.8–10.5)
WBC # FLD AUTO: 18.95 K/UL — HIGH (ref 3.8–10.5)
WBC # FLD AUTO: 21.77 K/UL — HIGH (ref 3.8–10.5)
WBC UR QL: 9 /HPF — HIGH (ref 0–5)

## 2023-06-22 PROCEDURE — 71045 X-RAY EXAM CHEST 1 VIEW: CPT | Mod: 26

## 2023-06-22 PROCEDURE — 93308 TTE F-UP OR LMTD: CPT | Mod: 26

## 2023-06-22 PROCEDURE — 99232 SBSQ HOSP IP/OBS MODERATE 35: CPT

## 2023-06-22 PROCEDURE — 93970 EXTREMITY STUDY: CPT | Mod: 26

## 2023-06-22 PROCEDURE — 70450 CT HEAD/BRAIN W/O DYE: CPT | Mod: 26

## 2023-06-22 PROCEDURE — 95720 EEG PHY/QHP EA INCR W/VEEG: CPT

## 2023-06-22 PROCEDURE — 99233 SBSQ HOSP IP/OBS HIGH 50: CPT

## 2023-06-22 RX ORDER — LISINOPRIL 2.5 MG/1
20 TABLET ORAL DAILY
Refills: 0 | Status: DISCONTINUED | OUTPATIENT
Start: 2023-06-23 | End: 2023-06-26

## 2023-06-22 RX ORDER — ENOXAPARIN SODIUM 100 MG/ML
40 INJECTION SUBCUTANEOUS EVERY 24 HOURS
Refills: 0 | Status: DISCONTINUED | OUTPATIENT
Start: 2023-06-22 | End: 2023-06-26

## 2023-06-22 RX ADMIN — Medication 0.1 MILLIGRAM(S): at 18:17

## 2023-06-22 RX ADMIN — OXYCODONE HYDROCHLORIDE 5 MILLIGRAM(S): 5 TABLET ORAL at 06:29

## 2023-06-22 RX ADMIN — Medication 75 MICROGRAM(S): at 06:22

## 2023-06-22 RX ADMIN — Medication 650 MILLIGRAM(S): at 13:28

## 2023-06-22 RX ADMIN — ALBUTEROL 2.5 MILLIGRAM(S): 90 AEROSOL, METERED ORAL at 06:21

## 2023-06-22 RX ADMIN — POLYETHYLENE GLYCOL 3350 17 GRAM(S): 17 POWDER, FOR SOLUTION ORAL at 14:16

## 2023-06-22 RX ADMIN — Medication 650 MILLIGRAM(S): at 14:26

## 2023-06-22 RX ADMIN — LACOSAMIDE 130 MILLIGRAM(S): 50 TABLET ORAL at 18:16

## 2023-06-22 RX ADMIN — BRIVARACETAM 100 MILLIGRAM(S): 25 TABLET, FILM COATED ORAL at 06:21

## 2023-06-22 RX ADMIN — ENOXAPARIN SODIUM 40 MILLIGRAM(S): 100 INJECTION SUBCUTANEOUS at 18:16

## 2023-06-22 RX ADMIN — LACOSAMIDE 130 MILLIGRAM(S): 50 TABLET ORAL at 06:21

## 2023-06-22 RX ADMIN — ALBUTEROL 2.5 MILLIGRAM(S): 90 AEROSOL, METERED ORAL at 13:27

## 2023-06-22 RX ADMIN — Medication 1 PATCH: at 19:26

## 2023-06-22 RX ADMIN — OXYCODONE HYDROCHLORIDE 5 MILLIGRAM(S): 5 TABLET ORAL at 07:30

## 2023-06-22 RX ADMIN — Medication 1 PATCH: at 13:27

## 2023-06-22 RX ADMIN — BRIVARACETAM 100 MILLIGRAM(S): 25 TABLET, FILM COATED ORAL at 18:17

## 2023-06-22 NOTE — PROGRESS NOTE ADULT - SUBJECTIVE AND OBJECTIVE BOX
[facility] Division of Hospital Medicine  Jojo Staley MD  Available via MS Teams  Spectra [phone]    SUBJECTIVE / OVERNIGHT EVENTS: patient seen and examined this morning. unable to obtain ROS from the patient due to mental status but per spouse at bedside, patient was able to say her name and hospital when asked earlier this morning.     ADDITIONAL REVIEW OF SYSTEMS:    MEDICATIONS  (STANDING):  albuterol    0.083% 2.5 milliGRAM(s) Nebulizer every 8 hours  brivaracetam Oral Solution 100 milliGRAM(s) Oral two times a day  chlorhexidine 2% Cloths 1 Application(s) Topical daily  cloNIDine 0.1 milliGRAM(s) Oral every 12 hours  enoxaparin Injectable 40 milliGRAM(s) SubCutaneous every 24 hours  lacosamide IVPB 150 milliGRAM(s) IV Intermittent every 12 hours  levothyroxine 75 MICROGram(s) Oral daily  nicotine - 21 mG/24Hr(s) Patch 1 Patch Transdermal daily  polyethylene glycol 3350 17 Gram(s) Oral daily  senna 2 Tablet(s) Oral at bedtime  sodium chloride 0.9%. 1000 milliLiter(s) (75 mL/Hr) IV Continuous <Continuous>    MEDICATIONS  (PRN):  acetaminophen   Oral Liquid .. 650 milliGRAM(s) Oral every 6 hours PRN Temp greater or equal to 38C (100.4F), Mild Pain (1 - 3)  ondansetron Injectable 4 milliGRAM(s) IV Push every 6 hours PRN Nausea and/or Vomiting  oxyCODONE    Solution 5 milliGRAM(s) Oral every 6 hours PRN Moderate Pain (4 - 6)      I&O's Summary    21 Jun 2023 07:01  -  22 Jun 2023 07:00  --------------------------------------------------------  IN: 600 mL / OUT: 300 mL / NET: 300 mL        PHYSICAL EXAM:  Vital Signs Last 24 Hrs  T(C): 37.5 (22 Jun 2023 05:00), Max: 38.2 (21 Jun 2023 23:00)  T(F): 99.5 (22 Jun 2023 05:00), Max: 100.8 (21 Jun 2023 23:00)  HR: 55 (22 Jun 2023 05:00) (55 - 106)  BP: 100/64 (22 Jun 2023 05:00) (100/64 - 146/65)  BP(mean): 101 (21 Jun 2023 11:06) (101 - 101)  RR: 18 (22 Jun 2023 05:00) (16 - 24)  SpO2: 95% (22 Jun 2023 05:00) (94% - 99%)    Parameters below as of 22 Jun 2023 05:00  Patient On (Oxygen Delivery Method): room air      CONSTITUTIONAL: NAD, well-groomed, moans intermittently  RESPIRATORY: Normal respiratory effort; lungs are clear to auscultation bilaterally  CARDIOVASCULAR: normal S1 and S2, no murmur/rub/gallop; No lower extremity edema  ABDOMEN: Nontender, + PEG in place, normoactive bowel sounds  MUSCULOSKELETAL:  no joint swelling or tenderness to palpation  PSYCH: eyes closed; calm  NEUROLOGY: nonverbal mostly and waved right hand when promted  SKIN: + staples in right craniectomy site (sunken)      LABS:                        10.7   18.95 )-----------( 386      ( 22 Jun 2023 07:23 )             34.4     06-22    147<H>  |  112<H>  |  18  ----------------------------<  128<H>  3.7   |  23  |  0.50    Ca    9.1      22 Jun 2023 07:23      PT/INR - ( 20 Jun 2023 15:51 )   PT: 13.7 sec;   INR: 1.19 ratio         PTT - ( 20 Jun 2023 15:51 )  PTT:30.2 sec      Urinalysis Basic - ( 22 Jun 2023 07:23 )    Color: x / Appearance: x / SG: x / pH: x  Gluc: 128 mg/dL / Ketone: x  / Bili: x / Urobili: x   Blood: x / Protein: x / Nitrite: x   Leuk Esterase: x / RBC: x / WBC x   Sq Epi: x / Non Sq Epi: x / Bacteria: x        SARS-CoV-2: NotDetec (06 Jun 2023 15:18)        RADIOLOGY & ADDITIONAL TESTS:  New Results Reviewed Today:     < from: Upper Endoscopy (06.21.23 @ 10:53) >  Impression:          - Normal esophagus.                       - Small hiatal hernia.                       - Normal stomach.            - Normal duodenum.                       - An externally removable PEG placement was successfully completed.    < end of copied text >    New Imaging Personally Reviewed Today: CXR (6/22) with no focal infiltrate      COMMUNICATION:  Care Discussed with Consultants/Other Providers and Details of Discussion: neurosurgery PA(Spike)  Discussions with Patient/Family: yes

## 2023-06-22 NOTE — CHART NOTE - NSCHARTNOTEFT_GEN_A_CORE
Ms. Arcos is a 38 year-old woman with a history of hypothyroidism who presented with unresponsiveness, found to have a very large right frontal parenchymal hemorrhage with intraventricular extension and mild left-sided hydrocephalus with midline shift to left on CTH. CTA w/o aneurysms or AVM. Pt not a candidate for tenecteplase due to hemorrhage and not a candidate for thrombectomy due to no LVO see on CTA H/N. S/p emergent decompressive craniectomy 6/6. Cerebral angio performed 6/9 did not show evidence of vascular malformation. NSCU course c/b seizures 6/9 for which EEG was placed and noted to show electroclinical and electrographic seizures form the the R frontal and posterior temporal regions. Patient currently on LEV 1g BID and LAC 150mg BID with no seizures recorded since 6/12/23 02:57. Extubated on 6/13, EVD removed 6/17. Downgraded to floors 6/20. S/p PEG placement 6/21.     Impression: Decreased level of consciousness, improving, due to large right intraparenchymal hemorrhage of uncertain etiology. Workup thus far without evidence of vasculopathy, systemic malignancy, or cardiac abnormalities.        Recommendations:   - Plan to repeat MRI in 2 months to assess for any underlying lesion  - EEG 6/20-6/22 without evidence of epileptiform abnormalities - can discontinue  - Continue ASMs  - PT/OT/SLP  - DVT ppx  - Rest of care as per neurosurgery  - No additional acute inpatient neurologic work-up indicated at this time  - Patient can follow up with vascular neurology, Dr. Alberto Tyson, at [address] [place] [address] after discharge. Please instruct the patient to call [phone] to schedule this appointment.     Neurology signing off. Please call [employer] at [phone] with any questions. Ms. Arcos is a 38 year-old woman with a history of hypothyroidism who presented with unresponsiveness, and was found to have a very large right frontal parenchymal hemorrhage with intraventricular extension and mild left-sided hydrocephalus with midline shift to left on CTH. CTA w/o aneurysms or AVM. Pt not a candidate for tenecteplase due to hemorrhage and not a candidate for thrombectomy due to no LVO see on CTA H/N. S/p emergent decompressive craniectomy 6/6. Cerebral angio performed 6/9 did not show evidence of vascular malformation. NSCU course c/b seizures 6/9 for which EEG was placed and noted to show electroclinical and electrographic seizures form the the R frontal and posterior temporal regions. Patient currently on LEV 1g BID and LAC 150mg BID with no seizures recorded since 6/12/23 02:57. Extubated on 6/13, EVD removed 6/17. Downgraded to floors 6/20. S/p PEG placement 6/21.     Impression: Decreased level of consciousness, improving, due to large right intraparenchymal hemorrhage of uncertain etiology. Workup thus far without evidence of vasculopathy, systemic malignancy, or cardiac abnormalities.        Recommendations:   - Plan to repeat MRI in 2 months to assess for any underlying lesion  - EEG 6/20-6/22 without evidence of epileptiform abnormalities - can discontinue  - Continue ASMs  - PT/OT/SLP  - DVT ppx  - Rest of care as per neurosurgery  - No additional acute inpatient neurologic work-up indicated at this time  - Patient can follow up with vascular neurology, Dr. Alberto Tyson, at [address] [place] [address] after discharge. Please instruct the patient to call [phone] to schedule this appointment.     Neurology signing off. [place] [employer] at [phone] with any questions.

## 2023-06-22 NOTE — PROGRESS NOTE ADULT - ASSESSMENT
38F Hx hypothyroid, hx sinus infections, smoker, undergoing in vitro fertilization found down at home 6/5/2023. CTH w/large R IPH w/IVH, CTA negative for aneurysms. Pt not a candidate for tenecteplase due to hemorrhage and not a candidate for thrombectomy due to no LVO see on CTA H/N. S/p emergent decompressive craniectomy 6/6 (no bone flap) Cerebral Angio performed 6/9 did not show evidence of vascular malformation. NSCU course c/b seizures 6/9 for which EEG was placed and noted show electroclinical and electrographic seizures from the the R frontal and posterior temporal regions.     Extubated on 6/13, EVD removed 6/17. Downgraded to floors 6/20.       #Dysphagia sp PEG 6/21/23  EGD - small HH, normal esophagus, stomach and duodenum    -abdominal binder for pt safety  -ok to start PEG feeds, titrate to goal and monitor tolerance  -nothing under PEG bumper please  -monitor stools    Further care per primary team  Discussed with spouse at bedside; all questions answered  Discussed with Neurosurgery team and Medicine      Jhony Morales PA-C    Gastroenterology  After hours and weekend coverage [phone]

## 2023-06-22 NOTE — PROGRESS NOTE ADULT - SUBJECTIVE AND OBJECTIVE BOX
INTERVAL HPI/OVERNIGHT EVENTS:  low grade temp overnight  VEEG in progress    sp EGD + PEG placement yesterday  EGD: normal esophagus, small HH, normal stomach, normal duodenum. PEG successfully placed. external bumper at 6cm nitesh    MEDICATIONS  (STANDING):  albuterol    0.083% 2.5 milliGRAM(s) Nebulizer every 8 hours  amLODIPine   Tablet 5 milliGRAM(s) Oral daily  brivaracetam Oral Solution 100 milliGRAM(s) Oral two times a day  chlorhexidine 2% Cloths 1 Application(s) Topical daily  cloNIDine 0.1 milliGRAM(s) Oral every 12 hours  enoxaparin Injectable 40 milliGRAM(s) SubCutaneous every 24 hours  lacosamide IVPB 150 milliGRAM(s) IV Intermittent every 12 hours  levothyroxine 75 MICROGram(s) Oral daily  lisinopril 40 milliGRAM(s) Oral daily  nicotine - 21 mG/24Hr(s) Patch 1 Patch Transdermal daily  polyethylene glycol 3350 17 Gram(s) Oral daily  senna 2 Tablet(s) Oral at bedtime  sodium chloride 0.9%. 1000 milliLiter(s) (75 mL/Hr) IV Continuous <Continuous>    MEDICATIONS  (PRN):  acetaminophen   Oral Liquid .. 650 milliGRAM(s) Oral every 6 hours PRN Temp greater or equal to 38C (100.4F), Mild Pain (1 - 3)  ondansetron Injectable 4 milliGRAM(s) IV Push every 6 hours PRN Nausea and/or Vomiting  oxyCODONE    Solution 5 milliGRAM(s) Oral every 6 hours PRN Moderate Pain (4 - 6)      Allergies  No Known Allergies      Review of Systems:  unable to obtain from pt    Vital Signs Last 24 Hrs  T(C): 37.5 (22 Jun 2023 05:00), Max: 38.2 (21 Jun 2023 23:00)  T(F): 99.5 (22 Jun 2023 05:00), Max: 100.8 (21 Jun 2023 23:00)  HR: 55 (22 Jun 2023 05:00) (55 - 106)  BP: 100/64 (22 Jun 2023 05:00) (100/64 - 146/65)  BP(mean): 101 (21 Jun 2023 11:06) (101 - 101)  RR: 18 (22 Jun 2023 05:00) (16 - 24)  SpO2: 95% (22 Jun 2023 05:00) (94% - 99%)    Parameters below as of 22 Jun 2023 05:00  Patient On (Oxygen Delivery Method): room air    PHYSICAL EXAM:  Constitutional: eyes closed, withdraws to painful stimuli VEEG in progress  spouse at bedside  Head: right crani  Neck: No LAD, no JVD  Respiratory: good air entry bl no accessory muscle use  Cardiovascular: S1 and S2, RRR  Gastrointestinal: BS+, soft, NT/ND, abdominal binder removed, PEG site clean and dry  bumper at 6cm lightly touching skin able to spin freely 360 degrees   binder resecured  Extremities: No peripheral edema, neg clubbing, cyanosis  Vascular: 2+ peripheral pulses  Neurological: eyes closed, withdraws to pain, not following commands  Skin: No rashes, anicteric      LABS:                        10.7   18.95 )-----------( 386      ( 22 Jun 2023 07:23 )             34.4     06-22    147<H>  |  112<H>  |  18  ----------------------------<  128<H>  3.7   |  23  |  0.50    Ca    9.1      22 Jun 2023 07:23      PT/INR - ( 20 Jun 2023 15:51 )   PT: 13.7 sec;   INR: 1.19 ratio         PTT - ( 20 Jun 2023 15:51 )  PTT:30.2 sec  Urinalysis Basic - ( 22 Jun 2023 07:23 )    Color: x / Appearance: x / SG: x / pH: x  Gluc: 128 mg/dL / Ketone: x  / Bili: x / Urobili: x   Blood: x / Protein: x / Nitrite: x   Leuk Esterase: x / RBC: x / WBC x   Sq Epi: x / Non Sq Epi: x / Bacteria: x          RADIOLOGY & ADDITIONAL TESTS:

## 2023-06-22 NOTE — PROGRESS NOTE ADULT - SUBJECTIVE AND OBJECTIVE BOX
SUBJECTIVE:  Seen with her wife/Karma/HCP at bedside. NAD. Appears comfortable w/VEEG on.     OVERNIGHT EVENTS: Temp 38.2 at 2300 to 37.5 current    Vital Signs Last 24 Hrs  T(C): 37.5 (2023 05:00), Max: 38.2 (2023 23:00)  T(F): 99.5 (2023 05:00), Max: 100.8 (2023 23:00)  HR: 55 (2023 05:00) (55 - 106)  BP: 100/64 (2023 05:00) (100/64 - 146/65)  BP(mean): 101 (2023 11:06) (101 - 101)  RR: 18 (2023 05:00) (16 - 24)  SpO2: 95% (2023 05:00) (94% - 99%)    Parameters below as of 2023 05:00  Patient On (Oxygen Delivery Method): room air    IVF: [ ] IVL [X ] NS@75/h  DIET: [ ] Regular [ ] CCD [ ] Renal [ ] Puree [ ] Dysphagia [ X] Tube Feeds: Glucerna 1.2 goal 50cc/H--NPO for PEG   PCA: [ ] YES [ X] NO   HARRELL: [ ] YES [X ] NO [X ] VOID + Incontinence  BM: [X ] YES-on 6/20 x2    DRAINS: None    PHYSICAL EXAM:    General: No Acute Distress     Neurological: Per HCP-pt AXO to place and her name, +word output.  minimally EO to noxious, pupils 4mm reactive b/l, Arousable to voice-not opening eyes, but FC on Rt side-move fingers and toes, move Rt side spontaneously/ antigravity, LUE WDs, LLE WDs    Pulmonary: Clear to Auscultation, No Rales, No Rhonchi, No Wheezes     Cardiovascular: S1, S2, Regular Rate and Rhythm     Gastrointestinal: Soft, Nontender, Nondistended     Incision: VEEG on/Head dsg CDI    LABS:                        10.7   18.95 )-----------( 386      ( 2023 07:23 )             34.4   06-22    147<H>  |  112<H>  |  18  ----------------------------<  128<H>  3.7   |  23  |  0.50    Ca    9.1      2023 07:23      PT/INR - ( 2023 15:51 )   PT: 13.7 sec;   INR: 1.19 ratio    PTT - ( 2023 15:51 )  PTT:30.2 sec    Pregnancy Profile, Urine (23 @ 07:33)    Pregnancy Profile, Urine: Negative    Urinalysis (23 @ 00:49)    pH Urine: 7.0   Glucose Qualitative, Urine: Negative   Blood, Urine: Negative   Color: Yellow   Urine Appearance: Slightly Turbid   Bilirubin: Negative   Ketone - Urine: Negative   Specific Gravity: 1.026   Protein, Urine: Trace   Urobilinogen: Negative   Nitrite: Negative   Leukocyte Esterase Concentration: Small     Bld cx testing     UA Cx-P     Bld Cx-T    I&O's Summary    2023 07:01  -  2023 07:00  --------------------------------------------------------  IN: 600 mL / OUT: 300 mL / NET: 300 mL    Surgical Pathology Report (23 @ 19:36)  1  Hematoma  Final Diagnosis  1. Hematoma, removal:  - Blood clot with minute fragments of brain parenchyma  - Beta-amyloid immunostain is pending and an addendum will be issued    < from: TTE W or WO Ultrasound Enhancing Agent (23 @ 11:17) >   1. Technically difficult image quality.   2. Normal left ventricular cavity size. The left ventricular wall thickness is normal. The left ventricular systolic function is mildly decreased with an ejection fraction of 57 % by Thompson's method of disks. There are regional wall motion abnormalities consistent with ischemic heart disease.   3. Basal and mid inferior septum, basal and mid inferior wall, and mid inferolateral segment are abnormal.   4. Normal right ventricular cavity size and normal systolic function.   5. No prior echocardiogram is available for comparison.    < end of copied text >    Rpt TTE-done FU    IMAGIN/22 CXR done Rpt-P    < from: CT Head No Cont (23 @ 09:41) >  Resolving right frontal hemorrhage with similar mass effect and unchanged   minimal leftward midline shift. No hydrocephalus.    < end of copied text >    < from: Xray Chest 1 View- PORTABLE-Urgent (Xray Chest 1 View- PORTABLE-Urgent .) (23 @ 11:45) >  Lines and tubes above.    No active pulmonary disease.    < end of copied text >    < from: [facility] Duplex Lower Ext Vein Scan, Bilat (23 @ 17:27) >  No evidence for acute DVT in the bilateral lower extremity deep venous   systems.    < end of copied text >    MEDICATIONS  (STANDING):  albuterol    0.083% 2.5 milliGRAM(s) Nebulizer every 8 hours  amLODIPine   Tablet 5 milliGRAM(s) Oral daily  brivaracetam Oral Solution 100 milliGRAM(s) Oral two times a day  chlorhexidine 2% Cloths 1 Application(s) Topical daily  cloNIDine 0.1 milliGRAM(s) Oral every 12 hours  enoxaparin Injectable 40 milliGRAM(s) SubCutaneous every 24 hours  lacosamide IVPB 150 milliGRAM(s) IV Intermittent every 12 hours  levothyroxine 75 MICROGram(s) Oral daily  lisinopril 40 milliGRAM(s) Oral daily  nicotine - 21 mG/24Hr(s) Patch 1 Patch Transdermal daily  polyethylene glycol 3350 17 Gram(s) Oral daily  senna 2 Tablet(s) Oral at bedtime  sodium chloride 0.9%. 1000 milliLiter(s) (75 mL/Hr) IV Continuous <Continuous>    MEDICATIONS  (PRN):  acetaminophen   Oral Liquid .. 650 milliGRAM(s) Oral every 6 hours PRN Temp greater or equal to 38C (100.4F), Mild Pain (1 - 3)  ondansetron Injectable 4 milliGRAM(s) IV Push every 6 hours PRN Nausea and/or Vomiting  oxyCODONE    Solution 5 milliGRAM(s) Oral every 6 hours PRN Moderate Pain (4 - 6)

## 2023-06-22 NOTE — CONSULT NOTE ADULT - SUBJECTIVE AND OBJECTIVE BOX
HPI:   Patient is a 38y female with a past history of hypothyroidism who was admitted 6/6 with large RT sided ICH after being found on floor at home.  She required emergency Rt frontal craniectomy for decompression as well as EVD. EVD removed 6/16.  Angio negative and she underwent G tube placement 6/20.  She is on meds for seizures, has had a fluctuating leukocytosis, and has had  grade temps.  Her mother is at bedside. She was born in [place], has lived in [place] most of her life, has a wife and was attempting in vitro fertilization, completed 12/22.She has been monitored off antibiotics.     REVIEW OF SYSTEMS:  All other review of systems negative (Comprehensive ROS): limited by condition    PAST MEDICAL & SURGICAL HISTORY:  Hypothyroidism    Allergies    No Known Allergies    Intolerances        Antimicrobials Day #  :    Other Medications:  acetaminophen   Oral Liquid .. 650 milliGRAM(s) Oral every 6 hours PRN  albuterol    0.083% 2.5 milliGRAM(s) Nebulizer every 8 hours  brivaracetam Oral Solution 100 milliGRAM(s) Oral two times a day  chlorhexidine 2% Cloths 1 Application(s) Topical daily  cloNIDine 0.1 milliGRAM(s) Oral every 12 hours  enoxaparin Injectable 40 milliGRAM(s) SubCutaneous every 24 hours  lacosamide IVPB 150 milliGRAM(s) IV Intermittent every 12 hours  levothyroxine 75 MICROGram(s) Oral daily  nicotine - 21 mG/24Hr(s) Patch 1 Patch Transdermal daily  ondansetron Injectable 4 milliGRAM(s) IV Push every 6 hours PRN  oxyCODONE    Solution 5 milliGRAM(s) Oral every 6 hours PRN  polyethylene glycol 3350 17 Gram(s) Oral daily  senna 2 Tablet(s) Oral at bedtime      FAMILY HISTORY:  Brother with tuberous sclerosis    SOCIAL HISTORY:  Smoking: y    ETOH: social    Drug Use: x        T(F): 99.5 (06-22-23 @ 05:00), Max: 100.8 (06-21-23 @ 23:00)  HR: 55 (06-22-23 @ 05:00)  BP: 100/64 (06-22-23 @ 05:00)  RR: 18 (06-22-23 @ 05:00)  SpO2: 95% (06-22-23 @ 05:00)  Wt(kg): --    PHYSICAL EXAM:  General: sleepy no acute distress, craniotomy dressed  Eyes:  anicteric, no conjunctival injection, no discharge  Oropharynx: no lesions or injection 	  Neck: supple, without adenopathy  Lungs: clear to auscultation  Heart: regular rate and rhythm; no murmur, rubs or gallops  Abdomen: soft, nondistended, nontender, G tube  Skin: no lesions  Extremities: no clubbing, cyanosis, or edema  Neurologic: poorly interactive  RT arm PICC line  LAB RESULTS:                        10.7   18.95 )-----------( 386      ( 22 Jun 2023 07:23 )             34.4     06-22    147<H>  |  112<H>  |  18  ----------------------------<  128<H>  3.7   |  23  |  0.50    Ca    9.1      22 Jun 2023 07:23        Urinalysis Basic - ( 22 Jun 2023 07:23 )    Color: x / Appearance: x / SG: x / pH: x  Gluc: 128 mg/dL / Ketone: x  / Bili: x / Urobili: x   Blood: x / Protein: x / Nitrite: x   Leuk Esterase: x / RBC: x / WBC x   Sq Epi: x / Non Sq Epi: x / Bacteria: x        MICROBIOLOGY:  RECENT CULTURES:  06-18 @ 10:40 .Blood Blood     No growth to date.      06-18 @ 10:35 .Blood Blood     No growth to date.            RADIOLOGY REVIEWED:  < from: [facility] Duplex Lower Ext Vein Scan, Bilat (06.22.23 @ 12:15) >  IMPRESSION:  No evidence of deep venous thrombosis in either lower extremity.    < end of copied text >  < from: CT Head No Cont (06.20.23 @ 09:41) >  IMPRESSION:    Resolving right frontal hemorrhage with similar mass effect and unchanged   minimal leftward midline shift. No hydrocephalus.    --- End of Report ---    < end of copied text >  < from: CT Abdomen and Pelvis No Cont (06.08.23 @ 15:42) >    IMPRESSION:  No evidence of acute traumatic pathology in the chest, abdomen, or pelvis   within the limitations of noncontrast exam.    --- End of Report ---    < from: Xray Chest 1 View- PORTABLE-Urgent (Xray Chest 1 View- PORTABLE-Urgent .) (06.18.23 @ 11:45) >  IMPRESSION:  Lines and tubes above.    No active pulmonary disease.    < end of copied text >  < from: TTE W or WO Ultrasound Enhancing Agent (06.22.23 @ 07:02) >  CONCLUSIONS:      1. The leftventricular systolic function is normal with an ejection fraction of 70 % by Thompson's method of disks.   2. Compared to the transthoracic echocardiogram performed on 6/8/2023 left ventricular function has improved.    ________________________________________________________________________________________  FINDINGS:     Left Ventricle:  The left ventricular systolic function is normal with a calculated ejection fraction of 70 % by the Thompson's biplane method of disks.     Right Ventricle:  The right ventricle is not well visualized. Normal right ventricular cavity size and normal systolic function.     Pericardium:  No pericardial effusion seen.  _________________________________    < end of copied text >  < end of copied text >

## 2023-06-22 NOTE — PROGRESS NOTE ADULT - ASSESSMENT
38F Hx hypothyroid, smoker (off and on), undergoing in vitro fertilization found down by her mother. CT head showed large right frontal parenchymal hemorrhage with intraventricular extension and mild left-sided hydrocephalus, midline   shift to the left. CTA of the head and neck revealed no aneurysms or AVM. Patient underwent emergent decompressive right craniectomy with evacuation of IPH, EVD placement on 6/6 (removed 6/17). Cerebral angio negative on 6/9. Course c/b seizures, fevers, dysphagia s/p PEG (6/21).

## 2023-06-22 NOTE — EEG REPORT - NS EEG TEXT BOX
BROOKE CAIN [facility]-[id number]     Study Date: 		06-21-23 09:35 to 06-22-23 02:49  Duration x Hours: 12:51  --------------------------------------------------------------------------------------------------  History:  CC/ HPI Patient is a 38y old  Female who presents with a chief complaint of IPH (20 Jun 2023 15:39)    MEDICATIONS  (STANDING):  albuterol    0.083% 2.5 milliGRAM(s) Nebulizer every 8 hours  amLODIPine   Tablet 5 milliGRAM(s) Oral daily  brivaracetam 100 milliGRAM(s) Oral two times a day  ceFAZolin   IVPB 2000 milliGRAM(s) IV Intermittent once  chlorhexidine 2% Cloths 1 Application(s) Topical daily  cloNIDine 0.1 milliGRAM(s) Oral every 8 hours  lacosamide IVPB 150 milliGRAM(s) IV Intermittent every 12 hours  levothyroxine 75 MICROGram(s) Oral daily  lisinopril 40 milliGRAM(s) Enteral Tube daily  nicotine - 21 mG/24Hr(s) Patch 1 Patch Transdermal daily  polyethylene glycol 3350 17 Gram(s) Oral daily  senna 2 Tablet(s) Oral at bedtime  sodium chloride 0.9%. 1000 milliLiter(s) (75 mL/Hr) IV Continuous <Continuous>    --------------------------------------------------------------------------------------------------  Study Interpretation:    [[[Abbreviation Key:  PDR=alpha rhythm/posterior dominant rhythm. A-P=anterior posterior.  Amplitude: ‘very low’:<20; ‘low’:20-49; ‘medium’:; ‘high’:>150uV.  Persistence for periodic/rhythmic patterns (% of epoch) ‘rare’:<1%; ‘occasional’:1-10%; ‘frequent’:10-50%; ‘abundant’:50-90%; ‘continuous’:>90%.  Persistence for sporadic discharges: ‘rare’:<1/hr; ‘occasional’:1/min-1/hr; ‘frequent’:>1/min; ‘abundant’:>1/10 sec.  RPP=rhythmic and periodic patterns; GRDA=generalized rhythmic delta activity; FIRDA=frontal intermittent GRDA; LRDA=lateralized rhythmic delta activity; TIRDA=temporal intermittent rhythmic delta activity;  LPD=PLED=lateralized periodic discharges; GPD=generalized periodic discharges; BIPDs =bilateral independent periodic discharges; Mf=multifocal; SIRPDs=stimulus induced rhythmic, periodic, or ictal appearing discharges; BIRDs=brief potentially ictal rhythmic discharges >4 Hz, lasting .5-10s; PFA (paroxysmal bursts >13 Hz or =8 Hz <10s).  Modifiers: +F=with fast component; +S=with spike component; +R=with rhythmic component.  S-B=burst suppression pattern.  Max=maximal. N1-drowsy; N2-stage II sleep; N3-slow wave sleep. SSS/BETS=small sharp spikes/benign epileptiform transients of sleep. HV=hyperventilation; PS=photic stimulation]]]    Daily EEG Visual Analysis    FINDINGS:      Background:  Continuous: continuous  Symmetry: symmetric  PDR: 8 Hz activity on left hemisphere, not well modulated on right, with amplitude to 40 uV, that attenuated to eye opening.  Low amplitude frontal beta noted in wakefulness.  Reactivity: present  Voltage: normal, mostly 20-150uV  Anterior Posterior Gradient: present  Other background findings: none  Breach: Persistently higher amplitude and sharper morphology over the right parietal region, likely representing breach    Background Slowing:  Generalized slowing: Continuous diffuse delta and theta  Focal slowing: Continuous right hemispheric delta and theta    State Changes:   -Drowsiness noted with increased slowing, attenuation of fast activity, vertex transients.  -Fragmented spindles and K-complexes, better formed on left    Sporadic Epileptiform Discharges:    None    Rhythmic and Periodic Patterns (RPPs):  None     Electrographic and Electroclinical seizures:  None    Other Clinical Events:  None    Activation Procedures:   -Hyperventilation was not performed.    -Photic stimulation was not performed.    Artifacts:  Intermittent myogenic and movement artifacts were noted.    ECG:  The heart rate on single channel ECG was predominantly between  BPM with frequent PVCs.    EEG Classification / Summary:  Abnormal  EEG in the awake / drowsy / asleep state(s).    Continuous right hemispheric slowing  Background slowing, generalized, mild  Right hemispheric parietal breach    Clinical Impression:  Evidence for right hemispheric cortical/subcortical structural lesion  Mild diffuse/multifocal cerebral dysfunction, not specific as to etiology  Right parietal region skull defect  There were no epileptiform abnormalities recorded.      This is a prelim report only, pending review with attending prior to finalization.    -------------------------------------------------------------------------------------------------------  [facility] EEG Reading Room Ph#: [phone]  Epilepsy Answering Service after 5PM and before 8:30AM: Ph#: [phone]    Adan Goodman M.D.   Epilepsy fellow BROOKE CAIN [facility]-[id number]     Study Date: 		06-21-23 09:35 to 06-22-23 02:49  Duration x Hours: 12:51  --------------------------------------------------------------------------------------------------  History:  CC/ HPI Patient is a 38y old  Female who presents with a chief complaint of IPH (20 Jun 2023 15:39)    MEDICATIONS  (STANDING):  albuterol    0.083% 2.5 milliGRAM(s) Nebulizer every 8 hours  amLODIPine   Tablet 5 milliGRAM(s) Oral daily  brivaracetam 100 milliGRAM(s) Oral two times a day  ceFAZolin   IVPB 2000 milliGRAM(s) IV Intermittent once  chlorhexidine 2% Cloths 1 Application(s) Topical daily  cloNIDine 0.1 milliGRAM(s) Oral every 8 hours  lacosamide IVPB 150 milliGRAM(s) IV Intermittent every 12 hours  levothyroxine 75 MICROGram(s) Oral daily  lisinopril 40 milliGRAM(s) Enteral Tube daily  nicotine - 21 mG/24Hr(s) Patch 1 Patch Transdermal daily  polyethylene glycol 3350 17 Gram(s) Oral daily  senna 2 Tablet(s) Oral at bedtime  sodium chloride 0.9%. 1000 milliLiter(s) (75 mL/Hr) IV Continuous <Continuous>    --------------------------------------------------------------------------------------------------  Study Interpretation:    [[[Abbreviation Key:  PDR=alpha rhythm/posterior dominant rhythm. A-P=anterior posterior.  Amplitude: ‘very low’:<20; ‘low’:20-49; ‘medium’:; ‘high’:>150uV.  Persistence for periodic/rhythmic patterns (% of epoch) ‘rare’:<1%; ‘occasional’:1-10%; ‘frequent’:10-50%; ‘abundant’:50-90%; ‘continuous’:>90%.  Persistence for sporadic discharges: ‘rare’:<1/hr; ‘occasional’:1/min-1/hr; ‘frequent’:>1/min; ‘abundant’:>1/10 sec.  RPP=rhythmic and periodic patterns; GRDA=generalized rhythmic delta activity; FIRDA=frontal intermittent GRDA; LRDA=lateralized rhythmic delta activity; TIRDA=temporal intermittent rhythmic delta activity;  LPD=PLED=lateralized periodic discharges; GPD=generalized periodic discharges; BIPDs =bilateral independent periodic discharges; Mf=multifocal; SIRPDs=stimulus induced rhythmic, periodic, or ictal appearing discharges; BIRDs=brief potentially ictal rhythmic discharges >4 Hz, lasting .5-10s; PFA (paroxysmal bursts >13 Hz or =8 Hz <10s).  Modifiers: +F=with fast component; +S=with spike component; +R=with rhythmic component.  S-B=burst suppression pattern.  Max=maximal. N1-drowsy; N2-stage II sleep; N3-slow wave sleep. SSS/BETS=small sharp spikes/benign epileptiform transients of sleep. HV=hyperventilation; PS=photic stimulation]]]    Daily EEG Visual Analysis    FINDINGS:      Background:  Continuous: continuous  Symmetry: symmetric  PDR: 8 Hz activity over the left hemisphere, poorly modulated over the right, with amplitude to 40 uV, that attenuated to eye opening.  Low amplitude frontal beta noted in wakefulness.  Reactivity: present  Voltage: normal, mostly 20-150uV  Anterior Posterior Gradient: present  Other background findings: none  Breach: Persistently higher amplitude and sharper morphology over the right parietal region, likely representing breach    Background Slowing:  Generalized slowing: Continuous diffuse delta and theta  Focal slowing: Continuous right hemispheric delta and theta    State Changes:   -Drowsiness noted with increased slowing, attenuation of fast activity, vertex transients.  -Fragmented spindles and K-complexes, better formed on left    Sporadic Epileptiform Discharges:    None    Rhythmic and Periodic Patterns (RPPs):  None     Electrographic and Electroclinical seizures:  None    Other Clinical Events:  None    Activation Procedures:   -Hyperventilation was not performed.    -Photic stimulation was not performed.    Artifacts:  Intermittent myogenic and movement artifacts were noted.    ECG:  The heart rate on single channel ECG was predominantly between  BPM with frequent PVCs.    EEG Classification / Summary:  Abnormal  EEG in the awake / drowsy / asleep state(s).    Continuous right hemispheric slowing  Background slowing, generalized, mild  Right parietocentral breach effect    Clinical Impression:  Evidence for right hemispheric cortical/subcortical structural lesion  Mild diffuse/multifocal cerebral dysfunction, not specific as to etiology  Right parietocentral region skull defect  There were no epileptiform abnormalities recorded.        -------------------------------------------------------------------------------------------------------  [facility] EEG Reading Room Ph#: [phone]  Epilepsy Answering Service after 5PM and before 8:30AM: Ph#: [phone]    Adan Goodman M.D.   Epilepsy fellow BROOKE CAIN [facility]-[id number]     Study Date: 		06-21-23 09:35 to 06-22-23 06:53  Duration x Hours: 16:51  --------------------------------------------------------------------------------------------------  History:  CC/ HPI Patient is a 38y old  Female who presents with a chief complaint of IPH (20 Jun 2023 15:39)    MEDICATIONS  (STANDING):  albuterol    0.083% 2.5 milliGRAM(s) Nebulizer every 8 hours  amLODIPine   Tablet 5 milliGRAM(s) Oral daily  brivaracetam 100 milliGRAM(s) Oral two times a day  ceFAZolin   IVPB 2000 milliGRAM(s) IV Intermittent once  chlorhexidine 2% Cloths 1 Application(s) Topical daily  cloNIDine 0.1 milliGRAM(s) Oral every 8 hours  lacosamide IVPB 150 milliGRAM(s) IV Intermittent every 12 hours  levothyroxine 75 MICROGram(s) Oral daily  lisinopril 40 milliGRAM(s) Enteral Tube daily  nicotine - 21 mG/24Hr(s) Patch 1 Patch Transdermal daily  polyethylene glycol 3350 17 Gram(s) Oral daily  senna 2 Tablet(s) Oral at bedtime  sodium chloride 0.9%. 1000 milliLiter(s) (75 mL/Hr) IV Continuous <Continuous>    --------------------------------------------------------------------------------------------------  Study Interpretation:    [[[Abbreviation Key:  PDR=alpha rhythm/posterior dominant rhythm. A-P=anterior posterior.  Amplitude: ‘very low’:<20; ‘low’:20-49; ‘medium’:; ‘high’:>150uV.  Persistence for periodic/rhythmic patterns (% of epoch) ‘rare’:<1%; ‘occasional’:1-10%; ‘frequent’:10-50%; ‘abundant’:50-90%; ‘continuous’:>90%.  Persistence for sporadic discharges: ‘rare’:<1/hr; ‘occasional’:1/min-1/hr; ‘frequent’:>1/min; ‘abundant’:>1/10 sec.  RPP=rhythmic and periodic patterns; GRDA=generalized rhythmic delta activity; FIRDA=frontal intermittent GRDA; LRDA=lateralized rhythmic delta activity; TIRDA=temporal intermittent rhythmic delta activity;  LPD=PLED=lateralized periodic discharges; GPD=generalized periodic discharges; BIPDs =bilateral independent periodic discharges; Mf=multifocal; SIRPDs=stimulus induced rhythmic, periodic, or ictal appearing discharges; BIRDs=brief potentially ictal rhythmic discharges >4 Hz, lasting .5-10s; PFA (paroxysmal bursts >13 Hz or =8 Hz <10s).  Modifiers: +F=with fast component; +S=with spike component; +R=with rhythmic component.  S-B=burst suppression pattern.  Max=maximal. N1-drowsy; N2-stage II sleep; N3-slow wave sleep. SSS/BETS=small sharp spikes/benign epileptiform transients of sleep. HV=hyperventilation; PS=photic stimulation]]]    Daily EEG Visual Analysis    FINDINGS:      Background:  Continuous: continuous  Symmetry: symmetric  PDR: 8 Hz activity over the left hemisphere, poorly modulated over the right, with amplitude to 40 uV, that attenuated to eye opening.  Low amplitude frontal beta noted in wakefulness.  Reactivity: present  Voltage: normal, mostly 20-150uV  Anterior Posterior Gradient: present  Other background findings: none  Breach: Persistently higher amplitude and sharper morphology over the right parietal region, likely representing breach    Background Slowing:  Generalized slowing: Continuous diffuse delta and theta  Focal slowing: Continuous right hemispheric delta and theta    State Changes:   -Drowsiness noted with increased slowing, attenuation of fast activity, vertex transients.  -Fragmented spindles and K-complexes, better formed on left    Sporadic Epileptiform Discharges:    None    Rhythmic and Periodic Patterns (RPPs):  None     Electrographic and Electroclinical seizures:  None    Other Clinical Events:  None    Activation Procedures:   -Hyperventilation was not performed.    -Photic stimulation was not performed.    Artifacts:  Intermittent myogenic and movement artifacts were noted.    ECG:  The heart rate on single channel ECG was predominantly between  BPM with frequent PVCs.    EEG Classification / Summary:  Abnormal  EEG in the awake / drowsy / asleep state(s).    Continuous right hemispheric slowing  Background slowing, generalized, mild  Right parietocentral breach effect    Clinical Impression:  Evidence for right hemispheric cortical/subcortical structural lesion  Mild diffuse/multifocal cerebral dysfunction, not specific as to etiology  Right parietocentral region skull defect  There were no epileptiform abnormalities recorded.        -------------------------------------------------------------------------------------------------------  [facility] EEG Reading Room Ph#: [phone]  Epilepsy Answering Service after 5PM and before 8:30AM: Ph#: [phone]    Adan Goodman M.D.   Epilepsy fellow

## 2023-06-22 NOTE — PROGRESS NOTE ADULT - PROBLEM SELECTOR PLAN 3
per spouse, patient was told in the past that her BP was elevated but on follow up visit, she was told it was ok and never started on any BP medication  - continue clonidine 0.1mg to BID (started on this admission), decrease lisinopril 20mg, d/c amlodipine 5mg given soft BP  - TTE on 6/8 (technically difficult study): Normal left ventricular cavity size. The left ventricular wall thickness is normal. The left ventricular systolic function is mildly decreased with an ejection fraction of 57 % by Thompson's method of disks. There are regional wall motion abnormalities consistent with ischemic heart disease. Basal and mid inferior septum, basal and mid inferior wall, and mid inferolateral segment are abnormal.  - ? if above TTE findings may have been related to stress induced, f/u repeat TTE for comparison.

## 2023-06-22 NOTE — CONSULT NOTE ADULT - ASSESSMENT
Patient is a 38y female with a past history of hypothyroidism who was admitted 6/6 with large RT sided ICH after being found on floor at home.  She required emergency Rt frontal craniectomy for decompression as well as EVD. EVD removed 6/16.  Angio negative and she underwent G tube placement 6/20.  She is on meds for seizures, has had a fluctuating leukocytosis, and has had  grade temps.  Her mother is at bedside. She was born in [place], has lived in [place] most of her life, has a wife and was attempting in vitro fertilization, completed 12/22.She has been monitored off antibiotics.   At this point I think we should follow low grade temps conservatively off antibiotics.  Her recent CXR is clear, UA is benign, no obvious primary infection. Her 6/18 blood cultures remain negative.All prior blood and CSF cultures have been negative.  Perhaps we are dealing with fever secondary to ICH, always a diagnosis of exclusion  Suggest:  1. Follow conservatively from an ID viewpoint, off antibiotics  2. Will follow exam and labs  3. Mother updated at bedside, thanks, will follow.

## 2023-06-22 NOTE — PROGRESS NOTE ADULT - ASSESSMENT
HPI:    PROCEDURE: Adm 6/6 Rt Jose crani evac hematoma w/flap discarded and intraop EVD. 6/21 PEG placed       POD#16    PLAN:  Neuro: s/p PEG placed 6/21-start PEG feeds this AM, Resume SQL 6pm tody. 6/21 Temp=38.2, UA small leuko and Mod Bact-FU UA Cx, Bld Cx, LED and CXR rpt-FU Medicine recomm. 6/22 Rpt TTE Done FU Rpt.  Need to DC [employer] once vEEG is off FU. Off Dex, Leukocytosis trending downwards. INR normalizing. Plan for Cranioplasty later with neuro-plastic Dr Agustin Kwan. 6/22 Stx CTH ordered for Cranioplasty planning. Acute anemia from surgical blood loss-trending downwards-monitor. s/p PRBC Tx'd.  Keep Na 145-155 and BP <160/90.  Inc activity/OOB.     EEG Prelim rpt of 6/21-Clinical Impression:Evidence for right hemispheric cortical/subcortical structural lesion Mild diffuse/multifocal cerebral dysfunction, not specific as to etiology  Right hemispheric skull defect There were no epileptiform abnormalities recorded.  This is a prelim report only, pending review with attending prior to finalization.    Hosp note of 6/20- Problem/Recommendation - 2: ·  Problem: Dysphagia. ·  Recommendation: continue with NG tube feed appreciate GI input plan for PEG tomorrow no absolute medical contraindication. Problem/Recommendation - 3:·  Problem: Hypertension. ·  Recommendation: per spouse, patient was told in the past that her BP was elevated but on follow up visit, she was told it was ok and never started on any BP medication- continue with lisinopril 40mg, clonidine 0.1mg TID, amlodipine 5mg for now (started on this admission)  - TTE on 6/8 (technically difficult study):   - Normal left ventricular cavity size. The left ventricular wall thickness is normal. The left ventricular systolic function      is mildly decreased with an ejection fraction of 57 % by Thompson's method of disks. There are regional wall      motion abnormalities consistent with ischemic heart disease.   - Basal and mid inferior septum, basal and mid inferior wall, and mid inferolateral segment are abnormal.- EKG (6/12) reviewed: NSR 89bpm with TWI in III (unchanged compared to prior)- ? if above TTE findings may have been related to stress induced, check repeat TTE for comparison. Problem/Recommendation - 4:·  Problem: Fever. ·  Recommendation: episodes of fever while in [place]  infectious work up unrevealing including negative Bcx/UA/CXR on 6/18 and unremarkable CT c/a/p on 6/15.LE duplex negative on 6/14 leukocytosis trending down continue to monitor.   Problem/Recommendation - 5:·  Problem: Seizure disorder. ·  Recommendation: continue with brivaracetam 100mg BID, lacosamide 150mg BID per neurorepeat EEG pending.   Problem/Recommendation - 6:·  Problem: Hypothyroid. ·  Recommendation: recent TSH normal (1.18) on 6/6 continue with synthroid 75mcg (need to confirm dose with spouse).   Problem/Recommendation - 7:·  Problem: Preventive measure. ·  Recommendation: resume chemoprophylaxis post-PEG when deemed stable. Last LE duplex on 6/14 neg for DVT.  continue bowel regimen- last documented BM 6/18O2 sat on room air stable in mid-90's, started albuterol neb, recent CXR (6/18) clear. monitor closely.consider adding free water with tube feed but per discussion with neurosurgery, trying to keep serum Na elevated so will hold off for now.    GI note of 6/20 seen-pt for PEG today FU.    Neuro note of 6/20 seen-keep Na 145-155 and BP goal <160/90    Respiratory: Patient instructed to use incentive spirometer [ X] YES [ ] NO              DVT ppx: [X ] SQL[ ] SQH and Venodynes [ ] Left [ ] Right [ X] Bilateral    Discharge Planning:  The patient was evaluated by PT/OT and recommended TBD upon further assessment.  She was subsequently DC on >>> in stable condition.    More than 30 minutes spent on total encounter: more than 50% of the visit was spent on educating the patient and family regarding condition, medications, follow up plans, signs and symptoms to be concerned with, preparing paperwork, and questions answered regarding discharge.

## 2023-06-23 LAB
ANION GAP SERPL CALC-SCNC: 16 MMOL/L — SIGNIFICANT CHANGE UP (ref 5–17)
BASOPHILS # BLD AUTO: 0.05 K/UL — SIGNIFICANT CHANGE UP (ref 0–0.2)
BASOPHILS NFR BLD AUTO: 0.3 % — SIGNIFICANT CHANGE UP (ref 0–2)
BUN SERPL-MCNC: 15 MG/DL — SIGNIFICANT CHANGE UP (ref 7–23)
CALCIUM SERPL-MCNC: 9.2 MG/DL — SIGNIFICANT CHANGE UP (ref 8.4–10.5)
CHLORIDE SERPL-SCNC: 104 MMOL/L — SIGNIFICANT CHANGE UP (ref 96–108)
CO2 SERPL-SCNC: 25 MMOL/L — SIGNIFICANT CHANGE UP (ref 22–31)
CREAT SERPL-MCNC: 0.4 MG/DL — LOW (ref 0.5–1.3)
CULTURE RESULTS: SIGNIFICANT CHANGE UP
EGFR: 129 ML/MIN/1.73M2 — SIGNIFICANT CHANGE UP
EOSINOPHIL # BLD AUTO: 0.09 K/UL — SIGNIFICANT CHANGE UP (ref 0–0.5)
EOSINOPHIL NFR BLD AUTO: 0.6 % — SIGNIFICANT CHANGE UP (ref 0–6)
GLUCOSE SERPL-MCNC: 120 MG/DL — HIGH (ref 70–99)
HCT VFR BLD CALC: 28.3 % — LOW (ref 34.5–45)
HGB BLD-MCNC: 8.9 G/DL — LOW (ref 11.5–15.5)
IMM GRANULOCYTES NFR BLD AUTO: 0.6 % — SIGNIFICANT CHANGE UP (ref 0–0.9)
LYMPHOCYTES # BLD AUTO: 1.9 K/UL — SIGNIFICANT CHANGE UP (ref 1–3.3)
LYMPHOCYTES # BLD AUTO: 12 % — LOW (ref 13–44)
MCHC RBC-ENTMCNC: 31.4 GM/DL — LOW (ref 32–36)
MCHC RBC-ENTMCNC: 31.6 PG — SIGNIFICANT CHANGE UP (ref 27–34)
MCV RBC AUTO: 100.4 FL — HIGH (ref 80–100)
MONOCYTES # BLD AUTO: 0.86 K/UL — SIGNIFICANT CHANGE UP (ref 0–0.9)
MONOCYTES NFR BLD AUTO: 5.4 % — SIGNIFICANT CHANGE UP (ref 2–14)
NEUTROPHILS # BLD AUTO: 12.83 K/UL — HIGH (ref 1.8–7.4)
NEUTROPHILS NFR BLD AUTO: 81.1 % — HIGH (ref 43–77)
NRBC # BLD: 0 /100 WBCS — SIGNIFICANT CHANGE UP (ref 0–0)
PLATELET # BLD AUTO: 270 K/UL — SIGNIFICANT CHANGE UP (ref 150–400)
POTASSIUM SERPL-MCNC: 3.7 MMOL/L — SIGNIFICANT CHANGE UP (ref 3.5–5.3)
POTASSIUM SERPL-SCNC: 3.7 MMOL/L — SIGNIFICANT CHANGE UP (ref 3.5–5.3)
RBC # BLD: 2.82 M/UL — LOW (ref 3.8–5.2)
RBC # FLD: 13.3 % — SIGNIFICANT CHANGE UP (ref 10.3–14.5)
SODIUM SERPL-SCNC: 145 MMOL/L — SIGNIFICANT CHANGE UP (ref 135–145)
SPECIMEN SOURCE: SIGNIFICANT CHANGE UP
WBC # BLD: 15.83 K/UL — HIGH (ref 3.8–10.5)
WBC # FLD AUTO: 15.83 K/UL — HIGH (ref 3.8–10.5)

## 2023-06-23 PROCEDURE — 99232 SBSQ HOSP IP/OBS MODERATE 35: CPT

## 2023-06-23 PROCEDURE — 99223 1ST HOSP IP/OBS HIGH 75: CPT

## 2023-06-23 RX ORDER — AMANTADINE HCL 100 MG
100 CAPSULE ORAL
Refills: 0 | Status: DISCONTINUED | OUTPATIENT
Start: 2023-06-23 | End: 2023-06-25

## 2023-06-23 RX ORDER — TRAZODONE HCL 50 MG
50 TABLET ORAL AT BEDTIME
Refills: 0 | Status: DISCONTINUED | OUTPATIENT
Start: 2023-06-23 | End: 2023-06-26

## 2023-06-23 RX ORDER — BACITRACIN ZINC 500 UNIT/G
1 OINTMENT IN PACKET (EA) TOPICAL EVERY 6 HOURS
Refills: 0 | Status: DISCONTINUED | OUTPATIENT
Start: 2023-06-23 | End: 2023-06-26

## 2023-06-23 RX ADMIN — Medication 1 PATCH: at 19:25

## 2023-06-23 RX ADMIN — BRIVARACETAM 100 MILLIGRAM(S): 25 TABLET, FILM COATED ORAL at 05:32

## 2023-06-23 RX ADMIN — ALBUTEROL 2.5 MILLIGRAM(S): 90 AEROSOL, METERED ORAL at 13:23

## 2023-06-23 RX ADMIN — SENNA PLUS 2 TABLET(S): 8.6 TABLET ORAL at 22:11

## 2023-06-23 RX ADMIN — Medication 50 MILLIGRAM(S): at 22:11

## 2023-06-23 RX ADMIN — ALBUTEROL 2.5 MILLIGRAM(S): 90 AEROSOL, METERED ORAL at 05:33

## 2023-06-23 RX ADMIN — Medication 0.1 MILLIGRAM(S): at 05:33

## 2023-06-23 RX ADMIN — SENNA PLUS 2 TABLET(S): 8.6 TABLET ORAL at 00:02

## 2023-06-23 RX ADMIN — CHLORHEXIDINE GLUCONATE 1 APPLICATION(S): 213 SOLUTION TOPICAL at 00:02

## 2023-06-23 RX ADMIN — CHLORHEXIDINE GLUCONATE 1 APPLICATION(S): 213 SOLUTION TOPICAL at 22:44

## 2023-06-23 RX ADMIN — ALBUTEROL 2.5 MILLIGRAM(S): 90 AEROSOL, METERED ORAL at 22:11

## 2023-06-23 RX ADMIN — POLYETHYLENE GLYCOL 3350 17 GRAM(S): 17 POWDER, FOR SOLUTION ORAL at 13:23

## 2023-06-23 RX ADMIN — Medication 1 PATCH: at 08:00

## 2023-06-23 RX ADMIN — Medication 1 PATCH: at 13:22

## 2023-06-23 RX ADMIN — ALBUTEROL 2.5 MILLIGRAM(S): 90 AEROSOL, METERED ORAL at 00:01

## 2023-06-23 RX ADMIN — Medication 0.1 MILLIGRAM(S): at 17:30

## 2023-06-23 RX ADMIN — ENOXAPARIN SODIUM 40 MILLIGRAM(S): 100 INJECTION SUBCUTANEOUS at 17:27

## 2023-06-23 RX ADMIN — LACOSAMIDE 130 MILLIGRAM(S): 50 TABLET ORAL at 05:30

## 2023-06-23 RX ADMIN — BRIVARACETAM 100 MILLIGRAM(S): 25 TABLET, FILM COATED ORAL at 18:21

## 2023-06-23 RX ADMIN — LISINOPRIL 20 MILLIGRAM(S): 2.5 TABLET ORAL at 05:33

## 2023-06-23 RX ADMIN — Medication 75 MICROGRAM(S): at 05:33

## 2023-06-23 RX ADMIN — Medication 1 PATCH: at 13:00

## 2023-06-23 RX ADMIN — Medication 100 MILLIGRAM(S): at 22:44

## 2023-06-23 RX ADMIN — LACOSAMIDE 130 MILLIGRAM(S): 50 TABLET ORAL at 17:30

## 2023-06-23 NOTE — EEG REPORT - NS EEG TEXT BOX
[facility] CAIN [facility]-[id number]     Study Date: 		06-23-23 09:37 to 06-24-23 04:37  Duration x Hours: 16:30  --------------------------------------------------------------------------------------------------  History:  CC/ HPI Patient is a 38y old  Female who presents with a chief complaint of IPH (20 Jun 2023 15:39)    MEDICATIONS  (STANDING):  albuterol    0.083% 2.5 milliGRAM(s) Nebulizer every 8 hours  amLODIPine   Tablet 5 milliGRAM(s) Oral daily  brivaracetam 100 milliGRAM(s) Oral two times a day  ceFAZolin   IVPB 2000 milliGRAM(s) IV Intermittent once  chlorhexidine 2% Cloths 1 Application(s) Topical daily  cloNIDine 0.1 milliGRAM(s) Oral every 8 hours  lacosamide IVPB 150 milliGRAM(s) IV Intermittent every 12 hours  levothyroxine 75 MICROGram(s) Oral daily  lisinopril 40 milliGRAM(s) Enteral Tube daily  nicotine - 21 mG/24Hr(s) Patch 1 Patch Transdermal daily  polyethylene glycol 3350 17 Gram(s) Oral daily  senna 2 Tablet(s) Oral at bedtime  sodium chloride 0.9%. 1000 milliLiter(s) (75 mL/Hr) IV Continuous <Continuous>    --------------------------------------------------------------------------------------------------  Study Interpretation:    [[[Abbreviation Key:  PDR=alpha rhythm/posterior dominant rhythm. A-P=anterior posterior.  Amplitude: ‘very low’:<20; ‘low’:20-49; ‘medium’:; ‘high’:>150uV.  Persistence for periodic/rhythmic patterns (% of epoch) ‘rare’:<1%; ‘occasional’:1-10%; ‘frequent’:10-50%; ‘abundant’:50-90%; ‘continuous’:>90%.  Persistence for sporadic discharges: ‘rare’:<1/hr; ‘occasional’:1/min-1/hr; ‘frequent’:>1/min; ‘abundant’:>1/10 sec.  RPP=rhythmic and periodic patterns; GRDA=generalized rhythmic delta activity; FIRDA=frontal intermittent GRDA; LRDA=lateralized rhythmic delta activity; TIRDA=temporal intermittent rhythmic delta activity;  LPD=PLED=lateralized periodic discharges; GPD=generalized periodic discharges; BIPDs =bilateral independent periodic discharges; Mf=multifocal; SIRPDs=stimulus induced rhythmic, periodic, or ictal appearing discharges; BIRDs=brief potentially ictal rhythmic discharges >4 Hz, lasting .5-10s; PFA (paroxysmal bursts >13 Hz or =8 Hz <10s).  Modifiers: +F=with fast component; +S=with spike component; +R=with rhythmic component.  S-B=burst suppression pattern.  Max=maximal. N1-drowsy; N2-stage II sleep; N3-slow wave sleep. SSS/BETS=small sharp spikes/benign epileptiform transients of sleep. HV=hyperventilation; PS=photic stimulation]]]    Daily EEG Visual Analysis    FINDINGS:      Background:  Continuous: continuous  Symmetry: symmetric  PDR: 8 Hz activity over the left hemisphere, poorly modulated over the right, with amplitude to 40 uV, that attenuated to eye opening.  Low amplitude frontal beta noted in wakefulness.  Reactivity: present  Voltage: normal, mostly 20-150uV  Anterior Posterior Gradient: present  Other background findings: none  Breach: Persistently higher amplitude and sharper morphology over the right parietal region, likely representing breach    Background Slowing:  Generalized slowing: Continuous diffuse delta and theta  Focal slowing: Continuous right hemispheric delta and theta    State Changes:   -Drowsiness noted with increased slowing, attenuation of fast activity, vertex transients.  -Fragmented spindles and K-complexes, better formed on left    Sporadic Epileptiform Discharges:    None    Rhythmic and Periodic Patterns (RPPs):  None     Electrographic and Electroclinical seizures:  None    Other Clinical Events:  None    Activation Procedures:   -Hyperventilation was not performed.    -Photic stimulation was not performed.    Artifacts:  Intermittent myogenic and movement artifacts were noted.    ECG:  The heart rate on single channel ECG was predominantly between  BPM with frequent PVCs.    EEG Classification / Summary:  Abnormal  EEG in the awake / drowsy / asleep state(s).    Continuous right hemispheric slowing  Background slowing, generalized, mild  Right parietocentral breach effect    Clinical Impression:  Evidence for right hemispheric cortical/subcortical structural lesion  Mild diffuse/multifocal cerebral dysfunction, not specific as to etiology  Right parietocentral region skull defect  There were no epileptiform abnormalities recorded.      This is a prelim report only, pending review with attending prior to finalization.      -------------------------------------------------------------------------------------------------------  [facility] EEG Reading Room Ph#: [phone]  Epilepsy Answering Service after 5PM and before 8:30AM: Ph#: [phone]    Adan Goodman M.D.   Epilepsy fellow [facility]IN [facility]-[id number]     Study Date: 		06-23-23 09:37 to 06-24-23 04:37  Duration x Hours: 16:30  --------------------------------------------------------------------------------------------------  History:  CC/ HPI Patient is a 38y old  Female who presents with a chief complaint of IPH (20 Jun 2023 15:39)    MEDICATIONS  (STANDING):  albuterol    0.083% 2.5 milliGRAM(s) Nebulizer every 8 hours  amLODIPine   Tablet 5 milliGRAM(s) Oral daily  brivaracetam 100 milliGRAM(s) Oral two times a day  ceFAZolin   IVPB 2000 milliGRAM(s) IV Intermittent once  chlorhexidine 2% Cloths 1 Application(s) Topical daily  cloNIDine 0.1 milliGRAM(s) Oral every 8 hours  lacosamide IVPB 150 milliGRAM(s) IV Intermittent every 12 hours  levothyroxine 75 MICROGram(s) Oral daily  lisinopril 40 milliGRAM(s) Enteral Tube daily  nicotine - 21 mG/24Hr(s) Patch 1 Patch Transdermal daily  polyethylene glycol 3350 17 Gram(s) Oral daily  senna 2 Tablet(s) Oral at bedtime  sodium chloride 0.9%. 1000 milliLiter(s) (75 mL/Hr) IV Continuous <Continuous>    --------------------------------------------------------------------------------------------------  Study Interpretation:    [[[Abbreviation Key:  PDR=alpha rhythm/posterior dominant rhythm. A-P=anterior posterior.  Amplitude: ‘very low’:<20; ‘low’:20-49; ‘medium’:; ‘high’:>150uV.  Persistence for periodic/rhythmic patterns (% of epoch) ‘rare’:<1%; ‘occasional’:1-10%; ‘frequent’:10-50%; ‘abundant’:50-90%; ‘continuous’:>90%.  Persistence for sporadic discharges: ‘rare’:<1/hr; ‘occasional’:1/min-1/hr; ‘frequent’:>1/min; ‘abundant’:>1/10 sec.  RPP=rhythmic and periodic patterns; GRDA=generalized rhythmic delta activity; FIRDA=frontal intermittent GRDA; LRDA=lateralized rhythmic delta activity; TIRDA=temporal intermittent rhythmic delta activity;  LPD=PLED=lateralized periodic discharges; GPD=generalized periodic discharges; BIPDs =bilateral independent periodic discharges; Mf=multifocal; SIRPDs=stimulus induced rhythmic, periodic, or ictal appearing discharges; BIRDs=brief potentially ictal rhythmic discharges >4 Hz, lasting .5-10s; PFA (paroxysmal bursts >13 Hz or =8 Hz <10s).  Modifiers: +F=with fast component; +S=with spike component; +R=with rhythmic component.  S-B=burst suppression pattern.  Max=maximal. N1-drowsy; N2-stage II sleep; N3-slow wave sleep. SSS/BETS=small sharp spikes/benign epileptiform transients of sleep. HV=hyperventilation; PS=photic stimulation]]]    Daily EEG Visual Analysis    FINDINGS:      Background:  Continuous: continuous  Symmetry: symmetric  PDR: 8 Hz activity over the left hemisphere, poorly modulated over the right, with amplitude to 40 uV, that attenuated to eye opening.  Low amplitude frontal beta noted in wakefulness.  Reactivity: present  Voltage: normal, mostly 20-150uV  Anterior Posterior Gradient: present  Other background findings: none  Breach: Persistently higher amplitude and sharper morphology over the right parietal region, likely representing breach    Background Slowing:  Generalized slowing: Continuous diffuse delta and theta  Focal slowing: Continuous right hemispheric delta and theta    State Changes:   -Drowsiness noted with increased slowing, attenuation of fast activity, vertex transients.  -Fragmented spindles and K-complexes, better formed on left    Sporadic Epileptiform Discharges:    None    Rhythmic and Periodic Patterns (RPPs):  None     Electrographic and Electroclinical seizures:  None    Other Clinical Events:  None    Activation Procedures:   -Hyperventilation was not performed.    -Photic stimulation was not performed.    Artifacts:  Intermittent myogenic and movement artifacts were noted.    ECG:  The heart rate on single channel ECG was predominantly between  BPM with frequent PVCs.    EEG Classification / Summary:  Abnormal  EEG in the awake / drowsy / asleep state(s).    Continuous right hemispheric slowing  Background slowing, generalized, mild  Right parietocentral breach effect    Clinical Impression:  Evidence for right hemispheric  structural lesion  Mild superimposed diffuse/multifocal cerebral dysfunction, not specific as to etiology  Right parietocentral region skull defect  There were no epileptiform abnormalities recorded.          -------------------------------------------------------------------------------------------------------  [facility] EEG Reading Room Ph#: [phone]  Epilepsy Answering Service after 5PM and before 8:30AM: Ph#: [phone]    Adan Goodman M.D.   Epilepsy fellow

## 2023-06-23 NOTE — CONSULT NOTE ADULT - CONSULT REQUESTED DATE/TIME
20-Jun-2023 13:40
06-Jun-2023 14:31
23-Jun-2023 10:28
22-Jun-2023 14:16
13-Jun-2023 11:15
20-Jun-2023

## 2023-06-23 NOTE — PROGRESS NOTE ADULT - ASSESSMENT
Patient is a 38y female with a past history of hypothyroidism who was admitted 6/6 with large RT sided ICH after being found on floor at home.  She required emergency Rt frontal craniectomy for decompression as well as EVD. EVD removed 6/16.  Angio negative and she underwent G tube placement 6/20.  She is on meds for seizures, has had a fluctuating leukocytosis, and has had  grade temps.  Her mother is at bedside. She was born in [place], has lived in [place] most of her life, has a wife and was attempting in vitro fertilization, completed 12/22.She has been monitored off antibiotics.   At this point I think we should follow low grade temps conservatively off antibiotics.  Her recent CXR is clear, UA is benign, no obvious primary infection. Her 6/18 blood cultures remain negative.All prior blood and CSF cultures have been negative.LE dopplers are negative for DVT.  Perhaps we are dealing with fever secondary to ICH, always a diagnosis of exclusion.  She has made clinical progress over the past week.  Suggest:  1. Follow conservatively from an ID viewpoint, off antibiotics  2. Will follow exam and labs  3. Additional w/u pending clinical course. Neurology follow up note greatly appreciated.

## 2023-06-23 NOTE — PROGRESS NOTE ADULT - SUBJECTIVE AND OBJECTIVE BOX
CC: f/u for low grade fevers    Patient reports: she is sleepy, poorly interactive, craniotomy dressed, EEG in progress    REVIEW OF SYSTEMS:  All other review of systems negative (Comprehensive ROS): limited by condition, tolerating enteral feeds    Antimicrobials Day #  :off    Other Medications Reviewed  MEDICATIONS  (STANDING):  albuterol    0.083% 2.5 milliGRAM(s) Nebulizer every 8 hours  brivaracetam Oral Solution 100 milliGRAM(s) Oral two times a day  chlorhexidine 2% Cloths 1 Application(s) Topical daily  cloNIDine 0.1 milliGRAM(s) Oral every 12 hours  enoxaparin Injectable 40 milliGRAM(s) SubCutaneous every 24 hours  lacosamide IVPB 150 milliGRAM(s) IV Intermittent every 12 hours  levothyroxine 75 MICROGram(s) Oral daily  lisinopril 20 milliGRAM(s) Oral daily  nicotine - 21 mG/24Hr(s) Patch 1 Patch Transdermal daily  polyethylene glycol 3350 17 Gram(s) Oral daily  senna 2 Tablet(s) Oral at bedtime    T(F): 98 (06-23-23 @ 04:48), Max: 100.9 (06-22-23 @ 13:48)  HR: 99 (06-23-23 @ 04:48)  BP: 120/84 (06-23-23 @ 04:48)  RR: 18 (06-23-23 @ 04:48)  SpO2: 96% (06-23-23 @ 04:48)  Wt(kg): --    PHYSICAL EXAM:  General: lethargic, no acute distress, head dressing intact  Eyes:  anicteric, no conjunctival injection, no discharge  Oropharynx: no lesions or injection 	  Neck: supple, without adenopathy  Lungs: clear to auscultation  Heart: regular rate and rhythm; no murmur, rubs or gallops  Abdomen: soft, nondistended, nontender, G tube  Skin: no lesions  Extremities: no clubbing, cyanosis, or edema  Neurologic:poorly interactive  Rt arm PICC line    LAB RESULTS:                        10.7   18.95 )-----------( 386      ( 22 Jun 2023 07:23 )             34.4     06-22    147<H>  |  112<H>  |  18  ----------------------------<  128<H>  3.7   |  23  |  0.50    Ca    9.1      22 Jun 2023 07:23        Urinalysis Basic - ( 22 Jun 2023 07:23 )    Color: x / Appearance: x / SG: x / pH: x  Gluc: 128 mg/dL / Ketone: x  / Bili: x / Urobili: x   Blood: x / Protein: x / Nitrite: x   Leuk Esterase: x / RBC: x / WBC x   Sq Epi: x / Non Sq Epi: x / Bacteria: x      MICROBIOLOGY:  RECENT CULTURES:  06-22 @ 00:49 .Blood Blood     No growth to date.      06-22 @ 00:40 .Blood Blood     No growth to date.      06-18 @ 10:40 .Blood Blood     No growth to date.      06-18 @ 10:35 .Blood Blood     No growth to date.          RADIOLOGY REVIEWED:  < from: [facility] Duplex Lower Ext Vein Scan, Bilat (06.22.23 @ 12:15) >  IMPRESSION:  No evidence of deep venous thrombosis in either lower extremity.    < end of copied text >  < from: Xray Chest 1 View- PORTABLE-Urgent (Xray Chest 1 View- PORTABLE-Urgent .) (06.18.23 @ 11:45) >  IMPRESSION:  Lines and tubes above.    No active pulmonary disease.  < from: CT Head No Cont (06.22.23 @ 10:42) >    IMPRESSION:    No significant interval change since prior exam.    Reidentified is right-sided decompressive craniectomy. No residual   hemorrhage is present. No new acute hemorrhage. Scattered areas of   hypoattenuation within the right frontal parietal and temporal lobes is   reidentified without change, likely reflecting areas of developing   encephalomalacia. Stable mass effect upon the right ventricle with   minimal right to left midline shift, unchanged.    --- End of Report ---    < end of copied text >

## 2023-06-23 NOTE — PROGRESS NOTE ADULT - SUBJECTIVE AND OBJECTIVE BOX
[facility] Division of Hospital Medicine  Jojo Staley MD  Available via MS Teams  Spectra [phone]    SUBJECTIVE / OVERNIGHT EVENTS: patient seen and examined. per family at bedside, patient tends to be more interactive in the afternoon. difficult to obtain ROS from the patient due to mental status.    ADDITIONAL REVIEW OF SYSTEMS:    MEDICATIONS  (STANDING):  albuterol    0.083% 2.5 milliGRAM(s) Nebulizer every 8 hours  brivaracetam Oral Solution 100 milliGRAM(s) Oral two times a day  chlorhexidine 2% Cloths 1 Application(s) Topical daily  cloNIDine 0.1 milliGRAM(s) Oral every 12 hours  enoxaparin Injectable 40 milliGRAM(s) SubCutaneous every 24 hours  lacosamide IVPB 150 milliGRAM(s) IV Intermittent every 12 hours  levothyroxine 75 MICROGram(s) Oral daily  lisinopril 20 milliGRAM(s) Oral daily  nicotine - 21 mG/24Hr(s) Patch 1 Patch Transdermal daily  polyethylene glycol 3350 17 Gram(s) Oral daily  senna 2 Tablet(s) Oral at bedtime    MEDICATIONS  (PRN):  acetaminophen   Oral Liquid .. 650 milliGRAM(s) Oral every 6 hours PRN Temp greater or equal to 38C (100.4F), Mild Pain (1 - 3)  ondansetron Injectable 4 milliGRAM(s) IV Push every 6 hours PRN Nausea and/or Vomiting  oxyCODONE    Solution 5 milliGRAM(s) Oral every 6 hours PRN Moderate Pain (4 - 6)      I&O's Summary    22 Jun 2023 07:01  -  23 Jun 2023 07:00  --------------------------------------------------------  IN: 400 mL / OUT: 500 mL / NET: -100 mL    23 Jun 2023 07:01  -  23 Jun 2023 10:31  --------------------------------------------------------  IN: 200 mL / OUT: 0 mL / NET: 200 mL        PHYSICAL EXAM:  Vital Signs Last 24 Hrs  T(C): 37.3 (23 Jun 2023 09:09), Max: 38.3 (22 Jun 2023 13:48)  T(F): 99.1 (23 Jun 2023 09:09), Max: 100.9 (22 Jun 2023 13:48)  HR: 102 (23 Jun 2023 09:09) (96 - 102)  BP: 125/73 (23 Jun 2023 09:09) (115/79 - 149/84)  BP(mean): --  RR: 18 (23 Jun 2023 09:09) (18 - 20)  SpO2: 96% (23 Jun 2023 09:09) (94% - 100%)    Parameters below as of 23 Jun 2023 09:09  Patient On (Oxygen Delivery Method): room air      CONSTITUTIONAL: NAD, well-groomed  RESPIRATORY: Normal respiratory effort; lungs are clear to auscultation bilaterally  CARDIOVASCULAR: normal S1 and S2, no murmur/rub/gallop; No lower extremity edema  ABDOMEN: Nontender, + PEG in place, normoactive bowel sounds  MUSCULOSKELETAL:  no joint swelling or tenderness to palpation  PSYCH: eyes closed; calm  NEUROLOGY: nonverbal mostly, waved her right hand moved right leg on command  SKIN: incision site in right craniectomy site (sunken) - C/D/I      LABS:                        10.7   18.95 )-----------( 386      ( 22 Jun 2023 07:23 )             34.4     06-22    147<H>  |  112<H>  |  18  ----------------------------<  128<H>  3.7   |  23  |  0.50    Ca    9.1      22 Jun 2023 07:23            Urinalysis Basic - ( 22 Jun 2023 07:23 )    Color: x / Appearance: x / SG: x / pH: x  Gluc: 128 mg/dL / Ketone: x  / Bili: x / Urobili: x   Blood: x / Protein: x / Nitrite: x   Leuk Esterase: x / RBC: x / WBC x   Sq Epi: x / Non Sq Epi: x / Bacteria: x        Culture - Urine (collected 22 Jun 2023 13:09)  Source: Catheterized Catheterized  Final Report (23 Jun 2023 10:24):    <10,000 CFU/mL Normal Urogenital Amanda    Culture - Blood (collected 22 Jun 2023 00:49)  Source: .Blood Blood  Preliminary Report (23 Jun 2023 03:01):    No growth to date.    Culture - Blood (collected 22 Jun 2023 00:40)  Source: .Blood Blood  Preliminary Report (23 Jun 2023 03:01):    No growth to date.      SARS-CoV-2: NotDetec (22 Jun 2023 14:56)  SARS-CoV-2: NotDetec (06 Jun 2023 15:18)        RADIOLOGY & ADDITIONAL TESTS:  New Results Reviewed Today:   < from: [facility] Duplex Lower Ext Vein Scan, Bilat (06.22.23 @ 12:15) >  IMPRESSION:  No evidence of deep venous thrombosis in either lower extremity.    < end of copied text >    < from: CT Head No Cont (06.22.23 @ 10:42) >  IMPRESSION:    No significant interval change since prior exam.    Reidentified is right-sided decompressive craniectomy. No residual   hemorrhage is present. No new acute hemorrhage. Scattered areas of   hypoattenuation within the right frontal parietal and temporal lobes is   reidentified without change, likely reflecting areas of developing   encephalomalacia. Stable mass effect upon the right ventricle with   minimal right to left midline shift, unchanged.    < end of copied text >    < from: TTE W or WO Ultrasound Enhancing Agent (06.22.23 @ 07:02) >  CONCLUSIONS:      1. The leftventricular systolic function is normal with an ejection fraction of 70 % by Thompson's method of disks.   2. Compared to the transthoracic echocardiogram performed on 6/8/2023 left ventricular function has improved.    < end of copied text >        COMMUNICATION:  Care Discussed with Consultants/Other Providers and Details of Discussion: neurosurgery PA(Spike)  Discussions with Patient/Family: yes

## 2023-06-23 NOTE — PROGRESS NOTE ADULT - SUBJECTIVE AND OBJECTIVE BOX
SUBJECTIVE:  Seen with her wife/Karma/HCP in bedside chair-No events overnite. Appears comfortable.    Vital Signs Last 24 Hrs  T(C): 36.7 (23 Jun 2023 04:48), Max: 38.3 (22 Jun 2023 13:48)  T(F): 98 (23 Jun 2023 04:48), Max: 100.9 (22 Jun 2023 13:48)  HR: 99 (23 Jun 2023 04:48) (96 - 101)  BP: 120/84 (23 Jun 2023 04:48) (115/79 - 149/84)  BP(mean): --  RR: 18 (23 Jun 2023 04:48) (18 - 20)  SpO2: 96% (23 Jun 2023 04:48) (94% - 100%)    Parameters below as of 22 Jun 2023 22:10  Patient On (Oxygen Delivery Method): room air    OVERNIGHT EVENTS: None    IVF: [X ] IVL [ ]  DIET: [ ] Regular [ ] CCD [ ] Renal [ ] Puree [ ] Dysphagia [ X] Tube Feeds: Glucerna 1.2 goal 50cc/H--NPO for PEG 6/21  PCA: [ ] YES [ X] NO   HARRELL: [ ] YES [X ] NO [X ] VOID + Incontinence. BS/PVR-P FU  BM: [X ] YES-on 6/20 x2    DRAINS: None    PHYSICAL EXAM:    General: No Acute Distress     Neurological:  +few word output per HCP, but not opening eyes and non verbal past 2 days for me. AOx 2-nods to questions, minimally EO to noxious, pupils 4mm reactive b/l,  FC on Rt side-move fingers and toes, move Rt side spontaneously/ antigravity, LUE WDs, LLE WDs    Pulmonary: Clear to Auscultation, No Rales, No Rhonchi, No Wheezes     Cardiovascular: S1, S2, Regular Rate and Rhythm     Gastrointestinal: Soft, Nontender, Nondistended     Ext: Boots b/l LE    Incision: VEEG on/Head dsg CDI    LABS:                        10.7   18.95 )-----------( 386      ( 22 Jun 2023 07:23 )             34.4   06-22    147<H>  |  112<H>  |  18  ----------------------------<  128<H>  3.7   |  23  |  0.50    Ca    9.1      22 Jun 2023 07:23      PT/INR - ( 20 Jun 2023 15:51 )   PT: 13.7 sec;   INR: 1.19 ratio    PTT - ( 20 Jun 2023 15:51 )  PTT:30.2 sec    Respiratory Viral Panel with COVID-19 by LORY (06.22.23 @ 14:56)    Rapid RVP Result: [facility]   SARS-CoV-2: [facility]    Culture - Blood (06.22.23 @ 00:49)    Specimen Source: .Blood Blood   Culture Results:   No growth to date.    Pregnancy Profile, Urine (06.22.23 @ 07:33)    Pregnancy Profile, Urine: Negative    Urinalysis (06.22.23 @ 00:49)    pH Urine: 7.0   Glucose Qualitative, Urine: Negative   Blood, Urine: Negative   Color: Yellow   Urine Appearance: Slightly Turbid   Bilirubin: Negative   Ketone - Urine: Negative   Specific Gravity: 1.026   Protein, Urine: Trace   Urobilinogen: Negative   Nitrite: Negative   Leukocyte Esterase Concentration: Small    6/22 UA Cx-T    I&O's Summary    22 Jun 2023 07:01  -  23 Jun 2023 07:00  --------------------------------------------------------  IN: 400 mL / OUT: 500 mL / NET: -100 mL    Surgical Pathology Report (06.06.23 @ 19:36)  1  Hematoma  Final Diagnosis  1. Hematoma, removal:  - Blood clot with minute fragments of brain parenchyma  - Beta-amyloid immunostain is pending and an addendum will be issued    < from: TTE W or WO Ultrasound Enhancing Agent (06.22.23 @ 07:02) >      1. The leftventricular systolic function is normal with an ejection fraction of 70 % by Thompson's method of disks.   2. Compared to the transthoracic echocardiogram performed on 6/8/2023 left ventricular function has improved.    < end of copied text >    IMAGING:   < from: [facility] Duplex Lower Ext Vein Scan, Bilat (06.22.23 @ 12:15) >  IMPRESSION:  No evidence of deep venous thrombosis in either lower extremity.    < end of copied text >  < from: CT Head No Cont (06.22.23 @ 10:42) >  No significant interval change since prior exam.    Reidentified is right-sided decompressive craniectomy. No residual   hemorrhage is present. No new acute hemorrhage. Scattered areas of   hypoattenuation within the right frontal parietal and temporal lobes is   reidentified without change, likely reflecting areas of developing   encephalomalacia. Stable mass effect upon the right ventricle with   minimal right to left midline shift, unchanged.    < end of copied text >    < from: CT Head No Cont (06.20.23 @ 09:41) >  Resolving right frontal hemorrhage with similar mass effect and unchanged   minimal leftward midline shift. No hydrocephalus.    < end of copied text >    < from: Xray Chest 1 View- PORTABLE-Urgent (Xray Chest 1 View- PORTABLE-Urgent .) (06.18.23 @ 11:45) >  Lines and tubes above.    No active pulmonary disease.    < end of copied text >    < from: [facility] Duplex Lower Ext Vein Scan, Bilat (06.14.23 @ 17:27) >  No evidence for acute DVT in the bilateral lower extremity deep venous   systems.    < end of copied text >    MEDICATIONS  (STANDING):  albuterol    0.083% 2.5 milliGRAM(s) Nebulizer every 8 hours  brivaracetam Oral Solution 100 milliGRAM(s) Oral two times a day  chlorhexidine 2% Cloths 1 Application(s) Topical daily  cloNIDine 0.1 milliGRAM(s) Oral every 12 hours  enoxaparin Injectable 40 milliGRAM(s) SubCutaneous every 24 hours  lacosamide IVPB 150 milliGRAM(s) IV Intermittent every 12 hours  levothyroxine 75 MICROGram(s) Oral daily  lisinopril 20 milliGRAM(s) Oral daily  nicotine - 21 mG/24Hr(s) Patch 1 Patch Transdermal daily  polyethylene glycol 3350 17 Gram(s) Oral daily  senna 2 Tablet(s) Oral at bedtime    MEDICATIONS  (PRN):  acetaminophen   Oral Liquid .. 650 milliGRAM(s) Oral every 6 hours PRN Temp greater or equal to 38C (100.4F), Mild Pain (1 - 3)  ondansetron Injectable 4 milliGRAM(s) IV Push every 6 hours PRN Nausea and/or Vomiting  oxyCODONE    Solution 5 milliGRAM(s) Oral every 6 hours PRN Moderate Pain (4 - 6)

## 2023-06-23 NOTE — PROGRESS NOTE ADULT - PROBLEM SELECTOR PLAN 3
per spouse, patient was told in the past that her BP was elevated but on follow up visit, she was told it was ok and never started on any BP medication  - continue clonidine 0.1mg to BID (started on this admission), lisinopril 20mg  - TTE on 6/8 (technically difficult study): Normal left ventricular cavity size. The left ventricular wall thickness is normal. The left ventricular systolic function is mildly decreased with an ejection fraction of 57 % by Thompson's method of disks. There are regional wall motion abnormalities consistent with ischemic heart disease. Basal and mid inferior septum, basal and mid inferior wall, and mid inferolateral segment are abnormal.  - repeat TTE (6/22) with normal LVSF

## 2023-06-23 NOTE — CONSULT NOTE ADULT - SUBJECTIVE AND OBJECTIVE BOX
Patient is a 38y old  Female who presents with a chief complaint of IPH (23 Jun 2023 08:17)      HPI:  Patient is a 39 yo found unresponsive at home on 6/6.   Imaging noted Very large right frontal parenchymal hemorrhage.  Went to OR emergently for R decompressive craniectomy w/ evacuation of IPH and placement of EVD  Bone Flap Discarded.  Course has been complicated by fevers, seizures.  S/P PEG on 6/21.   Patient w persistent significant functional deficits.   Work up for cause of brain bleed unrevealing thus far.    REVIEW OF SYSTEMS: No chest pain, shortness of breath, nausea, vomiting or diarhea; other ROS neg     PAST MEDICAL & SURGICAL HISTORY  Hypothyroid    FUNCTIONAL HISTORY:   Lives w wife in house w 2 IVONNE and 1st floor set up.  PTA Independent    CURRENT FUNCTIONAL STATUS:  Max A bed mob    FAMILY HISTORY   Neg    MEDICATIONS   acetaminophen   Oral Liquid .. 650 milliGRAM(s) Oral every 6 hours PRN  albuterol    0.083% 2.5 milliGRAM(s) Nebulizer every 8 hours  brivaracetam Oral Solution 100 milliGRAM(s) Oral two times a day  chlorhexidine 2% Cloths 1 Application(s) Topical daily  cloNIDine 0.1 milliGRAM(s) Oral every 12 hours  enoxaparin Injectable 40 milliGRAM(s) SubCutaneous every 24 hours  lacosamide IVPB 150 milliGRAM(s) IV Intermittent every 12 hours  levothyroxine 75 MICROGram(s) Oral daily  lisinopril 20 milliGRAM(s) Oral daily  nicotine - 21 mG/24Hr(s) Patch 1 Patch Transdermal daily  ondansetron Injectable 4 milliGRAM(s) IV Push every 6 hours PRN  oxyCODONE    Solution 5 milliGRAM(s) Oral every 6 hours PRN  polyethylene glycol 3350 17 Gram(s) Oral daily  senna 2 Tablet(s) Oral at bedtime    ALLERGIES  No Known Allergies    VITALS  T(C): 37.3 (06-23-23 @ 09:09), Max: 38.3 (06-22-23 @ 13:48)  HR: 102 (06-23-23 @ 09:09) (96 - 102)  BP: 125/73 (06-23-23 @ 09:09) (115/79 - 149/84)  RR: 18 (06-23-23 @ 09:09) (18 - 20)  SpO2: 96% (06-23-23 @ 09:09) (94% - 100%)  Wt(kg): --    PHYSICAL EXAM  Constitutional - NAD, Comfortable  HEENT - NCAT, EOMI  Neck - Supple, No limited ROM  Chest - CTA bilaterally, No wheeze, No rhonchi, No crackles  Cardiovascular - Tachy, S1S2, No murmurs  Abdomen - BS+, Soft, NTND  Extremities - No C/C/E, No calf tenderness   Neurologic Exam -                    Cognitive - Awake, Alert, AAO to self, place, date, year, situation     Communication - Fluent, No dysarthria, no aphasia     Cranial Nerves - CN 2-12 intact     Motor - No focal deficits      Sensory - Intact to LT     Reflexes - DTR Intact, No primitive reflexive  Psychiatric - Mood stable, Affect WNL    RECENT LABS/IMAGING  CBC Full  -  ( 22 Jun 2023 07:23 )  WBC Count : 18.95 K/uL  RBC Count : 3.32 M/uL  Hemoglobin : 10.7 g/dL  Hematocrit : 34.4 %  Platelet Count - Automated : 386 K/uL  Mean Cell Volume : 103.6 fl  Mean Cell Hemoglobin : 32.2 pg  Mean Cell Hemoglobin Concentration : 31.1 gm/dL  Auto Neutrophil # : x  Auto Lymphocyte # : x  Auto Monocyte # : x  Auto Eosinophil # : x  Auto Basophil # : x  Auto Neutrophil % : x  Auto Lymphocyte % : x  Auto Monocyte % : x  Auto Eosinophil % : x  Auto Basophil % : x    06-22    147<H>  |  112<H>  |  18  ----------------------------<  128<H>  3.7   |  23  |  0.50    Ca    9.1      22 Jun 2023 07:23      Urinalysis Basic - ( 22 Jun 2023 07:23 )    Color: x / Appearance: x / SG: x / pH: x  Gluc: 128 mg/dL / Ketone: x  / Bili: x / Urobili: x   Blood: x / Protein: x / Nitrite: x   Leuk Esterase: x / RBC: x / WBC x   Sq Epi: x / Non Sq Epi: x / Bacteria: x     CT Head No Cont (06.22.23 @ 10:42) >  No significant interval change since prior exam.    Reidentified is right-sided decompressive craniectomy. No residual   hemorrhage is present. No new acute hemorrhage. Scattered areas of   hypoattenuation within the right frontal parietal and temporal lobes is   reidentified without change, likely reflecting areas of developing   encephalomalacia. Stable mass effect upon the right ventricle with   minimal right to left midline shift, unchanged.    Impression:  39 yo with functional deficits secondary to diagnosis of ICH    Plan:  PT- ROM, Bed Mob, Transfers, Amb w AD and bracing as needed  OT- ADLs, bracing  SLP- Dysphagia eval and treat  Prec- Falls, Cardiac, Pulm  DVT Prophylaxis- SCDs  Skin- Turn q2 h  Dispo-  Patient is a 38y old  Female who presents with a chief complaint of IPH (23 Jun 2023 08:17)      HPI:  Patient is a 37 yo found unresponsive at home on 6/6.   Imaging noted Very large right frontal parenchymal hemorrhage.  Went to OR emergently for R decompressive craniectomy w/ evacuation of IPH and placement of EVD  Bone Flap Discarded.  Course has been complicated by fevers, seizures.  S/P PEG on 6/21.   Patient w persistent significant functional deficits.   Work up for cause of brain bleed unrevealing thus far.    REVIEW OF SYSTEMS: Unobtainable due to mental status    PAST MEDICAL & SURGICAL HISTORY  Hypothyroid    FUNCTIONAL HISTORY:   Lives w wife in house w 2 IVONNE and 1st floor set up.  PTA Independent    CURRENT FUNCTIONAL STATUS:  Max A bed mob    FAMILY HISTORY   Neg    MEDICATIONS   acetaminophen   Oral Liquid .. 650 milliGRAM(s) Oral every 6 hours PRN  albuterol    0.083% 2.5 milliGRAM(s) Nebulizer every 8 hours  brivaracetam Oral Solution 100 milliGRAM(s) Oral two times a day  chlorhexidine 2% Cloths 1 Application(s) Topical daily  cloNIDine 0.1 milliGRAM(s) Oral every 12 hours  enoxaparin Injectable 40 milliGRAM(s) SubCutaneous every 24 hours  lacosamide IVPB 150 milliGRAM(s) IV Intermittent every 12 hours  levothyroxine 75 MICROGram(s) Oral daily  lisinopril 20 milliGRAM(s) Oral daily  nicotine - 21 mG/24Hr(s) Patch 1 Patch Transdermal daily  ondansetron Injectable 4 milliGRAM(s) IV Push every 6 hours PRN  oxyCODONE    Solution 5 milliGRAM(s) Oral every 6 hours PRN  polyethylene glycol 3350 17 Gram(s) Oral daily  senna 2 Tablet(s) Oral at bedtime    ALLERGIES  No Known Allergies    VITALS  T(C): 37.3 (06-23-23 @ 09:09), Max: 38.3 (06-22-23 @ 13:48)  HR: 102 (06-23-23 @ 09:09) (96 - 102)  BP: 125/73 (06-23-23 @ 09:09) (115/79 - 149/84)  RR: 18 (06-23-23 @ 09:09) (18 - 20)  SpO2: 96% (06-23-23 @ 09:09) (94% - 100%)  Wt(kg): --    PHYSICAL EXAM  Constitutional - NAD, Comfortable  HEENT - R head incision intact  Neck - Supple, No limited ROM  Chest - CTA bilaterally, No wheeze, No rhonchi, No crackles  Cardiovascular - Tachy, S1S2, No murmurs  Abdomen - BS+, Soft, NTND  Extremities - No C/C/E, No calf tenderness   Neurologic Exam -                 Lethargic, briefly arousable  Left side plegic with MAS 3 spasticity LUE/LLE     Psychiatric - Mood stable, Affect WNL    RECENT LABS/IMAGING  CBC Full  -  ( 22 Jun 2023 07:23 )  WBC Count : 18.95 K/uL  RBC Count : 3.32 M/uL  Hemoglobin : 10.7 g/dL  Hematocrit : 34.4 %  Platelet Count - Automated : 386 K/uL  Mean Cell Volume : 103.6 fl  Mean Cell Hemoglobin : 32.2 pg  Mean Cell Hemoglobin Concentration : 31.1 gm/dL  Auto Neutrophil # : x  Auto Lymphocyte # : x  Auto Monocyte # : x  Auto Eosinophil # : x  Auto Basophil # : x  Auto Neutrophil % : x  Auto Lymphocyte % : x  Auto Monocyte % : x  Auto Eosinophil % : x  Auto Basophil % : x    06-22    147<H>  |  112<H>  |  18  ----------------------------<  128<H>  3.7   |  23  |  0.50    Ca    9.1      22 Jun 2023 07:23      Urinalysis Basic - ( 22 Jun 2023 07:23 )    Color: x / Appearance: x / SG: x / pH: x  Gluc: 128 mg/dL / Ketone: x  / Bili: x / Urobili: x   Blood: x / Protein: x / Nitrite: x   Leuk Esterase: x / RBC: x / WBC x   Sq Epi: x / Non Sq Epi: x / Bacteria: x     CT Head No Cont (06.22.23 @ 10:42) >  No significant interval change since prior exam.    Reidentified is right-sided decompressive craniectomy. No residual   hemorrhage is present. No new acute hemorrhage. Scattered areas of   hypoattenuation within the right frontal parietal and temporal lobes is   reidentified without change, likely reflecting areas of developing   encephalomalacia. Stable mass effect upon the right ventricle with   minimal right to left midline shift, unchanged.    Impression:  37 yo with functional deficits secondary to diagnosis of ICH    Plan:  PT- ROM, Bed Mob, Transfers, Amb w AD and bracing as needed  OT- ADLs, bracing  SLP- Dysphagia eval and treat  Prec- Falls, Cardiac, Pulm  DVT Prophylaxis- SCDs  Monitor wbc ct  Can consider Amantadine 100mg Qam to improve arousal and Trazadone 50mg QHS to help w sleep-wake cycle.  Skin- Turn q2 h  Dispo- Acute TBI Rehab- can tolerate 3h/d of therapies and requires daily physician visits  D/W Neurosx team and also spoke w patient's wife at bedside- discussed rehab options and functional prognosis.

## 2023-06-23 NOTE — PROGRESS NOTE ADULT - ASSESSMENT
HPI:    PROCEDURE: Adm 6/6 Rt Jose crani evac hematoma w/flap discarded and intraop EVD. 6/21 PEG placed       POD#17    PLAN:  Neuro: s/p PEG placed 6/21-started PEG feeds 6/22. 6/22 Temp=38.3, UA small leuko and Mod Bact-benign per ID,-monitor off Abx,FU UA Cx-T, Bld Cx NGTD, RVP neg. 6/22 CXR rpt-FU 6/23 DC VEEG. Need to DC [facility] once vEEG is off today. Ck Bladd Scan/PVR today (not done as ordered 6/22). Off Dex, Leukocytosis trending downwards. INR normalizing.  Acute anemia from surgical blood loss-trending downwards-monitor. s/p PRBC Tx'd. 6/22 Started Free water 200cc Q6h for hypernatremia-monitor.  Ck AM labs 6/24. Inc activity/OOB. Plan for Rehab Monday if Medical/ID cleared. Rehab to FU with Neuro-plastic Dr Agustin Kwan for cranioplasty in about 12 weeks-he can be contacted at em: [email or web address] or phone [phone].    Neuro note of 6/20-- Plan to repeat MRI in 2 months to assess for any underlying lesion - EEG 6/20-6/22 without evidence of epileptiform abnormalities - can discontinue  - Continue ASMs - PT/OT/SLP - DVT ppx - Rest of care as per neurosurgery - No additional acute inpatient neurologic work-up indicated at this time - Patient can follow up with vascular neurology, Dr. Alberto Tyson, at [address] [place] [address] after discharge. Please instruct the patient to call [phone] to schedule this appointment. Neurology signing off. [place] at [phone] with any questions.    ID note of 6/22-At this point I think we should follow low grade temps conservatively off antibiotics.Her recent CXR is clear, UA is benign, no obvious primary infection. Her 6/18 blood cultures remain negative.All prior blood and CSF cultures have been negative.Perhaps we are dealing with fever secondary to ICH, always a diagnosis of exclusion Suggest: 1. Follow conservatively from an ID viewpoint, off antibiotics 2. Will follow exam and labs 3. Mother updated at bedside, thanks, will follow.     EEG Prelim rpt of 6/21-Clinical Impression:Evidence for right hemispheric cortical/subcortical structural lesion Mild diffuse/multifocal cerebral dysfunction, not specific as to etiology  Right hemispheric skull defect There were no epileptiform abnormalities recorded.  This is a prelim report only, pending review with attending prior to finalization.    Hosp note of 6/20- Problem/Recommendation - 2: ·  Problem: Dysphagia. ·  Recommendation: continue with NG tube feed appreciate GI input plan for PEG tomorrow no absolute medical contraindication. Problem/Recommendation - 3:·  Problem: Hypertension. ·  Recommendation: per spouse, patient was told in the past that her BP was elevated but on follow up visit, she was told it was ok and never started on any BP medication- continue with lisinopril 40mg, clonidine 0.1mg TID, amlodipine 5mg for now (started on this admission)  - TTE on 6/8 (technically difficult study):   - Normal left ventricular cavity size. The left ventricular wall thickness is normal. The left ventricular systolic function      is mildly decreased with an ejection fraction of 57 % by Thompson's method of disks. There are regional wall      motion abnormalities consistent with ischemic heart disease.   - Basal and mid inferior septum, basal and mid inferior wall, and mid inferolateral segment are abnormal.- EKG (6/12) reviewed: NSR 89bpm with TWI in III (unchanged compared to prior)- ? if above TTE findings may have been related to stress induced, check repeat TTE for comparison. Problem/Recommendation - 4:·  Problem: Fever. ·  Recommendation: episodes of fever while in NSCU  infectious work up unrevealing including negative Bcx/UA/CXR on 6/18 and unremarkable CT c/a/p on 6/15.LE duplex negative on 6/14 leukocytosis trending down continue to monitor.   Problem/Recommendation - 5:·  Problem: Seizure disorder. ·  Recommendation: continue with brivaracetam 100mg BID, lacosamide 150mg BID per neurorepeat EEG pending.   Problem/Recommendation - 6:·  Problem: Hypothyroid. ·  Recommendation: recent TSH normal (1.18) on 6/6 continue with synthroid 75mcg (need to confirm dose with spouse).   Problem/Recommendation - 7:·  Problem: Preventive measure. ·  Recommendation: resume chemoprophylaxis post-PEG when deemed stable. Last LE duplex on 6/14 neg for DVT.  continue bowel regimen- last documented BM 6/18O2 sat on room air stable in mid-90's, started albuterol neb, recent CXR (6/18) clear. monitor closely.consider adding free water with tube feed but per discussion with neurosurgery, trying to keep serum Na elevated so will hold off for now.    GI note of 6/22 seen-on PEG feeds.    Respiratory: Patient instructed to use incentive spirometer [ X] YES [ ] NO              DVT ppx: [X ] SQL[ ] SQH and Venodynes [ ] Left [ ] Right [ X] Bilateral    Discharge Planning:  The patient was evaluated by PT/OT and recommended A. Rehab.  PMR  cons-P 6/23 FU. She was subsequently DC on >>> in stable condition.    More than 30 minutes spent on total encounter: more than 50% of the visit was spent on educating the patient and family regarding condition, medications, follow up plans, signs and symptoms to be concerned with, preparing paperwork, and questions answered regarding discharge.

## 2023-06-24 LAB
ANION GAP SERPL CALC-SCNC: 12 MMOL/L — SIGNIFICANT CHANGE UP (ref 5–17)
BASOPHILS # BLD AUTO: 0.04 K/UL — SIGNIFICANT CHANGE UP (ref 0–0.2)
BASOPHILS NFR BLD AUTO: 0.3 % — SIGNIFICANT CHANGE UP (ref 0–2)
BUN SERPL-MCNC: 16 MG/DL — SIGNIFICANT CHANGE UP (ref 7–23)
CALCIUM SERPL-MCNC: 9.3 MG/DL — SIGNIFICANT CHANGE UP (ref 8.4–10.5)
CHLORIDE SERPL-SCNC: 103 MMOL/L — SIGNIFICANT CHANGE UP (ref 96–108)
CO2 SERPL-SCNC: 25 MMOL/L — SIGNIFICANT CHANGE UP (ref 22–31)
CREAT SERPL-MCNC: 0.41 MG/DL — LOW (ref 0.5–1.3)
EGFR: 128 ML/MIN/1.73M2 — SIGNIFICANT CHANGE UP
EOSINOPHIL # BLD AUTO: 0.15 K/UL — SIGNIFICANT CHANGE UP (ref 0–0.5)
EOSINOPHIL NFR BLD AUTO: 1.1 % — SIGNIFICANT CHANGE UP (ref 0–6)
GLUCOSE SERPL-MCNC: 120 MG/DL — HIGH (ref 70–99)
HCT VFR BLD CALC: 31 % — LOW (ref 34.5–45)
HGB BLD-MCNC: 10.1 G/DL — LOW (ref 11.5–15.5)
IMM GRANULOCYTES NFR BLD AUTO: 0.5 % — SIGNIFICANT CHANGE UP (ref 0–0.9)
LYMPHOCYTES # BLD AUTO: 1.99 K/UL — SIGNIFICANT CHANGE UP (ref 1–3.3)
LYMPHOCYTES # BLD AUTO: 15.1 % — SIGNIFICANT CHANGE UP (ref 13–44)
MCHC RBC-ENTMCNC: 32.3 PG — SIGNIFICANT CHANGE UP (ref 27–34)
MCHC RBC-ENTMCNC: 32.6 GM/DL — SIGNIFICANT CHANGE UP (ref 32–36)
MCV RBC AUTO: 99 FL — SIGNIFICANT CHANGE UP (ref 80–100)
MONOCYTES # BLD AUTO: 0.78 K/UL — SIGNIFICANT CHANGE UP (ref 0–0.9)
MONOCYTES NFR BLD AUTO: 5.9 % — SIGNIFICANT CHANGE UP (ref 2–14)
NEUTROPHILS # BLD AUTO: 10.19 K/UL — HIGH (ref 1.8–7.4)
NEUTROPHILS NFR BLD AUTO: 77.1 % — HIGH (ref 43–77)
NRBC # BLD: 0 /100 WBCS — SIGNIFICANT CHANGE UP (ref 0–0)
PLATELET # BLD AUTO: 251 K/UL — SIGNIFICANT CHANGE UP (ref 150–400)
POTASSIUM SERPL-MCNC: 3.6 MMOL/L — SIGNIFICANT CHANGE UP (ref 3.5–5.3)
POTASSIUM SERPL-SCNC: 3.6 MMOL/L — SIGNIFICANT CHANGE UP (ref 3.5–5.3)
RBC # BLD: 3.13 M/UL — LOW (ref 3.8–5.2)
RBC # FLD: 13.2 % — SIGNIFICANT CHANGE UP (ref 10.3–14.5)
SODIUM SERPL-SCNC: 140 MMOL/L — SIGNIFICANT CHANGE UP (ref 135–145)
WBC # BLD: 13.21 K/UL — HIGH (ref 3.8–10.5)
WBC # FLD AUTO: 13.21 K/UL — HIGH (ref 3.8–10.5)

## 2023-06-24 PROCEDURE — 99232 SBSQ HOSP IP/OBS MODERATE 35: CPT

## 2023-06-24 RX ADMIN — Medication 1 PATCH: at 22:16

## 2023-06-24 RX ADMIN — LISINOPRIL 20 MILLIGRAM(S): 2.5 TABLET ORAL at 05:47

## 2023-06-24 RX ADMIN — BRIVARACETAM 100 MILLIGRAM(S): 25 TABLET, FILM COATED ORAL at 05:47

## 2023-06-24 RX ADMIN — CHLORHEXIDINE GLUCONATE 1 APPLICATION(S): 213 SOLUTION TOPICAL at 22:37

## 2023-06-24 RX ADMIN — OXYCODONE HYDROCHLORIDE 5 MILLIGRAM(S): 5 TABLET ORAL at 18:30

## 2023-06-24 RX ADMIN — LACOSAMIDE 130 MILLIGRAM(S): 50 TABLET ORAL at 20:14

## 2023-06-24 RX ADMIN — LACOSAMIDE 130 MILLIGRAM(S): 50 TABLET ORAL at 05:46

## 2023-06-24 RX ADMIN — Medication 75 MICROGRAM(S): at 05:50

## 2023-06-24 RX ADMIN — OXYCODONE HYDROCHLORIDE 5 MILLIGRAM(S): 5 TABLET ORAL at 09:11

## 2023-06-24 RX ADMIN — OXYCODONE HYDROCHLORIDE 5 MILLIGRAM(S): 5 TABLET ORAL at 08:41

## 2023-06-24 RX ADMIN — Medication 0.1 MILLIGRAM(S): at 05:47

## 2023-06-24 RX ADMIN — ALBUTEROL 2.5 MILLIGRAM(S): 90 AEROSOL, METERED ORAL at 22:37

## 2023-06-24 RX ADMIN — Medication 0.1 MILLIGRAM(S): at 18:31

## 2023-06-24 RX ADMIN — OXYCODONE HYDROCHLORIDE 5 MILLIGRAM(S): 5 TABLET ORAL at 19:00

## 2023-06-24 RX ADMIN — SENNA PLUS 2 TABLET(S): 8.6 TABLET ORAL at 23:43

## 2023-06-24 RX ADMIN — ALBUTEROL 2.5 MILLIGRAM(S): 90 AEROSOL, METERED ORAL at 05:47

## 2023-06-24 RX ADMIN — Medication 1 PATCH: at 13:38

## 2023-06-24 RX ADMIN — ALBUTEROL 2.5 MILLIGRAM(S): 90 AEROSOL, METERED ORAL at 15:30

## 2023-06-24 RX ADMIN — ENOXAPARIN SODIUM 40 MILLIGRAM(S): 100 INJECTION SUBCUTANEOUS at 18:30

## 2023-06-24 RX ADMIN — Medication 1 PATCH: at 12:26

## 2023-06-24 RX ADMIN — Medication 1 PATCH: at 07:00

## 2023-06-24 RX ADMIN — Medication 100 MILLIGRAM(S): at 10:58

## 2023-06-24 RX ADMIN — BRIVARACETAM 100 MILLIGRAM(S): 25 TABLET, FILM COATED ORAL at 18:30

## 2023-06-24 RX ADMIN — Medication 50 MILLIGRAM(S): at 22:37

## 2023-06-24 NOTE — PROGRESS NOTE ADULT - SUBJECTIVE AND OBJECTIVE BOX
Patient seen and examined at bedside.    --Anticoagulation--  enoxaparin Injectable 40 milliGRAM(s) SubCutaneous every 24 hours    T(C): 36.5 (06-24-23 @ 12:18), Max: 37.2 (06-24-23 @ 00:54)  HR: 94 (06-24-23 @ 12:18) (79 - 97)  BP: 104/88 (06-24-23 @ 12:18) (104/88 - 134/86)  RR: 18 (06-24-23 @ 12:18) (18 - 20)  SpO2: 98% (06-24-23 @ 12:18) (96% - 98%)  Wt(kg): --    Exam:  +minimally verbal. CHARITY, Ox 2-nods to questions, pupils 4mm reactive b/l,  FC on Rt side-move fingers and toes, move Rt side spontaneously/ antigravity, LUE WDs, LLE moved toes to command.    Labs:                        10.1   13.21 )-----------( 251      ( 24 Jun 2023 06:33 )             31.0     06-24    140  |  103  |  16  ----------------------------<  120<H>  3.6   |  25  |  0.41<L>    Ca    9.3      24 Jun 2023 06:32

## 2023-06-24 NOTE — PROGRESS NOTE ADULT - SUBJECTIVE AND OBJECTIVE BOX
CC: f/u for fever    Patient reports: she is sleepy, non verbal    REVIEW OF SYSTEMS:  All other review of systems negative (Comprehensive ROS): limited by condition    Antimicrobials Day #  :off    Other Medications Reviewed  MEDICATIONS  (STANDING):  albuterol    0.083% 2.5 milliGRAM(s) Nebulizer every 8 hours  amantadine Syrup 100 milliGRAM(s) Oral <User Schedule>  brivaracetam Oral Solution 100 milliGRAM(s) Oral two times a day  chlorhexidine 2% Cloths 1 Application(s) Topical daily  cloNIDine 0.1 milliGRAM(s) Oral every 12 hours  enoxaparin Injectable 40 milliGRAM(s) SubCutaneous every 24 hours  lacosamide IVPB 150 milliGRAM(s) IV Intermittent every 12 hours  levothyroxine 75 MICROGram(s) Oral daily  lisinopril 20 milliGRAM(s) Oral daily  nicotine - 21 mG/24Hr(s) Patch 1 Patch Transdermal daily  polyethylene glycol 3350 17 Gram(s) Oral daily  senna 2 Tablet(s) Oral at bedtime  traZODone 50 milliGRAM(s) Oral at bedtime    T(F): 98.9 (06-24-23 @ 04:35), Max: 99.2 (06-23-23 @ 13:17)  HR: 79 (06-24-23 @ 04:35)  BP: 107/62 (06-24-23 @ 04:35)  RR: 18 (06-24-23 @ 04:35)  SpO2: 97% (06-24-23 @ 04:35)  Wt(kg): --    PHYSICAL EXAM:  General: lethargic, no acute distress, craniectomy incision intact with a few superficial scabs  Eyes:  anicteric, no conjunctival injection, no discharge  Oropharynx: no lesions or injection 	  Neck: supple, without adenopathy  Lungs: clear to auscultation  Heart: regular rate and rhythm; no murmur, rubs or gallops  Abdomen: soft, nondistended, nontender, G tube  Skin: no rash  Extremities: no clubbing, cyanosis, or edema  Neurologic: poorly interactive, not verbal    LAB RESULTS:                        8.9    15.83 )-----------( 270      ( 23 Jun 2023 11:52 )             28.3     06-23    145  |  104  |  15  ----------------------------<  120<H>  3.7   |  25  |  0.40<L>    Ca    9.2      23 Jun 2023 11:53        Urinalysis Basic - ( 23 Jun 2023 11:53 )    Color: x / Appearance: x / SG: x / pH: x  Gluc: 120 mg/dL / Ketone: x  / Bili: x / Urobili: x   Blood: x / Protein: x / Nitrite: x   Leuk Esterase: x / RBC: x / WBC x   Sq Epi: x / Non Sq Epi: x / Bacteria: x      MICROBIOLOGY:  RECENT CULTURES:  06-22 @ 13:09 Catheterized Catheterized     <10,000 CFU/mL Normal Urogenital Amanda      06-22 @ 00:49 .Blood Blood     No growth to date.      06-22 @ 00:40 .Blood Blood     No growth to date.          RADIOLOGY REVIEWED:    < from: CT Head No Cont (06.22.23 @ 10:42) >  IMPRESSION:    No significant interval change since prior exam.    Reidentified is right-sided decompressive craniectomy. No residual   hemorrhage is present. No new acute hemorrhage. Scattered areas of   hypoattenuation within the right frontal parietal and temporal lobes is   reidentified without change, likely reflecting areas of developing   encephalomalacia. Stable mass effect upon the right ventricle with   minimal right to left midline shift, unchanged.    --- End of Report ---    < end of copied text >

## 2023-06-24 NOTE — PROGRESS NOTE ADULT - SUBJECTIVE AND OBJECTIVE BOX
[facility] Division of Hospital Medicine  Cheryl Lacy MD  Available via MS Teams    SUBJECTIVE / OVERNIGHT EVENTS:  no acute events overnight   had increased pain this morning, lethargic on my exam due to having recently taken pain medications   unable to obtain ROS 2/2 current mental status        ADDITIONAL REVIEW OF SYSTEMS:  as noted above     MEDICATIONS  (STANDING):  albuterol    0.083% 2.5 milliGRAM(s) Nebulizer every 8 hours  amantadine Syrup 100 milliGRAM(s) Oral <User Schedule>  brivaracetam Oral Solution 100 milliGRAM(s) Oral two times a day  chlorhexidine 2% Cloths 1 Application(s) Topical daily  cloNIDine 0.1 milliGRAM(s) Oral every 12 hours  enoxaparin Injectable 40 milliGRAM(s) SubCutaneous every 24 hours  lacosamide IVPB 150 milliGRAM(s) IV Intermittent every 12 hours  levothyroxine 75 MICROGram(s) Oral daily  lisinopril 20 milliGRAM(s) Oral daily  nicotine - 21 mG/24Hr(s) Patch 1 Patch Transdermal daily  polyethylene glycol 3350 17 Gram(s) Oral daily  senna 2 Tablet(s) Oral at bedtime  traZODone 50 milliGRAM(s) Oral at bedtime    MEDICATIONS  (PRN):  acetaminophen   Oral Liquid .. 650 milliGRAM(s) Oral every 6 hours PRN Temp greater or equal to 38C (100.4F), Mild Pain (1 - 3)  bacitracin   Ointment 1 Application(s) Topical every 6 hours PRN incisional healing  ondansetron Injectable 4 milliGRAM(s) IV Push every 6 hours PRN Nausea and/or Vomiting  oxyCODONE    Solution 5 milliGRAM(s) Oral every 6 hours PRN Moderate Pain (4 - 6)      I&O's Summary    23 Jun 2023 07:01  -  24 Jun 2023 07:00  --------------------------------------------------------  IN: 970 mL / OUT: 0 mL / NET: 970 mL        PHYSICAL EXAM:  Vital Signs Last 24 Hrs  T(C): 36.5 (24 Jun 2023 12:18), Max: 37.3 (23 Jun 2023 13:17)  T(F): 97.7 (24 Jun 2023 12:18), Max: 99.2 (23 Jun 2023 13:17)  HR: 94 (24 Jun 2023 12:18) (79 - 97)  BP: 104/88 (24 Jun 2023 12:18) (104/88 - 134/86)  BP(mean): --  RR: 18 (24 Jun 2023 12:18) (18 - 20)  SpO2: 98% (24 Jun 2023 12:18) (94% - 98%)    Parameters below as of 24 Jun 2023 12:18  Patient On (Oxygen Delivery Method): room air    PHYSICAL EXAM:  GENERAL: NAD, well-developed  HEAD:  surgical scar present, C/D/I  EYES: opened eyes, sclera and conjunctiva clear  NECK: Supple, no JVD  CHEST/LUNG: Clear to auscultation bilaterally; no wheezing or rales  HEART: Regular rate and rhythm; no murmurs, no LE edema   ABDOMEN: Soft, +PEG, nondistended; bowel sounds present  EXTREMITIES:  2+ Peripheral Pulses, no clubbing, cyanosis  PSYCH: somnolent, appears calm   NEUROLOGY: unable to assess 2/2 lethargy  SKIN: No rashes or lesions      LABS:                        10.1   13.21 )-----------( 251      ( 24 Jun 2023 06:33 )             31.0     06-24    140  |  103  |  16  ----------------------------<  120<H>  3.6   |  25  |  0.41<L>    Ca    9.3      24 Jun 2023 06:32            Urinalysis Basic - ( 24 Jun 2023 06:32 )    Color: x / Appearance: x / SG: x / pH: x  Gluc: 120 mg/dL / Ketone: x  / Bili: x / Urobili: x   Blood: x / Protein: x / Nitrite: x   Leuk Esterase: x / RBC: x / WBC x   Sq Epi: x / Non Sq Epi: x / Bacteria: x        Culture - Urine (collected 22 Jun 2023 13:09)  Source: Catheterized Catheterized  Final Report (23 Jun 2023 10:24):    <10,000 CFU/mL Normal Urogenital Amanda    Culture - Blood (collected 22 Jun 2023 00:49)  Source: .Blood Blood  Preliminary Report (23 Jun 2023 03:01):    No growth to date.    Culture - Blood (collected 22 Jun 2023 00:40)  Source: .Blood Blood  Preliminary Report (23 Jun 2023 03:01):    No growth to date.      SARS-CoV-2: NotDetec (22 Jun 2023 14:56)  SARS-CoV-2: NotDetec (06 Jun 2023 15:18)      RADIOLOGY & ADDITIONAL TESTS:  New Imaging Personally Reviewed Today:  New Electrocardiogram Personally Reviewed Today:  Other Results Reviewed Today:   Prior or Outpatient Records Reviewed Today with Summary:    COORDINATION OF CARE:  Consultant Communication and Details of Discussion (where applicable):

## 2023-06-24 NOTE — PROGRESS NOTE ADULT - ASSESSMENT
Patient is a 38y female with a past history of hypothyroidism who was admitted 6/6 with large RT sided ICH after being found on floor at home.  She required emergency Rt frontal craniectomy for decompression as well as EVD. EVD removed 6/16.  Angio negative and she underwent G tube placement 6/20.  She is on meds for seizures, has had a fluctuating leukocytosis, and has had  grade temps.  Her mother is at bedside. She was born in [place], has lived in [place] most of her life, has a wife and was attempting in vitro fertilization, completed 12/22.She has been monitored off antibiotics.   At this point I think we should follow low grade temps conservatively off antibiotics.  Her recent CXR is clear, UA is benign, no obvious primary infection. Her 6/18 blood cultures remain negative.All prior blood and CSF cultures have been negative.LE dopplers are negative for DVT.  Perhaps we are dealing with fever secondary to ICH, always a diagnosis of exclusion.  She has made clinical progress over the past week.Her fever appears to be moderating and her 6/22 blood cultures remain negative,  Suggest:  1. Follow conservatively from an ID viewpoint, off antibiotics  2. Will follow exam and labs  3. Additional w/u pending clinical course.   4. Leukocytosis may be reactive.

## 2023-06-24 NOTE — PROGRESS NOTE ADULT - ASSESSMENT
PROCEDURE: Adm 6/6 Rt Jose crani evac hematoma w/flap discarded and intraop EVD. 6/21 PEG placed       POD#18    PLAN:  - q4h neuro checks  - Fu AM labs, stable today, WBC downtrending 13<18  - Incision c/d/i  - Inc activity/OOB. Plan for Rehab Monday   - Rehab to FU with Neuro-plastics Dr Agustin Kwan for cranioplasty in about 12 weeks-he can be contacted at em: [email or web address] or phone [phone].  - Amantadine started per pmr recs   - helmet at bedside  - SQL  - diet - tube feeds  - cont vimpat/briviact, eeg neg 6/23  - hospitalist comgmt

## 2023-06-25 LAB
ANION GAP SERPL CALC-SCNC: 13 MMOL/L — SIGNIFICANT CHANGE UP (ref 5–17)
BUN SERPL-MCNC: 17 MG/DL — SIGNIFICANT CHANGE UP (ref 7–23)
CALCIUM SERPL-MCNC: 9.5 MG/DL — SIGNIFICANT CHANGE UP (ref 8.4–10.5)
CHLORIDE SERPL-SCNC: 101 MMOL/L — SIGNIFICANT CHANGE UP (ref 96–108)
CO2 SERPL-SCNC: 25 MMOL/L — SIGNIFICANT CHANGE UP (ref 22–31)
CREAT SERPL-MCNC: 0.39 MG/DL — LOW (ref 0.5–1.3)
EGFR: 130 ML/MIN/1.73M2 — SIGNIFICANT CHANGE UP
GLUCOSE SERPL-MCNC: 125 MG/DL — HIGH (ref 70–99)
HCT VFR BLD CALC: 30.8 % — LOW (ref 34.5–45)
HGB BLD-MCNC: 9.9 G/DL — LOW (ref 11.5–15.5)
MCHC RBC-ENTMCNC: 31.7 PG — SIGNIFICANT CHANGE UP (ref 27–34)
MCHC RBC-ENTMCNC: 32.1 GM/DL — SIGNIFICANT CHANGE UP (ref 32–36)
MCV RBC AUTO: 98.7 FL — SIGNIFICANT CHANGE UP (ref 80–100)
NRBC # BLD: 0 /100 WBCS — SIGNIFICANT CHANGE UP (ref 0–0)
PLATELET # BLD AUTO: 329 K/UL — SIGNIFICANT CHANGE UP (ref 150–400)
POTASSIUM SERPL-MCNC: 3.8 MMOL/L — SIGNIFICANT CHANGE UP (ref 3.5–5.3)
POTASSIUM SERPL-SCNC: 3.8 MMOL/L — SIGNIFICANT CHANGE UP (ref 3.5–5.3)
RBC # BLD: 3.12 M/UL — LOW (ref 3.8–5.2)
RBC # FLD: 13.1 % — SIGNIFICANT CHANGE UP (ref 10.3–14.5)
SODIUM SERPL-SCNC: 139 MMOL/L — SIGNIFICANT CHANGE UP (ref 135–145)
WBC # BLD: 11.11 K/UL — HIGH (ref 3.8–10.5)
WBC # FLD AUTO: 11.11 K/UL — HIGH (ref 3.8–10.5)

## 2023-06-25 RX ORDER — NYSTATIN 500MM UNIT
500000 POWDER (EA) MISCELLANEOUS EVERY 6 HOURS
Refills: 0 | Status: DISCONTINUED | OUTPATIENT
Start: 2023-06-25 | End: 2023-06-26

## 2023-06-25 RX ORDER — AMANTADINE HCL 100 MG
50 CAPSULE ORAL
Refills: 0 | Status: DISCONTINUED | OUTPATIENT
Start: 2023-06-25 | End: 2023-06-26

## 2023-06-25 RX ADMIN — OXYCODONE HYDROCHLORIDE 5 MILLIGRAM(S): 5 TABLET ORAL at 09:15

## 2023-06-25 RX ADMIN — Medication 1 PATCH: at 20:36

## 2023-06-25 RX ADMIN — LACOSAMIDE 130 MILLIGRAM(S): 50 TABLET ORAL at 05:23

## 2023-06-25 RX ADMIN — Medication 1 PATCH: at 12:00

## 2023-06-25 RX ADMIN — OXYCODONE HYDROCHLORIDE 5 MILLIGRAM(S): 5 TABLET ORAL at 15:53

## 2023-06-25 RX ADMIN — ALBUTEROL 2.5 MILLIGRAM(S): 90 AEROSOL, METERED ORAL at 15:33

## 2023-06-25 RX ADMIN — ENOXAPARIN SODIUM 40 MILLIGRAM(S): 100 INJECTION SUBCUTANEOUS at 18:42

## 2023-06-25 RX ADMIN — Medication 0.1 MILLIGRAM(S): at 05:23

## 2023-06-25 RX ADMIN — CHLORHEXIDINE GLUCONATE 1 APPLICATION(S): 213 SOLUTION TOPICAL at 21:24

## 2023-06-25 RX ADMIN — Medication 650 MILLIGRAM(S): at 11:13

## 2023-06-25 RX ADMIN — POLYETHYLENE GLYCOL 3350 17 GRAM(S): 17 POWDER, FOR SOLUTION ORAL at 11:14

## 2023-06-25 RX ADMIN — Medication 0.1 MILLIGRAM(S): at 18:42

## 2023-06-25 RX ADMIN — BRIVARACETAM 100 MILLIGRAM(S): 25 TABLET, FILM COATED ORAL at 18:43

## 2023-06-25 RX ADMIN — Medication 1 PATCH: at 11:14

## 2023-06-25 RX ADMIN — Medication 75 MICROGRAM(S): at 05:22

## 2023-06-25 RX ADMIN — Medication 650 MILLIGRAM(S): at 12:15

## 2023-06-25 RX ADMIN — Medication 500000 UNIT(S): at 23:48

## 2023-06-25 RX ADMIN — Medication 1 PATCH: at 15:23

## 2023-06-25 RX ADMIN — ALBUTEROL 2.5 MILLIGRAM(S): 90 AEROSOL, METERED ORAL at 05:23

## 2023-06-25 RX ADMIN — Medication 500000 UNIT(S): at 18:44

## 2023-06-25 RX ADMIN — LACOSAMIDE 130 MILLIGRAM(S): 50 TABLET ORAL at 18:43

## 2023-06-25 RX ADMIN — OXYCODONE HYDROCHLORIDE 5 MILLIGRAM(S): 5 TABLET ORAL at 07:47

## 2023-06-25 RX ADMIN — Medication 100 MILLIGRAM(S): at 09:47

## 2023-06-25 RX ADMIN — ALBUTEROL 2.5 MILLIGRAM(S): 90 AEROSOL, METERED ORAL at 21:25

## 2023-06-25 RX ADMIN — Medication 50 MILLIGRAM(S): at 21:25

## 2023-06-25 RX ADMIN — BRIVARACETAM 100 MILLIGRAM(S): 25 TABLET, FILM COATED ORAL at 05:23

## 2023-06-25 RX ADMIN — LISINOPRIL 20 MILLIGRAM(S): 2.5 TABLET ORAL at 05:23

## 2023-06-25 NOTE — PROGRESS NOTE ADULT - SUBJECTIVE AND OBJECTIVE BOX
CC: f/u for low grade fever    Patient reports: she is non verbal but is more alert and able to follow simple commands    REVIEW OF SYSTEMS:  All other review of systems negative (Comprehensive ROS): limited by condition    Antimicrobials Day #  :off    Other Medications Reviewed  MEDICATIONS  (STANDING):  albuterol    0.083% 2.5 milliGRAM(s) Nebulizer every 8 hours  amantadine Syrup 100 milliGRAM(s) Oral <User Schedule>  brivaracetam Oral Solution 100 milliGRAM(s) Oral two times a day  chlorhexidine 2% Cloths 1 Application(s) Topical daily  cloNIDine 0.1 milliGRAM(s) Oral every 12 hours  enoxaparin Injectable 40 milliGRAM(s) SubCutaneous every 24 hours  lacosamide IVPB 150 milliGRAM(s) IV Intermittent every 12 hours  levothyroxine 75 MICROGram(s) Oral daily  lisinopril 20 milliGRAM(s) Oral daily  nicotine - 21 mG/24Hr(s) Patch 1 Patch Transdermal daily  polyethylene glycol 3350 17 Gram(s) Oral daily  senna 2 Tablet(s) Oral at bedtime  traZODone 50 milliGRAM(s) Oral at bedtime    T(F): 98.1 (06-25-23 @ 04:44), Max: 98.6 (06-24-23 @ 20:20)  HR: 90 (06-25-23 @ 04:44)  BP: 110/74 (06-25-23 @ 04:44)  RR: 18 (06-25-23 @ 04:44)  SpO2: 96% (06-25-23 @ 04:44)  Wt(kg): --    PHYSICAL EXAM:  General: alert, no acute distress, craniectomy incision C/D/I  Eyes:  anicteric, no conjunctival injection, no discharge  Oropharynx: no lesions or injection 	  Neck: supple, without adenopathy  Lungs: clear to auscultation  Heart: regular rate and rhythm; no murmur, rubs or gallops  Abdomen: soft, nondistended, nontender, G tube  Skin: no lesions  Extremities: no clubbing, cyanosis, or edema  Neurologic: alert,left hemiparesis    LAB RESULTS:                        10.1   13.21 )-----------( 251      ( 24 Jun 2023 06:33 )             31.0     06-24    140  |  103  |  16  ----------------------------<  120<H>  3.6   |  25  |  0.41<L>    Ca    9.3      24 Jun 2023 06:32        Urinalysis Basic - ( 24 Jun 2023 06:32 )    Color: x / Appearance: x / SG: x / pH: x  Gluc: 120 mg/dL / Ketone: x  / Bili: x / Urobili: x   Blood: x / Protein: x / Nitrite: x   Leuk Esterase: x / RBC: x / WBC x   Sq Epi: x / Non Sq Epi: x / Bacteria: x      MICROBIOLOGY:  RECENT CULTURES:  06-22 @ 13:09 Catheterized Catheterized     <10,000 CFU/mL Normal Urogenital Amanda      06-22 @ 00:49 .Blood Blood     No growth to date.      06-22 @ 00:40 .Blood Blood     No growth to date.          RADIOLOGY REVIEWED:  < from: CT Head No Cont (06.22.23 @ 10:42) >  IMPRESSION:    No significant interval change since prior exam.    Reidentified is right-sided decompressive craniectomy. No residual   hemorrhage is present. No new acute hemorrhage. Scattered areas of   hypoattenuation within the right frontal parietal and temporal lobes is   reidentified without change, likely reflecting areas of developing   encephalomalacia. Stable mass effect upon the right ventricle with   minimal right to left midline shift, unchanged.    --- End of Report ---    < end of copied text >

## 2023-06-25 NOTE — PROGRESS NOTE ADULT - ASSESSMENT
ASSESSMENT AND PLAN: Adm 6/6 Rt Jose crani evac hematoma w/flap discarded and intraop EVD. 6/21 PEG placed  POD#19     NEURO: Seizure ppx: continue briviac and lacosamide   Amantadine started yesterday, patient is briskly following commands with right side   pain management with tylenol and oxycodone  continue trazodone    Helmet at bedside     PULM: Encouraged incentive spirometer   continue duonebs     CV: HTN control HTN and clonidine     ENDO: Continue synthroid     HEME/ONC:                      DVT ppx: SQL    RENAL: IVL    ID: Afebrile     GI: Miralax and senna for GI ppx   TF with PEG     DISCHARGE PLANNING: PT/OT: AR  D/w Neurosurgeon        ASSESSMENT AND PLAN: Adm 6/6 Rt Jose crani evac hematoma w/flap discarded and intraop EVD. 6/21 PEG placed  POD#19     NEURO: Seizure ppx: continue briviac and lacosamide   Amantadine started yesterday, patient is briskly following commands with right side   pain management with tylenol and oxycodone  continue trazodone    Helmet at bedside     PULM: Encouraged incentive spirometer   continue duonebs     CV: HTN control HTN and clonidine     ENDO: Continue synthroid     HEME/ONC:                      DVT ppx: SQL    RENAL: IVL    ID: Afebrile. ID following for low grade temps, monitoring off abx     GI: Miralax and senna for GI ppx   TF with PEG     DISCHARGE PLANNING: PT/OT: AR  D/w Neurosurgeon        ASSESSMENT AND PLAN: Adm 6/6 Rt Jose crani evac hematoma w/flap discarded and intraop EVD. 6/21 PEG placed  POD#19     NEURO: Seizure ppx: continue briviac and lacosamide   Amantadine started yesterday, patient is briskly following commands with right side   pain management with tylenol and oxycodone  continue trazodone    Helmet at bedside     PULM: Encouraged incentive spirometer   continue duonebs     CV: HTN control HTN and clonidine     ENDO: Continue synthroid     HEME/ONC:                      DVT ppx: SQL    RENAL: IVL    ID: Afebrile. ID following for low grade temps, monitoring off abx     GI: Miralax and senna for GI ppx   TF with PEG     DISCHARGE PLANNING: PT/OT: AR. Spoke with family extensively about current plan regarding rehab and current medication regimen. Family is concerned that the amantadine dose is keeping patient awake throughout the night. At baseline the patient is unable to tolerate caffeine.  Family made aware that we will speak to pharmacy to adjust amantadine dose.     D/w Neurosurgeon        ASSESSMENT AND PLAN: Adm 6/6 Rt Jose crani evac hematoma w/flap discarded and intraop EVD. 6/21 PEG placed  POD#19     NEURO: Seizure ppx: continue briviac and lacosamide   Amantadine started yesterday, patient is briskly following commands with right side. After speaking with family, patient has been awake for majority of the night, amantadine decreased to 50mg at 7am.    pain management with tylenol and oxycodone  continue trazodone    Helmet at bedside     PULM: Encouraged incentive spirometer   continue duonebs     CV: HTN control HTN and clonidine     ENDO: Continue synthroid     HEME/ONC:                      DVT ppx: SQL    RENAL: IVL    ID: Afebrile. ID following for low grade temps, monitoring off abx     GI: Miralax and senna for GI ppx   TF with PEG     DISCHARGE PLANNING: PT/OT: AR. Spoke with family extensively about current plan regarding rehab and current medication regimen. Family is concerned that the amantadine dose is keeping patient awake throughout the night. At baseline the patient is unable to tolerate caffeine.  Family made aware that we will speak to pharmacy to adjust amantadine dose.     D/w Neurosurgeon

## 2023-06-25 NOTE — PROGRESS NOTE ADULT - SUBJECTIVE AND OBJECTIVE BOX
SUBJECTIVE: Pt seen and examined with family at bedside    Vital Signs Last 24 Hrs  T(C): 36.8 (25 Jun 2023 09:00), Max: 37 (24 Jun 2023 20:20)  T(F): 98.3 (25 Jun 2023 09:00), Max: 98.6 (24 Jun 2023 20:20)  HR: 86 (25 Jun 2023 09:00) (85 - 93)  BP: 102/70 (25 Jun 2023 09:00) (102/70 - 138/81)  RR: 18 (25 Jun 2023 09:00) (18 - 18)  SpO2: 96% (25 Jun 2023 09:00) (96% - 97%)    Parameters below as of 25 Jun 2023 09:00  Patient On (Oxygen Delivery Method): room air                        9.9    11.11 )-----------( 329      ( 25 Jun 2023 07:13 )             30.8    06-25    139  |  101  |  17  ----------------------------<  125<H>  3.8   |  25  |  0.39<L>    Ca    9.5      25 Jun 2023 07:11    NEUROIMAGING: < from: CT Head No Cont (06.22.23 @ 10:42) >  No significant interval change since prior exam.    < end of copied text >    < from: VA Duplex Lower Ext Vein Scan, Bilat (06.22.23 @ 12:15) >  No evidence of deep venous thrombosis in either lower extremity.    < end of copied text >    PHYSICAL EXAM:    General: No Acute Distress     Neurological: Not opening eyes to noxious, nonverbal, following commands briskly on Rt side.  Right side antigravity, LUE wd and LLE wd      Pulmonary: Clear to Auscultation, No Rales, No Rhonchi, No Wheezes     Cardiovascular: S1, S2, Regular Rate and Rhythm     Gastrointestinal: Soft, Nontender, Nondistended     Incision: Clean and dry, staples removed     MEDICATIONS:   Antibiotics:    Neuro:  acetaminophen   Oral Liquid .. 650 milliGRAM(s) Oral every 6 hours PRN Temp greater or equal to 38C (100.4F), Mild Pain (1 - 3)  amantadine Syrup 100 milliGRAM(s) Oral <User Schedule>  brivaracetam Oral Solution 100 milliGRAM(s) Oral two times a day  lacosamide IVPB 150 milliGRAM(s) IV Intermittent every 12 hours  oxyCODONE    Solution 5 milliGRAM(s) Oral every 6 hours PRN Moderate Pain (4 - 6)  traZODone 50 milliGRAM(s) Oral at bedtime    Anticoagulation:  enoxaparin Injectable 40 milliGRAM(s) SubCutaneous every 24 hours    Cardiology:  cloNIDine 0.1 milliGRAM(s) Oral every 12 hours  lisinopril 20 milliGRAM(s) Oral daily    Endo:   levothyroxine 75 MICROGram(s) Oral daily    Pulm:  albuterol    0.083% 2.5 milliGRAM(s) Nebulizer every 8 hours    GI/:  polyethylene glycol 3350 17 Gram(s) Oral daily  senna 2 Tablet(s) Oral at bedtime    Other:  bacitracin   Ointment 1 Application(s) Topical every 6 hours PRN incisional healing  chlorhexidine 2% Cloths 1 Application(s) Topical daily  nicotine - 21 mG/24Hr(s) Patch 1 Patch Transdermal daily

## 2023-06-25 NOTE — PROGRESS NOTE ADULT - ASSESSMENT
Patient is a 38y female with a past history of hypothyroidism who was admitted 6/6 with large RT sided ICH after being found on floor at home.  She required emergency Rt frontal craniectomy for decompression as well as EVD. EVD removed 6/16.  Angio negative and she underwent G tube placement 6/20.  She is on meds for seizures, has had a fluctuating leukocytosis, and has had  grade temps.  Her mother is at bedside. She was born in [place], has lived in [place] most of her life, has a wife and was attempting in vitro fertilization, completed 12/22.She has been monitored off antibiotics.   At this point I think we should follow low grade temps conservatively off antibiotics.  Her recent CXR is clear, UA is benign, no obvious primary infection. Her 6/18 blood cultures remain negative.All prior blood and CSF cultures have been negative.LE dopplers are negative for DVT.  Perhaps we are dealing with fever secondary to ICH, always a diagnosis of exclusion.  She has made clinical progress over the past week.Her fever appears to be moderating and her 6/22 blood cultures remain negative,  Suggest:  1. Follow conservatively from an ID viewpoint, off antibiotics  2. Will follow exam and labs  3. Additional w/u pending clinical course.   4. Leukocytosis may be reactive.It is slowly moderating. Rehab planning as per NS

## 2023-06-26 ENCOUNTER — TRANSCRIPTION ENCOUNTER (OUTPATIENT)
Age: 38
End: 2023-06-26

## 2023-06-26 ENCOUNTER — INPATIENT (INPATIENT)
Facility: HOSPITAL | Age: 38
LOS: 35 days | Discharge: NURSING, REHAB OR LONG-TERM CARE | DRG: 56 | End: 2023-08-01
Attending: STUDENT IN AN ORGANIZED HEALTH CARE EDUCATION/TRAINING PROGRAM | Admitting: PSYCHIATRY & NEUROLOGY
Payer: COMMERCIAL

## 2023-06-26 VITALS
TEMPERATURE: 98 F | DIASTOLIC BLOOD PRESSURE: 92 MMHG | RESPIRATION RATE: 16 BRPM | OXYGEN SATURATION: 94 % | WEIGHT: 151.46 LBS | SYSTOLIC BLOOD PRESSURE: 128 MMHG | HEART RATE: 94 BPM | HEIGHT: 62 IN

## 2023-06-26 VITALS — SYSTOLIC BLOOD PRESSURE: 100 MMHG | HEART RATE: 84 BPM | DIASTOLIC BLOOD PRESSURE: 70 MMHG

## 2023-06-26 DIAGNOSIS — I61.9 NONTRAUMATIC INTRACEREBRAL HEMORRHAGE, UNSPECIFIED: ICD-10-CM

## 2023-06-26 DIAGNOSIS — R13.10 DYSPHAGIA, UNSPECIFIED: ICD-10-CM

## 2023-06-26 PROCEDURE — 93970 EXTREMITY STUDY: CPT

## 2023-06-26 PROCEDURE — 84145 PROCALCITONIN (PCT): CPT

## 2023-06-26 PROCEDURE — 86923 COMPATIBILITY TEST ELECTRIC: CPT

## 2023-06-26 PROCEDURE — 84132 ASSAY OF SERUM POTASSIUM: CPT

## 2023-06-26 PROCEDURE — 36430 TRANSFUSION BLD/BLD COMPNT: CPT

## 2023-06-26 PROCEDURE — 80048 BASIC METABOLIC PNL TOTAL CA: CPT

## 2023-06-26 PROCEDURE — 70553 MRI BRAIN STEM W/O & W/DYE: CPT

## 2023-06-26 PROCEDURE — 95714 VEEG EA 12-26 HR UNMNTR: CPT

## 2023-06-26 PROCEDURE — C8929: CPT

## 2023-06-26 PROCEDURE — 80307 DRUG TEST PRSMV CHEM ANLYZR: CPT

## 2023-06-26 PROCEDURE — 87641 MR-STAPH DNA AMP PROBE: CPT

## 2023-06-26 PROCEDURE — 70450 CT HEAD/BRAIN W/O DYE: CPT

## 2023-06-26 PROCEDURE — 71045 X-RAY EXAM CHEST 1 VIEW: CPT

## 2023-06-26 PROCEDURE — 84100 ASSAY OF PHOSPHORUS: CPT

## 2023-06-26 PROCEDURE — 81001 URINALYSIS AUTO W/SCOPE: CPT

## 2023-06-26 PROCEDURE — 74176 CT ABD & PELVIS W/O CONTRAST: CPT

## 2023-06-26 PROCEDURE — 99285 EMERGENCY DEPT VISIT HI MDM: CPT | Mod: 25

## 2023-06-26 PROCEDURE — C9399: CPT

## 2023-06-26 PROCEDURE — 82962 GLUCOSE BLOOD TEST: CPT

## 2023-06-26 PROCEDURE — 36227 PLACE CATH XTRNL CAROTID: CPT

## 2023-06-26 PROCEDURE — 96375 TX/PRO/DX INJ NEW DRUG ADDON: CPT

## 2023-06-26 PROCEDURE — 87040 BLOOD CULTURE FOR BACTERIA: CPT

## 2023-06-26 PROCEDURE — 99233 SBSQ HOSP IP/OBS HIGH 50: CPT

## 2023-06-26 PROCEDURE — 70548 MR ANGIOGRAPHY NECK W/DYE: CPT

## 2023-06-26 PROCEDURE — 93308 TTE F-UP OR LMTD: CPT

## 2023-06-26 PROCEDURE — 97760 ORTHOTIC MGMT&TRAING 1ST ENC: CPT

## 2023-06-26 PROCEDURE — 36224 PLACE CATH CAROTD ART: CPT

## 2023-06-26 PROCEDURE — 97530 THERAPEUTIC ACTIVITIES: CPT

## 2023-06-26 PROCEDURE — 97535 SELF CARE MNGMENT TRAINING: CPT

## 2023-06-26 PROCEDURE — 84702 CHORIONIC GONADOTROPIN TEST: CPT

## 2023-06-26 PROCEDURE — C1729: CPT

## 2023-06-26 PROCEDURE — 92523 SPEECH SOUND LANG COMPREHEN: CPT

## 2023-06-26 PROCEDURE — C1751: CPT

## 2023-06-26 PROCEDURE — 74177 CT ABD & PELVIS W/CONTRAST: CPT

## 2023-06-26 PROCEDURE — 85379 FIBRIN DEGRADATION QUANT: CPT

## 2023-06-26 PROCEDURE — 85014 HEMATOCRIT: CPT

## 2023-06-26 PROCEDURE — 82565 ASSAY OF CREATININE: CPT

## 2023-06-26 PROCEDURE — L8699: CPT

## 2023-06-26 PROCEDURE — 70498 CT ANGIOGRAPHY NECK: CPT | Mod: MA

## 2023-06-26 PROCEDURE — 71260 CT THORAX DX C+: CPT

## 2023-06-26 PROCEDURE — 94003 VENT MGMT INPAT SUBQ DAY: CPT

## 2023-06-26 PROCEDURE — 94002 VENT MGMT INPAT INIT DAY: CPT

## 2023-06-26 PROCEDURE — C1769: CPT

## 2023-06-26 PROCEDURE — 72125 CT NECK SPINE W/O DYE: CPT | Mod: MA

## 2023-06-26 PROCEDURE — 83036 HEMOGLOBIN GLYCOSYLATED A1C: CPT

## 2023-06-26 PROCEDURE — 82947 ASSAY GLUCOSE BLOOD QUANT: CPT

## 2023-06-26 PROCEDURE — 92610 EVALUATE SWALLOWING FUNCTION: CPT

## 2023-06-26 PROCEDURE — 94640 AIRWAY INHALATION TREATMENT: CPT

## 2023-06-26 PROCEDURE — 89051 BODY FLUID CELL COUNT: CPT

## 2023-06-26 PROCEDURE — 82435 ASSAY OF BLOOD CHLORIDE: CPT

## 2023-06-26 PROCEDURE — 88304 TISSUE EXAM BY PATHOLOGIST: CPT

## 2023-06-26 PROCEDURE — 86850 RBC ANTIBODY SCREEN: CPT

## 2023-06-26 PROCEDURE — C1887: CPT

## 2023-06-26 PROCEDURE — 87070 CULTURE OTHR SPECIMN AEROBIC: CPT

## 2023-06-26 PROCEDURE — 36415 COLL VENOUS BLD VENIPUNCTURE: CPT

## 2023-06-26 PROCEDURE — 84295 ASSAY OF SERUM SODIUM: CPT

## 2023-06-26 PROCEDURE — C9254: CPT

## 2023-06-26 PROCEDURE — 36226 PLACE CATH VERTEBRAL ART: CPT

## 2023-06-26 PROCEDURE — 97167 OT EVAL HIGH COMPLEX 60 MIN: CPT

## 2023-06-26 PROCEDURE — 86900 BLOOD TYPING SEROLOGIC ABO: CPT

## 2023-06-26 PROCEDURE — 96374 THER/PROPH/DIAG INJ IV PUSH: CPT

## 2023-06-26 PROCEDURE — 87015 SPECIMEN INFECT AGNT CONCNTJ: CPT

## 2023-06-26 PROCEDURE — 86901 BLOOD TYPING SEROLOGIC RH(D): CPT

## 2023-06-26 PROCEDURE — 0225U NFCT DS DNA&RNA 21 SARSCOV2: CPT

## 2023-06-26 PROCEDURE — 87205 SMEAR GRAM STAIN: CPT

## 2023-06-26 PROCEDURE — 70544 MR ANGIOGRAPHY HEAD W/O DYE: CPT

## 2023-06-26 PROCEDURE — 70496 CT ANGIOGRAPHY HEAD: CPT | Mod: MA

## 2023-06-26 PROCEDURE — C1889: CPT

## 2023-06-26 PROCEDURE — 88342 IMHCHEM/IMCYTCHM 1ST ANTB: CPT

## 2023-06-26 PROCEDURE — 70250 X-RAY EXAM OF SKULL: CPT

## 2023-06-26 PROCEDURE — 85730 THROMBOPLASTIN TIME PARTIAL: CPT

## 2023-06-26 PROCEDURE — 87206 SMEAR FLUORESCENT/ACID STAI: CPT

## 2023-06-26 PROCEDURE — 85018 HEMOGLOBIN: CPT

## 2023-06-26 PROCEDURE — 83010 ASSAY OF HAPTOGLOBIN QUANT: CPT

## 2023-06-26 PROCEDURE — 83930 ASSAY OF BLOOD OSMOLALITY: CPT

## 2023-06-26 PROCEDURE — 84484 ASSAY OF TROPONIN QUANT: CPT

## 2023-06-26 PROCEDURE — 82945 GLUCOSE OTHER FLUID: CPT

## 2023-06-26 PROCEDURE — C1760: CPT

## 2023-06-26 PROCEDURE — 80053 COMPREHEN METABOLIC PANEL: CPT

## 2023-06-26 PROCEDURE — 83735 ASSAY OF MAGNESIUM: CPT

## 2023-06-26 PROCEDURE — 84443 ASSAY THYROID STIM HORMONE: CPT

## 2023-06-26 PROCEDURE — 95711 VEEG 2-12 HR UNMONITORED: CPT

## 2023-06-26 PROCEDURE — P9016: CPT

## 2023-06-26 PROCEDURE — 95700 EEG CONT REC W/VID EEG TECH: CPT

## 2023-06-26 PROCEDURE — 82272 OCCULT BLD FECES 1-3 TESTS: CPT

## 2023-06-26 PROCEDURE — 83615 LACTATE (LD) (LDH) ENZYME: CPT

## 2023-06-26 PROCEDURE — 71250 CT THORAX DX C-: CPT

## 2023-06-26 PROCEDURE — 36569 INSJ PICC 5 YR+ W/O IMAGING: CPT

## 2023-06-26 PROCEDURE — 97110 THERAPEUTIC EXERCISES: CPT

## 2023-06-26 PROCEDURE — 84157 ASSAY OF PROTEIN OTHER: CPT

## 2023-06-26 PROCEDURE — 81025 URINE PREGNANCY TEST: CPT

## 2023-06-26 PROCEDURE — 85610 PROTHROMBIN TIME: CPT

## 2023-06-26 PROCEDURE — C1894: CPT

## 2023-06-26 PROCEDURE — 87640 STAPH A DNA AMP PROBE: CPT

## 2023-06-26 PROCEDURE — 87116 MYCOBACTERIA CULTURE: CPT

## 2023-06-26 PROCEDURE — 93005 ELECTROCARDIOGRAM TRACING: CPT

## 2023-06-26 PROCEDURE — 87086 URINE CULTURE/COLONY COUNT: CPT

## 2023-06-26 PROCEDURE — 85027 COMPLETE CBC AUTOMATED: CPT

## 2023-06-26 PROCEDURE — 82803 BLOOD GASES ANY COMBINATION: CPT

## 2023-06-26 PROCEDURE — 97162 PT EVAL MOD COMPLEX 30 MIN: CPT

## 2023-06-26 PROCEDURE — 85025 COMPLETE CBC W/AUTO DIFF WBC: CPT

## 2023-06-26 PROCEDURE — 83605 ASSAY OF LACTIC ACID: CPT

## 2023-06-26 PROCEDURE — 82330 ASSAY OF CALCIUM: CPT

## 2023-06-26 PROCEDURE — 85384 FIBRINOGEN ACTIVITY: CPT

## 2023-06-26 RX ORDER — LEVOTHYROXINE SODIUM 125 MCG
75 TABLET ORAL DAILY
Refills: 0 | Status: DISCONTINUED | OUTPATIENT
Start: 2023-06-26 | End: 2023-07-05

## 2023-06-26 RX ORDER — BRIVARACETAM 25 MG/1
100 TABLET, FILM COATED ORAL
Refills: 0 | Status: DISCONTINUED | OUTPATIENT
Start: 2023-06-26 | End: 2023-06-26

## 2023-06-26 RX ORDER — ACETAMINOPHEN 500 MG
20.31 TABLET ORAL
Qty: 0 | Refills: 0 | DISCHARGE
Start: 2023-06-26

## 2023-06-26 RX ORDER — POLYETHYLENE GLYCOL 3350 17 G/17G
17 POWDER, FOR SOLUTION ORAL DAILY
Refills: 0 | Status: DISCONTINUED | OUTPATIENT
Start: 2023-06-26 | End: 2023-07-05

## 2023-06-26 RX ORDER — NYSTATIN 500MM UNIT
5 POWDER (EA) MISCELLANEOUS
Qty: 0 | Refills: 0 | DISCHARGE
Start: 2023-06-26

## 2023-06-26 RX ORDER — SENNA PLUS 8.6 MG/1
2 TABLET ORAL
Qty: 0 | Refills: 0 | DISCHARGE
Start: 2023-06-26

## 2023-06-26 RX ORDER — LISINOPRIL 2.5 MG/1
20 TABLET ORAL DAILY
Refills: 0 | Status: DISCONTINUED | OUTPATIENT
Start: 2023-06-26 | End: 2023-07-05

## 2023-06-26 RX ORDER — NYSTATIN CREAM 100000 [USP'U]/G
1 CREAM TOPICAL
Refills: 0 | Status: DISCONTINUED | OUTPATIENT
Start: 2023-06-26 | End: 2023-07-06

## 2023-06-26 RX ORDER — OXYCODONE HYDROCHLORIDE 5 MG/1
5 TABLET ORAL EVERY 6 HOURS
Refills: 0 | Status: DISCONTINUED | OUTPATIENT
Start: 2023-06-26 | End: 2023-06-27

## 2023-06-26 RX ORDER — AMANTADINE HCL 100 MG
50 CAPSULE ORAL DAILY
Refills: 0 | Status: DISCONTINUED | OUTPATIENT
Start: 2023-06-26 | End: 2023-06-28

## 2023-06-26 RX ORDER — LACOSAMIDE 50 MG/1
150 TABLET ORAL
Refills: 0 | Status: DISCONTINUED | OUTPATIENT
Start: 2023-06-26 | End: 2023-06-26

## 2023-06-26 RX ORDER — SENNA PLUS 8.6 MG/1
2 TABLET ORAL AT BEDTIME
Refills: 0 | Status: DISCONTINUED | OUTPATIENT
Start: 2023-06-26 | End: 2023-07-09

## 2023-06-26 RX ORDER — LEVOTHYROXINE SODIUM 125 MCG
1 TABLET ORAL
Refills: 0 | DISCHARGE

## 2023-06-26 RX ORDER — POLYETHYLENE GLYCOL 3350 17 G/17G
17 POWDER, FOR SOLUTION ORAL
Qty: 0 | Refills: 0 | DISCHARGE
Start: 2023-06-26

## 2023-06-26 RX ORDER — BRIVARACETAM 25 MG/1
10 TABLET, FILM COATED ORAL
Qty: 0 | Refills: 0 | DISCHARGE
Start: 2023-06-26

## 2023-06-26 RX ORDER — LACOSAMIDE 50 MG/1
15 TABLET ORAL
Qty: 0 | Refills: 0 | DISCHARGE
Start: 2023-06-26

## 2023-06-26 RX ORDER — BACITRACIN ZINC 500 UNIT/G
1 OINTMENT IN PACKET (EA) TOPICAL
Qty: 0 | Refills: 0 | DISCHARGE
Start: 2023-06-26

## 2023-06-26 RX ORDER — LACOSAMIDE 50 MG/1
150 TABLET ORAL
Refills: 0 | Status: DISCONTINUED | OUTPATIENT
Start: 2023-06-26 | End: 2023-07-04

## 2023-06-26 RX ORDER — LEVOTHYROXINE SODIUM 125 MCG
1 TABLET ORAL
Qty: 0 | Refills: 0 | DISCHARGE
Start: 2023-06-26

## 2023-06-26 RX ORDER — ENOXAPARIN SODIUM 100 MG/ML
40 INJECTION SUBCUTANEOUS EVERY 24 HOURS
Refills: 0 | Status: DISCONTINUED | OUTPATIENT
Start: 2023-06-26 | End: 2023-07-04

## 2023-06-26 RX ORDER — ENOXAPARIN SODIUM 100 MG/ML
40 INJECTION SUBCUTANEOUS
Qty: 0 | Refills: 0 | DISCHARGE
Start: 2023-06-26

## 2023-06-26 RX ORDER — TRAZODONE HCL 50 MG
1 TABLET ORAL
Qty: 0 | Refills: 0 | DISCHARGE
Start: 2023-06-26

## 2023-06-26 RX ORDER — BRIVARACETAM 25 MG/1
100 TABLET, FILM COATED ORAL
Refills: 0 | Status: DISCONTINUED | OUTPATIENT
Start: 2023-06-26 | End: 2023-07-04

## 2023-06-26 RX ORDER — NICOTINE POLACRILEX 2 MG
1 GUM BUCCAL
Qty: 0 | Refills: 0 | DISCHARGE
Start: 2023-06-26

## 2023-06-26 RX ORDER — OXYCODONE HYDROCHLORIDE 5 MG/1
5 TABLET ORAL
Qty: 0 | Refills: 0 | DISCHARGE
Start: 2023-06-26

## 2023-06-26 RX ORDER — ACETAMINOPHEN 500 MG
650 TABLET ORAL EVERY 6 HOURS
Refills: 0 | Status: DISCONTINUED | OUTPATIENT
Start: 2023-06-26 | End: 2023-07-05

## 2023-06-26 RX ORDER — ALBUTEROL 90 UG/1
1 AEROSOL, METERED ORAL
Qty: 0 | Refills: 0 | DISCHARGE
Start: 2023-06-26

## 2023-06-26 RX ORDER — LISINOPRIL 2.5 MG/1
1 TABLET ORAL
Qty: 0 | Refills: 0 | DISCHARGE
Start: 2023-06-26

## 2023-06-26 RX ORDER — AMANTADINE HCL 100 MG
5 CAPSULE ORAL
Qty: 0 | Refills: 0 | DISCHARGE
Start: 2023-06-26

## 2023-06-26 RX ORDER — OXYCODONE HYDROCHLORIDE 5 MG/1
5 TABLET ORAL EVERY 6 HOURS
Refills: 0 | Status: DISCONTINUED | OUTPATIENT
Start: 2023-06-26 | End: 2023-06-26

## 2023-06-26 RX ORDER — TRAZODONE HCL 50 MG
50 TABLET ORAL AT BEDTIME
Refills: 0 | Status: DISCONTINUED | OUTPATIENT
Start: 2023-06-26 | End: 2023-06-30

## 2023-06-26 RX ADMIN — Medication 1 PATCH: at 18:29

## 2023-06-26 RX ADMIN — Medication 75 MICROGRAM(S): at 05:14

## 2023-06-26 RX ADMIN — Medication 500000 UNIT(S): at 05:17

## 2023-06-26 RX ADMIN — BRIVARACETAM 100 MILLIGRAM(S): 25 TABLET, FILM COATED ORAL at 19:34

## 2023-06-26 RX ADMIN — Medication 50 MILLIGRAM(S): at 20:27

## 2023-06-26 RX ADMIN — OXYCODONE HYDROCHLORIDE 5 MILLIGRAM(S): 5 TABLET ORAL at 20:05

## 2023-06-26 RX ADMIN — LACOSAMIDE 130 MILLIGRAM(S): 50 TABLET ORAL at 05:15

## 2023-06-26 RX ADMIN — LACOSAMIDE 150 MILLIGRAM(S): 50 TABLET ORAL at 20:28

## 2023-06-26 RX ADMIN — Medication 650 MILLIGRAM(S): at 09:08

## 2023-06-26 RX ADMIN — ALBUTEROL 2.5 MILLIGRAM(S): 90 AEROSOL, METERED ORAL at 13:11

## 2023-06-26 RX ADMIN — ALBUTEROL 2.5 MILLIGRAM(S): 90 AEROSOL, METERED ORAL at 05:16

## 2023-06-26 RX ADMIN — Medication 500000 UNIT(S): at 13:11

## 2023-06-26 RX ADMIN — OXYCODONE HYDROCHLORIDE 5 MILLIGRAM(S): 5 TABLET ORAL at 19:34

## 2023-06-26 RX ADMIN — LISINOPRIL 20 MILLIGRAM(S): 2.5 TABLET ORAL at 05:14

## 2023-06-26 RX ADMIN — BRIVARACETAM 100 MILLIGRAM(S): 25 TABLET, FILM COATED ORAL at 05:16

## 2023-06-26 RX ADMIN — Medication 0.1 MILLIGRAM(S): at 05:14

## 2023-06-26 RX ADMIN — Medication 50 MILLIGRAM(S): at 06:17

## 2023-06-26 RX ADMIN — Medication 1 PATCH: at 13:10

## 2023-06-26 RX ADMIN — Medication 650 MILLIGRAM(S): at 08:38

## 2023-06-26 RX ADMIN — Medication 1 PATCH: at 11:15

## 2023-06-26 RX ADMIN — ENOXAPARIN SODIUM 40 MILLIGRAM(S): 100 INJECTION SUBCUTANEOUS at 19:35

## 2023-06-26 NOTE — PROGRESS NOTE ADULT - NS ATTEND AMEND GEN_ALL_CORE FT
s/p ICH, s/p extubation, s/p peg for dysphagia  patient tolerating peg feeds    plan management as above.
38yF adm with IPH in NSCU.Now extugated and had PEG yesterday for dysphagia  low grade Temp last night  being FU on reg floor

## 2023-06-26 NOTE — PROGRESS NOTE ADULT - TIME BILLING
50 minutes spent on non-critical care patient
chart reviewing, history taking, physical exam, assessment and documentation, including speaking to specialist/SW/CM regarding the management.
Chart review, exam, documentation, discussion w family and Nsx team.

## 2023-06-26 NOTE — H&P ADULT - NSHPLABSRESULTS_GEN_ALL_CORE
6/6/23:  CT head; Ct angio of head & neck:  IMPRESSION: Very large right frontal parenchymal hemorrhage with   intraventricular extension and mild left-sided hydrocephalus, midline   shift to the left. CTA of the head and neck reveals no aneurysms or AVM.   However the large parenchymal hemorrhage may compress an underlying   vascular malformation or fistula.    6/6/23:  CT cervical spine:  IMPRESSION:  No acute fracture or traumatic subluxation.  No prevertebral soft tissue swelling.  No appreciable spinal canal stenosis or neural foraminal narrowing    6/6/23:  CT head:  Impression: Post drainage of right frontal parenchymal hemorrhage there   postoperative changes. Residual hemorrhage is significantly decreased.   Mild residual intraventricular hemorrhage. Decreased hydrocephalus   compared to 2:33 PM. Large right frontal parietal craniectomy     6/13/23:  MR head; MR angio of head & neck:  BRAIN:   Large right cerebral hemispheric parenchymal hematoma status   post evacuation and craniectomy. Superficial surgical drain in place.   Ventricular catheter in place. No adverse interval change CT head   6/9/2023.    6/8/23:  TTE w or wo ultrasound enhancing agent:  CONCLUSIONS:      1. Technically difficult image quality.   2. Normal left ventricular cavity size. The left ventricular wall thickness is normal. The left ventricular systolic function is mildly decreased with an ejection fraction of 57 % by Thompson's method of disks. There are regional wall motion abnormalities consistent with ischemic heart disease.   3. Basal and mid inferior septum, basal and mid inferior wall, and mid inferolateral segment are abnormal.   4. Normal right ventricular cavity size and normal systolic function.   5. No prior echocardiogram is available for comparison.    6/12/23:  ECG:  Diagnosis Line NORMAL SINUS RHYTHM  NORMAL ECG  NO PREVIOUS ECGS AVAILABLE  Confirmed by DILLON GLASS, EDWINA ([id number]) on 6/13/2023 4:25:02 PM    6/22/23;  TTE w or wo ultrasound enhancing agent:  CONCLUSIONS:     1. The left ventricular systolic function is normal with an ejection fraction of 70 % by Thompson's method of disks.   2. Compared to the transthoracic echocardiogram performed on 6/8/2023 left ventricular function has improved.    RECENT CULTURES:  06-22 @ 00:49 .Blood Blood     No growth to date. 6/6/23:  CT head; Ct angio of head & neck:  IMPRESSION: Very large right frontal parenchymal hemorrhage with   intraventricular extension and mild left-sided hydrocephalus, midline   shift to the left. CTA of the head and neck reveals no aneurysms or AVM.   However the large parenchymal hemorrhage may compress an underlying   vascular malformation or fistula.    6/6/23:  CT cervical spine:  IMPRESSION:  No acute fracture or traumatic subluxation.  No prevertebral soft tissue swelling.  No appreciable spinal canal stenosis or neural foraminal narrowing    6/6/23:  CT head:  Impression: Post drainage of right frontal parenchymal hemorrhage there   postoperative changes. Residual hemorrhage is significantly decreased.   Mild residual intraventricular hemorrhage. Decreased hydrocephalus   compared to 2:33 PM. Large right frontal parietal craniectomy     6/13/23:  MR head; MR angio of head & neck:  BRAIN:   Large right cerebral hemispheric parenchymal hematoma status   post evacuation and craniectomy. Superficial surgical drain in place.   Ventricular catheter in place. No adverse interval change CT head   6/9/2023.    6/8/23:  TTE w or wo ultrasound enhancing agent:  CONCLUSIONS:      1. Technically difficult image quality.   2. Normal left ventricular cavity size. The left ventricular wall thickness is normal. The left ventricular systolic function is mildly decreased with an ejection fraction of 57 % by Thompson's method of disks. There are regional wall motion abnormalities consistent with ischemic heart disease.   3. Basal and mid inferior septum, basal and mid inferior wall, and mid inferolateral segment are abnormal.   4. Normal right ventricular cavity size and normal systolic function.   5. No prior echocardiogram is available for comparison.    6/12/23:  ECG:  Diagnosis Line NORMAL SINUS RHYTHM  NORMAL ECG  NO PREVIOUS ECGS AVAILABLE  Confirmed by DILLON GLASS, EDWINA ([id number]) on 6/13/2023 4:25:02 PM    6/22/23;  TTE w or wo ultrasound enhancing agent:  CONCLUSIONS:     1. The left ventricular systolic function is normal with an ejection fraction of 70 % by Thompson's method of disks.   2. Compared to the transthoracic echocardiogram performed on 6/8/2023 left ventricular function has improved.    RECENT CULTURES:  06-22 @ 00:49 .Blood Blood     No growth to date.    6/23 EEG:   Clinical Impression:  Evidence for right hemispheric  structural lesion  Mild superimposed diffuse/multifocal cerebral dysfunction, not specific as to etiology  Right parietocentral region skull defect  There were no epileptiform abnormalities recorded.

## 2023-06-26 NOTE — DISCHARGE NOTE PROVIDER - REASON FOR ADMISSION
adm 6/6 Right IPH w/ IVH. s/p R hemicraniectomy for hematoma evacuation bone flap discarded EVD placed (removed 6/17)  Cerebral angiogram negative 6/9  IPH

## 2023-06-26 NOTE — PROGRESS NOTE ADULT - ASSESSMENT
ASSESSMENT & PLAN: Adm 6/6 after found unresponsive, CTH w/ large Rt frontal IPH w/ IVH and hydrocephalus, Angio 6/6 negative, now patient is s/pRt Jose crani evac hematoma w/flap discarded and intraop EVD on 6/6. EVD removed 6/17, 6/21 PEG placed  POD#20 R hemicraniectomy   s/p PICC 6/12 for access     NEURO:  Amantadine neurostimulant 50 mg in AM   continue anti-epileptic management w/ briviact 150 BID, and Vimpat 150 BID  Pain control prn meds, tylenol and oxy  Helmet when OOB, device at bedside   PT/OT - Rec AR, GCR dispo this afternoon     PULM:  on room air     CV:  HTN on admission   continue anti-HTN meds clonidine 0.1 mg wd, lisinopril 20 mg qd    RENAL:  IVL  hypernatremia improved s/p free water. monitor     GI:  PEG tube placed 6/21, tolerating glucerna TF goal 60 cc/hr   LBM     ENDO:   Goal euglycemia (-180), no issues     HEME/ONC:  VTE prophylaxis: [x] SCDs [x] chemoprophylaxis SQL     ID:  oral thrush continue nystatin   afebrile, no leukocytosis     Discussed with family discharge plan to [facility] today 3 PM.   will discuss plan w/ Dr. Bernstein   spectra [phone] ASSESSMENT & PLAN: Adm 6/6 after found unresponsive, CTH w/ large Rt frontal IPH w/ IVH and hydrocephalus, Angio 6/6 negative, now patient is s/pRt Jose crani evac hematoma w/flap discarded and intraop EVD on 6/6. EVD removed 6/17, 6/21 PEG placed  POD#20 R hemicraniectomy   s/p PICC 6/12 for access     NEURO:  Amantadine neurostimulant 50 mg in AM   continue anti-epileptic management w/ briviact 150 BID, and Vimpat 150 BID  Pain control prn meds, tylenol and oxy  Helmet when OOB, device at bedside   PT/OT - Rec AR, GCR dispo this afternoon     PULM:  on room air     CV:  HTN on admission   continue anti-HTN meds lisinopril 20 mg qd w/ hold parameters. clonidine d/c'd     RENAL:  IVL  hypernatremia improved s/p free water. d/c FW and follow Na     GI:  PEG tube placed 6/21, tolerating glucerna TF goal 60 cc/hr   LBM 6/26 today     ENDO:   Goal euglycemia (-180), no issues     HEME/ONC:  VTE prophylaxis: [x] SCDs [x] chemoprophylaxis SQL     ID:  oral thrush continue nystatin end 7/2   afebrile, no leukocytosis     Discussed with family discharge plan to [facility] today 3 PM. Paperwork provided and questions answered. Patients family HCP wife is understanding of plan to follow up with neuroplastic surgeon Dr. Burleson in 12 weeks or sooner for cranioplasty planning   will discuss plan w/ Dr. Bernstein   spectra [phone]

## 2023-06-26 NOTE — PROGRESS NOTE ADULT - SUBJECTIVE AND OBJECTIVE BOX
CC: f/u for  fever  Patient reports  has a headache  REVIEW OF SYSTEMS:  All other review of systems negative (Comprehensive ROS)    Antimicrobials Day #  :  nystatin    Suspension [id number] Unit(s) Oral every 6 hours    Other Medications Reviewed    T(F): 98 (06-26-23 @ 09:48), Max: 98.3 (06-25-23 @ 21:00)  HR: 84 (06-26-23 @ 10:02)  BP: 100/70 (06-26-23 @ 10:02)  RR: 19 (06-26-23 @ 09:48)  SpO2: 97% (06-26-23 @ 10:01)  Wt(kg): --    PHYSICAL EXAM:  General: alert, no acute distress, craniectomy wound is clean  Eyes:  anicteric, no conjunctival injection, no discharge  Oropharynx: no lesions or injection 	  Neck: supple, without adenopathy  Lungs: clear to auscultation  Heart: regular rate and rhythm; no murmur, rubs or gallops  Abdomen: soft, nondistended, nontender, without mass or organomegaly  Skin: no lesions  Extremities: no clubbing, cyanosis, or edema  Neurologic: alert, oriented, moves right  extremities    LAB RESULTS:                        9.9    11.11 )-----------( 329      ( 25 Jun 2023 07:13 )             30.8     06-25    139  |  101  |  17  ----------------------------<  125<H>  3.8   |  25  |  0.39<L>    Ca    9.5      25 Jun 2023 07:11        Urinalysis Basic - ( 25 Jun 2023 07:11 )    Color: x / Appearance: x / SG: x / pH: x  Gluc: 125 mg/dL / Ketone: x  / Bili: x / Urobili: x   Blood: x / Protein: x / Nitrite: x   Leuk Esterase: x / RBC: x / WBC x   Sq Epi: x / Non Sq Epi: x / Bacteria: x      MICROBIOLOGY:  RECENT CULTURES:  06-22 @ 13:09 Catheterized Catheterized     <10,000 CFU/mL Normal Urogenital Amanda      06-22 @ 00:49 .Blood Blood     No growth to date.      06-22 @ 00:40 .Blood Blood     No growth to date.          RADIOLOGY REVIEWED:    < from: CT Abdomen and Pelvis w/ IV Cont (06.15.23 @ 21:15) >    ACC: [id number] EXAM:  CT ABDOMEN AND PELVIS IC   ORDERED BY: SHILPA DUKES     ACC: [id number] EXAM:  CT CHEST IC   ORDERED BY: SHILPA DUKES     PROCEDURE DATE:  06/15/2023          INTERPRETATION:  CLINICAL INFORMATION: Oncologic workup    COMPARISON: CT chest abdomen and pelvis 6/8/2023    CONTRAST/COMPLICATIONS:  IV Contrast: Omnipaque 350 (accession [id number]), IV contrast documented   in unlinked concurrent exam (accession [id number])  90 cc administered   10   cc discarded  Oral Contrast: NONE  Complications: None reported at time of study completion    PROCEDURE:  CT of the Chest, Abdomen and Pelvis was performed.  Sagittal and coronal reformats were performed.    FINDINGS: Some images are degraded by artifacts from the patient's arms   within the scanning field of view.  Motion artifacts degrade some of the imaging.    CHEST:  LUNGS AND LARGE AIRWAYS: Respiratory motion artifact.  Mild dependent and mild left basilar subsegmental atelectasis.  PLEURA: No pleural effusion.  VESSELS: Within normal limits. Right arm PICC line terminates at the   superior cavoatrial junction  Left arm catheter terminates within the left axillary artery  HEART: Heart size is normal. No pericardial effusion.  MEDIASTINUM AND LEEROY: No lymphadenopathy. Enteric tube in place  CHEST WALL AND LOWER NECK: No significant acute abnormality.    ABDOMEN AND PELVIS:  LIVER: Mild focal fat deposition adjacent to the falciform ligament.  BILE DUCTS: Normal caliber.  GALLBLADDER: Nondistended  SPLEEN: Within normal limits.  PANCREAS: Within normal limits.  ADRENALS: Within normal limits.  KIDNEYS/URETERS: Symmetric renal enhancement without hydronephrosis.  Few subcentimeter right lower pole hypodensities too small for definitive   characterization    BLADDER: Minimally distended. Mild diffuse wall thickening  REPRODUCTIVE ORGANS: Uterus and adnexa within normal limits. Mildly   distended fluid-filled lower vagina    BOWEL: No bowel obstruction. Appendix is normal.  Postpyloric positioning of theweighted feeding tube, tip terminates   within the second portion of duodenum  Liquid density stool with air fluid levels throughout colon in keeping   with diarrheal illness. Rectal tube in place  PERITONEUM: Trace volume pelvic fluid, within physiologic limits.  VESSELS: Within normal limits.  RETROPERITONEUM/LYMPH NODES: No lymphadenopathy.  ABDOMINAL WALL: Anterior abdominal wall subcutaneous nodularity with foci   of air most likely correlates with sites of medication administration.  BONES:No significant acute bony abnormality.    IMPRESSION:  Bladder wall thickening suggests cystitis. Recommend urinalysis   correlation    No imaging evidence of primary malignancy or metastatic disease involving   the chest, abdomen or pelvis.    < end of copied text >  < from: MR Angio Neck w/ IV Cont (06.13.23 @ 14:43) >    ACC: [id number] EXAM:  MR ANGIO NECK IC   ORDERED BY:  NOEL BAHENA     ACC: [id number] EXAM:  MR BRAIN WAW IC   ORDERED BY: LEILA BEEBE     ACC: [id number] EXAM:  MR ANGIO BRAIN   ORDERED BY:  NOEL BAHENA     PROCEDURE DATE:  06/13/2023          INTERPRETATION:  Three examinations were performed:  1.  MR angiography neck circulation with and without gadolinium contrast  2.  MR angiography intracranial circulation without gadolinium contrast  3.  MR brain with and without gadolinium contrast      CLINICAL INFORMATION:    [facility]   Admitting Dxs: I61.9 UNRESPONSIVE  MMR    TECHNIQUE:    Neck circulation:   MR angiography was performed from the arch to the   skull base using two-dimensional time-of-flight technique. This data set   was reconstructed as maximum intensity pixel images and displayed in   multiple rotations.   Supplemental three-dimensional acquisition centered   at the carotid bifurcations was also performed, reconstructed as maximum   intensity pixel images and displayed in multiple rotations. Supplemental   gadolinium-enhanced MR angiography was performed during administration   with a dynamic first-pass subtraction technique. This data set was   reconstructed as maximum intensity pixel images and displayed in multiple   locations. The gadolinium-enhanced acquisition was obtained in the venous   phase. The nongadolinium acquisition substantially degraded by patient   motion.    Intracranial circulation:  MR angiography was performed using   three-dimensional time-of-flight technique.  This data set was   reconstructed as maximum intensity pixel images and displayed in multiple   rotations.     Brain:  Sagittal and axial T1-weighted images, axial FLAIR images,   axial susceptibility weighted images, axial T2-weighted images and axial   diffusion weighted images of the brain were obtained. Following   gadolinium administration axial volumetric T1-weighted gradient echo   images were obtained.  This data set was reconstructed in the sagittal   and coronal planes.  CONTRAST:    8 cc administered  ;  2 cc discarded    Note:   Artifact from gross patient motion causes image blurring and   limits evaluation.    COMPARISON:  CT head 6/9/2023 and CT angiography 6/6/2023      FINDINGS:    NECK CIRCULATION:    The RIGHT CAROTID circulation demonstrates an intact common carotid   artery. The carotid bulb appears intact.   The internal carotid artery is   patent to the skull base.    The LEFT CAROTID circulation demonstrates an intact common carotid   artery. The carotid bulb appears intact.   The internal carotid artery is   patent to the skull base.    The vertebral circulation demonstrates patent right and left vertebral   arteries. The vertebral arteries are near symmetric in caliber.  The   degree of vertebral asymmetry is within physiologic variation.  Each   vertebral artery demonstrates near constant caliber from its origin to   the foramen magnum.    INTRACRANIAL CIRCULATION:    The ANTERIOR circulation demonstrates intact inflow from the ascending   cervical segment to the petrous segment of each internal carotid artery.   The cavernous and clinoid segments largely obscured by artifact on this   examination.   The anterior cerebral arteries are patent and symmetric.    An anterior communicating artery is not convincingly demonstrated. The   anterior cerebral artery A2 segments are patent to peripheral branching..    The right middle cerebral artery demonstrates luminal irregularity in   its initial M1 segment without focal high-grade stenosis peripheral   anterior and posterior division sylvian branches also demonstrate   segmental narrowing and irregularity, partly obscured and distorted by   mass effect from the parenchymal hemorrhage.  The left middle cerebral   artery demonstratesmild luminal irregularity in its initial M1 segment   without critical stenosis. Its peripheral anterior and posterior division   sylvian branches are partly obscured by artifact, grossly intact.    The POSTERIOR circulation demonstrates intact inflow from each vertebral   artery.   The vertebral arteries are near symmetric in caliber.    PICA   arteries are patent on each side.  The basilar artery demonstrates   luminal irregularity and low grade stenoses.  Superior cerebellar   arteries are demonstrated.  Posterior communicating arteries is   demonstrated on the right, not clearly seen on the left. Each P1 and P2   segment consistent with luminal irregularity and at least low-grade   stenoses. Each posterior cerebral artery is patent to peripheral   branching.    No anomalous vessel termination, intracranial aneurysm or arteriovenous   malformation  is recognized.  Note that small intracranial aneurysms less   than 0.5 cm may not be detected by this technique.    BRAIN    BRAIN and CSF SPACES:   The is again noted for right frontal parietal   craniectomy.Cerebellar herniation of mildly dysmorphic and edematous   brain parenchyma or merely the posterior lateral aspect of the right   frontal lobe. Extensive residual parenchymal hemorrhage is again noted in   the deep hemispheric white matter and adjacent to the sylvian fissure   anterior limb. Edema pattern partly effaces the right lateral ventricle   and is associated with slight right-to-left subfalcine herniation. Small   volume of extra-axial fluid is present, without apparent significant mass   effect. A superficial drain is present. No abnormal brain parenchymal   enhancement occurs.    Right frontal ventricular catheter crosses these right frontal lobe,   frontal horn right lateral ventricle and septum pellucidum to terminate   in the frontal horn of the left lateral ventricle. The ventricular   configuration is unchanged. Intraventricular hemorrhage is most evident   on the left.    Small volume subarachnoid space hemorrhage is most evident over the   occipital lobes.    Edema pattern within the mid body of the corpus callosum appears to   represent contiguous edema associated with the hemorrhage. However,   separate shearing injury may be present.    VESSELS: MR angiography is reported above.    HEAD AND NECK STRUCTURES:   The orbits are unremarkable.  Paranasal   sinuses demonstrate mucosal disease within the right maxillary sinus and   to lesser extent frontal sinuses.  The nasal cavity appears intact. The   central skull base appears intact.  The nasopharynx is symmetric.  The   temporal bones appear clear of disease.  The calvarium appears   unremarkable.    ADDITIONAL FINDINGS:    None      IMPRESSION:    1.  RIGHT CAROTID NECK CIRCULATION:   Intact.    2.  LEFT CAROTID NECK CIRCULATION:    Intact.    3.  VERTEBRAL NECK CIRCULATION:   Intact    4.  ANTERIOR INTRACRANIAL CIRCULATION:     Patient motion artifact   limited. No definite adverse interval change CT angiography 6/6/2023    5.  POSTERIOR INTRACRANIAL CIRCULATION:   Intracranial atherosclerosis   posterior cerebral arteries at least mild to moderate.    6.  BRAIN:   Large right cerebral hemispheric parenchymal hematoma status   post evacuation and craniectomy. Superficial surgical drain in place.   Ventricular catheter in place. No adverse interval change CT head   6/9/2023.    --- End of Report ---        < end of copied text >              < from: TTE W or WO Ultrasound Enhancing Agent (06.22.23 @ 07:02) >    TRANSTHORACIC ECHOCARDIOGRAM REPORT  ________________________________________________________________________________                                      _______       Pt. Name:       BROOKE CAIN Study Date:    6/22/2023  MRN:            [id number]         YOB: 1985  Accession #:    [id number]          Age:           38 years  Account#:       [id number]       Gender:        F  Heart Rate:                        Height:        64.00 in (162.56 cm)  Rhythm:           Weight:        168.00 lb (76.20 kg)  Blood Pressure: 100/64 mmHg        BSA/BMI:       1.82 m² / 28.84 kg/m²  ________________________________________________________________________________________  Referring Physician:    [id number] Jean Carlos Bernstein  Interpreting Physician: Gian Mayo M.D.  Primary Sonographer:    Donovan Mata RDCS    CPT:               ECHO TTE W/O CON F/U LTD - 83820.m  Indication(s):     Other cerebrovascular disease - I67.89  Procedure:         Limited transthoracicechocardiogram.  Ordering Location: [facility]  Admission Status:  Inpatient  Study Information: Image quality for this study is fair.    _______________________________________________________________________________________  CONCLUSIONS:      1. The leftventricular systolic function is normal with an ejection fraction of 70 % by Thompson's method of disks.   2. Compared to the transthoracic echocardiogram performed on 6/8/2023 left ventricular function has improved.    ________________________________________________________________________________________  FINDINGS:     Left Ventricle:  The left ventricular systolic function is normal with a calculated ejection fraction of 70 % by the Thompson's biplane method of disks.     Right Ventricle:  The right ventricle is not well visualized. Normal right ventricular cavity size and normal systolic function.     Pericardium:  No pericardial effusion seen.  ____________________________________________________________________  QUANTITATIVE DATA  Left Ventricle Measurements                         Indexed BSA  LVIDd (2D):  3.7 cm  LVIDs (2D):  2.2 cm  LV Vol d, MOD A2C: 89.0 ml  49.00 ml/m²  LV Vol d, MOD A4C: 103.0 ml 56.71 ml/m²  LV Vol d, MOD BP:  95.3 ml  52.44 ml/m²  LV Vol s, MOD A2C: 25.5 ml14.04 ml/m²  LV Vol s, MOD A4C: 31.7 ml  17.45 ml/m²  LV Vol s, MOD BP:  28.7 ml  15.83 ml/m²  LVOT SV MOD BP:    66.5 ml  LV EF% MOD BP:     70 %       LVOT / RVOT/ Qp/Qs Data:  ________________________________________________________________________________________  Diagnosing Physician: Gian Mayo M.D.  Electronically signed on 6/22/2023 at 10:53:41 AM by Gian Mayo M.D.       < end of copied text >  Assessment: Patient admitted with large right  iph, angio unrevealing, required craniectomy, had some fever, no infection found, Fever resolved, leukocytosis is much improved. No infection is apparent at present.   Plan:  monitor off antibiotics  for rehab

## 2023-06-26 NOTE — H&P ADULT - NSHPSOCIALHISTORY_GEN_ALL_CORE
Lives with wife in [place], completed IVF 12/22?    Current everyday smoker w/ 10 pack years    OT 6/22  Bed Mobility  Bed Mobility Training Rolling/Turning: maximum assist (25% patient effort);  2 person assist;  verbal cues;  nonverbal cues (demo/gestures)  Bed Mobility Training Sit-to-Supine: dependent (less than 25% patient effort);  2 person assist;  nonverbal cues (demo/gestures);  verbal cues  Bed Mobility Training Supine-to-Sit: dependent (less than 25% patient effort);  2 person assist;  nonverbal cues (demo/gestures);  verbal cues  Bed Mobility Training Limitations: decreased ROM;  decreased strength;  impaired coordination;  impaired balance;  impaired postural control SW - Lives with mom. 2 steps to enter with handrail on both side. Able to reside on the first floor.     Current everyday smoker w/ 10 pack years    OT 6/22  Bed Mobility  Bed Mobility Training Rolling/Turning: maximum assist (25% patient effort);  2 person assist;  verbal cues;  nonverbal cues (demo/gestures)  Bed Mobility Training Sit-to-Supine: dependent (less than 25% patient effort);  2 person assist;  nonverbal cues (demo/gestures);  verbal cues  Bed Mobility Training Supine-to-Sit: dependent (less than 25% patient effort);  2 person assist;  nonverbal cues (demo/gestures);  verbal cues  Bed Mobility Training Limitations: decreased ROM;  decreased strength;  impaired coordination;  impaired balance;  impaired postural control

## 2023-06-26 NOTE — PROGRESS NOTE ADULT - PROBLEM SELECTOR PLAN 4
episodes of fever while in NSCU and again overnight 6/21  prior infectious work up unrevealing including negative Bcx/UA/CXR on 6/18 and unremarkable CT c/a/p on 6/15.  LE duplex negative on 6/14  leukocytosis worsened again today  check bladder scan  f/u repeat Bcx, LE duplex, Ucx, RVP  CXR (6/22)reviewed- no focal infiltrate  ID consult
episodes of fever during admission  prior infectious work up unrevealing including negative Bcx/UA/CXR on 6/18 and unremarkable CT c/a/p on 6/15.  repeat LE duplex negative on 6/22  no urinary retention on bladder scan  repeat Bcx/Ucx (6/21) negative, RVP negative  CXR (6/22)reviewed- no focal infiltrate  ID consult appreciated- monitor off abx
episodes of fever while in NSCU  infectious work up unrevealing including negative Bcx/UA/CXR on 6/18 and unremarkable CT c/a/p on 6/15.  LE duplex negative on 6/14  leukocytosis trending down  continue to monitor.
episodes of fever while in NSCU and again now with recurrent fevers   prior infectious work up unrevealing including negative Bcx/UA/CXR on 6/18 and unremarkable CT c/a/p on 6/15.  repeat LE duplex negative on 6/22  no urinary retention on bladder scan  repeat Bcx/Ucx (6/21) negative, RVP negative  CXR (6/22)reviewed- no focal infiltrate  ID consult appreciated- monitoring off abx  check repeat CBC today
episodes of fever while in NSCU and again now with recurrent fevers   prior infectious work up unrevealing including negative Bcx/UA/CXR on 6/18 and unremarkable CT c/a/p on 6/15.  repeat LE duplex negative on 6/22  no urinary retention on bladder scan  repeat Bcx/Ucx (6/21) negative, RVP negative  CXR (6/22)reviewed- no focal infiltrate  ID consult appreciated- monitoring off abx  --> remains afebrile now for past 48 hrs (last fever 6/22) and WBC count improving  - c/w monitor fever curve and WBC

## 2023-06-26 NOTE — H&P ADULT - HISTORY OF PRESENT ILLNESS
38 year old female w/ PMHx hypothyroidism who was admitted to [facility] 6/6 after being found on floor at home, with CT revealing large right-sided ICH. She required emergent right frontal craniectomy for decompression and EVD placement. EVD subsequently removed 6/16. Angio negative. Patient underwent G tube placement 6/20. Hospital course notable for fluctuating leukocytosis and high-grade temperatures with no identified source of infection including in urine, blood, CSF. Now admitted for multidisciplinary rehab.

## 2023-06-26 NOTE — PROGRESS NOTE ADULT - REASON FOR ADMISSION
IPH

## 2023-06-26 NOTE — PATIENT PROFILE ADULT - FALL HARM RISK - HARM RISK INTERVENTIONS
Assistance with ambulation/Assistance OOB with selected safe patient handling equipment/Communicate Risk of Fall with Harm to all staff/Discuss with provider need for PT consult/Monitor gait and stability/Provide patient with walking aids - walker, cane, crutches/Reinforce activity limits and safety measures with patient and family/Sit up slowly, dangle for a short time, stand at bedside before walking/Tailored Fall Risk Interventions/Use of alarms - bed, chair and/or voice tab/Visual Cue: Yellow wristband and red socks/Bed in lowest position, wheels locked, appropriate side rails in place/Call bell, personal items and telephone in reach/Instruct patient to call for assistance before getting out of bed or chair/Non-slip footwear when patient is out of bed/[place] to call system/Physically safe environment - no spills, clutter or unnecessary equipment/Purposeful Proactive Rounding/Room/bathroom lighting operational, light cord in reach Assistance with ambulation/Assistance OOB with selected safe patient handling equipment/Communicate Risk of Fall with Harm to all staff/Discuss with provider need for PT consult/Monitor gait and stability/Provide patient with walking aids - walker, cane, crutches/Reinforce activity limits and safety measures with patient and family/Tailored Fall Risk Interventions/Visual Cue: Yellow wristband and red socks/Bed in lowest position, wheels locked, appropriate side rails in place/Call bell, personal items and telephone in reach/Instruct patient to call for assistance before getting out of bed or chair/Non-slip footwear when patient is out of bed/[place] to call system/Physically safe environment - no spills, clutter or unnecessary equipment/Purposeful Proactive Rounding/Room/bathroom lighting operational, light cord in reach

## 2023-06-26 NOTE — PROGRESS NOTE ADULT - SUBJECTIVE AND OBJECTIVE BOX
INTERVAL HPI/OVERNIGHT EVENTS:  no abdominal pain  tolerating PEG feeds; BM x 2 yesterday  more alert and interactive  eager to go to rehab    MEDICATIONS  (STANDING):  albuterol    0.083% 2.5 milliGRAM(s) Nebulizer every 8 hours  amantadine Syrup 50 milliGRAM(s) Oral <User Schedule>  brivaracetam Oral Solution 100 milliGRAM(s) Oral two times a day  chlorhexidine 2% Cloths 1 Application(s) Topical daily  cloNIDine 0.1 milliGRAM(s) Oral every 12 hours  enoxaparin Injectable 40 milliGRAM(s) SubCutaneous every 24 hours  lacosamide IVPB 150 milliGRAM(s) IV Intermittent every 12 hours  levothyroxine 75 MICROGram(s) Oral daily  lisinopril 20 milliGRAM(s) Oral daily  nicotine - 21 mG/24Hr(s) Patch 1 Patch Transdermal daily  nystatin    Suspension 061357 Unit(s) Oral every 6 hours  polyethylene glycol 3350 17 Gram(s) Oral daily  senna 2 Tablet(s) Oral at bedtime  traZODone 50 milliGRAM(s) Oral at bedtime    MEDICATIONS  (PRN):  acetaminophen   Oral Liquid .. 650 milliGRAM(s) Oral every 6 hours PRN Temp greater or equal to 38C (100.4F), Mild Pain (1 - 3)  bacitracin   Ointment 1 Application(s) Topical every 6 hours PRN incisional healing  ondansetron Injectable 4 milliGRAM(s) IV Push every 6 hours PRN Nausea and/or Vomiting  oxyCODONE    Solution 5 milliGRAM(s) Oral every 6 hours PRN Moderate Pain (4 - 6)      Allergies  No Known Allergies      Review of Systems:  see HPI- remainder 10 point ROS negative    Vital Signs Last 24 Hrs  T(C): 36.7 (26 Jun 2023 09:48), Max: 37.2 (25 Jun 2023 13:00)  T(F): 98 (26 Jun 2023 09:48), Max: 98.9 (25 Jun 2023 13:00)  HR: 76 (26 Jun 2023 09:48) (76 - 96)  BP: 98/63 (26 Jun 2023 09:48) (98/63 - 129/92)  BP(mean): --  RR: 19 (26 Jun 2023 09:48) (18 - 19)  SpO2: 96% (26 Jun 2023 09:48) (96% - 99%)    Parameters below as of 26 Jun 2023 05:00  Patient On (Oxygen Delivery Method): room air    PHYSICAL EXAM:  Constitutional: awake and responsive/interactive; coherent speech.  mother, brother and spouse at bedside  Head: right crani, incision clean/dry - sunken, soft  Neck: No LAD, no JVD  Respiratory: good air entry bl no accessory muscle use  Cardiovascular: S1 and S2, RRR  Gastrointestinal: BS+, soft, NT/ND, abdominal binder removed, PEG site clean and dry  bumper at 6cm lightly touching skin able to spin freely 360 degrees  gauze under PEG bumper- removed by clinician;  binder resecured  Extremities: No peripheral edema, neg clubbing, cyanosis  Vascular: 2+ peripheral pulses  Neurological: eyes open, alert and responsive. follows commands left plegic. moves right side  Skin: No rashes, anicteric      LABS:                        9.9    11.11 )-----------( 329      ( 25 Jun 2023 07:13 )             30.8     06-25    139  |  101  |  17  ----------------------------<  125<H>  3.8   |  25  |  0.39<L>    Ca    9.5      25 Jun 2023 07:11        RADIOLOGY & ADDITIONAL TESTS:

## 2023-06-26 NOTE — PROGRESS NOTE ADULT - PROBLEM SELECTOR PLAN 5
continue with brivaracetam 100mg BID, lacosamide 150mg BID per neuro  repeat EEG negative for epileptiform abnormalities

## 2023-06-26 NOTE — PROGRESS NOTE ADULT - SUBJECTIVE AND OBJECTIVE BOX
SUBJECTIVE: patient seen and examined this AM with spouse, mother and brother at bedside. today she is POD 20 R hemicraniectomy for evacuation of hematoma. POD 5 PEG placement, she is awake and following commands. has been increasingly conversational per family.     Vital Signs Last 24 Hrs  T(C): 36.7 (06-26-23 @ 09:48), Max: 37.2 (06-25-23 @ 13:00)  T(F): 98 (06-26-23 @ 09:48), Max: 98.9 (06-25-23 @ 13:00)  HR: 76 (06-26-23 @ 09:48) (76 - 96)  BP: 98/63 (06-26-23 @ 09:48) (98/63 - 129/92)  BP(mean): --  RR: 19 (06-26-23 @ 09:48) (18 - 19)  SpO2: 96% (06-26-23 @ 09:48) (96% - 99%)    PHYSICAL EXAM:  Constitutional: No Acute Distress, waving hello   Neurological: Awake, Oriented x2-3 (says person, and month correctly, and place w/ choice), EOMI, improving mixed aphasia, Right side 4+/5, Left UE / Left LE withdraws to noxious stimuli   Incision: cranioplasty incision   Pulmonary: Clear to Auscultation, No rales, No rhonchi, No wheezes   Cardiovascular: S1, S2, Regular rate and rhythm   Gastrointestinal: Soft, Non-tender, Non-distended, +bowel sounds x 4, PEG site clean and dry and intact  Extremities: No calf tenderness bilaterally, no cyanosis, clubbing or edema    LABS:      9.9    11.11 )-----------( 329      ( 25 Jun 2023 07:13 )             30.8    06-25  139  |  101  |  17  ----------------------------<  125<H>  3.8   |  25  |  0.39<L>  Ca    9.5      25 Jun 2023 07:11    06-25 @ 07:01  -  06-26 @ 07:00  --------------------------------------------------------  IN: 2600 mL / OUT: 1300 mL / NET: 1300 mL    MEDICATIONS  (STANDING):  albuterol    0.083% 2.5 milliGRAM(s) Nebulizer every 8 hours  amantadine Syrup 50 milliGRAM(s) Oral <User Schedule>  brivaracetam Oral Solution 100 milliGRAM(s) Oral two times a day  chlorhexidine 2% Cloths 1 Application(s) Topical daily  cloNIDine 0.1 milliGRAM(s) Oral every 12 hours  enoxaparin Injectable 40 milliGRAM(s) SubCutaneous every 24 hours  lacosamide IVPB 150 milliGRAM(s) IV Intermittent every 12 hours  levothyroxine 75 MICROGram(s) Oral daily  lisinopril 20 milliGRAM(s) Oral daily  nicotine - 21 mG/24Hr(s) Patch 1 Patch Transdermal daily  nystatin    Suspension 025926 Unit(s) Oral every 6 hours  polyethylene glycol 3350 17 Gram(s) Oral daily  senna 2 Tablet(s) Oral at bedtime  traZODone 50 milliGRAM(s) Oral at bedtime    MEDICATIONS  (PRN):  acetaminophen   Oral Liquid .. 650 milliGRAM(s) Oral every 6 hours PRN Temp greater or equal to 38C (100.4F), Mild Pain (1 - 3)  bacitracin   Ointment 1 Application(s) Topical every 6 hours PRN incisional healing  ondansetron Injectable 4 milliGRAM(s) IV Push every 6 hours PRN Nausea and/or Vomiting  oxyCODONE    Solution 5 milliGRAM(s) Oral every 6 hours PRN Moderate Pain (4 - 6)    IMAGING: reviewed     DIET: PEG FEEDS glucerna @ goal 60cc/hr    SUBJECTIVE: patient seen and examined this AM with spouse, mother and brother at bedside. today she is POD 20 R hemicraniectomy for evacuation of hematoma. POD 5 PEG placement, she is awake and following commands. has been increasingly conversational per family after starting neurostimulant. Will d/c PICC line today, as all meds are administered through PEG.    Vital Signs Last 24 Hrs  T(C): 36.7 (06-26-23 @ 09:48), Max: 37.2 (06-25-23 @ 13:00)  T(F): 98 (06-26-23 @ 09:48), Max: 98.9 (06-25-23 @ 13:00)  HR: 76 (06-26-23 @ 09:48) (76 - 96)  BP: 98/63 (06-26-23 @ 09:48) (98/63 - 129/92)  BP(mean): --  RR: 19 (06-26-23 @ 09:48) (18 - 19)  SpO2: 96% (06-26-23 @ 09:48) (96% - 99%)    PHYSICAL EXAM:  Constitutional: No Acute Distress, waving hello   Neurological: Awake, Oriented x2-3 (says person, and month correctly, and place w/ choice), EOMI, improving mixed aphasia, Right side 4+/5, Left UE / Left LE withdraws to noxious stimuli   Incision: cranioplasty incision  Pulmonary: Clear to Auscultation, No rales, No rhonchi, No wheezes   Cardiovascular: S1, S2, Regular rate and rhythm   Gastrointestinal: Soft, Non-tender, Non-distended, +bowel sounds x 4, PEG site clean and dry and intact  Extremities: No calf tenderness bilaterally, no cyanosis, clubbing or edema    LABS:      9.9    11.11 )-----------( 329      ( 25 Jun 2023 07:13 )             30.8    06-25  139  |  101  |  17  ----------------------------<  125<H>  3.8   |  25  |  0.39<L>  Ca    9.5      25 Jun 2023 07:11    06-25 @ 07:01  -  06-26 @ 07:00  --------------------------------------------------------  IN: 2600 mL / OUT: 1300 mL / NET: 1300 mL    MEDICATIONS  (STANDING):  albuterol    0.083% 2.5 milliGRAM(s) Nebulizer every 8 hours  amantadine Syrup 50 milliGRAM(s) Oral <User Schedule>  brivaracetam Oral Solution 100 milliGRAM(s) Oral two times a day  chlorhexidine 2% Cloths 1 Application(s) Topical daily  cloNIDine 0.1 milliGRAM(s) Oral every 12 hours  enoxaparin Injectable 40 milliGRAM(s) SubCutaneous every 24 hours  lacosamide IVPB 150 milliGRAM(s) IV Intermittent every 12 hours  levothyroxine 75 MICROGram(s) Oral daily  lisinopril 20 milliGRAM(s) Oral daily  nicotine - 21 mG/24Hr(s) Patch 1 Patch Transdermal daily  nystatin    Suspension 660805 Unit(s) Oral every 6 hours  polyethylene glycol 3350 17 Gram(s) Oral daily  senna 2 Tablet(s) Oral at bedtime  traZODone 50 milliGRAM(s) Oral at bedtime    MEDICATIONS  (PRN):  acetaminophen   Oral Liquid .. 650 milliGRAM(s) Oral every 6 hours PRN Temp greater or equal to 38C (100.4F), Mild Pain (1 - 3)  bacitracin   Ointment 1 Application(s) Topical every 6 hours PRN incisional healing  ondansetron Injectable 4 milliGRAM(s) IV Push every 6 hours PRN Nausea and/or Vomiting  oxyCODONE    Solution 5 milliGRAM(s) Oral every 6 hours PRN Moderate Pain (4 - 6)    IMAGING: reviewed     DIET: PEG FEEDS glucerna @ goal 60cc/hr

## 2023-06-26 NOTE — PROGRESS NOTE ADULT - NUTRITIONAL ASSESSMENT
This patient has been assessed with a concern for Malnutrition and has been determined to have a diagnosis/diagnoses of Severe protein-calorie malnutrition.    This patient is being managed with:   Diet NPO with Tube Feed-  Tube Feeding Modality: Nasogastric  Glucerna 1.2 Tha (GLUCERNARTH)  Total Volume for 24 Hours (mL): 1200  Continuous  Starting Tube Feed Rate {mL per Hour}: 10  Increase Tube Feed Rate by (mL): 10     Every 4 hours  Until Goal Tube Feed Rate (mL per Hour): 50  Tube Feed Duration (in Hours): 24  Tube Feed Start Time: 07:30  Entered: Adrian 15 2023  7:21PM  
This patient has been assessed with a concern for Malnutrition and has been determined to have a diagnosis/diagnoses of Severe protein-calorie malnutrition.    This patient is being managed with:   Diet NPO with Tube Feed-  Tube Feeding Modality: Nasogastric  Glucerna 1.2 Tha (GLUCERNARTH)  Total Volume for 24 Hours (mL): 1200  Continuous  Starting Tube Feed Rate {mL per Hour}: 10  Increase Tube Feed Rate by (mL): 10     Every 4 hours  Until Goal Tube Feed Rate (mL per Hour): 50  Tube Feed Duration (in Hours): 24  Tube Feed Start Time: 10:22  Banatrol TF     Qty per Day:  1  Entered: Jun 16 2023  2:01AM  
This patient has been assessed with a concern for Malnutrition and has been determined to have a diagnosis/diagnoses of Severe protein-calorie malnutrition.    This patient is being managed with:   Diet NPO after Midnight-     NPO Start Date: 20-Jun-2023   NPO Start Time: 23:59  Except Medications  Entered: Jun 20 2023 12:52PM    Diet NPO with Tube Feed-  Tube Feeding Modality: Nasogastric  Glucerna 1.2 Tha (GLUCERNARTH)  Total Volume for 24 Hours (mL): 1200  Continuous  Starting Tube Feed Rate {mL per Hour}: 10  Increase Tube Feed Rate by (mL): 10     Every 4 hours  Until Goal Tube Feed Rate (mL per Hour): 50  Tube Feed Duration (in Hours): 24  Tube Feed Start Time: 10:22  Banatrol TF     Qty per Day:  1  Entered: Jun 16 2023  2:01AM  
This patient has been assessed with a concern for Malnutrition and has been determined to have a diagnosis/diagnoses of Severe protein-calorie malnutrition.    This patient is being managed with:   Diet NPO after Midnight-     NPO Start Date: 20-Jun-2023   NPO Start Time: 23:59  Except Medications  Entered: Jun 20 2023 12:52PM    Diet NPO with Tube Feed-  Tube Feeding Modality: Nasogastric  Glucerna 1.2 Tha (GLUCERNARTH)  Total Volume for 24 Hours (mL): 1200  Continuous  Starting Tube Feed Rate {mL per Hour}: 10  Increase Tube Feed Rate by (mL): 10     Every 4 hours  Until Goal Tube Feed Rate (mL per Hour): 50  Tube Feed Duration (in Hours): 24  Tube Feed Start Time: 10:22  Banatrol TF     Qty per Day:  1  Entered: Jun 16 2023  2:01AM  
This patient has been assessed with a concern for Malnutrition and has been determined to have a diagnosis/diagnoses of Severe protein-calorie malnutrition.    This patient is being managed with:   Diet NPO with Tube Feed-  Tube Feeding Modality: Nasogastric  Glucerna 1.2 Tha (GLUCERNARTH)  Total Volume for 24 Hours (mL): 1200  Continuous  Starting Tube Feed Rate {mL per Hour}: 10  Increase Tube Feed Rate by (mL): 10     Every 4 hours  Until Goal Tube Feed Rate (mL per Hour): 50  Tube Feed Duration (in Hours): 24  Tube Feed Start Time: 10:22  Banatrol TF     Qty per Day:  1  Entered: Jun 16 2023  2:01AM  
This patient has been assessed with a concern for Malnutrition and has been determined to have a diagnosis/diagnoses of Severe protein-calorie malnutrition.    This patient is being managed with:   Diet NPO with Tube Feed-  Tube Feeding Modality: Gastrostomy  Glucerna 1.2 Tha (GLUCERNARTH)  Total Volume for 24 Hours (mL): 1200  Continuous  Starting Tube Feed Rate {mL per Hour}: 30  Increase Tube Feed Rate by (mL): 10     Every 4 hours  Until Goal Tube Feed Rate (mL per Hour): 50  Tube Feed Duration (in Hours): 24  Tube Feed Start Time: 10:22  Banatrol TF     Qty per Day:  1     Qty per Day:  1  Entered: Jun 22 2023  9:33AM  
This patient has been assessed with a concern for Malnutrition and has been determined to have a diagnosis/diagnoses of Severe protein-calorie malnutrition.    This patient is being managed with:   Diet NPO with Tube Feed-  Tube Feeding Modality: Gastrostomy  Glucerna 1.2 Tha (GLUCERNARTH)  Total Volume for 24 Hours (mL): 1200  Continuous  Starting Tube Feed Rate {mL per Hour}: 30  Increase Tube Feed Rate by (mL): 10     Every 4 hours  Until Goal Tube Feed Rate (mL per Hour): 50  Tube Feed Duration (in Hours): 24  Tube Feed Start Time: 10:22  Banatrol TF     Qty per Day:  1     Qty per Day:  1  Entered: Jun 22 2023  9:33AM  
This patient has been assessed with a concern for Malnutrition and has been determined to have a diagnosis/diagnoses of Severe protein-calorie malnutrition.    This patient is being managed with:   Diet NPO with Tube Feed-  Tube Feeding Modality: Nasogastric  Glucerna 1.2 Tha (GLUCERNARTH)  Total Volume for 24 Hours (mL): 1200  Continuous  Starting Tube Feed Rate {mL per Hour}: 10  Increase Tube Feed Rate by (mL): 10     Every 4 hours  Until Goal Tube Feed Rate (mL per Hour): 50  Tube Feed Duration (in Hours): 24  Tube Feed Start Time: 07:30  Entered: Adrian 15 2023  7:21PM  
This patient has been assessed with a concern for Malnutrition and has been determined to have a diagnosis/diagnoses of Severe protein-calorie malnutrition.    This patient is being managed with:   Diet NPO with Tube Feed-  Tube Feeding Modality: Nasogastric  Glucerna 1.2 Tha (GLUCERNARTH)  Total Volume for 24 Hours (mL): 1200  Continuous  Starting Tube Feed Rate {mL per Hour}: 10  Increase Tube Feed Rate by (mL): 10     Every 4 hours  Until Goal Tube Feed Rate (mL per Hour): 50  Tube Feed Duration (in Hours): 24  Tube Feed Start Time: 10:22  Banatrol TF     Qty per Day:  1  Entered: Jun 16 2023  2:01AM  
This patient has been assessed with a concern for Malnutrition and has been determined to have a diagnosis/diagnoses of Severe protein-calorie malnutrition.    This patient is being managed with:   Diet NPO with Tube Feed-  Tube Feeding Modality: Gastrostomy  Glucerna 1.2 Tha (GLUCERNARTH)  Total Volume for 24 Hours (mL): 1200  Continuous  Starting Tube Feed Rate {mL per Hour}: 30  Increase Tube Feed Rate by (mL): 10     Every 4 hours  Until Goal Tube Feed Rate (mL per Hour): 50  Tube Feed Duration (in Hours): 24  Tube Feed Start Time: 10:22  Banatrol TF     Qty per Day:  1     Qty per Day:  1  Entered: Jun 22 2023  9:33AM  
This patient has been assessed with a concern for Malnutrition and has been determined to have a diagnosis/diagnoses of Severe protein-calorie malnutrition.    This patient is being managed with:   Diet NPO with Tube Feed-  Tube Feeding Modality: Gastrostomy  Glucerna 1.2 Tha (GLUCERNARTH)  Total Volume for 24 Hours (mL): 1200  Continuous  Starting Tube Feed Rate {mL per Hour}: 30  Increase Tube Feed Rate by (mL): 10     Every 4 hours  Until Goal Tube Feed Rate (mL per Hour): 50  Tube Feed Duration (in Hours): 24  Tube Feed Start Time: 10:22  Banatrol TF     Qty per Day:  1     Qty per Day:  1  Entered: Jun 22 2023  9:33AM  
This patient has been assessed with a concern for Malnutrition and has been determined to have a diagnosis/diagnoses of Severe protein-calorie malnutrition.    This patient is being managed with:   Diet NPO with Tube Feed-  Tube Feeding Modality: Nasogastric  Glucerna 1.2 Tha (GLUCERNARTH)  Total Volume for 24 Hours (mL): 1200  Continuous  Starting Tube Feed Rate {mL per Hour}: 10  Increase Tube Feed Rate by (mL): 10     Every 4 hours  Until Goal Tube Feed Rate (mL per Hour): 50  Tube Feed Duration (in Hours): 24  Tube Feed Start Time: 10:22  Banatrol TF     Qty per Day:  1  Entered: Jun 16 2023  2:01AM  

## 2023-06-26 NOTE — PROGRESS NOTE ADULT - PROBLEM SELECTOR PLAN 3
per spouse, patient was told in the past that her BP was elevated but on follow up visit, she was told it was ok and never started on any BP medication    - on clonidine 0.1mg to BID (started on this admission), lisinopril 20mg. BP borderline low today-  systolic. Decrease Lisinopril to 10mg/d, continue holding parameters on both. Monitor in rehab.     - TTE on 6/8 (technically difficult study): Normal left ventricular cavity size. The left ventricular wall thickness is normal. The left ventricular systolic function is mildly decreased with an ejection fraction of 57 % by Thompson's method of disks. There are regional wall motion abnormalities consistent with ischemic heart disease. Basal and mid inferior septum, basal and mid inferior wall, and mid inferolateral segment are abnormal.    - repeat TTE (6/22) with normal LVSF per spouse, patient was told in the past that her BP was elevated but on follow up visit, she was told it was ok and never started on any BP medication    - on clonidine 0.1mg to BID (started on this admission), lisinopril 20mg. BP borderline low today-  systolic. D/c Clonidine. Continue Lisinopril w holding parameters.     - TTE on 6/8 (technically difficult study): Normal left ventricular cavity size. The left ventricular wall thickness is normal. The left ventricular systolic function is mildly decreased with an ejection fraction of 57 % by Thompson's method of disks. There are regional wall motion abnormalities consistent with ischemic heart disease. Basal and mid inferior septum, basal and mid inferior wall, and mid inferolateral segment are abnormal.    - repeat TTE (6/22) with normal LVSF

## 2023-06-26 NOTE — PROGRESS NOTE ADULT - PROVIDER SPECIALTY LIST ADULT
Infectious Disease
NSICU
Neurosurgery
NSICU
Neurology
Neurosurgery
Infectious Disease
NSICU
Neurosurgery
Neurosurgery
Gastroenterology
Gastroenterology
NSICU
NSICU
Neurology
Neurosurgery
NSICU
NSICU
Neurosurgery
Neurosurgery
Internal Medicine
NSICU
NSICU
Neurosurgery
NSICU
NSICU
Internal Medicine
Hospitalist

## 2023-06-26 NOTE — PROGRESS NOTE ADULT - PROBLEM SELECTOR PLAN 6
recent TSH normal (1.18) on 6/6    continue with synthroid 75mcg (confirmed with A IVF clinic in [place] that the patient was last prescribed     synthroid 75mcg and last TSH in 11/2022 was 1.07)
recent TSH normal (1.18) on 6/6  continue with synthroid 75mcg (confirmed with A IVF clinic in [place] that the patient was last prescribed synthroid 75mcg and last TSH in 11/2022 was 1.07)
none
recent TSH normal (1.18) on 6/6  continue with synthroid 75mcg (confirmed with A IVF clinic in [place] that the patient was last prescribed synthroid 75mcg and last TSH in 11/2022 was 1.07)
recent TSH normal (1.18) on 6/6  continue with synthroid 75mcg (confirmed with A IVF clinic in [place] that the patient was last prescribed synthroid 75mcg and last TSH in 11/2022 was 1.07)
recent TSH normal (1.18) on 6/6  continue with synthroid 75mcg (confirmed with A IVF clinic in [place] that the patient was last prescribed synthroid 75mcg and last TSH in 11/2022 was 1.07)

## 2023-06-26 NOTE — H&P ADULT - NSHPREVIEWOFSYSTEMS_GEN_ALL_CORE
38 year old female w/ PMHx hypothyroidism who was admitted to [facility] 6/6 after being found on floor at home, with CT revealing large right-sided ICH. She required emergent right frontal craniectomy for decompression and EVD placement. EVD subsequently removed 6/16. Angio negative. Patient underwent G tube placement 6/20. Hospital course notable for fluctuating leukocytosis and high-grade temperatures with no identified source of infection including in urine, blood, CSF. Now admitted for multidisciplinary rehab. REVIEW OF SYSTEMS  Constitutional: No fever, No Chills, No fatigue  HEENT: No eye pain, No visual disturbances, No difficulty hearing, + headaches.   Pulm: No cough,  No shortness of breath  Cardio: No chest pain, No palpitations  GI:  No abdominal pain, No nausea, No vomiting, No diarrhea, No constipation  : No dysuria, No frequency, No hematuria  Neuro: No headaches, + memory loss, +  loss of strength, No numbness, No tremors  Skin: No itching, No rashes, + lesions   Endo: No temperature intolerance  MSK: No joint pain, No joint swelling, + muscle pain, No Neck or back pain  Psych:  No depression, No anxiety

## 2023-06-26 NOTE — H&P ADULT - ATTENDING COMMENTS
39 yo RHF s/p large Right hemispheric ICH/craniectomy with spastic dense  right hemiparesis,  right facial weakness, dysarthria , dysphagia and gait impairment. Admit to BIU with multiple functional deficits  Admit labs reviewed   Resume all medications   Aspiration and fall precaution   Wear helmet at all times when OOB  Sz ppx - Vimpat, Briviact   DVTppx   Consider Provigil trial   Wife and father at bedside - detailed update given, all questions answered   Radiological surveillance  Neurosurgical follow up

## 2023-06-26 NOTE — DISCHARGE NOTE PROVIDER - HOSPITAL COURSE
38F Hx hypothyroid, smoker, undergoing in vitro fertilization found down [facility] 11PM 6/5/2023. Got rocuronium for intubation. CTH w/large R IPH w/IVH, CTA negative for aneurysms, ICH score 4 admitted 6/6 with R ICH with MLS s/p emergent decompressive craniectomy and hematoma evacuation (bone flap discarded). EVD placement intraop @ 10 cm h20 and subgaleal drain placement (removed 6/10). remained intubated until safely weaned and extubated on 6/13. Hospital course complicated by post op hypotension requiring pressors, anemia requiring 1 Unit of PRBC on 6/9, and Right frontal region seizures seen on video eeg. patient weaned off pressors, H/H remained stable throughout hospital course s/p 1 unit once. Seizures adequately managed on currently regimen AED's briviact 100 bid and vimpat 150 bid. veeg negative since 6/12.   Patient needed adequate vascular access PICC placed in RUE 6/12. EVD challenged and removed on 6/17. In NSCU patient had low grade temps persistent with leukocytosis, no infectious cause identified, patient has remained afebrile since 6/21 cultures negative, ID consulted and patient has been monitored off abx leukocytosis now significantly improved. Patient transferred out of NSICU to [address] [place] [address] followed by hospitalist for comanagement  Patient being fed via NGT, failed multiple S&S eval. Patient s/p PEG tube placement on 6/21, tolerating feeds.   6/23 amantidine neurostimulant started and patients exam has significantly improved she is awake, still with mixed aphasia, oriented to self, place and year, moving the right side purposefully and withdrawing on Left side throughout. PICC removed 6/26  Seen by physical therapy, occupational therapy during admission who recommended dispo of acute rehab

## 2023-06-26 NOTE — PROGRESS NOTE ADULT - PROBLEM SELECTOR PLAN 7
lovenox for DVT prophylaxis. Last LE duplex on 6/22 neg for DVT   continue bowel regimen- last documented BM 6/20  continue albuterol neb  continue free water 200cc q8h via PEG- check repeat BMP today to assess mild hypernatremia and if worsening, consider increasing free water    will follow periodically
lovenox for DVT prophylaxis. Last LE duplex on 6/14 neg for DVT but repeat LE duplex pending  continue bowel regimen- last documented BM 6/20  continue albuterol neb  consider adding free water 200cc q6h via PEG   check bladder scan to r/o retention
lovenox for DVT prophylaxis. Last LE duplex on 6/22 neg for DVT   continue bowel regimen  continue albuterol neb  continue free water 200cc q8h via PEG- BMP with resolved hyperNa    plan for rehab today
resume chemoprophylaxis post-PEG when deemed stable. Last LE duplex on 6/14 neg for DVT.  continue bowel regimen- last documented BM 6/20  O2 sat on room air stable in mid-90's, continue albuterol neb, recent CXR (6/18) clear. monitor closely.  consider adding free water with tube feed but per discussion with neurosurgery on 6/20, trying to keep serum Na elevated so will hold off for now.
lovenox for DVT prophylaxis. Last LE duplex on 6/22 neg for DVT   continue bowel regimen- last documented BM 6/20  continue albuterol neb  continue free water 200cc q8h via PEG- BMP with resolved hyperNa    will follow periodically

## 2023-06-26 NOTE — H&P ADULT - ASSESSMENT
albuterol    0.083% 2.5 milliGRAM(s) Nebulizer every 8 hours  amantadine Syrup 100 milliGRAM(s) Oral <User Schedule>  brivaracetam Oral Solution 100 milliGRAM(s) Oral two times a day  chlorhexidine 2% Cloths 1 Application(s) Topical daily  cloNIDine 0.1 milliGRAM(s) Oral every 12 hours  enoxaparin Injectable 40 milliGRAM(s) SubCutaneous every 24 hours  lacosamide IVPB 150 milliGRAM(s) IV Intermittent every 12 hours  levothyroxine 75 MICROGram(s) Oral daily  lisinopril 20 milliGRAM(s) Oral daily  nicotine - 21 mG/24Hr(s) Patch 1 Patch Transdermal daily  polyethylene glycol 3350 17 Gram(s) Oral daily  senna 2 Tablet(s) Oral at bedtime  traZODone 50 milliGRAM(s) Oral at bedtime        Glucerna 1.2 Tha (GLUCERNARTH)  Total Volume for 24 Hours (mL): 1200  Continuous  Starting Tube Feed Rate {mL per Hour}: 30  Increase Tube Feed Rate by (mL): 10     Every 4 hours  Until Goal Tube Feed Rate (mL per Hour): 50  Tube Feed Duration (in Hours): 24  Tube Feed Start Time: 10:22  Banatrol TF     Qty per Day:  1     Qty per Day:  1    38 year old female w/ PMhx hypothyroidism admitted to [facility] 6/6 after being found unresponsive, found to have large right frontal IPH w/ IVH and hydrocephalus. Now s/p R krishna craniectomy for evacuation on 6/6. PEG placed 6/20. Now admitted for multidisciplinary rehab.     #IPH with IVH, R frontal lobe  #Gait instability, ADL and functional impairments  - Start comprehensive rehab program of PT/OT/SLp   - Amantadine 50 mg qAM  - Trazodone 50 mg qPM   - Antiepileptic regimen: Briviact 100 mg BID, Vimpat 150 mg BID  - Helmet when OOB    #HTN  - Lisinopril 20 mg daily   - Clonidine 0.1 mg PO q12h     #Pain Control  - Continue regimen from OSH:   -- PRN Tylenol (4-6), PRN Oxycodone (7-10)    #Dysphagia s/p PEG  - Abdominal binder  - PEG feeds as below    #Hypothyroidism  - Continue synthroid 75 mcg daily     #GI/Bowel Mgmt  - Senna 2 tabs daily  - Miralax daily PRN    #/Bladder Mgmt   - Monitor ability to void    FEN   - Diet -     SKIN  -     Precautions / PROPHYLAXIS:   - Falls, Cardiac, Seizures, Spine, Hip  - Weight bearing status:  - Lungs: Aspiration, Incentive Spirometer   - Pressure injury/Skin: Turn Q2hrs in bed while awake, OOB to Chair  - DVT:       Glucerna 1.2 Tha (GLUCERNARTH)  Total Volume for 24 Hours (mL): 1200  Continuous  Starting Tube Feed Rate {mL per Hour}: 30  Increase Tube Feed Rate by (mL): 10     Every 4 hours  Until Goal Tube Feed Rate (mL per Hour): 50  Tube Feed Duration (in Hours): 24  Tube Feed Start Time: 10:22  Banatrol TF     Qty per Day:  1     Qty per Day:  1         ---------------------------------------------------------------------------------  Post Discharge Follow up:    ---------------------------------------------------------------------------------  MEDICAL PROGNOSIS: GOOD              REHAB POTENTIAL: GOOD               ESTIMATED DISPOSITION: HOME WITH HOME CARE              ELOS: 10-14 Days   EXPECTED THERAPY:          P.T. 1hr/day            O.T. 1hr/day           S.L.P. 1hr/day          P&O Unnecessary       EXP FREQUENCY: 5 days per 7 day period     PRESCREEN COMPARISION:   I have reviewed the prescreen information and I have found no relevant changes between the preadmission screening and my post admission evaluation     RATIONALE FOR INPATIENT ADMISSION - Patient demonstrates the following: (check all that apply)  [X] Medically appropriate for rehabilitation admission  [X] Has attainable rehab goals with an appropriate initial discharge plan  [X] Has rehabilitation potential (expected to make a significant improvement within a reasonable period of time)   [X] Requires close medical management by a rehab physician, rehab nursing care, Hospitalist and comprehensive interdisciplinary team (including PT, OT, & or SLP, Prosthetics and Orthotics)   38 year old female w/ PMhx hypothyroidism admitted to [facility] 6/6 after being found unresponsive, found to have large right frontal IPH w/ IVH and hydrocephalus. Now s/p R krishna craniectomy for evacuation on 6/6. PEG placed 6/20. Now admitted for multidisciplinary rehab.     #IPH with IVH, R frontal lobe  #Gait instability, ADL and functional impairments  - Start comprehensive rehab program of PT/OT/SLp   - Amantadine 50 mg qAM  - Trazodone 50 mg qPM   - Antiepileptic regimen: Briviact 100 mg BID, Vimpat 150 mg BID  - Helmet when OOB    #HTN  - Lisinopril 20 mg daily   - Clonidine 0.1 mg PO q12h stoped on day of discharge from [facility], monitor for rebound     #Pain Control  - Continue regimen from OSH:   -- PRN Tylenol (4-6), PRN Oxycodone (7-10)    #Dysphagia s/p PEG  - Abdominal binder  - PEG feeds as below    #Hypothyroidism  - Continue synthroid 75 mcg daily     #GI/Bowel Mgmt  - Senna 2 tabs daily  - Miralax daily PRN    #/Bladder Mgmt   - Monitor ability to void    FEN   - Diet - Glucerna 1.2, 1200mL in 24h, has been 50 cc/hr  - Not an order in system, reached out to dietary   - Banatrol TF daily     SKIN  -     Precautions / PROPHYLAXIS:   - Falls, Cardiac, Seizures, Spine, Hip  - Helmet when OOB   - Pressure injury/Skin: Turn Q2hrs in bed while awake  - DVT: Lovenox     ---------------------------------------------------------------------------------  Post Discharge Follow up:    ---------------------------------------------------------------------------------  MEDICAL PROGNOSIS: GOOD              REHAB POTENTIAL: GOOD               ESTIMATED DISPOSITION: HOME WITH HOME CARE              ELOS: 10-14 Days   EXPECTED THERAPY:          P.T. 1hr/day            O.T. 1hr/day           S.L.P. 1hr/day          P&O Unnecessary       EXP FREQUENCY: 5 days per 7 day period     PRESCREEN COMPARISION:   I have reviewed the prescreen information and I have found no relevant changes between the preadmission screening and my post admission evaluation     RATIONALE FOR INPATIENT ADMISSION - Patient demonstrates the following: (check all that apply)  [X] Medically appropriate for rehabilitation admission  [X] Has attainable rehab goals with an appropriate initial discharge plan  [X] Has rehabilitation potential (expected to make a significant improvement within a reasonable period of time)   [X] Requires close medical management by a rehab physician, rehab nursing care, Hospitalist and comprehensive interdisciplinary team (including PT, OT, & or SLP, Prosthetics and Orthotics)   38 year old female w/ PMhx hypothyroidism admitted to [facility] 6/6 after being found unresponsive, found to have large right frontal IPH w/ IVH and hydrocephalus. Now s/p R krishna craniectomy for evacuation on 6/6. PEG placed 6/20. Now admitted for multidisciplinary rehab.     #IPH with IVH, R frontal lobe  #Gait instability, ADL and functional impairments  - Start comprehensive rehab program of PT/OT/SLp   - Amantadine 50 mg qAM  - Trazodone 50 mg qPM   - Antiepileptic regimen: Briviact 100 mg BID, Vimpat 150 mg BID  - Helmet when OOB    #HTN  - Lisinopril 20 mg daily   - Clonidine 0.1 mg PO q12h stoped on day of discharge from [facility], monitor for rebound     #Pain Control  - Continue regimen from OSH:   -- PRN Tylenol (4-6), PRN Oxycodone (7-10)    #Dysphagia s/p PEG  - Abdominal binder  - PEG feeds as below    #Hypothyroidism  - Continue synthroid 75 mcg daily     #Fever   - episodes of fever during admission  - prior infectious work up unrevealing including negative Bcx/UA/CXR on 6/18 and unremarkable CT c/a/p on 6/15.  - repeat LE duplex negative on 6/22  - no urinary retention on bladder scan  - repeat Bcx/Ucx (6/21) negative, RVP negative  - As per ID monitor off antibiotics    #GI/Bowel Mgmt  - Senna 2 tabs daily  - Miralax daily PRN    #/Bladder Mgmt   - Monitor ability to void    FEN   - Diet - Glucerna 1.2, 1200mL in 24h, has been 50 cc/hr  - Banatrol TF daily     SKIN  - R craniectomy site with healing incision    Precautions / PROPHYLAXIS:   - Falls, Cardiac, Seizures, Spine, Hip  - Helmet when OOB   - Pressure injury/Skin: Turn Q2hrs in bed while awake  - DVT: Lovenox     ---------------------------------------------------------------------------------  Post Discharge Follow up:    ---------------------------------------------------------------------------------  MEDICAL PROGNOSIS: GOOD              REHAB POTENTIAL: GOOD               ESTIMATED DISPOSITION: HOME WITH HOME CARE              ELOS: 10-14 Days   EXPECTED THERAPY:          P.T. 1hr/day            O.T. 1hr/day           S.L.P. 1hr/day          P&O Unnecessary       EXP FREQUENCY: 5 days per 7 day period     PRESCREEN COMPARISION:   I have reviewed the prescreen information and I have found no relevant changes between the preadmission screening and my post admission evaluation     RATIONALE FOR INPATIENT ADMISSION - Patient demonstrates the following: (check all that apply)  [X] Medically appropriate for rehabilitation admission  [X] Has attainable rehab goals with an appropriate initial discharge plan  [X] Has rehabilitation potential (expected to make a significant improvement within a reasonable period of time)   [X] Requires close medical management by a rehab physician, rehab nursing care, Hospitalist and comprehensive interdisciplinary team (including PT, OT, & or SLP, Prosthetics and Orthotics)

## 2023-06-26 NOTE — PROGRESS NOTE ADULT - SUBJECTIVE AND OBJECTIVE BOX
Patient is a 38y old  Female who presents with a chief complaint of IPH (26 Jun 2023 10:43)      SUBJECTIVE / OVERNIGHT EVENTS: Pt sat up by the side of her bed w full assistance by PT today. C/o fatigue and pain all over.     MEDICATIONS  (STANDING):  albuterol    0.083% 2.5 milliGRAM(s) Nebulizer every 8 hours  amantadine Syrup 50 milliGRAM(s) Oral <User Schedule>  brivaracetam Oral Solution 100 milliGRAM(s) Oral two times a day  chlorhexidine 2% Cloths 1 Application(s) Topical daily  cloNIDine 0.1 milliGRAM(s) Oral every 12 hours  enoxaparin Injectable 40 milliGRAM(s) SubCutaneous every 24 hours  lacosamide Solution 150 milliGRAM(s) Oral two times a day  levothyroxine 75 MICROGram(s) Oral daily  lisinopril 20 milliGRAM(s) Oral daily  nicotine - 21 mG/24Hr(s) Patch 1 Patch Transdermal daily  nystatin    Suspension 659303 Unit(s) Oral every 6 hours  polyethylene glycol 3350 17 Gram(s) Oral daily  senna 2 Tablet(s) Oral at bedtime  traZODone 50 milliGRAM(s) Oral at bedtime    MEDICATIONS  (PRN):  acetaminophen   Oral Liquid .. 650 milliGRAM(s) Oral every 6 hours PRN Temp greater or equal to 38C (100.4F), Mild Pain (1 - 3)  bacitracin   Ointment 1 Application(s) Topical every 6 hours PRN incisional healing  ondansetron Injectable 4 milliGRAM(s) IV Push every 6 hours PRN Nausea and/or Vomiting  oxyCODONE    Solution 5 milliGRAM(s) Oral every 6 hours PRN Moderate Pain (4 - 6)      CAPILLARY BLOOD GLUCOSE        I&O's Summary    25 Jun 2023 07:01  -  26 Jun 2023 07:00  --------------------------------------------------------  IN: 2600 mL / OUT: 1300 mL / NET: 1300 mL        PHYSICAL EXAM:  T(C): 36.7 (06-26-23 @ 09:48), Max: 36.8 (06-25-23 @ 16:00)  HR: 84 (06-26-23 @ 10:02) (76 - 96)  BP: 100/70 (06-26-23 @ 10:02) (98/63 - 129/92)  RR: 19 (06-26-23 @ 09:48) (18 - 19)  SpO2: 97% (06-26-23 @ 10:01) (96% - 99%)  CONSTITUTIONAL: NAD, well-developed, well-groomed  EYES: PERRLA; conjunctiva and sclera clear  ENMT: Moist oral mucosa, no pharyngeal injection or exudates; normal dentition  NECK: Supple, no palpable masses; no thyromegaly  RESPIRATORY: Normal respiratory effort; lungs are clear to auscultation bilaterally  CARDIOVASCULAR: Regular rate and rhythm, normal S1 and S2, no murmur/rub/gallop; No lower extremity edema; Peripheral pulses are 2+ bilaterally  ABDOMEN: Nontender to palpation, normoactive bowel sounds, no rebound/guarding; No hepatosplenomegaly  MUSCULOSKELETAL:  No clubbing or cyanosis of digits; no joint swelling or tenderness to palpation  PSYCH: Awake, calm  NEUROLOGY: R sided paralysis  SKIN: No rashes; no palpable lesions    LABS:                        9.9    11.11 )-----------( 329      ( 25 Jun 2023 07:13 )             30.8     06-25    139  |  101  |  17  ----------------------------<  125<H>  3.8   |  25  |  0.39<L>    Ca    9.5      25 Jun 2023 07:11            Urinalysis Basic - ( 25 Jun 2023 07:11 )    Color: x / Appearance: x / SG: x / pH: x  Gluc: 125 mg/dL / Ketone: x  / Bili: x / Urobili: x   Blood: x / Protein: x / Nitrite: x   Leuk Esterase: x / RBC: x / WBC x   Sq Epi: x / Non Sq Epi: x / Bacteria: x        RADIOLOGY & ADDITIONAL TESTS:    Imaging Personally Reviewed:    Consultant(s) Notes Reviewed:      Care Discussed with Consultants/Other Providers: Nsx team

## 2023-06-26 NOTE — DISCHARGE NOTE PROVIDER - NSDCFUADDAPPT_GEN_ALL_CORE_FT
Please contact Neuroplastic Surgery in 12 weeks  Dr. Agustin Kwan [phone]     [facility]  [phone]

## 2023-06-26 NOTE — DISCHARGE NOTE PROVIDER - CARE PROVIDER_API CALL
Jean Carlos Bernstein  Neurosurgery  [address], [address]  [place] [address]  Phone: [phone]  Fax: [phone]  Follow Up Time:     Jack Ellison  Infectious Disease  [address]  [place] [address]  Phone: [phone]  Fax: [phone]  Follow Up Time:

## 2023-06-26 NOTE — DISCHARGE NOTE PROVIDER - DETAILS OF MALNUTRITION DIAGNOSIS/DIAGNOSES
This patient has been assessed with a concern for Malnutrition and was treated during this hospitalization for the following Nutrition diagnosis/diagnoses:     -  06/15/2023: Severe protein-calorie malnutrition

## 2023-06-26 NOTE — PROGRESS NOTE ADULT - PROBLEM SELECTOR PROBLEM 1
Intraparenchymal hemorrhage of brain

## 2023-06-26 NOTE — CHART NOTE - NSCHARTNOTEFT_GEN_A_CORE
discussed with Dr. Sy neuroplastic surgeon regarding cranioplasty. He would like to see the patient in 4 weeks in clinic for OR planning. [phone]

## 2023-06-26 NOTE — PATIENT PROFILE ADULT - FUNCTIONAL ASSESSMENT - BASIC MOBILITY 6.
2-calculated by average/Not able to assess (calculate score using [facility] averaging method)

## 2023-06-26 NOTE — PROGRESS NOTE ADULT - ASSESSMENT
38F Hx hypothyroid, hx sinus infections, smoker, undergoing in vitro fertilization found down at home 6/5/2023. CTH w/large R IPH w/IVH, CTA negative for aneurysms. Pt not a candidate for tenecteplase due to hemorrhage and not a candidate for thrombectomy due to no LVO see on CTA H/N. S/p emergent decompressive craniectomy 6/6 (no bone flap) Cerebral Angio performed 6/9 did not show evidence of vascular malformation. NSCU course c/b seizures 6/9 for which EEG was placed and noted show electroclinical and electrographic seizures from the the R frontal and posterior temporal regions.     Extubated on 6/13, EVD removed 6/17. Downgraded to floors 6/20.       #Dysphagia sp PEG 6/21/23  EGD - small HH, normal esophagus, stomach and duodenum    -abdominal binder for pt safety; discussed with team and pt/family. All expressed understanding  -PEG feeds and local care  -nothing under PEG bumper please  -monitor stools    Further care per primary team  No GI objection to DC planning; can follow up as outpt at GI office after DC from rehab  Discussed with spouse & family at bedside; all questions answered  Discussed with Neurosurgery team     Jhony Morales PA-C    Gastroenterology  After hours and weekend coverage [phone]

## 2023-06-26 NOTE — PROGRESS NOTE ADULT - THIS PATIENT HAS THE FOLLOWING CONDITION(S)/DIAGNOSES ON THIS ADMISSION:
Cerebral Edema/Brain Compression / Herniation
Encephalopathy/Cerebral Edema/Brain Compression / Herniation/Acute Respiratory Failure
Brain Compression / Herniation
Brain Compression / Herniation/Acute Blood Loss Anemia
Brain Compression / Herniation/Acute Respiratory Failure
Cerebral Edema/Acute Respiratory Failure
Cerebral Edema/Brain Compression / Herniation
ICH
None
Brain Compression / Herniation
Brain Compression / Herniation/Acute Blood Loss Anemia
Brain Compression / Herniation/Acute Respiratory Failure
Brain Compression / Herniation/Acute Respiratory Failure
Encephalopathy/Functional Quadriplegia/Cerebral Edema/Brain Compression / Herniation/Acute Respiratory Failure
None
Brain Compression / Herniation
Cranial Helmet
ICH
None
Brain Compression / Herniation/Acute Respiratory Failure
Brain Compression / Herniation/Acute Respiratory Failure
Cerebral Edema
ICH
None
None
Encephalopathy/Functional Quadriplegia/Cerebral Edema/Brain Compression / Herniation/Acute Respiratory Failure
Functional Quadriplegia/Cerebral Edema/Brain Compression / Herniation
ICH
ICH
None
Functional Quadriplegia/Cerebral Edema/Brain Compression / Herniation

## 2023-06-26 NOTE — DISCHARGE NOTE PROVIDER - NSDCCPCAREPLAN_GEN_ALL_CORE_FT
PRINCIPAL DISCHARGE DIAGNOSIS  Diagnosis: ICH (intracerebral hemorrhage)  Assessment and Plan of Treatment: Right hemicraniectomy for evacuation of hematoma (bone flap discarded) and intraop EVD placed, subgaleal drain placed 6/6  Cerebral angiogram negative for vascular malformation 6/9  Subgaleal drain removed 6/10  EVD removed 6/17  Surgical staples removed 6/23   The patient has NO bone flap on R side   CUSTOM FITTED HELMET AT BEDSIDE - TO BE WORN WHEN OUT OF BED  continue amantadine 50 mg every morning 0700   Trazadone 50 mg qhs to help w/ sleep wake cycle  Pain control with oxycodone 5 mg q6h prn, and tylenol prn  PLEASE CONTACT DR MOHAN LEE NEUROPLASTIC SURGEON WITHIN 12 WEEKS FOR CRANIOPLASTY PLANNING   - [phone]  Please make all necessary appointments and follow up. Please DO NOT take any Aspirin and NSAIDs (Advil, Aleve, Motrin, Ibuprofen) until cleared by your Neurosurgeon. Please DO NOT do any heavy lifting, bending, twisting and straining. DO NOT do any scrubbing of surgical site. Pat dry only. Please come to the emergency room for any of the following: altered mental status, seizures, pain uncontrolled by pain medications, fevers, leaking / bleeding from surgical site, chest pain and shortness of breath.         SECONDARY DISCHARGE DIAGNOSES  Diagnosis: Hypothyroid  Assessment and Plan of Treatment: continue synthroid 75 mcg daily via peg       Diagnosis: Seizure  Assessment and Plan of Treatment: continue antiepileptic medications via peg tube   - Vimpat 150 q12H  - briviact 100 q12H       Diagnosis: Hypertension  Assessment and Plan of Treatment: continue HTN medications   - lisinopril 20 mg daily with hold parameters    Diagnosis: Dysphagia  Assessment and Plan of Treatment: PEG placement with GI service at [facility] 6/21   Tolerating TUBE FEEDS GLUCERNA 50cc/hr via PEG   LB 6/26      Diagnosis: Hypernatremia  Assessment and Plan of Treatment: please monitor hypernatremia (now corrected) - s/p Free water flushes 200q6h from 6/22-6/26 d/c'd. monitor BMP within 48H    Diagnosis: Oral thrush  Assessment and Plan of Treatment: continue nystatin for oral thrush end date 7/2    Diagnosis: Tobacco dependence  Assessment and Plan of Treatment: continue nicoderm patch daily     PRINCIPAL DISCHARGE DIAGNOSIS  Diagnosis: ICH (intracerebral hemorrhage)  Assessment and Plan of Treatment: Right hemicraniectomy for evacuation of hematoma (bone flap discarded) and intraop EVD placed, subgaleal drain placed 6/6  Cerebral angiogram negative for vascular malformation 6/9  Subgaleal drain removed 6/10  EVD removed 6/17  Surgical staples removed 6/23   The patient has NO bone flap on R side   CUSTOM FITTED HELMET AT BEDSIDE - TO BE WORN WHEN OUT OF BED  continue amantadine 50 mg every morning 0700   Trazadone 50 mg qhs to help w/ sleep wake cycle  Pain control with oxycodone 5 mg q6h prn, and tylenol prn  PLEASE CONTACT DR MOHAN LEE NEUROPLASTIC SURGEON WITHIN 4 WEEKS FOR CRANIOPLASTY PLANNING   - [phone]  Please make all necessary appointments and follow up. Please DO NOT take any Aspirin and NSAIDs (Advil, Aleve, Motrin, Ibuprofen) until cleared by your Neurosurgeon. Please DO NOT do any heavy lifting, bending, twisting and straining. DO NOT do any scrubbing of surgical site. Pat dry only. Please come to the emergency room for any of the following: altered mental status, seizures, pain uncontrolled by pain medications, fevers, leaking / bleeding from surgical site, chest pain and shortness of breath.         SECONDARY DISCHARGE DIAGNOSES  Diagnosis: Hypothyroid  Assessment and Plan of Treatment: continue synthroid 75 mcg daily via peg       Diagnosis: Seizure  Assessment and Plan of Treatment: continue antiepileptic medications via peg tube   - Vimpat 150 q12H  - briviact 100 q12H       Diagnosis: Hypertension  Assessment and Plan of Treatment: continue HTN medications   - lisinopril 20 mg daily with hold parameters    Diagnosis: Dysphagia  Assessment and Plan of Treatment: PEG placement with GI service at [facility] 6/21   Tolerating TUBE FEEDS GLUCERNA 50cc/hr via PEG   LB 6/26      Diagnosis: Hypernatremia  Assessment and Plan of Treatment: please monitor hypernatremia (now corrected) - s/p Free water flushes 200q6h from 6/22-6/26 d/c'd. monitor BMP within 48H    Diagnosis: Oral thrush  Assessment and Plan of Treatment: continue nystatin for oral thrush end date 7/2    Diagnosis: Tobacco dependence  Assessment and Plan of Treatment: continue nicoderm patch daily

## 2023-06-26 NOTE — CHART NOTE - NSCHARTNOTESELECT_GEN_ALL_CORE
Event Note
Interventional Neuro Radiology/Event Note
NEUROLOGY Resident/Event Note
PIV/Event Note
EEG prelim
EEG-prelim
EVD Removal/Event Note
Event Note
Interventional Neuro Radiology/Event Note
Nutrition Services
PIV POCUS/Event Note
Transport Note/Event Note
VTE Risk Assessment/Event Note

## 2023-06-26 NOTE — DISCHARGE NOTE PROVIDER - INSTRUCTIONS
Diet, NPO with Tube Feed:   Tube Feeding Modality: Gastrostomy  Glucerna 1.2 Tha (GLUCERNARTH)  Total Volume for 24 Hours (mL): 1200  Continuous  Starting Tube Feed Rate {mL per Hour}: 30  Increase Tube Feed Rate by (mL): 10     Every 4 hours  Until Goal Tube Feed Rate (mL per Hour): 50  Tube Feed Duration (in Hours): 24  Tube Feed Start Time: 10:22  Banatrol TF     Qty per Day:  1     Qty per Day:  1

## 2023-06-26 NOTE — PROGRESS NOTE ADULT - ASSESSMENT
38F Hx hypothyroid, smoker (off and on), undergoing in vitro fertilization found down by her mother. CT head showed large right frontal parenchymal hemorrhage with intraventricular extension and mild left-sided hydrocephalus, midline   shift to the left. CTA of the head and neck revealed no aneurysms or AVM.     Patient underwent emergent decompressive right craniectomy with evacuation of IPH, EVD placement on 6/6 (removed 6/17). Cerebral angio negative on 6/9. Course c/b seizures, fevers, dysphagia s/p PEG (6/21).     S/p PICC 6/12 for access.       38F Hx hypothyroid, smoker (off and on), undergoing in vitro fertilization found down by her mother. CT head showed large right frontal parenchymal hemorrhage with intraventricular extension and mild left-sided hydrocephalus, midline   shift to the left. CTA of the head and neck revealed no aneurysms or AVM.     Patient underwent emergent decompressive right craniectomy with evacuation of IPH, EVD placement on 6/6 (removed 6/17). Cerebral angio negative on 6/9. Course c/b seizures, fevers, dysphagia s/p PEG (6/21).     S/p PICC 6/12 for access.

## 2023-06-27 ENCOUNTER — NON-APPOINTMENT (OUTPATIENT)
Age: 38
End: 2023-06-27

## 2023-06-27 LAB
CULTURE RESULTS: SIGNIFICANT CHANGE UP
CULTURE RESULTS: SIGNIFICANT CHANGE UP
SPECIMEN SOURCE: SIGNIFICANT CHANGE UP
SPECIMEN SOURCE: SIGNIFICANT CHANGE UP

## 2023-06-27 PROCEDURE — 99223 1ST HOSP IP/OBS HIGH 75: CPT

## 2023-06-27 RX ADMIN — Medication 650 MILLIGRAM(S): at 11:45

## 2023-06-27 RX ADMIN — BRIVARACETAM 100 MILLIGRAM(S): 25 TABLET, FILM COATED ORAL at 05:45

## 2023-06-27 RX ADMIN — LACOSAMIDE 150 MILLIGRAM(S): 50 TABLET ORAL at 05:45

## 2023-06-27 RX ADMIN — Medication 650 MILLIGRAM(S): at 23:48

## 2023-06-27 RX ADMIN — LISINOPRIL 20 MILLIGRAM(S): 2.5 TABLET ORAL at 05:44

## 2023-06-27 RX ADMIN — BRIVARACETAM 100 MILLIGRAM(S): 25 TABLET, FILM COATED ORAL at 17:15

## 2023-06-27 RX ADMIN — Medication 50 MILLIGRAM(S): at 21:01

## 2023-06-27 RX ADMIN — LACOSAMIDE 150 MILLIGRAM(S): 50 TABLET ORAL at 19:24

## 2023-06-27 RX ADMIN — ENOXAPARIN SODIUM 40 MILLIGRAM(S): 100 INJECTION SUBCUTANEOUS at 19:23

## 2023-06-27 RX ADMIN — Medication 50 MILLIGRAM(S): at 11:55

## 2023-06-27 RX ADMIN — Medication 650 MILLIGRAM(S): at 10:45

## 2023-06-27 RX ADMIN — Medication 75 MICROGRAM(S): at 05:44

## 2023-06-27 NOTE — DIETITIAN INITIAL EVALUATION ADULT - NS FNS DIET ORDER
Diet, NPO with Tube Feed:   Tube Feeding Modality: Gastrostomy  TwoCal HN  Total Volume for 24 Hours (mL): 960  Bolus  Total Volume of Bolus (mL):  240  Total # of Feeds: 4  Tube Feed Frequency: Every 6 hours   Tube Feed Start Time: 08:00  Bolus Feed Rate (mL per Hour): 240   Bolus Feed Duration (in Hours): 0.5  Bolus Feed Instructions:  Provide tube feeding at 8 AM, 12 PM, 4 PM, 8 PM.  Free Water Flush  Bolus   Total Volume per Flush (mL): 250   Frequency: Every 6 Hours  Free Water Flush Instructions:  Provide free water flush QID at least 1 hour apart from tube feeding  Banatrol TF     Qty per Day:  1 (06-27-23 @ 10:39)

## 2023-06-27 NOTE — DIETITIAN INITIAL EVALUATION ADULT - CALCULATED FROM (CAL/KG)
Greg    Patient seen and evaluated, worsening respiratory distress, on CPAP. Appreciate cards, hospitalist input. Not worried about elevated WBC at this time. Continue postpartum management. [id number]

## 2023-06-27 NOTE — DIETITIAN INITIAL EVALUATION ADULT - ENTERAL
TwoCal  ml QID via PEG (provides 1920 kcal, 80 g protein, 664 ml water), Banatrol 1x/day, Free water flushes 250 ml QID at least 1 hour apart from tube feeding

## 2023-06-27 NOTE — DIETITIAN INITIAL EVALUATION ADULT - OTHER INFO
38 year old female w/ PMHx hypothyroidism who was admitted to [facility] 6/6 after being found on floor at home, with CT revealing large right-sided ICH. She required emergent right frontal craniectomy for decompression and EVD placement. EVD subsequently removed 6/16. Angio negative. Patient underwent G tube placement 6/20. Hospital course notable for fluctuating leukocytosis and high-grade temperatures with no identified source of infection including in urine, blood, CSF. Now admitted for multidisciplinary rehab.    Pt admitted on continuous tube feeding of Glucerna 1.5 @ 50 ml/hr x 24 hours (provided 1800 kcal, 99 g protein, 910 ml water). Recommend changing to bolus feeds of TwoCal  ml QID (provides 1920 kcal, 80 g protein, 664 ml water). Suggest free water flushes 250 ml QID at least 1 hour apart from tube feeding. Continue with Banatrol 1x/day as pt was receiving this at previous hospital, will monitor bowel movements. Pt weighed 168.4 lbs on 6-8 at previous hospital. Current weight is 151.4 lbs, indicating 17#/10% unintentional weight loss in <1 month. Pt was on tube feeding at previous hospital but per documentation, it was held at several points and pt was not receiving adequate energy/protein. Based on these factors, pt meets the diagnostic criteria for severe malnutrition in acute illness. Explained plan to transition to bolus with pt and pt's partner. Denies nausea, vomiting, diarrhea, constipation. Last BM 6-26 Per nursing flowsheets.

## 2023-06-27 NOTE — DIETITIAN INITIAL EVALUATION ADULT - PERTINENT MEDS FT
MEDICATIONS  (STANDING):  amantadine Syrup 50 milliGRAM(s) Oral daily  brivaracetam Oral Solution 100 milliGRAM(s) Oral two times a day  enoxaparin Injectable 40 milliGRAM(s) SubCutaneous every 24 hours  lacosamide Solution 150 milliGRAM(s) Oral two times a day  levothyroxine 75 MICROGram(s) Oral daily  lisinopril 20 milliGRAM(s) Oral daily  nystatin Powder 1 Application(s) Topical two times a day  polyethylene glycol 3350 17 Gram(s) Oral daily  traZODone 50 milliGRAM(s) Oral at bedtime    MEDICATIONS  (PRN):  acetaminophen   Oral Liquid .. 650 milliGRAM(s) Oral every 6 hours PRN Temp greater or equal to 38C (100.4F), Mild Pain (1 - 3)  oxyCODONE    IR 5 milliGRAM(s) Oral every 6 hours PRN Severe Pain (7 - 10)  senna 2 Tablet(s) Oral at bedtime PRN Constipation

## 2023-06-27 NOTE — CONSULT NOTE ADULT - ASSESSMENT
38 year old female w/ PMhx hypothyroidism admitted to [facility] 6/6 after being found unresponsive, found to have large right frontal IPH w/ IVH and hydrocephalus. Now s/p R krishna craniectomy for evacuation on 6/6. PEG placed 6/20. Now admitted for multidisciplinary rehab.     #IPH with IVH, R frontal lobe  - Amantadine 50 mg qAM  - Trazodone 50 mg qPM   - Antiepileptic regimen: Briviact 100 mg BID, Vimpat 150 mg BID  - Helmet when OOB    #Normocytic anemia  - Will montior Hg/Hct, transfuse if Hg<7  - Will check iron/B12/folate with next lab draw    #HTN  - Lisinopril 20 mg daily   - Clonidine 0.1 mg PO q12h stopped on day of discharge from [facility], monitor for rebound     #Dysphagia s/p PEG  - Abdominal binder  - PEG feeds as below    #Hypothyroidism  - Continue synthroid 75 mcg daily     #Fever, leukocytosis  - Will monitor F/WBC count, noted WBC on 6/25 of 11.11  - episodes of fever during [place] admission  - prior infectious work up unrevealing including negative Bcx/UA/CXR on 6/18 and unremarkable CT c/a/p on 6/15.  - repeat LE duplex negative on 6/22  - no urinary retention on bladder scan  - repeat Bcx/Ucx (6/21) negative, RVP negative  - As per ID monitor off antibiotics    DVT: Lovenox      38 year old female w/ PMhx hypothyroidism admitted to [facility] 6/6 after being found unresponsive, found to have large right frontal IPH w/ IVH and hydrocephalus. Now s/p R krishna craniectomy for evacuation on 6/6. PEG placed 6/20. Now admitted for multidisciplinary rehab.     #IPH with IVH, R frontal lobe  - Amantadine 50 mg qAM  - Trazodone 50 mg qPM   - Antiepileptic regimen: Briviact 100 mg BID, Vimpat 150 mg BID  - Helmet when OOB    #Normocytic anemia  - Will montior Hg/Hct, transfuse if Hg<7  - Will check iron/B12/folate with next lab draw    #HTN  - Lisinopril 20 mg daily   - Clonidine 0.1 mg PO q12h stopped on day of discharge from [facility], monitor for rebound     #Dysphagia s/p PEG  - Abdominal binder  - PEG feeds as below    #Hypothyroidism  - Continue synthroid 75 mcg daily   - Will check TSH/FT4 with next lab draw    #Fever, leukocytosis  - Will monitor F/WBC count, noted WBC on 6/25 of 11.11  - episodes of fever during [place] admission  - prior infectious work up unrevealing including negative Bcx/UA/CXR on 6/18 and unremarkable CT c/a/p on 6/15.  - repeat LE duplex negative on 6/22  - no urinary retention on bladder scan  - repeat Bcx/Ucx (6/21) negative, RVP negative  - As per ID monitor off antibiotics    DVT: Lovenox   Thank you for pleasure of consult  Will follow course with you

## 2023-06-27 NOTE — PROGRESS NOTE ADULT - NUTRITIONAL ASSESSMENT
This patient has been assessed with a concern for Malnutrition and has been determined to have a diagnosis/diagnoses of Severe protein-calorie malnutrition.    This patient is being managed with:   Diet NPO with Tube Feed-  Tube Feeding Modality: Gastrostomy  TwoCal HN  Total Volume for 24 Hours (mL): 960  Bolus  Total Volume of Bolus (mL):  240  Total # of Feeds: 4  Tube Feed Frequency: Every 6 hours   Tube Feed Start Time: 08:00  Bolus Feed Rate (mL per Hour): 240   Bolus Feed Duration (in Hours): 0.5  Bolus Feed Instructions:  Provide tube feeding at 8 AM 12 PM 4 PM 8 PM.  Free Water Flush  Bolus   Total Volume per Flush (mL): 250   Frequency: Every 6 Hours  Free Water Flush Instructions:  Provide free water flush QID at least 1 hour apart from tube feeding  Banatrol TF     Qty per Day:  1  Entered: Jun 27 2023 10:39AM

## 2023-06-27 NOTE — PROGRESS NOTE ADULT - ASSESSMENT
38 year old female w/ PMhx hypothyroidism admitted to [facility] 6/6 after being found unresponsive, found to have large right frontal IPH w/ IVH and hydrocephalus. Now s/p R krishna craniectomy for evacuation on 6/6. PEG placed 6/20. Now admitted for multidisciplinary rehab.     #IPH with IVH, R frontal lobe, now with left sided weakness, left visual field, left facial droop, Gait instability, ADL and functional impairments  - Continue comprehensive rehab program of PT/OT/SLP  - Amantadine 50 mg qAM  - Trazodone 50 mg qPM   - Antiepileptic regimen: Briviact 100 mg BID, Vimpat 150 mg BID  - Helmet when OOB    #HTN  - Lisinopril 20 mg daily   - Clonidine 0.1 mg PO q12h stoped on day of discharge from [facility], monitor for rebound     #Pain Control  - Continue regimen from OSH:   - PRN Tylenol (4-6), PRN Oxycodone (7-10)    #Dysphagia s/p PEG  - Abdominal binder  - PEG feeds changed to bolus    #Hypothyroidism  - Continue synthroid 75 mcg daily     #Fever   - episodes of fever during admission  - prior infectious work up unrevealing including negative Bcx/UA/CXR on 6/18 and unremarkable CT c/a/p on 6/15.  - repeat LE duplex negative on 6/22  - no urinary retention on bladder scan  - repeat Bcx/Ucx (6/21) negative, RVP negative  - As per ID monitor off antibiotics    #GI/Bowel Mgmt  - Senna 2 tabs daily  - Miralax daily PRN    #/Bladder Mgmt   - Monitor ability to void    FEN   - Diet - Glucerna 1.2   - Banatrol TF daily     SKIN  - R craniectomy site with healing incision, defect soft    Precautions / PROPHYLAXIS:   - Falls, Cardiac, Seizures   - Helmet when OOB   - Pressure injury/Skin: Turn Q2hrs in bed while awake  - DVT: Lovenox

## 2023-06-27 NOTE — PROGRESS NOTE ADULT - SUBJECTIVE AND OBJECTIVE BOX
SUBJECTIVE/ROS: Patient evaluated while in the bed. She states she slept well last night. She was noted to be very emotional about her current functional status and support provided. She denies chest pain, fever, chills, nausea, vomiting, abdominal pain, headache, or BLE pain.     HPI:  38 year old female w/ PMHx hypothyroidism who was admitted to [facility] 6/6 after being found on floor at home, with CT revealing large right-sided ICH. She required emergent right frontal craniectomy for decompression and EVD placement. EVD subsequently removed 6/16. Angio negative. Patient underwent G tube placement 6/20. Hospital course notable for fluctuating leukocytosis and high-grade temperatures with no identified source of infection including in urine, blood, CSF. Now admitted for multidisciplinary rehab.      Vital Signs Last 24 Hrs  T(C): 36.7 (27 Jun 2023 07:33), Max: 36.9 (26 Jun 2023 17:52)  T(F): 98 (27 Jun 2023 07:33), Max: 98.4 (26 Jun 2023 17:52)  HR: 94 (27 Jun 2023 07:33) (94 - 94)  BP: 118/84 (27 Jun 2023 07:33) (118/84 - 128/92)  BP(mean): --  RR: 16 (27 Jun 2023 07:33) (16 - 16)  SpO2: 96% (27 Jun 2023 07:33) (94% - 96%)    Parameters below as of 27 Jun 2023 07:33  Patient On (Oxygen Delivery Method): room air      Daily Height in cm: 157.48 (26 Jun 2023 17:52)    Daily       PHYSICAL EXAM  Constitutional - NAD, Comfortable  HEENT - NCAT, EOM not fully intact, no left lateral gaze  Neck - Supple, No limited ROM  Chest - CTA bilaterally  Cardiovascular - RRR, S1S2  Abdomen -BS+, Soft, NTND, PEG in place   Extremities - No C/C/E, No calf tenderness   Neurologic Exam - aox3, RU/RL 5/5, LU1/5, LL0/5, left visual field deficit, absent left lateral gaze, left facial      MEDICATIONS  (STANDING):  amantadine Syrup 50 milliGRAM(s) Oral daily  brivaracetam Oral Solution 100 milliGRAM(s) Oral two times a day  enoxaparin Injectable 40 milliGRAM(s) SubCutaneous every 24 hours  lacosamide Solution 150 milliGRAM(s) Oral two times a day  levothyroxine 75 MICROGram(s) Oral daily  lisinopril 20 milliGRAM(s) Oral daily  nystatin Powder 1 Application(s) Topical two times a day  polyethylene glycol 3350 17 Gram(s) Oral daily  traZODone 50 milliGRAM(s) Oral at bedtime    MEDICATIONS  (PRN):  acetaminophen   Oral Liquid .. 650 milliGRAM(s) Oral every 6 hours PRN Temp greater or equal to 38C (100.4F), Mild Pain (1 - 3)  oxyCODONE    IR 5 milliGRAM(s) Oral every 6 hours PRN Severe Pain (7 - 10)  senna 2 Tablet(s) Oral at bedtime PRN Constipation

## 2023-06-27 NOTE — SBIRT NOTE ADULT - NSSBIRTBRIEFINTDET_GEN_A_CORE
SW provided education on alochol use and treatment options however family was present. SW will re-address the need for resources when patient is alone.

## 2023-06-27 NOTE — DIETITIAN INITIAL EVALUATION ADULT - ORAL INTAKE PTA/DIET HISTORY
Pt and pt's partner provided information. Pt slightly confused at times but able to answer most questions. PTA, pt had a good appetite but did not eat 3 meals/day, generally snacked/ate small meals.

## 2023-06-27 NOTE — CONSULT NOTE ADULT - SUBJECTIVE AND OBJECTIVE BOX
38 year old female w/ PMHx hypothyroidism who was admitted to [facility] 6/6 after being found on floor at home, with CT revealing large right-sided ICH. She required emergent right frontal craniectomy for decompression and EVD placement. EVD subsequently removed 6/16. Angio negative. Patient underwent G tube placement 6/20. Hospital course notable for fluctuating leukocytosis and high-grade temperatures with no identified source of infection including in urine, blood, CSF. Now admitted for multidisciplinary rehab. (26 Jun 2023 13:08)      PAST MEDICAL & SURGICAL HISTORY:  Hypothyroidism    FAMILY HISTORY  Father: - at age - with history of   Mother: - at age - with history of     SOCIAL HISTORY  Substance Use (street drugs): (  ) never used  (  ) other:  Tobacco Usage:  (   ) never smoked   (   ) former smoker   (   ) current smoker  (     ) pack year  Alcohol Usage:  Sexual History:   Recent Travel:    Allergies  No Known Allergies  Intolerances    brivaracetam Oral Solution 100 milliGRAM(s) Oral two times a day  lacosamide Solution 150 milliGRAM(s) Oral two times a day  nystatin Powder 1 Application(s) Topical two times a day  oxyCODONE    IR 5 milliGRAM(s) Oral every 6 hours PRN      REVIEW OF SYSTEMS:  CONSTITUTIONAL: No fever, weight loss, or fatigue  EYES: No eye pain, visual disturbances, or discharge  ENMT:  No difficulty hearing, tinnitus, vertigo; No sinus or throat pain  NECK: No pain or stiffness  BREASTS: No pain, masses, or nipple discharge  RESPIRATORY: No cough, wheezing, chills or hemoptysis; No shortness of breath  CARDIOVASCULAR: No chest pain, palpitations, dizziness, or leg swelling  GASTROINTESTINAL: No abdominal or epigastric pain. No nausea, vomiting, or hematemesis; No diarrhea or constipation. No melena or hematochezia.  GENITOURINARY: No dysuria, frequency, hematuria, or incontinence  NEUROLOGICAL: No headaches, memory loss, loss of strength, numbness, or tremors  SKIN: No itching, burning, rashes, or lesions   LYMPH NODES: No enlarged glands  ENDOCRINE: No heat or cold intolerance; No hair loss  MUSCULOSKELETAL: No joint pain or swelling; No muscle, back, or extremity pain  PSYCHIATRIC: No depression, anxiety, mood swings, or difficulty sleeping  HEME/LYMPH: No easy bruising, or bleeding gums  ALLERY AND IMMUNOLOGIC: No hives or eczema    ALL ROS REVIEWED AND NORMAL EXCEPT AS STATED ABOVE    T(C): 36.9 (06-26-23 @ 17:52), Max: 36.9 (06-26-23 @ 17:52)  HR: 94 (06-26-23 @ 17:52) (76 - 94)  BP: 128/92 (06-26-23 @ 17:52) (98/63 - 128/92)  RR: 16 (06-26-23 @ 17:52) (16 - 19)  SpO2: 94% (06-26-23 @ 17:52) (94% - 97%)  Wt(kg): --Vital Signs Last 24 Hrs  T(C): 36.9 (26 Jun 2023 17:52), Max: 36.9 (26 Jun 2023 17:52)  T(F): 98.4 (26 Jun 2023 17:52), Max: 98.4 (26 Jun 2023 17:52)  HR: 94 (26 Jun 2023 17:52) (76 - 94)  BP: 128/92 (26 Jun 2023 17:52) (98/63 - 128/92)  BP(mean): --  RR: 16 (26 Jun 2023 17:52) (16 - 19)  SpO2: 94% (26 Jun 2023 17:52) (94% - 97%)    Parameters below as of 26 Jun 2023 17:52  Patient On (Oxygen Delivery Method): room air    PHYSICAL EXAM:  GENERAL: NAD, well-groomed, well-developed  HEAD:  Atraumatic, Normocephalic  EYES: EOMI, PERRLA, conjunctiva and sclera clear  ENMT: No tonsillar erythema, exudates, or enlargement; Moist mucous membranes, Good dentition, No lesions  NECK: Supple, No JVD, Normal thyroid  NERVOUS SYSTEM:  Alert & Oriented X3, Good concentration; Motor Strength 5/5 B/L upper and lower extremities; DTRs 2+ intact and symmetric  CHEST/LUNG: Clear to percussion bilaterally; No rales, rhonchi, wheezing, or rubs  HEART: Regular rate and rhythm; No murmurs, rubs, or gallops  ABDOMEN: Soft, Nontender, Nondistended; Bowel sounds present  EXTREMITIES:  2+ Peripheral Pulses, No clubbing, cyanosis, or edema  LYMPH: No lymphadenopathy noted  SKIN: No rashes or lesions    LABS:    CAPILLARY BLOOD GLUCOSE    RADIOLOGY & ADDITIONAL TESTS:  VA Duplex Lower Ext Vein Scan, Bilat (06.22.23 @ 12:15) >  IMPRESSION:  No evidence of deep venous thrombosis in either lower extremity.    Consultant(s) Notes Reviewed:  [x ] YES  [ ] NO  Care Discussed with Consultants/Other Providers [ x] YES  [ ] NO  Imaging Personally Reviewed:  [ ] YES  [x ] NO 38 year old female w/ PMHx hypothyroidism who was admitted to [facility] 6/6 after being found on floor at home, with CT revealing large right-sided ICH. She required emergent right frontal craniectomy for decompression and EVD placement. EVD subsequently removed 6/16. Angio negative. Patient underwent G tube placement 6/20. Hospital course notable for fluctuating leukocytosis and high-grade temperatures with no identified source of infection including in urine, blood, CSF. Now admitted for multidisciplinary rehab. (26 Jun 2023 13:08)      PAST MEDICAL & SURGICAL HISTORY:  Hypothyroidism    FAMILY HISTORY  Mother has HTN, father does not have medical issues including CAD, HTn, HLD, DM    SOCIAL HISTORY  Substance Use (street drugs): (x  ) never used  (  ) other:  Tobacco Usage:  (   ) never smoked   (  x ) former smoker   (   ) current smoker  (     ) pack year  Alcohol Usage:Social drinker as per mother  Sexual History: Wife    Allergies  No Known Allergies  Intolerances    brivaracetam Oral Solution 100 milliGRAM(s) Oral two times a day  lacosamide Solution 150 milliGRAM(s) Oral two times a day  nystatin Powder 1 Application(s) Topical two times a day  oxyCODONE    IR 5 milliGRAM(s) Oral every 6 hours PRN      REVIEW OF SYSTEMS:  CONSTITUTIONAL: No fever, weight loss, or fatigue  EYES: No eye pain, visual disturbances, or discharge  ENMT:  No difficulty hearing, tinnitus, vertigo; No sinus or throat pain  NECK: No pain or stiffness  BREASTS: No pain, masses, or nipple discharge  RESPIRATORY: No cough, wheezing, chills or hemoptysis; No shortness of breath  CARDIOVASCULAR: No chest pain, palpitations, dizziness, or leg swelling  GASTROINTESTINAL: No abdominal or epigastric pain. No nausea, vomiting, or hematemesis; No diarrhea or constipation. No melena or hematochezia.  GENITOURINARY: No dysuria, frequency, hematuria, or incontinence  NEUROLOGICAL: No headaches, memory loss, loss of strength, numbness, or tremors  SKIN: No itching, burning, rashes, or lesions   LYMPH NODES: No enlarged glands  ENDOCRINE: No heat or cold intolerance; No hair loss  MUSCULOSKELETAL: No joint pain or swelling; No muscle, back, or extremity pain  PSYCHIATRIC: No depression, anxiety, mood swings, or difficulty sleeping  HEME/LYMPH: No easy bruising, or bleeding gums  ALLERY AND IMMUNOLOGIC: No hives or eczema    ALL ROS REVIEWED AND NORMAL EXCEPT AS STATED ABOVE    T(C): 36.9 (06-26-23 @ 17:52), Max: 36.9 (06-26-23 @ 17:52)  HR: 94 (06-26-23 @ 17:52) (76 - 94)  BP: 128/92 (06-26-23 @ 17:52) (98/63 - 128/92)  RR: 16 (06-26-23 @ 17:52) (16 - 19)  SpO2: 94% (06-26-23 @ 17:52) (94% - 97%)  Wt(kg): --Vital Signs Last 24 Hrs  T(C): 36.9 (26 Jun 2023 17:52), Max: 36.9 (26 Jun 2023 17:52)  T(F): 98.4 (26 Jun 2023 17:52), Max: 98.4 (26 Jun 2023 17:52)  HR: 94 (26 Jun 2023 17:52) (76 - 94)  BP: 128/92 (26 Jun 2023 17:52) (98/63 - 128/92)  BP(mean): --  RR: 16 (26 Jun 2023 17:52) (16 - 19)  SpO2: 94% (26 Jun 2023 17:52) (94% - 97%)    Parameters below as of 26 Jun 2023 17:52  Patient On (Oxygen Delivery Method): room air    PHYSICAL EXAM:  GENERAL: NAD, AAO, speaks in full sentences, emotional, poor historian  Dry MM  HEAD:  Right craniotomy noted with surgical scar  EYES: EOMI, PERRLA, conjunctiva and sclera clear  ENMT: No tonsillar erythema, exudates, or enlargement; Moist mucous membranes, Good dentition, No lesions  NECK: Supple, No JVD, Normal thyroid  NERVOUS SYSTEM:  Alert & Oriented   CHEST/LUNG: Clear to percussion bilaterally; No rales, rhonchi, wheezing, or rubs  HEART: Regular rate and rhythm; No murmurs, rubs, or gallops  ABDOMEN: Soft, Nontender, Nondistended; Bowel sounds present  EXTREMITIES:  2+ Peripheral Pulses, No clubbing, cyanosis, or edema  LYMPH: No lymphadenopathy noted  SKIN: No rashes or lesions    LABS:        CAPILLARY BLOOD GLUCOSE    RADIOLOGY & ADDITIONAL TESTS:  VA Duplex Lower Ext Vein Scan, Bilat (06.22.23 @ 12:15) >  IMPRESSION:  No evidence of deep venous thrombosis in either lower extremity.    Consultant(s) Notes Reviewed:  [x ] YES  [ ] NO  Care Discussed with Consultants/Other Providers [ x] YES  [ ] NO  Imaging Personally Reviewed:  [ ] YES  [x ] NO

## 2023-06-28 PROCEDURE — 99232 SBSQ HOSP IP/OBS MODERATE 35: CPT

## 2023-06-28 RX ORDER — AMANTADINE HCL 100 MG
50 CAPSULE ORAL
Refills: 0 | Status: DISCONTINUED | OUTPATIENT
Start: 2023-06-29 | End: 2023-06-30

## 2023-06-28 RX ORDER — PANTOPRAZOLE SODIUM 20 MG/1
40 TABLET, DELAYED RELEASE ORAL DAILY
Refills: 0 | Status: DISCONTINUED | OUTPATIENT
Start: 2023-06-28 | End: 2023-07-05

## 2023-06-28 RX ADMIN — LACOSAMIDE 150 MILLIGRAM(S): 50 TABLET ORAL at 18:30

## 2023-06-28 RX ADMIN — LACOSAMIDE 150 MILLIGRAM(S): 50 TABLET ORAL at 05:13

## 2023-06-28 RX ADMIN — BRIVARACETAM 100 MILLIGRAM(S): 25 TABLET, FILM COATED ORAL at 05:15

## 2023-06-28 RX ADMIN — Medication 650 MILLIGRAM(S): at 13:30

## 2023-06-28 RX ADMIN — LISINOPRIL 20 MILLIGRAM(S): 2.5 TABLET ORAL at 05:13

## 2023-06-28 RX ADMIN — Medication 650 MILLIGRAM(S): at 22:45

## 2023-06-28 RX ADMIN — ENOXAPARIN SODIUM 40 MILLIGRAM(S): 100 INJECTION SUBCUTANEOUS at 18:40

## 2023-06-28 RX ADMIN — BRIVARACETAM 100 MILLIGRAM(S): 25 TABLET, FILM COATED ORAL at 18:30

## 2023-06-28 RX ADMIN — Medication 75 MICROGRAM(S): at 05:13

## 2023-06-28 RX ADMIN — Medication 650 MILLIGRAM(S): at 12:52

## 2023-06-28 RX ADMIN — Medication 650 MILLIGRAM(S): at 06:14

## 2023-06-28 RX ADMIN — Medication 50 MILLIGRAM(S): at 19:58

## 2023-06-28 RX ADMIN — Medication 650 MILLIGRAM(S): at 23:45

## 2023-06-28 RX ADMIN — Medication 650 MILLIGRAM(S): at 05:21

## 2023-06-28 RX ADMIN — PANTOPRAZOLE SODIUM 40 MILLIGRAM(S): 20 TABLET, DELAYED RELEASE ORAL at 12:30

## 2023-06-28 RX ADMIN — Medication 650 MILLIGRAM(S): at 00:46

## 2023-06-28 RX ADMIN — POLYETHYLENE GLYCOL 3350 17 GRAM(S): 17 POWDER, FOR SOLUTION ORAL at 12:31

## 2023-06-28 RX ADMIN — NYSTATIN CREAM 1 APPLICATION(S): 100000 CREAM TOPICAL at 18:30

## 2023-06-28 NOTE — PROGRESS NOTE ADULT - ASSESSMENT
38 year old female w/ PMhx hypothyroidism admitted to [facility] 6/6 after being found unresponsive, found to have large right frontal IPH w/ IVH and hydrocephalus. Now s/p R krishna craniectomy for evacuation on 6/6. PEG placed 6/20. Now admitted for multidisciplinary rehab.     #IPH with IVH, R frontal lobe, now with left sided weakness, left visual field, left facial droop, Gait instability, ADL and functional impairments  - Continue comprehensive rehab program of PT/OT/SLP  - Amantadine 50 mg qAM  - Trazodone 50 mg qPM   - Briviact 100 mg BID, Vimpat 150 mg BID  - Helmet when OOB  - treatment plan discussed w/mother at bedside  - LLE Estim/boot ordered    #HTN  - Lisinopril 20 mg daily   - Clonidine 0.1 mg PO q12h dc on day of discharge from [facility], monitor for rebound     #Pain Control  - PRN Tylenol (4-6), PRN Oxycodone (7-10)    #Dysphagia s/p PEG/GI  - Abdominal binder  - PEG feeds changed to bolus  -add GI ppx for reflux symptoms this am    #Hypothyroidism  - Continue synthroid 75 mcg daily     #Fever   - infectious work up unrevealing  - repeat LE duplex negative on 6/22  - no urinary retention on bladder scan  - repeat Bcx/Ucx (6/21) negative, RVP negative  - As per ID monitor off antibiotics    #GI/Bowel Mgmt  - Senna 2 tabs daily  - Miralax daily PRN    #/Bladder Mgmt   - Monitor ability to void    FEN   - Diet - Glucerna 1.2   - Banatrol TF daily     SKIN  - R craniectomy site with healing incision, defect soft    Precautions / PROPHYLAXIS:   - Falls, Cardiac, Seizures   - Helmet when OOB   - Pressure injury/Skin: Turn Q2hrs in bed while awake  - DVT: Lovenox

## 2023-06-28 NOTE — PROGRESS NOTE ADULT - SUBJECTIVE AND OBJECTIVE BOX
HPI:  38 year old female w/ PMHx hypothyroidism who was admitted to [facility] 6/6 after being found on floor at home, with CT revealing large right-sided ICH. She required emergent right frontal craniectomy for decompression and EVD placement. EVD subsequently removed 6/16. Angio negative. Patient underwent G tube placement 6/20. Hospital course notable for fluctuating leukocytosis and high-grade temperatures with no identified source of infection including in urine, blood, CSF. Now admitted for multidisciplinary rehab.       SUBJECTIVE/ROS: Patient evaluated while in chair. Slept on off due to environment. denies chest pain, fever, chills, nausea, vomiting, abdominal pain, headache, or BLE pain. Awake and alert. Working in therapy. Tolerating OOB. Will add EStim. No sz.       VITALS  Vital Signs Last 24 Hrs  T(C): 36.4 (28 Jun 2023 08:09), Max: 36.9 (27 Jun 2023 14:37)  T(F): 97.6 (28 Jun 2023 08:09), Max: 98.5 (27 Jun 2023 14:37)  HR: 96 (28 Jun 2023 08:09) (86 - 100)  BP: 114/81 (28 Jun 2023 08:09) (114/81 - 154/90)  BP(mean): --  RR: 16 (28 Jun 2023 08:09) (16 - 16)  SpO2: 98% (28 Jun 2023 08:09) (97% - 98%)    Parameters below as of 28 Jun 2023 08:09  Patient On (Oxygen Delivery Method): room air      CURRENT MEDICATIONS  MEDICATIONS  (STANDING):  brivaracetam Oral Solution 100 milliGRAM(s) Oral two times a day  enoxaparin Injectable 40 milliGRAM(s) SubCutaneous every 24 hours  lacosamide Solution 150 milliGRAM(s) Oral two times a day  levothyroxine 75 MICROGram(s) Oral daily  lisinopril 20 milliGRAM(s) Oral daily  nystatin Powder 1 Application(s) Topical two times a day  pantoprazole   Suspension 40 milliGRAM(s) Oral daily  polyethylene glycol 3350 17 Gram(s) Oral daily  traZODone 50 milliGRAM(s) Oral at bedtime    MEDICATIONS  (PRN):  acetaminophen   Oral Liquid .. 650 milliGRAM(s) Oral every 6 hours PRN Temp greater or equal to 38C (100.4F), Mild Pain (1 - 3)  senna 2 Tablet(s) Oral at bedtime PRN Constipation      PHYSICAL EXAM  Constitutional - NAD, Comfortable  HEENT - NCAT, EOM  Neck - Supple, No limited ROM  Chest - CTA bilaterally  Cardiovascular - RRR, S1S2  Abdomen -BS+, Soft, NTND, PEG CDI  Extremities - No C/C/E, No calf tenderness   Neurologic Exam - aox3, RU/RL 5/5, LU1/5, LL0/5, left visual field deficit, absent left lateral gaze, left facial      Continue comprehensive acute rehab program consisting of 3hrs/day of OT/PT and SLP.

## 2023-06-29 LAB
ALBUMIN SERPL ELPH-MCNC: 2.9 G/DL — LOW (ref 3.3–5)
ALP SERPL-CCNC: 112 U/L — SIGNIFICANT CHANGE UP (ref 40–120)
ALT FLD-CCNC: 42 U/L — SIGNIFICANT CHANGE UP (ref 10–45)
ANION GAP SERPL CALC-SCNC: 10 MMOL/L — SIGNIFICANT CHANGE UP (ref 5–17)
AST SERPL-CCNC: 23 U/L — SIGNIFICANT CHANGE UP (ref 10–40)
BASOPHILS # BLD AUTO: 0.05 K/UL — SIGNIFICANT CHANGE UP (ref 0–0.2)
BASOPHILS NFR BLD AUTO: 0.4 % — SIGNIFICANT CHANGE UP (ref 0–2)
BILIRUB SERPL-MCNC: 0.2 MG/DL — SIGNIFICANT CHANGE UP (ref 0.2–1.2)
BUN SERPL-MCNC: 14 MG/DL — SIGNIFICANT CHANGE UP (ref 7–23)
CALCIUM SERPL-MCNC: 9.6 MG/DL — SIGNIFICANT CHANGE UP (ref 8.4–10.5)
CHLORIDE SERPL-SCNC: 99 MMOL/L — SIGNIFICANT CHANGE UP (ref 96–108)
CO2 SERPL-SCNC: 26 MMOL/L — SIGNIFICANT CHANGE UP (ref 22–31)
CREAT SERPL-MCNC: 0.51 MG/DL — SIGNIFICANT CHANGE UP (ref 0.5–1.3)
EGFR: 122 ML/MIN/1.73M2 — SIGNIFICANT CHANGE UP
EOSINOPHIL # BLD AUTO: 0.23 K/UL — SIGNIFICANT CHANGE UP (ref 0–0.5)
EOSINOPHIL NFR BLD AUTO: 1.7 % — SIGNIFICANT CHANGE UP (ref 0–6)
FOLATE SERPL-MCNC: >20 NG/ML — SIGNIFICANT CHANGE UP
GLUCOSE SERPL-MCNC: 121 MG/DL — HIGH (ref 70–99)
HCT VFR BLD CALC: 35.8 % — SIGNIFICANT CHANGE UP (ref 34.5–45)
HGB BLD-MCNC: 12 G/DL — SIGNIFICANT CHANGE UP (ref 11.5–15.5)
IMM GRANULOCYTES NFR BLD AUTO: 0.8 % — SIGNIFICANT CHANGE UP (ref 0–0.9)
IRON SATN MFR SERPL: 13 % — LOW (ref 14–50)
IRON SATN MFR SERPL: 41 UG/DL — SIGNIFICANT CHANGE UP (ref 30–160)
LYMPHOCYTES # BLD AUTO: 19.2 % — SIGNIFICANT CHANGE UP (ref 13–44)
LYMPHOCYTES # BLD AUTO: 2.65 K/UL — SIGNIFICANT CHANGE UP (ref 1–3.3)
MCHC RBC-ENTMCNC: 32.2 PG — SIGNIFICANT CHANGE UP (ref 27–34)
MCHC RBC-ENTMCNC: 33.5 GM/DL — SIGNIFICANT CHANGE UP (ref 32–36)
MCV RBC AUTO: 96 FL — SIGNIFICANT CHANGE UP (ref 80–100)
MONOCYTES # BLD AUTO: 0.73 K/UL — SIGNIFICANT CHANGE UP (ref 0–0.9)
MONOCYTES NFR BLD AUTO: 5.3 % — SIGNIFICANT CHANGE UP (ref 2–14)
NEUTROPHILS # BLD AUTO: 10.01 K/UL — HIGH (ref 1.8–7.4)
NEUTROPHILS NFR BLD AUTO: 72.6 % — SIGNIFICANT CHANGE UP (ref 43–77)
NRBC # BLD: 0 /100 WBCS — SIGNIFICANT CHANGE UP (ref 0–0)
PLATELET # BLD AUTO: 393 K/UL — SIGNIFICANT CHANGE UP (ref 150–400)
POTASSIUM SERPL-MCNC: 4.7 MMOL/L — SIGNIFICANT CHANGE UP (ref 3.5–5.3)
POTASSIUM SERPL-SCNC: 4.7 MMOL/L — SIGNIFICANT CHANGE UP (ref 3.5–5.3)
PROT SERPL-MCNC: 7.8 G/DL — SIGNIFICANT CHANGE UP (ref 6–8.3)
RBC # BLD: 3.73 M/UL — LOW (ref 3.8–5.2)
RBC # FLD: 12.8 % — SIGNIFICANT CHANGE UP (ref 10.3–14.5)
SODIUM SERPL-SCNC: 135 MMOL/L — SIGNIFICANT CHANGE UP (ref 135–145)
T4 FREE SERPL-MCNC: 1.9 NG/DL — HIGH (ref 0.9–1.8)
TIBC SERPL-MCNC: 309 UG/DL — SIGNIFICANT CHANGE UP (ref 220–430)
TSH SERPL-MCNC: 3.78 UIU/ML — HIGH (ref 0.36–3.74)
UIBC SERPL-MCNC: 268 UG/DL — SIGNIFICANT CHANGE UP (ref 110–370)
VIT B12 SERPL-MCNC: 604 PG/ML — SIGNIFICANT CHANGE UP (ref 232–1245)
WBC # BLD: 13.78 K/UL — HIGH (ref 3.8–10.5)
WBC # FLD AUTO: 13.78 K/UL — HIGH (ref 3.8–10.5)

## 2023-06-29 PROCEDURE — 99232 SBSQ HOSP IP/OBS MODERATE 35: CPT

## 2023-06-29 RX ORDER — GABAPENTIN 400 MG/1
100 CAPSULE ORAL AT BEDTIME
Refills: 0 | Status: DISCONTINUED | OUTPATIENT
Start: 2023-06-29 | End: 2023-07-02

## 2023-06-29 RX ORDER — OXYCODONE HYDROCHLORIDE 5 MG/1
5 TABLET ORAL ONCE
Refills: 0 | Status: DISCONTINUED | OUTPATIENT
Start: 2023-06-29 | End: 2023-06-29

## 2023-06-29 RX ADMIN — PANTOPRAZOLE SODIUM 40 MILLIGRAM(S): 20 TABLET, DELAYED RELEASE ORAL at 12:57

## 2023-06-29 RX ADMIN — Medication 650 MILLIGRAM(S): at 16:08

## 2023-06-29 RX ADMIN — NYSTATIN CREAM 1 APPLICATION(S): 100000 CREAM TOPICAL at 18:06

## 2023-06-29 RX ADMIN — LISINOPRIL 20 MILLIGRAM(S): 2.5 TABLET ORAL at 06:07

## 2023-06-29 RX ADMIN — GABAPENTIN 100 MILLIGRAM(S): 400 CAPSULE ORAL at 20:30

## 2023-06-29 RX ADMIN — LACOSAMIDE 150 MILLIGRAM(S): 50 TABLET ORAL at 18:06

## 2023-06-29 RX ADMIN — LACOSAMIDE 150 MILLIGRAM(S): 50 TABLET ORAL at 06:07

## 2023-06-29 RX ADMIN — OXYCODONE HYDROCHLORIDE 5 MILLIGRAM(S): 5 TABLET ORAL at 04:00

## 2023-06-29 RX ADMIN — Medication 650 MILLIGRAM(S): at 07:15

## 2023-06-29 RX ADMIN — Medication 650 MILLIGRAM(S): at 17:08

## 2023-06-29 RX ADMIN — BRIVARACETAM 100 MILLIGRAM(S): 25 TABLET, FILM COATED ORAL at 18:06

## 2023-06-29 RX ADMIN — POLYETHYLENE GLYCOL 3350 17 GRAM(S): 17 POWDER, FOR SOLUTION ORAL at 12:56

## 2023-06-29 RX ADMIN — Medication 75 MICROGRAM(S): at 06:08

## 2023-06-29 RX ADMIN — Medication 650 MILLIGRAM(S): at 06:15

## 2023-06-29 RX ADMIN — Medication 50 MILLIGRAM(S): at 06:08

## 2023-06-29 RX ADMIN — ENOXAPARIN SODIUM 40 MILLIGRAM(S): 100 INJECTION SUBCUTANEOUS at 18:15

## 2023-06-29 RX ADMIN — OXYCODONE HYDROCHLORIDE 5 MILLIGRAM(S): 5 TABLET ORAL at 03:00

## 2023-06-29 RX ADMIN — Medication 50 MILLIGRAM(S): at 20:33

## 2023-06-29 RX ADMIN — BRIVARACETAM 100 MILLIGRAM(S): 25 TABLET, FILM COATED ORAL at 06:07

## 2023-06-29 NOTE — PROGRESS NOTE ADULT - MENTAL STATUS
Alert   Knows she is in hospital  Can ID objects and repeat words  Does not recall 3 words after several minutes

## 2023-06-29 NOTE — PROGRESS NOTE ADULT - ATTENDING COMMENTS
Pt. seen with resident.  Agree with documentation above as per resident with amendments made as appropriate. Patient medically stable. Making progress towards rehab goals.     right ICH s/p hemicraniectomy  Pt. limited by neuropathic headache pain-- wife at bedside.   Discussed trial of Neurontin 100mg qhs for pain-- will taper up as tolerated.    Function in therapy and Rehab plan of care and discharge plan discussed in team meeting.

## 2023-06-29 NOTE — PROGRESS NOTE ADULT - SUBJECTIVE AND OBJECTIVE BOX
HPI:  38 year old female w/ PMHx hypothyroidism who was admitted to [facility] 6/6 after being found on floor at home, with CT revealing large right-sided ICH. She required emergent right frontal craniectomy for decompression and EVD placement. EVD subsequently removed 6/16. Angio negative. Patient underwent G tube placement 6/20. Hospital course notable for fluctuating leukocytosis and high-grade temperatures with no identified source of infection including in urine, blood, CSF. Now admitted for multidisciplinary rehab. (26 Jun 2023 13:08)      SUBJECTIVE: Patient seen and examined. No acute overnight events, though the bolus feeds have given her some bloating. Therapy sessions have gone smoothly. Has headache near surgical site.     VITALS  38y  Vital Signs Last 24 Hrs  T(C): 36.9 (29 Jun 2023 08:43), Max: 36.9 (29 Jun 2023 08:43)  T(F): 98.5 (29 Jun 2023 08:43), Max: 98.5 (29 Jun 2023 08:43)  HR: 101 (29 Jun 2023 08:43) (88 - 101)  BP: 115/82 (29 Jun 2023 08:43) (115/82 - 126/88)  BP(mean): --  RR: 16 (29 Jun 2023 08:43) (16 - 16)  SpO2: 98% (29 Jun 2023 08:43) (98% - 98%)    Parameters below as of 29 Jun 2023 08:43  Patient On (Oxygen Delivery Method): room air      Daily     Daily     RECENT LABS:                        12.0   13.78 )-----------( 393      ( 29 Jun 2023 05:46 )             35.8     06-29    135  |  99  |  14  ----------------------------<  121<H>  4.7   |  26  |  0.51    Ca    9.6      29 Jun 2023 05:46    TPro  7.8  /  Alb  2.9<L>  /  TBili  0.2  /  DBili  x   /  AST  23  /  ALT  42  /  AlkPhos  112  06-29    LIVER FUNCTIONS - ( 29 Jun 2023 05:46 )  Alb: 2.9 g/dL / Pro: 7.8 g/dL / ALK PHOS: 112 U/L / ALT: 42 U/L / AST: 23 U/L / GGT: x             Urinalysis Basic - ( 29 Jun 2023 05:46 )    Color: x / Appearance: x / SG: x / pH: x  Gluc: 121 mg/dL / Ketone: x  / Bili: x / Urobili: x   Blood: x / Protein: x / Nitrite: x   Leuk Esterase: x / RBC: x / WBC x   Sq Epi: x / Non Sq Epi: x / Bacteria: x          CAPILLARY BLOOD GLUCOSE          MEDICATIONS:  MEDICATIONS  (STANDING):  amantadine Syrup 50 milliGRAM(s) Oral <User Schedule>  brivaracetam Oral Solution 100 milliGRAM(s) Oral two times a day  enoxaparin Injectable 40 milliGRAM(s) SubCutaneous every 24 hours  lacosamide Solution 150 milliGRAM(s) Oral two times a day  levothyroxine 75 MICROGram(s) Oral daily  lisinopril 20 milliGRAM(s) Oral daily  nystatin Powder 1 Application(s) Topical two times a day  pantoprazole   Suspension 40 milliGRAM(s) Oral daily  polyethylene glycol 3350 17 Gram(s) Oral daily  traZODone 50 milliGRAM(s) Oral at bedtime    MEDICATIONS  (PRN):  acetaminophen   Oral Liquid .. 650 milliGRAM(s) Oral every 6 hours PRN Temp greater or equal to 38C (100.4F), Mild Pain (1 - 3)  senna 2 Tablet(s) Oral at bedtime PRN Constipation     HPI:  38 year old female w/ PMHx hypothyroidism who was admitted to [facility] 6/6 after being found on floor at home, with CT revealing large right-sided ICH. She required emergent right frontal craniectomy for decompression and EVD placement. EVD subsequently removed 6/16. Angio negative. Patient underwent G tube placement 6/20. Hospital course notable for fluctuating leukocytosis and high-grade temperatures with no identified source of infection including in urine, blood, CSF. Now admitted for multidisciplinary rehab. (26 Jun 2023 13:08)      SUBJECTIVE: Patient seen and examined. No acute overnight events, though the bolus feeds have given her some bloating. Therapy sessions have gone smoothly. Has headache near surgical site. described as sharp, shooting pain.  INterrupting sleep.  Spouse states pt. was awake at 3am with pain-- fell asleep after getting oxycodone    VITALS  38y  Vital Signs Last 24 Hrs  T(C): 36.9 (29 Jun 2023 08:43), Max: 36.9 (29 Jun 2023 08:43)  T(F): 98.5 (29 Jun 2023 08:43), Max: 98.5 (29 Jun 2023 08:43)  HR: 101 (29 Jun 2023 08:43) (88 - 101)  BP: 115/82 (29 Jun 2023 08:43) (115/82 - 126/88)  BP(mean): --  RR: 16 (29 Jun 2023 08:43) (16 - 16)  SpO2: 98% (29 Jun 2023 08:43) (98% - 98%)    Parameters below as of 29 Jun 2023 08:43  Patient On (Oxygen Delivery Method): room air      Daily     Daily     RECENT LABS:                        12.0   13.78 )-----------( 393      ( 29 Jun 2023 05:46 )             35.8     06-29    135  |  99  |  14  ----------------------------<  121<H>  4.7   |  26  |  0.51    Ca    9.6      29 Jun 2023 05:46    TPro  7.8  /  Alb  2.9<L>  /  TBili  0.2  /  DBili  x   /  AST  23  /  ALT  42  /  AlkPhos  112  06-29    LIVER FUNCTIONS - ( 29 Jun 2023 05:46 )  Alb: 2.9 g/dL / Pro: 7.8 g/dL / ALK PHOS: 112 U/L / ALT: 42 U/L / AST: 23 U/L / GGT: x             Urinalysis Basic - ( 29 Jun 2023 05:46 )    Color: x / Appearance: x / SG: x / pH: x  Gluc: 121 mg/dL / Ketone: x  / Bili: x / Urobili: x   Blood: x / Protein: x / Nitrite: x   Leuk Esterase: x / RBC: x / WBC x   Sq Epi: x / Non Sq Epi: x / Bacteria: x          CAPILLARY BLOOD GLUCOSE          MEDICATIONS:  MEDICATIONS  (STANDING):  amantadine Syrup 50 milliGRAM(s) Oral <User Schedule>  brivaracetam Oral Solution 100 milliGRAM(s) Oral two times a day  enoxaparin Injectable 40 milliGRAM(s) SubCutaneous every 24 hours  lacosamide Solution 150 milliGRAM(s) Oral two times a day  levothyroxine 75 MICROGram(s) Oral daily  lisinopril 20 milliGRAM(s) Oral daily  nystatin Powder 1 Application(s) Topical two times a day  pantoprazole   Suspension 40 milliGRAM(s) Oral daily  polyethylene glycol 3350 17 Gram(s) Oral daily  traZODone 50 milliGRAM(s) Oral at bedtime    MEDICATIONS  (PRN):  acetaminophen   Oral Liquid .. 650 milliGRAM(s) Oral every 6 hours PRN Temp greater or equal to 38C (100.4F), Mild Pain (1 - 3)  senna 2 Tablet(s) Oral at bedtime PRN Constipation

## 2023-06-29 NOTE — CHART NOTE - NSCHARTNOTEFT_GEN_A_CORE
Reviewed chart, approached Pt for an initial consult around 9:10 a.m., but she was in the bathroom. Returned later, Pt declined participation because she was feeling very tired because being unable to sleep last night. Agreed to reattempt within the next day.

## 2023-06-29 NOTE — PROGRESS NOTE ADULT - CONSTITUTIONAL COMMENTS
Slightly restless, distracted. Not overtly distressed Slightly restless, distracted. Not overtly distressed. R frontal craniectomy site c/d/i

## 2023-06-29 NOTE — CHART NOTE - NSCHARTNOTEFT_GEN_A_CORE
[facility] Plan of Care    REHABILITATION DIAGNOSIS:  Nontraumatic intracerebral hemorrhage          COMORBIDITIES/COMPLICATING CONDITIONS IMPACTING REHABILITATION:  HEALTH ISSUES - PROBLEM Dx:        PAST MEDICAL & SURGICAL HISTORY:      Based upon consideration of the patient's impairments, functional status, complicating conditions and any other contributing factors and after information garnered from the assessments of all therapy disciplines involved in treating the patient and other pertinent clinicians:    INTERDISCIPLINARY REHABILITATION INTERVENTIONS:    [ X  ] Transfer Training  [ X  ] Bed Mobility  [ X  ] Therapeutic Exercise  [ X ] Balance/Coordination Exercises  [ X ] Locomotion retraining  [ X  ] Stairs  [  X ] Functional Transfer Training  [ X  ] Bowel/Bladder program  [ X  ] Pain Management  [ X  ] Skin/Wound Care  [ X  ] Visual/Perceptual Training  [ X  ] Therapeutic Recreation Activities  [  X ] Neuromuscular Re-education  [ X  ] Activities of Daily Living  [ X  ] Speech Exercise  [X   ] Swallowing Exercises  [   ] Vital Stim  [   ] Dietary Supplements  [   ] Calorie Count  [ X  ] Cognitive Exercises  [  X ] Congnitive/Linguistic Treatment  [  x ] Behavior Program  [  x ] Neuropsych Therapy  [ X  ] Patient/Family Counseling  [ X ] Family Training  [ X  ] Community Re-entry  [   ] Orthotic Evaluation  [   ] Prosthetic Eval/Training    MEDICAL PROGNOSIS: good      REHAB POTENTIAL:  Good    EXPECTED DAILY THERAPY:  3 hours/day           ESTIMATED LOS:  [  ] 5-7 Days  [  ] 7-10 Days  [  ] 10- 14 Days  [  ] 14- 18 Days  [ x ] 18- 21 Days    ESTIMATED DISPOSITION:  [  ] Home   [  ] Home with Outpatient Therapies  [ x ] Home with Home Therapies  [ x ] [facility]  [  ] Nursing Home  [  ] Long Term Acute Care    INTERDISCIPLINARY FUNCTIONAL OUTCOMES/GOALS:         Gait/Mobility: 2-3       Transfers: 2-3       ADLs: 2-3       Functional Transfers: 2-3       Medication Management: 2       Communication: 3       Cognitive: 2       Dysphagia: 4       Bladder 5       Bowel: 5     Functional Independent Measures:   7 = Independent  6 = Modified Independent  5 = Supervision  4 = Minimal Assist/ Contact Guard  3 = Moderate Assistance  2 = Maximum Assistance  1 = Total Assistance  0 = Unable to assess

## 2023-06-29 NOTE — PROGRESS NOTE ADULT - ASSESSMENT
38 year old female w/ PMhx hypothyroidism admitted to [facility] 6/6 after being found unresponsive, found to have large right frontal IPH w/ IVH and hydrocephalus. Now s/p R krishna craniectomy for evacuation on 6/6. PEG placed 6/20. Now admitted for multidisciplinary rehab.     #IPH with IVH, R frontal lobe, now with left sided weakness, left visual field, left facial droop, Gait instability, ADL and functional impairments  - Continue comprehensive rehab program of PT/OT/SLP  - Amantadine 50 mg qAM  - Trazodone 50 mg qPM   - Briviact 100 mg BID, Vimpat 150 mg BID  - Helmet when OOB  - Treatment plan discussed w/mother at bedside  - LLE Estim/boot ordered    #HTN  - Lisinopril 20 mg daily     #Pain Control  - PRN Tylenol (4-6), PRN Oxycodone (7-10)    #Dysphagia s/p PEG/GI  - Abdominal binder  - PEG feeds changed to bolus and vital iso bloating w/ TwoCal  - PPx w/ protonix  - MBS tomorrow     #Hypothyroidism  - Continue synthroid 75 mcg daily     #Fevers, likely central   - Prior infectious work up unrevealing  - Repeat LE duplex negative on 6/22  - No urinary retention on bladder scan  - Repeat Bcx/Ucx (6/21) negative, RVP negative  - As per ID monitor off antibiotics    #GI/Bowel Mgmt  - Senna 2 tabs daily  - Miralax daily PRN    #/Bladder Mgmt   - Monitor ability to void    FEN   - Diet - Vital   - Banatrol TF daily     SKIN  - R craniectomy site with healing incision, defect soft    Precautions / PROPHYLAXIS:   - Falls, Cardiac, Seizures   - Helmet when OOB   - Pressure injury/Skin: Turn Q2hrs in bed while awake  - DVT: Lovenox     --------------------------------    IDT 6/29:  SLP: KARLY tomorrow, continuing w/ tube feeds, mild-mod language deficits, mod-severe cognitive deficits, limited by attention  OT: Total across the board, goal mod-max  PT: Max across the board  Goals: 1 Pivot and transfers, bed mobility mod-A, 2 attend to structured tasks for 30 minutes   Dispo: 7/24, back to Neurosurgical care or cranioplasty. Spouse very supportive. Possible re-admit following cranioplasty   38 year old female w/ PMhx hypothyroidism admitted to [facility] 6/6 after being found unresponsive, found to have large right frontal IPH w/ IVH and hydrocephalus. Now s/p R krishna craniectomy for evacuation on 6/6. PEG placed 6/20. Now admitted for multidisciplinary rehab.     #IPH with IVH, R frontal lobe, now with left sided weakness, left visual field, left facial droop, Gait instability, ADL and functional impairments  - Continue comprehensive rehab program of PT/OT/SLP  - Amantadine 50 mg qAM  - Trazodone 50 mg qPM   - Briviact 100 mg BID, Vimpat 150 mg BID  - Helmet when OOB  - Treatment plan discussed w/mother at bedside  - LLE Estim/boot ordered    #HTN  - Lisinopril 20 mg daily     #Pain Control  - PRN Tylenol (4-6), PRN Oxycodone (7-10)  - Starting standing gabapentin, 100 mg nightly     #Dysphagia s/p PEG/GI  - Abdominal binder  - PEG feeds changed to bolus and vital iso bloating w/ TwoCal  - PPx w/ protonix  - MBS tomorrow     #Hypothyroidism  - Continue synthroid 75 mcg daily     #Fevers, likely central   - Prior infectious work up unrevealing  - Repeat LE duplex negative on 6/22  - No urinary retention on bladder scan  - Repeat Bcx/Ucx (6/21) negative, RVP negative  - As per ID monitor off antibiotics    #GI/Bowel Mgmt  - Senna 2 tabs daily  - Miralax daily PRN    #/Bladder Mgmt   - Monitor ability to void    FEN   - Diet - Vital   - Banatrol TF daily     SKIN  - R craniectomy site with healing incision, defect soft    Precautions / PROPHYLAXIS:   - Falls, Cardiac, Seizures   - Helmet when OOB   - Pressure injury/Skin: Turn Q2hrs in bed while awake  - DVT: Lovenox     --------------------------------    IDT 6/29:  SLP: KARLY tomorrow, continuing w/ tube feeds, mild-mod language deficits, mod-severe cognitive deficits, limited by attention  OT: Total across the board, goal mod-max  PT: Max across the board  Goals: 1 Pivot and transfers, bed mobility mod-A, 2 attend to structured tasks for 30 minutes   Dispo: 7/24, back to Neurosurgical care or cranioplasty. Spouse very supportive. Possible re-admit following cranioplasty   38 year old female w/ PMhx hypothyroidism admitted to [facility] 6/6 after being found unresponsive, found to have large right frontal IPH w/ IVH and hydrocephalus. Now s/p R krishna craniectomy for evacuation on 6/6. PEG placed 6/20. Now admitted for multidisciplinary rehab.     #IPH with IVH, R frontal lobe, now with left sided weakness, left visual field, left facial droop, Gait instability, ADL and functional impairments  - Continue comprehensive rehab program of PT/OT/SLP  - Amantadine 50 mg qAM  - Trazodone 50 mg qPM   - Briviact 100 mg BID, Vimpat 150 mg BID  - Helmet when OOB  - Treatment plan discussed w/mother at bedside  - LLE Estim/boot ordered    #HTN  - Lisinopril 20 mg daily     #Pain Control  - PRN Tylenol (4-6), PRN Oxycodone (7-10)  - Starting standing gabapentin, 100 mg nightly     #Dysphagia s/p PEG/GI  - Abdominal binder  - PEG feeds changed to bolus and vital iso bloating w/ TwoCal  - PPx w/ protonix  - MBS tomorrow     #Hypothyroidism  - Continue synthroid 75 mcg daily     #Fevers, likely central   - Prior infectious work up unrevealing  - Repeat LE duplex negative on 6/22  - No urinary retention on bladder scan  - Repeat Bcx/Ucx (6/21) negative, RVP negative  - As per ID monitor off antibiotics    #GI/Bowel Mgmt  - Senna 2 tabs daily  - Miralax daily PRN    #/Bladder Mgmt   - Monitor ability to void    FEN   - Diet - Vital   - Banatrol TF daily     SKIN  - R craniectomy site with healing incision, defect soft    Precautions / PROPHYLAXIS:   - Falls, Cardiac, Seizures   - Helmet when OOB   - Pressure injury/Skin: Turn Q2hrs in bed while awake  - DVT: Lovenox     --------------------------------    IDT 6/29:  SLP: KARLY tomorrow, continuing w/ tube feeds, mild-mod language deficits, mod-severe cognitive deficits, limited by attention  OT: Total A- across the board, max A--squat-pivot transfer; impaired sitting balance and head control; goal mod-max  PT: Max A--squat pivot transfer; Standing at Wall bar-- tot A.  decreased trunk control  Goals: 1 Pivot and transfers, bed mobility mod-A, 2 attend to structured tasks for 30 minutes   Dispo: 7/24, HALLIE vs.  back to Neurosurgical care or cranioplasty. Spouse very supportive. Possible re-admit following cranioplasty

## 2023-06-30 PROCEDURE — 90832 PSYTX W PT 30 MINUTES: CPT

## 2023-06-30 PROCEDURE — 74230 X-RAY XM SWLNG FUNCJ C+: CPT | Mod: 26

## 2023-06-30 PROCEDURE — 99232 SBSQ HOSP IP/OBS MODERATE 35: CPT

## 2023-06-30 RX ORDER — BACITRACIN ZINC 500 UNIT/G
1 OINTMENT IN PACKET (EA) TOPICAL THREE TIMES A DAY
Refills: 0 | Status: DISCONTINUED | OUTPATIENT
Start: 2023-06-30 | End: 2023-08-01

## 2023-06-30 RX ORDER — TRAZODONE HCL 50 MG
75 TABLET ORAL AT BEDTIME
Refills: 0 | Status: DISCONTINUED | OUTPATIENT
Start: 2023-06-30 | End: 2023-07-04

## 2023-06-30 RX ADMIN — NYSTATIN CREAM 1 APPLICATION(S): 100000 CREAM TOPICAL at 18:25

## 2023-06-30 RX ADMIN — Medication 75 MICROGRAM(S): at 07:01

## 2023-06-30 RX ADMIN — LACOSAMIDE 150 MILLIGRAM(S): 50 TABLET ORAL at 18:24

## 2023-06-30 RX ADMIN — PANTOPRAZOLE SODIUM 40 MILLIGRAM(S): 20 TABLET, DELAYED RELEASE ORAL at 12:49

## 2023-06-30 RX ADMIN — GABAPENTIN 100 MILLIGRAM(S): 400 CAPSULE ORAL at 21:20

## 2023-06-30 RX ADMIN — LACOSAMIDE 150 MILLIGRAM(S): 50 TABLET ORAL at 07:02

## 2023-06-30 RX ADMIN — LISINOPRIL 20 MILLIGRAM(S): 2.5 TABLET ORAL at 07:01

## 2023-06-30 RX ADMIN — Medication 650 MILLIGRAM(S): at 10:07

## 2023-06-30 RX ADMIN — Medication 650 MILLIGRAM(S): at 21:19

## 2023-06-30 RX ADMIN — Medication 650 MILLIGRAM(S): at 16:19

## 2023-06-30 RX ADMIN — Medication 650 MILLIGRAM(S): at 02:51

## 2023-06-30 RX ADMIN — ENOXAPARIN SODIUM 40 MILLIGRAM(S): 100 INJECTION SUBCUTANEOUS at 18:36

## 2023-06-30 RX ADMIN — Medication 650 MILLIGRAM(S): at 02:01

## 2023-06-30 RX ADMIN — Medication 650 MILLIGRAM(S): at 09:07

## 2023-06-30 RX ADMIN — Medication 75 MILLIGRAM(S): at 21:20

## 2023-06-30 RX ADMIN — BRIVARACETAM 100 MILLIGRAM(S): 25 TABLET, FILM COATED ORAL at 07:01

## 2023-06-30 RX ADMIN — BRIVARACETAM 100 MILLIGRAM(S): 25 TABLET, FILM COATED ORAL at 18:24

## 2023-06-30 RX ADMIN — Medication 50 MILLIGRAM(S): at 07:01

## 2023-06-30 RX ADMIN — NYSTATIN CREAM 1 APPLICATION(S): 100000 CREAM TOPICAL at 07:02

## 2023-06-30 RX ADMIN — Medication 1 APPLICATION(S): at 15:19

## 2023-06-30 RX ADMIN — POLYETHYLENE GLYCOL 3350 17 GRAM(S): 17 POWDER, FOR SOLUTION ORAL at 12:49

## 2023-06-30 RX ADMIN — Medication 650 MILLIGRAM(S): at 15:19

## 2023-06-30 RX ADMIN — Medication 650 MILLIGRAM(S): at 22:19

## 2023-06-30 NOTE — PROGRESS NOTE ADULT - ASSESSMENT
38 year old female w/ PMhx hypothyroidism admitted to [facility] 6/6 after being found unresponsive, found to have large right frontal IPH w/ IVH and hydrocephalus. Now s/p R krishna craniectomy for evacuation on 6/6. PEG placed 6/20. Now admitted for multidisciplinary rehab.     #IPH with IVH, R frontal lobe, now with left sided weakness, left visual field, left facial droop, Gait instability, ADL and functional impairments  - Continue comprehensive rehab program of PT/OT/SLP  - Amantadine 50 mg qAM - DC   - Trazodone 50 mg qPM - incr 75mg  - Briviact 100 mg BID, Vimpat 150 mg BID  - Helmet when OOB  - Treatment plan discussed w/mother at bedside  - LLE Estim/boot ordered    #HTN  - Lisinopril 20 mg daily     #Pain Control  - PRN Tylenol (4-6), PRN Oxycodone (7-10)  - Starting standing gabapentin 100 mg nightly     #Dysphagia s/p PEG/GI  - Abdominal binder  - PEG feeds changed to bolus and vital iso bloating w/ TwoCal  - PPx w/ protonix  - MBS (6/30) - upgraded to puree/thin with provale cup + supplemental feed if doesn't eat >50% of meal tray    #Hypothyroidism  - Continue synthroid 75 mcg daily     #Fevers, likely central   - Prior infectious work up unrevealing  - Repeat LE duplex negative on 6/22  - No urinary retention on bladder scan  - Repeat Bcx/Ucx (6/21) negative, RVP negative  - As per ID monitor off antibiotics    #GI/Bowel Mgmt  - Senna 2 tabs daily  - Miralax daily PRN    #/Bladder Mgmt   - Monitor ability to void    FEN   - Diet - Vital - upgraded to puree/thin with provale cup + supplemental feed if doesn't eat >50% of meal tray  - Banatrol TF daily   - MBS 6/30 - diet upgraded    SKIN  - R craniectomy site with healing incision, defect soft    Precautions / PROPHYLAXIS:   - Falls, Cardiac, Seizures   - Helmet when OOB   - Pressure injury/Skin: Turn Q2hrs in bed while awake  - DVT: Lovenox     --------------------------------    IDT 6/29:  SLP: KARLY tomorrow, continuing w/ tube feeds, mild-mod language deficits, mod-severe cognitive deficits, limited by attention  OT: Total A- across the board, max A--squat-pivot transfer; impaired sitting balance and head control; goal mod-max  PT: Max A--squat pivot transfer; Standing at Wall bar-- tot A.  decreased trunk control  Goals: 1 Pivot and transfers, bed mobility mod-A, 2 attend to structured tasks for 30 minutes   Dispo: 7/24, HALLIE vs.  back to Neurosurgical care or cranioplasty. Spouse very supportive. Possible re-admit following cranioplasty   38 year old female w/ PMhx hypothyroidism admitted to [facility] 6/6 after being found unresponsive, found to have large right frontal IPH w/ IVH and hydrocephalus. Now s/p R krishna craniectomy for evacuation on 6/6. PEG placed 6/20. Now admitted for multidisciplinary rehab.     #IPH with IVH, R frontal lobe, now with left sided weakness, left visual field, left facial droop, Gait instability, ADL and functional impairments  - Continue comprehensive rehab program of PT/OT/SLP  - Briviact 100 mg BID, Vimpat 150 mg BID  - Helmet when OOB  - Treatment plan discussed w/mother at bedside  - LLE Estim/boot ordered    cognition--  --stop amantadine as may be interfering with sleep (pt. received today's dose already)    Sleep--  --Trial increasing Trazodone to 75mg qhs to improve sleep and restlessness.     #HTN  - Lisinopril 20 mg daily     #Pain Control  - PRN Tylenol (4-6),   - Started standing gabapentin 100 mg nightly for Neuropathic headache pain.      #Dysphagia s/p PEG/GI  - Abdominal binder  - PEG feeds changed to bolus and vital iso bloating w/ TwoCal  - PPx w/ protonix  - MBS (6/30) - upgraded to puree/thin with provale cup + supplemental feed if doesn't eat >50% of meal tray  --1:1 assistance with meals    #Hypothyroidism  - Continue synthroid 75 mcg daily     #Fevers, likely central   - Prior infectious work up unrevealing  - Repeat LE duplex negative on 6/22  - No urinary retention on bladder scan  - Repeat Bcx/Ucx (6/21) negative, RVP negative  - As per ID monitor off antibiotics    #GI/Bowel Mgmt  - Senna 2 tabs daily  - Miralax daily PRN    #/Bladder Mgmt   - Monitor ability to void    FEN   - Diet - Vital - upgraded to puree/thin with provale cup + supplemental feed if doesn't eat >50% of meal tray  - Banatrol TF daily   - MBS 6/30 - diet upgraded    SKIN  - R craniectomy site with healing incision, defect soft    Precautions / PROPHYLAXIS:   - Falls, Cardiac, Seizures   - Helmet when OOB   - Pressure injury/Skin: Turn Q2hrs in bed while awake  - DVT: Lovenox     --------------------------------    IDT 6/29:  SLP: MBS tomorrow, continuing w/ tube feeds, mild-mod language deficits, mod-severe cognitive deficits, limited by attention  OT: Total A- across the board, max A--squat-pivot transfer; impaired sitting balance and head control; goal mod-max  PT: Max A--squat pivot transfer; Standing at Wall bar-- tot A.  decreased trunk control  Goals: 1 Pivot and transfers, bed mobility mod-A, 2 attend to structured tasks for 30 minutes   Dispo: 7/24, HALLIE vs.  back to Neurosurgical care or cranioplasty. Spouse very supportive. Possible re-admit following cranioplasty

## 2023-06-30 NOTE — PROGRESS NOTE ADULT - ASSESSMENT
Pt alert, attentive, mild expressive language difficulties, thought processes - scattered, no abnormal thought contents noted, blunted to depressed affect, euthymic mood, denied AH/VH, denied SI/HI/I/P, mild restlessness. Plan: Continue the assessment process. Provide supportive tx.

## 2023-06-30 NOTE — PROGRESS NOTE ADULT - NUTRITIONAL ASSESSMENT
This patient has been assessed with a concern for Malnutrition and has been determined to have a diagnosis/diagnoses of Severe protein-calorie malnutrition.    This patient is being managed with:   Diet Pureed-  Single Sips Only  Tube Feeding Modality: Gastrostomy  Vital 1.5 Tha  Total Volume for 24 Hours (mL): 960  Bolus  Total Volume of Bolus (mL):  240  Tube Feed Frequency: Every 6 hours   Tube Feed Start Time: 09:00  Bolus Feed Rate (mL per Hour): 240   Bolus Feed Duration (in Hours): 1  Bolus Feed Instructions:  Please give bolus TF if pt consumes <50% at meals + 240ml Vital 1.5 every evening @ 8p    Start Time: 23:00  Supplement Feeding Modality:  Oral  Ensure Plus High Protein Cans or Servings Per Day:  1       Frequency:  Two Times a day  Entered: Jun 30 2023 10:00AM

## 2023-06-30 NOTE — PROGRESS NOTE ADULT - SUBJECTIVE AND OBJECTIVE BOX
Pt was seen for 25 min for supportive tx. Pt c/o poor sleep, low energy. Reviewed chart, approached Pt for an initial consult, introduced the role of neuropsychology in the tx team, and explained the nature/purpose of the consult. Pt is a 37 y/o female who was admitted to [facility] 6/6 after being found on floor at home, with CT revealing large right-sided ICH. She required emergent right frontal craniectomy for decompression and EVD placement. Pt agreed to participate and was seen on the bedside. She reported feeling tired because she has not been sleeping or eating (due to having a PEG tube) that well in the previous days. Meeting was dedicated to establish rapport with Pt, gathering basic biographical information, and assessing Pt's current emotional functioning. Pt's parents were present during the meeting. Pt was able to answer most questions asked in order to explore her medical and social hx, with occasional help from her mother. She also answered to questions from the clinical interview to assess her present mental state. She appeared mildly restless, but denied pain or physical discomfort. Agreed to work on her cognitive screening on the next session. Pt said she was very hungry and her first meal since she got sick was brought during the meeting after having the MBS test earlier this morning. Support and encouragement were provided.

## 2023-06-30 NOTE — PROGRESS NOTE ADULT - SUBJECTIVE AND OBJECTIVE BOX
HPI:  38 year old female w/ PMHx hypothyroidism who was admitted to [facility] 6/6 after being found on floor at home, with CT revealing large right-sided ICH. She required emergent right frontal craniectomy for decompression and EVD placement. EVD subsequently removed 6/16. Angio negative. Patient underwent G tube placement 6/20. Hospital course notable for fluctuating leukocytosis and high-grade temperatures with no identified source of infection including in urine, blood, CSF. Now admitted for multidisciplinary rehab. (26 Jun 2023 13:08)    SUBJECTIVE:   Patient seen and evaluated with mother at bedside.  Reports that she took trazodone at bedtime and was able to sleep til 12am, then woke up and has been restless. Discussed sleep wake cycle regimen - mother attributes sleeplessness with stimulant (amantadine), will dc and assess if there's any improvement for the weekend. Reports generalized pain and dizziness.  Feels uncomfortable with helmet - reinforce importance of having it for protection.  Mother notes short term memory is impaired, assured that it's normal, will be working with speech on cognition, comprehension, and memory. Had MBS today - will upgrade to puree/thin diet.  Denies CP, SOB, cough, abd pain, or dysuria. LBM 6/29 x 2.    VITALS  38y  Vital Signs Last 24 Hrs  T(C): 36.8 (06-30-23 @ 08:55), Max: 36.8 (06-30-23 @ 08:55)  T(F): 98.3 (06-30-23 @ 08:55), Max: 98.3 (06-30-23 @ 08:55)  HR: 99 (06-30-23 @ 08:55) (99 - 101)  BP: 116/84 (06-30-23 @ 08:55) (115/79 - 117/84)  RR: 16 (06-30-23 @ 08:55) (16 - 16)  SpO2: 99% (06-30-23 @ 08:55) (96% - 99%)    PHYSICAL EXAM  Constitutional - NAD, Comfortable, irritable, helmet on in reclined WC  HEENT - NCAT, EOM  Neck - Supple, No limited ROM  Chest - no resp distress  Cardiovascular - warm and well perfused, no cyanosis or pedal edema  Abdomen - Soft, NTND, PEG CDI  Extremities - No edema, No calf tenderness   Neurologic Exam - aox3, short term memory impaired - inaccurate current town of residence, L Sh 1/5, EF 2/5, EE 1/5, WE+WF 0/5; L HF 1/5, HE 2/5, KE+KF 1/5, PF+DF 0/5. MAS: 1+ HF, 1+ ankle PF, 3 EF, 2 pectoral (adducted, internally rotated), left visual field deficit, absent left lateral gaze, left facial weakness    RECENT LABS:                        12.0   13.78 )-----------( 393      ( 29 Jun 2023 05:46 )             35.8     06-29    135  |  99  |  14  ----------------------------<  121<H>  4.7   |  26  |  0.51    Ca    9.6      29 Jun 2023 05:46    TPro  7.8  /  Alb  2.9<L>  /  TBili  0.2  /  DBili  x   /  AST  23  /  ALT  42  /  AlkPhos  112  06-29    LIVER FUNCTIONS - ( 29 Jun 2023 05:46 )  Alb: 2.9 g/dL / Pro: 7.8 g/dL / ALK PHOS: 112 U/L / ALT: 42 U/L / AST: 23 U/L / GGT: x             Urinalysis Basic - ( 29 Jun 2023 05:46 )  Color: x / Appearance: x / SG: x / pH: x  Gluc: 121 mg/dL / Ketone: x  / Bili: x / Urobili: x   Blood: x / Protein: x / Nitrite: x   Leuk Esterase: x / RBC: x / WBC x   Sq Epi: x / Non Sq Epi: x / Bacteria: x      MEDICATIONS:  MEDICATIONS  (STANDING):  brivaracetam Oral Solution 100 milliGRAM(s) Oral two times a day  enoxaparin Injectable 40 milliGRAM(s) SubCutaneous every 24 hours  gabapentin 100 milliGRAM(s) Oral at bedtime  lacosamide Solution 150 milliGRAM(s) Oral two times a day  levothyroxine 75 MICROGram(s) Oral daily  lisinopril 20 milliGRAM(s) Oral daily  nystatin Powder 1 Application(s) Topical two times a day  pantoprazole   Suspension 40 milliGRAM(s) Oral daily  polyethylene glycol 3350 17 Gram(s) Oral daily  traZODone 75 milliGRAM(s) Oral at bedtime    MEDICATIONS  (PRN):  acetaminophen   Oral Liquid .. 650 milliGRAM(s) Oral every 6 hours PRN Temp greater or equal to 38C (100.4F), Mild Pain (1 - 3)  senna 2 Tablet(s) Oral at bedtime PRN Constipation   HPI:  38 year old female w/ PMHx hypothyroidism who was admitted to [facility] 6/6 after being found on floor at home, with CT revealing large right-sided ICH. She required emergent right frontal craniectomy for decompression and EVD placement. EVD subsequently removed 6/16. Angio negative. Patient underwent G tube placement 6/20. Hospital course notable for fluctuating leukocytosis and high-grade temperatures with no identified source of infection including in urine, blood, CSF. Now admitted for multidisciplinary rehab. (26 Jun 2023 13:08)    SUBJECTIVE:   Patient seen and evaluated with mother at bedside.  Reports that she took trazodone at bedtime and was able to sleep til 12am, then woke up and has been restless. Pt. reports generalized pain, uncomfortable sitting in WC due to fatigue.  Wants to lie down in bed.  Discussed sleep wake cycle dysregulation after Brain injury with pt's mother at bedside.  Mother notes that prior to her stroke pt. would be awake at night if she drank coffee in AM.     Reports generalized pain and dizziness.  Feels uncomfortable with helmet - reinforce importance of having it for protection.  Mother notes short term memory is impaired, assured that it's normal, will be working with speech on cognition, comprehension, and memory. Had MBS today - will upgrade to puree/thin diet.  Denies CP, SOB, cough, abd pain, or dysuria. LBM 6/29 x 2.    VITALS  38y  Vital Signs Last 24 Hrs  T(C): 36.8 (06-30-23 @ 08:55), Max: 36.8 (06-30-23 @ 08:55)  T(F): 98.3 (06-30-23 @ 08:55), Max: 98.3 (06-30-23 @ 08:55)  HR: 99 (06-30-23 @ 08:55) (99 - 101)  BP: 116/84 (06-30-23 @ 08:55) (115/79 - 117/84)  RR: 16 (06-30-23 @ 08:55) (16 - 16)  SpO2: 99% (06-30-23 @ 08:55) (96% - 99%)    PHYSICAL EXAM  Constitutional - NAD, Comfortable, irritable, helmet on in reclined WC  HEENT - NCAT, EOM  Neck - Supple, No limited ROM  Chest - no resp distress  Cardiovascular - warm and well perfused, no cyanosis or pedal edema  Abdomen - Soft, NTND, PEG CDI  Extremities - No edema, No calf tenderness   Neurologic Exam - aox3, short term memory impaired - inaccurate current town of residence, L Sh 1/5, EF 2/5, EE 1/5, WE+WF 0/5; L HF 1/5, HE 2/5, KE+KF 1/5, PF+DF 0/5. MAS: 1+ HF, 1+ ankle PF, 3 EF, 2 pectoral (adducted, internally rotated), left visual field deficit, absent left lateral gaze, left facial weakness    RECENT LABS:                        12.0   13.78 )-----------( 393      ( 29 Jun 2023 05:46 )             35.8     06-29    135  |  99  |  14  ----------------------------<  121<H>  4.7   |  26  |  0.51    Ca    9.6      29 Jun 2023 05:46    TPro  7.8  /  Alb  2.9<L>  /  TBili  0.2  /  DBili  x   /  AST  23  /  ALT  42  /  AlkPhos  112  06-29    LIVER FUNCTIONS - ( 29 Jun 2023 05:46 )  Alb: 2.9 g/dL / Pro: 7.8 g/dL / ALK PHOS: 112 U/L / ALT: 42 U/L / AST: 23 U/L / GGT: x             Urinalysis Basic - ( 29 Jun 2023 05:46 )  Color: x / Appearance: x / SG: x / pH: x  Gluc: 121 mg/dL / Ketone: x  / Bili: x / Urobili: x   Blood: x / Protein: x / Nitrite: x   Leuk Esterase: x / RBC: x / WBC x   Sq Epi: x / Non Sq Epi: x / Bacteria: x      MEDICATIONS:  MEDICATIONS  (STANDING):  brivaracetam Oral Solution 100 milliGRAM(s) Oral two times a day  enoxaparin Injectable 40 milliGRAM(s) SubCutaneous every 24 hours  gabapentin 100 milliGRAM(s) Oral at bedtime  lacosamide Solution 150 milliGRAM(s) Oral two times a day  levothyroxine 75 MICROGram(s) Oral daily  lisinopril 20 milliGRAM(s) Oral daily  nystatin Powder 1 Application(s) Topical two times a day  pantoprazole   Suspension 40 milliGRAM(s) Oral daily  polyethylene glycol 3350 17 Gram(s) Oral daily  traZODone 75 milliGRAM(s) Oral at bedtime    MEDICATIONS  (PRN):  acetaminophen   Oral Liquid .. 650 milliGRAM(s) Oral every 6 hours PRN Temp greater or equal to 38C (100.4F), Mild Pain (1 - 3)  senna 2 Tablet(s) Oral at bedtime PRN Constipation

## 2023-07-01 PROCEDURE — 99232 SBSQ HOSP IP/OBS MODERATE 35: CPT

## 2023-07-01 RX ADMIN — Medication 75 MICROGRAM(S): at 05:09

## 2023-07-01 RX ADMIN — Medication 650 MILLIGRAM(S): at 06:07

## 2023-07-01 RX ADMIN — ENOXAPARIN SODIUM 40 MILLIGRAM(S): 100 INJECTION SUBCUTANEOUS at 20:42

## 2023-07-01 RX ADMIN — NYSTATIN CREAM 1 APPLICATION(S): 100000 CREAM TOPICAL at 05:18

## 2023-07-01 RX ADMIN — LACOSAMIDE 150 MILLIGRAM(S): 50 TABLET ORAL at 17:16

## 2023-07-01 RX ADMIN — NYSTATIN CREAM 1 APPLICATION(S): 100000 CREAM TOPICAL at 17:21

## 2023-07-01 RX ADMIN — GABAPENTIN 100 MILLIGRAM(S): 400 CAPSULE ORAL at 21:08

## 2023-07-01 RX ADMIN — Medication 650 MILLIGRAM(S): at 19:10

## 2023-07-01 RX ADMIN — BRIVARACETAM 100 MILLIGRAM(S): 25 TABLET, FILM COATED ORAL at 17:16

## 2023-07-01 RX ADMIN — Medication 650 MILLIGRAM(S): at 11:59

## 2023-07-01 RX ADMIN — LISINOPRIL 20 MILLIGRAM(S): 2.5 TABLET ORAL at 05:09

## 2023-07-01 RX ADMIN — Medication 650 MILLIGRAM(S): at 12:59

## 2023-07-01 RX ADMIN — LACOSAMIDE 150 MILLIGRAM(S): 50 TABLET ORAL at 05:10

## 2023-07-01 RX ADMIN — Medication 650 MILLIGRAM(S): at 05:07

## 2023-07-01 RX ADMIN — Medication 650 MILLIGRAM(S): at 18:10

## 2023-07-01 RX ADMIN — BRIVARACETAM 100 MILLIGRAM(S): 25 TABLET, FILM COATED ORAL at 05:10

## 2023-07-01 RX ADMIN — PANTOPRAZOLE SODIUM 40 MILLIGRAM(S): 20 TABLET, DELAYED RELEASE ORAL at 11:59

## 2023-07-01 RX ADMIN — Medication 75 MILLIGRAM(S): at 21:09

## 2023-07-01 NOTE — PROGRESS NOTE ADULT - ASSESSMENT
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 Now s/p R krishna craniectomy for evacuation on 6/6. PEG placed 6/20. Now admitted for multidisciplinary rehab.     #IPH with IVH, R frontal lobe  - Amantadine 50 mg qAM  - Trazodone 50 mg qPM   - Antiepileptic regimen: Briviact 100 mg BID, Vimpat 150 mg BID  - Helmet when OOB    #Normocytic anemia  - Will montior Hg/Hct, transfuse if Hg<7  - Will check iron/B12/folate with next lab draw    #HTN  - Lisinopril 20 mg daily   - Clonidine 0.1 mg PO q12h stopped on day of discharge from [facility], monitor for rebound     #Dysphagia s/p PEG  - Abdominal binder  - PEG feeds as below    #Hypothyroidism  - Continue synthroid 75 mcg daily   - Will check TSH/FT4 with next lab draw    #Fever, leukocytosis  - Will monitor F/WBC count, noted WBC on 6/25 of 11.11  - episodes of fever during [place] admission  - prior infectious work up unrevealing including negative Bcx/UA/CXR on 6/18 and unremarkable CT c/a/p on 6/15.  - repeat LE duplex negative on 6/22  - no urinary retention on bladder scan  - repeat Bcx/Ucx (6/21) negative, RVP negative  - As per ID monitor off antibiotics    DVT: Lovenox

## 2023-07-01 NOTE — PROGRESS NOTE ADULT - SUBJECTIVE AND OBJECTIVE BOX
Hospitalist: Emi Oglesby DO    CHIEF COMPLAINT: Patient is a 38y old  female who presents with a chief complaint of IPH (01 Jul 2023 09:30)      SUBJECTIVE / OVERNIGHT EVENTS: Patient seen and examined. No acute events overnight. Pain well controlled and patient without any complaints.    MEDICATIONS  (STANDING):  brivaracetam Oral Solution 100 milliGRAM(s) Oral two times a day  enoxaparin Injectable 40 milliGRAM(s) SubCutaneous every 24 hours  gabapentin 100 milliGRAM(s) Oral at bedtime  lacosamide Solution 150 milliGRAM(s) Oral two times a day  levothyroxine 75 MICROGram(s) Oral daily  lisinopril 20 milliGRAM(s) Oral daily  nystatin Powder 1 Application(s) Topical two times a day  pantoprazole   Suspension 40 milliGRAM(s) Oral daily  polyethylene glycol 3350 17 Gram(s) Oral daily  traZODone 75 milliGRAM(s) Oral at bedtime    MEDICATIONS  (PRN):  acetaminophen   Oral Liquid .. 650 milliGRAM(s) Oral every 6 hours PRN Temp greater or equal to 38C (100.4F), Mild Pain (1 - 3)  bacitracin   Ointment 1 Application(s) Topical three times a day PRN scalp irritation  senna 2 Tablet(s) Oral at bedtime PRN Constipation      VITALS:  T(F): 98 (07-01-23 @ 20:46), Max: 98.1 (07-01-23 @ 09:11)  HR: 92 (07-01-23 @ 20:46) (92 - 97)  BP: 125/81 (07-01-23 @ 20:46) (116/79 - 125/81)  RR: 16 (07-01-23 @ 20:46) (16 - 16)  SpO2: 97% (07-01-23 @ 20:46)      REVIEW OF SYSTEMS:  For ROV please refer back to H&P     PHYSICAL EXAM:  HEAD:  Right craniotomy noted with surgical scar  EYES: EOMI, PERRLA, conjunctiva and sclera clear  ENMT: No tonsillar erythema, exudates, or enlargement; Moist mucous membranes, Good dentition, No lesions  NECK: Supple, No JVD, Normal thyroid  NERVOUS SYSTEM:  Alert & Oriented   CHEST/LUNG: Clear to percussion bilaterally; No rales, rhonchi, wheezing, or rubs  HEART: Regular rate and rhythm; No murmurs, rubs, or gallops  ABDOMEN: Soft, Nontender, Nondistended; Bowel sounds present  EXTREMITIES:  2+ Peripheral Pulses, No clubbing, cyanosis, or edema  LYMPH: No lymphadenopathy noted  SKIN: No rashes or lesions        RADIOLOGY & ADDITIONAL TESTS:    Imaging Personally Reviewed:    [X] Consultant(s) Notes Reviewed:  [X] Care Discussed with Consultants/Other Providers:

## 2023-07-01 NOTE — PROGRESS NOTE ADULT - NUTRITIONAL ASSESSMENT
This patient has been assessed with a concern for Malnutrition and has been determined to have a diagnosis/diagnoses of Severe protein-calorie malnutrition.    This patient is being managed with:   Diet Pureed-  Single Sips Only  Supplement Feeding Modality:  Oral  Ensure Plus High Protein Cans or Servings Per Day:  1       Frequency:  Two Times a day  Entered: Jun 30 2023  1:41PM

## 2023-07-01 NOTE — PROGRESS NOTE ADULT - SUBJECTIVE AND OBJECTIVE BOX
No overnight events.      REVIEW OF SYSTEMS  Constitutional - No fever,  No fatigue  Neurological - No headaches, No loss of strength  Musculoskeletal - No joint pain, No joint swelling, No muscle pain    VITALS  T(C): 36.7 (07-01-23 @ 09:11), Max: 36.7 (06-30-23 @ 20:44)  HR: 96 (07-01-23 @ 09:11) (96 - 99)  BP: 118/80 (07-01-23 @ 09:11) (116/79 - 139/86)  RR: 16 (07-01-23 @ 09:11) (15 - 16)  SpO2: 96% (07-01-23 @ 09:11) (96% - 98%)  Wt(kg): --       MEDICATIONS   acetaminophen   Oral Liquid .. 650 milliGRAM(s) every 6 hours PRN  bacitracin   Ointment 1 Application(s) three times a day PRN  brivaracetam Oral Solution 100 milliGRAM(s) two times a day  enoxaparin Injectable 40 milliGRAM(s) every 24 hours  gabapentin 100 milliGRAM(s) at bedtime  lacosamide Solution 150 milliGRAM(s) two times a day  levothyroxine 75 MICROGram(s) daily  lisinopril 20 milliGRAM(s) daily  nystatin Powder 1 Application(s) two times a day  pantoprazole   Suspension 40 milliGRAM(s) daily  polyethylene glycol 3350 17 Gram(s) daily  senna 2 Tablet(s) at bedtime PRN  traZODone 75 milliGRAM(s) at bedtime      RECENT LABS/IMAGING                        ---------  PHYSICAL EXAM  Constitutional - NAD, Comfortable, laying in bed   right craniectomy   Pulm - Breathing comfortably  Abd - Soft, NTND PEG   Extremities - No edema, No calf tenderness  Neurologic Exam -                    Cognitive - Awake, Alert, oriented x 3     Communication - Fluent     Motor - left hemiplegia      Psychiatric - Mood WNL, Affect WNL    ASSESSMENT/PLAN  38y Female h/o hypothyroid with functional deficits after right frontal IPH  s/p right hemicraniectomy helmet when OOB  briviact, vimpat bid   dysphagia s/p PEG, MBG, now on puree/thin  sleep, trazodone increased   Continue current medical management  Pain - Tylenol PRN gabapentin   DVT PPX - lovenox   Continue 3hrs a day of comprehensive rehab program.

## 2023-07-02 PROCEDURE — 99232 SBSQ HOSP IP/OBS MODERATE 35: CPT

## 2023-07-02 RX ORDER — GABAPENTIN 400 MG/1
200 CAPSULE ORAL AT BEDTIME
Refills: 0 | Status: DISCONTINUED | OUTPATIENT
Start: 2023-07-02 | End: 2023-07-03

## 2023-07-02 RX ADMIN — NYSTATIN CREAM 1 APPLICATION(S): 100000 CREAM TOPICAL at 06:07

## 2023-07-02 RX ADMIN — BRIVARACETAM 100 MILLIGRAM(S): 25 TABLET, FILM COATED ORAL at 18:38

## 2023-07-02 RX ADMIN — Medication 75 MILLIGRAM(S): at 21:02

## 2023-07-02 RX ADMIN — Medication 650 MILLIGRAM(S): at 16:45

## 2023-07-02 RX ADMIN — PANTOPRAZOLE SODIUM 40 MILLIGRAM(S): 20 TABLET, DELAYED RELEASE ORAL at 12:04

## 2023-07-02 RX ADMIN — Medication 650 MILLIGRAM(S): at 05:59

## 2023-07-02 RX ADMIN — LISINOPRIL 20 MILLIGRAM(S): 2.5 TABLET ORAL at 05:58

## 2023-07-02 RX ADMIN — GABAPENTIN 200 MILLIGRAM(S): 400 CAPSULE ORAL at 21:02

## 2023-07-02 RX ADMIN — LACOSAMIDE 150 MILLIGRAM(S): 50 TABLET ORAL at 19:55

## 2023-07-02 RX ADMIN — ENOXAPARIN SODIUM 40 MILLIGRAM(S): 100 INJECTION SUBCUTANEOUS at 18:37

## 2023-07-02 RX ADMIN — Medication 75 MICROGRAM(S): at 05:59

## 2023-07-02 RX ADMIN — LACOSAMIDE 150 MILLIGRAM(S): 50 TABLET ORAL at 05:59

## 2023-07-02 RX ADMIN — Medication 650 MILLIGRAM(S): at 15:50

## 2023-07-02 RX ADMIN — NYSTATIN CREAM 1 APPLICATION(S): 100000 CREAM TOPICAL at 18:37

## 2023-07-02 RX ADMIN — BRIVARACETAM 100 MILLIGRAM(S): 25 TABLET, FILM COATED ORAL at 05:59

## 2023-07-02 RX ADMIN — Medication 650 MILLIGRAM(S): at 06:59

## 2023-07-02 NOTE — PROGRESS NOTE ADULT - ASSESSMENT
38 year old female w/ PMhx hypothyroidism admitted to [facility] 6/6 after being found unresponsive, found to have large right frontal IPH w/ IVH and hydrocephalus. Now s/p R krishna craniectomy for evacuation on 6/6. PEG placed 6/20. Now admitted for multidisciplinary rehab.     #IPH with IVH, R frontal lobe  - Amantadine 50 mg qAM  - Trazodone 50 mg qPM   - Antiepileptic regimen: Briviact 100 mg BID, Vimpat 150 mg BID  - Helmet when OOB    #Normocytic anemia  - Will montior Hg/Hct, transfuse if Hg<7  - Will check iron/B12/folate with next lab draw    #HTN  - Lisinopril 20 mg daily   - Clonidine 0.1 mg PO q12h stopped on day of discharge from [facility], monitor for rebound     #Dysphagia s/p PEG  - Abdominal binder  - PEG feeds as below    #Hypothyroidism  - Continue synthroid 75 mcg daily   - Will check TSH/FT4 with next lab draw    #Fever, leukocytosis  - Will monitor F/WBC count, noted WBC on 6/25 of 11.11  - episodes of fever during [facility] admission  - prior infectious work up unrevealing including negative Bcx/UA/CXR on 6/18 and unremarkable CT c/a/p on 6/15.  - repeat LE duplex negative on 6/22  - no urinary retention on bladder scan  - repeat Bcx/Ucx (6/21) negative, RVP negative  - As per ID monitor off antibiotics    DVT: Lovenox

## 2023-07-02 NOTE — PROGRESS NOTE ADULT - SUBJECTIVE AND OBJECTIVE BOX
Hospitalist: Emi Oglesby DO    CHIEF COMPLAINT: Patient is a 38y old  female who presents with a chief complaint of IPH (02 Jul 2023 09:47)      SUBJECTIVE / OVERNIGHT EVENTS: Patient seen and examined. No acute events overnight. Pain well controlled and patient without any complaints.    MEDICATIONS  (STANDING):  brivaracetam Oral Solution 100 milliGRAM(s) Oral two times a day  enoxaparin Injectable 40 milliGRAM(s) SubCutaneous every 24 hours  gabapentin 200 milliGRAM(s) Oral at bedtime  lacosamide Solution 150 milliGRAM(s) Oral two times a day  levothyroxine 75 MICROGram(s) Oral daily  lisinopril 20 milliGRAM(s) Oral daily  nystatin Powder 1 Application(s) Topical two times a day  pantoprazole   Suspension 40 milliGRAM(s) Oral daily  polyethylene glycol 3350 17 Gram(s) Oral daily  traZODone 75 milliGRAM(s) Oral at bedtime    MEDICATIONS  (PRN):  acetaminophen   Oral Liquid .. 650 milliGRAM(s) Oral every 6 hours PRN Temp greater or equal to 38C (100.4F), Mild Pain (1 - 3)  bacitracin   Ointment 1 Application(s) Topical three times a day PRN scalp irritation  senna 2 Tablet(s) Oral at bedtime PRN Constipation      VITALS:  T(F): 98.6 (07-02-23 @ 08:20), Max: 98.6 (07-02-23 @ 08:20)  HR: 95 (07-02-23 @ 08:20) (92 - 98)  BP: 110/80 (07-02-23 @ 08:20) (110/80 - 125/81)  RR: 16 (07-02-23 @ 08:20) (15 - 16)  SpO2: 100% (07-02-23 @ 08:20)      REVIEW OF SYSTEMS:  For ROV please refer back to H&P     PHYSICAL EXAM:  HEAD:  Right craniotomy noted with surgical scar  EYES: EOMI, PERRLA, conjunctiva and sclera clear  ENMT: No tonsillar erythema, exudates, or enlargement; Moist mucous membranes, Good dentition, No lesions  NECK: Supple, No JVD, Normal thyroid  NERVOUS SYSTEM:  Alert & Oriented   CHEST/LUNG: Clear to percussion bilaterally; No rales, rhonchi, wheezing, or rubs  HEART: Regular rate and rhythm; No murmurs, rubs, or gallops  ABDOMEN: Soft, Nontender, Nondistended; Bowel sounds present  EXTREMITIES:  2+ Peripheral Pulses, No clubbing, cyanosis, or edema  LYMPH: No lymphadenopathy noted  SKIN: No rashes or lesions    Imaging Personally Reviewed:    [X] Consultant(s) Notes Reviewed:  [X] Care Discussed with Consultants/Other Providers:

## 2023-07-02 NOTE — PROGRESS NOTE ADULT - NUTRITIONAL ASSESSMENT
This patient has been assessed with a concern for Malnutrition and has been determined to have a diagnosis/diagnoses of Severe protein-calorie malnutrition.    This patient is being managed with:   Diet Pureed-  Single Sips Only  Tube Feeding Modality: Gastrostomy  Vital 1.5 Tha  Total Volume for 24 Hours (mL): 960  Bolus  Total Volume of Bolus (mL):  240  Tube Feed Frequency: Every 6 hours   Tube Feed Start Time: 09:00  Bolus Feed Rate (mL per Hour): 240   Bolus Feed Duration (in Hours): 1  Bolus Feed Instructions:  Please give bolus TF if pt consumes <50% at meals + 240ml Vital 1.5 every evening @ 8p    Start Time: 23:00  Supplement Feeding Modality:  Oral  Ensure Plus High Protein Cans or Servings Per Day:  1       Frequency:  Two Times a day  Entered: Jul 1 2023 12:11PM

## 2023-07-02 NOTE — PROGRESS NOTE ADULT - SUBJECTIVE AND OBJECTIVE BOX
patient complains of pain, general, head, neck, shoulders       REVIEW OF SYSTEMS  Constitutional - No fever,  No fatigue  Neurological - No headaches, No loss of strength  Musculoskeletal - No joint pain, No joint swelling, No muscle pain    VITALS  T(C): 37 (07-02-23 @ 08:20), Max: 37 (07-02-23 @ 08:20)  HR: 95 (07-02-23 @ 08:20) (92 - 98)  BP: 110/80 (07-02-23 @ 08:20) (110/80 - 125/81)  RR: 16 (07-02-23 @ 08:20) (15 - 16)  SpO2: 100% (07-02-23 @ 08:20) (97% - 100%)  Wt(kg): --       MEDICATIONS   acetaminophen   Oral Liquid .. 650 milliGRAM(s) every 6 hours PRN  bacitracin   Ointment 1 Application(s) three times a day PRN  brivaracetam Oral Solution 100 milliGRAM(s) two times a day  enoxaparin Injectable 40 milliGRAM(s) every 24 hours  gabapentin 100 milliGRAM(s) at bedtime  lacosamide Solution 150 milliGRAM(s) two times a day  levothyroxine 75 MICROGram(s) daily  lisinopril 20 milliGRAM(s) daily  nystatin Powder 1 Application(s) two times a day  pantoprazole   Suspension 40 milliGRAM(s) daily  polyethylene glycol 3350 17 Gram(s) daily  senna 2 Tablet(s) at bedtime PRN  traZODone 75 milliGRAM(s) at bedtime      RECENT LABS/IMAGING          ---------  PHYSICAL EXAM  Constitutional - NAD, Comfortable, laying in bed   right craniectomy   Pulm - Breathing comfortably  Abd - Soft, NTND PEG   Extremities - No edema, No calf tenderness  Neurologic Exam -                    Cognitive - Awake, Alert, oriented x 3     Communication - Fluent     Motor - left hemiplegia      Psychiatric - Mood WNL, Affect WNL    ASSESSMENT/PLAN  38y Female h/o hypothyroid with functional deficits after right frontal IPH  s/p right hemicraniectomy helmet when OOB  briviact, vimpat bid   dysphagia s/p PEG, MBG, now on puree/thin  sleep, trazodone increased   Continue current medical management  Pain - Tylenol PRN gabapentin, will increase dose, add tramadol   DVT PPX - lovenox   Continue 3hrs a day of comprehensive rehab program.

## 2023-07-03 LAB
ALBUMIN SERPL ELPH-MCNC: 3.2 G/DL — LOW (ref 3.3–5)
ALP SERPL-CCNC: 105 U/L — SIGNIFICANT CHANGE UP (ref 40–120)
ALT FLD-CCNC: 40 U/L — SIGNIFICANT CHANGE UP (ref 10–45)
ANION GAP SERPL CALC-SCNC: 9 MMOL/L — SIGNIFICANT CHANGE UP (ref 5–17)
AST SERPL-CCNC: 21 U/L — SIGNIFICANT CHANGE UP (ref 10–40)
BASOPHILS # BLD AUTO: 0.05 K/UL — SIGNIFICANT CHANGE UP (ref 0–0.2)
BASOPHILS NFR BLD AUTO: 0.6 % — SIGNIFICANT CHANGE UP (ref 0–2)
BILIRUB SERPL-MCNC: 0.2 MG/DL — SIGNIFICANT CHANGE UP (ref 0.2–1.2)
BUN SERPL-MCNC: 13 MG/DL — SIGNIFICANT CHANGE UP (ref 7–23)
CALCIUM SERPL-MCNC: 9.7 MG/DL — SIGNIFICANT CHANGE UP (ref 8.4–10.5)
CHLORIDE SERPL-SCNC: 99 MMOL/L — SIGNIFICANT CHANGE UP (ref 96–108)
CO2 SERPL-SCNC: 27 MMOL/L — SIGNIFICANT CHANGE UP (ref 22–31)
CREAT SERPL-MCNC: 0.54 MG/DL — SIGNIFICANT CHANGE UP (ref 0.5–1.3)
EGFR: 120 ML/MIN/1.73M2 — SIGNIFICANT CHANGE UP
EOSINOPHIL # BLD AUTO: 0.2 K/UL — SIGNIFICANT CHANGE UP (ref 0–0.5)
EOSINOPHIL NFR BLD AUTO: 2.2 % — SIGNIFICANT CHANGE UP (ref 0–6)
GLUCOSE SERPL-MCNC: 121 MG/DL — HIGH (ref 70–99)
HCT VFR BLD CALC: 38.9 % — SIGNIFICANT CHANGE UP (ref 34.5–45)
HGB BLD-MCNC: 13.1 G/DL — SIGNIFICANT CHANGE UP (ref 11.5–15.5)
IMM GRANULOCYTES NFR BLD AUTO: 0.8 % — SIGNIFICANT CHANGE UP (ref 0–0.9)
LYMPHOCYTES # BLD AUTO: 2.05 K/UL — SIGNIFICANT CHANGE UP (ref 1–3.3)
LYMPHOCYTES # BLD AUTO: 22.8 % — SIGNIFICANT CHANGE UP (ref 13–44)
MCHC RBC-ENTMCNC: 32 PG — SIGNIFICANT CHANGE UP (ref 27–34)
MCHC RBC-ENTMCNC: 33.7 GM/DL — SIGNIFICANT CHANGE UP (ref 32–36)
MCV RBC AUTO: 94.9 FL — SIGNIFICANT CHANGE UP (ref 80–100)
MONOCYTES # BLD AUTO: 0.54 K/UL — SIGNIFICANT CHANGE UP (ref 0–0.9)
MONOCYTES NFR BLD AUTO: 6 % — SIGNIFICANT CHANGE UP (ref 2–14)
NEUTROPHILS # BLD AUTO: 6.07 K/UL — SIGNIFICANT CHANGE UP (ref 1.8–7.4)
NEUTROPHILS NFR BLD AUTO: 67.6 % — SIGNIFICANT CHANGE UP (ref 43–77)
NRBC # BLD: 0 /100 WBCS — SIGNIFICANT CHANGE UP (ref 0–0)
PLATELET # BLD AUTO: 387 K/UL — SIGNIFICANT CHANGE UP (ref 150–400)
POTASSIUM SERPL-MCNC: 4.3 MMOL/L — SIGNIFICANT CHANGE UP (ref 3.5–5.3)
POTASSIUM SERPL-SCNC: 4.3 MMOL/L — SIGNIFICANT CHANGE UP (ref 3.5–5.3)
PROT SERPL-MCNC: 7.9 G/DL — SIGNIFICANT CHANGE UP (ref 6–8.3)
RBC # BLD: 4.1 M/UL — SIGNIFICANT CHANGE UP (ref 3.8–5.2)
RBC # FLD: 13 % — SIGNIFICANT CHANGE UP (ref 10.3–14.5)
SODIUM SERPL-SCNC: 135 MMOL/L — SIGNIFICANT CHANGE UP (ref 135–145)
WBC # BLD: 8.98 K/UL — SIGNIFICANT CHANGE UP (ref 3.8–10.5)
WBC # FLD AUTO: 8.98 K/UL — SIGNIFICANT CHANGE UP (ref 3.8–10.5)

## 2023-07-03 PROCEDURE — 99232 SBSQ HOSP IP/OBS MODERATE 35: CPT

## 2023-07-03 RX ORDER — TIZANIDINE 4 MG/1
2 TABLET ORAL AT BEDTIME
Refills: 0 | Status: DISCONTINUED | OUTPATIENT
Start: 2023-07-03 | End: 2023-07-04

## 2023-07-03 RX ORDER — GABAPENTIN 400 MG/1
300 CAPSULE ORAL AT BEDTIME
Refills: 0 | Status: DISCONTINUED | OUTPATIENT
Start: 2023-07-03 | End: 2023-07-04

## 2023-07-03 RX ADMIN — LISINOPRIL 20 MILLIGRAM(S): 2.5 TABLET ORAL at 06:02

## 2023-07-03 RX ADMIN — Medication 650 MILLIGRAM(S): at 16:04

## 2023-07-03 RX ADMIN — Medication 75 MILLIGRAM(S): at 21:07

## 2023-07-03 RX ADMIN — ENOXAPARIN SODIUM 40 MILLIGRAM(S): 100 INJECTION SUBCUTANEOUS at 19:48

## 2023-07-03 RX ADMIN — LACOSAMIDE 150 MILLIGRAM(S): 50 TABLET ORAL at 06:01

## 2023-07-03 RX ADMIN — Medication 650 MILLIGRAM(S): at 08:45

## 2023-07-03 RX ADMIN — POLYETHYLENE GLYCOL 3350 17 GRAM(S): 17 POWDER, FOR SOLUTION ORAL at 12:24

## 2023-07-03 RX ADMIN — GABAPENTIN 300 MILLIGRAM(S): 400 CAPSULE ORAL at 21:07

## 2023-07-03 RX ADMIN — BRIVARACETAM 100 MILLIGRAM(S): 25 TABLET, FILM COATED ORAL at 18:12

## 2023-07-03 RX ADMIN — Medication 650 MILLIGRAM(S): at 03:30

## 2023-07-03 RX ADMIN — NYSTATIN CREAM 1 APPLICATION(S): 100000 CREAM TOPICAL at 18:12

## 2023-07-03 RX ADMIN — NYSTATIN CREAM 1 APPLICATION(S): 100000 CREAM TOPICAL at 06:09

## 2023-07-03 RX ADMIN — Medication 650 MILLIGRAM(S): at 09:15

## 2023-07-03 RX ADMIN — TIZANIDINE 2 MILLIGRAM(S): 4 TABLET ORAL at 21:07

## 2023-07-03 RX ADMIN — Medication 75 MICROGRAM(S): at 05:08

## 2023-07-03 RX ADMIN — LACOSAMIDE 150 MILLIGRAM(S): 50 TABLET ORAL at 18:11

## 2023-07-03 RX ADMIN — Medication 650 MILLIGRAM(S): at 02:32

## 2023-07-03 RX ADMIN — Medication 650 MILLIGRAM(S): at 16:36

## 2023-07-03 RX ADMIN — BRIVARACETAM 100 MILLIGRAM(S): 25 TABLET, FILM COATED ORAL at 06:02

## 2023-07-03 RX ADMIN — PANTOPRAZOLE SODIUM 40 MILLIGRAM(S): 20 TABLET, DELAYED RELEASE ORAL at 12:24

## 2023-07-03 NOTE — PROGRESS NOTE ADULT - SUBJECTIVE AND OBJECTIVE BOX
Hospitalist: Emi Oglesby DO    CHIEF COMPLAINT: Patient is a 38y old  female who presents with a chief complaint of IPH (02 Jul 2023 11:43)      SUBJECTIVE / OVERNIGHT EVENTS: Patient seen and examined. No acute events overnight. Pain well controlled and patient without any complaints.    MEDICATIONS  (STANDING):  brivaracetam Oral Solution 100 milliGRAM(s) Oral two times a day  enoxaparin Injectable 40 milliGRAM(s) SubCutaneous every 24 hours  gabapentin 200 milliGRAM(s) Oral at bedtime  lacosamide Solution 150 milliGRAM(s) Oral two times a day  levothyroxine 75 MICROGram(s) Oral daily  lisinopril 20 milliGRAM(s) Oral daily  nystatin Powder 1 Application(s) Topical two times a day  pantoprazole   Suspension 40 milliGRAM(s) Oral daily  polyethylene glycol 3350 17 Gram(s) Oral daily  traZODone 75 milliGRAM(s) Oral at bedtime    MEDICATIONS  (PRN):  acetaminophen   Oral Liquid .. 650 milliGRAM(s) Oral every 6 hours PRN Temp greater or equal to 38C (100.4F), Mild Pain (1 - 3)  bacitracin   Ointment 1 Application(s) Topical three times a day PRN scalp irritation  senna 2 Tablet(s) Oral at bedtime PRN Constipation      VITALS:  T(F): 97.8 (07-02-23 @ 20:00), Max: 97.8 (07-02-23 @ 20:00)  HR: 87 (07-03-23 @ 06:00) (87 - 96)  BP: 115/81 (07-03-23 @ 06:00) (113/84 - 115/81)  RR: 15 (07-02-23 @ 20:00) (15 - 15)  SpO2: 96% (07-02-23 @ 20:00)      REVIEW OF SYSTEMS:  For ROV please refer back to H&P     PHYSICAL EXAM:  HEAD:  Right craniotomy noted with surgical scar  EYES: EOMI, PERRLA, conjunctiva and sclera clear  ENMT: No tonsillar erythema, exudates, or enlargement; Moist mucous membranes, Good dentition, No lesions  NECK: Supple, No JVD, Normal thyroid  NERVOUS SYSTEM:  Alert & Oriented   CHEST/LUNG: Clear to percussion bilaterally; No rales, rhonchi, wheezing, or rubs  HEART: Regular rate and rhythm; No murmurs, rubs, or gallops  ABDOMEN: Soft, Nontender, Nondistended; Bowel sounds present  EXTREMITIES:  2+ Peripheral Pulses, No clubbing, cyanosis, or edema  LYMPH: No lymphadenopathy noted  SKIN: No rashes or lesions      LABS:              13.1                 135  | 27   | 13           8.98  >-----------< 387     ------------------------< 121                   38.9                 4.3  | 99   | 0.54                                         Ca 9.7   Mg x     Ph x           TPro  7.9  /  Alb  3.2      TBili  0.2  /  DBili  x         AST  21  /  ALT  40            AlkPhos  105        MICROBIOLOGY:  Urinalysis Basic - ( 03 Jul 2023 07:04 )    Color: x / Appearance: x / SG: x / pH: x  Gluc: 121 mg/dL / Ketone: x  / Bili: x / Urobili: x   Blood: x / Protein: x / Nitrite: x   Leuk Esterase: x / RBC: x / WBC x   Sq Epi: x / Non Sq Epi: x / Bacteria: x            RADIOLOGY & ADDITIONAL TESTS:    Imaging Personally Reviewed:    [X] Consultant(s) Notes Reviewed:  [X] Care Discussed with Consultants/Other Providers:

## 2023-07-03 NOTE — PROGRESS NOTE ADULT - SUBJECTIVE AND OBJECTIVE BOX
HPI:  38 year old female w/ PMHx hypothyroidism who was admitted to [facility] 6/6 after being found on floor at home, with CT revealing large right-sided ICH. She required emergent right frontal craniectomy for decompression and EVD placement. EVD subsequently removed 6/16. Angio negative. Patient underwent G tube placement 6/20. Hospital course notable for fluctuating leukocytosis and high-grade temperatures with no identified source of infection including in urine, blood, CSF. Now admitted for multidisciplinary rehab. (26 Jun 2023 13:08)    SUBJECTIVE:   Patient seen and evaluated with mother at bedside.  Didn't sleep overnight, was restless with generalized discomfort (greatest discomfort to left UE and neck).  +Headache.  Denies CP, SOB, cough, dizziness, abd pain, or dysuria    VITALS  38y  Vital Signs Last 24 Hrs  T(C): 36.6 (07-02-23 @ 20:00), Max: 36.6 (07-02-23 @ 20:00)  T(F): 97.8 (07-02-23 @ 20:00), Max: 97.8 (07-02-23 @ 20:00)  HR: 87 (07-03-23 @ 06:00) (87 - 96)  BP: 115/81 (07-03-23 @ 06:00) (113/84 - 115/81)  RR: 15 (07-02-23 @ 20:00) (15 - 15)  SpO2: 96% (07-02-23 @ 20:00) (96% - 96%)    PHYSICAL EXAM  Constitutional - NAD, Comfortable, irritable  HEENT - NCAT, EOM  Neck - Supple, No limited ROM  Chest - no resp distress  Cardiovascular - warm and well perfused, no cyanosis or pedal edema  Abdomen - Soft, NTND, PEG CDI  Extremities - No edema, No calf tenderness   Neurologic Exam - aox3, short term memory impaired   MMT: L Sh 1/5, EF 2/5, EE 1/5, WE+WF 0/5; L HF 1/5, HE 2/5, KE+KF 1/5, PF+DF 0/5  MAS: 1+ HF, 1+ ankle PF, 3 EF, 2FF, 2 pectoral (adducted, internally rotated), l  eft visual field deficit, absent left lateral gaze, left facial weakness    RECENT LABS:             13.1   8.98  )-----------( 387      ( 03 Jul 2023 07:04 )             38.9     07-03    135  |  99  |  13  ----------------------------<  121<H>  4.3   |  27  |  0.54    Ca    9.7      03 Jul 2023 07:04    TPro  7.9  /  Alb  3.2<L>  /  TBili  0.2  /  DBili  x   /  AST  21  /  ALT  40  /  AlkPhos  105  07-03    LIVER FUNCTIONS - ( 03 Jul 2023 07:04 )  Alb: 3.2 g/dL / Pro: 7.9 g/dL / ALK PHOS: 105 U/L / ALT: 40 U/L / AST: 21 U/L / GGT: x           Urinalysis Basic - ( 03 Jul 2023 07:04 )  Color: x / Appearance: x / SG: x / pH: x  Gluc: 121 mg/dL / Ketone: x  / Bili: x / Urobili: x   Blood: x / Protein: x / Nitrite: x   Leuk Esterase: x / RBC: x / WBC x   Sq Epi: x / Non Sq Epi: x / Bacteria: x    MEDICATIONS:  MEDICATIONS  (STANDING):  brivaracetam Oral Solution 100 milliGRAM(s) Oral two times a day  enoxaparin Injectable 40 milliGRAM(s) SubCutaneous every 24 hours  gabapentin 300 milliGRAM(s) Oral at bedtime  lacosamide Solution 150 milliGRAM(s) Oral two times a day  levothyroxine 75 MICROGram(s) Oral daily  lisinopril 20 milliGRAM(s) Oral daily  nystatin Powder 1 Application(s) Topical two times a day  pantoprazole   Suspension 40 milliGRAM(s) Oral daily  polyethylene glycol 3350 17 Gram(s) Oral daily  tiZANidine 2 milliGRAM(s) Oral at bedtime  traZODone 75 milliGRAM(s) Oral at bedtime    MEDICATIONS  (PRN):  acetaminophen   Oral Liquid .. 650 milliGRAM(s) Oral every 6 hours PRN Temp greater or equal to 38C (100.4F), Mild Pain (1 - 3)  bacitracin   Ointment 1 Application(s) Topical three times a day PRN scalp irritation  senna 2 Tablet(s) Oral at bedtime PRN Constipation

## 2023-07-03 NOTE — PROGRESS NOTE ADULT - ASSESSMENT
38 year old female w/ PMhx hypothyroidism admitted to [facility] 6/6 after being found unresponsive, found to have large right frontal IPH w/ IVH and hydrocephalus. Now s/p R krishna craniectomy for evacuation on 6/6. PEG placed 6/20. Now admitted for multidisciplinary rehab.     #IPH with IVH, R frontal lobe, now with left sided weakness, left visual field, left facial droop, Gait instability, ADL and functional impairments  - Continue comprehensive rehab program of PT/OT/SLP  - Briviact 100 mg BID, Vimpat 150 mg BID  - Helmet when OOB  - Treatment plan discussed w/mother at bedside  - LLE Estim/boot ordered    #cognition/mood--  --stop amantadine as may be interfering with sleep (pt. received today's dose already)  - recreation therapy  - patio pass  - consider neuropsych evaluation     #Sleep--  --Trial increasing Trazodone to 75mg qhs to improve sleep and restlessness.   - incr gabapentin to help with sleep    #HTN  - Lisinopril 20 mg daily     #Pain Control  - PRN Tylenol (4-6),   - Started standing gabapentin 100 mg nightly for Neuropathic headache pain -> incr bertha 200 over weekend, further increase to 300 HS   - tizanidine 2mg HS started for tone    #Dysphagia s/p PEG/GI  - Abdominal binder  - PEG feeds changed to bolus and vital iso bloating w/ TwoCal  - PPx w/ protonix  [place] [facility] [place] - upgraded to puree/thin with provale cup + supplemental feed if doesn't eat >50% of meal tray  --1:1 assistance with meals    #Hypothyroidism  - Continue synthroid 75 mcg daily     #Fevers, likely central   - Prior infectious work up unrevealing  - Repeat LE duplex negative on 6/22  - No urinary retention on bladder scan  - Repeat Bcx/Ucx (6/21) negative, RVP negative  - As per ID monitor off antibiotics    #GI/Bowel Mgmt  - Senna 2 tabs daily  - Miralax daily PRN    #/Bladder Mgmt   - Monitor ability to void    FEN   - Diet - Vital - upgraded to puree/thin with provale cup + supplemental feed if doesn't eat >50% of meal tray  - Banatrol TF daily   [place] [facility] [place] diet upgraded    SKIN  - R craniectomy site with healing incision, defect soft    Precautions / PROPHYLAXIS:   - Falls, Cardiac, Seizures   - Helmet when OOB   - Pressure injury/Skin: Turn Q2hrs in bed while awake  - DVT: Lovenox     --------------------------------    IDT 6/29:  [place] [facility] [place], continuing w/ tube feeds, mild-mod language deficits, mod-severe cognitive deficits, limited by attention  OT: Total A- across the board, max A--squat-pivot transfer; impaired sitting balance and head control; goal mod-max  PT: Max A--squat pivot transfer; Standing at Wall bar-- tot A.  decreased trunk control  Goals: 1 Pivot and transfers, bed mobility mod-A, 2 attend to structured tasks for 30 minutes   Dispo: 7/24, [facility] vs.  back to Neurosurgical care or cranioplasty. Spouse very supportive. Possible re-admit following cranioplasty   38 year old female w/ PMhx hypothyroidism admitted to [facility] 6/6 after being found unresponsive, found to have large right frontal IPH w/ IVH and hydrocephalus. Now s/p R krishna craniectomy for evacuation on 6/6. PEG placed 6/20. Now admitted for multidisciplinary rehab.     #IPH with IVH, R frontal lobe, now with left sided weakness, left visual field, left facial droop, Gait instability, ADL and functional impairments  - Continue comprehensive rehab program of PT/OT/SLP  - Briviact 100 mg BID, Vimpat 150 mg BID  - Helmet when OOB  - Treatment plan discussed w/mother at bedside  - LLE Estim/boot ordered    #cognition/mood--  --stoppe amantadine as may be interfering with sleep   - recreation therapy  - patio pass  - consider neuropsych evaluation     #Sleep--  --Trial increasing Trazodone to 75mg qhs to improve sleep and restlessness.   - incr gabapentin to 300mg to help with sleep and neuropathic pain    Spasticity--  --start Trial of Tizanidine 2mg qhs  --should help improve sleep    #HTN  - Lisinopril 20 mg daily     #Pain Control  - PRN Tylenol (4-6),   - Gabapentin increase to 300 HS   - tizanidine 2mg HS started for tone    #Dysphagia s/p PEG/GI  - Abdominal binder  - PEG feeds changed to bolus and vital due to bloating w/ TwoCal  - PPx w/ protonix  - [facility] (6/30) - upgraded to puree/thin with provale cup + supplemental feed if doesn't eat >50% of meal tray  --1:1 assistance with meals    #Hypothyroidism  - Continue synthroid 75 mcg daily         #GI/Bowel Mgmt  - Senna 2 tabs daily  - Miralax daily PRN    #/Bladder Mgmt   - Check PVRs    FEN   - Diet - Vital - upgraded to puree/thin with provale cup + supplemental feed if doesn't eat >50% of meal tray  - Banatrol TF daily   - [facility] 6/30 - diet upgraded    SKIN  - R craniectomy site with healing incision, defect soft    Precautions / PROPHYLAXIS:   - Falls, Cardiac, Seizures   - Helmet when OOB   - Pressure injury/Skin: Turn Q2hrs in bed while awake  - DVT: Lovenox     --------------------------------    IDT 6/29:  SLP: [facility] tomorrow, continuing w/ tube feeds, mild-mod language deficits, mod-severe cognitive deficits, limited by attention  OT: Total A- across the board, max A--squat-pivot transfer; impaired sitting balance and head control; goal mod-max  PT: Max A--squat pivot transfer; Standing at Wall bar-- tot A.  decreased trunk control  Goals: 1 Pivot and transfers, bed mobility mod-A, 2 attend to structured tasks for 30 minutes   Dispo: 7/24, [facility] vs.  back to Neurosurgical care or cranioplasty. Spouse very supportive. Possible re-admit following cranioplasty

## 2023-07-03 NOTE — PROGRESS NOTE ADULT - ASSESSMENT
38 year old female w/ PMhx hypothyroidism admitted to [facility] 6/6 after being found unresponsive, found to have large right frontal IPH w/ IVH and hydrocephalus. Now s/p R krishna craniectomy for evacuation on 6/6. PEG placed 6/20. Now admitted for multidisciplinary rehab.     #IPH with IVH, R frontal lobe  - Amantadine 50 mg qAM  - Trazodone 50 mg qPM   - Antiepileptic regimen: Briviact 100 mg BID, Vimpat 150 mg BID  - Helmet when OOB    #Normocytic anemia  - Will montior Hg/Hct, transfuse if Hg<7  - Will check iron/B12/folate with next lab draw    #HTN  - Lisinopril 20 mg daily   - Clonidine 0.1 mg PO q12h stopped on day of discharge from [facility], monitor for rebound     #Dysphagia s/p PEG  - Abdominal binder  - PEG feeds as below    #Hypothyroidism  - Continue synthroid 75 mcg daily   - Will check TSH/FT4 with next lab draw    #Fever, leukocytosis  - Will monitor F/WBC count, noted WBC on 6/25 of 11.11  - episodes of fever during [facility] admission  - prior infectious work up unrevealing including negative Bcx/UA/CXR on 6/18 and unremarkable CT c/a/p on 6/15.  - repeat LE duplex negative on 6/22  - no urinary retention on bladder scan  - repeat Bcx/Ucx (6/21) negative, RVP negative  - As per ID monitor off antibiotics    DVT: Lovenox

## 2023-07-03 NOTE — CHART NOTE - NSCHARTNOTEFT_GEN_A_CORE
Nutrition Follow Up Note  Source: Medical Record [X] Patient [ ] Family [X] pt's mom provided info         Diet: Pureed, single sips only, Ensure Plus High Protein BID  Pt now on an oral diet, with suboptimal PO intake per nursing flow sheets, usually eating <50% of meals. Pt did consume >50% of breakfast this AM per nursing. Continues on supplemental feeding. Ensure Plus High Protein (provides 350 kcal, 20 g protein/serving)    Enteral/Parenteral Nutrition: Vital 1.5 240 ml QID, provide tube feeding if pt consumes <50% of meal + standing bolus @ 8 PM  provides 1440 kcal, 65 g protein if 100% provision    Current Weight: 151.4 lbs (6-26)      Pertinent Medications: MEDICATIONS  (STANDING):  brivaracetam Oral Solution 100 milliGRAM(s) Oral two times a day  enoxaparin Injectable 40 milliGRAM(s) SubCutaneous every 24 hours  gabapentin 300 milliGRAM(s) Oral at bedtime  lacosamide Solution 150 milliGRAM(s) Oral two times a day  levothyroxine 75 MICROGram(s) Oral daily  lisinopril 20 milliGRAM(s) Oral daily  nystatin Powder 1 Application(s) Topical two times a day  pantoprazole   Suspension 40 milliGRAM(s) Oral daily  polyethylene glycol 3350 17 Gram(s) Oral daily  tiZANidine 2 milliGRAM(s) Oral at bedtime  traZODone 75 milliGRAM(s) Oral at bedtime    MEDICATIONS  (PRN):  acetaminophen   Oral Liquid .. 650 milliGRAM(s) Oral every 6 hours PRN Temp greater or equal to 38C (100.4F), Mild Pain (1 - 3)  bacitracin   Ointment 1 Application(s) Topical three times a day PRN scalp irritation  senna 2 Tablet(s) Oral at bedtime PRN Constipation      Pertinent Labs:  07-03 Na135 mmol/L Glu 121 mg/dL<H> K+ 4.3 mmol/L Cr  0.54 mg/dL BUN 13 mg/dL 07-03 Alb 3.2 g/dL<L>        Skin: surgical incision per nursing flow sheets Per nursing flowsheets     Edema: No edema per nursing flow sheets     Last BM: on  7-3 per nursing flow sheets    Estimated Needs:   [X] No Change since Previous Assessment  [ ] Recalculated:     Previous Nutrition Diagnosis:   Severe malnutrition, acute    Nutrition Diagnosis is [X] Ongoing  - addressed with oral diet, Ensure Plus High Protein (provides 350 kcal, 20 g protein/serving), tube feeding     New Nutrition Diagnosis: [X] Not Applicable  [ ] Inadequate Protein Energy Intake   [ ] Inadequate Oral Intake   [ ] Excessive Energy Intake   [ ] Increased Nutrient Needs   [ ] Obesity   [ ] Altered GI Function   [ ] Unintended Weight Loss   [ ] Food & Nutrition Related Knowledge Deficit  [ ] Limited Adherence to nutrition related recommendations   [ ] Malnutrition      Interventions:   1. Recommend continuing with current oral diet, Ensure Plus High Protein (provides 350 kcal, 20 g protein/serving), tube feeding  2. Monitor tolerance of tube feeding + oral diet    Monitoring & Evaluation:   [X] Weights   [X] PO Intake   [X] Follow Up (Per Protocol)  [X] Tolerance to Diet Prescription   [X] Other: Labs    RD Remains Available.  Cassidy Ruby RD

## 2023-07-04 DIAGNOSIS — K94.23 GASTROSTOMY MALFUNCTION: ICD-10-CM

## 2023-07-04 PROCEDURE — 99232 SBSQ HOSP IP/OBS MODERATE 35: CPT

## 2023-07-04 PROCEDURE — 74018 RADEX ABDOMEN 1 VIEW: CPT | Mod: 26,77

## 2023-07-04 PROCEDURE — 99223 1ST HOSP IP/OBS HIGH 75: CPT

## 2023-07-04 PROCEDURE — 74018 RADEX ABDOMEN 1 VIEW: CPT | Mod: 26

## 2023-07-04 RX ORDER — BRIVARACETAM 25 MG/1
100 TABLET, FILM COATED ORAL
Refills: 0 | Status: DISCONTINUED | OUTPATIENT
Start: 2023-07-04 | End: 2023-07-04

## 2023-07-04 RX ORDER — ACETAMINOPHEN 500 MG
650 TABLET ORAL
Refills: 0 | Status: DISCONTINUED | OUTPATIENT
Start: 2023-07-04 | End: 2023-07-04

## 2023-07-04 RX ORDER — ACETAMINOPHEN 500 MG
1000 TABLET ORAL ONCE
Refills: 0 | Status: DISCONTINUED | OUTPATIENT
Start: 2023-07-04 | End: 2023-07-13

## 2023-07-04 RX ORDER — SODIUM CHLORIDE 9 MG/ML
1000 INJECTION, SOLUTION INTRAVENOUS
Refills: 0 | Status: DISCONTINUED | OUTPATIENT
Start: 2023-07-04 | End: 2023-07-05

## 2023-07-04 RX ORDER — ACETAMINOPHEN 500 MG
1000 TABLET ORAL ONCE
Refills: 0 | Status: COMPLETED | OUTPATIENT
Start: 2023-07-04 | End: 2023-07-04

## 2023-07-04 RX ORDER — GABAPENTIN 400 MG/1
300 CAPSULE ORAL THREE TIMES A DAY
Refills: 0 | Status: DISCONTINUED | OUTPATIENT
Start: 2023-07-04 | End: 2023-07-04

## 2023-07-04 RX ORDER — LACOSAMIDE 50 MG/1
150 TABLET ORAL
Refills: 0 | Status: DISCONTINUED | OUTPATIENT
Start: 2023-07-04 | End: 2023-07-05

## 2023-07-04 RX ORDER — ACETAMINOPHEN 500 MG
1000 TABLET ORAL ONCE
Refills: 0 | Status: DISCONTINUED | OUTPATIENT
Start: 2023-07-04 | End: 2023-07-04

## 2023-07-04 RX ORDER — LEVETIRACETAM 250 MG/1
1000 TABLET, FILM COATED ORAL EVERY 12 HOURS
Refills: 0 | Status: DISCONTINUED | OUTPATIENT
Start: 2023-07-04 | End: 2023-07-05

## 2023-07-04 RX ORDER — TRAZODONE HCL 50 MG
75 TABLET ORAL AT BEDTIME
Refills: 0 | Status: DISCONTINUED | OUTPATIENT
Start: 2023-07-04 | End: 2023-07-04

## 2023-07-04 RX ADMIN — SODIUM CHLORIDE 100 MILLILITER(S): 9 INJECTION, SOLUTION INTRAVENOUS at 18:21

## 2023-07-04 RX ADMIN — Medication 400 MILLIGRAM(S): at 20:30

## 2023-07-04 RX ADMIN — LACOSAMIDE 130 MILLIGRAM(S): 50 TABLET ORAL at 17:39

## 2023-07-04 RX ADMIN — PANTOPRAZOLE SODIUM 40 MILLIGRAM(S): 20 TABLET, DELAYED RELEASE ORAL at 11:45

## 2023-07-04 RX ADMIN — Medication 1000 MILLIGRAM(S): at 21:53

## 2023-07-04 RX ADMIN — SODIUM CHLORIDE 100 MILLILITER(S): 9 INJECTION, SOLUTION INTRAVENOUS at 17:15

## 2023-07-04 RX ADMIN — SODIUM CHLORIDE 100 MILLILITER(S): 9 INJECTION, SOLUTION INTRAVENOUS at 17:03

## 2023-07-04 RX ADMIN — LISINOPRIL 20 MILLIGRAM(S): 2.5 TABLET ORAL at 06:16

## 2023-07-04 RX ADMIN — POLYETHYLENE GLYCOL 3350 17 GRAM(S): 17 POWDER, FOR SOLUTION ORAL at 11:45

## 2023-07-04 RX ADMIN — LACOSAMIDE 150 MILLIGRAM(S): 50 TABLET ORAL at 06:09

## 2023-07-04 RX ADMIN — BRIVARACETAM 100 MILLIGRAM(S): 25 TABLET, FILM COATED ORAL at 06:09

## 2023-07-04 RX ADMIN — LEVETIRACETAM 400 MILLIGRAM(S): 250 TABLET, FILM COATED ORAL at 17:14

## 2023-07-04 RX ADMIN — SODIUM CHLORIDE 100 MILLILITER(S): 9 INJECTION, SOLUTION INTRAVENOUS at 17:39

## 2023-07-04 RX ADMIN — NYSTATIN CREAM 1 APPLICATION(S): 100000 CREAM TOPICAL at 17:03

## 2023-07-04 RX ADMIN — NYSTATIN CREAM 1 APPLICATION(S): 100000 CREAM TOPICAL at 11:45

## 2023-07-04 RX ADMIN — Medication 75 MICROGRAM(S): at 06:14

## 2023-07-04 NOTE — CONSULT NOTE ADULT - PROBLEM SELECTOR RECOMMENDATION 9
Trevino currently in the stoma. Advise x-ray to determine if tube is within gastric lumen.  I recommend that if the tube is in the gastric lumen to leave it in place in order to guide replacement PEG endoscopically while preserving the original tract. Please DO NOT USE the Trevino for meds or fluids at this time even if x-ray shows good placement. If the tube is not within the stomach it should be removed and dressing placed over the stoma.  Please maintain patient NPO and start IV fluids, meds.  Hold anticoagulation.  Patient and family agree to endoscopic replacement of PEG. Will plan for tomorrow.

## 2023-07-04 NOTE — CHART NOTE - NSCHARTNOTEFT_GEN_A_CORE
called by RN about 10 minutes ago that patient had removed her PEG. Inserted Trevino catheter at bedside to maintain patency. Dr. Belle aware, coming to assess at bedside. Called by RN about 10 minutes ago that patient had removed her PEG. Inserted Trevino catheter at bedside to maintain patency. Dr. Belle aware, coming to assess at bedside.    Dr. Belle scheduling patient for EGD in Am    Abdominal XRay pending    Patient strict NPO until returns from procedure    Medication adjustments -    -PM Briviact switched to IV (first dose given earlier via PEG)  -PM VIPMAT switched to IV (firse dose given earlier via PEG)  -Am ordering 1g of Tylenol to be given IV, as at bedside patient overtly uncomfortable, stating everything hurts. Had PRN and standing orders in, which will not be able to be given.   -Lovenox held ahead of EGD   -Can hold second dose of Lisinopril tonight  -Can hold bowel regimen tonight  -Got AM Synthroid, daily protonix      Gabapentin, Tizanidine, Trazodone to be held until after procedure. Called by RN about 10 minutes ago that patient had removed her PEG. Inserted Trevino catheter at bedside to maintain patency. Dr. Belle aware, coming to assess at bedside.    Dr. Belle scheduling patient for EGD in Am    Abdominal XRay pending    Patient strict NPO until returns from procedure    Medication adjustments -    -PM Briviact switched to IV (first dose given earlier via PEG)  -PM VIPMAT switched to IV (firse dose given earlier via PEG)  -Am ordering 1g of Tylenol to be given IV, as at bedside patient overtly uncomfortable, stating everything hurts. Had PRN and standing orders in, which will not be able to be given.   -Lovenox held ahead of EGD   -Can hold second dose of Lisinopril tonight  -Can hold bowel regimen tonight  -Got AM Synthroid, daily protonix      Gabapentin, Tizanidine, Trazodone to be held until after procedure.    d/w Dr. Moreau

## 2023-07-04 NOTE — CONSULT NOTE ADULT - SUBJECTIVE AND OBJECTIVE BOX
GI Consult    HPI:  38 year old female w/ PMHx hypothyroidism who was admitted to [facility] 6/6 after being found on floor at home, with CT revealing large right-sided ICH. She required emergent right frontal craniectomy for decompression and EVD placement. EVD subsequently removed 6/16. Angio negative. Patient underwent G tube placement 6/20/2023.  Called today for dislodged PEG. Munson catheter placed at site. Awaiting x-ray.       PAST MEDICAL & SURGICAL HISTORY:  hypothyroidism    FAMILY HISTORY:  [place]    Social History:  SW - Lives with mom. 2 steps to enter with handrail on both side. Able to reside on the first floor.   10 pack years smoking hx        MEDICATIONS  (STANDING):  acetaminophen   Oral Liquid .. 650 milliGRAM(s) Oral <User Schedule>  brivaracetam Oral Solution 100 milliGRAM(s) Oral two times a day  enoxaparin Injectable 40 milliGRAM(s) SubCutaneous every 24 hours  lacosamide Solution 150 milliGRAM(s) Oral two times a day  levothyroxine 75 MICROGram(s) Oral daily  lisinopril 20 milliGRAM(s) Oral daily  nystatin Powder 1 Application(s) Topical two times a day  pantoprazole   Suspension 40 milliGRAM(s) Oral daily  polyethylene glycol 3350 17 Gram(s) Oral daily  tiZANidine 2 milliGRAM(s) Oral at bedtime    MEDICATIONS  (PRN):  acetaminophen   Oral Liquid .. 650 milliGRAM(s) Oral every 6 hours PRN Temp greater or equal to 38C (100.4F), Mild Pain (1 - 3)  bacitracin   Ointment 1 Application(s) Topical three times a day PRN scalp irritation  senna 2 Tablet(s) Oral at bedtime PRN Constipation        Allergies    No Known Allergies    Intolerances        Diet, Pureed:   Single Sips Only  Tube Feeding Modality: Gastrostomy  Vital 1.5 Tha  Total Volume for 24 Hours (mL): 960  Bolus  Total Volume of Bolus (mL):  240  Tube Feed Frequency: Every 6 hours   Tube Feed Start Time: 09:00  Bolus Feed Rate (mL per Hour): 240   Bolus Feed Duration (in Hours): 1  Bolus Feed Instructions:  Please give bolus TF if pt consumes <50% at meals + 240ml Vital 1.5 every evening @ 8p    Start Time: 23:00  Supplement Feeding Modality:  Oral  Ensure Plus High Protein Cans or Servings Per Day:  1       Frequency:  Two Times a day (07-01-23 @ 12:11) [Active]            GI Review of Systems: GI ROS per HPI      PHYSICAL EXAM:   Vital Signs:  Vital Signs Last 24 Hrs  T(C): 36.7 (04 Jul 2023 14:50), Max: 36.9 (04 Jul 2023 08:20)  T(F): 98.1 (04 Jul 2023 14:50), Max: 98.5 (04 Jul 2023 08:20)  HR: 95 (04 Jul 2023 14:50) (90 - 113)  BP: 121/81 (04 Jul 2023 14:50) (100/68 - 138/73)  BP(mean): --  RR: 16 (04 Jul 2023 14:50) (16 - 18)  SpO2: 100% (04 Jul 2023 14:50) (97% - 100%)    Parameters below as of 04 Jul 2023 14:50  Patient On (Oxygen Delivery Method): room air      Daily     Daily I&O's Summary    03 Jul 2023 07:01  -  04 Jul 2023 07:00  --------------------------------------------------------  IN: 480 mL / OUT: 0 mL / NET: 480 mL        GENERAL:   NAD  HEENT:  NC/AT,  s/p craniectomy  CHEST:  Clear  HEART:  RRR  ABDOMEN:  Soft, +munson in stoma. +heme at site. Nondistended.  EXTR:  no edema  SKIN:  No rash or jaundice  NEURO:  Awake, alert    LABS:                        13.1   8.98  )-----------( 387      ( 03 Jul 2023 07:04 )             38.9       07-03    135  |  99  |  13  ----------------------------<  121<H>  4.3   |  27  |  0.54    Ca    9.7      03 Jul 2023 07:04    TPro  7.9  /  Alb  3.2<L>  /  TBili  0.2  /  DBili  x   /  AST  21  /  ALT  40  /  AlkPhos  105  07-03    EGD with PEG placement report 6/20/2023  The examined esophagus was normal.       A small hiatal hernia was present.       The entire examined stomach was normal. A post-pyloric NGT was in place and later removed.        The patient was placed in the supine position for PEG placement. The stomach was insufflated        to appose gastric and abdominal walls. A site was located in the body of the stomach with        transillumination and manual external pressure for placement. The abdominal wall was marked        and prepped in a sterile manner. The trocar needle was introduced through the abdominal wall        and into the stomach. A snare was introduced through the endoscope and opened in the gastric        lumen. The guide wire was passed through the trocar and into the open snare. The snare was        closed around the guidewire. The endoscope and snare were removed, pulling the wire out        through the mouth. A skin incision was made at the site of needle insertion. The externally        removable 20 Fr EndoVive Safety gastrostomy tube was lubricated. The G-tube wastied to the        guide wire and pulled through the mouth and into the stomach. The trocar needle was removed,        and the gastrostomy tube was pulled out from the stomach through the skin. The external        bumper was attached to the gastrostomy tube, and the tube was cut to remove the guide wire.        The final position of the gastrostomy tube was confirmed by skin marking noted to be 6 cm at        the external bumper. The final tension and compression of the abdominal wall by the PEG tube        and external bumper were checked and revealed that the bumper was loose and lightly touching        the skin. The feeding tube was capped, and the tube site cleaned and dressed.       The duodenal bulb and second portion of the duodenum were normal.                                                                                                        Impression:          - Normal esophagus.                       - Small hiatal hernia.                       - Normal stomach.            - Normal duodenum.                       - An externally removable PEG placement was successfully completed.  Recommendation:      - [facility] to [facility].                       - Please follow the post-PEG recommendations including: Nutrition consult for                        formula and volume, change dressing once per day, NPO x4 hrs then water and                        medications today and may use PEG tomorrow for feedings after evaluation by               GI team.

## 2023-07-04 NOTE — CONSULT NOTE ADULT - ASSESSMENT
38 year old woman w/PMHx hypothyroidism, admitted to [facility] 6/6 for large right frontal IPH w/ IVH and hydrocephalus, s/p R hemicraniectomy for evacuation on 6/6, s/p PEG 6/20/2023, now with inadvertent dislodgment of PEG.

## 2023-07-04 NOTE — PROGRESS NOTE ADULT - ASSESSMENT
38 year old female w/ PMhx hypothyroidism admitted to [facility] 6/6 after being found unresponsive, found to have large right frontal IPH w/ IVH and hydrocephalus. Now s/p R krishna craniectomy for evacuation on 6/6. PEG placed 6/20. Now admitted for multidisciplinary rehab.     #IPH with IVH, R frontal lobe  - Amantadine 50 mg qAM  - Trazodone 50 mg qPM   - Antiepileptic regimen: Briviact 100 mg BID, Vimpat 150 mg BID  - Helmet when OOB    #Normocytic anemia  - Will montior Hg/Hct, transfuse if Hg<7  - Will check iron/B12/folate with next lab draw    #HTN  - Lisinopril 20 mg daily   - Clonidine 0.1 mg PO q12h stopped on day of discharge from [facility], monitor for rebound     #Dysphagia s/p PEG  - Abdominal binder  - PEG feeds as below    #Hypothyroidism  - Continue synthroid 75 mcg daily   - Will check TSH/FT4 with next lab draw    #Fever, leukocytosis  - Will monitor F/WBC count, noted WBC on 6/25 of 11.11  - episodes of fever during [place] admission  - prior infectious work up unrevealing including negative Bcx/UA/CXR on 6/18 and unremarkable CT c/a/p on 6/15.  - repeat LE duplex negative on 6/22  - no urinary retention on bladder scan  - repeat Bcx/Ucx (6/21) negative, RVP negative  - As per ID monitor off antibiotics    DVT: Lovenox

## 2023-07-04 NOTE — PROGRESS NOTE ADULT - SUBJECTIVE AND OBJECTIVE BOX
No overnight events.      REVIEW OF SYSTEMS  Constitutional - No fever,  No fatigue  Neurological - No headaches, No loss of strength  Musculoskeletal - No joint pain, No joint swelling, No muscle pain    VITALS  T(C): 36.9 (07-04-23 @ 08:20), Max: 36.9 (07-04-23 @ 08:20)  HR: 90 (07-04-23 @ 08:20) (81 - 113)  BP: 100/68 (07-04-23 @ 08:20) (100/68 - 138/73)  RR: 16 (07-04-23 @ 08:20) (14 - 18)  SpO2: 98% (07-04-23 @ 08:20) (96% - 98%)  Wt(kg): --       MEDICATIONS   acetaminophen   Oral Liquid .. 650 milliGRAM(s) every 6 hours PRN  bacitracin   Ointment 1 Application(s) three times a day PRN  brivaracetam Oral Solution 100 milliGRAM(s) two times a day  enoxaparin Injectable 40 milliGRAM(s) every 24 hours  gabapentin 300 milliGRAM(s) at bedtime  lacosamide Solution 150 milliGRAM(s) two times a day  levothyroxine 75 MICROGram(s) daily  lisinopril 20 milliGRAM(s) daily  nystatin Powder 1 Application(s) two times a day  pantoprazole   Suspension 40 milliGRAM(s) daily  polyethylene glycol 3350 17 Gram(s) daily  senna 2 Tablet(s) at bedtime PRN  tiZANidine 2 milliGRAM(s) at bedtime  traZODone 75 milliGRAM(s) at bedtime      RECENT LABS/IMAGING                        13.1   8.98  )-----------( 387      ( 03 Jul 2023 07:04 )             38.9     07-03    135  |  99  |  13  ----------------------------<  121<H>  4.3   |  27  |  0.54    Ca    9.7      03 Jul 2023 07:04    TPro  7.9  /  Alb  3.2<L>  /  TBili  0.2  /  DBili  x   /  AST  21  /  ALT  40  /  AlkPhos  105  07-03      Urinalysis Basic - ( 03 Jul 2023 07:04 )    Color: x / Appearance: x / SG: x / pH: x  Gluc: 121 mg/dL / Ketone: x  / Bili: x / Urobili: x   Blood: x / Protein: x / Nitrite: x   Leuk Esterase: x / RBC: x / WBC x   Sq Epi: x / Non Sq Epi: x / Bacteria: x          ---------  PHYSICAL EXAM  Constitutional - NAD, Comfortable, laying in bed   right craniectomy   Pulm - Breathing comfortably  Abd - Soft, NTND PEG   Extremities - No edema, No calf tenderness  Neurologic Exam -                    Cognitive - Awake, Alert, oriented x 3     Communication - Fluent     Motor - left hemiplegia      Psychiatric - Mood WNL, Affect WNL    ASSESSMENT/PLAN  38y Female h/o hypothyroid with functional deficits after right frontal IPH  s/p right hemicraniectomy helmet when OOB  briviact, vimpat bid   dysphagia s/p PEG, MBG, now on puree/thin, supplemental feeds as needed   sleep, trazodone increased   Continue current medical management  Pain - Tylenol PRN gabapentin increased, tramadol prn, tizanidine at night added   DVT PPX - lovenox   Continue 3hrs a day of comprehensive rehab program.                No overnight events.  slept better     REVIEW OF SYSTEMS  Constitutional - No fever,  No fatigue  Neurological - No headaches, No loss of strength  Musculoskeletal - No joint pain, No joint swelling, No muscle pain    VITALS  T(C): 36.9 (07-04-23 @ 08:20), Max: 36.9 (07-04-23 @ 08:20)  HR: 90 (07-04-23 @ 08:20) (81 - 113)  BP: 100/68 (07-04-23 @ 08:20) (100/68 - 138/73)  RR: 16 (07-04-23 @ 08:20) (14 - 18)  SpO2: 98% (07-04-23 @ 08:20) (96% - 98%)  Wt(kg): --       MEDICATIONS   acetaminophen   Oral Liquid .. 650 milliGRAM(s) every 6 hours PRN  bacitracin   Ointment 1 Application(s) three times a day PRN  brivaracetam Oral Solution 100 milliGRAM(s) two times a day  enoxaparin Injectable 40 milliGRAM(s) every 24 hours  gabapentin 300 milliGRAM(s) at bedtime  lacosamide Solution 150 milliGRAM(s) two times a day  levothyroxine 75 MICROGram(s) daily  lisinopril 20 milliGRAM(s) daily  nystatin Powder 1 Application(s) two times a day  pantoprazole   Suspension 40 milliGRAM(s) daily  polyethylene glycol 3350 17 Gram(s) daily  senna 2 Tablet(s) at bedtime PRN  tiZANidine 2 milliGRAM(s) at bedtime  traZODone 75 milliGRAM(s) at bedtime      RECENT LABS/IMAGING                        13.1   8.98  )-----------( 387      ( 03 Jul 2023 07:04 )             38.9     07-03    135  |  99  |  13  ----------------------------<  121<H>  4.3   |  27  |  0.54    Ca    9.7      03 Jul 2023 07:04    TPro  7.9  /  Alb  3.2<L>  /  TBili  0.2  /  DBili  x   /  AST  21  /  ALT  40  /  AlkPhos  105  07-03      Urinalysis Basic - ( 03 Jul 2023 07:04 )    Color: x / Appearance: x / SG: x / pH: x  Gluc: 121 mg/dL / Ketone: x  / Bili: x / Urobili: x   Blood: x / Protein: x / Nitrite: x   Leuk Esterase: x / RBC: x / WBC x   Sq Epi: x / Non Sq Epi: x / Bacteria: x          ---------  PHYSICAL EXAM  Constitutional - NAD, Comfortable, laying in bed   right craniectomy   Pulm - Breathing comfortably  Abd - Soft, NTND PEG   Extremities - No edema, No calf tenderness  Neurologic Exam -                    Cognitive - Awake, Alert, oriented x 3     Communication - Fluent     Motor - left hemiplegia      Psychiatric - Mood WNL, Affect WNL    ASSESSMENT/PLAN  38y Female h/o hypothyroid with functional deficits after right frontal IPH  s/p right hemicraniectomy helmet when OOB  briviact, vimpat bid   dysphagia s/p PEG, MBG, now on puree/thin, supplemental feeds as needed   sleep, trazodone increased   Continue current medical management  Pain - Tylenol PRN gabapentin increased, tramadol prn, tizanidine at night added   DVT PPX - lovenox   Continue 3hrs a day of comprehensive rehab program.

## 2023-07-04 NOTE — CHART NOTE - NSCHARTNOTEFT_GEN_A_CORE
Unable to determine w/ AXRs if munson catheter is in the stomach.   I removed the Munson and placed dressing at the site.  Patient to undergo EGD with replacement of PEG tomorrow.

## 2023-07-04 NOTE — PROGRESS NOTE ADULT - SUBJECTIVE AND OBJECTIVE BOX
Hospitalist: Emi Oglesby DO    CHIEF COMPLAINT: Patient is a 38y old  female who presents with a chief complaint of IPH (04 Jul 2023 09:42)      SUBJECTIVE / OVERNIGHT EVENTS: Patient seen and examined. No acute events overnight. Pain well controlled and patient without any complaints.    MEDICATIONS  (STANDING):  acetaminophen   Oral Liquid .. 650 milliGRAM(s) Oral <User Schedule>  brivaracetam Oral Solution 100 milliGRAM(s) Oral two times a day  enoxaparin Injectable 40 milliGRAM(s) SubCutaneous every 24 hours  gabapentin 300 milliGRAM(s) Oral at bedtime  lacosamide Solution 150 milliGRAM(s) Oral two times a day  levothyroxine 75 MICROGram(s) Oral daily  lisinopril 20 milliGRAM(s) Oral daily  nystatin Powder 1 Application(s) Topical two times a day  pantoprazole   Suspension 40 milliGRAM(s) Oral daily  polyethylene glycol 3350 17 Gram(s) Oral daily  tiZANidine 2 milliGRAM(s) Oral at bedtime  traZODone 75 milliGRAM(s) Oral at bedtime    MEDICATIONS  (PRN):  acetaminophen   Oral Liquid .. 650 milliGRAM(s) Oral every 6 hours PRN Temp greater or equal to 38C (100.4F), Mild Pain (1 - 3)  bacitracin   Ointment 1 Application(s) Topical three times a day PRN scalp irritation  senna 2 Tablet(s) Oral at bedtime PRN Constipation      VITALS:  T(F): 98.5 (07-04-23 @ 08:20), Max: 98.5 (07-04-23 @ 08:20)  HR: 90 (07-04-23 @ 08:20) (81 - 113)  BP: 100/68 (07-04-23 @ 08:20) (100/68 - 138/73)  RR: 16 (07-04-23 @ 08:20) (14 - 18)  SpO2: 98% (07-04-23 @ 08:20)      REVIEW OF SYSTEMS:  For ROV please refer back to H&P     PHYSICAL EXAM:  HEAD:  Right craniotomy noted with surgical scar  EYES: EOMI, PERRLA, conjunctiva and sclera clear  ENMT: No tonsillar erythema, exudates, or enlargement; Moist mucous membranes, Good dentition, No lesions  NECK: Supple, No JVD, Normal thyroid  NERVOUS SYSTEM:  Alert & Oriented   CHEST/LUNG: Clear to percussion bilaterally; No rales, rhonchi, wheezing, or rubs  HEART: Regular rate and rhythm; No murmurs, rubs, or gallops  ABDOMEN: Soft, Nontender, Nondistended; Bowel sounds present  EXTREMITIES:  2+ Peripheral Pulses, No clubbing, cyanosis, or edema  LYMPH: No lymphadenopathy noted  SKIN: No rashes or lesions          LABS:              13.1                 135  | 27   | 13           8.98  >-----------< 387     ------------------------< 121                   38.9                 4.3  | 99   | 0.54                                         Ca 9.7   Mg x     Ph x           TPro  7.9  /  Alb  3.2      TBili  0.2  /  DBili  x         AST  21  /  ALT  40            AlkPhos  105            Urinalysis Basic - ( 03 Jul 2023 07:04 )    Color: x / Appearance: x / SG: x / pH: x  Gluc: 121 mg/dL / Ketone: x  / Bili: x / Urobili: x   Blood: x / Protein: x / Nitrite: x   Leuk Esterase: x / RBC: x / WBC x   Sq Epi: x / Non Sq Epi: x / Bacteria: x           CAPILLARY BLOOD GLUCOSE            MICROBIOLOGY:  Urinalysis Basic - ( 03 Jul 2023 07:04 )    Color: x / Appearance: x / SG: x / pH: x  Gluc: 121 mg/dL / Ketone: x  / Bili: x / Urobili: x   Blood: x / Protein: x / Nitrite: x   Leuk Esterase: x / RBC: x / WBC x   Sq Epi: x / Non Sq Epi: x / Bacteria: x            RADIOLOGY & ADDITIONAL TESTS:    Imaging Personally Reviewed:    [X] Consultant(s) Notes Reviewed:  [X] Care Discussed with Consultants/Other Providers:

## 2023-07-05 PROCEDURE — 99233 SBSQ HOSP IP/OBS HIGH 50: CPT

## 2023-07-05 PROCEDURE — 99232 SBSQ HOSP IP/OBS MODERATE 35: CPT

## 2023-07-05 RX ORDER — TRAZODONE HCL 50 MG
75 TABLET ORAL AT BEDTIME
Refills: 0 | Status: DISCONTINUED | OUTPATIENT
Start: 2023-07-05 | End: 2023-07-27

## 2023-07-05 RX ORDER — TIZANIDINE 4 MG/1
2 TABLET ORAL AT BEDTIME
Refills: 0 | Status: DISCONTINUED | OUTPATIENT
Start: 2023-07-05 | End: 2023-07-11

## 2023-07-05 RX ORDER — PANTOPRAZOLE SODIUM 20 MG/1
40 TABLET, DELAYED RELEASE ORAL DAILY
Refills: 0 | Status: DISCONTINUED | OUTPATIENT
Start: 2023-07-05 | End: 2023-07-26

## 2023-07-05 RX ORDER — ENOXAPARIN SODIUM 100 MG/ML
40 INJECTION SUBCUTANEOUS EVERY 24 HOURS
Refills: 0 | Status: DISCONTINUED | OUTPATIENT
Start: 2023-07-05 | End: 2023-07-13

## 2023-07-05 RX ORDER — ACETAMINOPHEN 500 MG
650 TABLET ORAL EVERY 6 HOURS
Refills: 0 | Status: DISCONTINUED | OUTPATIENT
Start: 2023-07-05 | End: 2023-07-07

## 2023-07-05 RX ORDER — LACOSAMIDE 50 MG/1
150 TABLET ORAL
Refills: 0 | Status: DISCONTINUED | OUTPATIENT
Start: 2023-07-05 | End: 2023-07-12

## 2023-07-05 RX ORDER — LISINOPRIL 2.5 MG/1
20 TABLET ORAL DAILY
Refills: 0 | Status: DISCONTINUED | OUTPATIENT
Start: 2023-07-06 | End: 2023-07-06

## 2023-07-05 RX ORDER — CEFAZOLIN SODIUM 1 G
2000 VIAL (EA) INJECTION ONCE
Refills: 0 | Status: DISCONTINUED | OUTPATIENT
Start: 2023-07-05 | End: 2023-07-05

## 2023-07-05 RX ORDER — LEVOTHYROXINE SODIUM 125 MCG
75 TABLET ORAL DAILY
Refills: 0 | Status: DISCONTINUED | OUTPATIENT
Start: 2023-07-05 | End: 2023-08-01

## 2023-07-05 RX ORDER — BRIVARACETAM 25 MG/1
100 TABLET, FILM COATED ORAL
Refills: 0 | Status: DISCONTINUED | OUTPATIENT
Start: 2023-07-05 | End: 2023-07-09

## 2023-07-05 RX ORDER — GABAPENTIN 400 MG/1
300 CAPSULE ORAL AT BEDTIME
Refills: 0 | Status: DISCONTINUED | OUTPATIENT
Start: 2023-07-05 | End: 2023-07-11

## 2023-07-05 RX ORDER — GABAPENTIN 400 MG/1
300 CAPSULE ORAL ONCE
Refills: 0 | Status: COMPLETED | OUTPATIENT
Start: 2023-07-05 | End: 2023-07-05

## 2023-07-05 RX ADMIN — Medication 75 MILLIGRAM(S): at 22:00

## 2023-07-05 RX ADMIN — LISINOPRIL 20 MILLIGRAM(S): 2.5 TABLET ORAL at 05:54

## 2023-07-05 RX ADMIN — NYSTATIN CREAM 1 APPLICATION(S): 100000 CREAM TOPICAL at 17:50

## 2023-07-05 RX ADMIN — ENOXAPARIN SODIUM 40 MILLIGRAM(S): 100 INJECTION SUBCUTANEOUS at 17:47

## 2023-07-05 RX ADMIN — LACOSAMIDE 130 MILLIGRAM(S): 50 TABLET ORAL at 05:46

## 2023-07-05 RX ADMIN — GABAPENTIN 300 MILLIGRAM(S): 400 CAPSULE ORAL at 22:00

## 2023-07-05 RX ADMIN — LACOSAMIDE 150 MILLIGRAM(S): 50 TABLET ORAL at 13:44

## 2023-07-05 RX ADMIN — Medication 75 MICROGRAM(S): at 05:54

## 2023-07-05 RX ADMIN — SODIUM CHLORIDE 100 MILLILITER(S): 9 INJECTION, SOLUTION INTRAVENOUS at 09:08

## 2023-07-05 RX ADMIN — Medication 650 MILLIGRAM(S): at 14:55

## 2023-07-05 RX ADMIN — LACOSAMIDE 150 MILLIGRAM(S): 50 TABLET ORAL at 17:47

## 2023-07-05 RX ADMIN — BRIVARACETAM 100 MILLIGRAM(S): 25 TABLET, FILM COATED ORAL at 13:03

## 2023-07-05 RX ADMIN — Medication 650 MILLIGRAM(S): at 14:22

## 2023-07-05 RX ADMIN — TIZANIDINE 2 MILLIGRAM(S): 4 TABLET ORAL at 22:00

## 2023-07-05 RX ADMIN — Medication 0.5 MILLIGRAM(S): at 09:05

## 2023-07-05 RX ADMIN — LEVETIRACETAM 400 MILLIGRAM(S): 250 TABLET, FILM COATED ORAL at 05:45

## 2023-07-05 RX ADMIN — SODIUM CHLORIDE 100 MILLILITER(S): 9 INJECTION, SOLUTION INTRAVENOUS at 07:06

## 2023-07-05 RX ADMIN — BRIVARACETAM 100 MILLIGRAM(S): 25 TABLET, FILM COATED ORAL at 17:47

## 2023-07-05 RX ADMIN — GABAPENTIN 300 MILLIGRAM(S): 400 CAPSULE ORAL at 15:48

## 2023-07-05 NOTE — PROGRESS NOTE ADULT - NUTRITIONAL ASSESSMENT
This patient has been assessed with a concern for Malnutrition and has been determined to have a diagnosis/diagnoses of Severe protein-calorie malnutrition.    This patient is being managed with:   Diet NPO-  Entered: Jul 4 2023  3:50PM    Diet NPO-  NPO for Procedure/Test     NPO Start Date: 04-Jul-2023   NPO Start Time: 03:30  Entered: Jul 4 2023  3:29PM

## 2023-07-05 NOTE — PROGRESS NOTE ADULT - NS ATTEND AMEND GEN_ALL_CORE FT
Pt. seen with NP.  Agree with documentation above as per NP with amendments made as appropriate. Patient medically stable. Making progress towards rehab goals.       right ICH s/p hemicraniectomy--  Seen this Afternoon with pt's aunt and Sister at bedside.  Pt. with left sided neuropathic pain -- will benefit from    incr gabapentin to 300mg to help with sleep and neuropathic pain    Spasticity--  --start Trial of Tizanidine 2mg qhs    --both above changes should help improve sleep    d/w family at bedside
Patient seen and examined at the bedside. Agree with the assessment and plan of JANELLE Adan.   Plan as outlined above.  Also d/w Dr. Sanford who concurs with waiting to see if PEG is needed.
Pt. seen with resident & NP.  Agree with documentation above as per NP with amendments made as appropriate. Patient medically stable. Making progress towards rehab goals.     right ICH s/p hemicraniectomy  MBS (6/30) - upgraded to puree/thin with provale cup + supplemental feed if doesn't eat >50% of meal tray; 1:1 assistance with meals    cognition--  --stop amantadine as may be interfering with sleep (pt. received today's dose already)    Sleep--  --Trial increasing Trazodone to 75mg qhs to improve sleep and restlessness.     Pt's mother at bedside-- discussed rehab plan of care

## 2023-07-05 NOTE — PROGRESS NOTE ADULT - SUBJECTIVE AND OBJECTIVE BOX
INTERVAL HPI/OVERNIGHT EVENTS:  Patient seen and examined at bed side, family at bed side. PEG tube out from yesterday. No Event over night. She is NPO after mid night for the scheduled PEG/ EGD.   She is anxious wanted the procedure to be done as soon.  As per mother she is eating pureed diet.   Patient transported to [facility] As per mother she is eating pureed diet, not sure if she need the PEG tube. Discussion with Dr Poon and plan to hold off the PEG placement and assess her eating. If needed the PEG to be rescheduled.     MEDICATIONS  (STANDING):  brivaracetam 100 milliGRAM(s) Oral two times a day  enoxaparin Injectable 40 milliGRAM(s) SubCutaneous every 24 hours  gabapentin 300 milliGRAM(s) Oral at bedtime  lacosamide Solution 150 milliGRAM(s) Oral two times a day  levothyroxine 75 MICROGram(s) Oral daily  lisinopril 20 milliGRAM(s) Oral daily  nystatin Powder 1 Application(s) Topical two times a day  pantoprazole   Suspension 40 milliGRAM(s) Oral daily  tiZANidine 2 milliGRAM(s) Oral at bedtime  traZODone 75 milliGRAM(s) Oral at bedtime    MEDICATIONS  (PRN):  acetaminophen   IVPB .. 1000 milliGRAM(s) IV Intermittent once PRN Severe Pain (7 - 10)  acetaminophen   Oral Liquid .. 650 milliGRAM(s) Oral every 6 hours PRN Mild Pain (1 - 3)  bacitracin   Ointment 1 Application(s) Topical three times a day PRN scalp irritation  senna 2 Tablet(s) Oral at bedtime PRN Constipation      Allergies    No Known Allergies    Intolerances        PAST MEDICAL & SURGICAL HISTORY:    PHYSICAL EXAM:   Vital Signs:  Vital Signs Last 24 Hrs  T(C): 36.7 (05 Jul 2023 11:12), Max: 36.7 (04 Jul 2023 19:31)  T(F): 98.1 (05 Jul 2023 11:12), Max: 98.1 (05 Jul 2023 11:12)  HR: 66 (05 Jul 2023 11:12) (66 - 92)  BP: 134/85 (05 Jul 2023 11:12) (129/90 - 138/67)  BP(mean): --  RR: 16 (05 Jul 2023 11:12) (16 - 16)  SpO2: 98% (05 Jul 2023 11:12) (98% - 100%)    Parameters below as of 05 Jul 2023 11:12  Patient On (Oxygen Delivery Method): room air      Daily     Daily I&O's Summary      GENERAL:  Appears stated age  HEENT:  NC/AT,  conjunctivae clear and pink  CHEST:  Full & symmetric excursion,  HEART:  Regular rhythm, S1, S2  ABDOMEN:  Soft, non-tender, non-distended, normoactive bowel sounds, Old PEG site dry intact   EXTEREMITIES:  no cyanosis, clubbing or edema  SKIN:  No rash/warm/dry  NEURO:  Alert, restless       LABS:              amylase   lipase  RADIOLOGY & ADDITIONAL TESTS:   GI Follow up    Patient seen and examined.  Family at bedside.   No events overnight. She is NPO  for the scheduled endoscopic PEG replacement.     Patient transported to Endoscopy Unit.  Patient's mother expressed that the patient is eating pureed diet, and is not sure if she needs the PEG tube.    MEDICATIONS  (STANDING):  brivaracetam 100 milliGRAM(s) Oral two times a day  enoxaparin Injectable 40 milliGRAM(s) SubCutaneous every 24 hours  gabapentin 300 milliGRAM(s) Oral at bedtime  lacosamide Solution 150 milliGRAM(s) Oral two times a day  levothyroxine 75 MICROGram(s) Oral daily  lisinopril 20 milliGRAM(s) Oral daily  nystatin Powder 1 Application(s) Topical two times a day  pantoprazole   Suspension 40 milliGRAM(s) Oral daily  tiZANidine 2 milliGRAM(s) Oral at bedtime  traZODone 75 milliGRAM(s) Oral at bedtime    MEDICATIONS  (STANDING):  brivaracetam 100 milliGRAM(s) Oral two times a day  enoxaparin Injectable 40 milliGRAM(s) SubCutaneous every 24 hours  gabapentin 300 milliGRAM(s) Oral at bedtime  lacosamide Solution 150 milliGRAM(s) Oral two times a day  levothyroxine 75 MICROGram(s) Oral daily  lisinopril 20 milliGRAM(s) Oral daily  nystatin Powder 1 Application(s) Topical two times a day  pantoprazole   Suspension 40 milliGRAM(s) Oral daily  tiZANidine 2 milliGRAM(s) Oral at bedtime  traZODone 75 milliGRAM(s) Oral at bedtime    MEDICATIONS  (PRN):  acetaminophen   IVPB .. 1000 milliGRAM(s) IV Intermittent once PRN Severe Pain (7 - 10)  acetaminophen   Oral Liquid .. 650 milliGRAM(s) Oral every 6 hours PRN Mild Pain (1 - 3)  bacitracin   Ointment 1 Application(s) Topical three times a day PRN scalp irritation  senna 2 Tablet(s) Oral at bedtime PRN Constipation      Allergies    No Known Allergies    Intolerances        PHYSICAL EXAM:   Vital Signs:  Vital Signs Last 24 Hrs  T(C): 36.7 (05 Jul 2023 11:12), Max: 36.7 (04 Jul 2023 19:31)  T(F): 98.1 (05 Jul 2023 11:12), Max: 98.1 (05 Jul 2023 11:12)  HR: 66 (05 Jul 2023 11:12) (66 - 92)  BP: 134/85 (05 Jul 2023 11:12) (129/90 - 138/67)  BP(mean): --  RR: 16 (05 Jul 2023 11:12) (16 - 16)  SpO2: 98% (05 Jul 2023 11:12) (98% - 100%)    Parameters below as of 05 Jul 2023 11:12  Patient On (Oxygen Delivery Method): room air    GENERAL:   NAD  HEENT:  NC/AT,  s/p hemicraniectomy  CHEST:  Clear  HEART:  RRR  ABDOMEN:  Soft, Nondistended. no bleeding at stoma  EXTR:  no edema  SKIN:  No rash or jaundice  NEURO:  Awake, alert      LABS:    no new labs

## 2023-07-05 NOTE — PROGRESS NOTE ADULT - PROBLEM SELECTOR PLAN 1
Patient is eating pureed diet  Continue Speech and swallow eval  Nutrition F/U may need calory count  Will hold off the PEG placement at this time. Holding off the PEG placement at this time.  Patient is tolerating pureed diet  Continue Speech and swallow evaluation  Nutrition F/U, may need calorie count  GI will sign off. Please reconsult as needed.

## 2023-07-05 NOTE — PROGRESS NOTE ADULT - SUBJECTIVE AND OBJECTIVE BOX
HPI:  38 year old female w/ PMHx hypothyroidism who was admitted to [facility] 6/6 after being found on floor at home, with CT revealing large right-sided ICH. She required emergent right frontal craniectomy for decompression and EVD placement. EVD subsequently removed 6/16. Angio negative. Patient underwent G tube placement 6/20. Hospital course notable for fluctuating leukocytosis and high-grade temperatures with no identified source of infection including in urine, blood, CSF. Now admitted for multidisciplinary rehab. (26 Jun 2023 13:08)    Subjective:  ON patient pulled PEG tube out. GI consulted and replaced temporarily with munson. Abd xray completed. Patient continued to be NPO and medications switched to IV except tizanidine, gabapentin and trazodone. AEDs given IV. On exam this morning, patient complaints of pain. Last night was given x2 tylenol without any improvements. +agitation this AM. Given 0.5 ativan. Therapies held for this AM. Otherwise, no other complaints. No NV, HA, vision changes, changes in bowel or bladder habits. VSS. Plan for GI PEG tube placement today.     VITALS  Vital Signs Last 24 Hrs  T(C): 36.7 (05 Jul 2023 07:07), Max: 36.7 (04 Jul 2023 14:50)  T(F): 98 (05 Jul 2023 07:07), Max: 98.1 (04 Jul 2023 14:50)  HR: 90 (05 Jul 2023 07:07) (89 - 95)  BP: 131/82 (05 Jul 2023 07:07) (121/81 - 138/67)  BP(mean): --  RR: 16 (05 Jul 2023 07:07) (16 - 16)  SpO2: 99% (05 Jul 2023 07:07) (99% - 100%)    Parameters below as of 05 Jul 2023 07:07  Patient On (Oxygen Delivery Method): room air    REVIEW OF SYMPTOMS  Complaints of pain, 12 point ROS otherwise negative.     PHYSICAL EXAM  Constitutional: agitated   Neuro: alert, left sided hemiplegia   Abdomen: soft, minimally distended   Psychiatric - Mood stable, Affect WNL  Skin: no skin breakdown     RADIOLOGY/OTHER RESULTS  Xray of the abdomen 7/4:  FINDINGS:  PEG/Munson catheter projects over the central abdomen, tip projecting over the left hemiabdomen. Note that the retention balloon is not clearly visualized.    Bowel gas pattern is nonobstructive. No gross free intraperitoneal air, limited by supine position.  IMPRESSION:  PEG/Munson catheter present.  Advise administration of oral contrast via catheter to confirm positioning.    MEDICATIONS  (STANDING):  dextrose 5% + sodium chloride 0.9%. 1000 milliLiter(s) (100 mL/Hr) IV Continuous <Continuous>  lacosamide IVPB 150 milliGRAM(s) IV Intermittent two times a day  levETIRAcetam  IVPB 1000 milliGRAM(s) IV Intermittent every 12 hours  levothyroxine 75 MICROGram(s) Oral daily  lisinopril 20 milliGRAM(s) Oral daily  nystatin Powder 1 Application(s) Topical two times a day  pantoprazole   Suspension 40 milliGRAM(s) Oral daily  polyethylene glycol 3350 17 Gram(s) Oral daily    MEDICATIONS  (PRN):  acetaminophen   IVPB .. 1000 milliGRAM(s) IV Intermittent once PRN Severe Pain (7 - 10)  acetaminophen   Oral Liquid .. 650 milliGRAM(s) Oral every 6 hours PRN Temp greater or equal to 38C (100.4F), Mild Pain (1 - 3)  bacitracin   Ointment 1 Application(s) Topical three times a day PRN scalp irritation  senna 2 Tablet(s) Oral at bedtime PRN Constipation         HPI:  38 year old female w/ PMHx hypothyroidism who was admitted to [facility] 6/6 after being found on floor at home, with CT revealing large right-sided ICH. She required emergent right frontal craniectomy for decompression and EVD placement. EVD subsequently removed 6/16. Angio negative. Patient underwent G tube placement 6/20. Hospital course notable for fluctuating leukocytosis and high-grade temperatures with no identified source of infection including in urine, blood, CSF. Now admitted for multidisciplinary rehab. (26 Jun 2023 13:08)    Subjective:  Yesterday patient pulled PEG tube out. GI consulted and replaced temporarily with munson but Abd. xray could not confirm tip in stomach lumen. Patient continued to be NPO and Seizure and  medications and Tylenol switched to IV --all PO meds held. AEDs given IV.  Pt. did not sleep last night.   On exam this morning, patient complaints of pain and very restless and anxious. Last night was given x2 tylenol without any improvements. +agitation this AM. Given 0.5 ativan IV. Therapies held for this AM. Otherwise, no other complaints. No NV, HA, vision changes, changes in bowel or bladder habits. VSS. Plan for GI PEG tube placement today.     VITALS  Vital Signs Last 24 Hrs  T(C): 36.7 (05 Jul 2023 07:07), Max: 36.7 (04 Jul 2023 14:50)  T(F): 98 (05 Jul 2023 07:07), Max: 98.1 (04 Jul 2023 14:50)  HR: 90 (05 Jul 2023 07:07) (89 - 95)  BP: 131/82 (05 Jul 2023 07:07) (121/81 - 138/67)  BP(mean): --  RR: 16 (05 Jul 2023 07:07) (16 - 16)  SpO2: 99% (05 Jul 2023 07:07) (99% - 100%)    Parameters below as of 05 Jul 2023 07:07  Patient On (Oxygen Delivery Method): room air    REVIEW OF SYMPTOMS  Complaints of pain, 12 point ROS otherwise negative.     PHYSICAL EXAM  Constitutional: agitated   Neuro: alert, left sided hemiplegia   Abdomen: soft, minimally distended, PEG removal site-- no drainage, no significant erythema  Psychiatric - Mood stable, Affect WNL  Skin: no skin breakdown     RADIOLOGY/OTHER RESULTS  Xray of the abdomen 7/4:  FINDINGS:  PEG/Munson catheter projects over the central abdomen, tip projecting over the left hemiabdomen. Note that the retention balloon is not clearly visualized.    Bowel gas pattern is nonobstructive. No gross free intraperitoneal air, limited by supine position.  IMPRESSION:  PEG/Munson catheter present.  Advise administration of oral contrast via catheter to confirm positioning.    MEDICATIONS  (STANDING):  dextrose 5% + sodium chloride 0.9%. 1000 milliLiter(s) (100 mL/Hr) IV Continuous <Continuous>  lacosamide IVPB 150 milliGRAM(s) IV Intermittent two times a day  levETIRAcetam  IVPB 1000 milliGRAM(s) IV Intermittent every 12 hours  levothyroxine 75 MICROGram(s) Oral daily  lisinopril 20 milliGRAM(s) Oral daily  nystatin Powder 1 Application(s) Topical two times a day  pantoprazole   Suspension 40 milliGRAM(s) Oral daily  polyethylene glycol 3350 17 Gram(s) Oral daily    MEDICATIONS  (PRN):  acetaminophen   IVPB .. 1000 milliGRAM(s) IV Intermittent once PRN Severe Pain (7 - 10)  acetaminophen   Oral Liquid .. 650 milliGRAM(s) Oral every 6 hours PRN Temp greater or equal to 38C (100.4F), Mild Pain (1 - 3)  bacitracin   Ointment 1 Application(s) Topical three times a day PRN scalp irritation  senna 2 Tablet(s) Oral at bedtime PRN Constipation

## 2023-07-05 NOTE — PROGRESS NOTE ADULT - ASSESSMENT
38 year old woman w/PMHx hypothyroidism, admitted to [facility] 6/6 for large right frontal IPH w/ IVH and hydrocephalus, s/p R hemicraniectomy for evacuation on 6/6, s/p PEG 6/20/2023, now with inadvertent dislodgment of PEG.   GI consultation for PEG placement with discussion she is tolerating Pureed diet. PEG procedure canceled.   Continue current management. We will sign off Please reconsult as needed.  38 year old woman w/PMHx hypothyroidism, admitted to [facility] 6/6 for large right frontal IPH w/ IVH and hydrocephalus, s/p R hemicraniectomy for evacuation on 6/6, s/p PEG 6/20/2023, now with inadvertent dislodgment of PEG.   GI consultation for PEG placement; however, she is tolerating pureed diet and primary team feels today that she could attempt meds and diet exclusively orally. PEG procedure cancelled.

## 2023-07-05 NOTE — PROGRESS NOTE ADULT - ATTENDING COMMENTS
Pt. seen with niels.  Agree with documentation above as per fellow with amendments made as appropriate. Patient medically stable. Making progress towards rehab goals.     right ICH--  Pt. pulled out PEG tube yesterday afternoon-- Trevino placed in stoma but tip could not be confirmed in stomach as per xray--  Pt. went to endoscopy around 11am for PEG placement.   Pt. did not get trazodone, tizanidine, or gabapentin last night--- Pt. received IV tylenol last night x 2 but still restless and did not sleep.  Restless this AM -- trying to pull out IV-- gave x 1 dose 0.5mg ativan and pt. fell asleep. Pt. seen with niels.  Agree with documentation above as per fellow with amendments made as appropriate. Patient medically stable. Making progress towards rehab goals.     right ICH--  Pt. pulled out PEG tube yesterday afternoon-- Trevino placed in stoma but tip could not be confirmed in stomach as per xray--  Pt. went to endoscopy around 11am for PEG placement.   Pt. did not get trazodone, tizanidine, or gabapentin last night--- Pt. received IV tylenol last night x 2 but still restless and did not sleep.  Restless this AM -- trying to pull out IV-- gave x 1 dose 0.5mg ativan and pt. fell asleep.    ***Addendum-- Spoke with GI, Dr. Poon, while pt. was in Endoscopy.  Pt's PEG tube cancelled.  Dr. Poon discussed that since pt. is tolerating some PO nutrition with Puree diet, and pt's restless and risk of removal of PEG if replaced. Also he noted that pt's mother expressed she preferred to not have PEG procedure to be replaced.  Discussed will keep pt on PO and perform calorie count and administer meds crushed via PEG    --Spoke with pt's mother and Aunt on BIU after pt. returned to floor-- Mother was frustrated that GI, Dr. Belle, told her yesterday that pt. needed the procedure for PEG and pt. was NPO and did not sleep and was restless all night due to not getting her usual sleep and pain med regimen, and that today procedure was cancelled.  Discussed that her current medications have been working better for her and that pt. had a setback today with this situation. Mother stated she does not want Pt to have her PEG unless absolutely necessary.  I expressed understanding of her concerns and frustrations.  Discussed will remove IV and start pt. on Puree diet and crush PO meds and monitor.  All questions answered.  Mother expressed agreement with plan.

## 2023-07-05 NOTE — PROGRESS NOTE ADULT - ASSESSMENT
ASSESSMENT/PLAN  38y Female h/o hypothyroid with functional deficits after right frontal IPHs/p right hemicraniectomy.     #hx of seizure   -continue keppra, vimpat bid   -continue to monitor neuro exam     #dysphagia s/p PEG   -PEG tube removed on 7/4 by patient   -plan for replacement of PEG on 7/5 with GI, will follow up recommendations   -dc fluids once PEG tube replaced and switch medications through PEG tube     #Pain   -x2 tylenol given on 7/4   -continue trazodone 75, gabapentin and tramadol PRN   -continue tizanidine 2 at bedtime     -gabapentin increased->continue to follow for neuropathic pain     #hx of anemia   -follow up medicine recommendations   -H/H improving   -fu B12 and folate levels    #hx of HTN   -follow up medicine recommendations      #DVT ppx   -continue lovenox      ASSESSMENT/PLAN  38y Female h/o hypothyroid with functional deficits after right frontal IPHs/p right hemicraniectomy.     #hx of seizure   -continue keppra, vimpat IV bid   -continue to monitor neuro exam   --change back to PO  Briviact and Lacosamide once PEG cleared to be in use    #dysphagia s/p PEG   -PEG tube removed on 7/4 by patient   -plan for replacement of PEG on 7/5 with GI, will follow up recommendations   -dc fluids once PEG tube replaced and switch medications through PEG tube     #Pain   -x2 tylenol given on 7/4   -continue trazodone 75, gabapentin and tramadol PRN --once PEG placed and cleared for use  -continue tizanidine 2 at bedtime   --Held  due to PEG out  -gabapentin increased->continue to follow for neuropathic pain     #hx of anemia   -follow up medicine recommendations   -H/H improving   -fu B12 and folate levels    #hx of HTN   -follow up medicine recommendations      #DVT ppx   -continue lovenox

## 2023-07-05 NOTE — PROGRESS NOTE ADULT - SUBJECTIVE AND OBJECTIVE BOX
Patient is a 38y old  Female who presents with a chief complaint of IPH (04 Jul 2023 15:24)      Patient seen and examined at bedside. Mother at bedside- states patient did not sleep much overnight because felt uncomfortable from IV and did not get any meds. Patient pulled out PEG tube yesterday therefore could not get meds, to be replaced today. Appears comfortable at this time.    ALLERGIES:  No Known Allergies    MEDICATIONS  (STANDING):  dextrose 5% + sodium chloride 0.9%. 1000 milliLiter(s) (100 mL/Hr) IV Continuous <Continuous>  lacosamide IVPB 150 milliGRAM(s) IV Intermittent two times a day  levETIRAcetam  IVPB 1000 milliGRAM(s) IV Intermittent every 12 hours  levothyroxine 75 MICROGram(s) Oral daily  lisinopril 20 milliGRAM(s) Oral daily  LORazepam   Injectable 0.5 milliGRAM(s) IV Push once  nystatin Powder 1 Application(s) Topical two times a day  pantoprazole   Suspension 40 milliGRAM(s) Oral daily  polyethylene glycol 3350 17 Gram(s) Oral daily    MEDICATIONS  (PRN):  acetaminophen   IVPB .. 1000 milliGRAM(s) IV Intermittent once PRN Severe Pain (7 - 10)  acetaminophen   Oral Liquid .. 650 milliGRAM(s) Oral every 6 hours PRN Temp greater or equal to 38C (100.4F), Mild Pain (1 - 3)  bacitracin   Ointment 1 Application(s) Topical three times a day PRN scalp irritation  senna 2 Tablet(s) Oral at bedtime PRN Constipation    Vital Signs Last 24 Hrs  T(F): 98 (05 Jul 2023 07:07), Max: 98.1 (04 Jul 2023 14:50)  HR: 90 (05 Jul 2023 07:07) (89 - 95)  BP: 131/82 (05 Jul 2023 07:07) (121/81 - 138/67)  RR: 16 (05 Jul 2023 07:07) (16 - 16)  SpO2: 99% (05 Jul 2023 07:07) (99% - 100%)  I&O's Summary      PHYSICAL EXAM:  HEAD:  Right craniotomy noted with surgical scar  ENMT: No tonsillar erythema, Moist mucous membranes  NECK: Supple, No JVD, Normal thyroid  CHEST/LUNG: Clear to percussion bilaterally; No rales, rhonchi, wheezing, or rubs  HEART: Regular rate and rhythm; No murmurs, rubs, or gallops  ABDOMEN: Soft, Nontender, Nondistended; Bowel sounds present, dressing over old PEG site  EXTREMITIES:  2+ Peripheral Pulses, No clubbing, cyanosis, or edema  SKIN: warm and perfused    LABS:                        13.1   8.98  )-----------( 387      ( 03 Jul 2023 07:04 )             38.9     07-03    135  |  99  |  13  ----------------------------<  121  4.3   |  27  |  0.54    Ca    9.7      03 Jul 2023 07:04    TPro  7.9  /  Alb  3.2  /  TBili  0.2  /  DBili  x   /  AST  21  /  ALT  40  /  AlkPhos  105  07-03                                Urinalysis Basic - ( 03 Jul 2023 07:04 )    Color: x / Appearance: x / SG: x / pH: x  Gluc: 121 mg/dL / Ketone: x  / Bili: x / Urobili: x   Blood: x / Protein: x / Nitrite: x   Leuk Esterase: x / RBC: x / WBC x   Sq Epi: x / Non Sq Epi: x / Bacteria: x            RADIOLOGY & ADDITIONAL TESTS:    Care Discussed with Consultants/Other Providers:

## 2023-07-05 NOTE — PROGRESS NOTE ADULT - ASSESSMENT
38 year old female w/ PMhx hypothyroidism admitted to [facility] 6/6 after being found unresponsive, found to have large right frontal IPH w/ IVH and hydrocephalus. Now s/p R krishna craniectomy for evacuation on 6/6. PEG placed 6/20. Now admitted for multidisciplinary rehab.     #IPH with IVH, R frontal lobe  - Amantadine 50 mg qAM  - Trazodone 50 mg qPM   - Antiepileptic regimen: Briviact 100 mg BID, Vimpat 150 mg BID  - Helmet when OOB    #Normocytic anemia  - Will montior Hg/Hct, transfuse if Hg<7  - iron/B12/folate normal     #HTN  - Lisinopril 20 mg daily   - Clonidine 0.1 mg PO q12h stopped on day of discharge from [facility], monitor for rebound     #Dysphagia s/p PEG  - removed PEG tube, to be replaced today by GI  - Abdominal binder  - PEG feeds to be started as per GI    #Hypothyroidism  - Continue synthroid 75 mcg daily   - thyroid panel reviewed    #Fever, leukocytosis  - resolved  - episodes of fever during [place] admission  - prior infectious work up unrevealing including negative Bcx/UA/CXR on 6/18 and unremarkable CT c/a/p on 6/15.  - repeat LE duplex negative on 6/22  - no urinary retention on bladder scan  - repeat Bcx/Ucx (6/21) negative, RVP negative  - As per ID monitor off antibiotics    #DVT ppx  - Lovenox, held for now for OR PEG replacement

## 2023-07-06 LAB
ALBUMIN SERPL ELPH-MCNC: 2.9 G/DL — LOW (ref 3.3–5)
ALP SERPL-CCNC: 87 U/L — SIGNIFICANT CHANGE UP (ref 40–120)
ALT FLD-CCNC: 34 U/L — SIGNIFICANT CHANGE UP (ref 10–45)
ANION GAP SERPL CALC-SCNC: 7 MMOL/L — SIGNIFICANT CHANGE UP (ref 5–17)
AST SERPL-CCNC: 27 U/L — SIGNIFICANT CHANGE UP (ref 10–40)
BASOPHILS # BLD AUTO: 0.04 K/UL — SIGNIFICANT CHANGE UP (ref 0–0.2)
BASOPHILS NFR BLD AUTO: 0.4 % — SIGNIFICANT CHANGE UP (ref 0–2)
BILIRUB SERPL-MCNC: 0.2 MG/DL — SIGNIFICANT CHANGE UP (ref 0.2–1.2)
BUN SERPL-MCNC: 11 MG/DL — SIGNIFICANT CHANGE UP (ref 7–23)
CALCIUM SERPL-MCNC: 9.4 MG/DL — SIGNIFICANT CHANGE UP (ref 8.4–10.5)
CHLORIDE SERPL-SCNC: 103 MMOL/L — SIGNIFICANT CHANGE UP (ref 96–108)
CO2 SERPL-SCNC: 26 MMOL/L — SIGNIFICANT CHANGE UP (ref 22–31)
CREAT SERPL-MCNC: 0.47 MG/DL — LOW (ref 0.5–1.3)
EGFR: 124 ML/MIN/1.73M2 — SIGNIFICANT CHANGE UP
EOSINOPHIL # BLD AUTO: 0.17 K/UL — SIGNIFICANT CHANGE UP (ref 0–0.5)
EOSINOPHIL NFR BLD AUTO: 1.8 % — SIGNIFICANT CHANGE UP (ref 0–6)
GLUCOSE SERPL-MCNC: 105 MG/DL — HIGH (ref 70–99)
HCT VFR BLD CALC: 36 % — SIGNIFICANT CHANGE UP (ref 34.5–45)
HGB BLD-MCNC: 11.9 G/DL — SIGNIFICANT CHANGE UP (ref 11.5–15.5)
IMM GRANULOCYTES NFR BLD AUTO: 0.6 % — SIGNIFICANT CHANGE UP (ref 0–0.9)
LYMPHOCYTES # BLD AUTO: 3.09 K/UL — SIGNIFICANT CHANGE UP (ref 1–3.3)
LYMPHOCYTES # BLD AUTO: 32.3 % — SIGNIFICANT CHANGE UP (ref 13–44)
MCHC RBC-ENTMCNC: 32.1 PG — SIGNIFICANT CHANGE UP (ref 27–34)
MCHC RBC-ENTMCNC: 33.1 GM/DL — SIGNIFICANT CHANGE UP (ref 32–36)
MCV RBC AUTO: 97 FL — SIGNIFICANT CHANGE UP (ref 80–100)
MONOCYTES # BLD AUTO: 0.67 K/UL — SIGNIFICANT CHANGE UP (ref 0–0.9)
MONOCYTES NFR BLD AUTO: 7 % — SIGNIFICANT CHANGE UP (ref 2–14)
NEUTROPHILS # BLD AUTO: 5.55 K/UL — SIGNIFICANT CHANGE UP (ref 1.8–7.4)
NEUTROPHILS NFR BLD AUTO: 57.9 % — SIGNIFICANT CHANGE UP (ref 43–77)
NRBC # BLD: 0 /100 WBCS — SIGNIFICANT CHANGE UP (ref 0–0)
PLATELET # BLD AUTO: 343 K/UL — SIGNIFICANT CHANGE UP (ref 150–400)
POTASSIUM SERPL-MCNC: 3.6 MMOL/L — SIGNIFICANT CHANGE UP (ref 3.5–5.3)
POTASSIUM SERPL-SCNC: 3.6 MMOL/L — SIGNIFICANT CHANGE UP (ref 3.5–5.3)
PROT SERPL-MCNC: 7 G/DL — SIGNIFICANT CHANGE UP (ref 6–8.3)
RBC # BLD: 3.71 M/UL — LOW (ref 3.8–5.2)
RBC # FLD: 13.4 % — SIGNIFICANT CHANGE UP (ref 10.3–14.5)
SODIUM SERPL-SCNC: 136 MMOL/L — SIGNIFICANT CHANGE UP (ref 135–145)
WBC # BLD: 9.58 K/UL — SIGNIFICANT CHANGE UP (ref 3.8–10.5)
WBC # FLD AUTO: 9.58 K/UL — SIGNIFICANT CHANGE UP (ref 3.8–10.5)

## 2023-07-06 PROCEDURE — 99232 SBSQ HOSP IP/OBS MODERATE 35: CPT

## 2023-07-06 RX ORDER — DIVALPROEX SODIUM 500 MG/1
125 TABLET, DELAYED RELEASE ORAL
Refills: 0 | Status: DISCONTINUED | OUTPATIENT
Start: 2023-07-07 | End: 2023-07-07

## 2023-07-06 RX ORDER — DIVALPROEX SODIUM 500 MG/1
250 TABLET, DELAYED RELEASE ORAL ONCE
Refills: 0 | Status: COMPLETED | OUTPATIENT
Start: 2023-07-06 | End: 2023-07-06

## 2023-07-06 RX ORDER — LISINOPRIL 2.5 MG/1
10 TABLET ORAL DAILY
Refills: 0 | Status: DISCONTINUED | OUTPATIENT
Start: 2023-07-06 | End: 2023-07-07

## 2023-07-06 RX ORDER — DIVALPROEX SODIUM 500 MG/1
750 TABLET, DELAYED RELEASE ORAL
Refills: 0 | Status: DISCONTINUED | OUTPATIENT
Start: 2023-07-06 | End: 2023-08-01

## 2023-07-06 RX ADMIN — DIVALPROEX SODIUM 250 MILLIGRAM(S): 500 TABLET, DELAYED RELEASE ORAL at 09:26

## 2023-07-06 RX ADMIN — DIVALPROEX SODIUM 750 MILLIGRAM(S): 500 TABLET, DELAYED RELEASE ORAL at 17:24

## 2023-07-06 RX ADMIN — LACOSAMIDE 150 MILLIGRAM(S): 50 TABLET ORAL at 05:44

## 2023-07-06 RX ADMIN — TIZANIDINE 2 MILLIGRAM(S): 4 TABLET ORAL at 21:09

## 2023-07-06 RX ADMIN — Medication 75 MICROGRAM(S): at 05:43

## 2023-07-06 RX ADMIN — Medication 75 MILLIGRAM(S): at 21:09

## 2023-07-06 RX ADMIN — BRIVARACETAM 100 MILLIGRAM(S): 25 TABLET, FILM COATED ORAL at 17:25

## 2023-07-06 RX ADMIN — NYSTATIN CREAM 1 APPLICATION(S): 100000 CREAM TOPICAL at 05:44

## 2023-07-06 RX ADMIN — ENOXAPARIN SODIUM 40 MILLIGRAM(S): 100 INJECTION SUBCUTANEOUS at 17:26

## 2023-07-06 RX ADMIN — PANTOPRAZOLE SODIUM 40 MILLIGRAM(S): 20 TABLET, DELAYED RELEASE ORAL at 12:10

## 2023-07-06 RX ADMIN — LACOSAMIDE 150 MILLIGRAM(S): 50 TABLET ORAL at 17:25

## 2023-07-06 RX ADMIN — BRIVARACETAM 100 MILLIGRAM(S): 25 TABLET, FILM COATED ORAL at 05:43

## 2023-07-06 RX ADMIN — Medication 650 MILLIGRAM(S): at 18:55

## 2023-07-06 RX ADMIN — GABAPENTIN 300 MILLIGRAM(S): 400 CAPSULE ORAL at 21:09

## 2023-07-06 RX ADMIN — Medication 650 MILLIGRAM(S): at 19:35

## 2023-07-06 NOTE — CHART NOTE - NSCHARTNOTEFT_GEN_A_CORE
Brief nutrition note: consult received for calorie count. Informed RN and family to begin calorie count at Lunch this afternoon & sheet placed in pt's room. + preferences obtained. Reviewed preferences and menu alternatives; likes Extra sauces and gravy at meals, chocolate Ensure Plus High Protein QQ 8 oz (Provides 350 kcal, 20 grams of protein), Chocolate ice cream and sweet potatoes, noted in Kardex. Will follow up with findings. RD to remain available/follow up per protocol.     Nikia Gentile RDN, MS, CDN also available on TEAMS

## 2023-07-06 NOTE — PROGRESS NOTE ADULT - SUBJECTIVE AND OBJECTIVE BOX
Patient is a 38y old  Female who presents with a chief complaint of IP (05 Jul 2023 16:59)      Patient seen and examined at bedside. No events overnight, slept well. Mother and wife at bedside. Patient at this time feels ok, denies any acute complaints but with spasm to LUE. Denies chest pain, sob, abd pain, nausea or vomiting with food. Decision was made to not replace PEG tube yesterday as patient was tolerating PO diet.    ALLERGIES:  No Known Allergies    MEDICATIONS  (STANDING):  brivaracetam 100 milliGRAM(s) Oral two times a day  divalproex Sprinkle 250 milliGRAM(s) Oral once  enoxaparin Injectable 40 milliGRAM(s) SubCutaneous every 24 hours  gabapentin 300 milliGRAM(s) Oral at bedtime  lacosamide Solution 150 milliGRAM(s) Oral two times a day  levothyroxine 75 MICROGram(s) Oral daily  lisinopril 20 milliGRAM(s) Oral daily  nystatin Powder 1 Application(s) Topical two times a day  pantoprazole   Suspension 40 milliGRAM(s) Oral daily  tiZANidine 2 milliGRAM(s) Oral at bedtime  traZODone 75 milliGRAM(s) Oral at bedtime    MEDICATIONS  (PRN):  acetaminophen   IVPB .. 1000 milliGRAM(s) IV Intermittent once PRN Severe Pain (7 - 10)  acetaminophen   Oral Liquid .. 650 milliGRAM(s) Oral every 6 hours PRN Mild Pain (1 - 3)  bacitracin   Ointment 1 Application(s) Topical three times a day PRN scalp irritation  senna 2 Tablet(s) Oral at bedtime PRN Constipation    Vital Signs Last 24 Hrs  T(F): 98.5 (06 Jul 2023 08:16), Max: 98.5 (06 Jul 2023 08:16)  HR: 95 (06 Jul 2023 08:16) (61 - 96)  BP: 103/67 (06 Jul 2023 08:16) (92/63 - 134/85)  RR: 16 (06 Jul 2023 08:16) (16 - 16)  SpO2: 96% (06 Jul 2023 08:16) (96% - 100%)  I&O's Summary    05 Jul 2023 07:01  -  06 Jul 2023 07:00  --------------------------------------------------------  IN: 240 mL / OUT: 0 mL / NET: 240 mL        PHYSICAL EXAM:  HEAD:  Right craniotomy noted with surgical scar c/d/i  ENMT: No tonsillar erythema, Moist mucous membranes  NECK: Supple, No JVD, Normal thyroid  CHEST/LUNG: Clear to percussion bilaterally; No rales, rhonchi, wheezing, or rubs  HEART: Regular rate and rhythm; No murmurs, rubs, or gallops  ABDOMEN: Soft, Nontender, Nondistended; Bowel sounds present, dressing over old PEG site  EXTREMITIES:  2+ Peripheral Pulses, No clubbing, cyanosis, or edema  SKIN: warm and perfused    LABS:                        11.9   9.58  )-----------( 343      ( 06 Jul 2023 06:20 )             36.0     07-06    136  |  103  |  11  ----------------------------<  105  3.6   |  26  |  0.47    Ca    9.4      06 Jul 2023 06:20    TPro  7.0  /  Alb  2.9  /  TBili  0.2  /  DBili  x   /  AST  27  /  ALT  34  /  AlkPhos  87  07-06                                Urinalysis Basic - ( 06 Jul 2023 06:20 )    Color: x / Appearance: x / SG: x / pH: x  Gluc: 105 mg/dL / Ketone: x  / Bili: x / Urobili: x   Blood: x / Protein: x / Nitrite: x   Leuk Esterase: x / RBC: x / WBC x   Sq Epi: x / Non Sq Epi: x / Bacteria: x            RADIOLOGY & ADDITIONAL TESTS:    Care Discussed with Consultants/Other Providers:

## 2023-07-06 NOTE — PROGRESS NOTE ADULT - SUBJECTIVE AND OBJECTIVE BOX
HPI:  38 year old female w/ PMHx hypothyroidism who was admitted to [facility] 6/6 after being found on floor at home, with CT revealing large right-sided ICH. She required emergent right frontal craniectomy for decompression and EVD placement. EVD subsequently removed 6/16. Angio negative. Patient underwent G tube placement 6/20. Hospital course notable for fluctuating leukocytosis and high-grade temperatures with no identified source of infection including in urine, blood, CSF. Now admitted for multidisciplinary rehab. (26 Jun 2023 13:08)          Subjective: Overnight SBP 92, which improved with increased PO intake. Otherwise, stable overnight. This morning BP soft. Will plan to decrease lisinopril. Tolerating pureed diet. Reports improved sleep, but still feels restless. Endorses L-sided pain. Spoke to her neurologist regarding transitioning off brivaracetam to VPA. Will plan.   Per chart review, patient will follow up with neuroplastic surgeon Dr. Sy ([phone]) regarding cranioplasty in 12 weeks or sooner (around September).       VITALS  Vital Signs Last 24 Hrs  T(C): 36.9 (06 Jul 2023 08:16), Max: 36.9 (06 Jul 2023 08:16)  T(F): 98.5 (06 Jul 2023 08:16), Max: 98.5 (06 Jul 2023 08:16)  HR: 95 (06 Jul 2023 08:16) (61 - 96)  BP: 103/67 (06 Jul 2023 08:16) (92/63 - 111/81)  BP(mean): --  RR: 16 (06 Jul 2023 08:16) (16 - 16)  SpO2: 96% (06 Jul 2023 08:16) (96% - 100%)    Parameters below as of 06 Jul 2023 08:16  Patient On (Oxygen Delivery Method): room air        REVIEW OF SYMPTOMS  Neurological deficits      PHYSICAL EXAM  Constitutional - NAD, Comfortable  HEENT - Right frontal hemicraniectomy   Chest - Breathing comfortably on RA  Cardiovascular - RRR,  warm well perfused  Abdomen - BS+, Soft, PEG incisional site healing well.   Extremities - No edema, No calf tenderness   Neurologic Exam -                    Cognitive - Awake, Alert, AAO to self, place, date, year, situation     Communication - Fluent, No dysarthria,  Aphasia,      Cranial Nerves - PERRLA, EOMI, VF intact, No facial assymmetry, sensation intact, tongue midline,      Motor  Left hemiparesis. Right side 5/5.      Sensory - Intact to LT     Reflexes - DTR Intact, No primitive reflexive     Coordination - FTN intact  Psychiatric - Mood stable, Affect WNL  Skin -   Wounds -      RECENT LABS                        11.9   9.58  )-----------( 343      ( 06 Jul 2023 06:20 )             36.0     07-06    136  |  103  |  11  ----------------------------<  105<H>  3.6   |  26  |  0.47<L>    Ca    9.4      06 Jul 2023 06:20    TPro  7.0  /  Alb  2.9<L>  /  TBili  0.2  /  DBili  x   /  AST  27  /  ALT  34  /  AlkPhos  87  07-06      Urinalysis Basic - ( 06 Jul 2023 06:20 )    Color: x / Appearance: x / SG: x / pH: x  Gluc: 105 mg/dL / Ketone: x  / Bili: x / Urobili: x   Blood: x / Protein: x / Nitrite: x   Leuk Esterase: x / RBC: x / WBC x   Sq Epi: x / Non Sq Epi: x / Bacteria: x          RADIOLOGY/OTHER RESULTS      MEDICATIONS  (STANDING):  brivaracetam 100 milliGRAM(s) Oral two times a day  enoxaparin Injectable 40 milliGRAM(s) SubCutaneous every 24 hours  gabapentin 300 milliGRAM(s) Oral at bedtime  lacosamide Solution 150 milliGRAM(s) Oral two times a day  levothyroxine 75 MICROGram(s) Oral daily  lisinopril 20 milliGRAM(s) Oral daily  nystatin Powder 1 Application(s) Topical two times a day  pantoprazole   Suspension 40 milliGRAM(s) Oral daily  tiZANidine 2 milliGRAM(s) Oral at bedtime  traZODone 75 milliGRAM(s) Oral at bedtime    MEDICATIONS  (PRN):  acetaminophen   IVPB .. 1000 milliGRAM(s) IV Intermittent once PRN Severe Pain (7 - 10)  acetaminophen   Oral Liquid .. 650 milliGRAM(s) Oral every 6 hours PRN Mild Pain (1 - 3)  bacitracin   Ointment 1 Application(s) Topical three times a day PRN scalp irritation  senna 2 Tablet(s) Oral at bedtime PRN Constipation         HPI:  38 year old female w/ PMHx hypothyroidism who was admitted to [facility] 6/6 after being found on floor at home, with CT revealing large right-sided ICH. She required emergent right frontal craniectomy for decompression and EVD placement. EVD subsequently removed 6/16. Angio negative. Patient underwent G tube placement 6/20. Hospital course notable for fluctuating leukocytosis and high-grade temperatures with no identified source of infection including in urine, blood, CSF. Now admitted for multidisciplinary rehab. (26 Jun 2023 13:08)          Subjective: Overnight SBP 92, which improved with increased PO hydration. Otherwise, stable overnight. This morning BP soft. Tolerating pureed diet and ate ~75% of eggs this morning. Reports improved sleep, but still feels restless. Endorses L-sided pain. Spoke to her neurologist regarding transitioning off brivaracetam to VPA.   Per chart review, patient will follow up with neuroplastic surgeon Dr. Sy ([phone]) regarding cranioplasty in 12 weeks or sooner (around September).      VITALS  Vital Signs Last 24 Hrs  T(C): 36.9 (06 Jul 2023 08:16), Max: 36.9 (06 Jul 2023 08:16)  T(F): 98.5 (06 Jul 2023 08:16), Max: 98.5 (06 Jul 2023 08:16)  HR: 95 (06 Jul 2023 08:16) (61 - 96)  BP: 103/67 (06 Jul 2023 08:16) (92/63 - 111/81)  BP(mean): --  RR: 16 (06 Jul 2023 08:16) (16 - 16)  SpO2: 96% (06 Jul 2023 08:16) (96% - 100%)    Parameters below as of 06 Jul 2023 08:16  Patient On (Oxygen Delivery Method): room air        REVIEW OF SYMPTOMS  Neurological deficits      PHYSICAL EXAM  Constitutional - NAD, Comfortable  HEENT - Right frontal hemicraniectomy   Chest - Breathing comfortably on RA  Cardiovascular - RRR,  warm well perfused  Abdomen - BS+, Soft, PEG incisional site healing well.   Extremities - No edema, No calf tenderness   Neurologic Exam -                    Cognitive - Awake, Alert, AAO to self, place, date, year, situation     Communication - Fluent, No dysarthria,       Motor  Left hemiparesis. Right side 5/5.      Sensory - Impaired.       Coordination - Impaired.   Psychiatric - anxious,  restless at times        RECENT LABS                        11.9   9.58  )-----------( 343      ( 06 Jul 2023 06:20 )             36.0     07-06    136  |  103  |  11  ----------------------------<  105<H>  3.6   |  26  |  0.47<L>    Ca    9.4      06 Jul 2023 06:20    TPro  7.0  /  Alb  2.9<L>  /  TBili  0.2  /  DBili  x   /  AST  27  /  ALT  34  /  AlkPhos  87  07-06      Urinalysis Basic - ( 06 Jul 2023 06:20 )    Color: x / Appearance: x / SG: x / pH: x  Gluc: 105 mg/dL / Ketone: x  / Bili: x / Urobili: x   Blood: x / Protein: x / Nitrite: x   Leuk Esterase: x / RBC: x / WBC x   Sq Epi: x / Non Sq Epi: x / Bacteria: x          RADIOLOGY/OTHER RESULTS      MEDICATIONS  (STANDING):  brivaracetam 100 milliGRAM(s) Oral two times a day  enoxaparin Injectable 40 milliGRAM(s) SubCutaneous every 24 hours  gabapentin 300 milliGRAM(s) Oral at bedtime  lacosamide Solution 150 milliGRAM(s) Oral two times a day  levothyroxine 75 MICROGram(s) Oral daily  lisinopril 20 milliGRAM(s) Oral daily  nystatin Powder 1 Application(s) Topical two times a day  pantoprazole   Suspension 40 milliGRAM(s) Oral daily  tiZANidine 2 milliGRAM(s) Oral at bedtime  traZODone 75 milliGRAM(s) Oral at bedtime    MEDICATIONS  (PRN):  acetaminophen   IVPB .. 1000 milliGRAM(s) IV Intermittent once PRN Severe Pain (7 - 10)  acetaminophen   Oral Liquid .. 650 milliGRAM(s) Oral every 6 hours PRN Mild Pain (1 - 3)  bacitracin   Ointment 1 Application(s) Topical three times a day PRN scalp irritation  senna 2 Tablet(s) Oral at bedtime PRN Constipation

## 2023-07-06 NOTE — PROGRESS NOTE ADULT - ASSESSMENT
38 year old female w/ PMhx hypothyroidism admitted to [facility] 6/6 after being found unresponsive, found to have large right frontal IPH w/ IVH and hydrocephalus. Now s/p R krishna craniectomy for evacuation on 6/6. PEG placed 6/20. Now admitted for multidisciplinary rehab.     #IPH with IVH, R frontal lobe  - Amantadine 50 mg qAM  - Trazodone 50 mg qPM   - Antiepileptic regimen: Briviact 100 mg BID, Vimpat 150 mg BID  - Helmet when OOB    #Normocytic anemia  - Will montior Hg/Hct, transfuse if Hg<7  - iron/B12/folate normal     #HTN  - Lisinopril 20 mg daily   - Clonidine 0.1 mg PO q12h stopped on day of discharge from [facility], monitor for rebound     #Dysphagia s/p PEG  - s/p self removal of PEG tube, tolerating PO diet, PEG not replaced  - GI signed off, monitor tolerating PO diet    #Hypothyroidism  - Continue synthroid 75 mcg daily   - thyroid panel reviewed    #Fever, leukocytosis  - resolved  - episodes of fever during [place] admission  - prior infectious work up unrevealing including negative Bcx/UA/CXR on 6/18 and unremarkable CT c/a/p on 6/15.  - repeat LE duplex negative on 6/22  - no urinary retention on bladder scan  - repeat Bcx/Ucx (6/21) negative, RVP negative  - As per ID monitor off antibiotics    #DVT ppx  - Lovenox 38 year old female w/ PMhx hypothyroidism admitted to [facility] 6/6 after being found unresponsive, found to have large right frontal IPH w/ IVH and hydrocephalus. Now s/p R krishna craniectomy for evacuation on 6/6. PEG placed 6/20. Now admitted for multidisciplinary rehab.     #IPH with IVH, R frontal lobe  - Amantadine 50 mg qAM  - Trazodone 50 mg qPM   - Antiepileptic regimen: transitioning from Briviact to depakote, continue Vimpat 150 mg BID  - Helmet when OOB    #Normocytic anemia  - Will montior Hg/Hct, transfuse if Hg<7  - iron/B12/folate normal     #HTN  - Lisinopril 20 mg daily   - Clonidine 0.1 mg PO q12h stopped on day of discharge from [facility], monitor for rebound     #Dysphagia s/p PEG  - s/p self removal of PEG tube, tolerating PO diet, PEG not replaced  - GI signed off, monitor tolerating PO diet    #Hypothyroidism  - Continue synthroid 75 mcg daily   - thyroid panel reviewed    #Fever, leukocytosis  - resolved  - episodes of fever during [place] admission  - prior infectious work up unrevealing including negative Bcx/UA/CXR on 6/18 and unremarkable CT c/a/p on 6/15.  - repeat LE duplex negative on 6/22  - no urinary retention on bladder scan  - repeat Bcx/Ucx (6/21) negative, RVP negative  - As per ID monitor off antibiotics    #DVT ppx  - Lovenox 38 year old female w/ PMhx hypothyroidism admitted to [facility] 6/6 after being found unresponsive, found to have large right frontal IPH w/ IVH and hydrocephalus. Now s/p R krishna craniectomy for evacuation on 6/6. PEG placed 6/20. Now admitted for multidisciplinary rehab.     #IPH with IVH, R frontal lobe  - Amantadine 50 mg qAM  - Trazodone 50 mg qPM   - Antiepileptic regimen: transitioning from Briviact to depakote, continue Vimpat 150 mg BID  - Helmet when OOB    #Normocytic anemia  - Will montior Hg/Hct, transfuse if Hg<7  - iron/B12/folate normal     #HTN  - Lisinopril decreased to 10mg daily   - Clonidine 0.1 mg PO q12h stopped on day of discharge from [facility], monitor for rebound     #Dysphagia s/p PEG  - s/p self removal of PEG tube, tolerating PO diet, PEG not replaced  - GI signed off, monitor tolerating PO diet    #Hypothyroidism  - Continue synthroid 75 mcg daily   - thyroid panel reviewed    #Fever, leukocytosis  - resolved  - episodes of fever during [place] admission  - prior infectious work up unrevealing including negative Bcx/UA/CXR on 6/18 and unremarkable CT c/a/p on 6/15.  - repeat LE duplex negative on 6/22  - no urinary retention on bladder scan  - repeat Bcx/Ucx (6/21) negative, RVP negative  - As per ID monitor off antibiotics    #DVT ppx  - Lovenox

## 2023-07-06 NOTE — PROGRESS NOTE ADULT - ASSESSMENT
ASSESSMENT/PLAN  38y Female h/o hypothyroid with functional deficits after right frontal IPHs/p right hemicraniectomy.     #hx of seizure   -stopped keppra and vimpat IV bid   -continue to monitor neuro exam   --Currently on PO Briviact. Spoke to neurologist to transition to VPA as she is agitated/restless. Will plan to give VPA 500mg in AM and 750mg HS. Started with one dose of 250mg this morning, and added another 250mg dose later around 12pm as she was agitated/restless.    --On Monday 7/10, will need to order valproic acid and NH3 level. If VPA levels are adequate then will discontinue Briviact.     #dysphagia s/p PEG   -PEG tube removed on 7/4 by patient. Per GI, patient is tolerating pureed diet and will hold off PED replacement.     #Pain and #insomnia  -x2 Tylenol given on 7/4   -continue trazodone 75, gabapentin 300mg and tizanidine 2 at bedtime   --gabapentin increased->continue to follow for neuropathic pain.     #Right Hemicraniectomy and EVD on 6/6  -called neuroplastic surgeon Dr. Sy ([phone]). He spoke to patient's wife about scheduling an appointment on 7/25 or 8/1 (pending rehab DC) to plan for cranioplasty reconstruction.     #hx of anemia   -follow up medicine recommendations   -H/H improving   -fu B12 and folate levels    #hx of HTN   -Will decrease lisinopril to 10mg, as patient BP has been soft.   -follow up medicine recommendations      #DVT ppx   -continue lovenox      ASSESSMENT/PLAN  38y Female h/o hypothyroid with functional deficits after right frontal IPHs/p right hemicraniectomy.     #hx of seizure   -stopped keppra and vimpat IV bid   -continue to monitor neuro exam   --Currently on PO Briviact. Spoke to neurologist to transition to VPA as she is agitated/restless. Will plan to give VPA 500mg in AM and 750mg HS. Started with one dose of 250mg this morning, and will start 750mg VPA in evening, --Will increase AM dose to 500 mg Valproic Acid as tolerated tomorrow.      --On Monday 7/10, will need to order valproic acid and NH3 level. If VPA levels are adequate then will taper Briviact to 50mg BID-- Taper off after few days.   --d/w pt's mother at bedside    #dysphagia s/p PEG   -PEG tube came out on 7/4. Per GI, patient is tolerating pureed diet and will hold off PEG replacement.     #Pain and #insomnia  -x2 Tylenol given on 7/4   -continue trazodone 75, gabapentin 300mg and tizanidine 2 at bedtime   --May taper Trazodone to 50mg based on effect of adding Depakote tonight    #Right Hemicraniectomy and EVD on 6/6  -called neuroplastic surgeon Dr. Sy ([phone]). He spoke to patient's wife about scheduling an appointment on 7/25 or 8/1 (pending rehab DC) to plan for cranioplasty reconstruction.     #hx of anemia   -follow up medicine recommendations   -H/H improving   -fu B12 and folate levels    #hx of HTN   -Will decrease lisinopril to 10mg, as patient BP has been soft.   -follow up medicine recommendations      #DVT ppx   -continue lovenox     IDT 7/6:  Nursing: incontinent B/B  SLP: on puree with thin liquids, small single sips, mild-mod language deficits, mod-severe cognitive deficits, limited by attention, memory, reasoning deficits.  Mild to Mod Dysarthria  OT: Total A- across the board, max A--squat-pivot transfer; goal mod-max  PT: Max A--Bed mobility and squat pivot transfer; Ambulated by  rail 15ft-- tot A.  WC tot A.   Goals: 1 Pivot and transfers, bed mobility mod-A, 2 attend to structured tasks for 30 minutes   Dispo: 7/24, [facility] vs.  back to Neurosurgical care or cranioplasty. Spouse very supportive. Possible re-admit following cranioplasty

## 2023-07-06 NOTE — PROGRESS NOTE ADULT - NUTRITIONAL ASSESSMENT
This patient has been assessed with a concern for Malnutrition and has been determined to have a diagnosis/diagnoses of Severe protein-calorie malnutrition.    This patient is being managed with:   Diet Pureed-  Entered: Jul 5 2023 12:44PM   This patient has been assessed with a concern for Malnutrition and has been determined to have a diagnosis/diagnoses of Severe protein-calorie malnutrition.    This patient is being managed with:   Diet Pureed- On calorie count.   Ensure TID.

## 2023-07-07 PROCEDURE — 99232 SBSQ HOSP IP/OBS MODERATE 35: CPT

## 2023-07-07 RX ORDER — DIVALPROEX SODIUM 500 MG/1
250 TABLET, DELAYED RELEASE ORAL ONCE
Refills: 0 | Status: COMPLETED | OUTPATIENT
Start: 2023-07-07 | End: 2023-07-07

## 2023-07-07 RX ORDER — ACETAMINOPHEN 500 MG
975 TABLET ORAL EVERY 6 HOURS
Refills: 0 | Status: DISCONTINUED | OUTPATIENT
Start: 2023-07-07 | End: 2023-07-13

## 2023-07-07 RX ORDER — DIVALPROEX SODIUM 500 MG/1
500 TABLET, DELAYED RELEASE ORAL
Refills: 0 | Status: DISCONTINUED | OUTPATIENT
Start: 2023-07-08 | End: 2023-08-01

## 2023-07-07 RX ORDER — LIDOCAINE 4 G/100G
1 CREAM TOPICAL DAILY
Refills: 0 | Status: DISCONTINUED | OUTPATIENT
Start: 2023-07-07 | End: 2023-07-11

## 2023-07-07 RX ORDER — TRAZODONE HCL 50 MG
25 TABLET ORAL ONCE
Refills: 0 | Status: COMPLETED | OUTPATIENT
Start: 2023-07-07 | End: 2023-07-07

## 2023-07-07 RX ADMIN — LACOSAMIDE 150 MILLIGRAM(S): 50 TABLET ORAL at 17:34

## 2023-07-07 RX ADMIN — GABAPENTIN 300 MILLIGRAM(S): 400 CAPSULE ORAL at 20:31

## 2023-07-07 RX ADMIN — Medication 25 MILLIGRAM(S): at 21:59

## 2023-07-07 RX ADMIN — Medication 650 MILLIGRAM(S): at 02:54

## 2023-07-07 RX ADMIN — PANTOPRAZOLE SODIUM 40 MILLIGRAM(S): 20 TABLET, DELAYED RELEASE ORAL at 11:16

## 2023-07-07 RX ADMIN — BRIVARACETAM 100 MILLIGRAM(S): 25 TABLET, FILM COATED ORAL at 17:34

## 2023-07-07 RX ADMIN — Medication 975 MILLIGRAM(S): at 14:45

## 2023-07-07 RX ADMIN — TIZANIDINE 2 MILLIGRAM(S): 4 TABLET ORAL at 20:31

## 2023-07-07 RX ADMIN — Medication 975 MILLIGRAM(S): at 14:05

## 2023-07-07 RX ADMIN — DIVALPROEX SODIUM 250 MILLIGRAM(S): 500 TABLET, DELAYED RELEASE ORAL at 11:12

## 2023-07-07 RX ADMIN — DIVALPROEX SODIUM 125 MILLIGRAM(S): 500 TABLET, DELAYED RELEASE ORAL at 06:34

## 2023-07-07 RX ADMIN — BRIVARACETAM 100 MILLIGRAM(S): 25 TABLET, FILM COATED ORAL at 06:34

## 2023-07-07 RX ADMIN — LIDOCAINE 1 PATCH: 4 CREAM TOPICAL at 19:29

## 2023-07-07 RX ADMIN — ENOXAPARIN SODIUM 40 MILLIGRAM(S): 100 INJECTION SUBCUTANEOUS at 17:29

## 2023-07-07 RX ADMIN — LIDOCAINE 1 PATCH: 4 CREAM TOPICAL at 23:05

## 2023-07-07 RX ADMIN — LACOSAMIDE 150 MILLIGRAM(S): 50 TABLET ORAL at 06:34

## 2023-07-07 RX ADMIN — DIVALPROEX SODIUM 750 MILLIGRAM(S): 500 TABLET, DELAYED RELEASE ORAL at 17:31

## 2023-07-07 RX ADMIN — Medication 975 MILLIGRAM(S): at 07:20

## 2023-07-07 RX ADMIN — Medication 75 MICROGRAM(S): at 06:34

## 2023-07-07 RX ADMIN — Medication 975 MILLIGRAM(S): at 00:00

## 2023-07-07 RX ADMIN — Medication 975 MILLIGRAM(S): at 06:33

## 2023-07-07 RX ADMIN — LIDOCAINE 1 PATCH: 4 CREAM TOPICAL at 11:14

## 2023-07-07 RX ADMIN — Medication 650 MILLIGRAM(S): at 03:48

## 2023-07-07 RX ADMIN — Medication 75 MILLIGRAM(S): at 20:31

## 2023-07-07 NOTE — PROGRESS NOTE ADULT - ASSESSMENT
ASSESSMENT/PLAN  38y Female h/o hypothyroid with functional deficits after right frontal IPHs/p right hemicraniectomy.     #hx of seizure   -stopped keppra and vimpat IV bid   -continue to monitor neuro exam   --Currently on PO Briviact. Spoke to neurologist to transition to VPA as she is agitated/restless. Will plan to give VPA 500mg in AM and 750mg HS. Started with one dose of 250mg this morning, and will start 750mg VPA in evening, --Will increase AM dose to 500 mg Valproic Acid as tolerated tomorrow.      --On Monday 7/10, will need to order valproic acid and NH3 level. If VPA levels are adequate then will taper Briviact to 50mg BID-- Taper off after few days.   --d/w pt's mother at bedside    #dysphagia s/p PEG   -PEG tube came out on 7/4. Per GI, patient is tolerating pureed diet and will hold off PEG replacement.     #Pain and #insomnia  -x2 Tylenol given on 7/4   -continue trazodone 75, gabapentin 300mg and tizanidine 2 at bedtime   --May taper Trazodone to 50mg based on effect of adding Depakote tonight    #Right Hemicraniectomy and EVD on 6/6  -called neuroplastic surgeon Dr. Sy ([phone]). He spoke to patient's wife about scheduling an appointment on 7/25 or 8/1 (pending rehab DC) to plan for cranioplasty reconstruction.     #hx of anemia   -follow up medicine recommendations   -H/H improving   -fu B12 and folate levels    #hx of HTN   -Will decrease lisinopril to 10mg, as patient BP has been soft.   -follow up medicine recommendations      #DVT ppx   -continue lovenox     IDT 7/6:  Nursing: incontinent B/B  SLP: on puree with thin liquids, small single sips, mild-mod language deficits, mod-severe cognitive deficits, limited by attention, memory, reasoning deficits.  Mild to Mod Dysarthria  OT: Total A- across the board, max A--squat-pivot transfer; goal mod-max  PT: Max A--Bed mobility and squat pivot transfer; Ambulated by  rail 15ft-- tot A.  WC tot A.   Goals: 1 Pivot and transfers, bed mobility mod-A, 2 attend to structured tasks for 30 minutes   Dispo: 7/24, [facility] vs.  back to Neurosurgical care or cranioplasty. Spouse very supportive. Possible re-admit following cranioplasty   ASSESSMENT/PLAN  38y Female h/o hypothyroid with functional deficits after right frontal IPHs/p right hemicraniectomy.     #hx of seizure   -stopped keppra and vimpat IV bid   -continue to monitor neuro exam   --Currently on PO Briviact. Spoke to neurologist to transition to VPA as she is agitated/restless. Will plan to give VPA 500mg in AM and 750mg HS. Started with one dose of 250mg this morning, and will start 750mg VPA in evening, -- Valproic Acid AM dose increased to 500 mg 7/6. Emotionally labile this AM. Will consider increasing dose over next few days if continues.      --On Monday 7/10, will need to order valproic acid and NH3 level. If VPA levels are adequate then will taper Briviact to 50mg BID-- Taper off after few days.   --d/w pt's mother at bedside    #dysphagia s/p PEG   -PEG tube came out on 7/4. Per GI, patient is tolerating pureed diet and will hold off PEG replacement.     #Pain and #insomnia  -x2 Tylenol given on 7/4   -continue trazodone 75, gabapentin 300mg and tizanidine 2 at bedtime   --May taper Trazodone to 50mg based on effect of adding Depakote tonight    #Right Hemicraniectomy and EVD on 6/6  -called neuroplastic surgeon Dr. Sy ([phone]). He spoke to patient's wife about scheduling an appointment on 7/25 or 8/1 (pending rehab DC) to plan for cranioplasty reconstruction.     #hx of anemia   -follow up medicine recommendations   -H/H improving   -fu B12 and folate levels    #hx of HTN   -Will decrease lisinopril to 10mg, as patient BP has been soft.   -follow up medicine recommendations      #DVT ppx   -continue lovenox     IDT 7/6:  Nursing: incontinent B/B  SLP: on puree with thin liquids, small single sips, mild-mod language deficits, mod-severe cognitive deficits, limited by attention, memory, reasoning deficits.  Mild to Mod Dysarthria  OT: Total A- across the board, max A--squat-pivot transfer; goal mod-max  PT: Max A--Bed mobility and squat pivot transfer; Ambulated by  rail 15ft-- tot A.  JAYLAN tot A.   Goals: 1 Pivot and transfers, bed mobility mod-A, 2 attend to structured tasks for 30 minutes   Dispo: 7/24, [facility] vs.  back to Neurosurgical care or cranioplasty. Spouse very supportive. Possible re-admit following cranioplasty   ASSESSMENT/PLAN  38y Female h/o hypothyroid with functional deficits after right frontal IPHs/p right hemicraniectomy.     #hx of seizure   -Cont. Vimpat 150mg BID  --Currently on PO Briviact 100mg. Spoke to pt's neurologist at [facility], Dr. Tyson,  to transition to VPA as she is agitated/restless. Will plan to give VPA 500mg in AM and 750mg HS. Started with one dose of 125mg this morning, and later gave additional 250mg later in AM.     -- started 750mg VPA yesterday evening,   -- Valproic Acid AM dose increased to 500 mg 7/8 in AM and cont. 750mg in evening as per neurology recommendations.      --On Monday 7/10, will need to order valproic acid and NH3 level. If VPA levels are adequate then will taper Briviact to 50mg BID-- Taper off after few days.   --d/w pt's mother and father    #dysphagia s/p PEG   -PEG tube came out on 7/4. Per GI, patient is tolerating pureed diet and will hold off PEG replacement.     #Pain and #insomnia  -cont. Tylenol PRN  -continue trazodone 75, gabapentin 300mg and tizanidine 2 at bedtime   -Monitor    #Right Hemicraniectomy and EVD on 6/6  -called neuroplastic surgeon Dr. Sy ([phone]). He spoke to patient's wife about scheduling an appointment on 7/25 or 8/1 (pending rehab DC) to plan for cranioplasty reconstruction.     #hx of anemia   -follow up medicine recommendations   -H/H improving   -fu B12 and folate levels    #hx of HTN --BP soft  -stop lisinopril   --d/w hospitalist    #DVT ppx   -continue lovenox     IDT 7/6:  Nursing: incontinent B/B  SLP: on puree with thin liquids, small single sips, mild-mod language deficits, mod-severe cognitive deficits, limited by attention, memory, reasoning deficits.  Mild to Mod Dysarthria  OT: Total A- across the board, max A--squat-pivot transfer; goal mod-max  PT: Max A--Bed mobility and squat pivot transfer; Ambulated by  rail 15ft-- tot A.  WC tot A.   Goals: 1 Pivot and transfers, bed mobility mod-A, 2 attend to structured tasks for 30 minutes   Dispo: 7/24, [facility]IE vs.  back to Neurosurgical care or cranioplasty. Spouse very supportive. Possible re-admit following cranioplasty

## 2023-07-07 NOTE — CHART NOTE - NSCHARTNOTEFT_GEN_A_CORE
Brief nutrition note: consult received for calorie count.   Thursday 7/6 patient consumed:  Dinner: 4 oz turkey (214kcal, 29.5g pro), 2 oz stuffing (75 kcal, 1.2 g Pro), 6 oz cauliflower (21 kcal, 1.7 g pro), 4 oz chocolate ice cream (270 kcal, 5g protein)  Dinner Total: 580kcal, 37.4g Pro    Friday 7/7 patient consumed:  5oz Upper sorbian toast (350 kcal, 21.7 g Pro), 2 oz syrup (209 kcal, 0g Pro), 6oz cream of wheat (440kcal, 16g Pro)  Breakfast Total: 999kcal, 37.7g Pro    Patient consuming >75% estimated energy requirements at meals. Soft and Bite Sized trial tray ordered for lunch meal 7/7. Recommend follow SLP recommendations regarding diet upgrade. RD to remain available/follow up per protocol.     Nikia Gentile RDN, MS, CDN also available on TEAMS

## 2023-07-07 NOTE — PROGRESS NOTE ADULT - NUTRITIONAL ASSESSMENT
This patient has been assessed with a concern for Malnutrition and has been determined to have a diagnosis/diagnoses of Severe protein-calorie malnutrition.    This patient is being managed with:   Diet Pureed-  Supplement Feeding Modality:  Oral  Ensure Enlive Cans or Servings Per Day:  1       Frequency:  Three Times a day  Entered: Jul 6 2023 12:09PM

## 2023-07-07 NOTE — PROGRESS NOTE ADULT - ATTENDING COMMENTS
Pt. seen with resident.  Agree with documentation above as per resident with amendments made as appropriate. Patient medically stable. Making progress towards rehab goals.     right ICH s/p hemicraniectomy--  Cont. Vimpat 150mg BID  --Currently on PO Briviact 100mg. Spoke to pt's neurologist at [facility], Dr. Tyson,  to transition to VPA as she is agitated/restless. Will plan to give VPA 500mg in AM and 750mg HS. Started with one dose of 125mg this morning, and later gave additional 250mg later in AM.     -- started 750mg VPA yesterday evening,   -- Valproic Acid AM dose increased to 500 mg 7/8 in AM and cont. 750mg in evening as per neurology recommendations.      --On Monday 7/10, will need to order valproic acid and NH3 level. If VPA levels are adequate then will taper Briviact to 50mg BID-- Taper off after few days.       --BP low-- stop lisinopril --d/w hospitalist-- monitor

## 2023-07-07 NOTE — PROGRESS NOTE ADULT - SUBJECTIVE AND OBJECTIVE BOX
Patient is a 38y old  Female who presents with a chief complaint of IPH (06 Jul 2023 11:15)    Patient seen and examined at bedside. No events overnight, but did not sleep well. Mother and wife at bedside. Patient at this time states she feels "lousy" but as per family, state that she's doing ok. Spasms are improving with medication. Denies chest pain, sob, abd pain, nausea or vomiting with food. Tolerating PO diet, ate all of her dinner without nausea/vomiting/abd pain.    ALLERGIES:  No Known Allergies    MEDICATIONS  (STANDING):  brivaracetam 100 milliGRAM(s) Oral two times a day  divalproex Sprinkle 750 milliGRAM(s) Oral <User Schedule>  divalproex Sprinkle 125 milliGRAM(s) Oral <User Schedule>  enoxaparin Injectable 40 milliGRAM(s) SubCutaneous every 24 hours  gabapentin 300 milliGRAM(s) Oral at bedtime  lacosamide Solution 150 milliGRAM(s) Oral two times a day  levothyroxine 75 MICROGram(s) Oral daily  lidocaine   4% Patch 1 Patch Transdermal daily  pantoprazole   Suspension 40 milliGRAM(s) Oral daily  tiZANidine 2 milliGRAM(s) Oral at bedtime  traZODone 75 milliGRAM(s) Oral at bedtime    MEDICATIONS  (PRN):  acetaminophen     Tablet .. 975 milliGRAM(s) Oral every 6 hours PRN Mild Pain (1 - 3)  acetaminophen   IVPB .. 1000 milliGRAM(s) IV Intermittent once PRN Severe Pain (7 - 10)  bacitracin   Ointment 1 Application(s) Topical three times a day PRN scalp irritation  senna 2 Tablet(s) Oral at bedtime PRN Constipation    Vital Signs Last 24 Hrs  T(F): 98.5 (06 Jul 2023 19:55), Max: 98.5 (06 Jul 2023 19:55)  HR: 85 (07 Jul 2023 06:32) (83 - 85)  BP: 99/68 (07 Jul 2023 06:32) (99/68 - 110/77)  RR: 16 (06 Jul 2023 19:55) (16 - 16)  SpO2: 98% (06 Jul 2023 19:55) (98% - 98%)  I&O's Summary      PHYSICAL EXAM:  HEAD:  Right craniotomy noted with surgical scar c/d/i  ENMT: No tonsillar erythema, Moist mucous membranes  NECK: Supple, No JVD, Normal thyroid  CHEST/LUNG: Clear to percussion bilaterally; No rales, rhonchi, wheezing, or rubs  HEART: Regular rate and rhythm; No murmurs, rubs, or gallops  ABDOMEN: Soft, Nontender, Nondistended; Bowel sounds present, dressing over old PEG site  EXTREMITIES:  2+ Peripheral Pulses, No clubbing, cyanosis, or edema  SKIN: warm and perfused    LABS:                        11.9   9.58  )-----------( 343      ( 06 Jul 2023 06:20 )             36.0     07-06    136  |  103  |  11  ----------------------------<  105  3.6   |  26  |  0.47    Ca    9.4      06 Jul 2023 06:20    TPro  7.0  /  Alb  2.9  /  TBili  0.2  /  DBili  x   /  AST  27  /  ALT  34  /  AlkPhos  87  07-06                                Urinalysis Basic - ( 06 Jul 2023 06:20 )    Color: x / Appearance: x / SG: x / pH: x  Gluc: 105 mg/dL / Ketone: x  / Bili: x / Urobili: x   Blood: x / Protein: x / Nitrite: x   Leuk Esterase: x / RBC: x / WBC x   Sq Epi: x / Non Sq Epi: x / Bacteria: x            RADIOLOGY & ADDITIONAL TESTS:    Care Discussed with Consultants/Other Providers:

## 2023-07-07 NOTE — CHART NOTE - NSCHARTNOTEFT_GEN_A_CORE
Attempted to see Pt to continue her initial evaluation. Pt declined due to having visitors (4) at the time. Agreed to retry within the next business day. Will follow up.

## 2023-07-07 NOTE — PROGRESS NOTE ADULT - SUBJECTIVE AND OBJECTIVE BOX
NOTE IN PROGRESS    HPI:  38 year old female w/ PMHx hypothyroidism who was admitted to [facility] 6/6 after being found on floor at home, with CT revealing large right-sided ICH. She required emergent right frontal craniectomy for decompression and EVD placement. EVD subsequently removed 6/16. Angio negative. Patient underwent G tube placement 6/20. Hospital course notable for fluctuating leukocytosis and high-grade temperatures with no identified source of infection including in urine, blood, CSF. Now admitted for multidisciplinary rehab. (26 Jun 2023 13:08)          Subjective: Seen and examined at bedside with multiple family members. Reported by nursing that she slept well overnight. Reports some L arm discomfort/tightness with stretching. Has continued tone in her L upper extremity. Performing stretching and bracing during OT sessions. Awaiting L PRAFO boot from therapy. Currently being transitioned from Briviact to [facility], d/w neurology. Discussed plan for follow up with neuroplastic surgeon Dr. Sy ([phone]) regarding cranioplasty in 12 weeks or sooner (around September). Patient tearful this AM and frustrated.         Vital Signs Last 24 Hrs  T(C): 36.9 (06 Jul 2023 19:55), Max: 36.9 (06 Jul 2023 19:55)  T(F): 98.5 (06 Jul 2023 19:55), Max: 98.5 (06 Jul 2023 19:55)  HR: 85 (07 Jul 2023 06:32) (83 - 85)  BP: 99/68 (07 Jul 2023 06:32) (99/68 - 110/77)  BP(mean): --  RR: 16 (06 Jul 2023 19:55) (16 - 16)  SpO2: 98% (06 Jul 2023 19:55) (98% - 98%)    Parameters below as of 06 Jul 2023 19:55  Patient On (Oxygen Delivery Method): room air            REVIEW OF SYMPTOMS  Neurological deficits      PHYSICAL EXAM  Constitutional - NAD, Comfortable  HEENT - Right frontal hemicraniectomy   Chest - Breathing comfortably on RA  Cardiovascular - RRR,  warm well perfused  Abdomen - BS+, Soft, PEG incisional site healing well.   Extremities - No edema, No calf tenderness   Neurologic Exam -                    Cognitive - Awake, Alert, AAO to self, place, date, year, situation     Communication - Fluent, No dysarthria,       Motor  Left hemiparesis. Right side 5/5.      Sensory - Impaired.       Coordination - Impaired.   Psychiatric - anxious,  restless at times        RECENT LABS                        11.9   9.58  )-----------( 343      ( 06 Jul 2023 06:20 )             36.0     07-06    136  |  103  |  11  ----------------------------<  105<H>  3.6   |  26  |  0.47<L>    Ca    9.4      06 Jul 2023 06:20    TPro  7.0  /  Alb  2.9<L>  /  TBili  0.2  /  DBili  x   /  AST  27  /  ALT  34  /  AlkPhos  87  07-06      Urinalysis Basic - ( 06 Jul 2023 06:20 )    Color: x / Appearance: x / SG: x / pH: x  Gluc: 105 mg/dL / Ketone: x  / Bili: x / Urobili: x   Blood: x / Protein: x / Nitrite: x   Leuk Esterase: x / RBC: x / WBC x   Sq Epi: x / Non Sq Epi: x / Bacteria: x          RADIOLOGY/OTHER RESULTS      MEDICATIONS  (STANDING):  brivaracetam 100 milliGRAM(s) Oral two times a day  enoxaparin Injectable 40 milliGRAM(s) SubCutaneous every 24 hours  gabapentin 300 milliGRAM(s) Oral at bedtime  lacosamide Solution 150 milliGRAM(s) Oral two times a day  levothyroxine 75 MICROGram(s) Oral daily  lisinopril 20 milliGRAM(s) Oral daily  nystatin Powder 1 Application(s) Topical two times a day  pantoprazole   Suspension 40 milliGRAM(s) Oral daily  tiZANidine 2 milliGRAM(s) Oral at bedtime  traZODone 75 milliGRAM(s) Oral at bedtime    MEDICATIONS  (PRN):  acetaminophen   IVPB .. 1000 milliGRAM(s) IV Intermittent once PRN Severe Pain (7 - 10)  acetaminophen   Oral Liquid .. 650 milliGRAM(s) Oral every 6 hours PRN Mild Pain (1 - 3)  bacitracin   Ointment 1 Application(s) Topical three times a day PRN scalp irritation  senna 2 Tablet(s) Oral at bedtime PRN Constipation         HPI:  38 year old female w/ PMHx hypothyroidism who was admitted to [facility] 6/6 after being found on floor at home, with CT revealing large right-sided ICH. She required emergent right frontal craniectomy for decompression and EVD placement. EVD subsequently removed 6/16. Angio negative. Patient underwent G tube placement 6/20. Hospital course notable for fluctuating leukocytosis and high-grade temperatures with no identified source of infection including in urine, blood, CSF. Now admitted for multidisciplinary rehab. (26 Jun 2023 13:08)          Subjective: Seen and examined at bedside with multiple family members. Reported by nursing that she slept well overnight. Reports some L arm discomfort/tightness with stretching. Has continued tone in her L upper extremity. Performing stretching and bracing during OT sessions. Awaiting L AKIKO boot from therapy. Currently being transitioned from Briviact to depakote, d/w neurology. Per father, he reports that her mood has been stable over the past few days, however starting yesterday she has been slightly more emotionally labile. Patient tearful this AM and frustrated. Will continue depakote at current dose for now and consider increasing over the next few days. Discussed plan for follow up with neuroplastic surgeon Dr. Sy ([phone]) regarding cranioplasty in 12 weeks or sooner (around September).         Vital Signs Last 24 Hrs  T(C): 36.9 (06 Jul 2023 19:55), Max: 36.9 (06 Jul 2023 19:55)  T(F): 98.5 (06 Jul 2023 19:55), Max: 98.5 (06 Jul 2023 19:55)  HR: 85 (07 Jul 2023 06:32) (83 - 85)  BP: 99/68 (07 Jul 2023 06:32) (99/68 - 110/77)  BP(mean): --  RR: 16 (06 Jul 2023 19:55) (16 - 16)  SpO2: 98% (06 Jul 2023 19:55) (98% - 98%)    Parameters below as of 06 Jul 2023 19:55  Patient On (Oxygen Delivery Method): room air            REVIEW OF SYMPTOMS  Neurological deficits      PHYSICAL EXAM  Constitutional - NAD, Comfortable  HEENT - Right frontal hemicraniectomy   Chest - Breathing comfortably on RA  Cardiovascular - RRR,  warm well perfused  Abdomen - BS+, Soft, PEG incisional site healing well.   Extremities - No edema, No calf tenderness   Neurologic Exam -                    Cognitive - Awake, Alert, AAO to self, place, date, year, situation     Communication - Fluent, No dysarthria,       Motor  Left hemiparesis. Right side 5/5.      Sensory - Impaired.       Coordination - Impaired.   Psychiatric - anxious,  restless at times. Tearful this AM        RECENT LABS                        11.9   9.58  )-----------( 343      ( 06 Jul 2023 06:20 )             36.0     07-06    136  |  103  |  11  ----------------------------<  105<H>  3.6   |  26  |  0.47<L>    Ca    9.4      06 Jul 2023 06:20    TPro  7.0  /  Alb  2.9<L>  /  TBili  0.2  /  DBili  x   /  AST  27  /  ALT  34  /  AlkPhos  87  07-06      Urinalysis Basic - ( 06 Jul 2023 06:20 )    Color: x / Appearance: x / SG: x / pH: x  Gluc: 105 mg/dL / Ketone: x  / Bili: x / Urobili: x   Blood: x / Protein: x / Nitrite: x   Leuk Esterase: x / RBC: x / WBC x   Sq Epi: x / Non Sq Epi: x / Bacteria: x          RADIOLOGY/OTHER RESULTS      MEDICATIONS  (STANDING):  brivaracetam 100 milliGRAM(s) Oral two times a day  divalproex Sprinkle 750 milliGRAM(s) Oral <User Schedule>  enoxaparin Injectable 40 milliGRAM(s) SubCutaneous every 24 hours  gabapentin 300 milliGRAM(s) Oral at bedtime  lacosamide Solution 150 milliGRAM(s) Oral two times a day  levothyroxine 75 MICROGram(s) Oral daily  lidocaine   4% Patch 1 Patch Transdermal daily  pantoprazole   Suspension 40 milliGRAM(s) Oral daily  tiZANidine 2 milliGRAM(s) Oral at bedtime  traZODone 75 milliGRAM(s) Oral at bedtime    MEDICATIONS  (PRN):  acetaminophen     Tablet .. 975 milliGRAM(s) Oral every 6 hours PRN Mild Pain (1 - 3)  acetaminophen   IVPB .. 1000 milliGRAM(s) IV Intermittent once PRN Severe Pain (7 - 10)  bacitracin   Ointment 1 Application(s) Topical three times a day PRN scalp irritation  senna 2 Tablet(s) Oral at bedtime PRN Constipation         HPI:  38 year old female w/ PMHx hypothyroidism who was admitted to [facility] 6/6 after being found on floor at home, with CT revealing large right-sided ICH. She required emergent right frontal craniectomy for decompression and EVD placement. EVD subsequently removed 6/16. Angio negative. Patient underwent G tube placement 6/20. Hospital course notable for fluctuating leukocytosis and high-grade temperatures with no identified source of infection including in urine, blood, CSF. Now admitted for multidisciplinary rehab. (26 Jun 2023 13:08)          Subjective: Seen and examined at bedside with multiple family members. Reported by nursing that she slept well overnight. Reports some L arm discomfort/tightness with stretching. Has continued tone in her L upper extremity. Performing stretching and bracing during OT sessions. Awaiting L AKIKO hernandez from therapy. Currently being transitioned from Briviact to [facility], d/w neurology. Per father, he reports that her mood has been stable over the past few days, however starting yesterday she has been slightly more emotionally labile. Patient tearful this AM and frustrated. PT and OT note pt. has been generally anxious since admission and appears to be similar today.    Updated provided to father at bedside.    Seen in PT ambulating earlier.            Vital Signs Last 24 Hrs  T(C): 36.9 (06 Jul 2023 19:55), Max: 36.9 (06 Jul 2023 19:55)  T(F): 98.5 (06 Jul 2023 19:55), Max: 98.5 (06 Jul 2023 19:55)  HR: 85 (07 Jul 2023 06:32) (83 - 85)  BP: 99/68 (07 Jul 2023 06:32) (99/68 - 110/77)  BP(mean): --  RR: 16 (06 Jul 2023 19:55) (16 - 16)  SpO2: 98% (06 Jul 2023 19:55) (98% - 98%)    Parameters below as of 06 Jul 2023 19:55  Patient On (Oxygen Delivery Method): room air            REVIEW OF SYMPTOMS  Neurological deficits      PHYSICAL EXAM  Constitutional - NAD, Comfortable  HEENT - Right frontal hemicraniectomy   Chest - Breathing comfortably on RA  Cardiovascular - RRR,  warm well perfused  Abdomen - BS+, Soft, PEG incisional site healing well.   Extremities - No edema, No calf tenderness   Neurologic Exam -                    Cognitive - Awake, Alert, AAO to self, place, date, year, situation     Communication - Fluent, No dysarthria,       Motor  Left hemiparesis. Right side 5/5.      Sensory - Impaired.       Coordination - Impaired.   Psychiatric - anxious,  restless at times. Tearful this AM        RECENT LABS                        11.9   9.58  )-----------( 343      ( 06 Jul 2023 06:20 )             36.0     07-06    136  |  103  |  11  ----------------------------<  105<H>  3.6   |  26  |  0.47<L>    Ca    9.4      06 Jul 2023 06:20    TPro  7.0  /  Alb  2.9<L>  /  TBili  0.2  /  DBili  x   /  AST  27  /  ALT  34  /  AlkPhos  87  07-06      Urinalysis Basic - ( 06 Jul 2023 06:20 )    Color: x / Appearance: x / SG: x / pH: x  Gluc: 105 mg/dL / Ketone: x  / Bili: x / Urobili: x   Blood: x / Protein: x / Nitrite: x   Leuk Esterase: x / RBC: x / WBC x   Sq Epi: x / Non Sq Epi: x / Bacteria: x          RADIOLOGY/OTHER RESULTS      MEDICATIONS  (STANDING):  brivaracetam 100 milliGRAM(s) Oral two times a day  divalproex Sprinkle 750 milliGRAM(s) Oral <User Schedule>  enoxaparin Injectable 40 milliGRAM(s) SubCutaneous every 24 hours  gabapentin 300 milliGRAM(s) Oral at bedtime  lacosamide Solution 150 milliGRAM(s) Oral two times a day  levothyroxine 75 MICROGram(s) Oral daily  lidocaine   4% Patch 1 Patch Transdermal daily  pantoprazole   Suspension 40 milliGRAM(s) Oral daily  tiZANidine 2 milliGRAM(s) Oral at bedtime  traZODone 75 milliGRAM(s) Oral at bedtime    MEDICATIONS  (PRN):  acetaminophen     Tablet .. 975 milliGRAM(s) Oral every 6 hours PRN Mild Pain (1 - 3)  acetaminophen   IVPB .. 1000 milliGRAM(s) IV Intermittent once PRN Severe Pain (7 - 10)  bacitracin   Ointment 1 Application(s) Topical three times a day PRN scalp irritation  senna 2 Tablet(s) Oral at bedtime PRN Constipation

## 2023-07-08 PROCEDURE — 99232 SBSQ HOSP IP/OBS MODERATE 35: CPT

## 2023-07-08 RX ADMIN — Medication 975 MILLIGRAM(S): at 04:54

## 2023-07-08 RX ADMIN — Medication 975 MILLIGRAM(S): at 05:37

## 2023-07-08 RX ADMIN — LACOSAMIDE 150 MILLIGRAM(S): 50 TABLET ORAL at 17:25

## 2023-07-08 RX ADMIN — Medication 975 MILLIGRAM(S): at 19:16

## 2023-07-08 RX ADMIN — GABAPENTIN 300 MILLIGRAM(S): 400 CAPSULE ORAL at 21:27

## 2023-07-08 RX ADMIN — SENNA PLUS 2 TABLET(S): 8.6 TABLET ORAL at 19:15

## 2023-07-08 RX ADMIN — LIDOCAINE 1 PATCH: 4 CREAM TOPICAL at 19:44

## 2023-07-08 RX ADMIN — DIVALPROEX SODIUM 500 MILLIGRAM(S): 500 TABLET, DELAYED RELEASE ORAL at 06:40

## 2023-07-08 RX ADMIN — LIDOCAINE 1 PATCH: 4 CREAM TOPICAL at 12:39

## 2023-07-08 RX ADMIN — Medication 75 MICROGRAM(S): at 06:40

## 2023-07-08 RX ADMIN — Medication 975 MILLIGRAM(S): at 20:05

## 2023-07-08 RX ADMIN — BRIVARACETAM 100 MILLIGRAM(S): 25 TABLET, FILM COATED ORAL at 17:26

## 2023-07-08 RX ADMIN — TIZANIDINE 2 MILLIGRAM(S): 4 TABLET ORAL at 21:27

## 2023-07-08 RX ADMIN — ENOXAPARIN SODIUM 40 MILLIGRAM(S): 100 INJECTION SUBCUTANEOUS at 17:25

## 2023-07-08 RX ADMIN — DIVALPROEX SODIUM 750 MILLIGRAM(S): 500 TABLET, DELAYED RELEASE ORAL at 17:25

## 2023-07-08 RX ADMIN — PANTOPRAZOLE SODIUM 40 MILLIGRAM(S): 20 TABLET, DELAYED RELEASE ORAL at 12:40

## 2023-07-08 RX ADMIN — LACOSAMIDE 150 MILLIGRAM(S): 50 TABLET ORAL at 06:41

## 2023-07-08 RX ADMIN — Medication 75 MILLIGRAM(S): at 21:27

## 2023-07-08 RX ADMIN — BRIVARACETAM 100 MILLIGRAM(S): 25 TABLET, FILM COATED ORAL at 06:40

## 2023-07-08 NOTE — PROGRESS NOTE ADULT - NUTRITIONAL ASSESSMENT
This patient has been assessed with a concern for Malnutrition and has been determined to have a diagnosis/diagnoses of Severe protein-calorie malnutrition.    This patient is being managed with:   Diet Minced and Moist-  Extra Sauces Gravies  Supplement Feeding Modality:  Oral  Ensure Plus High Protein Cans or Servings Per Day:  1       Frequency:  Three Times a day  Entered: Jul 7 2023  1:03PM

## 2023-07-08 NOTE — PROVIDER CONTACT NOTE (OTHER) - SITUATION
pt expressed feeling more anxious then usual and was visually upset and complaining she couldn't sleep

## 2023-07-08 NOTE — PROGRESS NOTE ADULT - CONSTITUTIONAL COMMENTS
eyes open, verbalizing but confused, poor recall and memory deficits +right hemicraniectomy, sunken flap

## 2023-07-08 NOTE — PROGRESS NOTE ADULT - ASSESSMENT
38y Female h/o hypothyroid with functional deficits after right frontal IPH s/p right hemicraniectomy.     # right frontal IPH  - Right Hemicraniectomy and EVD on 6/6  - neuroplastic surgeon Dr. Sy ([phone]). He spoke to patient's wife about scheduling an appointment on 7/25 or 8/1 (pending rehab DC) to plan for cranioplasty reconstruction.     # hx of seizure   - Cont. Vimpat 150mg BID  - Currently on PO Briviact 100mg. Transitoning to VPA as she is agitated/restless, reviewed with patient and caregiver 7/8   - Valproic Acid AM dose increased to 500 mg 7/8 in AM and cont. 750mg in evening as per neurology recommendations.      - Check valproic acid and NH3 level 7/10. If VPA levels are adequate then will taper Briviact to 50mg BID to off after few days.     # anxiety, insomnia  - continue trazodone 75  - switch AEDs as above    # Pain, spasticity  - cont. Tylenol PRN  -  gabapentin 300mg  -  tizanidine 2 at bedtime . May need increase tizanidine dose      #hx of HTN   - stop lisinopril due to soft BP  (- 121/81 - 141/95) 7/8    # hx of anemia   - H/H improving   - B12 and folate levels WNL 6/29    # DVT ppx   -continue lovenox

## 2023-07-08 NOTE — PROGRESS NOTE ADULT - SUBJECTIVE AND OBJECTIVE BOX
Patient is a 38y old  Female who presents with a chief complaint of IPH (2023 11:03)      HPI:  38 year old female w/ PMHx hypothyroidism who was admitted to [facility]  after being found on floor at home, with CT revealing large right-sided ICH. She required emergent right frontal craniectomy for decompression and EVD placement. EVD subsequently removed . Angio negative. Patient underwent G tube placement . Hospital course notable for fluctuating leukocytosis and high-grade temperatures with no identified source of infection including in urine, blood, CSF. Now admitted for multidisciplinary rehab. (2023 13:08)      PAST MEDICAL & SURGICAL HISTORY:      MEDICATIONS  (STANDING):  brivaracetam 100 milliGRAM(s) Oral two times a day  divalproex Sprinkle 500 milliGRAM(s) Oral <User Schedule>  divalproex Sprinkle 750 milliGRAM(s) Oral <User Schedule>  enoxaparin Injectable 40 milliGRAM(s) SubCutaneous every 24 hours  gabapentin 300 milliGRAM(s) Oral at bedtime  lacosamide Solution 150 milliGRAM(s) Oral two times a day  levothyroxine 75 MICROGram(s) Oral daily  lidocaine   4% Patch 1 Patch Transdermal daily  pantoprazole   Suspension 40 milliGRAM(s) Oral daily  tiZANidine 2 milliGRAM(s) Oral at bedtime  traZODone 75 milliGRAM(s) Oral at bedtime    MEDICATIONS  (PRN):  acetaminophen     Tablet .. 975 milliGRAM(s) Oral every 6 hours PRN Mild Pain (1 - 3)  acetaminophen   IVPB .. 1000 milliGRAM(s) IV Intermittent once PRN Severe Pain (7 - 10)  bacitracin   Ointment 1 Application(s) Topical three times a day PRN scalp irritation  senna 2 Tablet(s) Oral at bedtime PRN Constipation      Allergies    No Known Allergies    Intolerances          VITALS  38y  Vital Signs Last 24 Hrs  T(C): 36.7 (2023 10:43), Max: 36.7 (2023 20:25)  T(F): 98 (2023 10:43), Max: 98.1 (2023 20:25)  HR: 91 (2023 10:43) (88 - 91)  BP: 121/81 (2023 10:43) (121/81 - 141/95)  BP(mean): --  RR: 15 (2023 10:43) (15 - 16)  SpO2: 97% (2023 10:43) (96% - 97%)    Parameters below as of 2023 10:43  Patient On (Oxygen Delivery Method): room air      Daily     Daily Weight in k.7 (2023 22:39)        RECENT LABS:                      CAPILLARY BLOOD GLUCOSE

## 2023-07-09 PROCEDURE — 99232 SBSQ HOSP IP/OBS MODERATE 35: CPT

## 2023-07-09 RX ORDER — POLYETHYLENE GLYCOL 3350 17 G/17G
17 POWDER, FOR SOLUTION ORAL DAILY
Refills: 0 | Status: DISCONTINUED | OUTPATIENT
Start: 2023-07-09 | End: 2023-07-21

## 2023-07-09 RX ORDER — BRIVARACETAM 25 MG/1
100 TABLET, FILM COATED ORAL
Refills: 0 | Status: DISCONTINUED | OUTPATIENT
Start: 2023-07-09 | End: 2023-07-10

## 2023-07-09 RX ORDER — SENNA PLUS 8.6 MG/1
2 TABLET ORAL AT BEDTIME
Refills: 0 | Status: DISCONTINUED | OUTPATIENT
Start: 2023-07-09 | End: 2023-08-01

## 2023-07-09 RX ORDER — BRIVARACETAM 25 MG/1
100 TABLET, FILM COATED ORAL ONCE
Refills: 0 | Status: DISCONTINUED | OUTPATIENT
Start: 2023-07-09 | End: 2023-07-09

## 2023-07-09 RX ADMIN — BRIVARACETAM 100 MILLIGRAM(S): 25 TABLET, FILM COATED ORAL at 19:07

## 2023-07-09 RX ADMIN — Medication 975 MILLIGRAM(S): at 06:07

## 2023-07-09 RX ADMIN — TIZANIDINE 2 MILLIGRAM(S): 4 TABLET ORAL at 21:36

## 2023-07-09 RX ADMIN — DIVALPROEX SODIUM 500 MILLIGRAM(S): 500 TABLET, DELAYED RELEASE ORAL at 06:06

## 2023-07-09 RX ADMIN — Medication 75 MICROGRAM(S): at 06:06

## 2023-07-09 RX ADMIN — DIVALPROEX SODIUM 750 MILLIGRAM(S): 500 TABLET, DELAYED RELEASE ORAL at 18:39

## 2023-07-09 RX ADMIN — Medication 975 MILLIGRAM(S): at 22:30

## 2023-07-09 RX ADMIN — Medication 75 MILLIGRAM(S): at 21:36

## 2023-07-09 RX ADMIN — BRIVARACETAM 100 MILLIGRAM(S): 25 TABLET, FILM COATED ORAL at 06:06

## 2023-07-09 RX ADMIN — LIDOCAINE 1 PATCH: 4 CREAM TOPICAL at 00:25

## 2023-07-09 RX ADMIN — LACOSAMIDE 150 MILLIGRAM(S): 50 TABLET ORAL at 19:00

## 2023-07-09 RX ADMIN — PANTOPRAZOLE SODIUM 40 MILLIGRAM(S): 20 TABLET, DELAYED RELEASE ORAL at 11:12

## 2023-07-09 RX ADMIN — ENOXAPARIN SODIUM 40 MILLIGRAM(S): 100 INJECTION SUBCUTANEOUS at 18:38

## 2023-07-09 RX ADMIN — LIDOCAINE 1 PATCH: 4 CREAM TOPICAL at 11:12

## 2023-07-09 RX ADMIN — GABAPENTIN 300 MILLIGRAM(S): 400 CAPSULE ORAL at 21:36

## 2023-07-09 RX ADMIN — Medication 975 MILLIGRAM(S): at 15:30

## 2023-07-09 RX ADMIN — LACOSAMIDE 150 MILLIGRAM(S): 50 TABLET ORAL at 06:06

## 2023-07-09 RX ADMIN — LIDOCAINE 1 PATCH: 4 CREAM TOPICAL at 18:35

## 2023-07-09 RX ADMIN — Medication 10 MILLIGRAM(S): at 11:03

## 2023-07-09 RX ADMIN — Medication 975 MILLIGRAM(S): at 21:36

## 2023-07-09 RX ADMIN — LIDOCAINE 1 PATCH: 4 CREAM TOPICAL at 22:50

## 2023-07-09 RX ADMIN — Medication 975 MILLIGRAM(S): at 16:00

## 2023-07-09 RX ADMIN — Medication 975 MILLIGRAM(S): at 06:43

## 2023-07-09 NOTE — CHART NOTE - NSCHARTNOTEFT_GEN_A_CORE
Nutrition Follow Up Note  Hospital Course   (Per Electronic Medical Record)    Source:  Patient [X]  Family Member [X]   Nursing Staff [X]   Medical Record [X]      Diet: Diet, Minced and Moist:   Extra Sauces Gravies  Supplement Feeding Modality:  Oral  Ensure Plus High Protein Cans or Servings Per Day:  1       Frequency:  Three Times a day (07-07-23 @ 13:04) [Active]    At this time patient tolerating current diet, w/ varied appetite/intake consuming % of meals 2/2 recent episode of constipation being managed medically. Reviewed preferences noted in Kardex. No issues w/ chewing and swallowing current diet texture, per patients family drinks fluids in-between bites to ensure food is cleared from the mouth. Denies N/V/D, however w/ constipation, last BM 7/9 per family report.         Enteral/Parenteral Nutrition: Not Applicable    Current Weight: 151.4 lb on 6/26    Pertinent Medications: MEDICATIONS  (STANDING):  brivaracetam 100 milliGRAM(s) Oral two times a day  divalproex Sprinkle 750 milliGRAM(s) Oral <User Schedule>  divalproex Sprinkle 500 milliGRAM(s) Oral <User Schedule>  enoxaparin Injectable 40 milliGRAM(s) SubCutaneous every 24 hours  gabapentin 300 milliGRAM(s) Oral at bedtime  lacosamide Solution 150 milliGRAM(s) Oral two times a day  levothyroxine 75 MICROGram(s) Oral daily  lidocaine   4% Patch 1 Patch Transdermal daily  pantoprazole   Suspension 40 milliGRAM(s) Oral daily  senna 2 Tablet(s) Oral at bedtime  tiZANidine 2 milliGRAM(s) Oral at bedtime  traZODone 75 milliGRAM(s) Oral at bedtime    MEDICATIONS  (PRN):  acetaminophen     Tablet .. 975 milliGRAM(s) Oral every 6 hours PRN Mild Pain (1 - 3)  acetaminophen   IVPB .. 1000 milliGRAM(s) IV Intermittent once PRN Severe Pain (7 - 10)  bacitracin   Ointment 1 Application(s) Topical three times a day PRN scalp irritation  polyethylene glycol 3350 17 Gram(s) Oral daily PRN Constipation      Pertinent Labs:   07-06 Alb 2.9 g/dL<L>    Skin: No pressure injury per nursing flowsheets, Surgical Incision, Site Right parietal     Edema: No edema noted per nursing flowsheet    Last Bowel Movement: on 7/9 per family report    Estimated Needs:   [X] No Change Since Previous Assessment    Previous Nutrition Diagnosis:   Severe malnutrition, acute    Nutrition Diagnosis is [X] Ongoing  - addressed with oral diet, Ensure Plus High Protein (provides 350 kcal, 20 g protein/serving), tube feeding    New Nutrition Diagnosis: [X] Not Applicable    Interventions:   1. Recommend Continue Nutrition Plan of Care.  2. Ongoing diet education     Monitoring & Evaluation:   [X] Weights   [X] PO Intake   [X] Skin Integrity   [X] Follow Up (Per Protocol)  [X] Tolerance to Diet Prescription   [X] Other: Labs    Registered Dietitian/Nutritionist Remains Available.  Nikia Gentile RD, MS, CDN    Phone# [phone] Nutrition Follow Up Note  Hospital Course   (Per Electronic Medical Record)    Source:  Patient [X]  Family Member [X]   Nursing Staff [X]   Medical Record [X]      Diet: Diet, Minced and Moist:   Extra Sauces Gravies  Supplement Feeding Modality:  Oral  Ensure Plus High Protein Cans or Servings Per Day:  1       Frequency:  Three Times a day (07-07-23 @ 13:04) [Active]    At this time patient tolerating current diet, w/ varied appetite/intake consuming % of meals 2/2 recent episode of constipation being managed medically. Reviewed preferences noted in Kardex. No issues w/ chewing and swallowing current diet texture, per patients family drinks fluids in-between bites to ensure food is cleared from the mouth. Denies N/V/D, however w/ constipation, last BM 7/9 per family report.  Noted: Per GI note 7/5 s/p PEG 6/20/2023, now with inadvertent dislodgment of PEG. GI consultation for PEG placement; however, she is tolerating diet and primary team feels today that she could attempt meds and diet exclusively orally. PEG procedure cancelled.     Enteral/Parenteral Nutrition: Not Applicable    Current Weight: 151.4 lb on 6/26    Pertinent Medications: MEDICATIONS  (STANDING):  brivaracetam 100 milliGRAM(s) Oral two times a day  divalproex Sprinkle 750 milliGRAM(s) Oral <User Schedule>  divalproex Sprinkle 500 milliGRAM(s) Oral <User Schedule>  enoxaparin Injectable 40 milliGRAM(s) SubCutaneous every 24 hours  gabapentin 300 milliGRAM(s) Oral at bedtime  lacosamide Solution 150 milliGRAM(s) Oral two times a day  levothyroxine 75 MICROGram(s) Oral daily  lidocaine   4% Patch 1 Patch Transdermal daily  pantoprazole   Suspension 40 milliGRAM(s) Oral daily  senna 2 Tablet(s) Oral at bedtime  tiZANidine 2 milliGRAM(s) Oral at bedtime  traZODone 75 milliGRAM(s) Oral at bedtime    MEDICATIONS  (PRN):  acetaminophen     Tablet .. 975 milliGRAM(s) Oral every 6 hours PRN Mild Pain (1 - 3)  acetaminophen   IVPB .. 1000 milliGRAM(s) IV Intermittent once PRN Severe Pain (7 - 10)  bacitracin   Ointment 1 Application(s) Topical three times a day PRN scalp irritation  polyethylene glycol 3350 17 Gram(s) Oral daily PRN Constipation      Pertinent Labs:   07-06 Alb 2.9 g/dL<L>    Skin: No pressure injury per nursing flowsheets, Surgical Incision, Site Right parietal     Edema: No edema noted per nursing flowsheet    Last Bowel Movement: on 7/9 per family report    Estimated Needs:   [X] No Change Since Previous Assessment    Previous Nutrition Diagnosis:   Severe malnutrition, acute    Nutrition Diagnosis is [X] Ongoing  - addressed with oral diet, Ensure Plus High Protein (provides 350 kcal, 20 g protein/serving), tube feeding    New Nutrition Diagnosis: [X] Not Applicable    Interventions:   1. Recommend Continue Nutrition Plan of Care.  2. Ongoing diet education     Monitoring & Evaluation:   [X] Weights   [X] PO Intake   [X] Skin Integrity   [X] Follow Up (Per Protocol)  [X] Tolerance to Diet Prescription   [X] Other: Labs    Registered Dietitian/Nutritionist Remains Available.  Nikia Gentile RD, MS, CDN    Phone# [phone]

## 2023-07-09 NOTE — PROGRESS NOTE ADULT - SUBJECTIVE AND OBJECTIVE BOX
Patient is a 38y old  Female who presents with a chief complaint of IPH (2023 11:59)      HPI:  38 year old female w/ PMHx hypothyroidism who was admitted to [facility]  after being found on floor at home, with CT revealing large right-sided ICH. She required emergent right frontal craniectomy for decompression and EVD placement. EVD subsequently removed . Angio negative. Patient underwent G tube placement . Hospital course notable for fluctuating leukocytosis and high-grade temperatures with no identified source of infection including in urine, blood, CSF. Now admitted for multidisciplinary rehab. (2023 13:08)      PAST MEDICAL & SURGICAL HISTORY:      MEDICATIONS  (STANDING):  brivaracetam 100 milliGRAM(s) Oral two times a day  divalproex Sprinkle 750 milliGRAM(s) Oral <User Schedule>  divalproex Sprinkle 500 milliGRAM(s) Oral <User Schedule>  enoxaparin Injectable 40 milliGRAM(s) SubCutaneous every 24 hours  gabapentin 300 milliGRAM(s) Oral at bedtime  lacosamide Solution 150 milliGRAM(s) Oral two times a day  levothyroxine 75 MICROGram(s) Oral daily  lidocaine   4% Patch 1 Patch Transdermal daily  pantoprazole   Suspension 40 milliGRAM(s) Oral daily  tiZANidine 2 milliGRAM(s) Oral at bedtime  traZODone 75 milliGRAM(s) Oral at bedtime    MEDICATIONS  (PRN):  acetaminophen     Tablet .. 975 milliGRAM(s) Oral every 6 hours PRN Mild Pain (1 - 3)  acetaminophen   IVPB .. 1000 milliGRAM(s) IV Intermittent once PRN Severe Pain (7 - 10)  bacitracin   Ointment 1 Application(s) Topical three times a day PRN scalp irritation  senna 2 Tablet(s) Oral at bedtime PRN Constipation      Allergies    No Known Allergies    Intolerances          VITALS  38y  Vital Signs Last 24 Hrs  T(C): 37.2 (2023 07:55), Max: 37.2 (2023 07:55)  T(F): 99 (2023 07:55), Max: 99 (2023 07:55)  HR: 78 (2023 07:55) (78 - 91)  BP: 124/87 (2023 07:55) (121/81 - 138/95)  BP(mean): --  RR: 18 (2023 07:55) (15 - 18)  SpO2: 96% (2023 07:55) (96% - 97%)    Parameters below as of 2023 07:55  Patient On (Oxygen Delivery Method): room air      Daily     Daily Weight in k.6 (2023 22:36)        RECENT LABS:                      CAPILLARY BLOOD GLUCOSE

## 2023-07-09 NOTE — PROGRESS NOTE ADULT - GASTROINTESTINAL
normal/soft/nontender/nondistended/normal active bowel sounds
normal/soft/nontender/nondistended/normal active bowel sounds
soft/nontender/nondistended

## 2023-07-09 NOTE — PROGRESS NOTE ADULT - COMMENTS
Patient seen with caregiver at bedside. Eyes open spontaneously, restless, +alien hand syndrome left side. Reduced sleep due to pain and anxiety although LE pain appears improved with gabapentin    planned changes in medication reviewed
Patient was sleeping upon entering room but opened eyes readily to auditory stim. Caregiver also present; reports patient slept well last night, and anxiety and pain seemed significantly improved. Patient was also found to be less restless, although did track visually as we were speaking. Initiation depakote and plan to wean, as well as doses of medications for spasticity and neuropathic pain dreviewed

## 2023-07-09 NOTE — PROGRESS NOTE ADULT - CARDIOVASCULAR
normal/regular rate and rhythm/S1 S2 present/no gallops/no rub/no murmur
regular rate and rhythm/S1 S2 present
normal/regular rate and rhythm/S1 S2 present/no gallops/no rub/no murmur

## 2023-07-09 NOTE — PROGRESS NOTE ADULT - ASSESSMENT
38y Female h/o hypothyroid with functional deficits after right frontal IPH s/p right hemicraniectomy.     # right frontal IPH  - Right Hemicraniectomy and EVD on 6/6  - neuroplastic surgeon Dr. Sy ([phone]). He spoke to patient's wife about scheduling an appointment on 7/25 or 8/1 (pending rehab DC) to plan for cranioplasty reconstruction.     # hx of seizure   - Cont. Vimpat 150mg BID  - Briviact 100mg--> Transitoning to VPA for agitation/restlessness, secondary benefit. Family aware   - Valproic Acid 500 mg AM and 750mg in evening as per neurology recommendations.      - Check valproic acid and NH3 level 7/10. If VPA levels are adequate then will taper Briviact to 50mg BID to off after few days.     # anxiety, insomnia  - continue trazodone 75  - switch AEDs as above    # Pain, spasticity  - cont. Tylenol PRN  -  gabapentin 300mg  -  tizanidine 2 at bedtime . Currently stable, slept well overnight 7/9    # hx of HTN   - stop lisinopril due to soft BP  - BP (121/81 - 138/95) 7/9    # hx of anemia   - H/H improving   - B12 and folate levels WNL 6/29    # DVT ppx   -continue lovenox        38y Female h/o hypothyroid with functional deficits after right frontal IPH s/p right hemicraniectomy.     # right frontal IPH  - Right Hemicraniectomy and EVD on 6/6  - neuroplastic surgeon Dr. Sy ([phone]). He spoke to patient's wife about scheduling an appointment on 7/25 or 8/1 (pending rehab DC) to plan for cranioplasty reconstruction.     # hx of seizure   - Cont. Vimpat 150mg BID  - Briviact 100mg--> Transitoning to VPA for agitation/restlessness, secondary benefit. Family aware   - Valproic Acid 500 mg AM and 750mg in evening as per neurology recommendations.      - Check valproic acid and NH3 level 7/10. If VPA levels are adequate then will taper Briviact to 50mg BID to off after few days.     # anxiety, insomnia  - continue trazodone 75  - switch AEDs as above    # Pain, spasticity  - cont. Tylenol PRN  -  gabapentin 300mg  -  tizanidine 2 at bedtime . Currently stable, slept well overnight 7/9    # hx of HTN   - stop lisinopril due to soft BP  - BP (121/81 - 138/95) 7/9    # hx of anemia   - H/H improving   - B12 and folate levels WNL 6/29    # constipation: last BM 7/6 but small, discomfort  - change senna to standing 7/9  - miralax PRN  - dulcolax x 1 now    # DVT ppx   -continue lovenox

## 2023-07-09 NOTE — PROGRESS NOTE ADULT - MOTOR
left UE: MAS 3 shoulder, elbow flexor, wrist  Pain wit PROm, no effusion /warmth elbow or shoulder joints  left LE: ROM WFL hip and knee but hyperesthesias  no calf swellign or warmth
left UE: MAS 3 shoulder, elbow flexor, wrist  Pain wit PROm, no effusion /warmth elbow or shoulder joints  left LE: ROM WFL hip and knee but hyperesthesias
L Sh 1/5, EF 2/5, EE 1/5, WE+WF 0/5  L HF 1/5, HE 2/5, KE+KF 1/5, PF+DF 0/5  MAS: 1+ HF, 1+ ankle PF, 3 EF, 2 pectoral (adducted, internally rotated)

## 2023-07-09 NOTE — PROGRESS NOTE ADULT - RESPIRATORY
normal/clear to auscultation bilaterally/no wheezes/no rales/no rhonchi
normal/clear to auscultation bilaterally/no wheezes/no rales/no rhonchi
no use of accessory muscles/respirations non-labored/no intercostal retractions
Partial Purse String (Intermediate) Text: Given the location of the defect and the characteristics of the surrounding skin an intermediate purse string closure was deemed most appropriate.  Undermining was performed circumfirentially around the surgical defect.  A purse string suture was then placed and tightened. Wound tension only allowed a partial closure of the circular defect.

## 2023-07-10 LAB
ALBUMIN SERPL ELPH-MCNC: 2.7 G/DL — LOW (ref 3.3–5)
ALP SERPL-CCNC: 82 U/L — SIGNIFICANT CHANGE UP (ref 40–120)
ALT FLD-CCNC: 41 U/L — SIGNIFICANT CHANGE UP (ref 10–45)
AMMONIA BLD-MCNC: 34 UMOL/L — SIGNIFICANT CHANGE UP (ref 11–55)
ANION GAP SERPL CALC-SCNC: 8 MMOL/L — SIGNIFICANT CHANGE UP (ref 5–17)
AST SERPL-CCNC: 31 U/L — SIGNIFICANT CHANGE UP (ref 10–40)
BASOPHILS # BLD AUTO: 0.03 K/UL — SIGNIFICANT CHANGE UP (ref 0–0.2)
BASOPHILS NFR BLD AUTO: 0.4 % — SIGNIFICANT CHANGE UP (ref 0–2)
BILIRUB SERPL-MCNC: 0.3 MG/DL — SIGNIFICANT CHANGE UP (ref 0.2–1.2)
BUN SERPL-MCNC: 9 MG/DL — SIGNIFICANT CHANGE UP (ref 7–23)
CALCIUM SERPL-MCNC: 9 MG/DL — SIGNIFICANT CHANGE UP (ref 8.4–10.5)
CHLORIDE SERPL-SCNC: 97 MMOL/L — SIGNIFICANT CHANGE UP (ref 96–108)
CO2 SERPL-SCNC: 29 MMOL/L — SIGNIFICANT CHANGE UP (ref 22–31)
CREAT SERPL-MCNC: 0.54 MG/DL — SIGNIFICANT CHANGE UP (ref 0.5–1.3)
EGFR: 120 ML/MIN/1.73M2 — SIGNIFICANT CHANGE UP
EOSINOPHIL # BLD AUTO: 0.17 K/UL — SIGNIFICANT CHANGE UP (ref 0–0.5)
EOSINOPHIL NFR BLD AUTO: 2 % — SIGNIFICANT CHANGE UP (ref 0–6)
GLUCOSE SERPL-MCNC: 103 MG/DL — HIGH (ref 70–99)
HCT VFR BLD CALC: 35.8 % — SIGNIFICANT CHANGE UP (ref 34.5–45)
HGB BLD-MCNC: 11.6 G/DL — SIGNIFICANT CHANGE UP (ref 11.5–15.5)
IMM GRANULOCYTES NFR BLD AUTO: 0.6 % — SIGNIFICANT CHANGE UP (ref 0–0.9)
LYMPHOCYTES # BLD AUTO: 1.81 K/UL — SIGNIFICANT CHANGE UP (ref 1–3.3)
LYMPHOCYTES # BLD AUTO: 21.5 % — SIGNIFICANT CHANGE UP (ref 13–44)
MCHC RBC-ENTMCNC: 31.7 PG — SIGNIFICANT CHANGE UP (ref 27–34)
MCHC RBC-ENTMCNC: 32.4 GM/DL — SIGNIFICANT CHANGE UP (ref 32–36)
MCV RBC AUTO: 97.8 FL — SIGNIFICANT CHANGE UP (ref 80–100)
MONOCYTES # BLD AUTO: 0.78 K/UL — SIGNIFICANT CHANGE UP (ref 0–0.9)
MONOCYTES NFR BLD AUTO: 9.3 % — SIGNIFICANT CHANGE UP (ref 2–14)
NEUTROPHILS # BLD AUTO: 5.56 K/UL — SIGNIFICANT CHANGE UP (ref 1.8–7.4)
NEUTROPHILS NFR BLD AUTO: 66.2 % — SIGNIFICANT CHANGE UP (ref 43–77)
NRBC # BLD: 0 /100 WBCS — SIGNIFICANT CHANGE UP (ref 0–0)
PLATELET # BLD AUTO: 316 K/UL — SIGNIFICANT CHANGE UP (ref 150–400)
POTASSIUM SERPL-MCNC: 3.7 MMOL/L — SIGNIFICANT CHANGE UP (ref 3.5–5.3)
POTASSIUM SERPL-SCNC: 3.7 MMOL/L — SIGNIFICANT CHANGE UP (ref 3.5–5.3)
PROT SERPL-MCNC: 7.4 G/DL — SIGNIFICANT CHANGE UP (ref 6–8.3)
RBC # BLD: 3.66 M/UL — LOW (ref 3.8–5.2)
RBC # FLD: 13 % — SIGNIFICANT CHANGE UP (ref 10.3–14.5)
SODIUM SERPL-SCNC: 134 MMOL/L — LOW (ref 135–145)
VALPROATE SERPL-MCNC: 55 UG/ML — SIGNIFICANT CHANGE UP (ref 50–100)
WBC # BLD: 8.4 K/UL — SIGNIFICANT CHANGE UP (ref 3.8–10.5)
WBC # FLD AUTO: 8.4 K/UL — SIGNIFICANT CHANGE UP (ref 3.8–10.5)

## 2023-07-10 PROCEDURE — 99232 SBSQ HOSP IP/OBS MODERATE 35: CPT

## 2023-07-10 RX ORDER — BRIVARACETAM 25 MG/1
50 TABLET, FILM COATED ORAL
Refills: 0 | Status: DISCONTINUED | OUTPATIENT
Start: 2023-07-10 | End: 2023-07-13

## 2023-07-10 RX ADMIN — LACOSAMIDE 150 MILLIGRAM(S): 50 TABLET ORAL at 05:50

## 2023-07-10 RX ADMIN — Medication 975 MILLIGRAM(S): at 06:28

## 2023-07-10 RX ADMIN — LIDOCAINE 1 PATCH: 4 CREAM TOPICAL at 19:38

## 2023-07-10 RX ADMIN — DIVALPROEX SODIUM 500 MILLIGRAM(S): 500 TABLET, DELAYED RELEASE ORAL at 05:51

## 2023-07-10 RX ADMIN — Medication 75 MILLIGRAM(S): at 21:32

## 2023-07-10 RX ADMIN — BRIVARACETAM 100 MILLIGRAM(S): 25 TABLET, FILM COATED ORAL at 05:50

## 2023-07-10 RX ADMIN — Medication 975 MILLIGRAM(S): at 23:08

## 2023-07-10 RX ADMIN — DIVALPROEX SODIUM 750 MILLIGRAM(S): 500 TABLET, DELAYED RELEASE ORAL at 18:41

## 2023-07-10 RX ADMIN — LIDOCAINE 1 PATCH: 4 CREAM TOPICAL at 11:46

## 2023-07-10 RX ADMIN — TIZANIDINE 2 MILLIGRAM(S): 4 TABLET ORAL at 21:32

## 2023-07-10 RX ADMIN — BRIVARACETAM 100 MILLIGRAM(S): 25 TABLET, FILM COATED ORAL at 18:42

## 2023-07-10 RX ADMIN — Medication 975 MILLIGRAM(S): at 05:52

## 2023-07-10 RX ADMIN — Medication 975 MILLIGRAM(S): at 11:45

## 2023-07-10 RX ADMIN — Medication 75 MICROGRAM(S): at 05:50

## 2023-07-10 RX ADMIN — Medication 975 MILLIGRAM(S): at 12:15

## 2023-07-10 RX ADMIN — ENOXAPARIN SODIUM 40 MILLIGRAM(S): 100 INJECTION SUBCUTANEOUS at 18:41

## 2023-07-10 RX ADMIN — LACOSAMIDE 150 MILLIGRAM(S): 50 TABLET ORAL at 18:42

## 2023-07-10 RX ADMIN — PANTOPRAZOLE SODIUM 40 MILLIGRAM(S): 20 TABLET, DELAYED RELEASE ORAL at 11:46

## 2023-07-10 RX ADMIN — LIDOCAINE 1 PATCH: 4 CREAM TOPICAL at 23:06

## 2023-07-10 RX ADMIN — GABAPENTIN 300 MILLIGRAM(S): 400 CAPSULE ORAL at 21:32

## 2023-07-10 NOTE — PROGRESS NOTE ADULT - ASSESSMENT
37 y/o F w/ PMhx hypothyroidism admitted to [facility] 6/6 after being found unresponsive, found to have large right frontal IPH w/ IVH and hydrocephalus. Now s/p R krishna craniectomy for evacuation on 6/6. PEG placed 6/20. Now admitted for multidisciplinary rehab.     #IPH with IVH, R frontal lobe  - Amantadine, trazodone per rehab   - Antiepileptic regimen: transitioning from Briviact to depakote, continue Vimpat 150 mg BID. follow valproic acid, ammonia level  - Helmet when OOB  - Continue comprehensive rehab program - PT/OT/SLP per rehab team  - Pain management, bowel regimen per rehab     #Normocytic anemia  - H/H stable, monitor for Hb < 7  - Iron/B12/folate normal     #HTN  - Lisinopril held due to hypotension  - Clonidine 0.1 mg PO q12h stopped on day of discharge from [facility]    #Dysphagia s/p PEG  - s/p self removal of PEG tube, tolerating PO diet, PEG not replaced  - GI signed off, monitor tolerating PO diet    #Hypothyroidism  - Continue synthroid 75 mcg daily   - thyroid panel reviewed    #DVT ppx - Lovenox

## 2023-07-10 NOTE — PROGRESS NOTE ADULT - SUBJECTIVE AND OBJECTIVE BOX
Patient is a 38y old  Female who presents with a chief complaint of IPH (09 Jul 2023 10:03)      Patient seen and examined at bedside, stable NAD. slept throughout night.     ALLERGIES:  No Known Allergies    MEDICATIONS  (STANDING):  brivaracetam Oral Solution 100 milliGRAM(s) Oral two times a day  divalproex Sprinkle 750 milliGRAM(s) Oral <User Schedule>  divalproex Sprinkle 500 milliGRAM(s) Oral <User Schedule>  enoxaparin Injectable 40 milliGRAM(s) SubCutaneous every 24 hours  gabapentin 300 milliGRAM(s) Oral at bedtime  lacosamide Solution 150 milliGRAM(s) Oral two times a day  levothyroxine 75 MICROGram(s) Oral daily  lidocaine   4% Patch 1 Patch Transdermal daily  pantoprazole   Suspension 40 milliGRAM(s) Oral daily  senna 2 Tablet(s) Oral at bedtime  tiZANidine 2 milliGRAM(s) Oral at bedtime  traZODone 75 milliGRAM(s) Oral at bedtime    MEDICATIONS  (PRN):  acetaminophen     Tablet .. 975 milliGRAM(s) Oral every 6 hours PRN Mild Pain (1 - 3)  acetaminophen   IVPB .. 1000 milliGRAM(s) IV Intermittent once PRN Severe Pain (7 - 10)  bacitracin   Ointment 1 Application(s) Topical three times a day PRN scalp irritation  polyethylene glycol 3350 17 Gram(s) Oral daily PRN Constipation    Vital Signs Last 24 Hrs  T(F): 99.4 (09 Jul 2023 20:41), Max: 99.4 (09 Jul 2023 20:41)  HR: 98 (09 Jul 2023 20:41) (98 - 98)  BP: 123/87 (09 Jul 2023 20:41) (123/87 - 123/87)  RR: 18 (09 Jul 2023 20:41) (18 - 18)  SpO2: 98% (09 Jul 2023 20:41) (98% - 98%)  I&O's Summary        PHYSICAL EXAM:  General: NAD, A/O x 3, +right craniotomy scar c/d/i, +sunken flap  ENT: MMM, no scleral icterus  Neck: Supple, No JVD  Lungs: Clear to auscultation bilaterally, no wheezes, rales, rhonchi  Cardio: RRR, S1/S2  Abdomen: Soft, Nontender, Nondistended; Bowel sounds present  Extremities: No calf tenderness, No pitting edema    LABS:                        11.6   8.40  )-----------( 316      ( 10 Jul 2023 05:25 )             35.8       07-10    134  |  97  |  9   ----------------------------<  103  3.7   |  29  |  0.54    Ca    9.0      10 Jul 2023 05:25    TPro  7.4  /  Alb  2.7  /  TBili  0.3  /  DBili  x   /  AST  31  /  ALT  41  /  AlkPhos  82  07-10       Urinalysis Basic - ( 10 Jul 2023 05:25 )    Color: x / Appearance: x / SG: x / pH: x  Gluc: 103 mg/dL / Ketone: x  / Bili: x / Urobili: x   Blood: x / Protein: x / Nitrite: x   Leuk Esterase: x / RBC: x / WBC x   Sq Epi: x / Non Sq Epi: x / Bacteria: x    RADIOLOGY & ADDITIONAL TESTS:      Care Discussed with Consultants/Other Providers: yes, rehab

## 2023-07-10 NOTE — PROGRESS NOTE ADULT - ASSESSMENT
ASSESSMENT/PLAN  38y Female h/o hypothyroid with functional deficits after right frontal IPHs/p right hemicraniectomy.     #hx of seizure   -Cont. Vimpat 150mg BID  --Currently on PO Briviact 100mg. Spoke to pt's neurologist at [facility], Dr. Tyson last week,  to transition to VPA as she is agitated/restless.   --on VPA 500mg in AM and 750mg VPA in the evening, as per neurology recommendations.      -- valproic acid 7/10-- result pending. If VPA levels are adequate then will taper Briviact to 50mg BID-- Taper off after few days.   --NH3 level normal 7/10      #dysphagia s/p PEG   -PEG tube came out on 7/4. Per GI, patient is tolerating pureed diet and will hold off PEG replacement.     #Pain and #insomnia  -cont. Tylenol PRN  -continue trazodone 75, gabapentin 300mg and tizanidine 2 at bedtime   --Monitor BP-- if not hypotensive will consider increasing tizanidine to 4mg qhs for spasticity  -Monitor    #Right Hemicraniectomy and EVD on 6/6  -called neuroplastic surgeon Dr. Sy ([phone]). He spoke to patient's wife about scheduling an appointment on 7/25 or 8/1 (pending rehab DC) to plan for cranioplasty reconstruction.     #hx of anemia   -follow up medicine recommendations   -H/H improving   -fu B12 and folate levels    #hx of HTN --BP soft  -stopped lisinopril   -Monitor    #DVT ppx   -continue lovenox     IDT 7/6:  Nursing: incontinent B/B  SLP: on puree with thin liquids, small single sips, mild-mod language deficits, mod-severe cognitive deficits, limited by attention, memory, reasoning deficits.  Mild to Mod Dysarthria  OT: Total A- across the board, max A--squat-pivot transfer; goal mod-max  PT: Max A--Bed mobility and squat pivot transfer; Ambulated by  rail 15ft-- tot A.  WC tot A.   Goals: 1 Pivot and transfers, bed mobility mod-A, 2 attend to structured tasks for 30 minutes   Dispo: 7/24, [facility] vs.  back to Neurosurgical care or cranioplasty. Spouse very supportive. Possible re-admit following cranioplasty

## 2023-07-10 NOTE — PROGRESS NOTE ADULT - SUBJECTIVE AND OBJECTIVE BOX
HPI:  38 year old female w/ PMHx hypothyroidism who was admitted to [facility] 6/6 after being found on floor at home, with CT revealing large right-sided ICH. She required emergent right frontal craniectomy for decompression and EVD placement. EVD subsequently removed 6/16. Angio negative. Patient underwent G tube placement 6/20. Hospital course notable for fluctuating leukocytosis and high-grade temperatures with no identified source of infection including in urine, blood, CSF. Now admitted for multidisciplinary rehab. (26 Jun 2023 13:08)          Subjective:  Seen this AM during PT.  Pt. ambulated a little further with PT.  Still anxious but improved.  Notes pain when standing and asking to sit down but able to be directed to complete ambulation task.  Slept through the night as per staff and brother.        VITALS  Vital Signs Last 24 Hrs  T(C): 36.9 (10 Jul 2023 08:10), Max: 37.4 (09 Jul 2023 20:41)  T(F): 98.4 (10 Jul 2023 08:10), Max: 99.4 (09 Jul 2023 20:41)  HR: 90 (10 Jul 2023 08:10) (90 - 98)  BP: 107/74 (10 Jul 2023 08:10) (107/74 - 123/87)  BP(mean): --  RR: 16 (10 Jul 2023 08:10) (16 - 18)  SpO2: 96% (10 Jul 2023 08:10) (96% - 98%)    Parameters below as of 10 Jul 2023 08:10  Patient On (Oxygen Delivery Method): room air        REVIEW OF SYMPTOMS  Neurological deficits      PHYSICAL EXAM  Constitutional - NAD, Comfortable  HEENT - Right frontal hemicraniectomy   Chest - Breathing comfortably on RA  Cardiovascular - RRR,  warm well perfused  Abdomen - BS+, Soft, PEG incisional site healing well.   Extremities - No edema, No calf tenderness   Neurologic Exam -                    Cognitive - Awake, Alert, AAO to self, place, date, year, situation     Communication - Fluent, No dysarthria,       Motor  Left hemiparesis. Right side 5/5.      Sensory - Impaired.       Coordination - Impaired.   Spasticity-- right shoulder adductors and elbow flexors 3/4  Psychiatric - anxious,      RECENT LABS                        11.6   8.40  )-----------( 316      ( 10 Jul 2023 05:25 )             35.8     07-10    134<L>  |  97  |  9   ----------------------------<  103<H>  3.7   |  29  |  0.54    Ca    9.0      10 Jul 2023 05:25    TPro  7.4  /  Alb  2.7<L>  /  TBili  0.3  /  DBili  x   /  AST  31  /  ALT  41  /  AlkPhos  82  07-10      Urinalysis Basic - ( 10 Jul 2023 05:25 )    Color: x / Appearance: x / SG: x / pH: x  Gluc: 103 mg/dL / Ketone: x  / Bili: x / Urobili: x   Blood: x / Protein: x / Nitrite: x   Leuk Esterase: x / RBC: x / WBC x   Sq Epi: x / Non Sq Epi: x / Bacteria: x          RADIOLOGY/OTHER RESULTS      MEDICATIONS  (STANDING):  brivaracetam Oral Solution 100 milliGRAM(s) Oral two times a day  divalproex Sprinkle 750 milliGRAM(s) Oral <User Schedule>  divalproex Sprinkle 500 milliGRAM(s) Oral <User Schedule>  enoxaparin Injectable 40 milliGRAM(s) SubCutaneous every 24 hours  gabapentin 300 milliGRAM(s) Oral at bedtime  lacosamide Solution 150 milliGRAM(s) Oral two times a day  levothyroxine 75 MICROGram(s) Oral daily  lidocaine   4% Patch 1 Patch Transdermal daily  pantoprazole   Suspension 40 milliGRAM(s) Oral daily  senna 2 Tablet(s) Oral at bedtime  tiZANidine 2 milliGRAM(s) Oral at bedtime  traZODone 75 milliGRAM(s) Oral at bedtime    MEDICATIONS  (PRN):  acetaminophen     Tablet .. 975 milliGRAM(s) Oral every 6 hours PRN Mild Pain (1 - 3)  acetaminophen   IVPB .. 1000 milliGRAM(s) IV Intermittent once PRN Severe Pain (7 - 10)  bacitracin   Ointment 1 Application(s) Topical three times a day PRN scalp irritation  polyethylene glycol 3350 17 Gram(s) Oral daily PRN Constipation

## 2023-07-11 PROCEDURE — 99232 SBSQ HOSP IP/OBS MODERATE 35: CPT

## 2023-07-11 RX ORDER — TIZANIDINE 4 MG/1
4 TABLET ORAL AT BEDTIME
Refills: 0 | Status: DISCONTINUED | OUTPATIENT
Start: 2023-07-11 | End: 2023-08-01

## 2023-07-11 RX ORDER — LIDOCAINE 4 G/100G
1 CREAM TOPICAL
Refills: 0 | Status: DISCONTINUED | OUTPATIENT
Start: 2023-07-11 | End: 2023-07-26

## 2023-07-11 RX ORDER — GABAPENTIN 400 MG/1
400 CAPSULE ORAL AT BEDTIME
Refills: 0 | Status: DISCONTINUED | OUTPATIENT
Start: 2023-07-11 | End: 2023-07-16

## 2023-07-11 RX ADMIN — Medication 975 MILLIGRAM(S): at 06:44

## 2023-07-11 RX ADMIN — Medication 75 MICROGRAM(S): at 05:44

## 2023-07-11 RX ADMIN — Medication 75 MILLIGRAM(S): at 21:02

## 2023-07-11 RX ADMIN — BRIVARACETAM 50 MILLIGRAM(S): 25 TABLET, FILM COATED ORAL at 05:43

## 2023-07-11 RX ADMIN — Medication 975 MILLIGRAM(S): at 10:38

## 2023-07-11 RX ADMIN — BRIVARACETAM 50 MILLIGRAM(S): 25 TABLET, FILM COATED ORAL at 17:52

## 2023-07-11 RX ADMIN — LIDOCAINE 1 PATCH: 4 CREAM TOPICAL at 19:29

## 2023-07-11 RX ADMIN — PANTOPRAZOLE SODIUM 40 MILLIGRAM(S): 20 TABLET, DELAYED RELEASE ORAL at 12:02

## 2023-07-11 RX ADMIN — Medication 975 MILLIGRAM(S): at 05:44

## 2023-07-11 RX ADMIN — LACOSAMIDE 150 MILLIGRAM(S): 50 TABLET ORAL at 05:43

## 2023-07-11 RX ADMIN — SENNA PLUS 2 TABLET(S): 8.6 TABLET ORAL at 21:02

## 2023-07-11 RX ADMIN — LIDOCAINE 1 PATCH: 4 CREAM TOPICAL at 12:02

## 2023-07-11 RX ADMIN — Medication 975 MILLIGRAM(S): at 16:15

## 2023-07-11 RX ADMIN — TIZANIDINE 4 MILLIGRAM(S): 4 TABLET ORAL at 21:48

## 2023-07-11 RX ADMIN — GABAPENTIN 400 MILLIGRAM(S): 400 CAPSULE ORAL at 21:02

## 2023-07-11 RX ADMIN — DIVALPROEX SODIUM 750 MILLIGRAM(S): 500 TABLET, DELAYED RELEASE ORAL at 17:53

## 2023-07-11 RX ADMIN — ENOXAPARIN SODIUM 40 MILLIGRAM(S): 100 INJECTION SUBCUTANEOUS at 17:55

## 2023-07-11 RX ADMIN — Medication 975 MILLIGRAM(S): at 00:08

## 2023-07-11 RX ADMIN — DIVALPROEX SODIUM 500 MILLIGRAM(S): 500 TABLET, DELAYED RELEASE ORAL at 05:44

## 2023-07-11 RX ADMIN — LACOSAMIDE 150 MILLIGRAM(S): 50 TABLET ORAL at 17:53

## 2023-07-11 NOTE — PROGRESS NOTE ADULT - SUBJECTIVE AND OBJECTIVE BOX
Patient is a 38y old  Female who presents with a chief complaint of IP (10 Jul 2023 12:38)      Patient seen and examined at bedside. slept poorly overnight. +generalized malaise, msk pain. otherwise denies headache, fever, chills, cp, sob, abd pain, nausea, vomiting, diarrhea.     ALLERGIES:  No Known Allergies    MEDICATIONS  (STANDING):  brivaracetam Oral Solution 50 milliGRAM(s) Oral two times a day  divalproex Sprinkle 750 milliGRAM(s) Oral <User Schedule>  divalproex Sprinkle 500 milliGRAM(s) Oral <User Schedule>  enoxaparin Injectable 40 milliGRAM(s) SubCutaneous every 24 hours  gabapentin 300 milliGRAM(s) Oral at bedtime  lacosamide Solution 150 milliGRAM(s) Oral two times a day  levothyroxine 75 MICROGram(s) Oral daily  lidocaine   4% Patch 1 Patch Transdermal daily  pantoprazole   Suspension 40 milliGRAM(s) Oral daily  senna 2 Tablet(s) Oral at bedtime  tiZANidine 2 milliGRAM(s) Oral at bedtime  traZODone 75 milliGRAM(s) Oral at bedtime    MEDICATIONS  (PRN):  acetaminophen     Tablet .. 975 milliGRAM(s) Oral every 6 hours PRN Mild Pain (1 - 3)  acetaminophen   IVPB .. 1000 milliGRAM(s) IV Intermittent once PRN Severe Pain (7 - 10)  bacitracin   Ointment 1 Application(s) Topical three times a day PRN scalp irritation  polyethylene glycol 3350 17 Gram(s) Oral daily PRN Constipation    Vital Signs Last 24 Hrs  T(F): 98.4 (11 Jul 2023 08:01), Max: 98.6 (10 Jul 2023 20:09)  HR: 83 (11 Jul 2023 08:01) (80 - 92)  BP: 130/87 (11 Jul 2023 08:01) (119/80 - 130/93)  RR: 16 (11 Jul 2023 08:01) (15 - 16)  SpO2: 97% (11 Jul 2023 08:01) (97% - 100%)  I&O's Summary    PHYSICAL EXAM:  General: NAD, A/O x 3, +right craniotomy scar c/d/i, +sunken flap  ENT: MMM, no scleral icterus  Neck: Supple, No JVD  Lungs: Clear to auscultation bilaterally, no wheezes, rales, rhonchi  Cardio: RRR, S1/S2  Abdomen: Soft, Nontender, Nondistended; Bowel sounds present  Extremities: No calf tenderness, No pitting edema    LABS:                        11.6   8.40  )-----------( 316      ( 10 Jul 2023 05:25 )             35.8       07-10    134  |  97  |  9   ----------------------------<  103  3.7   |  29  |  0.54    Ca    9.0      10 Jul 2023 05:25    TPro  7.4  /  Alb  2.7  /  TBili  0.3  /  DBili  x   /  AST  31  /  ALT  41  /  AlkPhos  82  07-10                                  Urinalysis Basic - ( 10 Jul 2023 05:25 )    Color: x / Appearance: x / SG: x / pH: x  Gluc: 103 mg/dL / Ketone: x  / Bili: x / Urobili: x   Blood: x / Protein: x / Nitrite: x   Leuk Esterase: x / RBC: x / WBC x   Sq Epi: x / Non Sq Epi: x / Bacteria: x            RADIOLOGY & ADDITIONAL TESTS:    Care Discussed with Consultants/Other Providers: yes, rehab

## 2023-07-11 NOTE — CHART NOTE - NSCHARTNOTEFT_GEN_A_CORE
Approached Pt for supportive tx, Pt declined due to having a "pounding headache". Informed nursing because Pt does not remember when she had pain medication earlier today.

## 2023-07-11 NOTE — PROGRESS NOTE ADULT - ASSESSMENT
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 Now s/p R krishna craniectomy for evacuation on 6/6. PEG placed 6/20. Now admitted for multidisciplinary rehab.     #IPH with IVH, R frontal lobe  - Amantadine, trazodone per rehab   - Antiepileptic regimen: transitioning from Briviact to depakote, continue Vimpat 150 mg BID. follow valproic acid, ammonia level  - Helmet when OOB  - Continue comprehensive rehab program - PT/OT/SLP per rehab team  - Pain management, bowel regimen per rehab     #Normocytic anemia  - H/H stable, monitor for Hb < 7  - Iron/B12/folate normal     #HTN  - Lisinopril held due to hypotension  - Clonidine 0.1 mg PO q12h stopped on day of discharge from [facility]    #Dysphagia s/p PEG  - s/p self removal of PEG tube, tolerating PO diet, PEG not replaced  - GI signed off, monitor tolerating PO diet    #Hypothyroidism  - Continue synthroid 75mcg qd   - Thyroid panel reviewed    #DVT ppx - Lovenox

## 2023-07-11 NOTE — PROGRESS NOTE ADULT - SUBJECTIVE AND OBJECTIVE BOX
HPI:  38 year old female w/ PMHx hypothyroidism who was admitted to [facility] 6/6 after being found on floor at home, with CT revealing large right-sided ICH. She required emergent right frontal craniectomy for decompression and EVD placement. EVD subsequently removed 6/16. Angio negative. Patient underwent G tube placement 6/20. Hospital course notable for fluctuating leukocytosis and high-grade temperatures with no identified source of infection including in urine, blood, CSF. Now admitted for multidisciplinary rehab. (26 Jun 2023 13:08)    SUBJECTIVE:  Patient seen and examined by bedside. Complaints of pain "everywhere". She was not able to sleep last night. No new complaints of N/V/new numbness tingling or weakness. In the process of changing antiepileptics. No seizure like activity noted. VSS. afebrile.     VITALS  Vital Signs Last 24 Hrs  T(C): 36.9 (10 Jul 2023 08:10), Max: 37.4 (09 Jul 2023 20:41)  T(F): 98.4 (10 Jul 2023 08:10), Max: 99.4 (09 Jul 2023 20:41)  HR: 90 (10 Jul 2023 08:10) (90 - 98)  BP: 107/74 (10 Jul 2023 08:10) (107/74 - 123/87)  BP(mean): --  RR: 16 (10 Jul 2023 08:10) (16 - 18)  SpO2: 96% (10 Jul 2023 08:10) (96% - 98%)    Parameters below as of 10 Jul 2023 08:10  Patient On (Oxygen Delivery Method): room air    REVIEW OF SYMPTOMS  Neurological deficits  Pain     PHYSICAL EXAM  Constitutional - NAD, complaints of pain   HEENT - Right frontal hemicraniectomy   Chest - Breathing comfortably on RA   Cardiovascular - RRR,  warm well perfused  Abdomen - BS+, Soft, PEG incisional site healing well.   Extremities - No edema, No calf tenderness   Neurologic Exam -                    Cognitive - Awake, Alert, AAO to self, place, date, year, situation     Communication - Fluent, No dysarthria,       Motor  Left hemiparesis. Right side 5/5.      Sensory - Impaired.       Coordination - Impaired.   Spasticity-- right shoulder adductors and elbow flexors 3/4  Psychiatric - anxious,      RECENT LABS                        11.6   8.40  )-----------( 316      ( 10 Jul 2023 05:25 )             35.8     07-10    134<L>  |  97  |  9   ----------------------------<  103<H>  3.7   |  29  |  0.54    Ca    9.0      10 Jul 2023 05:25    TPro  7.4  /  Alb  2.7<L>  /  TBili  0.3  /  DBili  x   /  AST  31  /  ALT  41  /  AlkPhos  82  07-10      Urinalysis Basic - ( 10 Jul 2023 05:25 )    Color: x / Appearance: x / SG: x / pH: x  Gluc: 103 mg/dL / Ketone: x  / Bili: x / Urobili: x   Blood: x / Protein: x / Nitrite: x   Leuk Esterase: x / RBC: x / WBC x   Sq Epi: x / Non Sq Epi: x / Bacteria: x    RADIOLOGY:  Reviewed     MEDICATIONS  (STANDING):  brivaracetam Oral Solution 100 milliGRAM(s) Oral two times a day  divalproex Sprinkle 750 milliGRAM(s) Oral <User Schedule>  divalproex Sprinkle 500 milliGRAM(s) Oral <User Schedule>  enoxaparin Injectable 40 milliGRAM(s) SubCutaneous every 24 hours  gabapentin 300 milliGRAM(s) Oral at bedtime  lacosamide Solution 150 milliGRAM(s) Oral two times a day  levothyroxine 75 MICROGram(s) Oral daily  lidocaine   4% Patch 1 Patch Transdermal daily  pantoprazole   Suspension 40 milliGRAM(s) Oral daily  senna 2 Tablet(s) Oral at bedtime  tiZANidine 2 milliGRAM(s) Oral at bedtime  traZODone 75 milliGRAM(s) Oral at bedtime    MEDICATIONS  (PRN):  acetaminophen     Tablet .. 975 milliGRAM(s) Oral every 6 hours PRN Mild Pain (1 - 3)  acetaminophen   IVPB .. 1000 milliGRAM(s) IV Intermittent once PRN Severe Pain (7 - 10)  bacitracin   Ointment 1 Application(s) Topical three times a day PRN scalp irritation  polyethylene glycol 3350 17 Gram(s) Oral daily PRN Constipation    ASSESSMENT/PLAN  38y Female h/o hypothyroid with functional deficits after right frontal IPHs/p right hemicraniectomy.     #hx of seizure  #agitation   -Cont Briviact 50mg BID-> plan to discontinue   -Cont [id number] BID ->plan to repeat VPA    -plan to repeat EEG this week   -Cont Depakote 500mg daily   --NH3 level normal 7/10    #dysphagia s/p PEG   -PEG tube came out on 7/4, did not replace   -calorie count sufficient     #Pain  #Insomnia  -cont. Tylenol PRN  -cont Trazodone 75mg at bedtime  -plan to increase gabapentin 300mg at bedtime   -plan to increase tizanidine 2mg-> 4mg (CMP ordered for thrs)     #Right Hemicraniectomy and EVD on 6/6  -called neuroplastic surgeon Dr. Sy ([phone]). He spoke to patient's wife about scheduling an appointment on 7/25 or 8/1 (pending rehab DC) to plan for cranioplasty reconstruction.     #hx of anemia   -follow up medicine recommendations   -H/H improving   -fu B12 and folate levels    #hx of HTN --BP soft  -stopped lisinopril   -Monitor    #DVT ppx   -continue lovenox     IDT 7/6:  Nursing: incontinent B/B  SLP: on puree with thin liquids, small single sips, mild-mod language deficits, mod-severe cognitive deficits, limited by attention, memory, reasoning deficits.  Mild to Mod Dysarthria  OT: Total A- across the board, max A--squat-pivot transfer; goal mod-max  PT: Max A--Bed mobility and squat pivot transfer; Ambulated by  rail 15ft-- tot A.   tot A.   Goals: 1 Pivot and transfers, bed mobility mod-A, 2 attend to structured tasks for 30 minutes   Dispo: 7/24, [facility] vs.  back to Neurosurgical care or cranioplasty. Spouse very supportive. Possible re-admit following cranioplasty HPI:  38 year old female w/ PMHx hypothyroidism who was admitted to [facility] 6/6 after being found on floor at home, with CT revealing large right-sided ICH. She required emergent right frontal craniectomy for decompression and EVD placement. EVD subsequently removed 6/16. Angio negative. Patient underwent G tube placement 6/20. Hospital course notable for fluctuating leukocytosis and high-grade temperatures with no identified source of infection including in urine, blood, CSF. Now admitted for multidisciplinary rehab. (26 Jun 2023 13:08)    SUBJECTIVE:  Patient seen and examined by bedside. Complaints of pain "everywhere". She was not able to sleep last night. No new complaints of N/V/new numbness tingling or weakness. In the process of changing antiepileptics. No seizure like activity noted. VSS. afebrile.      VITALS  Vital Signs Last 24 Hrs  T(C): 36.9 (10 Jul 2023 08:10), Max: 37.4 (09 Jul 2023 20:41)  T(F): 98.4 (10 Jul 2023 08:10), Max: 99.4 (09 Jul 2023 20:41)  HR: 90 (10 Jul 2023 08:10) (90 - 98)  BP: 107/74 (10 Jul 2023 08:10) (107/74 - 123/87)  BP(mean): --  RR: 16 (10 Jul 2023 08:10) (16 - 18)  SpO2: 96% (10 Jul 2023 08:10) (96% - 98%)    Parameters below as of 10 Jul 2023 08:10  Patient On (Oxygen Delivery Method): room air    REVIEW OF SYMPTOMS  Neurological deficits  Pain     PHYSICAL EXAM  Constitutional - NAD, complaints of pain   HEENT - Right frontal hemicraniectomy   Chest - Breathing comfortably on RA   Cardiovascular - RRR,  warm well perfused  Abdomen - BS+, Soft, PEG incisional site healing well.   Extremities - No edema, No calf tenderness   Neurologic Exam -                    Cognitive - Awake, Alert, AAO to self, place, date, year, situation     Communication - Fluent, No dysarthria,       Motor  Left hemiparesis. Right side 5/5.      Sensory - Impaired.       Coordination - Impaired.   Spasticity-- right shoulder adductors and elbow flexors 3/4  Psychiatric - anxious,      RECENT LABS                        11.6   8.40  )-----------( 316      ( 10 Jul 2023 05:25 )             35.8     07-10    134<L>  |  97  |  9   ----------------------------<  103<H>  3.7   |  29  |  0.54    Ca    9.0      10 Jul 2023 05:25    TPro  7.4  /  Alb  2.7<L>  /  TBili  0.3  /  DBili  x   /  AST  31  /  ALT  41  /  AlkPhos  82  07-10      Urinalysis Basic - ( 10 Jul 2023 05:25 )    Color: x / Appearance: x / SG: x / pH: x  Gluc: 103 mg/dL / Ketone: x  / Bili: x / Urobili: x   Blood: x / Protein: x / Nitrite: x   Leuk Esterase: x / RBC: x / WBC x   Sq Epi: x / Non Sq Epi: x / Bacteria: x    RADIOLOGY:  Reviewed     MEDICATIONS  (STANDING):  brivaracetam Oral Solution 100 milliGRAM(s) Oral two times a day  divalproex Sprinkle 750 milliGRAM(s) Oral <User Schedule>  divalproex Sprinkle 500 milliGRAM(s) Oral <User Schedule>  enoxaparin Injectable 40 milliGRAM(s) SubCutaneous every 24 hours  gabapentin 300 milliGRAM(s) Oral at bedtime  lacosamide Solution 150 milliGRAM(s) Oral two times a day  levothyroxine 75 MICROGram(s) Oral daily  lidocaine   4% Patch 1 Patch Transdermal daily  pantoprazole   Suspension 40 milliGRAM(s) Oral daily  senna 2 Tablet(s) Oral at bedtime  tiZANidine 2 milliGRAM(s) Oral at bedtime  traZODone 75 milliGRAM(s) Oral at bedtime    MEDICATIONS  (PRN):  acetaminophen     Tablet .. 975 milliGRAM(s) Oral every 6 hours PRN Mild Pain (1 - 3)  acetaminophen   IVPB .. 1000 milliGRAM(s) IV Intermittent once PRN Severe Pain (7 - 10)  bacitracin   Ointment 1 Application(s) Topical three times a day PRN scalp irritation  polyethylene glycol 3350 17 Gram(s) Oral daily PRN Constipation    ASSESSMENT/PLAN  38y Female h/o hypothyroid with functional deficits after right frontal IPHs/p right hemicraniectomy.     #hx of seizure  #agitation   -Cont Briviact 50mg BID-> plan to discontinue after 3 days if EEG is fine  -Cont [id number] BID ->plan to repeat VPA    -plan to repeat EEG this week   -Cont Depakote 500mg daily in AM and 750mg in evening.    --depakote level 55 7/10--therapeutic  --NH3 level normal 7/10      #dysphagia s/p PEG   -PEG tube came out on 7/4, did not replace   -calorie count sufficient     #Pain  #Insomnia  -cont. Tylenol PRN  -cont Trazodone 75mg at bedtime  increase gabapentin to 400mg at bedtime for left Neuropathic pain  - increase tizanidine 2mg-> 4mg (CMP ordered for thrs)     #Right Hemicraniectomy and EVD on 6/6  -called neuroplastic surgeon Dr. Sy ([phone]). He spoke to patient's wife about scheduling an appointment on 7/25 or 8/1 (pending rehab DC) to plan for cranioplasty reconstruction.     #hx of anemia   -follow up medicine recommendations   -H/H improving   -fu B12 and folate levels    #hx of HTN --BP soft  -stopped lisinopril   -Monitor    #DVT ppx   -continue lovenox     IDT 7/6:  Nursing: incontinent B/B  SLP: on puree with thin liquids, small single sips, mild-mod language deficits, mod-severe cognitive deficits, limited by attention, memory, reasoning deficits.  Mild to Mod Dysarthria  OT: Total A- across the board, max A--squat-pivot transfer; goal mod-max  PT: Max A--Bed mobility and squat pivot transfer; Ambulated by  kathrynil 15ft-- tot A.  WC tot A.   Goals: 1 Pivot and transfers, bed mobility mod-A, 2 attend to structured tasks for 30 minutes   Dispo: 7/24, [facility] vs.  back to Neurosurgical care or cranioplasty. Spouse very supportive. Possible re-admit following cranioplasty

## 2023-07-11 NOTE — PROGRESS NOTE ADULT - ATTENDING COMMENTS
Pt. seen with resident & fellow.  Agree with documentation above as per fellow with amendments made as appropriate. Patient medically stable. Making progress towards rehab goals.     right ICH  Pt. emotional and c/o pain--  increase gabapentin to 400mg at bedtime for left Neuropathic pain  - increase tizanidine 2mg-> 4mg for spasticity (CMP ordered for thrs)  --Monitor BP    -- Transitioning from Briviact to Depakote-- VPA level therapeutic-- decrease Briviact to 50mg BID

## 2023-07-12 PROCEDURE — 99233 SBSQ HOSP IP/OBS HIGH 50: CPT

## 2023-07-12 RX ORDER — LACOSAMIDE 50 MG/1
150 TABLET ORAL
Refills: 0 | Status: DISCONTINUED | OUTPATIENT
Start: 2023-07-12 | End: 2023-07-22

## 2023-07-12 RX ADMIN — LACOSAMIDE 150 MILLIGRAM(S): 50 TABLET ORAL at 17:27

## 2023-07-12 RX ADMIN — LIDOCAINE 1 PATCH: 4 CREAM TOPICAL at 19:32

## 2023-07-12 RX ADMIN — Medication 975 MILLIGRAM(S): at 01:45

## 2023-07-12 RX ADMIN — LACOSAMIDE 150 MILLIGRAM(S): 50 TABLET ORAL at 06:06

## 2023-07-12 RX ADMIN — Medication 975 MILLIGRAM(S): at 13:06

## 2023-07-12 RX ADMIN — Medication 75 MICROGRAM(S): at 06:07

## 2023-07-12 RX ADMIN — LIDOCAINE 1 PATCH: 4 CREAM TOPICAL at 07:49

## 2023-07-12 RX ADMIN — Medication 975 MILLIGRAM(S): at 09:17

## 2023-07-12 RX ADMIN — SENNA PLUS 2 TABLET(S): 8.6 TABLET ORAL at 21:27

## 2023-07-12 RX ADMIN — Medication 975 MILLIGRAM(S): at 01:02

## 2023-07-12 RX ADMIN — BRIVARACETAM 50 MILLIGRAM(S): 25 TABLET, FILM COATED ORAL at 06:06

## 2023-07-12 RX ADMIN — DIVALPROEX SODIUM 500 MILLIGRAM(S): 500 TABLET, DELAYED RELEASE ORAL at 06:07

## 2023-07-12 RX ADMIN — Medication 75 MILLIGRAM(S): at 21:27

## 2023-07-12 RX ADMIN — PANTOPRAZOLE SODIUM 40 MILLIGRAM(S): 20 TABLET, DELAYED RELEASE ORAL at 12:47

## 2023-07-12 RX ADMIN — POLYETHYLENE GLYCOL 3350 17 GRAM(S): 17 POWDER, FOR SOLUTION ORAL at 12:47

## 2023-07-12 RX ADMIN — BRIVARACETAM 50 MILLIGRAM(S): 25 TABLET, FILM COATED ORAL at 17:22

## 2023-07-12 RX ADMIN — Medication 975 MILLIGRAM(S): at 19:23

## 2023-07-12 RX ADMIN — ENOXAPARIN SODIUM 40 MILLIGRAM(S): 100 INJECTION SUBCUTANEOUS at 17:22

## 2023-07-12 RX ADMIN — LACOSAMIDE 150 MILLIGRAM(S): 50 TABLET ORAL at 17:22

## 2023-07-12 RX ADMIN — Medication 975 MILLIGRAM(S): at 07:50

## 2023-07-12 RX ADMIN — GABAPENTIN 400 MILLIGRAM(S): 400 CAPSULE ORAL at 21:26

## 2023-07-12 RX ADMIN — DIVALPROEX SODIUM 750 MILLIGRAM(S): 500 TABLET, DELAYED RELEASE ORAL at 17:22

## 2023-07-12 RX ADMIN — Medication 975 MILLIGRAM(S): at 20:15

## 2023-07-12 RX ADMIN — TIZANIDINE 4 MILLIGRAM(S): 4 TABLET ORAL at 21:27

## 2023-07-12 RX ADMIN — Medication 975 MILLIGRAM(S): at 14:47

## 2023-07-12 RX ADMIN — LIDOCAINE 1 PATCH: 4 CREAM TOPICAL at 00:15

## 2023-07-12 NOTE — CHART NOTE - NSCHARTNOTEFT_GEN_A_CORE
Nutrition Follow Up Note  Source: Medical Record [X] Patient [X] Family [X]  pt's mom provided info       Diet: Minced and moist, Extra sauces and gravies, Ensure Plus High Protein (provides 350 kcal, 20 g protein/serving) TID  Pt tolerating diet with variable PO intake, observed with suboptimal PO intake during meal rounds. Discussed food preferences with pt and pt's mom at bedside. Pt is not drinking Ensure Plus High Protein (provides 350 kcal, 20 g protein/serving) TID, suggest decreasing it to 1x/day and encourage intake. If pt continues to not drink supplement, consider discontinuing it. Will honor food preferences as able to optimize PO intake. Denies nausea, vomiting, diarrhea, constipation. SLP following.    Enteral/Parenteral Nutrition: N/A    Current Weight: 150.3 lbs (7-11)  151.4 lbs (7-10)  151.2 lbs (7-8)    Pertinent Medications: MEDICATIONS  (STANDING):  brivaracetam Oral Solution 50 milliGRAM(s) Oral two times a day  divalproex Sprinkle 750 milliGRAM(s) Oral <User Schedule>  divalproex Sprinkle 500 milliGRAM(s) Oral <User Schedule>  enoxaparin Injectable 40 milliGRAM(s) SubCutaneous every 24 hours  gabapentin 400 milliGRAM(s) Oral at bedtime  lacosamide Solution 150 milliGRAM(s) Oral two times a day  levothyroxine 75 MICROGram(s) Oral daily  lidocaine   4% Patch 1 Patch Transdermal <User Schedule>  pantoprazole   Suspension 40 milliGRAM(s) Oral daily  senna 2 Tablet(s) Oral at bedtime  tiZANidine 4 milliGRAM(s) Oral at bedtime  traZODone 75 milliGRAM(s) Oral at bedtime    MEDICATIONS  (PRN):  acetaminophen     Tablet .. 975 milliGRAM(s) Oral every 6 hours PRN Mild Pain (1 - 3)  acetaminophen   IVPB .. 1000 milliGRAM(s) IV Intermittent once PRN Severe Pain (7 - 10)  bacitracin   Ointment 1 Application(s) Topical three times a day PRN scalp irritation  polyethylene glycol 3350 17 Gram(s) Oral daily PRN Constipation      Pertinent Labs:  07-10 Na134 mmol/L<L> Glu 103 mg/dL<H> K+ 3.7 mmol/L Cr  0.54 mg/dL BUN 9 mg/dL 07-10 Alb 2.7 g/dL<L>        Skin: surgical incision per nursing flow sheets     Edema: No edema per nursing flow sheets     Last BM: on 7-10 Per nursing flowsheets     Estimated Needs:   [X] No Change since Previous Assessment  [ ] Recalculated:     Previous Nutrition Diagnosis:   Severe malnutrition, acute    Nutrition Diagnosis is [X] Ongoing  - addressed with Ensure Plus High Protein (provides 350 kcal, 20 g protein/serving)     New Nutrition Diagnosis: [X] Not Applicable  [ ] Inadequate Protein Energy Intake   [ ] Inadequate Oral Intake   [ ] Excessive Energy Intake   [ ] Increased Nutrient Needs   [ ] Obesity   [ ] Altered GI Function   [ ] Unintended Weight Loss   [ ] Food & Nutrition Related Knowledge Deficit  [ ] Limited Adherence to nutrition related recommendations   [ ] Malnutrition      Interventions:   1. Recommend continuing with current diet but decrease Ensure Plus High Protein (provides 350 kcal, 20 g protein/serving) to 1x/day  2. Follow SLP recommendations  3. Honor food preferences as able    Monitoring & Evaluation:   [X] Weights   [X] PO Intake   [X] Follow Up (Per Protocol)  [X] Tolerance to Diet Prescription   [X] Other: Labs & PCOT    RD Remains Available.  Cassidy Ruby RD

## 2023-07-12 NOTE — PROGRESS NOTE ADULT - ATTENDING COMMENTS
Pt. seen with fellow.  Agree with documentation above as per fellow with amendments made as appropriate. Patient medically stable. Making progress towards rehab goals.     right ICH  Pt. went for EEG in afternoon but was incomplete as pt. was pulling at leads.  Will try again tomorrow.  may give ativan to calm pt. prior if needed.      Transitioning Briviact to depakote.    --Spoke with pt's mother who is asking about plan for pt's cranioplasty and transfer.  Discussed that Dr. Sy would like to see pt. later this month on July 25 in office to assess her craniectomy site and scalp healing and may plan for Cranioplasty in August.  Discussed plan for [facility] at the end of the month, but mother concerned about pt. having multiple transfers.  I reached out to Dr. Sy to see if her outpatient appt. could be moved earlier to next week to assess and start the process of getting implant and to see if pt. can have Cranioplasty by the 1st week of August.  If so, will try to extend pt's [place] stay for direct transfer to [facility] for cranioplasty.  Dr. Sy will d/w his surgical coordinator tomorrow and get back to me.

## 2023-07-12 NOTE — PROGRESS NOTE ADULT - SUBJECTIVE AND OBJECTIVE BOX
HPI:  38 year old female w/ PMHx hypothyroidism who was admitted to [facility] 6/6 after being found on floor at home, with CT revealing large right-sided ICH. She required emergent right frontal craniectomy for decompression and EVD placement. EVD subsequently removed 6/16. Angio negative. Patient underwent G tube placement 6/20. Hospital course notable for fluctuating leukocytosis and high-grade temperatures with no identified source of infection including in urine, blood, CSF. Now admitted for multidisciplinary rehab. (26 Jun 2023 13:08)    SUBJECTIVE:  Patient seen and examined by bedside. Complaints of pain "everywhere". She was not able to sleep last night. No new complaints of N/V/new numbness tingling or weakness. In the process of changing antiepileptics. No seizure like activity noted. VSS. afebrile.      VITALS  Vital Signs Last 24 Hrs  T(C): 36.9 (10 Jul 2023 08:10), Max: 37.4 (09 Jul 2023 20:41)  T(F): 98.4 (10 Jul 2023 08:10), Max: 99.4 (09 Jul 2023 20:41)  HR: 90 (10 Jul 2023 08:10) (90 - 98)  BP: 107/74 (10 Jul 2023 08:10) (107/74 - 123/87)  BP(mean): --  RR: 16 (10 Jul 2023 08:10) (16 - 18)  SpO2: 96% (10 Jul 2023 08:10) (96% - 98%)    Parameters below as of 10 Jul 2023 08:10  Patient On (Oxygen Delivery Method): room air    REVIEW OF SYMPTOMS  Neurological deficits  Pain     PHYSICAL EXAM  Constitutional - NAD, complaints of pain   HEENT - Right frontal hemicraniectomy   Chest - Breathing comfortably on RA   Cardiovascular - RRR,  warm well perfused  Abdomen - BS+, Soft, PEG incisional site healing well.   Extremities - No edema, No calf tenderness   Neurologic Exam -                    Cognitive - Awake, Alert, AAO to self, place, date, year, situation     Communication - Fluent, No dysarthria,       Motor  Left hemiparesis. Right side 5/5.      Sensory - Impaired.       Coordination - Impaired.   Spasticity-- right shoulder adductors and elbow flexors 3/4  Psychiatric - anxious,      RECENT LABS                        11.6   8.40  )-----------( 316      ( 10 Jul 2023 05:25 )             35.8     07-10    134<L>  |  97  |  9   ----------------------------<  103<H>  3.7   |  29  |  0.54    Ca    9.0      10 Jul 2023 05:25    TPro  7.4  /  Alb  2.7<L>  /  TBili  0.3  /  DBili  x   /  AST  31  /  ALT  41  /  AlkPhos  82  07-10      Urinalysis Basic - ( 10 Jul 2023 05:25 )    Color: x / Appearance: x / SG: x / pH: x  Gluc: 103 mg/dL / Ketone: x  / Bili: x / Urobili: x   Blood: x / Protein: x / Nitrite: x   Leuk Esterase: x / RBC: x / WBC x   Sq Epi: x / Non Sq Epi: x / Bacteria: x    RADIOLOGY:  Reviewed     MEDICATIONS  (STANDING):  brivaracetam Oral Solution 100 milliGRAM(s) Oral two times a day  divalproex Sprinkle 750 milliGRAM(s) Oral <User Schedule>  divalproex Sprinkle 500 milliGRAM(s) Oral <User Schedule>  enoxaparin Injectable 40 milliGRAM(s) SubCutaneous every 24 hours  gabapentin 300 milliGRAM(s) Oral at bedtime  lacosamide Solution 150 milliGRAM(s) Oral two times a day  levothyroxine 75 MICROGram(s) Oral daily  lidocaine   4% Patch 1 Patch Transdermal daily  pantoprazole   Suspension 40 milliGRAM(s) Oral daily  senna 2 Tablet(s) Oral at bedtime  tiZANidine 2 milliGRAM(s) Oral at bedtime  traZODone 75 milliGRAM(s) Oral at bedtime    MEDICATIONS  (PRN):  acetaminophen     Tablet .. 975 milliGRAM(s) Oral every 6 hours PRN Mild Pain (1 - 3)  acetaminophen   IVPB .. 1000 milliGRAM(s) IV Intermittent once PRN Severe Pain (7 - 10)  bacitracin   Ointment 1 Application(s) Topical three times a day PRN scalp irritation  polyethylene glycol 3350 17 Gram(s) Oral daily PRN Constipation    ASSESSMENT/PLAN  38y Female h/o hypothyroid with functional deficits after right frontal IPHs/p right hemicraniectomy.     #hx of seizure  #agitation   -Cont Briviact 50mg BID-> plan to discontinue after 3 days if EEG is fine  -Cont [id number] BID ->plan to repeat VPA    -plan to repeat EEG this week   -Cont Depakote 500mg daily in AM and 750mg in evening.    --depakote level 55 7/10--therapeutic  --NH3 level normal 7/10      #dysphagia s/p PEG   -PEG tube came out on 7/4, did not replace   -calorie count sufficient     #Pain  #Insomnia  -cont. Tylenol PRN  -cont Trazodone 75mg at bedtime  increase gabapentin to 400mg at bedtime for left Neuropathic pain  - increase tizanidine 2mg-> 4mg (CMP ordered for thrs)     #Right Hemicraniectomy and EVD on 6/6  -called neuroplastic surgeon Dr. Sy ([phone]). He spoke to patient's wife about scheduling an appointment on 7/25 or 8/1 (pending rehab DC) to plan for cranioplasty reconstruction.     #hx of anemia   -follow up medicine recommendations   -H/H improving   -fu B12 and folate levels    #hx of HTN --BP soft  -stopped lisinopril   -Monitor    #DVT ppx   -continue lovenox     IDT 7/6:  Nursing: incontinent B/B  SLP: on puree with thin liquids, small single sips, mild-mod language deficits, mod-severe cognitive deficits, limited by attention, memory, reasoning deficits.  Mild to Mod Dysarthria  OT: Total A- across the board, max A--squat-pivot transfer; goal mod-max  PT: Max A--Bed mobility and squat pivot transfer; Ambulated by  kathrynil 15ft-- tot A.  WC tot A.   Goals: 1 Pivot and transfers, bed mobility mod-A, 2 attend to structured tasks for 30 minutes   Dispo: 7/24, [facility] vs.  back to Neurosurgical care or cranioplasty. Spouse very supportive. Possible re-admit following cranioplasty HPI:  38 year old female w/ PMHx hypothyroidism who was admitted to [facility] 6/6 after being found on floor at home, with CT revealing large right-sided ICH. She required emergent right frontal craniectomy for decompression and EVD placement. EVD subsequently removed 6/16. Angio negative. Patient underwent G tube placement 6/20. Hospital course notable for fluctuating leukocytosis and high-grade temperatures with no identified source of infection including in urine, blood, CSF. Now admitted for multidisciplinary rehab. (26 Jun 2023 13:08)    SUBJECTIVE:  Patient seen and examined while in therapy. Also, spoke to patient's family during examination. She continuous to have complaints of pain. Nonspecific, occasionally it is located in the shoulder. She slept 6 hours last night. No complaints of CP/SOB/NV. Taking in good PO. Working hard in therapies. VSS. afebrile. EEG planned for today at 1pm.     VITALS  Vital Signs Last 24 Hrs  T(C): 36.9 (10 Jul 2023 08:10), Max: 37.4 (09 Jul 2023 20:41)  T(F): 98.4 (10 Jul 2023 08:10), Max: 99.4 (09 Jul 2023 20:41)  HR: 90 (10 Jul 2023 08:10) (90 - 98)  BP: 107/74 (10 Jul 2023 08:10) (107/74 - 123/87)  BP(mean): --  RR: 16 (10 Jul 2023 08:10) (16 - 18)  SpO2: 96% (10 Jul 2023 08:10) (96% - 98%)    Parameters below as of 10 Jul 2023 08:10  Patient On (Oxygen Delivery Method): room air    REVIEW OF SYMPTOMS  Neurological deficits  Pain     PHYSICAL EXAM  Constitutional - NAD, complaints of pain, in therapy practicing standing    HEENT - Right frontal hemicraniectomy   Chest - Breathing comfortably on RA   Cardiovascular - RRR,  warm well perfused  Abdomen - BS+, Soft, PEG incisional site healing well.   Extremities - No edema, No calf tenderness   Neurologic Exam -                    Cognitive - Awake, Alert, AAO to self, place, date, year, situation     Communication - Fluent, No dysarthria,       Motor  Left hemiparesis. Right side 5/5.      Sensory - Impaired.       Coordination - Impaired.   Spasticity-- right shoulder adductors and elbow flexors 3/4  Psychiatric - anxious    RECENT LABS                        11.6   8.40  )-----------( 316      ( 10 Jul 2023 05:25 )             35.8     07-10    134<L>  |  97  |  9   ----------------------------<  103<H>  3.7   |  29  |  0.54    Ca    9.0      10 Jul 2023 05:25    TPro  7.4  /  Alb  2.7<L>  /  TBili  0.3  /  DBili  x   /  AST  31  /  ALT  41  /  AlkPhos  82  07-10      Urinalysis Basic - ( 10 Jul 2023 05:25 )    Color: x / Appearance: x / SG: x / pH: x  Gluc: 103 mg/dL / Ketone: x  / Bili: x / Urobili: x   Blood: x / Protein: x / Nitrite: x   Leuk Esterase: x / RBC: x / WBC x   Sq Epi: x / Non Sq Epi: x / Bacteria: x    RADIOLOGY:  Reviewed     MEDICATIONS  (STANDING):  brivaracetam Oral Solution 100 milliGRAM(s) Oral two times a day  divalproex Sprinkle 750 milliGRAM(s) Oral <User Schedule>  divalproex Sprinkle 500 milliGRAM(s) Oral <User Schedule>  enoxaparin Injectable 40 milliGRAM(s) SubCutaneous every 24 hours  gabapentin 300 milliGRAM(s) Oral at bedtime  lacosamide Solution 150 milliGRAM(s) Oral two times a day  levothyroxine 75 MICROGram(s) Oral daily  lidocaine   4% Patch 1 Patch Transdermal daily  pantoprazole   Suspension 40 milliGRAM(s) Oral daily  senna 2 Tablet(s) Oral at bedtime  tiZANidine 2 milliGRAM(s) Oral at bedtime  traZODone 75 milliGRAM(s) Oral at bedtime    MEDICATIONS  (PRN):  acetaminophen     Tablet .. 975 milliGRAM(s) Oral every 6 hours PRN Mild Pain (1 - 3)  acetaminophen   IVPB .. 1000 milliGRAM(s) IV Intermittent once PRN Severe Pain (7 - 10)  bacitracin   Ointment 1 Application(s) Topical three times a day PRN scalp irritation  polyethylene glycol 3350 17 Gram(s) Oral daily PRN Constipation    ASSESSMENT/PLAN  38y Female h/o hypothyroid with functional deficits after right frontal IPHs/p right hemicraniectomy.     #hx of seizure  #agitation   -Cont Briviact 50mg BID-> plan to discontinue after 3 days if EEG is fine  -Cont [id number] BID ->plan to repeat VPA    -plan to repeat EEG on 7/12-will follow up results   -Cont Depakote 500mg daily in AM and 750mg in evening.    --Depakote level 55 7/10--therapeutic  --NH3 level normal 7/10      #dysphagia s/p PEG   -PEG tube came out on 7/4, did not replace   -calorie count sufficient     #Pain/Neuropathic   #Insomnia  -cont. Tylenol PRN  -cont Trazodone 75mg at bedtime  -cont gabapentin to 400mg at bedtime for le  -cont tizanidine 2mg-> 4mg, follow up CMP for thurs     #Right Hemicraniectomy and EVD on 6/6  -called neuroplastic surgeon Dr. Sy ([phone]). He spoke to patient's wife about scheduling an appointment on 7/25 or 8/1 (pending rehab DC) to plan for cranioplasty reconstruction.   -discussed cranioplasty planning with patient's family    #hx of anemia   -follow up medicine recommendations   -H/H stable     #hx of HTN --BP soft  -Monitor    #DVT ppx   -continue lovenox     IDT 7/6:  Nursing: incontinent B/B  SLP: on puree with thin liquids, small single sips, mild-mod language deficits, mod-severe cognitive deficits, limited by attention, memory, reasoning deficits.  Mild to Mod Dysarthria  OT: Total A- across the board, max A--squat-pivot transfer; goal mod-max  PT: Max A--Bed mobility and squat pivot transfer; Ambulated by  rail 15ft-- tot A.  WC tot A.   Goals: 1 Pivot and transfers, bed mobility mod-A, 2 attend to structured tasks for 30 minutes   Dispo: 7/24, [facility] vs.  back to Neurosurgical care or cranioplasty. Spouse very supportive. Possible re-admit following cranioplasty HPI:  38 year old female w/ PMHx hypothyroidism who was admitted to [facility] 6/6 after being found on floor at home, with CT revealing large right-sided ICH. She required emergent right frontal craniectomy for decompression and EVD placement. EVD subsequently removed 6/16. Angio negative. Patient underwent G tube placement 6/20. Hospital course notable for fluctuating leukocytosis and high-grade temperatures with no identified source of infection including in urine, blood, CSF. Now admitted for multidisciplinary rehab. (26 Jun 2023 13:08)    SUBJECTIVE:  Patient seen and examined while in therapy. Also, spoke to patient's family during examination. She continuous to have complaints of pain. Nonspecific, occasionally it is located in the shoulder. She slept 6 hours last night. No complaints of CP/SOB/NV. Taking in good PO. Working hard in therapies. VSS. afebrile. EEG planned for today at 1pm.     VITALS  Vital Signs Last 24 Hrs  T(C): 36.9 (10 Jul 2023 08:10), Max: 37.4 (09 Jul 2023 20:41)  T(F): 98.4 (10 Jul 2023 08:10), Max: 99.4 (09 Jul 2023 20:41)  HR: 90 (10 Jul 2023 08:10) (90 - 98)  BP: 107/74 (10 Jul 2023 08:10) (107/74 - 123/87)  BP(mean): --  RR: 16 (10 Jul 2023 08:10) (16 - 18)  SpO2: 96% (10 Jul 2023 08:10) (96% - 98%)    Parameters below as of 10 Jul 2023 08:10  Patient On (Oxygen Delivery Method): room air    REVIEW OF SYMPTOMS  Neurological deficits  Pain     PHYSICAL EXAM  Constitutional - NAD, complaints of pain, in therapy practicing standing    HEENT - Right frontal hemicraniectomy   Chest - Breathing comfortably on RA   Cardiovascular - RRR,  warm well perfused  Abdomen - BS+, Soft, PEG incisional site healing well.   Extremities - No edema, No calf tenderness   Neurologic Exam -                    Cognitive - Awake, Alert, AAO to self, place, date, year, situation     Communication - Fluent, No dysarthria,       Motor  Left hemiparesis. Right side 5/5.      Sensory - Impaired.       Coordination - Impaired.   Spasticity-- right shoulder adductors and elbow flexors 3/4  Psychiatric - anxious    RECENT LABS                        11.6   8.40  )-----------( 316      ( 10 Jul 2023 05:25 )             35.8     07-10    134<L>  |  97  |  9   ----------------------------<  103<H>  3.7   |  29  |  0.54    Ca    9.0      10 Jul 2023 05:25    TPro  7.4  /  Alb  2.7<L>  /  TBili  0.3  /  DBili  x   /  AST  31  /  ALT  41  /  AlkPhos  82  07-10      Urinalysis Basic - ( 10 Jul 2023 05:25 )    Color: x / Appearance: x / SG: x / pH: x  Gluc: 103 mg/dL / Ketone: x  / Bili: x / Urobili: x   Blood: x / Protein: x / Nitrite: x   Leuk Esterase: x / RBC: x / WBC x   Sq Epi: x / Non Sq Epi: x / Bacteria: x    RADIOLOGY:  Reviewed     MEDICATIONS  (STANDING):  brivaracetam Oral Solution 100 milliGRAM(s) Oral two times a day  divalproex Sprinkle 750 milliGRAM(s) Oral <User Schedule>  divalproex Sprinkle 500 milliGRAM(s) Oral <User Schedule>  enoxaparin Injectable 40 milliGRAM(s) SubCutaneous every 24 hours  gabapentin 300 milliGRAM(s) Oral at bedtime  lacosamide Solution 150 milliGRAM(s) Oral two times a day  levothyroxine 75 MICROGram(s) Oral daily  lidocaine   4% Patch 1 Patch Transdermal daily  pantoprazole   Suspension 40 milliGRAM(s) Oral daily  senna 2 Tablet(s) Oral at bedtime  tiZANidine 2 milliGRAM(s) Oral at bedtime  traZODone 75 milliGRAM(s) Oral at bedtime    MEDICATIONS  (PRN):  acetaminophen     Tablet .. 975 milliGRAM(s) Oral every 6 hours PRN Mild Pain (1 - 3)  acetaminophen   IVPB .. 1000 milliGRAM(s) IV Intermittent once PRN Severe Pain (7 - 10)  bacitracin   Ointment 1 Application(s) Topical three times a day PRN scalp irritation  polyethylene glycol 3350 17 Gram(s) Oral daily PRN Constipation    ASSESSMENT/PLAN  38y Female h/o hypothyroid with functional deficits after right frontal IPHs/p right hemicraniectomy.     #Right Hemicraniectomy and EVD on 6/6  -called neuroplastic surgeon Dr. Sy ([phone]). He spoke to patient's wife about scheduling an appointment on 7/25 or 8/1 (pending rehab DC) to plan for cranioplasty reconstruction.   -discussed cranioplasty planning with patient's family  -neuroendocrine workup ordered for 7/13     #hx of seizure  #agitation   -Cont Briviact 50mg BID-> plan to discontinue after 3 days if EEG is fine  -Cont [id number] BID ->plan to repeat VPA    -plan to repeat EEG on 7/12-will follow up results   -Cont Depakote 500mg daily in AM and 750mg in evening.    --Depakote level 55 7/10--therapeutic  --NH3 level normal 7/10      #dysphagia s/p PEG   -PEG tube came out on 7/4, did not replace   -calorie count sufficient     #Pain/Neuropathic   #Insomnia  -cont. Tylenol PRN  -cont Trazodone 75mg at bedtime  -cont gabapentin to 400mg at bedtime for le  -cont tizanidine 2mg-> 4mg, follow up CMP for thurs     #hx of anemia   -follow up medicine recommendations   -H/H stable     #hx of HTN --BP soft  -Monitor    #DVT ppx   -continue lovenox     IDT 7/6:  Nursing: incontinent B/B  SLP: on puree with thin liquids, small single sips, mild-mod language deficits, mod-severe cognitive deficits, limited by attention, memory, reasoning deficits.  Mild to Mod Dysarthria  OT: Total A- across the board, max A--squat-pivot transfer; goal mod-max  PT: Max A--Bed mobility and squat pivot transfer; Ambulated by  rail 15ft-- tot A.  WC tot A.   Goals: 1 Pivot and transfers, bed mobility mod-A, 2 attend to structured tasks for 30 minutes   Dispo: 7/24, [facility] vs.  back to Neurosurgical care or cranioplasty. Spouse very supportive. Possible re-admit following cranioplasty

## 2023-07-13 LAB
ALBUMIN SERPL ELPH-MCNC: 3 G/DL — LOW (ref 3.3–5)
ALP SERPL-CCNC: 71 U/L — SIGNIFICANT CHANGE UP (ref 40–120)
ALT FLD-CCNC: 50 U/L — HIGH (ref 10–45)
ANION GAP SERPL CALC-SCNC: 8 MMOL/L — SIGNIFICANT CHANGE UP (ref 5–17)
AST SERPL-CCNC: 28 U/L — SIGNIFICANT CHANGE UP (ref 10–40)
BASOPHILS # BLD AUTO: 0.02 K/UL — SIGNIFICANT CHANGE UP (ref 0–0.2)
BASOPHILS NFR BLD AUTO: 0.3 % — SIGNIFICANT CHANGE UP (ref 0–2)
BILIRUB SERPL-MCNC: 0.1 MG/DL — LOW (ref 0.2–1.2)
BUN SERPL-MCNC: 10 MG/DL — SIGNIFICANT CHANGE UP (ref 7–23)
CALCIUM SERPL-MCNC: 9.4 MG/DL — SIGNIFICANT CHANGE UP (ref 8.4–10.5)
CHLORIDE SERPL-SCNC: 100 MMOL/L — SIGNIFICANT CHANGE UP (ref 96–108)
CO2 SERPL-SCNC: 28 MMOL/L — SIGNIFICANT CHANGE UP (ref 22–31)
CORTIS AM PEAK SERPL-MCNC: 21.8 UG/DL — HIGH (ref 6–18.4)
CREAT SERPL-MCNC: 0.4 MG/DL — LOW (ref 0.5–1.3)
EGFR: 129 ML/MIN/1.73M2 — SIGNIFICANT CHANGE UP
EOSINOPHIL # BLD AUTO: 0.05 K/UL — SIGNIFICANT CHANGE UP (ref 0–0.5)
EOSINOPHIL NFR BLD AUTO: 0.8 % — SIGNIFICANT CHANGE UP (ref 0–6)
ESTRADIOL FREE SERPL-MCNC: 7 PG/ML — SIGNIFICANT CHANGE UP
FSH SERPL-MCNC: 9.6 IU/L — SIGNIFICANT CHANGE UP
GLUCOSE SERPL-MCNC: 105 MG/DL — HIGH (ref 70–99)
HCT VFR BLD CALC: 35.7 % — SIGNIFICANT CHANGE UP (ref 34.5–45)
HGB BLD-MCNC: 11.7 G/DL — SIGNIFICANT CHANGE UP (ref 11.5–15.5)
IMM GRANULOCYTES NFR BLD AUTO: 0.6 % — SIGNIFICANT CHANGE UP (ref 0–0.9)
LH SERPL-ACNC: 1.4 IU/L — SIGNIFICANT CHANGE UP
LYMPHOCYTES # BLD AUTO: 2.36 K/UL — SIGNIFICANT CHANGE UP (ref 1–3.3)
LYMPHOCYTES # BLD AUTO: 37 % — SIGNIFICANT CHANGE UP (ref 13–44)
MCHC RBC-ENTMCNC: 31.7 PG — SIGNIFICANT CHANGE UP (ref 27–34)
MCHC RBC-ENTMCNC: 32.8 GM/DL — SIGNIFICANT CHANGE UP (ref 32–36)
MCV RBC AUTO: 96.7 FL — SIGNIFICANT CHANGE UP (ref 80–100)
MONOCYTES # BLD AUTO: 0.5 K/UL — SIGNIFICANT CHANGE UP (ref 0–0.9)
MONOCYTES NFR BLD AUTO: 7.8 % — SIGNIFICANT CHANGE UP (ref 2–14)
NEUTROPHILS # BLD AUTO: 3.41 K/UL — SIGNIFICANT CHANGE UP (ref 1.8–7.4)
NEUTROPHILS NFR BLD AUTO: 53.5 % — SIGNIFICANT CHANGE UP (ref 43–77)
NRBC # BLD: 0 /100 WBCS — SIGNIFICANT CHANGE UP (ref 0–0)
PLATELET # BLD AUTO: 297 K/UL — SIGNIFICANT CHANGE UP (ref 150–400)
POTASSIUM SERPL-MCNC: 4.1 MMOL/L — SIGNIFICANT CHANGE UP (ref 3.5–5.3)
POTASSIUM SERPL-SCNC: 4.1 MMOL/L — SIGNIFICANT CHANGE UP (ref 3.5–5.3)
PROT SERPL-MCNC: 6.8 G/DL — SIGNIFICANT CHANGE UP (ref 6–8.3)
RBC # BLD: 3.69 M/UL — LOW (ref 3.8–5.2)
RBC # FLD: 12.9 % — SIGNIFICANT CHANGE UP (ref 10.3–14.5)
SODIUM SERPL-SCNC: 136 MMOL/L — SIGNIFICANT CHANGE UP (ref 135–145)
T4 FREE SERPL-MCNC: 1.8 NG/DL — SIGNIFICANT CHANGE UP (ref 0.9–1.8)
TSH SERPL-MCNC: 1.24 UIU/ML — SIGNIFICANT CHANGE UP (ref 0.36–3.74)
VALPROATE FREE SERPL-MCNC: 14.5 MG/L — SIGNIFICANT CHANGE UP (ref 4.8–17.3)
WBC # BLD: 6.38 K/UL — SIGNIFICANT CHANGE UP (ref 3.8–10.5)
WBC # FLD AUTO: 6.38 K/UL — SIGNIFICANT CHANGE UP (ref 3.8–10.5)

## 2023-07-13 PROCEDURE — 99233 SBSQ HOSP IP/OBS HIGH 50: CPT

## 2023-07-13 RX ORDER — ACETAMINOPHEN 500 MG
650 TABLET ORAL ONCE
Refills: 0 | Status: DISCONTINUED | OUTPATIENT
Start: 2023-07-13 | End: 2023-07-13

## 2023-07-13 RX ORDER — ONABOTULINUMTOXINA 100 UNIT
200 VIAL (EA) INJECTION ONCE
Refills: 0 | Status: COMPLETED | OUTPATIENT
Start: 2023-07-14 | End: 2023-07-14

## 2023-07-13 RX ORDER — BRIVARACETAM 25 MG/1
50 TABLET, FILM COATED ORAL
Refills: 0 | Status: DISCONTINUED | OUTPATIENT
Start: 2023-07-13 | End: 2023-07-13

## 2023-07-13 RX ORDER — ACETAMINOPHEN 500 MG
975 TABLET ORAL ONCE
Refills: 0 | Status: COMPLETED | OUTPATIENT
Start: 2023-07-13 | End: 2023-07-13

## 2023-07-13 RX ORDER — ACETAMINOPHEN 500 MG
975 TABLET ORAL EVERY 6 HOURS
Refills: 0 | Status: DISCONTINUED | OUTPATIENT
Start: 2023-07-14 | End: 2023-07-14

## 2023-07-13 RX ADMIN — Medication 75 MICROGRAM(S): at 06:13

## 2023-07-13 RX ADMIN — LACOSAMIDE 150 MILLIGRAM(S): 50 TABLET ORAL at 06:12

## 2023-07-13 RX ADMIN — POLYETHYLENE GLYCOL 3350 17 GRAM(S): 17 POWDER, FOR SOLUTION ORAL at 13:52

## 2023-07-13 RX ADMIN — BRIVARACETAM 50 MILLIGRAM(S): 25 TABLET, FILM COATED ORAL at 17:27

## 2023-07-13 RX ADMIN — Medication 975 MILLIGRAM(S): at 09:03

## 2023-07-13 RX ADMIN — TIZANIDINE 4 MILLIGRAM(S): 4 TABLET ORAL at 21:15

## 2023-07-13 RX ADMIN — Medication 975 MILLIGRAM(S): at 04:02

## 2023-07-13 RX ADMIN — SENNA PLUS 2 TABLET(S): 8.6 TABLET ORAL at 21:17

## 2023-07-13 RX ADMIN — Medication 975 MILLIGRAM(S): at 09:46

## 2023-07-13 RX ADMIN — LIDOCAINE 1 PATCH: 4 CREAM TOPICAL at 21:11

## 2023-07-13 RX ADMIN — Medication 975 MILLIGRAM(S): at 03:10

## 2023-07-13 RX ADMIN — DIVALPROEX SODIUM 750 MILLIGRAM(S): 500 TABLET, DELAYED RELEASE ORAL at 17:27

## 2023-07-13 RX ADMIN — LACOSAMIDE 150 MILLIGRAM(S): 50 TABLET ORAL at 17:27

## 2023-07-13 RX ADMIN — Medication 975 MILLIGRAM(S): at 20:50

## 2023-07-13 RX ADMIN — Medication 75 MILLIGRAM(S): at 21:15

## 2023-07-13 RX ADMIN — PANTOPRAZOLE SODIUM 40 MILLIGRAM(S): 20 TABLET, DELAYED RELEASE ORAL at 12:23

## 2023-07-13 RX ADMIN — BRIVARACETAM 50 MILLIGRAM(S): 25 TABLET, FILM COATED ORAL at 06:12

## 2023-07-13 RX ADMIN — DIVALPROEX SODIUM 500 MILLIGRAM(S): 500 TABLET, DELAYED RELEASE ORAL at 06:12

## 2023-07-13 RX ADMIN — Medication 975 MILLIGRAM(S): at 15:20

## 2023-07-13 RX ADMIN — Medication 975 MILLIGRAM(S): at 20:02

## 2023-07-13 RX ADMIN — GABAPENTIN 400 MILLIGRAM(S): 400 CAPSULE ORAL at 21:14

## 2023-07-13 RX ADMIN — Medication 975 MILLIGRAM(S): at 17:08

## 2023-07-13 RX ADMIN — LIDOCAINE 1 PATCH: 4 CREAM TOPICAL at 19:30

## 2023-07-13 RX ADMIN — LIDOCAINE 1 PATCH: 4 CREAM TOPICAL at 08:05

## 2023-07-13 NOTE — PROVIDER CONTACT NOTE (OTHER) - SITUATION
Patient complaining of pain, crying, stating everything hurts, had her last dose of tylenol around 3pm, requesting an additional dose

## 2023-07-13 NOTE — PROGRESS NOTE ADULT - SUBJECTIVE AND OBJECTIVE BOX
HPI:  38 year old female w/ PMHx hypothyroidism who was admitted to [facility] 6/6 after being found on floor at home, with CT revealing large right-sided ICH. She required emergent right frontal craniectomy for decompression and EVD placement. EVD subsequently removed 6/16. Angio negative. Patient underwent G tube placement 6/20. Hospital course notable for fluctuating leukocytosis and high-grade temperatures with no identified source of infection including in urine, blood, CSF. Now admitted for multidisciplinary rehab. (26 Jun 2023 13:08)    SUBJECTIVE:  Patient stable overnight. Seen in AM with wife present. She continues to have complaints of nonspecific generalized pain. Per wife, she slept form 10pm-3am and is eating well. Discussion of treating her muscle spasticity with Botox injection was held. No complaints of CP/SOB/NV. Taking in good PO. VSS. afebrile. Another attempt of EEG planned for today at 1pm.     VITALS  Vital Signs Last 24 Hrs  T(C): 36.9 (10 Jul 2023 08:10), Max: 37.4 (09 Jul 2023 20:41)  T(F): 98.4 (10 Jul 2023 08:10), Max: 99.4 (09 Jul 2023 20:41)  HR: 90 (10 Jul 2023 08:10) (90 - 98)  BP: 107/74 (10 Jul 2023 08:10) (107/74 - 123/87)  BP(mean): --  RR: 16 (10 Jul 2023 08:10) (16 - 18)  SpO2: 96% (10 Jul 2023 08:10) (96% - 98%)    Parameters below as of 10 Jul 2023 08:10  Patient On (Oxygen Delivery Method): room air    REVIEW OF SYMPTOMS  Neurological deficits  Pain     PHYSICAL EXAM  Constitutional - NAD, complaints of pain, in therapy practicing standing    HEENT - Right frontal hemicraniectomy   Chest - Breathing comfortably on RA   Cardiovascular - RRR,  warm well perfused  Abdomen - BS+, Soft, PEG incisional site healing well.   Extremities - No edema, No calf tenderness   Neurologic Exam -                    Cognitive - Awake, Alert, AAO to self, place, date, year, situation     Communication - Fluent, No dysarthria,       Motor  Left hemiparesis. Right side 5/5.      Sensory - Impaired.       Coordination - Impaired.   Spasticity-- right shoulder adductors and elbow flexors 3/4  Psychiatric - anxious      LABS:                        11.7   6.38  )-----------( 297      ( 13 Jul 2023 07:32 )             35.7     07-13    136  |  100  |  10  ----------------------------<  105<H>  4.1   |  28  |  0.40<L>    Ca    9.4      13 Jul 2023 07:32    TPro  6.8  /  Alb  3.0<L>  /  TBili  0.1<L>  /  DBili  x   /  AST  28  /  ALT  50<H>  /  AlkPhos  71  07-13        RADIOLOGY:  Reviewed     MEDICATIONS  (STANDING):  brivaracetam Oral Solution 50 milliGRAM(s) Oral two times a day  divalproex Sprinkle 750 milliGRAM(s) Oral <User Schedule>  divalproex Sprinkle 500 milliGRAM(s) Oral <User Schedule>  enoxaparin Injectable 40 milliGRAM(s) SubCutaneous every 24 hours  gabapentin 400 milliGRAM(s) Oral at bedtime  lacosamide Solution 150 milliGRAM(s) Oral two times a day  levothyroxine 75 MICROGram(s) Oral daily  lidocaine   4% Patch 1 Patch Transdermal <User Schedule>  pantoprazole   Suspension 40 milliGRAM(s) Oral daily  senna 2 Tablet(s) Oral at bedtime  tiZANidine 4 milliGRAM(s) Oral at bedtime  traZODone 75 milliGRAM(s) Oral at bedtime    MEDICATIONS  (PRN):  acetaminophen     Tablet .. 975 milliGRAM(s) Oral every 6 hours PRN Mild Pain (1 - 3)  acetaminophen   IVPB .. 1000 milliGRAM(s) IV Intermittent once PRN Severe Pain (7 - 10)  bacitracin   Ointment 1 Application(s) Topical three times a day PRN scalp irritation  polyethylene glycol 3350 17 Gram(s) Oral daily PRN Constipation       HPI:  38 year old female w/ PMHx hypothyroidism who was admitted to [facility] 6/6 after being found on floor at home, with CT revealing large right-sided ICH. She required emergent right frontal craniectomy for decompression and EVD placement. EVD subsequently removed 6/16. Angio negative. Patient underwent G tube placement 6/20. Hospital course notable for fluctuating leukocytosis and high-grade temperatures with no identified source of infection including in urine, blood, CSF. Now admitted for multidisciplinary rehab. (26 Jun 2023 13:08)    SUBJECTIVE:  Patient stable overnight. Seen in AM with wife present. She continues to have complaints of nonspecific generalized pain. Per wife, she slept form 10pm-3am and is eating well. Discussion of treating her muscle spasticity with Botox injection was held. No complaints of CP/SOB/NV. Taking in good PO. VSS. afebrile. Another attempt of EEG planned for today at 1pm.     VITALS  Vital Signs Last 24 Hrs  T(C): 36.9 (10 Jul 2023 08:10), Max: 37.4 (09 Jul 2023 20:41)  T(F): 98.4 (10 Jul 2023 08:10), Max: 99.4 (09 Jul 2023 20:41)  HR: 90 (10 Jul 2023 08:10) (90 - 98)  BP: 107/74 (10 Jul 2023 08:10) (107/74 - 123/87)  BP(mean): --  RR: 16 (10 Jul 2023 08:10) (16 - 18)  SpO2: 96% (10 Jul 2023 08:10) (96% - 98%)    Parameters below as of 10 Jul 2023 08:10  Patient On (Oxygen Delivery Method): room air    REVIEW OF SYMPTOMS  Neurological deficits  Pain     PHYSICAL EXAM  Constitutional - NAD, complaints of pain, in therapy practicing standing    HEENT - Right frontal hemicraniectomy   Chest - Breathing comfortably on RA   Cardiovascular - RRR,  warm well perfused  Abdomen - BS+, Soft, PEG incisional site healing well.   Extremities - No edema, No calf tenderness   Neurologic Exam -                    Cognitive - Awake, Alert, AAO to self, place, date, year, situation     Communication - Fluent, No dysarthria,       Motor  Left hemiparesis. Right side 5/5.      Sensory - Impaired.       Coordination - Impaired.   Spasticity-- Left shoulder adductors and elbow flexors 3/4  Psychiatric - anxious      LABS:                        11.7   6.38  )-----------( 297      ( 13 Jul 2023 07:32 )             35.7     07-13    136  |  100  |  10  ----------------------------<  105<H>  4.1   |  28  |  0.40<L>    Ca    9.4      13 Jul 2023 07:32    TPro  6.8  /  Alb  3.0<L>  /  TBili  0.1<L>  /  DBili  x   /  AST  28  /  ALT  50<H>  /  AlkPhos  71  07-13        RADIOLOGY:  Reviewed     MEDICATIONS  (STANDING):  brivaracetam Oral Solution 50 milliGRAM(s) Oral two times a day  divalproex Sprinkle 750 milliGRAM(s) Oral <User Schedule>  divalproex Sprinkle 500 milliGRAM(s) Oral <User Schedule>  gabapentin 400 milliGRAM(s) Oral at bedtime  lacosamide Solution 150 milliGRAM(s) Oral two times a day  levothyroxine 75 MICROGram(s) Oral daily  lidocaine   4% Patch 1 Patch Transdermal <User Schedule>  pantoprazole   Suspension 40 milliGRAM(s) Oral daily  senna 2 Tablet(s) Oral at bedtime  tiZANidine 4 milliGRAM(s) Oral at bedtime  traZODone 75 milliGRAM(s) Oral at bedtime    MEDICATIONS  (PRN):  acetaminophen     Tablet .. 975 milliGRAM(s) Oral every 6 hours PRN Mild Pain (1 - 3)  acetaminophen   IVPB .. 1000 milliGRAM(s) IV Intermittent once PRN Severe Pain (7 - 10)  bacitracin   Ointment 1 Application(s) Topical three times a day PRN scalp irritation  polyethylene glycol 3350 17 Gram(s) Oral daily PRN Constipation         HPI:  38 year old female w/ PMHx hypothyroidism who was admitted to [facility] 6/6 after being found on floor at home, with CT revealing large right-sided ICH. She required emergent right frontal craniectomy for decompression and EVD placement. EVD subsequently removed 6/16. Angio negative. Patient underwent G tube placement 6/20. Hospital course notable for fluctuating leukocytosis and high-grade temperatures with no identified source of infection including in urine, blood, CSF. Now admitted for multidisciplinary rehab. (26 Jun 2023 13:08)    SUBJECTIVE:  Patient stable overnight. Seen in AM with wife present. She continues to have complaints of nonspecific generalized pain. Per wife, she slept form 10pm-3am and is eating well. Last BM 7/10 and received miralax yesterday night. Discussion of treating her muscle spasticity with Botox injection was held. No complaints of CP/SOB/NV. Taking in good PO. VSS. afebrile.     Vital Signs Last 24 Hrs  T(C): 36.6 (13 Jul 2023 07:25), Max: 36.9 (12 Jul 2023 19:27)  T(F): 97.9 (13 Jul 2023 07:25), Max: 98.4 (12 Jul 2023 19:27)  HR: 71 (13 Jul 2023 07:25) (71 - 90)  BP: 120/83 (13 Jul 2023 07:25) (120/83 - 149/97)  BP(mean): --  RR: 16 (13 Jul 2023 07:25) (16 - 16)  SpO2: 98% (13 Jul 2023 07:25) (98% - 99%)    Parameters below as of 13 Jul 2023 07:25  Patient On (Oxygen Delivery Method): room air      REVIEW OF SYMPTOMS  Neurological deficits  Pain     PHYSICAL EXAM  Constitutional - NAD, complaints of pain, in therapy practicing standing    HEENT - Right frontal hemicraniectomy   Chest - Breathing comfortably on RA   Cardiovascular - RRR,  warm well perfused  Abdomen - BS+, Soft, PEG incisional site healing well.   Extremities - No edema, No calf tenderness   Neurologic Exam -                    Cognitive - Awake, Alert, AAO to self, place, date, year, situation     Communication - Fluent, No dysarthria,       Motor  Left hemiparesis. Right side 5/5.      Sensory - Impaired.       Coordination - Impaired.   Spasticity-- Left shoulder adductors and elbow flexors 3/4  Psychiatric - anxious      LABS:                        11.7   6.38  )-----------( 297      ( 13 Jul 2023 07:32 )             35.7     07-13    136  |  100  |  10  ----------------------------<  105<H>  4.1   |  28  |  0.40<L>    Ca    9.4      13 Jul 2023 07:32    TPro  6.8  /  Alb  3.0<L>  /  TBili  0.1<L>  /  DBili  x   /  AST  28  /  ALT  50<H>  /  AlkPhos  71  07-13        RADIOLOGY:  Reviewed     MEDICATIONS  (STANDING):  brivaracetam Oral Solution 50 milliGRAM(s) Oral two times a day  divalproex Sprinkle 750 milliGRAM(s) Oral <User Schedule>  divalproex Sprinkle 500 milliGRAM(s) Oral <User Schedule>  gabapentin 400 milliGRAM(s) Oral at bedtime  lacosamide Solution 150 milliGRAM(s) Oral two times a day  levothyroxine 75 MICROGram(s) Oral daily  lidocaine   4% Patch 1 Patch Transdermal <User Schedule>  pantoprazole   Suspension 40 milliGRAM(s) Oral daily  senna 2 Tablet(s) Oral at bedtime  tiZANidine 4 milliGRAM(s) Oral at bedtime  traZODone 75 milliGRAM(s) Oral at bedtime    MEDICATIONS  (PRN):  acetaminophen     Tablet .. 975 milliGRAM(s) Oral every 6 hours PRN Mild Pain (1 - 3)  acetaminophen   IVPB .. 1000 milliGRAM(s) IV Intermittent once PRN Severe Pain (7 - 10)  bacitracin   Ointment 1 Application(s) Topical three times a day PRN scalp irritation  polyethylene glycol 3350 17 Gram(s) Oral daily PRN Constipation         HPI:  38 year old female w/ PMHx hypothyroidism who was admitted to [facility] 6/6 after being found on floor at home, with CT revealing large right-sided ICH. She required emergent right frontal craniectomy for decompression and EVD placement. EVD subsequently removed 6/16. Angio negative. Patient underwent G tube placement 6/20. Hospital course notable for fluctuating leukocytosis and high-grade temperatures with no identified source of infection including in urine, blood, CSF. Now admitted for multidisciplinary rehab. (26 Jun 2023 13:08)    SUBJECTIVE:  Patient stable overnight. Seen in AM with wife present. She continues to have complaints of nonspecific generalized pain. Per wife, she slept overnight and is eating well. Last BM 7/10 and received miralax yesterday night. Discussion of treating her muscle spasticity with Botox injection was held. No complaints of CP/SOB/NV. Taking in good PO. VSS. afebrile.     Vital Signs Last 24 Hrs  T(C): 36.6 (13 Jul 2023 07:25), Max: 36.9 (12 Jul 2023 19:27)  T(F): 97.9 (13 Jul 2023 07:25), Max: 98.4 (12 Jul 2023 19:27)  HR: 71 (13 Jul 2023 07:25) (71 - 90)  BP: 120/83 (13 Jul 2023 07:25) (120/83 - 149/97)  BP(mean): --  RR: 16 (13 Jul 2023 07:25) (16 - 16)  SpO2: 98% (13 Jul 2023 07:25) (98% - 99%)    Parameters below as of 13 Jul 2023 07:25  Patient On (Oxygen Delivery Method): room air      REVIEW OF SYMPTOMS  Neurological deficits  Pain     PHYSICAL EXAM  Constitutional - NAD, complaints of pain, in therapy practicing standing    HEENT - Right frontal hemicraniectomy   Chest - Breathing comfortably on RA   Cardiovascular - RRR,  warm well perfused  Abdomen - BS+, Soft, PEG incisional site healing well.   Extremities - No edema, No calf tenderness   Neurologic Exam -                    Cognitive - Awake, Alert, AAO to self, place, date, year, situation     Communication - Fluent, No dysarthria,       Motor  Left hemiparesis. Right side 5/5.      Sensory - Impaired.       Coordination - Impaired.   Spasticity-- Left shoulder adductors and elbow flexors 3/4  Psychiatric - anxious      LABS:                        11.7   6.38  )-----------( 297      ( 13 Jul 2023 07:32 )             35.7     07-13    136  |  100  |  10  ----------------------------<  105<H>  4.1   |  28  |  0.40<L>    Ca    9.4      13 Jul 2023 07:32    TPro  6.8  /  Alb  3.0<L>  /  TBili  0.1<L>  /  DBili  x   /  AST  28  /  ALT  50<H>  /  AlkPhos  71  07-13        RADIOLOGY:  Reviewed     MEDICATIONS  (STANDING):  brivaracetam Oral Solution 50 milliGRAM(s) Oral two times a day  divalproex Sprinkle 750 milliGRAM(s) Oral <User Schedule>  divalproex Sprinkle 500 milliGRAM(s) Oral <User Schedule>  gabapentin 400 milliGRAM(s) Oral at bedtime  lacosamide Solution 150 milliGRAM(s) Oral two times a day  levothyroxine 75 MICROGram(s) Oral daily  lidocaine   4% Patch 1 Patch Transdermal <User Schedule>  pantoprazole   Suspension 40 milliGRAM(s) Oral daily  senna 2 Tablet(s) Oral at bedtime  tiZANidine 4 milliGRAM(s) Oral at bedtime  traZODone 75 milliGRAM(s) Oral at bedtime    MEDICATIONS  (PRN):  acetaminophen     Tablet .. 975 milliGRAM(s) Oral every 6 hours PRN Mild Pain (1 - 3)  acetaminophen   IVPB .. 1000 milliGRAM(s) IV Intermittent once PRN Severe Pain (7 - 10)  bacitracin   Ointment 1 Application(s) Topical three times a day PRN scalp irritation  polyethylene glycol 3350 17 Gram(s) Oral daily PRN Constipation

## 2023-07-13 NOTE — PROVIDER CONTACT NOTE (OTHER) - ASSESSMENT
pt expressed feeling more anxious then usual and was visually upset and complaining she couldn't sleep
/98 hr 88 Temp 97.9 O2 97
fell downstairs and hit left elbow, 4 months ago/Accidental fall

## 2023-07-13 NOTE — PROGRESS NOTE ADULT - NUTRITIONAL ASSESSMENT
This patient has been assessed with a concern for Malnutrition and has been determined to have a diagnosis/diagnoses of Severe protein-calorie malnutrition.    This patient is being managed with:   Diet Minced and Moist-  Extra Sauces Gravies  Supplement Feeding Modality:  Oral  Ensure Plus High Protein Cans or Servings Per Day:  1       Frequency:  Daily  Entered: Jul 12 2023  3:40PM    Diet Minced and Moist-  Extra Sauces Gravies  Supplement Feeding Modality:  Oral  Ensure Plus High Protein Cans or Servings Per Day:  1       Frequency:  Three Times a day  Entered: Jul 7 2023  1:03PM    The following pending diet order is being considered for treatment of Severe protein-calorie malnutrition:null

## 2023-07-13 NOTE — PROVIDER CONTACT NOTE (OTHER) - RECOMMENDATIONS
have the dr order either melatonin or an additional dose of trazodone to help the pt feel less anxious and help her sleep
order an additional 975mg of Tylenol

## 2023-07-13 NOTE — PROGRESS NOTE ADULT - ASSESSMENT
ASSESSMENT/PLAN  38y Female h/o hypothyroid with functional deficits after right frontal IPHs/p right hemicraniectomy.     #Right Hemicraniectomy and EVD on 6/6  - Communication with patients family, neuroplastic surgeon Dr. Sy ([phone]) and PM&R team has taken place. As of now there is a scheduled appointment on 7/25 with Dr. Sy for cranioplasty reconstruction. However, there is a possibility for an earlier appointment that is pending discussion/coordination.   - neuroendocrine workup ordered 7/13       #hx of seizure  #agitation   -Cont Briviact 50mg BID-> plan to discontinue after 3 days if EEG is fine. Unable to tolerate EEG yesterday. Will plan for another try today with PRN ativan prior.   -Cont [id number] BID ->plan to repeat VPA    -Cont Depakote 500mg daily in AM and 750mg in evening.    --Depakote level 55 7/10--therapeutic  --NH3 level normal 7/10      #dysphagia s/p PEG   -PEG tube came out on 7/4, did not replace   -calorie count sufficient     #Pain/Neuropathic   #Insomnia  -cont. Tylenol PRN  -cont Trazodone 75mg at bedtime  -cont gabapentin to 400mg at bedtime  -cont tizanidine 4mg, follow up CMP 7/13. Liver enzymes wnl.    -discussed treatment benefits/risks of botox injection use for left pec and bicep muscles with family today. Planning botox injection tomorrow.     #hx of anemia   -follow up medicine recommendations   -H/H stable     #hx of HTN --BP soft  -Monitor    #DVT ppx   -continue lovenox     IDT 7/6:  Nursing: incontinent B/B  SLP: on puree with thin liquids, small single sips, mild-mod language deficits, mod-severe cognitive deficits, limited by attention, memory, reasoning deficits.  Mild to Mod Dysarthria  OT: Total A- across the board, max A--squat-pivot transfer; goal mod-max  PT: Max A--Bed mobility and squat pivot transfer; Ambulated by  rail 15ft-- tot A.  WC tot A.   Goals: 1 Pivot and transfers, bed mobility mod-A, 2 attend to structured tasks for 30 minutes   Dispo: 7/24, [facility]IE vs.  back to Neurosurgical care or cranioplasty. Spouse very supportive. Possible re-admit following cranioplasty. Pending coordination for possible  ASSESSMENT/PLAN  38y Female h/o hypothyroid with functional deficits after right frontal IPHs/p right hemicraniectomy.     #Right Hemicraniectomy and EVD on 6/6  - Communication with patients family, neuroplastic surgeon Dr. Sy ([phone]) and PM&R team has taken place. As of now there is a scheduled appointment on 7/25 with Dr. Sy for cranioplasty reconstruction. However, there is a possibility for an earlier appointment (possibly on 7/18) that is pending discussion/coordination.   - neuroendocrine workup ordered 7/13       #hx of seizure  #agitation   -Cont Briviact 50mg BID-> plan to discontinue tomorrow. Unable to tolerate EEG yesterday, depakote levels therapeutic.   -Cont [id number] BID ->plan to repeat VPA    -Cont Depakote 500mg daily in AM and 750mg in evening.    --Depakote level 55 7/10--therapeutic  --NH3 level normal 7/10      #dysphagia s/p PEG   -PEG tube came out on 7/4, did not replace   -calorie count sufficient     #Pain/Neuropathic   #Insomnia  -cont. Tylenol PRN  -cont Trazodone 75mg at bedtime  -cont gabapentin to 400mg at bedtime  -cont tizanidine 4mg, follow up CMP 7/13. Liver enzymes wnl.    -discussed treatment benefits/risks of botox injection use for left pec and bicep muscles with family today. Planning botox injection tomorrow 7/14.     #hx of anemia   -follow up medicine recommendations   -H/H stable     #hx of HTN --BP soft  -Monitor    #DVT ppx   -continue lovenox     IDT 7/6:  Nursing: incontinent B/B  SLP: on puree with thin liquids, small single sips, mild-mod language deficits, mod-severe cognitive deficits, limited by attention, memory, reasoning deficits.  Mild to Mod Dysarthria  OT: Total A- across the board, max A--squat-pivot transfer; goal mod-max  PT: Max A--Bed mobility and squat pivot transfer; Ambulated by  rail 15ft-- tot A.  WC tot A.   Goals: 1 Pivot and transfers, bed mobility mod-A, 2 attend to structured tasks for 30 minutes   Dispo: 7/24, [facility]IE vs.  back to Neurosurgical care or cranioplasty. Spouse very supportive. Possible re-admit following cranioplasty. Pending coordination for possible  ASSESSMENT/PLAN  38y Female h/o hypothyroid with functional deficits after right frontal IPHs/p right hemicraniectomy.     #Right Hemicraniectomy and EVD on 6/6  - Communication with patients family, neuroplastic surgeon Dr. Sy ([phone]) and PM&R team has taken place. As of now there is a scheduled appointment on 7/25 with Dr. Sy for cranioplasty reconstruction. However, there is a possibility for an earlier appointment (possibly on 7/18) that is pending discussion/coordination.   - neuroendocrine workup ordered 7/13       #hx of seizure  #agitation   #anxiety  -Cont Briviact 50mg BID taper --> plan to discontinue tomorrow. And continue depakote 500mg in AM, 750mg in evening.   - Unable to tolerate EEG yesterday, Depakote levels therapeutic on 7/10.   -Cont [id number] BID      --NH3 level normal 7/10  -will consider starting SSRI/SNRI next week for anxiety/depression.        #dysphagia s/p PEG   -PEG tube came out on 7/4, did not replace   -calorie count sufficient     #Pain/Neuropathic   #Insomnia  -cont. Tylenol PRN  -cont Trazodone 75mg at bedtime  -cont gabapentin to 400mg at bedtime  -cont tizanidine 4mg, monitor CMP. Liver enzymes wnl 7/13.    -discussed treatment benefits/risks of botox injection use for left pec and bicep muscles with family today. Planning botox injection tomorrow 7/14.     #constipation  -last BM 7/10.   -Senna qHs.   -Miralax PRN.     #hx of anemia   -follow up medicine recommendations   -H/H stable     #hx of HTN --BP soft  -Monitor    #DVT ppx   -continue lovenox     IDT 7/6:  Nursing: incontinent B/B  SLP: on puree with thin liquids, small single sips, mild-mod language deficits, mod-severe cognitive deficits, limited by attention, memory, reasoning deficits.  Mild to Mod Dysarthria  OT: Total A- across the board, max A--squat-pivot transfer; goal mod-max  PT: Max A--Bed mobility and squat pivot transfer; Ambulated by  rail 15ft-- tot A.  WC tot A.   Goals: 1 Pivot and transfers, bed mobility mod-A, 2 attend to structured tasks for 30 minutes   Dispo: 7/24, [facility]LEI vs.  back to Neurosurgical care or cranioplasty. Spouse very supportive. Possible re-admit following cranioplasty. Pending coordination for possible  ASSESSMENT/PLAN  38y Female h/o hypothyroid with functional deficits after right frontal IPHs/p right hemicraniectomy.     #Right Hemicraniectomy and EVD on 6/6  - Communication with patients family, neuroplastic surgeon Dr. Sy ([phone]) and PM&R team has taken place. As of now there is a scheduled appointment on 7/25 with Dr. Sy for cranioplasty reconstruction. However, there is a possibility for an earlier appointment (possibly on 7/18) that is pending discussion/coordination.   - neuroendocrine workup ordered 7/13       #hx of seizure  #agitation   #anxiety  -Cont Briviact 50mg BID taper --> plan to discontinue tomorrow. And continue depakote 500mg in AM, 750mg in evening.   - Unable to tolerate EEG yesterday, Depakote levels therapeutic on 7/10.   -Cont [id number] BID      --NH3 level normal 7/10  -will consider starting SSRI next week for anxiety/depression if not improved off of Briviact.        #dysphagia s/p PEG   -PEG tube came out on 7/4, did not replace   -calorie count sufficient     #Pain/Neuropathic   #Insomnia  -cont. Tylenol PRN  -cont Trazodone 75mg at bedtime  -cont gabapentin to 400mg at bedtime  -cont tizanidine 4mg, monitor CMP. Liver enzymes wnl 7/13.    -discussed treatment benefits/risks of botox injection use for left pec and bicep muscles with family today. Planning botox injection tomorrow 7/14.     #constipation  -last BM 7/10.   -Senna qHs.   -Miralax PRN.     #hx of anemia   -follow up medicine recommendations   -H/H stable     #hx of HTN --BP soft  -Monitor    #DVT ppx   - lovenox --Hold tonight in anticipation of botox injections tomorrow.  plan to resume 7/15    IDT 7/13:  Nursing: incontinent B/B  SLP: on Minced and moist with thin liquids, has Pocketing as gets distracted.  mild-mod language deficits, severe cognitive deficits, limited by attention, memory, reasoning deficits.  Mild Dysarthria; Ongoing education to family  OT: Mod assist–eating/grooming; max assist–bathing and dressing; transfers–max assist of 1 person; toilet transfers to be assessed.  PT: Mod -Max A--Bed mobility: Max A--squat pivot transfer & Ambulating by  rail 15ft-- tot A--Max A and WCf.  WC tot A.   Goals: 1 Pivot and transfers, bed mobility mod-A, 2 attend to structured tasks for 30 minutes   Dispo: 8/7 HALLIE --if  back to Neurosurgical care or cranioplasty. May possibly be sooner if HALLIE.

## 2023-07-13 NOTE — PROGRESS NOTE ADULT - ATTENDING COMMENTS
Pt. seen with resident.  Agree with documentation above as per resident with amendments made as appropriate. Patient medically stable. Making progress towards rehab goals.     right ICH   plan to discontinue Briviact tomorrow. And continue depakote 500mg in AM, 750mg in evening.   --Discussed risks/benefits of Botox injections with pt's wife, Ena, at bedside.  Interested in botox injections.  Will perform tomorrow.  all questions answered.  Pt. anxious and emotionally labile-- will consider adding lexapro next week if not improved off Briviact    EEG attempted yesterday but not able to be completed.  Discontinued.  Pt's VPA level therapeutic.  Will defer EEG.  monitor.  no signs of seizure.    Making progress in therapy-- discussed in team meeting.    -Reached out to neurosurgery regarding scheduling her outpatient appt. for Cranioplasty assessment to next week.  Awaiting coordinator to get back to me with confirmation.  Notified Rehab team.  Spoke with pt's wife regarding above and that we will try to extend pt's stay if cranioplasty scheduled by early August.  If later than this, pt. would need to go to [facility] and then [facility] at the time of surgery.    All questions answered.

## 2023-07-14 LAB
INSULIN-LIKE GROWTH FACTOR 1 INTERPRETATION: SIGNIFICANT CHANGE UP
INSULIN-LIKE GROWTH FACTOR 1: 170 NG/ML — SIGNIFICANT CHANGE UP (ref 78–274)

## 2023-07-14 PROCEDURE — 99232 SBSQ HOSP IP/OBS MODERATE 35: CPT

## 2023-07-14 RX ORDER — ESCITALOPRAM OXALATE 10 MG/1
5 TABLET, FILM COATED ORAL
Refills: 0 | Status: DISCONTINUED | OUTPATIENT
Start: 2023-07-14 | End: 2023-07-18

## 2023-07-14 RX ORDER — TRAZODONE HCL 50 MG
25 TABLET ORAL ONCE
Refills: 0 | Status: COMPLETED | OUTPATIENT
Start: 2023-07-14 | End: 2023-07-14

## 2023-07-14 RX ORDER — ENOXAPARIN SODIUM 100 MG/ML
40 INJECTION SUBCUTANEOUS EVERY 24 HOURS
Refills: 0 | Status: DISCONTINUED | OUTPATIENT
Start: 2023-07-15 | End: 2023-08-01

## 2023-07-14 RX ORDER — ACETAMINOPHEN 500 MG
975 TABLET ORAL EVERY 6 HOURS
Refills: 0 | Status: DISCONTINUED | OUTPATIENT
Start: 2023-07-14 | End: 2023-08-01

## 2023-07-14 RX ADMIN — LIDOCAINE 1 PATCH: 4 CREAM TOPICAL at 08:15

## 2023-07-14 RX ADMIN — Medication 200 UNIT(S): at 17:53

## 2023-07-14 RX ADMIN — Medication 975 MILLIGRAM(S): at 16:16

## 2023-07-14 RX ADMIN — LACOSAMIDE 150 MILLIGRAM(S): 50 TABLET ORAL at 17:58

## 2023-07-14 RX ADMIN — DIVALPROEX SODIUM 750 MILLIGRAM(S): 500 TABLET, DELAYED RELEASE ORAL at 17:58

## 2023-07-14 RX ADMIN — LIDOCAINE 1 PATCH: 4 CREAM TOPICAL at 18:05

## 2023-07-14 RX ADMIN — Medication 975 MILLIGRAM(S): at 21:46

## 2023-07-14 RX ADMIN — Medication 75 MILLIGRAM(S): at 21:45

## 2023-07-14 RX ADMIN — Medication 975 MILLIGRAM(S): at 09:13

## 2023-07-14 RX ADMIN — Medication 975 MILLIGRAM(S): at 08:13

## 2023-07-14 RX ADMIN — Medication 75 MICROGRAM(S): at 06:06

## 2023-07-14 RX ADMIN — LIDOCAINE 1 PATCH: 4 CREAM TOPICAL at 20:00

## 2023-07-14 RX ADMIN — PANTOPRAZOLE SODIUM 40 MILLIGRAM(S): 20 TABLET, DELAYED RELEASE ORAL at 12:26

## 2023-07-14 RX ADMIN — ESCITALOPRAM OXALATE 5 MILLIGRAM(S): 10 TABLET, FILM COATED ORAL at 18:18

## 2023-07-14 RX ADMIN — Medication 975 MILLIGRAM(S): at 22:45

## 2023-07-14 RX ADMIN — TIZANIDINE 4 MILLIGRAM(S): 4 TABLET ORAL at 21:44

## 2023-07-14 RX ADMIN — Medication 975 MILLIGRAM(S): at 15:16

## 2023-07-14 RX ADMIN — DIVALPROEX SODIUM 500 MILLIGRAM(S): 500 TABLET, DELAYED RELEASE ORAL at 06:06

## 2023-07-14 RX ADMIN — LACOSAMIDE 150 MILLIGRAM(S): 50 TABLET ORAL at 06:06

## 2023-07-14 RX ADMIN — GABAPENTIN 400 MILLIGRAM(S): 400 CAPSULE ORAL at 21:44

## 2023-07-14 RX ADMIN — Medication 25 MILLIGRAM(S): at 01:25

## 2023-07-14 NOTE — CHART NOTE - NSCHARTNOTEFT_GEN_A_CORE
Met briefly with Pt (< 10 min). Pt c/o pain. Pt reported not feeling well and having pain all over her body. She became emotional and tearful, as well as apologetic for not feeling like talking today. She appeared surprised when told that according to her therapists and attending doctor she was doing somewhat better today. She reported having a pounding headache, especially bothersome in the right side of her head which lacks the skull bone flap. Agreed to reattempt on Monday. Will f/u.

## 2023-07-14 NOTE — PROGRESS NOTE ADULT - ASSESSMENT
39 y/o F w/ PMhx hypothyroidism admitted to [facility] 6/6 after being found unresponsive, found to have large right frontal IPH w/ IVH and hydrocephalus. Now s/p R krishna craniectomy for evacuation on 6/6. PEG placed 6/20. Now admitted for multidisciplinary rehab.     #IPH with IVH, R frontal lobe  - Amantadine, trazodone per rehab   - Antiepileptic regimen: depakote, continue Vimpat 150 mg BID.   - Helmet when OOB  - Continue comprehensive rehab program - PT/OT/SLP per rehab team  - Pain management, bowel regimen per rehab     #Normocytic anemia  - H/H stable, monitor for Hb < 7  - Iron/B12/folate normal     #HTN  - Lisinopril held due to hypotension    #Dysphagia s/p PEG  - s/p self removal of PEG tube, tolerating PO diet, PEG not replaced  - GI signed off, monitor tolerating PO diet    #Hypothyroidism  - Continue synthroid 75mcg qd     #DVT ppx - Lovenox

## 2023-07-14 NOTE — PROGRESS NOTE ADULT - NUTRITIONAL ASSESSMENT
This patient has been assessed with a concern for Malnutrition and has been determined to have a diagnosis/diagnoses of Severe protein-calorie malnutrition.    This patient is being managed with:   Diet Minced and Moist-  Extra Sauces Gravies  Supplement Feeding Modality:  Oral  Ensure Plus High Protein Cans or Servings Per Day:  1       Frequency:  Daily  Entered: Jul 12 2023  3:40PM

## 2023-07-14 NOTE — PROGRESS NOTE ADULT - ATTENDING COMMENTS
Pt. seen with resident.  Agree with documentation above as per resident with amendments made as appropriate. Patient medically stable. Making progress towards rehab goals.       right ICH s/p hemicraniectomy  Pt. limited by anxiety and emotional lability-- was better today but still an issue.  Pt. slept after getting extra 25mg Trazodone.   Discussed starting trial of Lexapro 5mg in evening.    Botox injection performed.  See procedure note.

## 2023-07-14 NOTE — PROGRESS NOTE ADULT - SUBJECTIVE AND OBJECTIVE BOX
Patient is a 38y old  Female who presents with a chief complaint of IPH (14 Jul 2023 11:45)      Subjective and overnight events:  Patient seen and examined at bedside. reports some stomach discomfort but pain stable. mild nausea and no vomiting. no fever, chills, sob, cp, abd pain    ALLERGIES:  No Known Allergies    MEDICATIONS  (STANDING):  divalproex Sprinkle 750 milliGRAM(s) Oral <User Schedule>  divalproex Sprinkle 500 milliGRAM(s) Oral <User Schedule>  escitalopram 5 milliGRAM(s) Oral <User Schedule>  gabapentin 400 milliGRAM(s) Oral at bedtime  lacosamide Solution 150 milliGRAM(s) Oral two times a day  levothyroxine 75 MICROGram(s) Oral daily  lidocaine   4% Patch 1 Patch Transdermal <User Schedule>  onabotulinumtoxinA Injectable 200 Unit(s) IntraMuscular once  pantoprazole   Suspension 40 milliGRAM(s) Oral daily  senna 2 Tablet(s) Oral at bedtime  tiZANidine 4 milliGRAM(s) Oral at bedtime  traZODone 75 milliGRAM(s) Oral at bedtime    MEDICATIONS  (PRN):  acetaminophen     Tablet .. 975 milliGRAM(s) Oral every 6 hours PRN Mild Pain (1 - 3)  bacitracin   Ointment 1 Application(s) Topical three times a day PRN scalp irritation  polyethylene glycol 3350 17 Gram(s) Oral daily PRN Constipation    Vital Signs Last 24 Hrs  T(F): 98.1 (14 Jul 2023 08:26), Max: 98.1 (14 Jul 2023 08:26)  HR: 85 (14 Jul 2023 08:26) (85 - 88)  BP: 111/79 (14 Jul 2023 08:26) (111/79 - 136/98)  RR: 15 (14 Jul 2023 08:26) (15 - 16)  SpO2: 97% (14 Jul 2023 08:26) (97% - 97%)  I&O's Summary    PHYSICAL EXAM:  General: NAD, awake alert  ENT: MMM  Neck: Supple, No JVD  Lungs: Clear to auscultation bilaterally  Cardio: RRR, S1/S2, No murmurs  Abdomen: Soft, mild epigastric discomfort, Nondistended; Bowel sounds present  Extremities: No calf tenderness, No pitting edema    LABS:                        11.7   6.38  )-----------( 297      ( 13 Jul 2023 07:32 )             35.7     07-13    136  |  100  |  10  ----------------------------<  105  4.1   |  28  |  0.40    Ca    9.4      13 Jul 2023 07:32    TPro  6.8  /  Alb  3.0  /  TBili  0.1  /  DBili  x   /  AST  28  /  ALT  50  /  AlkPhos  71  07-13          TSH 1.239   TSH with FT4 reflex --  Total T3 --        Urinalysis Basic - ( 13 Jul 2023 07:32 )    Color: x / Appearance: x / SG: x / pH: x  Gluc: 105 mg/dL / Ketone: x  / Bili: x / Urobili: x   Blood: x / Protein: x / Nitrite: x   Leuk Esterase: x / RBC: x / WBC x   Sq Epi: x / Non Sq Epi: x / Bacteria: x            RADIOLOGY & ADDITIONAL TESTS:    Care Discussed with Consultants/Other Providers: d/w rehab team during IDR round

## 2023-07-14 NOTE — PROGRESS NOTE ADULT - SUBJECTIVE AND OBJECTIVE BOX
HPI:  38 year old female w/ PMHx hypothyroidism who was admitted to [facility] 6/6 after being found on floor at home, with CT revealing large right-sided ICH. She required emergent right frontal craniectomy for decompression and EVD placement. EVD subsequently removed 6/16. Angio negative. Patient underwent G tube placement 6/20. Hospital course notable for fluctuating leukocytosis and high-grade temperatures with no identified source of infection including in urine, blood, CSF. Now admitted for multidisciplinary rehab. (26 Jun 2023 13:08)    SUBJECTIVE: Patient received an additional dose of trazodone and Tylenol for restlessness and pain, which she tolerated well. This AM she was seen with her wife present at bedside. Continues to have generalized pain including a headache. Discussion to start a SSRI for anxiety/depression was held with her wife and consent was obtained for botox injection in L pec and arm. Otherwise she is taking in good PO, tolerating therapy and working very hard.      Vital Signs Last 24 Hrs  T(C): 36.6 (13 Jul 2023 07:25), Max: 36.9 (12 Jul 2023 19:27)  T(F): 97.9 (13 Jul 2023 07:25), Max: 98.4 (12 Jul 2023 19:27)  HR: 71 (13 Jul 2023 07:25) (71 - 90)  BP: 120/83 (13 Jul 2023 07:25) (120/83 - 149/97)  BP(mean): --  RR: 16 (13 Jul 2023 07:25) (16 - 16)  SpO2: 98% (13 Jul 2023 07:25) (98% - 99%)    Parameters below as of 13 Jul 2023 07:25  Patient On (Oxygen Delivery Method): room air      REVIEW OF SYMPTOMS  Neurological deficits  Pain     PHYSICAL EXAM  Constitutional - NAD, complaints of pain, in therapy practicing standing    HEENT - Right frontal hemicraniectomy   Chest - Breathing comfortably on RA   Cardiovascular - RRR,  warm well perfused  Abdomen - BS+, Soft, PEG incisional site healing well.   Extremities - No edema, No calf tenderness   Neurologic Exam -                    Cognitive - Awake, Alert, AAO to self, place, date, year, situation     Communication - Fluent, No dysarthria,       Motor  Left hemiparesis. Right side 5/5.      Sensory - Impaired.       Coordination - Impaired.   Spasticity-- Left shoulder adductors and elbow flexors 3/4  Psychiatric - anxious      LABS:                        11.7   6.38  )-----------( 297      ( 13 Jul 2023 07:32 )             35.7     07-13    136  |  100  |  10  ----------------------------<  105<H>  4.1   |  28  |  0.40<L>    Ca    9.4      13 Jul 2023 07:32    TPro  6.8  /  Alb  3.0<L>  /  TBili  0.1<L>  /  DBili  x   /  AST  28  /  ALT  50<H>  /  AlkPhos  71  07-13        RADIOLOGY:  Reviewed     MEDICATIONS  (STANDING):  brivaracetam Oral Solution 50 milliGRAM(s) Oral two times a day  divalproex Sprinkle 750 milliGRAM(s) Oral <User Schedule>  divalproex Sprinkle 500 milliGRAM(s) Oral <User Schedule>  gabapentin 400 milliGRAM(s) Oral at bedtime  lacosamide Solution 150 milliGRAM(s) Oral two times a day  levothyroxine 75 MICROGram(s) Oral daily  lidocaine   4% Patch 1 Patch Transdermal <User Schedule>  pantoprazole   Suspension 40 milliGRAM(s) Oral daily  senna 2 Tablet(s) Oral at bedtime  tiZANidine 4 milliGRAM(s) Oral at bedtime  traZODone 75 milliGRAM(s) Oral at bedtime    MEDICATIONS  (PRN):  acetaminophen     Tablet .. 975 milliGRAM(s) Oral every 6 hours PRN Mild Pain (1 - 3)  acetaminophen   IVPB .. 1000 milliGRAM(s) IV Intermittent once PRN Severe Pain (7 - 10)  bacitracin   Ointment 1 Application(s) Topical three times a day PRN scalp irritation  polyethylene glycol 3350 17 Gram(s) Oral daily PRN Constipation         HPI:  38 year old female w/ PMHx hypothyroidism who was admitted to [facility] 6/6 after being found on floor at home, with CT revealing large right-sided ICH. She required emergent right frontal craniectomy for decompression and EVD placement. EVD subsequently removed 6/16. Angio negative. Patient underwent G tube placement 6/20. Hospital course notable for fluctuating leukocytosis and high-grade temperatures with no identified source of infection including in urine, blood, CSF. Now admitted for multidisciplinary rehab. (26 Jun 2023 13:08)    SUBJECTIVE: Patient received an additional dose of trazodone 25mg and Tylenol for restlessness and pain, which she tolerated well and fell asleep. This AM she was seen with her wife present at bedside. Continues to have generalized pain including a headache. Discussion to start a SSRI for anxiety/depression was held with her wife and consent was obtained for botox injection in L pec and arm. Otherwise she is taking in good PO, tolerating therapy and working very hard.      Vital Signs Last 24 Hrs  T(C): 36.6 (13 Jul 2023 07:25), Max: 36.9 (12 Jul 2023 19:27)  T(F): 97.9 (13 Jul 2023 07:25), Max: 98.4 (12 Jul 2023 19:27)  HR: 71 (13 Jul 2023 07:25) (71 - 90)  BP: 120/83 (13 Jul 2023 07:25) (120/83 - 149/97)  BP(mean): --  RR: 16 (13 Jul 2023 07:25) (16 - 16)  SpO2: 98% (13 Jul 2023 07:25) (98% - 99%)    Parameters below as of 13 Jul 2023 07:25  Patient On (Oxygen Delivery Method): room air      REVIEW OF SYMPTOMS  Neurological deficits  Pain     PHYSICAL EXAM  Constitutional - NAD, complaints of pain, in therapy practicing standing    HEENT - Right frontal hemicraniectomy   Chest - Breathing comfortably on RA   Cardiovascular - RRR,  warm well perfused  Abdomen - BS+, Soft, PEG incisional site healing well.   Extremities - No edema, No calf tenderness   Neurologic Exam -                    Cognitive - Awake, Alert, AAO to self, place, date, year, situation     Communication - Fluent, No dysarthria,       Motor  Left hemiparesis. Right side 5/5.      Sensory - Impaired.       Coordination - Impaired.   Spasticity-- Left shoulder adductors and elbow flexors 3/4  Psychiatric - anxious      LABS:                        11.7   6.38  )-----------( 297      ( 13 Jul 2023 07:32 )             35.7     07-13    136  |  100  |  10  ----------------------------<  105<H>  4.1   |  28  |  0.40<L>    Ca    9.4      13 Jul 2023 07:32    TPro  6.8  /  Alb  3.0<L>  /  TBili  0.1<L>  /  DBili  x   /  AST  28  /  ALT  50<H>  /  AlkPhos  71  07-13        RADIOLOGY:  Reviewed     MEDICATIONS  (STANDING):  brivaracetam Oral Solution 50 milliGRAM(s) Oral two times a day  divalproex Sprinkle 750 milliGRAM(s) Oral <User Schedule>  divalproex Sprinkle 500 milliGRAM(s) Oral <User Schedule>  gabapentin 400 milliGRAM(s) Oral at bedtime  lacosamide Solution 150 milliGRAM(s) Oral two times a day  levothyroxine 75 MICROGram(s) Oral daily  lidocaine   4% Patch 1 Patch Transdermal <User Schedule>  pantoprazole   Suspension 40 milliGRAM(s) Oral daily  senna 2 Tablet(s) Oral at bedtime  tiZANidine 4 milliGRAM(s) Oral at bedtime  traZODone 75 milliGRAM(s) Oral at bedtime    MEDICATIONS  (PRN):  acetaminophen     Tablet .. 975 milliGRAM(s) Oral every 6 hours PRN Mild Pain (1 - 3)  acetaminophen   IVPB .. 1000 milliGRAM(s) IV Intermittent once PRN Severe Pain (7 - 10)  bacitracin   Ointment 1 Application(s) Topical three times a day PRN scalp irritation  polyethylene glycol 3350 17 Gram(s) Oral daily PRN Constipation

## 2023-07-14 NOTE — PROGRESS NOTE ADULT - ASSESSMENT
ASSESSMENT/PLAN  38y Female h/o hypothyroid with functional deficits after right frontal IPHs/p right hemicraniectomy.     #Right Hemicraniectomy and EVD on 6/6  - Communication with patients family, neuroplastic surgeon Dr. Sy ([phone]) and PM&R team has taken place. As of now there is a scheduled appointment on 7/25 with Dr. Sy for cranioplasty reconstruction. However, there is a possibility for an earlier appointment (possibly on 7/18) that is pending discussion/coordination.   - neuroendocrine workup ordered 7/13, which is thus far wnl.       #hx of seizure  #agitation   #anxiety/depression  -Briviact 50mg BID taper discontinued today 7/14.   -Continue depakote 500mg in AM, 750mg in evening.   - Unable to tolerate EEG yesterday, Depakote levels therapeutic on 7/10.   -Cont [id number] BID      --NH3 level normal 7/10  -Starting lexapro 5mg today 7/14 for anxiety/depression.        #dysphagia s/p PEG   -PEG tube came out on 7/4, did not replace   -calorie count sufficient, and tolerating PO diet.      #Pain/Neuropathic   #Insomnia  -cont. Tylenol PRN  -cont Trazodone 75mg at bedtime  -cont gabapentin to 400mg at bedtime  -cont tizanidine 4mg, monitor CMP. Liver enzymes wnl 7/13.    -Patient is s/p botox injection in left pec and bicep muscles 7/14.     #constipation  -last BM 7/10.   -Senna qHs.   -Miralax PRN.     #hx of anemia   -follow up medicine recommendations   -H/H stable     #hx of HTN --BP soft  -Monitor    #DVT ppx   - lovenox --Held in anticipation of botox injections.  Plan to resume 7/15    IDT 7/13:  Nursing: incontinent B/B  SLP: on Minced and moist with thin liquids, has Pocketing as gets distracted.  mild-mod language deficits, severe cognitive deficits, limited by attention, memory, reasoning deficits.  Mild Dysarthria; Ongoing education to family  OT: Mod assist–eating/grooming; max assist–bathing and dressing; transfers–max assist of 1 person; toilet transfers to be assessed.  PT: Mod -Max A--Bed mobility: Max A--squat pivot transfer & Ambulating by  rail 15ft-- tot A--Max A and WCf.  WC tot A.   Goals: 1 Pivot and transfers, bed mobility mod-A, 2 attend to structured tasks for 30 minutes   Dispo: 8/7 HALLIE --if  back to Neurosurgical care or cranioplasty. May possibly be sooner if HALLIE.  ASSESSMENT/PLAN  38y Female h/o hypothyroid with functional deficits after right frontal IPHs/p right hemicraniectomy.     #Right Hemicraniectomy and EVD on 6/6  - Communication with patients family, neuroplastic surgeon Dr. Sy ([phone]) and PM&R team has taken place. As of now there is a scheduled appointment on 7/25 with Dr. Sy for cranioplasty reconstruction. However, there is a possibility for an earlier appointment (possibly on 7/18) that is pending discussion/coordination.   - neuroendocrine workup ordered 7/13, which is thus far wnl.       #hx of seizure  #agitation   #anxiety/depression  -Briviact 50mg BID taper discontinued today 7/14.   -Continue depakote 500mg in AM, 750mg in evening.   - Unable to tolerate EEG yesterday, Depakote levels therapeutic on 7/10.   -Cont [id number] BID      --NH3 level normal 7/10    anxiety/depression  -Starting lexapro 5mg today 7/14 for anxiety/depression.        #dysphagia s/p PEG   -PEG tube came out on 7/4, did not replace   -calorie count sufficient, and tolerating PO diet.      #Pain/Neuropathic   #Insomnia  -cont. Tylenol PRN  -cont Trazodone 75mg at bedtime  -cont gabapentin to 400mg at bedtime  -cont tizanidine 4mg, monitor CMP. Liver enzymes wnl 7/13.    -Patient is s/p botox injection in left pec and bicep muscles 7/14  --see procedure note    #constipation  -last BM 7/10.   -Senna qHs.   -Miralax PRN.     #hx of anemia   -follow up medicine recommendations   -H/H stable     #hx of HTN --BP soft  -Monitor    #DVT ppx   - lovenox --Held in anticipation of botox injections.  Plan to resume 7/15    IDT 7/13:  Nursing: incontinent B/B  SLP: on Minced and moist with thin liquids, has Pocketing as gets distracted.  mild-mod language deficits, severe cognitive deficits, limited by attention, memory, reasoning deficits.  Mild Dysarthria; Ongoing education to family  OT: Mod assist–eating/grooming; max assist–bathing and dressing; transfers–max assist of 1 person; toilet transfers to be assessed.  PT: Mod -Max A--Bed mobility: Max A--squat pivot transfer & Ambulating by  rail 15ft-- tot A--Max A and WCf.  WC tot A.   Goals: 1 Pivot and transfers, bed mobility mod-A, 2 attend to structured tasks for 30 minutes   Dispo: 8/7 HALLIE --if  back to Neurosurgical care or cranioplasty. May possibly be sooner if HALLIE.

## 2023-07-14 NOTE — CHART NOTE - NSCHARTNOTEFT_GEN_A_CORE
Informed consent obtained from patient's spouse. Risks/benefits of botox discussed.   Procedure performed around 3:30pm.  Pt's mother present.      200 units of Botox was injected into patient's left upper extremity in the following muscle pattern below using sterile technique.  EMG guidance used to provide muscle biofeedback.     Pectoralis Major-- 100 units               Biceps  -- 100 units.     Botox Lot #[id number],  Exp. 2025/11    Preservative free saline Lot # [id number];  Exp 12/23    pt. tolerated procedure well.   Discussed with nursing and patients family to monitor injection sites for redness, or bruising.  May use Ice or tylenol PRN for discomfort.   Hold Lovenox today and resume tomorrow.

## 2023-07-15 PROCEDURE — 99232 SBSQ HOSP IP/OBS MODERATE 35: CPT

## 2023-07-15 RX ORDER — GABAPENTIN 400 MG/1
200 CAPSULE ORAL ONCE
Refills: 0 | Status: COMPLETED | OUTPATIENT
Start: 2023-07-15 | End: 2023-07-15

## 2023-07-15 RX ADMIN — LIDOCAINE 1 PATCH: 4 CREAM TOPICAL at 18:12

## 2023-07-15 RX ADMIN — DIVALPROEX SODIUM 500 MILLIGRAM(S): 500 TABLET, DELAYED RELEASE ORAL at 06:19

## 2023-07-15 RX ADMIN — DIVALPROEX SODIUM 750 MILLIGRAM(S): 500 TABLET, DELAYED RELEASE ORAL at 17:08

## 2023-07-15 RX ADMIN — Medication 975 MILLIGRAM(S): at 06:03

## 2023-07-15 RX ADMIN — Medication 975 MILLIGRAM(S): at 11:38

## 2023-07-15 RX ADMIN — Medication 75 MICROGRAM(S): at 05:04

## 2023-07-15 RX ADMIN — SENNA PLUS 2 TABLET(S): 8.6 TABLET ORAL at 21:31

## 2023-07-15 RX ADMIN — ENOXAPARIN SODIUM 40 MILLIGRAM(S): 100 INJECTION SUBCUTANEOUS at 06:19

## 2023-07-15 RX ADMIN — LACOSAMIDE 150 MILLIGRAM(S): 50 TABLET ORAL at 06:20

## 2023-07-15 RX ADMIN — Medication 975 MILLIGRAM(S): at 22:37

## 2023-07-15 RX ADMIN — GABAPENTIN 400 MILLIGRAM(S): 400 CAPSULE ORAL at 21:29

## 2023-07-15 RX ADMIN — LIDOCAINE 1 PATCH: 4 CREAM TOPICAL at 20:33

## 2023-07-15 RX ADMIN — GABAPENTIN 200 MILLIGRAM(S): 400 CAPSULE ORAL at 15:09

## 2023-07-15 RX ADMIN — Medication 975 MILLIGRAM(S): at 05:03

## 2023-07-15 RX ADMIN — Medication 75 MILLIGRAM(S): at 21:30

## 2023-07-15 RX ADMIN — Medication 975 MILLIGRAM(S): at 21:37

## 2023-07-15 RX ADMIN — LACOSAMIDE 150 MILLIGRAM(S): 50 TABLET ORAL at 17:18

## 2023-07-15 RX ADMIN — LIDOCAINE 1 PATCH: 4 CREAM TOPICAL at 08:28

## 2023-07-15 RX ADMIN — PANTOPRAZOLE SODIUM 40 MILLIGRAM(S): 20 TABLET, DELAYED RELEASE ORAL at 11:39

## 2023-07-15 RX ADMIN — ESCITALOPRAM OXALATE 5 MILLIGRAM(S): 10 TABLET, FILM COATED ORAL at 17:08

## 2023-07-15 RX ADMIN — Medication 975 MILLIGRAM(S): at 12:38

## 2023-07-15 RX ADMIN — TIZANIDINE 4 MILLIGRAM(S): 4 TABLET ORAL at 21:29

## 2023-07-15 NOTE — PROGRESS NOTE ADULT - ASSESSMENT
37 y/o F w/ PMhx hypothyroidism admitted to [facility] 6/6 after being found unresponsive, found to have large right frontal IPH w/ IVH and hydrocephalus. Now s/p R krishna craniectomy for evacuation on 6/6. PEG placed 6/20. Now admitted for multidisciplinary rehab.     #IPH with IVH, R frontal lobe  - Amantadine, trazodone per rehab   - Antiepileptic regimen: depakote, continue Vimpat 150 mg BID.   - Helmet when OOB  - Continue comprehensive rehab program - PT/OT/SLP per rehab team  - Pain management, bowel regimen per rehab     #Normocytic anemia  - H/H stable, monitor for Hb < 7  - Iron/B12/folate normal     #HTN  - Lisinopril held due to hypotension    #Dysphagia s/p PEG  - s/p self removal of PEG tube, tolerating PO diet, PEG not replaced  - GI signed off, monitor tolerating PO diet    #Hypothyroidism  - Continue synthroid 75mcg qd     #DVT ppx - Lovenox

## 2023-07-15 NOTE — PROGRESS NOTE ADULT - SUBJECTIVE AND OBJECTIVE BOX
Hospitalist: Emi Oglesby DO    CHIEF COMPLAINT: Patient is a 38y old  female who presents with a chief complaint of IPH (15 Jul 2023 09:34)      SUBJECTIVE / OVERNIGHT EVENTS: Patient seen and examined. No acute events overnight. Pain well controlled and patient without any complaints.    MEDICATIONS  (STANDING):  divalproex Sprinkle 750 milliGRAM(s) Oral <User Schedule>  divalproex Sprinkle 500 milliGRAM(s) Oral <User Schedule>  enoxaparin Injectable 40 milliGRAM(s) SubCutaneous every 24 hours  escitalopram 5 milliGRAM(s) Oral <User Schedule>  gabapentin 400 milliGRAM(s) Oral at bedtime  lacosamide Solution 150 milliGRAM(s) Oral two times a day  levothyroxine 75 MICROGram(s) Oral daily  lidocaine   4% Patch 1 Patch Transdermal <User Schedule>  pantoprazole   Suspension 40 milliGRAM(s) Oral daily  senna 2 Tablet(s) Oral at bedtime  tiZANidine 4 milliGRAM(s) Oral at bedtime  traZODone 75 milliGRAM(s) Oral at bedtime    MEDICATIONS  (PRN):  acetaminophen     Tablet .. 975 milliGRAM(s) Oral every 6 hours PRN Mild Pain (1 - 3), Moderate Pain (4 - 6), Severe Pain (7 - 10)  bacitracin   Ointment 1 Application(s) Topical three times a day PRN scalp irritation  polyethylene glycol 3350 17 Gram(s) Oral daily PRN Constipation      VITALS:  T(F): 97.9 (07-15-23 @ 09:32), Max: 97.9 (07-15-23 @ 09:32)  HR: 96 (07-15-23 @ 09:32) (86 - 96)  BP: 125/87 (07-15-23 @ 09:32) (110/74 - 141/96)  RR: 16 (07-15-23 @ 09:32) (15 - 16)  SpO2: 98% (07-15-23 @ 09:32)      REVIEW OF SYSTEMS:  For ROV please refer back to H&P     PHYSICAL EXAM:  General: NAD, awake alert  ENT: MMM  Neck: Supple, No JVD  Lungs: Clear to auscultation bilaterally  Cardio: RRR, S1/S2, No murmurs  Abdomen: Soft, mild epigastric discomfort, Nondistended; Bowel sounds present  Extremities: No calf tenderness, No pitting edema      RADIOLOGY & ADDITIONAL TESTS:    Imaging Personally Reviewed:    [X] Consultant(s) Notes Reviewed:  [X] Care Discussed with Consultants/Other Providers:

## 2023-07-15 NOTE — PROGRESS NOTE ADULT - SUBJECTIVE AND OBJECTIVE BOX
Cc: IPH    HPI: Patient with no new medical issues today.  Had botox injection ytd  Pain controlled, no chest pain, no N/V, no Fevers/Chills. No other new ROS      acetaminophen     Tablet .. 975 milliGRAM(s) Oral every 6 hours PRN  bacitracin   Ointment 1 Application(s) Topical three times a day PRN  divalproex Sprinkle 750 milliGRAM(s) Oral <User Schedule>  divalproex Sprinkle 500 milliGRAM(s) Oral <User Schedule>  enoxaparin Injectable 40 milliGRAM(s) SubCutaneous every 24 hours  escitalopram 5 milliGRAM(s) Oral <User Schedule>  gabapentin 400 milliGRAM(s) Oral at bedtime  lacosamide Solution 150 milliGRAM(s) Oral two times a day  levothyroxine 75 MICROGram(s) Oral daily  lidocaine   4% Patch 1 Patch Transdermal <User Schedule>  pantoprazole   Suspension 40 milliGRAM(s) Oral daily  polyethylene glycol 3350 17 Gram(s) Oral daily PRN  senna 2 Tablet(s) Oral at bedtime  tiZANidine 4 milliGRAM(s) Oral at bedtime  traZODone 75 milliGRAM(s) Oral at bedtime      T(C): 36.4 (07-14-23 @ 21:53), Max: 36.4 (07-14-23 @ 21:53)  HR: 91 (07-14-23 @ 21:53) (86 - 91)  BP: 141/96 (07-14-23 @ 21:53) (110/74 - 141/96)  RR: 15 (07-14-23 @ 21:53) (15 - 16)  SpO2: 96% (07-14-23 @ 21:53) (96% - 98%)    In NAD  HEENT- EOMI  Heart- Well Perfused  Lungs- No resp distress, no use of accessory resp muscles  Neuro- Exam unchanged  Psych- Affect wnl          Imp: Patient with diagnosis of   IPH       admitted for comprehensive acute rehabilitation.    Plan:  - Continue therapies  - DVT prophylaxis  - Skin- Turn q2h, check skin daily  - Continue current medications; patient medically stable.   - Patient is stable to continue current rehabilitation program.

## 2023-07-15 NOTE — PROGRESS NOTE ADULT - ASSESSMENT
From primary team notes    ASSESSMENT/PLAN  38y Female h/o hypothyroid with functional deficits after right frontal IPHs/p right hemicraniectomy.     #Right Hemicraniectomy and EVD on 6/6  - Communication with patients family, neuroplastic surgeon Dr. Sy ([phone]) and PM&R team has taken place. As of now there is a scheduled appointment on 7/25 with Dr. Sy for cranioplasty reconstruction. However, there is a possibility for an earlier appointment (possibly on 7/18) that is pending discussion/coordination.   - neuroendocrine workup ordered 7/13, which is thus far wnl.       #hx of seizure  #agitation   #anxiety/depression  -Briviact 50mg BID taper discontinued 7/14.   -Continue depakote 500mg in AM, 750mg in evening.   - Unable to tolerate EEG yesterday, Depakote levels therapeutic on 7/10.   -Cont [id number] BID      --NH3 level normal 7/10    anxiety/depression  -Starting lexapro 5mg today 7/14 for anxiety/depression.        #dysphagia s/p PEG   -PEG tube came out on 7/4, did not replace   -calorie count sufficient, and tolerating PO diet.      #Pain/Neuropathic   #Insomnia  -cont. Tylenol PRN  -cont Trazodone 75mg at bedtime  -cont gabapentin to 400mg at bedtime  -cont tizanidine 4mg, monitor CMP. Liver enzymes wnl 7/13.    -Patient is s/p botox injection in left pec and bicep muscles 7/14  --see procedure note    #constipation  -last BM 7/10.   -Senna qHs.   -Miralax PRN.     #hx of anemia   -follow up medicine recommendations   -H/H stable     #hx of HTN --BP soft  -Monitor    #DVT ppx   - lovenox --Held in anticipation of botox injections.  Plan to resume 7/15    IDT 7/13:  Nursing: incontinent B/B  SLP: on Minced and moist with thin liquids, has Pocketing as gets distracted.  mild-mod language deficits, severe cognitive deficits, limited by attention, memory, reasoning deficits.  Mild Dysarthria; Ongoing education to family  OT: Mod assist–eating/grooming; max assist–bathing and dressing; transfers–max assist of 1 person; toilet transfers to be assessed.  PT: Mod -Max A--Bed mobility: Max A--squat pivot transfer & Ambulating by  rail 15ft-- tot A--Max A and WCf.  WC tot A.   Goals: 1 Pivot and transfers, bed mobility mod-A, 2 attend to structured tasks for 30 minutes   Dispo: 8/7 HALLIE --if  back to Neurosurgical care or cranioplasty. May possibly be sooner if HALLIE.

## 2023-07-16 PROCEDURE — 99232 SBSQ HOSP IP/OBS MODERATE 35: CPT

## 2023-07-16 RX ORDER — GABAPENTIN 400 MG/1
600 CAPSULE ORAL AT BEDTIME
Refills: 0 | Status: DISCONTINUED | OUTPATIENT
Start: 2023-07-16 | End: 2023-07-16

## 2023-07-16 RX ORDER — GABAPENTIN 400 MG/1
400 CAPSULE ORAL AT BEDTIME
Refills: 0 | Status: DISCONTINUED | OUTPATIENT
Start: 2023-07-16 | End: 2023-07-22

## 2023-07-16 RX ORDER — GABAPENTIN 400 MG/1
200 CAPSULE ORAL
Refills: 0 | Status: DISCONTINUED | OUTPATIENT
Start: 2023-07-17 | End: 2023-07-20

## 2023-07-16 RX ORDER — GABAPENTIN 400 MG/1
200 CAPSULE ORAL ONCE
Refills: 0 | Status: COMPLETED | OUTPATIENT
Start: 2023-07-16 | End: 2023-07-16

## 2023-07-16 RX ADMIN — Medication 975 MILLIGRAM(S): at 12:11

## 2023-07-16 RX ADMIN — GABAPENTIN 400 MILLIGRAM(S): 400 CAPSULE ORAL at 21:32

## 2023-07-16 RX ADMIN — ESCITALOPRAM OXALATE 5 MILLIGRAM(S): 10 TABLET, FILM COATED ORAL at 17:16

## 2023-07-16 RX ADMIN — LACOSAMIDE 150 MILLIGRAM(S): 50 TABLET ORAL at 17:16

## 2023-07-16 RX ADMIN — LACOSAMIDE 150 MILLIGRAM(S): 50 TABLET ORAL at 06:30

## 2023-07-16 RX ADMIN — GABAPENTIN 200 MILLIGRAM(S): 400 CAPSULE ORAL at 15:30

## 2023-07-16 RX ADMIN — TIZANIDINE 4 MILLIGRAM(S): 4 TABLET ORAL at 21:32

## 2023-07-16 RX ADMIN — Medication 975 MILLIGRAM(S): at 13:11

## 2023-07-16 RX ADMIN — DIVALPROEX SODIUM 500 MILLIGRAM(S): 500 TABLET, DELAYED RELEASE ORAL at 06:33

## 2023-07-16 RX ADMIN — DIVALPROEX SODIUM 750 MILLIGRAM(S): 500 TABLET, DELAYED RELEASE ORAL at 17:16

## 2023-07-16 RX ADMIN — Medication 75 MILLIGRAM(S): at 21:33

## 2023-07-16 RX ADMIN — Medication 75 MICROGRAM(S): at 05:46

## 2023-07-16 RX ADMIN — Medication 975 MILLIGRAM(S): at 06:31

## 2023-07-16 RX ADMIN — Medication 975 MILLIGRAM(S): at 07:07

## 2023-07-16 RX ADMIN — PANTOPRAZOLE SODIUM 40 MILLIGRAM(S): 20 TABLET, DELAYED RELEASE ORAL at 12:02

## 2023-07-16 RX ADMIN — Medication 975 MILLIGRAM(S): at 22:27

## 2023-07-16 RX ADMIN — Medication 975 MILLIGRAM(S): at 21:34

## 2023-07-16 RX ADMIN — ENOXAPARIN SODIUM 40 MILLIGRAM(S): 100 INJECTION SUBCUTANEOUS at 06:30

## 2023-07-16 NOTE — PROGRESS NOTE ADULT - SUBJECTIVE AND OBJECTIVE BOX
Cc: IPH    HPI: Patient with no new medical issues today.  Pain controlled, no chest pain, no N/V, no Fevers/Chills. No other new ROS      acetaminophen     Tablet .. 975 milliGRAM(s) Oral every 6 hours PRN  bacitracin   Ointment 1 Application(s) Topical three times a day PRN  divalproex Sprinkle 750 milliGRAM(s) Oral <User Schedule>  divalproex Sprinkle 500 milliGRAM(s) Oral <User Schedule>  enoxaparin Injectable 40 milliGRAM(s) SubCutaneous every 24 hours  escitalopram 5 milliGRAM(s) Oral <User Schedule>  gabapentin 400 milliGRAM(s) Oral at bedtime  lacosamide Solution 150 milliGRAM(s) Oral two times a day  levothyroxine 75 MICROGram(s) Oral daily  lidocaine   4% Patch 1 Patch Transdermal <User Schedule>  pantoprazole   Suspension 40 milliGRAM(s) Oral daily  polyethylene glycol 3350 17 Gram(s) Oral daily PRN  senna 2 Tablet(s) Oral at bedtime  tiZANidine 4 milliGRAM(s) Oral at bedtime  traZODone 75 milliGRAM(s) Oral at bedtime      ICU Vital Signs Last 24 Hrs  T(C): 37 (15 Jul 2023 21:27), Max: 37 (15 Jul 2023 21:27)  T(F): 98.6 (15 Jul 2023 21:27), Max: 98.6 (15 Jul 2023 21:27)  HR: 91 (15 Jul 2023 21:27) (87 - 96)  BP: 146/95 (15 Jul 2023 21:27) (125/86 - 146/95)  RR: 16 (15 Jul 2023 21:27) (16 - 16)  SpO2: 97% (15 Jul 2023 21:27) (96% - 98%)    O2 Parameters below as of 15 Jul 2023 21:27  Patient On (Oxygen Delivery Method): room air        In NAD  HEENT- EOMI  Heart- Well Perfused  Lungs- No resp distress, no use of accessory resp muscles  Neuro- Exam unchanged  Psych- Affect wnl          Imp: Patient with diagnosis of   IPH       admitted for comprehensive acute rehabilitation.    Plan:  - Continue therapies  - DVT prophylaxis  - Skin- Turn q2h, check skin daily  - Continue current medications; patient medically stable.   - Patient is stable to continue current rehabilitation program.   - Medicine notes reviewed

## 2023-07-16 NOTE — PROGRESS NOTE ADULT - ASSESSMENT
From primary team notes    ASSESSMENT/PLAN  38y Female h/o hypothyroid with functional deficits after right frontal IPHs/p right hemicraniectomy.     #Right Hemicraniectomy and EVD on 6/6  - Communication with patients family, neuroplastic surgeon Dr. Sy ([phone]) and PM&R team has taken place. As of now there is a scheduled appointment on 7/25 with Dr. Sy for cranioplasty reconstruction. However, there is a possibility for an earlier appointment (possibly on 7/18) that is pending discussion/coordination.   - neuroendocrine workup ordered 7/13, which is thus far wnl.       #hx of seizure  #agitation   #anxiety/depression  -Briviact 50mg BID taper discontinued 7/14.   -Continue depakote 500mg in AM, 750mg in evening.   - Unable to tolerate EEG yesterday, Depakote levels therapeutic on 7/10.   -Cont [id number] BID      --NH3 level normal 7/10    anxiety/depression  -Starting lexapro 5mg today 7/14 for anxiety/depression.        #dysphagia s/p PEG   -PEG tube came out on 7/4, did not replace   -calorie count sufficient, and tolerating PO diet.      #Pain/Neuropathic   #Insomnia  -cont. Tylenol PRN  -cont Trazodone 75mg at bedtime  -cont gabapentin to 400mg at bedtime  -cont tizanidine 4mg, monitor CMP. Liver enzymes wnl 7/13.    -Patient is s/p botox injection in left pec and bicep muscles 7/14  --see procedure note    #constipation  -last BM 7/10.   -Senna qHs.   -Miralax PRN.     #hx of anemia   -follow up medicine recommendations   -H/H stable     #hx of HTN --BP soft  -Monitor    #DVT ppx   - lovenox --Held in anticipation of botox injections.  Plan to resume 7/15    IDT 7/13:  Nursing: incontinent B/B  SLP: on Minced and moist with thin liquids, has Pocketing as gets distracted.  mild-mod language deficits, severe cognitive deficits, limited by attention, memory, reasoning deficits.  Mild Dysarthria; Ongoing education to family  OT: Mod assist–eating/grooming; max assist–bathing and dressing; transfers–max assist of 1 person; toilet transfers to be assessed.  PT: Mod -Max A--Bed mobility: Max A--squat pivot transfer & Ambulating by  rail 15ft-- tot A--Max A and WCf.  WC tot A.   Goals: 1 Pivot and transfers, bed mobility mod-A, 2 attend to structured tasks for 30 minutes   Dispo: 8/7 HALLIE --if  back to Neurosurgical care or cranioplasty. May possibly be sooner if HALLIE.

## 2023-07-17 LAB
ALBUMIN SERPL ELPH-MCNC: 2.9 G/DL — LOW (ref 3.3–5)
ALP SERPL-CCNC: 66 U/L — SIGNIFICANT CHANGE UP (ref 40–120)
ALT FLD-CCNC: 54 U/L — HIGH (ref 10–45)
ANION GAP SERPL CALC-SCNC: 7 MMOL/L — SIGNIFICANT CHANGE UP (ref 5–17)
AST SERPL-CCNC: 20 U/L — SIGNIFICANT CHANGE UP (ref 10–40)
BASOPHILS # BLD AUTO: 0.03 K/UL — SIGNIFICANT CHANGE UP (ref 0–0.2)
BASOPHILS NFR BLD AUTO: 0.5 % — SIGNIFICANT CHANGE UP (ref 0–2)
BILIRUB SERPL-MCNC: 0.2 MG/DL — SIGNIFICANT CHANGE UP (ref 0.2–1.2)
BUN SERPL-MCNC: 7 MG/DL — SIGNIFICANT CHANGE UP (ref 7–23)
CALCIUM SERPL-MCNC: 8.9 MG/DL — SIGNIFICANT CHANGE UP (ref 8.4–10.5)
CHLORIDE SERPL-SCNC: 100 MMOL/L — SIGNIFICANT CHANGE UP (ref 96–108)
CO2 SERPL-SCNC: 29 MMOL/L — SIGNIFICANT CHANGE UP (ref 22–31)
CREAT SERPL-MCNC: 0.47 MG/DL — LOW (ref 0.5–1.3)
EGFR: 124 ML/MIN/1.73M2 — SIGNIFICANT CHANGE UP
EOSINOPHIL # BLD AUTO: 0.09 K/UL — SIGNIFICANT CHANGE UP (ref 0–0.5)
EOSINOPHIL NFR BLD AUTO: 1.4 % — SIGNIFICANT CHANGE UP (ref 0–6)
GLUCOSE SERPL-MCNC: 104 MG/DL — HIGH (ref 70–99)
HCT VFR BLD CALC: 34.9 % — SIGNIFICANT CHANGE UP (ref 34.5–45)
HGB BLD-MCNC: 11.6 G/DL — SIGNIFICANT CHANGE UP (ref 11.5–15.5)
IMM GRANULOCYTES NFR BLD AUTO: 0.6 % — SIGNIFICANT CHANGE UP (ref 0–0.9)
LYMPHOCYTES # BLD AUTO: 2.32 K/UL — SIGNIFICANT CHANGE UP (ref 1–3.3)
LYMPHOCYTES # BLD AUTO: 36 % — SIGNIFICANT CHANGE UP (ref 13–44)
MCHC RBC-ENTMCNC: 31.6 PG — SIGNIFICANT CHANGE UP (ref 27–34)
MCHC RBC-ENTMCNC: 33.2 GM/DL — SIGNIFICANT CHANGE UP (ref 32–36)
MCV RBC AUTO: 95.1 FL — SIGNIFICANT CHANGE UP (ref 80–100)
MONOCYTES # BLD AUTO: 0.54 K/UL — SIGNIFICANT CHANGE UP (ref 0–0.9)
MONOCYTES NFR BLD AUTO: 8.4 % — SIGNIFICANT CHANGE UP (ref 2–14)
NEUTROPHILS # BLD AUTO: 3.42 K/UL — SIGNIFICANT CHANGE UP (ref 1.8–7.4)
NEUTROPHILS NFR BLD AUTO: 53.1 % — SIGNIFICANT CHANGE UP (ref 43–77)
NRBC # BLD: 0 /100 WBCS — SIGNIFICANT CHANGE UP (ref 0–0)
PLATELET # BLD AUTO: 276 K/UL — SIGNIFICANT CHANGE UP (ref 150–400)
POTASSIUM SERPL-MCNC: 3.8 MMOL/L — SIGNIFICANT CHANGE UP (ref 3.5–5.3)
POTASSIUM SERPL-SCNC: 3.8 MMOL/L — SIGNIFICANT CHANGE UP (ref 3.5–5.3)
PROT SERPL-MCNC: 6.3 G/DL — SIGNIFICANT CHANGE UP (ref 6–8.3)
RBC # BLD: 3.67 M/UL — LOW (ref 3.8–5.2)
RBC # FLD: 12.5 % — SIGNIFICANT CHANGE UP (ref 10.3–14.5)
SODIUM SERPL-SCNC: 136 MMOL/L — SIGNIFICANT CHANGE UP (ref 135–145)
WBC # BLD: 6.44 K/UL — SIGNIFICANT CHANGE UP (ref 3.8–10.5)
WBC # FLD AUTO: 6.44 K/UL — SIGNIFICANT CHANGE UP (ref 3.8–10.5)

## 2023-07-17 PROCEDURE — 99232 SBSQ HOSP IP/OBS MODERATE 35: CPT

## 2023-07-17 RX ADMIN — Medication 975 MILLIGRAM(S): at 07:08

## 2023-07-17 RX ADMIN — LACOSAMIDE 150 MILLIGRAM(S): 50 TABLET ORAL at 18:02

## 2023-07-17 RX ADMIN — GABAPENTIN 200 MILLIGRAM(S): 400 CAPSULE ORAL at 15:25

## 2023-07-17 RX ADMIN — DIVALPROEX SODIUM 500 MILLIGRAM(S): 500 TABLET, DELAYED RELEASE ORAL at 06:20

## 2023-07-17 RX ADMIN — LIDOCAINE 1 PATCH: 4 CREAM TOPICAL at 08:09

## 2023-07-17 RX ADMIN — SENNA PLUS 2 TABLET(S): 8.6 TABLET ORAL at 21:05

## 2023-07-17 RX ADMIN — DIVALPROEX SODIUM 750 MILLIGRAM(S): 500 TABLET, DELAYED RELEASE ORAL at 17:45

## 2023-07-17 RX ADMIN — LIDOCAINE 1 PATCH: 4 CREAM TOPICAL at 20:15

## 2023-07-17 RX ADMIN — Medication 75 MILLIGRAM(S): at 21:05

## 2023-07-17 RX ADMIN — PANTOPRAZOLE SODIUM 40 MILLIGRAM(S): 20 TABLET, DELAYED RELEASE ORAL at 12:23

## 2023-07-17 RX ADMIN — Medication 75 MICROGRAM(S): at 05:07

## 2023-07-17 RX ADMIN — LIDOCAINE 1 PATCH: 4 CREAM TOPICAL at 19:16

## 2023-07-17 RX ADMIN — Medication 975 MILLIGRAM(S): at 06:22

## 2023-07-17 RX ADMIN — LACOSAMIDE 150 MILLIGRAM(S): 50 TABLET ORAL at 06:19

## 2023-07-17 RX ADMIN — ENOXAPARIN SODIUM 40 MILLIGRAM(S): 100 INJECTION SUBCUTANEOUS at 06:20

## 2023-07-17 RX ADMIN — ESCITALOPRAM OXALATE 5 MILLIGRAM(S): 10 TABLET, FILM COATED ORAL at 17:44

## 2023-07-17 RX ADMIN — GABAPENTIN 400 MILLIGRAM(S): 400 CAPSULE ORAL at 21:06

## 2023-07-17 RX ADMIN — TIZANIDINE 4 MILLIGRAM(S): 4 TABLET ORAL at 21:05

## 2023-07-17 NOTE — PROGRESS NOTE ADULT - ATTENDING COMMENTS
Pt. seen with resident.  Agree with documentation above as per resident with amendments made as appropriate. Patient medically stable. Making progress towards rehab goals.     right ICH  Continue lexapro 5mg today 7/14 for anxiety/depression.  will consider increasing later in the week as tolerated.      Gabapentin increased to add dose in afternoon for neuropathic pain/headaches.  Monitor

## 2023-07-17 NOTE — PROGRESS NOTE ADULT - ASSESSMENT
From primary team notes    ASSESSMENT/PLAN  38y Female h/o hypothyroid with functional deficits after right frontal IPHs/p right hemicraniectomy.     #Right Hemicraniectomy and EVD on 6/6  - Communication with patients family, neuroplastic surgeon Dr. Sy ([phone]) and PM&R team has taken place. As of now there is a scheduled appointment on 7/25 with Dr. Sy for cranioplasty reconstruction. However, there is a possibility for an earlier appointment (possibly on 7/18) that is pending discussion/coordination.   - neuroendocrine workup ordered 7/13, which is thus far wnl.       #hx of seizure  #agitation   #anxiety/depression  -Briviact 50mg BID taper discontinued 7/14.   -Continue depakote 500mg in AM, 750mg in evening.   - Unable to tolerate EEG, however Depakote levels therapeutic on 7/10.   -Cont [id number] BID      --NH3 level normal 7/10    anxiety/depression  -Starting lexapro 5mg today 7/14 for anxiety/depression.        #dysphagia s/p PEG   -PEG tube came out on 7/4, did not replace   -calorie count sufficient, and tolerating PO diet.      #Pain/Neuropathic   #Insomnia  -cont. Tylenol PRN  -cont Trazodone 75mg at bedtime  -night team started gabapentin 200mg at 2pm, and will cont gabapentin 400mg at bedtime  -cont tizanidine 4mg, monitor CMP. Liver enzymes wnl 7/13.    -Patient is s/p botox injection in left pec and bicep muscles 7/14  --see procedure note    #constipation  -last BM 7/16.   -Senna qHs.   -Miralax PRN.     #hx of anemia   -follow up medicine recommendations   -H/H stable     #hx of HTN --BP soft  -Monitor    #DVT ppx   - resume lovenox    IDT 7/13:  Nursing: incontinent B/B  SLP: on Minced and moist with thin liquids, has Pocketing as gets distracted.  mild-mod language deficits, severe cognitive deficits, limited by attention, memory, reasoning deficits.  Mild Dysarthria; Ongoing education to family  OT: Mod assist–eating/grooming; max assist–bathing and dressing; transfers–max assist of 1 person; toilet transfers to be assessed.  PT: Mod -Max A--Bed mobility: Max A--squat pivot transfer & Ambulating by  rail 15ft-- tot A--Max A and WCf.  WC tot A.   Goals: 1 Pivot and transfers, bed mobility mod-A, 2 attend to structured tasks for 30 minutes   Dispo: 8/7 HALLIE --if  back to Neurosurgical care or cranioplasty. May possibly be sooner if HALLIE.  From primary team notes    ASSESSMENT/PLAN  38y Female h/o hypothyroid with functional deficits after right frontal IPHs/p right hemicraniectomy.     #Right Hemicraniectomy and EVD on 6/6  - Communication with patients family, neuroplastic surgeon Dr. Sy ([phone]) and PM&R team has taken place.   - scheduled appointment on 7/18 with Dr. Sy for cranioplasty reconstruction  - neuroendocrine workup ordered 7/13, which is thus far wnl.       #hx of seizure  #agitation   #anxiety/depression  -Briviact 50mg BID taper discontinued 7/14.   -Continue depakote 500mg in AM, 750mg in evening. Unable to tolerate EEG, however Depakote levels therapeutic on 7/10.   -Cont [id number] BID      --NH3 level normal 7/10    anxiety/depression  -Continue lexapro 5mg today 7/14 for anxiety/depression.        #dysphagia s/p PEG   -PEG tube came out on 7/4, did not replace   -calorie count sufficient, and tolerating PO diet.      #Pain/Neuropathic   #Insomnia  -cont. Tylenol PRN  -cont Trazodone 75mg at bedtime  -night team started gabapentin 200mg at 2pm, which we will continue along with gabapentin 400mg at bedtime  -cont tizanidine 4mg, monitor CMP. Liver enzymes ALT 54 slightly elevated on 7/17.  -Patient is s/p botox injection in left pec and bicep muscles 7/14  --see procedure note    #constipation  -last BM 7/16.   -Senna qHs.   -Miralax PRN.     #hx of anemia   -follow up medicine recommendations   -H/H stable     #hx of HTN --BP soft  -Monitor    #DVT ppx   - resume lovenox    IDT 7/13:  Nursing: incontinent B/B  SLP: on Minced and moist with thin liquids, has Pocketing as gets distracted.  mild-mod language deficits, severe cognitive deficits, limited by attention, memory, reasoning deficits.  Mild Dysarthria; Ongoing education to family  OT: Mod assist–eating/grooming; max assist–bathing and dressing; transfers–max assist of 1 person; toilet transfers to be assessed.  PT: Mod -Max A--Bed mobility: Max A--squat pivot transfer & Ambulating by  rail 15ft-- tot A--Max A and WCf.  WC tot A.   Goals: 1 Pivot and transfers, bed mobility mod-A, 2 attend to structured tasks for 30 minutes   Dispo: 8/7 HALLIE --if  back to Neurosurgical care or cranioplasty. May possibly be sooner if HALLIE.  From primary team notes    ASSESSMENT/PLAN  38y Female h/o hypothyroid with functional deficits after right frontal IPHs/p right hemicraniectomy.     #Right Hemicraniectomy and EVD on 6/6  - Communication with patients family, neuroplastic surgeon Dr. Sy ([phone]) and PM&R team has taken place.   - scheduled appointment on 7/18 with Dr. Sy for cranioplasty reconstruction  - neuroendocrine workup ordered 7/13, which is thus far wnl.       #hx of seizure  #agitation   #anxiety/depression  -Briviact 50mg BID  discontinued 7/14.   -Continue depakote 500mg in AM, 750mg in evening. Unable to tolerate EEG, however Depakote levels therapeutic on 7/10.   -Cont [id number] BID      --NH3 level normal 7/10    anxiety/depression  -Continue lexapro 5mg today 7/14 for anxiety/depression.  will consider increasing later in the week as tolerated.        #dysphagia s/p PEG   -PEG tube came out on 7/4, did not replace   -calorie count sufficient, and tolerating PO diet.      #Pain/Neuropathic   #Insomnia  -cont. Tylenol PRN  -cont Trazodone 75mg at bedtime  -night team started gabapentin 200mg at 2pm, which we will continue along with gabapentin 400mg at bedtime  -cont tizanidine 4mg, monitor CMP. Liver enzymes ALT 54 slightly elevated on 7/17.  -Patient is s/p botox injection in left pec and bicep muscles 7/14  --see procedure note    #constipation  -last BM 7/16.   -Senna qHs.   -Miralax PRN.     #hx of anemia   -follow up medicine recommendations   -H/H stable     #hx of HTN --BP soft  -Monitor    #DVT ppx   - resume lovenox    IDT 7/13:  Nursing: incontinent B/B  SLP: on Minced and moist with thin liquids, has Pocketing as gets distracted.  mild-mod language deficits, severe cognitive deficits, limited by attention, memory, reasoning deficits.  Mild Dysarthria; Ongoing education to family  OT: Mod assist–eating/grooming; max assist–bathing and dressing; transfers–max assist of 1 person; toilet transfers to be assessed.  PT: Mod -Max A--Bed mobility: Max A--squat pivot transfer & Ambulating by  rail 15ft-- tot A--Max A and WCf.  WC tot A.   Goals: 1 Pivot and transfers, bed mobility mod-A, 2 attend to structured tasks for 30 minutes   Dispo: 8/7 HALLIE --if  back to Neurosurgical care or cranioplasty. May possibly be sooner if HALLIE.

## 2023-07-17 NOTE — PROGRESS NOTE ADULT - SUBJECTIVE AND OBJECTIVE BOX
HPI:  38 year old female w/ PMHx hypothyroidism who was admitted to [facility] 6/6 after being found on floor at home, with CT revealing large right-sided ICH. She required emergent right frontal craniectomy for decompression and EVD placement. EVD subsequently removed 6/16. Angio negative. Patient underwent G tube placement 6/20. Hospital course notable for fluctuating leukocytosis and high-grade temperatures with no identified source of infection including in urine, blood, CSF. Now admitted for multidisciplinary rehab. (26 Jun 2023 13:08)    SUBJECTIVE: Over the weekend, her gabapentin was switched to BID. Reports improvement in sleep since starting lexapro, however continues to have generalized pain including a headache and body pain. Otherwise she is taking in good PO, had an BM, tolerating therapy and working very hard.      Vital Signs Last 24 Hrs  T(C): 36.7 (16 Jul 2023 21:46), Max: 36.7 (16 Jul 2023 21:46)  T(F): 98.1 (16 Jul 2023 21:46), Max: 98.1 (16 Jul 2023 21:46)  HR: 94 (16 Jul 2023 21:46) (89 - 94)  BP: 151/76 (16 Jul 2023 21:46) (137/90 - 151/76)  BP(mean): --  RR: 14 (16 Jul 2023 21:46) (14 - 16)  SpO2: 97% (16 Jul 2023 21:46) (97% - 97%)    Parameters below as of 16 Jul 2023 21:46  Patient On (Oxygen Delivery Method): room air          REVIEW OF SYMPTOMS  Neurological deficits  Pain     PHYSICAL EXAM  Constitutional - NAD, complaints of pain, in therapy practicing standing    HEENT - Right frontal hemicraniectomy   Chest - Breathing comfortably on RA   Cardiovascular - RRR,  warm well perfused  Abdomen - BS+, Soft, PEG incisional site healing well.   Extremities - No edema, No calf tenderness   Neurologic Exam -                    Cognitive - Awake, Alert, AAO to self, place, date, year, situation     Communication - Fluent, No dysarthria,       Motor  Left hemiparesis. Right side 5/5.      Sensory - Impaired.       Coordination - Impaired.   Spasticity-- Left shoulder adductors and elbow flexors 3/4  Psychiatric - anxious      LABS:  cret                        11.6   6.44  )-----------( 276      ( 17 Jul 2023 06:04 )             34.9     07-17    136  |  100  |  7   ----------------------------<  104<H>  3.8   |  29  |  0.47<L>    Ca    8.9      17 Jul 2023 06:04    TPro  6.3  /  Alb  2.9<L>  /  TBili  0.2  /  DBili  x   /  AST  20  /  ALT  54<H>  /  AlkPhos  66  07-17                RADIOLOGY:  Reviewed     MEDICATIONS  (STANDING):  divalproex Sprinkle 750 milliGRAM(s) Oral <User Schedule>  divalproex Sprinkle 500 milliGRAM(s) Oral <User Schedule>  enoxaparin Injectable 40 milliGRAM(s) SubCutaneous every 24 hours  escitalopram 5 milliGRAM(s) Oral <User Schedule>  gabapentin 400 milliGRAM(s) Oral at bedtime  gabapentin 200 milliGRAM(s) Oral <User Schedule>  lacosamide Solution 150 milliGRAM(s) Oral two times a day  levothyroxine 75 MICROGram(s) Oral daily  lidocaine   4% Patch 1 Patch Transdermal <User Schedule>  pantoprazole   Suspension 40 milliGRAM(s) Oral daily  senna 2 Tablet(s) Oral at bedtime  tiZANidine 4 milliGRAM(s) Oral at bedtime  traZODone 75 milliGRAM(s) Oral at bedtime    MEDICATIONS  (PRN):  acetaminophen     Tablet .. 975 milliGRAM(s) Oral every 6 hours PRN Mild Pain (1 - 3), Moderate Pain (4 - 6), Severe Pain (7 - 10)  bacitracin   Ointment 1 Application(s) Topical three times a day PRN scalp irritation  polyethylene glycol 3350 17 Gram(s) Oral daily PRN Constipation         HPI:  38 year old female w/ PMHx hypothyroidism who was admitted to [facility] 6/6 after being found on floor at home, with CT revealing large right-sided ICH. She required emergent right frontal craniectomy for decompression and EVD placement. EVD subsequently removed 6/16. Angio negative. Patient underwent G tube placement 6/20. Hospital course notable for fluctuating leukocytosis and high-grade temperatures with no identified source of infection including in urine, blood, CSF. Now admitted for multidisciplinary rehab. (26 Jun 2023 13:08)    SUBJECTIVE: Over the weekend, her gabapentin was switched to BID--dose started at 2pm. Reports improvement in sleep since starting lexapro, however continues to have generalized pain including a headache and body pain. Otherwise she is taking in good PO, had an BM, tolerating therapy and working very hard.      Vital Signs Last 24 Hrs  T(C): 36.7 (16 Jul 2023 21:46), Max: 36.7 (16 Jul 2023 21:46)  T(F): 98.1 (16 Jul 2023 21:46), Max: 98.1 (16 Jul 2023 21:46)  HR: 94 (16 Jul 2023 21:46) (89 - 94)  BP: 151/76 (16 Jul 2023 21:46) (137/90 - 151/76)  BP(mean): --  RR: 14 (16 Jul 2023 21:46) (14 - 16)  SpO2: 97% (16 Jul 2023 21:46) (97% - 97%)    Parameters below as of 16 Jul 2023 21:46  Patient On (Oxygen Delivery Method): room air          REVIEW OF SYMPTOMS  Neurological deficits  Pain     PHYSICAL EXAM  Constitutional - NAD, complaints of pain, in therapy practicing standing    HEENT - Right frontal hemicraniectomy   Chest - Breathing comfortably on RA   Cardiovascular - RRR,  warm well perfused  Abdomen - BS+, Soft, PEG incisional site healing well.   Extremities - No edema, No calf tenderness   Neurologic Exam -                    Cognitive - Awake, Alert, AAO to self, place, date, year, situation     Communication - Fluent, No dysarthria,       Motor  Left hemiparesis. Right side 5/5.      Sensory - Impaired.       Coordination - Impaired.   Spasticity-- Left shoulder adductors and elbow flexors 3/4  Psychiatric - anxious      LABS:  cret                        11.6   6.44  )-----------( 276      ( 17 Jul 2023 06:04 )             34.9     07-17    136  |  100  |  7   ----------------------------<  104<H>  3.8   |  29  |  0.47<L>    Ca    8.9      17 Jul 2023 06:04    TPro  6.3  /  Alb  2.9<L>  /  TBili  0.2  /  DBili  x   /  AST  20  /  ALT  54<H>  /  AlkPhos  66  07-17                RADIOLOGY:  Reviewed     MEDICATIONS  (STANDING):  divalproex Sprinkle 750 milliGRAM(s) Oral <User Schedule>  divalproex Sprinkle 500 milliGRAM(s) Oral <User Schedule>  enoxaparin Injectable 40 milliGRAM(s) SubCutaneous every 24 hours  escitalopram 5 milliGRAM(s) Oral <User Schedule>  gabapentin 400 milliGRAM(s) Oral at bedtime  gabapentin 200 milliGRAM(s) Oral <User Schedule>  lacosamide Solution 150 milliGRAM(s) Oral two times a day  levothyroxine 75 MICROGram(s) Oral daily  lidocaine   4% Patch 1 Patch Transdermal <User Schedule>  pantoprazole   Suspension 40 milliGRAM(s) Oral daily  senna 2 Tablet(s) Oral at bedtime  tiZANidine 4 milliGRAM(s) Oral at bedtime  traZODone 75 milliGRAM(s) Oral at bedtime    MEDICATIONS  (PRN):  acetaminophen     Tablet .. 975 milliGRAM(s) Oral every 6 hours PRN Mild Pain (1 - 3), Moderate Pain (4 - 6), Severe Pain (7 - 10)  bacitracin   Ointment 1 Application(s) Topical three times a day PRN scalp irritation  polyethylene glycol 3350 17 Gram(s) Oral daily PRN Constipation

## 2023-07-18 ENCOUNTER — APPOINTMENT (OUTPATIENT)
Dept: NEUROSURGERY | Facility: CLINIC | Age: 38
End: 2023-07-18
Payer: COMMERCIAL

## 2023-07-18 VITALS
SYSTOLIC BLOOD PRESSURE: 145 MMHG | HEART RATE: 90 BPM | DIASTOLIC BLOOD PRESSURE: 96 MMHG | TEMPERATURE: 97.1 F | OXYGEN SATURATION: 96 %

## 2023-07-18 PROCEDURE — 99215 OFFICE O/P EST HI 40 MIN: CPT

## 2023-07-18 PROCEDURE — 99232 SBSQ HOSP IP/OBS MODERATE 35: CPT

## 2023-07-18 RX ORDER — ESCITALOPRAM OXALATE 10 MG/1
10 TABLET, FILM COATED ORAL
Refills: 0 | Status: DISCONTINUED | OUTPATIENT
Start: 2023-07-18 | End: 2023-08-01

## 2023-07-18 RX ADMIN — TIZANIDINE 4 MILLIGRAM(S): 4 TABLET ORAL at 21:11

## 2023-07-18 RX ADMIN — Medication 975 MILLIGRAM(S): at 03:20

## 2023-07-18 RX ADMIN — LACOSAMIDE 150 MILLIGRAM(S): 50 TABLET ORAL at 06:29

## 2023-07-18 RX ADMIN — ESCITALOPRAM OXALATE 10 MILLIGRAM(S): 10 TABLET, FILM COATED ORAL at 17:54

## 2023-07-18 RX ADMIN — SENNA PLUS 2 TABLET(S): 8.6 TABLET ORAL at 21:11

## 2023-07-18 RX ADMIN — DIVALPROEX SODIUM 750 MILLIGRAM(S): 500 TABLET, DELAYED RELEASE ORAL at 17:55

## 2023-07-18 RX ADMIN — Medication 75 MICROGRAM(S): at 05:19

## 2023-07-18 RX ADMIN — LIDOCAINE 1 PATCH: 4 CREAM TOPICAL at 09:04

## 2023-07-18 RX ADMIN — Medication 975 MILLIGRAM(S): at 11:45

## 2023-07-18 RX ADMIN — LIDOCAINE 1 PATCH: 4 CREAM TOPICAL at 20:52

## 2023-07-18 RX ADMIN — Medication 975 MILLIGRAM(S): at 11:14

## 2023-07-18 RX ADMIN — Medication 975 MILLIGRAM(S): at 19:08

## 2023-07-18 RX ADMIN — Medication 75 MILLIGRAM(S): at 21:11

## 2023-07-18 RX ADMIN — ENOXAPARIN SODIUM 40 MILLIGRAM(S): 100 INJECTION SUBCUTANEOUS at 06:28

## 2023-07-18 RX ADMIN — LIDOCAINE 1 PATCH: 4 CREAM TOPICAL at 19:20

## 2023-07-18 RX ADMIN — GABAPENTIN 400 MILLIGRAM(S): 400 CAPSULE ORAL at 21:10

## 2023-07-18 RX ADMIN — LACOSAMIDE 150 MILLIGRAM(S): 50 TABLET ORAL at 18:00

## 2023-07-18 RX ADMIN — PANTOPRAZOLE SODIUM 40 MILLIGRAM(S): 20 TABLET, DELAYED RELEASE ORAL at 11:15

## 2023-07-18 RX ADMIN — Medication 975 MILLIGRAM(S): at 02:23

## 2023-07-18 RX ADMIN — Medication 975 MILLIGRAM(S): at 20:03

## 2023-07-18 RX ADMIN — DIVALPROEX SODIUM 500 MILLIGRAM(S): 500 TABLET, DELAYED RELEASE ORAL at 06:28

## 2023-07-18 NOTE — REASON FOR VISIT
[New Patient Visit] : a new patient visit [Referred By: _________] : Patient was referred by GOLDY [Family Member] : family member [Spouse] : spouse [Parent] : parent

## 2023-07-18 NOTE — CHART NOTE - NSCHARTNOTEFT_GEN_A_CORE
Nutrition Follow Up Note  Hospital Course   (Per Electronic Medical Record)    Source:  Patient [X]  Family Member [X]   Nursing Staff [X]   Medical Record [X]      Diet: Diet, Minced and Moist:   Extra Sauces Gravies  Supplement Feeding Modality:  Oral  Ensure Plus High Protein Cans or Servings Per Day:  1       Frequency:  Daily (07-12-23 @ 15:40) [Active]    At this time patient tolerating diet w/ varied appetite/intake consuming 0-75% of meals. Reports acceptance on Ensure Plus High Protein Daily 8 oz (Provides 350 kcal, 20 grams of protein) QD. Reviewed preferences w/ patient/family/RN noted in dietary Kardex. No issues w/ chewing and swallowing current diet texture.  Denies N/V/C/D, at this time.         Enteral/Parenteral Nutrition: Not Applicable    Current Weight: 151lb on 7/17  150.3 lbs (7-11)  151.4 lbs (7-10)  151.2 lbs (7-8)    Pertinent Medications: MEDICATIONS  (STANDING):  divalproex Sprinkle 750 milliGRAM(s) Oral <User Schedule>  divalproex Sprinkle 500 milliGRAM(s) Oral <User Schedule>  enoxaparin Injectable 40 milliGRAM(s) SubCutaneous every 24 hours  escitalopram 5 milliGRAM(s) Oral <User Schedule>  gabapentin 400 milliGRAM(s) Oral at bedtime  gabapentin 200 milliGRAM(s) Oral <User Schedule>  lacosamide Solution 150 milliGRAM(s) Oral two times a day  levothyroxine 75 MICROGram(s) Oral daily  lidocaine   4% Patch 1 Patch Transdermal <User Schedule>  pantoprazole   Suspension 40 milliGRAM(s) Oral daily  senna 2 Tablet(s) Oral at bedtime  tiZANidine 4 milliGRAM(s) Oral at bedtime  traZODone 75 milliGRAM(s) Oral at bedtime    MEDICATIONS  (PRN):  acetaminophen     Tablet .. 975 milliGRAM(s) Oral every 6 hours PRN Mild Pain (1 - 3), Moderate Pain (4 - 6), Severe Pain (7 - 10)  bacitracin   Ointment 1 Application(s) Topical three times a day PRN scalp irritation  polyethylene glycol 3350 17 Gram(s) Oral daily PRN Constipation      Pertinent Labs:  07-17 Na136 mmol/L Glu 104 mg/dL<H> K+ 3.8 mmol/L Cr  0.47 mg/dL<L> BUN 7 mg/dL 07-17 Alb 2.9 g/dL<L>    Skin: No pressure injury per nursing flowsheets, Surgical Incision, Site right parietal     Edema: No edema noted per nursing flowsheet    Last Bowel Movement: on 7/17 Per nursing flowsheets     Estimated Needs:   [X] No Change Since Previous Assessment    Previous Nutrition Diagnosis:   Severe malnutrition, acute    Nutrition Diagnosis is [X] Ongoing  - addressed with Ensure Plus High Protein (provides 350 kcal, 20 g protein/serving) QD    New Nutrition Diagnosis: [X] Not Applicable    Interventions:   1. Recommend Continue Nutrition Plan of Care.  2. Ongoing diet education     Monitoring & Evaluation:   [X] Weights   [X] PO Intake   [X] Skin Integrity   [X] Follow Up (Per Protocol)  [X] Tolerance to Diet Prescription   [X] Other: Labs     Registered Dietitian/Nutritionist Remains Available.  Nikia Gentile RD, MS, CDN    Phone# [phone] Nutrition Follow Up Note  Hospital Course   (Per Electronic Medical Record)    Source:  Patient [X]  Family Member [X]   Nursing Staff [X]   Medical Record [X]      Diet: Diet, Minced and Moist:   Extra Sauces Gravies  Supplement Feeding Modality:  Oral  Ensure Plus High Protein Cans or Servings Per Day:  1       Frequency:  Daily (07-12-23 @ 15:40) [Active]    At this time patient tolerating diet w/ varied appetite/intake consuming 0-75% of meals. Reports acceptance on Ensure Plus High Protein Daily 8 oz (Provides 350 kcal, 20 grams of protein) QD. Reviewed preferences w/ patient/family/RN noted in dietary Kardex. No issues w/ chewing and swallowing current diet texture.  Denies N/V/C/D, at this time.         Addendum- Per SLP patient diet texture upgraded to Soft and Bite Sized today (7/18)     Enteral/Parenteral Nutrition: Not Applicable    Current Weight: 151lb on 7/17  150.3 lbs (7-11)  151.4 lbs (7-10)  151.2 lbs (7-8)    Pertinent Medications: MEDICATIONS  (STANDING):  divalproex Sprinkle 750 milliGRAM(s) Oral <User Schedule>  divalproex Sprinkle 500 milliGRAM(s) Oral <User Schedule>  enoxaparin Injectable 40 milliGRAM(s) SubCutaneous every 24 hours  escitalopram 5 milliGRAM(s) Oral <User Schedule>  gabapentin 400 milliGRAM(s) Oral at bedtime  gabapentin 200 milliGRAM(s) Oral <User Schedule>  lacosamide Solution 150 milliGRAM(s) Oral two times a day  levothyroxine 75 MICROGram(s) Oral daily  lidocaine   4% Patch 1 Patch Transdermal <User Schedule>  pantoprazole   Suspension 40 milliGRAM(s) Oral daily  senna 2 Tablet(s) Oral at bedtime  tiZANidine 4 milliGRAM(s) Oral at bedtime  traZODone 75 milliGRAM(s) Oral at bedtime    MEDICATIONS  (PRN):  acetaminophen     Tablet .. 975 milliGRAM(s) Oral every 6 hours PRN Mild Pain (1 - 3), Moderate Pain (4 - 6), Severe Pain (7 - 10)  bacitracin   Ointment 1 Application(s) Topical three times a day PRN scalp irritation  polyethylene glycol 3350 17 Gram(s) Oral daily PRN Constipation      Pertinent Labs:  07-17 Na136 mmol/L Glu 104 mg/dL<H> K+ 3.8 mmol/L Cr  0.47 mg/dL<L> BUN 7 mg/dL 07-17 Alb 2.9 g/dL<L>    Skin: No pressure injury per nursing flowsheets, Surgical Incision, Site right parietal     Edema: No edema noted per nursing flowsheet    Last Bowel Movement: on 7/17 Per nursing flowsheets     Estimated Needs:   [X] No Change Since Previous Assessment    Previous Nutrition Diagnosis:   Severe malnutrition, acute    Nutrition Diagnosis is [X] Ongoing  - addressed with Ensure Plus High Protein (provides 350 kcal, 20 g protein/serving) QD    New Nutrition Diagnosis: [X] Not Applicable    Interventions:   1. Recommend Continue Nutrition Plan of Care.  2. Ongoing diet education     Monitoring & Evaluation:   [X] Weights   [X] PO Intake   [X] Skin Integrity   [X] Follow Up (Per Protocol)  [X] Tolerance to Diet Prescription   [X] Other: Labs     Registered Dietitian/Nutritionist Remains Available.  Nikia Gentile RD, MS, CDN    Phone# [phone]

## 2023-07-18 NOTE — PROGRESS NOTE ADULT - SUBJECTIVE AND OBJECTIVE BOX
HPI:  38 year old female w/ PMHx hypothyroidism who was admitted to [facility] 6/6 after being found on floor at home, with CT revealing large right-sided ICH. She required emergent right frontal craniectomy for decompression and EVD placement. EVD subsequently removed 6/16. Angio negative. Patient underwent G tube placement 6/20. Hospital course notable for fluctuating leukocytosis and high-grade temperatures with no identified source of infection including in urine, blood, CSF. Now admitted for multidisciplinary rehab. (26 Jun 2023 13:08)    SUBJECTIVE: Over the weekend, her gabapentin was switched to BID--dose started at 2pm. Reports improvement in sleep since starting lexapro, however continues to have generalized pain including a headache and body pain. Otherwise she is taking in good PO, had an BM, tolerating therapy and working very hard.      Vital Signs Last 24 Hrs  T(C): 36.7 (16 Jul 2023 21:46), Max: 36.7 (16 Jul 2023 21:46)  T(F): 98.1 (16 Jul 2023 21:46), Max: 98.1 (16 Jul 2023 21:46)  HR: 94 (16 Jul 2023 21:46) (89 - 94)  BP: 151/76 (16 Jul 2023 21:46) (137/90 - 151/76)  BP(mean): --  RR: 14 (16 Jul 2023 21:46) (14 - 16)  SpO2: 97% (16 Jul 2023 21:46) (97% - 97%)    Parameters below as of 16 Jul 2023 21:46  Patient On (Oxygen Delivery Method): room air          REVIEW OF SYMPTOMS  Neurological deficits  Pain     PHYSICAL EXAM  Constitutional - NAD, complaints of pain, in therapy practicing standing    HEENT - Right frontal hemicraniectomy   Chest - Breathing comfortably on RA   Cardiovascular - RRR,  warm well perfused  Abdomen - BS+, Soft, PEG incisional site healing well.   Extremities - No edema, No calf tenderness   Neurologic Exam -                    Cognitive - Awake, Alert, AAO to self, place, date, year, situation     Communication - Fluent, No dysarthria,       Motor  Left hemiparesis. Right side 5/5.      Sensory - Impaired.       Coordination - Impaired.   Spasticity-- Left shoulder adductors and elbow flexors 3/4  Psychiatric - anxious      LABS:  cret                        11.6   6.44  )-----------( 276      ( 17 Jul 2023 06:04 )             34.9     07-17    136  |  100  |  7   ----------------------------<  104<H>  3.8   |  29  |  0.47<L>    Ca    8.9      17 Jul 2023 06:04    TPro  6.3  /  Alb  2.9<L>  /  TBili  0.2  /  DBili  x   /  AST  20  /  ALT  54<H>  /  AlkPhos  66  07-17    RADIOLOGY:  Reviewed     MEDICATIONS  (STANDING):  divalproex Sprinkle 750 milliGRAM(s) Oral <User Schedule>  divalproex Sprinkle 500 milliGRAM(s) Oral <User Schedule>  enoxaparin Injectable 40 milliGRAM(s) SubCutaneous every 24 hours  escitalopram 5 milliGRAM(s) Oral <User Schedule>  gabapentin 400 milliGRAM(s) Oral at bedtime  gabapentin 200 milliGRAM(s) Oral <User Schedule>  lacosamide Solution 150 milliGRAM(s) Oral two times a day  levothyroxine 75 MICROGram(s) Oral daily  lidocaine   4% Patch 1 Patch Transdermal <User Schedule>  pantoprazole   Suspension 40 milliGRAM(s) Oral daily  senna 2 Tablet(s) Oral at bedtime  tiZANidine 4 milliGRAM(s) Oral at bedtime  traZODone 75 milliGRAM(s) Oral at bedtime    MEDICATIONS  (PRN):  acetaminophen     Tablet .. 975 milliGRAM(s) Oral every 6 hours PRN Mild Pain (1 - 3), Moderate Pain (4 - 6), Severe Pain (7 - 10)  bacitracin   Ointment 1 Application(s) Topical three times a day PRN scalp irritation  polyethylene glycol 3350 17 Gram(s) Oral daily PRN Constipation  Assessment and Plan:   · Assessment	  From primary team notes    ASSESSMENT/PLAN  38y Female h/o hypothyroid with functional deficits after right frontal IPHs/p right hemicraniectomy.     #Right Hemicraniectomy and EVD on 6/6  - Communication with patients family, neuroplastic surgeon Dr. Sy ([phone]) and PM&R team has taken place.   - scheduled appointment on 7/18 with Dr. Agustin-Shalom for cranioplasty reconstruction  - neuroendocrine workup ordered 7/13, which is thus far wnl.       #hx of seizure  #agitation   #anxiety/depression  -Briviact 50mg BID  discontinued 7/14.   -Continue depakote 500mg in AM, 750mg in evening. Unable to tolerate EEG, however Depakote levels therapeutic on 7/10.   -Cont [id number] BID      --NH3 level normal 7/10    anxiety/depression  -Continue lexapro 5mg today 7/14 for anxiety/depression.  will consider increasing later in the week as tolerated.        #dysphagia s/p PEG   -PEG tube came out on 7/4, did not replace   -calorie count sufficient, and tolerating PO diet.      #Pain/Neuropathic   #Insomnia  -cont. Tylenol PRN  -cont Trazodone 75mg at bedtime  -night team started gabapentin 200mg at 2pm, which we will continue along with gabapentin 400mg at bedtime  -cont tizanidine 4mg, monitor CMP. Liver enzymes ALT 54 slightly elevated on 7/17.  -Patient is s/p botox injection in left pec and bicep muscles 7/14  --see procedure note    #constipation  -last BM 7/16.   -Senna qHs.   -Miralax PRN.     #hx of anemia   -follow up medicine recommendations   -H/H stable     #hx of HTN --BP soft  -Monitor    #DVT ppx   - resume lovenox    IDT 7/13:  Nursing: incontinent B/B  SLP: on Minced and moist with thin liquids, has Pocketing as gets distracted.  mild-mod language deficits, severe cognitive deficits, limited by attention, memory, reasoning deficits.  Mild Dysarthria; Ongoing education to family  OT: Mod assist–eating/grooming; max assist–bathing and dressing; transfers–max assist of 1 person; toilet transfers to be assessed.  PT: Mod -Max A--Bed mobility: Max A--squat pivot transfer & Ambulating by  rail 15ft-- tot A--Max A and WCf.  WC tot A.   Goals: 1 Pivot and transfers, bed mobility mod-A, 2 attend to structured tasks for 30 minutes   Dispo: 8/7 HALLIE --if  back to Neurosurgical care or cranioplasty. May possibly be sooner if HALLIE.        HPI:  38 year old female w/ PMHx hypothyroidism who was admitted to [facility] 6/6 after being found on floor at home, with CT revealing large right-sided ICH. She required emergent right frontal craniectomy for decompression and EVD placement. EVD subsequently removed 6/16. Angio negative. Patient underwent G tube placement 6/20. Hospital course notable for fluctuating leukocytosis and high-grade temperatures with no identified source of infection including in urine, blood, CSF. Now admitted for multidisciplinary rehab. (26 Jun 2023 13:08)    SUBJECTIVE:   No acute overnight events. Patient seen and examined in physical therapy. Ambulating with two person assistance. Complaints of nonspecific pain. No new complaints of CP/SOB/NV. Patient is sleeping well. VSS. afebrile.     Vital Signs Last 24 Hrs  T(C): 36.7 (16 Jul 2023 21:46), Max: 36.7 (16 Jul 2023 21:46)  T(F): 98.1 (16 Jul 2023 21:46), Max: 98.1 (16 Jul 2023 21:46)  HR: 94 (16 Jul 2023 21:46) (89 - 94)  BP: 151/76 (16 Jul 2023 21:46) (137/90 - 151/76)  BP(mean): --  RR: 14 (16 Jul 2023 21:46) (14 - 16)  SpO2: 97% (16 Jul 2023 21:46) (97% - 97%)    Parameters below as of 16 Jul 2023 21:46  Patient On (Oxygen Delivery Method): room air    REVIEW OF SYMPTOMS  Neurological deficits  Pain     PHYSICAL EXAM  Constitutional - NAD, complaints of pain, in therapy practicing standing    HEENT - Right frontal hemicraniectomy, helmet in place    Chest - Breathing comfortably on RA   Cardiovascular - RRR,  warm well perfused  Abdomen - BS+, Soft, PEG incisional site healing well.   Extremities - No edema, No calf tenderness   Neurologic Exam -                    Cognitive - Awake, Alert, AAO to self, place, date, year, situation     Communication - Fluent, No dysarthria,       Motor  Left hemiparesis. Right side 5/5.      Sensory - Impaired.       Coordination - Impaired.   Spasticity-- Left shoulder adductors and elbow flexors 3/4  Psychiatric - anxious      LABS:  cret                        11.6   6.44  )-----------( 276      ( 17 Jul 2023 06:04 )             34.9     07-17    136  |  100  |  7   ----------------------------<  104<H>  3.8   |  29  |  0.47<L>    Ca    8.9      17 Jul 2023 06:04    TPro  6.3  /  Alb  2.9<L>  /  TBili  0.2  /  DBili  x   /  AST  20  /  ALT  54<H>  /  AlkPhos  66  07-17    RADIOLOGY:  Reviewed     MEDICATIONS  (STANDING):  divalproex Sprinkle 750 milliGRAM(s) Oral <User Schedule>  divalproex Sprinkle 500 milliGRAM(s) Oral <User Schedule>  enoxaparin Injectable 40 milliGRAM(s) SubCutaneous every 24 hours  escitalopram 5 milliGRAM(s) Oral <User Schedule>  gabapentin 400 milliGRAM(s) Oral at bedtime  gabapentin 200 milliGRAM(s) Oral <User Schedule>  lacosamide Solution 150 milliGRAM(s) Oral two times a day  levothyroxine 75 MICROGram(s) Oral daily  lidocaine   4% Patch 1 Patch Transdermal <User Schedule>  pantoprazole   Suspension 40 milliGRAM(s) Oral daily  senna 2 Tablet(s) Oral at bedtime  tiZANidine 4 milliGRAM(s) Oral at bedtime  traZODone 75 milliGRAM(s) Oral at bedtime    MEDICATIONS  (PRN):  acetaminophen     Tablet .. 975 milliGRAM(s) Oral every 6 hours PRN Mild Pain (1 - 3), Moderate Pain (4 - 6), Severe Pain (7 - 10)  bacitracin   Ointment 1 Application(s) Topical three times a day PRN scalp irritation  polyethylene glycol 3350 17 Gram(s) Oral daily PRN Constipation  Assessment and Plan:   · Assessment	  From primary team notes    ASSESSMENT/PLAN:  38y Female h/o hypothyroid with functional deficits after right frontal IPHs/p right hemicraniectomy.     #Right Hemicraniectomy and EVD on 6/6  - Communication with patients family, neuroplastic surgeon Dr. Sy ([phone]) and PM&R team has taken place.   - scheduled appointment on 7/18 with Dr. Sy for cranioplasty reconstruction  - neuroendocrine workup ordered, otherwise nml     #hx of seizure  #agitation   #anxiety/depression  -Briviact 50mg BID  discontinued 7/14.   -Continue Depakote 500mg in AM, 750mg in evening   -Cont [id number] BID      -Cont Lexapro 10mg daily (increased from 5mg to 10mg on 7/18)   - Ordered Valproic acid level and ammonia for 7/20     #dysphagia s/p PEG   -PEG tube came out on 7/4, did not replace   -calorie count sufficient, and tolerating PO diet.      #Pain/Neuropathic   #Insomnia  -Botox to Lpec and Lbicep 7/14-tolerated procedure well   -cont. Tylenol PRN  -cont Trazodone 75mg at bedtime  -cont Gabapentin 200mg at 2pm, which we will continue along with gabapentin 400mg at bedtime, consider increasing   -cont Tizanidine 4mg, monitor CMP. Liver enzymes ALT 54 slightly elevated on 7/17. follow up CMP on 7/20  -cont lidocaine patch     #constipation  -Senna qHs.   -Miralax PRN.     #hx of anemia   -follow up medicine recommendations   -H/H stable     #hx of HTN --BP soft  -Monitor    #DVT ppx   - cont lovenox    IDT 7/13:  Nursing: incontinent B/B  SLP: on Minced and moist with thin liquids, has Pocketing as gets distracted.  mild-mod language deficits, severe cognitive deficits, limited by attention, memory, reasoning deficits.  Mild Dysarthria; Ongoing education to family  OT: Mod assist–eating/grooming; max assist–bathing and dressing; transfers–max assist of 1 person; toilet transfers to be assessed.  PT: Mod -Max A--Bed mobility: Max A--squat pivot transfer & Ambulating by  kathrynil 15ft-- tot A--Max A and WCf.  WC tot A.   Goals: 1 Pivot and transfers, bed mobility mod-A, 2 attend to structured tasks for 30 minutes   Dispo: 8/7 HALLIE --if  back to Neurosurgical care or cranioplasty. May possibly be sooner if HALLIE.        HPI:  38 year old female w/ PMHx hypothyroidism who was admitted to [facility] 6/6 after being found on floor at home, with CT revealing large right-sided ICH. She required emergent right frontal craniectomy for decompression and EVD placement. EVD subsequently removed 6/16. Angio negative. Patient underwent G tube placement 6/20. Hospital course notable for fluctuating leukocytosis and high-grade temperatures with no identified source of infection including in urine, blood, CSF. Now admitted for multidisciplinary rehab. (26 Jun 2023 13:08)    SUBJECTIVE:   No acute overnight events. Patient seen and examined in physical therapy. Ambulating with two person assistance. Complaints of nonspecific pain. No new complaints of CP/SOB/NV. Patient is sleeping well. VSS. afebrile.     Vital Signs Last 24 Hrs  T(C): 36.7 (16 Jul 2023 21:46), Max: 36.7 (16 Jul 2023 21:46)  T(F): 98.1 (16 Jul 2023 21:46), Max: 98.1 (16 Jul 2023 21:46)  HR: 94 (16 Jul 2023 21:46) (89 - 94)  BP: 151/76 (16 Jul 2023 21:46) (137/90 - 151/76)  BP(mean): --  RR: 14 (16 Jul 2023 21:46) (14 - 16)  SpO2: 97% (16 Jul 2023 21:46) (97% - 97%)    Parameters below as of 16 Jul 2023 21:46  Patient On (Oxygen Delivery Method): room air    REVIEW OF SYMPTOMS  Neurological deficits  Pain     PHYSICAL EXAM  Constitutional - NAD, complaints of pain, in therapy practicing standing    HEENT - Right frontal hemicraniectomy, helmet in place    Chest - Breathing comfortably on RA   Cardiovascular - RRR,  warm well perfused  Abdomen - BS+, Soft, PEG incisional site healing well.   Extremities - No edema, No calf tenderness   Neurologic Exam -                    Cognitive - Awake, Alert, AAO to self, place, date, year, situation     Communication - Fluent, No dysarthria,       Motor  Left hemiparesis. Right side 5/5.      Sensory - Impaired.       Coordination - Impaired.   Spasticity-- Left shoulder adductors and elbow flexors 3/4  Psychiatric - anxious      LABS:                        11.6   6.44  )-----------( 276      ( 17 Jul 2023 06:04 )             34.9     07-17    136  |  100  |  7   ----------------------------<  104<H>  3.8   |  29  |  0.47<L>    Ca    8.9      17 Jul 2023 06:04    TPro  6.3  /  Alb  2.9<L>  /  TBili  0.2  /  DBili  x   /  AST  20  /  ALT  54<H>  /  AlkPhos  66  07-17    RADIOLOGY:  Reviewed     MEDICATIONS  (STANDING):  divalproex Sprinkle 750 milliGRAM(s) Oral <User Schedule>  divalproex Sprinkle 500 milliGRAM(s) Oral <User Schedule>  enoxaparin Injectable 40 milliGRAM(s) SubCutaneous every 24 hours  escitalopram 5 milliGRAM(s) Oral <User Schedule>  gabapentin 400 milliGRAM(s) Oral at bedtime  gabapentin 200 milliGRAM(s) Oral <User Schedule>  lacosamide Solution 150 milliGRAM(s) Oral two times a day  levothyroxine 75 MICROGram(s) Oral daily  lidocaine   4% Patch 1 Patch Transdermal <User Schedule>  pantoprazole   Suspension 40 milliGRAM(s) Oral daily  senna 2 Tablet(s) Oral at bedtime  tiZANidine 4 milliGRAM(s) Oral at bedtime  traZODone 75 milliGRAM(s) Oral at bedtime    MEDICATIONS  (PRN):  acetaminophen     Tablet .. 975 milliGRAM(s) Oral every 6 hours PRN Mild Pain (1 - 3), Moderate Pain (4 - 6), Severe Pain (7 - 10)  bacitracin   Ointment 1 Application(s) Topical three times a day PRN scalp irritation  polyethylene glycol 3350 17 Gram(s) Oral daily PRN Constipation  Assessment and Plan:   · Assessment	  From primary team notes    ASSESSMENT/PLAN:  38y Female h/o hypothyroid with functional deficits after right frontal IPHs/p right hemicraniectomy.     #Right Hemicraniectomy and EVD on 6/6  - Communication with patients family, neuroplastic surgeon Dr. Sy ([phone]) and PM&R team has taken place.   - scheduled appointment on 7/18 with Dr. Sy for cranioplasty reconstruction  - neuroendocrine workup ordered, otherwise nml     #hx of seizure  #agitation   #anxiety/depression  -Briviact 50mg BID  discontinued 7/14.   -Continue Depakote 500mg in AM, 750mg in evening   -Cont [id number] BID      -Cont Lexapro 10mg daily (increased from 5mg to 10mg on 7/18)   - Ordered Valproic acid level and ammonia for 7/20     #dysphagia s/p PEG   -PEG tube came out on 7/4, did not replace   -calorie count sufficient, and tolerating PO diet.      #Pain/Neuropathic   #Insomnia  -Botox to Lpec and Lbicep 7/14-tolerated procedure well   -cont. Tylenol PRN  -cont Trazodone 75mg at bedtime  -cont Gabapentin 200mg at 2pm, which we will continue along with gabapentin 400mg at bedtime, consider increasing   -cont Tizanidine 4mg, monitor CMP. Liver enzymes ALT 54 slightly elevated on 7/17. follow up CMP on 7/20  -cont lidocaine patch     #constipation  -Senna qHs.   -Miralax PRN.     #hx of anemia   -follow up medicine recommendations   -H/H stable     #hx of HTN --BP soft  -Monitor    #DVT ppx   - cont lovenox    IDT 7/13:  Nursing: incontinent B/B  SLP: on Minced and moist with thin liquids, has Pocketing as gets distracted.  mild-mod language deficits, severe cognitive deficits, limited by attention, memory, reasoning deficits.  Mild Dysarthria; Ongoing education to family  OT: Mod assist–eating/grooming; max assist–bathing and dressing; transfers–max assist of 1 person; toilet transfers to be assessed.  PT: Mod -Max A--Bed mobility: Max A--squat pivot transfer & Ambulating by  rail 15ft-- tot A--Max A and WCf.  WC tot A.   Goals: 1 Pivot and transfers, bed mobility mod-A, 2 attend to structured tasks for 30 minutes   Dispo: 8/7 HALLIE --if  back to Neurosurgical care or cranioplasty. May possibly be sooner if HALLIE.        HPI:  38 year old female w/ PMHx hypothyroidism who was admitted to [facility] 6/6 after being found on floor at home, with CT revealing large right-sided ICH. She required emergent right frontal craniectomy for decompression and EVD placement. EVD subsequently removed 6/16. Angio negative. Patient underwent G tube placement 6/20. Hospital course notable for fluctuating leukocytosis and high-grade temperatures with no identified source of infection including in urine, blood, CSF. Now admitted for multidisciplinary rehab. (26 Jun 2023 13:08)    SUBJECTIVE:   No acute overnight events. Patient seen and examined in physical therapy. Ambulating with two person assistance. Complaints of nonspecific pain. No new complaints of CP/SOB/NV. Patient is sleeping well. VSS. afebrile. Patient still emotional labile and anxious.      Vital Signs Last 24 Hrs  T(C): 36.7 (16 Jul 2023 21:46), Max: 36.7 (16 Jul 2023 21:46)  T(F): 98.1 (16 Jul 2023 21:46), Max: 98.1 (16 Jul 2023 21:46)  HR: 94 (16 Jul 2023 21:46) (89 - 94)  BP: 151/76 (16 Jul 2023 21:46) (137/90 - 151/76)  BP(mean): --  RR: 14 (16 Jul 2023 21:46) (14 - 16)  SpO2: 97% (16 Jul 2023 21:46) (97% - 97%)    Parameters below as of 16 Jul 2023 21:46  Patient On (Oxygen Delivery Method): room air    REVIEW OF SYMPTOMS  Neurological deficits  Pain     PHYSICAL EXAM  Constitutional - NAD, complaints of pain, in therapy practicing standing    HEENT - Right frontal hemicraniectomy, helmet in place    Chest - Breathing comfortably on RA   Cardiovascular - RRR,  warm well perfused  Abdomen - BS+, Soft, PEG incisional site healing well.   Extremities - No edema, No calf tenderness   Neurologic Exam -                    Cognitive - Awake, Alert, AAO to self, place, date, year, situation     Communication - Fluent, No dysarthria,       Motor  Left hemiparesis. Right side 5/5.      Sensory - Impaired.       Coordination - Impaired.   Spasticity-- Left shoulder adductors and elbow flexors 3/4  Psychiatric - anxious      LABS:                        11.6   6.44  )-----------( 276      ( 17 Jul 2023 06:04 )             34.9     07-17    136  |  100  |  7   ----------------------------<  104<H>  3.8   |  29  |  0.47<L>    Ca    8.9      17 Jul 2023 06:04    TPro  6.3  /  Alb  2.9<L>  /  TBili  0.2  /  DBili  x   /  AST  20  /  ALT  54<H>  /  AlkPhos  66  07-17    RADIOLOGY:  Reviewed     MEDICATIONS  (STANDING):  divalproex Sprinkle 750 milliGRAM(s) Oral <User Schedule>  divalproex Sprinkle 500 milliGRAM(s) Oral <User Schedule>  enoxaparin Injectable 40 milliGRAM(s) SubCutaneous every 24 hours  escitalopram 5 milliGRAM(s) Oral <User Schedule>  gabapentin 400 milliGRAM(s) Oral at bedtime  gabapentin 200 milliGRAM(s) Oral <User Schedule>  lacosamide Solution 150 milliGRAM(s) Oral two times a day  levothyroxine 75 MICROGram(s) Oral daily  lidocaine   4% Patch 1 Patch Transdermal <User Schedule>  pantoprazole   Suspension 40 milliGRAM(s) Oral daily  senna 2 Tablet(s) Oral at bedtime  tiZANidine 4 milliGRAM(s) Oral at bedtime  traZODone 75 milliGRAM(s) Oral at bedtime    MEDICATIONS  (PRN):  acetaminophen     Tablet .. 975 milliGRAM(s) Oral every 6 hours PRN Mild Pain (1 - 3), Moderate Pain (4 - 6), Severe Pain (7 - 10)  bacitracin   Ointment 1 Application(s) Topical three times a day PRN scalp irritation  polyethylene glycol 3350 17 Gram(s) Oral daily PRN Constipation  Assessment and Plan:   · Assessment	  From primary team notes    ASSESSMENT/PLAN:  38y Female h/o hypothyroid with functional deficits after right frontal IPHs/p right hemicraniectomy.     #Right Hemicraniectomy and EVD on 6/6  - Communication with patients family, neuroplastic surgeon Dr. Sy ([phone]) and PM&R team has taken place.   - scheduled appointment on 7/18 with Dr. Agustin-Shalom for assessment for  cranioplasty reconstruction  - neuroendocrine workup ordered, otherwise nml     #hx of seizure  #agitation   #-Briviact 50mg BID  discontinued 7/14.   -Continue Depakote 500mg in AM, 750mg in evening   -Cont [id number] BID      - Ordered Valproic acid level and ammonia for 7/20  AM    anxiety/depression  - Lexapro increased from 5mg to 10mg on 7/18  --monitor for SE's    #dysphagia s/p PEG   -PEG tube came out on 7/4, did not replace   -calorie count sufficient, and tolerating PO diet.      #Pain/Neuropathic   #Insomnia  -Botox to Lpec and Lbicep 7/14-tolerated procedure well   -cont. Tylenol PRN  -cont Trazodone 75mg at bedtime  -cont Gabapentin 200mg at 2pm, which we will continue along with gabapentin 400mg at bedtime, consider increasing   -cont Tizanidine 4mg, monitor CMP. Liver enzymes ALT 54 slightly elevated on 7/17. follow up CMP on 7/20  -cont lidocaine patch     #constipation  -Senna qHs.   -Miralax PRN.     #hx of anemia   -follow up medicine recommendations   -H/H stable     #hx of HTN --BP soft  -Monitor    #DVT ppx   - cont lovenox    IDT 7/13:  Nursing: incontinent B/B  SLP: on Minced and moist with thin liquids, has Pocketing as gets distracted.  mild-mod language deficits, severe cognitive deficits, limited by attention, memory, reasoning deficits.  Mild Dysarthria; Ongoing education to family  OT: Mod assist–eating/grooming; max assist–bathing and dressing; transfers–max assist of 1 person; toilet transfers to be assessed.  PT: Mod -Max A--Bed mobility: Max A--squat pivot transfer & Ambulating by  rail 15ft-- tot A--Max A and WCf.  WC tot A.   Goals: 1 Pivot and transfers, bed mobility mod-A, 2 attend to structured tasks for 30 minutes   Dispo: 8/7 HALLIE --if  back to Neurosurgical care or cranioplasty. May possibly be sooner if HALLIE.

## 2023-07-18 NOTE — PROGRESS NOTE ADULT - ATTENDING COMMENTS
Pt. seen with resident & fellow.  Agree with documentation above as per fellow with amendments made as appropriate. Patient medically stable. Making progress towards rehab goals.     right ICH s/p Craniectomy  Ordered Valproic acid level and ammonia for 7/20  AM    anxiety/depression  - Lexapro increased from 5mg to 10mg on 7/18  --monitor for SE's    appointment  with Dr. Sy for assessment for  cranioplasty reconstruction-- d/w Dr. Sy NS-- Pt's wound is still atrophic and wide-- concern for risk of infection and his plan is for Cranioplasty in Sept.

## 2023-07-19 PROCEDURE — 99232 SBSQ HOSP IP/OBS MODERATE 35: CPT

## 2023-07-19 RX ADMIN — LIDOCAINE 1 PATCH: 4 CREAM TOPICAL at 19:12

## 2023-07-19 RX ADMIN — Medication 975 MILLIGRAM(S): at 10:49

## 2023-07-19 RX ADMIN — Medication 975 MILLIGRAM(S): at 18:07

## 2023-07-19 RX ADMIN — LACOSAMIDE 150 MILLIGRAM(S): 50 TABLET ORAL at 06:23

## 2023-07-19 RX ADMIN — ENOXAPARIN SODIUM 40 MILLIGRAM(S): 100 INJECTION SUBCUTANEOUS at 05:19

## 2023-07-19 RX ADMIN — GABAPENTIN 200 MILLIGRAM(S): 400 CAPSULE ORAL at 14:34

## 2023-07-19 RX ADMIN — Medication 975 MILLIGRAM(S): at 18:50

## 2023-07-19 RX ADMIN — Medication 975 MILLIGRAM(S): at 11:49

## 2023-07-19 RX ADMIN — Medication 75 MICROGRAM(S): at 05:18

## 2023-07-19 RX ADMIN — PANTOPRAZOLE SODIUM 40 MILLIGRAM(S): 20 TABLET, DELAYED RELEASE ORAL at 18:03

## 2023-07-19 RX ADMIN — ESCITALOPRAM OXALATE 10 MILLIGRAM(S): 10 TABLET, FILM COATED ORAL at 18:02

## 2023-07-19 RX ADMIN — TIZANIDINE 4 MILLIGRAM(S): 4 TABLET ORAL at 21:13

## 2023-07-19 RX ADMIN — Medication 75 MILLIGRAM(S): at 21:12

## 2023-07-19 RX ADMIN — GABAPENTIN 400 MILLIGRAM(S): 400 CAPSULE ORAL at 21:13

## 2023-07-19 RX ADMIN — LACOSAMIDE 150 MILLIGRAM(S): 50 TABLET ORAL at 18:02

## 2023-07-19 RX ADMIN — DIVALPROEX SODIUM 500 MILLIGRAM(S): 500 TABLET, DELAYED RELEASE ORAL at 06:22

## 2023-07-19 RX ADMIN — LIDOCAINE 1 PATCH: 4 CREAM TOPICAL at 20:19

## 2023-07-19 RX ADMIN — DIVALPROEX SODIUM 750 MILLIGRAM(S): 500 TABLET, DELAYED RELEASE ORAL at 18:02

## 2023-07-19 RX ADMIN — LIDOCAINE 1 PATCH: 4 CREAM TOPICAL at 08:49

## 2023-07-19 NOTE — PROGRESS NOTE ADULT - SUBJECTIVE AND OBJECTIVE BOX
HPI:  38 year old female w/ PMHx hypothyroidism who was admitted to [facility] 6/6 after being found on floor at home, with CT revealing large right-sided ICH. She required emergent right frontal craniectomy for decompression and EVD placement. EVD subsequently removed 6/16. Angio negative. Patient underwent G tube placement 6/20. Hospital course notable for fluctuating leukocytosis and high-grade temperatures with no identified source of infection including in urine, blood, CSF. Now admitted for multidisciplinary rehab. (26 Jun 2023 13:08)    SUBJECTIVE:   No acute overnight events. Patient seen and examined by bedside. Family present during history taking. Patient went to Neurosurgery outpatient cranioplasty follow up appointment. She is doing well today. Slept better. No new complaints of numbness tingling or weakness. No CP/SOB/NV. She is working hard in therapies. Still has significant spasticity in the left UE, but improving after botox. VSS. afebrile. Will continue to monitor closely.     Vital Signs Last 24 Hrs  T(C): 36.7 (16 Jul 2023 21:46), Max: 36.7 (16 Jul 2023 21:46)  T(F): 98.1 (16 Jul 2023 21:46), Max: 98.1 (16 Jul 2023 21:46)  HR: 94 (16 Jul 2023 21:46) (89 - 94)  BP: 151/76 (16 Jul 2023 21:46) (137/90 - 151/76)  BP(mean): --  RR: 14 (16 Jul 2023 21:46) (14 - 16)  SpO2: 97% (16 Jul 2023 21:46) (97% - 97%)    Parameters below as of 16 Jul 2023 21:46  Patient On (Oxygen Delivery Method): room air    REVIEW OF SYMPTOMS  Neurological deficits  Pain     PHYSICAL EXAM  Constitutional - NAD, slept well   HEENT - Right frontal hemicraniectomy incision site clean dry and intact   Chest - Breathing comfortably on RA   Cardiovascular - RRR,  warm well perfused  Abdomen - BS+, Soft, PEG incisional site healing well.   Extremities - No edema, No calf tenderness   Neurologic Exam -                    Cognitive - Awake, Alert, AAO to self, place, date, year, situation     Communication - Fluent, No dysarthria,       Motor  Left hemiparesis. Right side 5/5.      Sensory - Impaired.       Coordination - Impaired.   Spasticity-- left shoulder abduction 3, left shoulder flexion 3, left finger flexion 1   Psychiatric - pleasant     LABS:                        11.6   6.44  )-----------( 276      ( 17 Jul 2023 06:04 )             34.9     07-17    136  |  100  |  7   ----------------------------<  104<H>  3.8   |  29  |  0.47<L>    Ca    8.9      17 Jul 2023 06:04    TPro  6.3  /  Alb  2.9<L>  /  TBili  0.2  /  DBili  x   /  AST  20  /  ALT  54<H>  /  AlkPhos  66  07-17    RADIOLOGY:  Reviewed     MEDICATIONS  (STANDING):  divalproex Sprinkle 750 milliGRAM(s) Oral <User Schedule>  divalproex Sprinkle 500 milliGRAM(s) Oral <User Schedule>  enoxaparin Injectable 40 milliGRAM(s) SubCutaneous every 24 hours  escitalopram 5 milliGRAM(s) Oral <User Schedule>  gabapentin 400 milliGRAM(s) Oral at bedtime  gabapentin 200 milliGRAM(s) Oral <User Schedule>  lacosamide Solution 150 milliGRAM(s) Oral two times a day  levothyroxine 75 MICROGram(s) Oral daily  lidocaine   4% Patch 1 Patch Transdermal <User Schedule>  pantoprazole   Suspension 40 milliGRAM(s) Oral daily  senna 2 Tablet(s) Oral at bedtime  tiZANidine 4 milliGRAM(s) Oral at bedtime  traZODone 75 milliGRAM(s) Oral at bedtime    MEDICATIONS  (PRN):  acetaminophen     Tablet .. 975 milliGRAM(s) Oral every 6 hours PRN Mild Pain (1 - 3), Moderate Pain (4 - 6), Severe Pain (7 - 10)  bacitracin   Ointment 1 Application(s) Topical three times a day PRN scalp irritation  polyethylene glycol 3350 17 Gram(s) Oral daily PRN Constipation  Assessment and Plan:   · Assessment	  From primary team notes    ASSESSMENT/PLAN:  38y Female h/o hypothyroid with functional deficits after right frontal IPHs/p right hemicraniectomy.     #Right Hemicraniectomy and EVD on 6/6  - Communication with patients family, neuroplastic surgeon Dr. Sy ([phone]) and PM&R team has taken place.   - neuroendocrine workup ordered, otherwise nml   - cranioplasty appointment on 7/18 with Dr. Sy- due to healing of crani site would tentatively recommend cranioplasty in August 2023, discussed transfer from [facility] to cranioplasty appointment once discharge, patient's family aware and agree with plan     #hx of seizure  #agitation   #-Briviact 50mg BID  discontinued 7/14.   -Continue Depakote 500mg in AM, 750mg in evening   -Cont [id number] BID      - Ordered Valproic acid level and ammonia for 7/20  AM    anxiety/depression  - Lexapro increased from 5mg to 10mg on 7/18-tolerating well   --monitor for SE's    #dysphagia s/p PEG   -PEG tube came out on 7/4, did not replace   -calorie count sufficient, and tolerating PO diet.      #Pain/Neuropathic   #Insomnia  -Botox to Lpec and Lbicep 7/14-tolerated procedure well   -cont. Tylenol PRN  -cont Trazodone 75mg at bedtime  -cont Gabapentin 200mg at 2pm, which we will continue along with gabapentin 400mg at bedtime, consider increasing   -cont Tizanidine 4mg, monitor CMP. Liver enzymes ALT 54 slightly elevated on 7/17. follow up CMP on 7/20  -cont lidocaine patch     #constipation  -Senna qHs.   -Miralax PRN.     #hx of anemia   -follow up medicine recommendations   -H/H stable     #hx of HTN --BP soft  -Monitor    #DVT ppx   - cont lovenox    IDT 7/13:  Nursing: incontinent B/B  SLP: on Minced and moist with thin liquids, has Pocketing as gets distracted.  mild-mod language deficits, severe cognitive deficits, limited by attention, memory, reasoning deficits.  Mild Dysarthria; Ongoing education to family  OT: Mod assist–eating/grooming; max assist–bathing and dressing; transfers–max assist of 1 person; toilet transfers to be assessed.  PT: Mod -Max A--Bed mobility: Max A--squat pivot transfer & Ambulating by  rail 15ft-- tot A--Max A and WCf.  WC tot A.   Goals: 1 Pivot and transfers, bed mobility mod-A, 2 attend to structured tasks for 30 minutes   Dispo: 8/7 [facility] --if  back to Neurosurgical care or cranioplasty. May possibly be sooner if H[facility]IE.        HPI:  38 year old female w/ PMHx hypothyroidism who was admitted to [facility] 6/6 after being found on floor at home, with CT revealing large right-sided ICH. She required emergent right frontal craniectomy for decompression and EVD placement. EVD subsequently removed 6/16. Angio negative. Patient underwent G tube placement 6/20. Hospital course notable for fluctuating leukocytosis and high-grade temperatures with no identified source of infection including in urine, blood, CSF. Now admitted for multidisciplinary rehab. (26 Jun 2023 13:08)    SUBJECTIVE:   No acute overnight events. Patient seen and examined by bedside. Family present during history taking. Patient went to Neurosurgery outpatient cranioplasty follow up appointment. She is doing well today. Slept better. No new complaints of numbness tingling or weakness. No CP/SOB/NV. She is working hard in therapies. Still has significant spasticity in the left UE, but improving after botox. VSS. afebrile. Will continue to monitor closely.     Vital Signs Last 24 Hrs  T(C): 36.7 (16 Jul 2023 21:46), Max: 36.7 (16 Jul 2023 21:46)  T(F): 98.1 (16 Jul 2023 21:46), Max: 98.1 (16 Jul 2023 21:46)  HR: 94 (16 Jul 2023 21:46) (89 - 94)  BP: 151/76 (16 Jul 2023 21:46) (137/90 - 151/76)  BP(mean): --  RR: 14 (16 Jul 2023 21:46) (14 - 16)  SpO2: 97% (16 Jul 2023 21:46) (97% - 97%)    Parameters below as of 16 Jul 2023 21:46  Patient On (Oxygen Delivery Method): room air    REVIEW OF SYMPTOMS  Neurological deficits  Pain     PHYSICAL EXAM  Constitutional - NAD, slept well   HEENT - Right frontal hemicraniectomy incision site clean dry and intact   Chest - Breathing comfortably on RA   Cardiovascular - RRR,  warm well perfused  Abdomen - BS+, Soft, PEG incisional site healing well.   Extremities - No edema, No calf tenderness   Neurologic Exam -                    Cognitive - Awake, Alert, AAO to self, place, date, year, situation     Communication - Fluent, No dysarthria,       Motor  Left hemiparesis. Right side 5/5.      Sensory - Impaired.       Coordination - Impaired.   Spasticity-- left shoulder abduction 3, left shoulder flexion 3, left finger flexion 1   Psychiatric - pleasant     LABS:                        11.6   6.44  )-----------( 276      ( 17 Jul 2023 06:04 )             34.9     07-17    136  |  100  |  7   ----------------------------<  104<H>  3.8   |  29  |  0.47<L>    Ca    8.9      17 Jul 2023 06:04    TPro  6.3  /  Alb  2.9<L>  /  TBili  0.2  /  DBili  x   /  AST  20  /  ALT  54<H>  /  AlkPhos  66  07-17    RADIOLOGY:  Reviewed     MEDICATIONS  (STANDING):  divalproex Sprinkle 750 milliGRAM(s) Oral <User Schedule>  divalproex Sprinkle 500 milliGRAM(s) Oral <User Schedule>  enoxaparin Injectable 40 milliGRAM(s) SubCutaneous every 24 hours  escitalopram 5 milliGRAM(s) Oral <User Schedule>  gabapentin 400 milliGRAM(s) Oral at bedtime  gabapentin 200 milliGRAM(s) Oral <User Schedule>  lacosamide Solution 150 milliGRAM(s) Oral two times a day  levothyroxine 75 MICROGram(s) Oral daily  lidocaine   4% Patch 1 Patch Transdermal <User Schedule>  pantoprazole   Suspension 40 milliGRAM(s) Oral daily  senna 2 Tablet(s) Oral at bedtime  tiZANidine 4 milliGRAM(s) Oral at bedtime  traZODone 75 milliGRAM(s) Oral at bedtime    MEDICATIONS  (PRN):  acetaminophen     Tablet .. 975 milliGRAM(s) Oral every 6 hours PRN Mild Pain (1 - 3), Moderate Pain (4 - 6), Severe Pain (7 - 10)  bacitracin   Ointment 1 Application(s) Topical three times a day PRN scalp irritation  polyethylene glycol 3350 17 Gram(s) Oral daily PRN Constipation  Assessment and Plan:   · Assessment	  From primary team notes    ASSESSMENT/PLAN:  38y Female h/o hypothyroid with functional deficits after right frontal IPHs/p right hemicraniectomy.     #Right Hemicraniectomy and EVD on 6/6  - Communication with patients family, neuroplastic surgeon Dr. Sy ([phone]) and PM&R team has taken place.   - neuroendocrine workup ordered, otherwise nml   - cranioplasty appointment on 7/18 with Dr. Sy- due to healing of crani site would tentatively recommend cranioplasty in September 2023, discussed transfer from [facility] to cranioplasty appointment once discharge, patient's family aware and agree with plan     #hx of seizure  #agitation   #-Briviact 50mg BID  discontinued 7/14.   -Continue Depakote 500mg in AM, 750mg in evening   -Cont [id number] BID      - Ordered Valproic acid level and ammonia for 7/20  AM    anxiety/depression  - Lexapro increased from 5mg to 10mg on 7/18-tolerating well   --monitor for SE's    #dysphagia s/p PEG   -PEG tube came out on 7/4, did not replace   -calorie count sufficient, and tolerating PO diet.      #Pain/Neuropathic   #Insomnia  -Botox to Lpec and Lbicep 7/14-tolerated procedure well   -cont. Tylenol PRN  -cont Trazodone 75mg at bedtime  -cont Gabapentin 200mg at 2pm, which we will continue along with gabapentin 400mg at bedtime, consider increasing   -cont Tizanidine 4mg, monitor CMP. Liver enzymes ALT 54 slightly elevated on 7/17. follow up CMP on 7/20  -cont lidocaine patch     #constipation  -Senna qHs.   -Miralax PRN.     #hx of anemia   -follow up medicine recommendations   -H/H stable     #hx of HTN --BP soft  -Monitor    #DVT ppx   - cont lovenox    IDT 7/13:  Nursing: incontinent B/B  SLP: on Minced and moist with thin liquids, has Pocketing as gets distracted.  mild-mod language deficits, severe cognitive deficits, limited by attention, memory, reasoning deficits.  Mild Dysarthria; Ongoing education to family  OT: Mod assist–eating/grooming; max assist–bathing and dressing; transfers–max assist of 1 person; toilet transfers to be assessed.  PT: Mod -Max A--Bed mobility: Max A--squat pivot transfer & Ambulating by  rail 15ft-- tot A--Max A and WCf.  WC tot A.   Goals: 1 Pivot and transfers, bed mobility mod-A, 2 attend to structured tasks for 30 minutes   Dispo: 8/7 [facility]IE --if  back to Neurosurgical care or cranioplasty. May possibly be sooner if HALLIE.        HPI:  38 year old female w/ PMHx hypothyroidism who was admitted to [facility] 6/6 after being found on floor at home, with CT revealing large right-sided ICH. She required emergent right frontal craniectomy for decompression and EVD placement. EVD subsequently removed 6/16. Angio negative. Patient underwent G tube placement 6/20. Hospital course notable for fluctuating leukocytosis and high-grade temperatures with no identified source of infection including in urine, blood, CSF. Now admitted for multidisciplinary rehab. (26 Jun 2023 13:08)    SUBJECTIVE:   No acute overnight events. Patient seen and examined by bedside. Mother present during history taking. Patient went to Neurosurgery outpatient cranioplasty follow up appointment. She is doing well today. Slept better. No new complaints of numbness tingling or weakness. No CP/SOB/NV. She is working hard in therapies. Still has significant spasticity in the left UE, but improving after botox. VSS. afebrile. Will continue to monitor closely.     Vital Signs Last 24 Hrs  T(C): 36.7 (16 Jul 2023 21:46), Max: 36.7 (16 Jul 2023 21:46)  T(F): 98.1 (16 Jul 2023 21:46), Max: 98.1 (16 Jul 2023 21:46)  HR: 94 (16 Jul 2023 21:46) (89 - 94)  BP: 151/76 (16 Jul 2023 21:46) (137/90 - 151/76)  BP(mean): --  RR: 14 (16 Jul 2023 21:46) (14 - 16)  SpO2: 97% (16 Jul 2023 21:46) (97% - 97%)    Parameters below as of 16 Jul 2023 21:46  Patient On (Oxygen Delivery Method): room air    REVIEW OF SYMPTOMS  Neurological deficits  Pain     PHYSICAL EXAM  Constitutional - NAD, slept well   HEENT - Right frontal hemicraniectomy incision site clean dry and intact   Chest - Breathing comfortably on RA   Cardiovascular - RRR,  warm well perfused  Abdomen - BS+, Soft, PEG incisional site healing well.   Extremities - No edema, No calf tenderness   Neurologic Exam -                    Cognitive - Awake, Alert, AAO to self, place, date, year, situation     Communication - Fluent, No dysarthria,       Motor  Left hemiparesis. Right side 5/5.      Sensory - Impaired.       Coordination - Impaired.   Spasticity-- left shoulder abduction 3, left shoulder flexion 3, left finger flexion 1   Psychiatric - pleasant     LABS:                        11.6   6.44  )-----------( 276      ( 17 Jul 2023 06:04 )             34.9     07-17    136  |  100  |  7   ----------------------------<  104<H>  3.8   |  29  |  0.47<L>    Ca    8.9      17 Jul 2023 06:04    TPro  6.3  /  Alb  2.9<L>  /  TBili  0.2  /  DBili  x   /  AST  20  /  ALT  54<H>  /  AlkPhos  66  07-17    RADIOLOGY:  Reviewed     MEDICATIONS  (STANDING):  divalproex Sprinkle 750 milliGRAM(s) Oral <User Schedule>  divalproex Sprinkle 500 milliGRAM(s) Oral <User Schedule>  enoxaparin Injectable 40 milliGRAM(s) SubCutaneous every 24 hours  escitalopram 5 milliGRAM(s) Oral <User Schedule>  gabapentin 400 milliGRAM(s) Oral at bedtime  gabapentin 200 milliGRAM(s) Oral <User Schedule>  lacosamide Solution 150 milliGRAM(s) Oral two times a day  levothyroxine 75 MICROGram(s) Oral daily  lidocaine   4% Patch 1 Patch Transdermal <User Schedule>  pantoprazole   Suspension 40 milliGRAM(s) Oral daily  senna 2 Tablet(s) Oral at bedtime  tiZANidine 4 milliGRAM(s) Oral at bedtime  traZODone 75 milliGRAM(s) Oral at bedtime    MEDICATIONS  (PRN):  acetaminophen     Tablet .. 975 milliGRAM(s) Oral every 6 hours PRN Mild Pain (1 - 3), Moderate Pain (4 - 6), Severe Pain (7 - 10)  bacitracin   Ointment 1 Application(s) Topical three times a day PRN scalp irritation  polyethylene glycol 3350 17 Gram(s) Oral daily PRN Constipation  Assessment and Plan:   · Assessment	  From primary team notes    ASSESSMENT/PLAN:  38y Female h/o hypothyroid with functional deficits after right frontal IPHs/p right hemicraniectomy.     #Right Hemicraniectomy and EVD on 6/6  - Communication with patients family, neuroplastic surgeon Dr. Sy ([phone]) and PM&R team has taken place.   - neuroendocrine workup ordered, otherwise nml   - cranioplasty appointment on 7/18 with Dr. Sy- due to healing of crani site would tentatively recommend cranioplasty in September 2023, discussed transfer from [facility] to cranioplasty appointment once discharge, patient's family aware and agree with plan     #hx of seizure  #agitation   #-Briviact 50mg BID  discontinued 7/14.   -Continue Depakote 500mg in AM, 750mg in evening   -Cont [id number] BID      - Ordered Valproic acid level and ammonia for 7/20  AM    anxiety/depression  - Lexapro increased from 5mg to 10mg on 7/18-tolerating well   --monitor for SE's    #dysphagia s/p PEG   -PEG tube came out on 7/4, did not replace   -calorie count sufficient, and tolerating PO diet.      #Pain/Neuropathic   #Insomnia  -Botox to Lpec and Lbicep 7/14-tolerated procedure well   -cont. Tylenol PRN  -cont Trazodone 75mg at bedtime  -cont Gabapentin 200mg at 2pm, which we will continue along with gabapentin 400mg at bedtime, consider increasing   -cont Tizanidine 4mg, monitor CMP. Liver enzymes ALT 54 slightly elevated on 7/17. follow up CMP on 7/20  -cont lidocaine patch     #constipation  -Senna qHs.   -Miralax PRN.     #hx of anemia   -follow up medicine recommendations   -H/H stable     #hx of HTN --BP soft  -Monitor    #DVT ppx   - cont lovenox    IDT 7/13:  Nursing: incontinent B/B  SLP: on Minced and moist with thin liquids, has Pocketing as gets distracted.  mild-mod language deficits, severe cognitive deficits, limited by attention, memory, reasoning deficits.  Mild Dysarthria; Ongoing education to family  OT: Mod assist–eating/grooming; max assist–bathing and dressing; transfers–max assist of 1 person; toilet transfers to be assessed.  PT: Mod -Max A--Bed mobility: Max A--squat pivot transfer & Ambulating by  rail 15ft-- tot A--Max A and WCf.  WC tot A.   Goals: 1 Pivot and transfers, bed mobility mod-A, 2 attend to structured tasks for 30 minutes   Dispo: 8/7 [facility]IE --if  back to Neurosurgical care or cranioplasty. May possibly be sooner if HALLIE.

## 2023-07-19 NOTE — PROGRESS NOTE ADULT - ATTENDING COMMENTS
Pt. seen with fellow.  Agree with documentation above as per fellow with amendments made as appropriate. Patient medically stable. Making progress towards rehab goals.     right ICH--    Pt. slept better last night.   Tolerating lexapro.  Seen during ST with mother at bedside  discussed pt's cranioplasty appointment on 7/18 with Dr. Sy- and his explanation due to healing of crani site would tentatively recommend cranioplasty in September 2023  Spoke with pt's mother regarding [facility] vs. [facility] and that Acute Rehab time period is limited and pt. will need to continue therapy in [place]. I have a P2P to try to extend stay but discussed with mother that extension may likely not be granted as Cranioplasty will be > 2 weeks away.  She expressed understanding.  Has multiple questions about H[facility]IE-- referred to SW to discuss further.

## 2023-07-20 LAB
ALBUMIN SERPL ELPH-MCNC: 2.9 G/DL — LOW (ref 3.3–5)
ALP SERPL-CCNC: 58 U/L — SIGNIFICANT CHANGE UP (ref 40–120)
ALT FLD-CCNC: 40 U/L — SIGNIFICANT CHANGE UP (ref 10–45)
AMMONIA BLD-MCNC: 26 UMOL/L — SIGNIFICANT CHANGE UP (ref 11–55)
ANION GAP SERPL CALC-SCNC: 7 MMOL/L — SIGNIFICANT CHANGE UP (ref 5–17)
AST SERPL-CCNC: 16 U/L — SIGNIFICANT CHANGE UP (ref 10–40)
BASOPHILS # BLD AUTO: 0.04 K/UL — SIGNIFICANT CHANGE UP (ref 0–0.2)
BASOPHILS NFR BLD AUTO: 0.6 % — SIGNIFICANT CHANGE UP (ref 0–2)
BILIRUB SERPL-MCNC: 0.1 MG/DL — LOW (ref 0.2–1.2)
BUN SERPL-MCNC: 7 MG/DL — SIGNIFICANT CHANGE UP (ref 7–23)
CALCIUM SERPL-MCNC: 9.2 MG/DL — SIGNIFICANT CHANGE UP (ref 8.4–10.5)
CHLORIDE SERPL-SCNC: 99 MMOL/L — SIGNIFICANT CHANGE UP (ref 96–108)
CO2 SERPL-SCNC: 29 MMOL/L — SIGNIFICANT CHANGE UP (ref 22–31)
CREAT SERPL-MCNC: 0.4 MG/DL — LOW (ref 0.5–1.3)
EGFR: 129 ML/MIN/1.73M2 — SIGNIFICANT CHANGE UP
EOSINOPHIL # BLD AUTO: 0.08 K/UL — SIGNIFICANT CHANGE UP (ref 0–0.5)
EOSINOPHIL NFR BLD AUTO: 1.1 % — SIGNIFICANT CHANGE UP (ref 0–6)
ESTRADIOL FREE MFR SERPL: 2.8 % — SIGNIFICANT CHANGE UP
ESTRADIOL FREE SERPL-MCNC: <0.03 PG/ML — LOW
ESTRADIOL SERPL HS-MCNC: <1 PG/ML — SIGNIFICANT CHANGE UP
GLUCOSE SERPL-MCNC: 96 MG/DL — SIGNIFICANT CHANGE UP (ref 70–99)
HCT VFR BLD CALC: 36.1 % — SIGNIFICANT CHANGE UP (ref 34.5–45)
HGB BLD-MCNC: 11.9 G/DL — SIGNIFICANT CHANGE UP (ref 11.5–15.5)
IMM GRANULOCYTES NFR BLD AUTO: 0.7 % — SIGNIFICANT CHANGE UP (ref 0–0.9)
LYMPHOCYTES # BLD AUTO: 2.18 K/UL — SIGNIFICANT CHANGE UP (ref 1–3.3)
LYMPHOCYTES # BLD AUTO: 30.2 % — SIGNIFICANT CHANGE UP (ref 13–44)
MCHC RBC-ENTMCNC: 31.6 PG — SIGNIFICANT CHANGE UP (ref 27–34)
MCHC RBC-ENTMCNC: 33 GM/DL — SIGNIFICANT CHANGE UP (ref 32–36)
MCV RBC AUTO: 96 FL — SIGNIFICANT CHANGE UP (ref 80–100)
MONOCYTES # BLD AUTO: 0.57 K/UL — SIGNIFICANT CHANGE UP (ref 0–0.9)
MONOCYTES NFR BLD AUTO: 7.9 % — SIGNIFICANT CHANGE UP (ref 2–14)
NEUTROPHILS # BLD AUTO: 4.3 K/UL — SIGNIFICANT CHANGE UP (ref 1.8–7.4)
NEUTROPHILS NFR BLD AUTO: 59.5 % — SIGNIFICANT CHANGE UP (ref 43–77)
NRBC # BLD: 0 /100 WBCS — SIGNIFICANT CHANGE UP (ref 0–0)
PLATELET # BLD AUTO: 283 K/UL — SIGNIFICANT CHANGE UP (ref 150–400)
POTASSIUM SERPL-MCNC: 3.7 MMOL/L — SIGNIFICANT CHANGE UP (ref 3.5–5.3)
POTASSIUM SERPL-SCNC: 3.7 MMOL/L — SIGNIFICANT CHANGE UP (ref 3.5–5.3)
PROT SERPL-MCNC: 6.6 G/DL — SIGNIFICANT CHANGE UP (ref 6–8.3)
RBC # BLD: 3.76 M/UL — LOW (ref 3.8–5.2)
RBC # FLD: 13 % — SIGNIFICANT CHANGE UP (ref 10.3–14.5)
SODIUM SERPL-SCNC: 135 MMOL/L — SIGNIFICANT CHANGE UP (ref 135–145)
VALPROATE SERPL-MCNC: 51 UG/ML — SIGNIFICANT CHANGE UP (ref 50–100)
WBC # BLD: 7.22 K/UL — SIGNIFICANT CHANGE UP (ref 3.8–10.5)
WBC # FLD AUTO: 7.22 K/UL — SIGNIFICANT CHANGE UP (ref 3.8–10.5)

## 2023-07-20 PROCEDURE — 99232 SBSQ HOSP IP/OBS MODERATE 35: CPT

## 2023-07-20 PROCEDURE — 90832 PSYTX W PT 30 MINUTES: CPT

## 2023-07-20 RX ORDER — GABAPENTIN 400 MG/1
300 CAPSULE ORAL
Refills: 0 | Status: DISCONTINUED | OUTPATIENT
Start: 2023-07-20 | End: 2023-07-31

## 2023-07-20 RX ADMIN — LIDOCAINE 1 PATCH: 4 CREAM TOPICAL at 19:16

## 2023-07-20 RX ADMIN — SENNA PLUS 2 TABLET(S): 8.6 TABLET ORAL at 21:30

## 2023-07-20 RX ADMIN — GABAPENTIN 400 MILLIGRAM(S): 400 CAPSULE ORAL at 21:30

## 2023-07-20 RX ADMIN — GABAPENTIN 300 MILLIGRAM(S): 400 CAPSULE ORAL at 14:14

## 2023-07-20 RX ADMIN — PANTOPRAZOLE SODIUM 40 MILLIGRAM(S): 20 TABLET, DELAYED RELEASE ORAL at 18:03

## 2023-07-20 RX ADMIN — LIDOCAINE 1 PATCH: 4 CREAM TOPICAL at 21:26

## 2023-07-20 RX ADMIN — DIVALPROEX SODIUM 500 MILLIGRAM(S): 500 TABLET, DELAYED RELEASE ORAL at 06:26

## 2023-07-20 RX ADMIN — Medication 975 MILLIGRAM(S): at 12:37

## 2023-07-20 RX ADMIN — LACOSAMIDE 150 MILLIGRAM(S): 50 TABLET ORAL at 06:26

## 2023-07-20 RX ADMIN — Medication 975 MILLIGRAM(S): at 21:31

## 2023-07-20 RX ADMIN — ENOXAPARIN SODIUM 40 MILLIGRAM(S): 100 INJECTION SUBCUTANEOUS at 05:31

## 2023-07-20 RX ADMIN — Medication 75 MILLIGRAM(S): at 21:30

## 2023-07-20 RX ADMIN — DIVALPROEX SODIUM 750 MILLIGRAM(S): 500 TABLET, DELAYED RELEASE ORAL at 18:04

## 2023-07-20 RX ADMIN — Medication 75 MICROGRAM(S): at 05:12

## 2023-07-20 RX ADMIN — Medication 975 MILLIGRAM(S): at 20:32

## 2023-07-20 RX ADMIN — LIDOCAINE 1 PATCH: 4 CREAM TOPICAL at 09:03

## 2023-07-20 RX ADMIN — TIZANIDINE 4 MILLIGRAM(S): 4 TABLET ORAL at 21:30

## 2023-07-20 RX ADMIN — Medication 975 MILLIGRAM(S): at 11:37

## 2023-07-20 RX ADMIN — LACOSAMIDE 150 MILLIGRAM(S): 50 TABLET ORAL at 18:04

## 2023-07-20 RX ADMIN — ESCITALOPRAM OXALATE 10 MILLIGRAM(S): 10 TABLET, FILM COATED ORAL at 18:04

## 2023-07-20 NOTE — PROGRESS NOTE ADULT - ASSESSMENT
ASSESSMENT/PLAN:  38y Female h/o hypothyroid with functional deficits after right frontal IPHs/p right hemicraniectomy.     #Right Hemicraniectomy and EVD on 6/6  - Communication with patients family, neuroplastic surgeon Dr. Sy ([phone]) and PM&R team has taken place.   - neuroendocrine workup ordered, otherwise nml   - cranioplasty appointment on 7/18 with Dr. Sy- due to healing of crani site would tentatively recommend cranioplasty in September 2023, discussed transfer from [facility] to cranioplasty appointment once discharge, patient's family aware and agree with plan     #hx of seizure  #agitation   #-Briviact 50mg BID  discontinued 7/14.   -Continue Depakote 500mg in AM, 750mg in evening   -Cont [id number] BID      - Ordered Valproic acid level, which is wnl. And ammonia     anxiety/depression  - Lexapro increased from 5mg to 10mg on 7/18-tolerating well and improvement in symptoms.   --monitor for SE's    #dysphagia s/p PEG   -PEG tube came out on 7/4, did not replace   -calorie count sufficient, and tolerating PO diet.      #Pain/Neuropathic   #Insomnia  -Botox to Lpec and Lbicep 7/14-tolerated procedure well.    -cont. Tylenol PRN  -cont Trazodone 75mg at bedtime  -cont Gabapentin 200mg at 2pm, which we will continue along with gabapentin 400mg at bedtime, consider increasing   -cont Tizanidine 4mg, monitor CMP. Liver enzymes ALT 54 slightly elevated on 7/17. follow up CMP on 7/20  -cont lidocaine patch     #constipation  -Senna qHs.   -Miralax PRN.     #hx of anemia   -follow up medicine recommendations   -H/H stable     #hx of HTN --BP soft  -Monitor    #DVT ppx   - cont lovenox    IDT 7/13:  Nursing: incontinent B/B  SLP: on Minced and moist with thin liquids, has Pocketing as gets distracted.  mild-mod language deficits, severe cognitive deficits, limited by attention, memory, reasoning deficits.  Mild Dysarthria; Ongoing education to family  OT: Mod assist–eating/grooming; max assist–bathing and dressing; transfers–max assist of 1 person; toilet transfers to be assessed.  PT: Mod -Max A--Bed mobility: Max A--squat pivot transfer & Ambulating by  rail 15ft-- tot A--Max A and WCf.  WC tot A.   Goals: 1 Pivot and transfers, bed mobility mod-A, 2 attend to structured tasks for 30 minutes   Dispo: 8/7 H[facility]IE --if  back to Neurosurgical care or cranioplasty. May possibly be sooner if [facility]IE.        ASSESSMENT/PLAN:  38y Female h/o hypothyroid with functional deficits after right frontal IPHs/p right hemicraniectomy.     #Right Hemicraniectomy and EVD on 6/6  - Communication with patients family, neuroplastic surgeon Dr. Sy ([phone]) and PM&R team has taken place.   - neuroendocrine workup ordered, otherwise nml   - cranioplasty appointment on 7/18 with Dr. Sy- due to healing of crani site would tentatively recommend cranioplasty in September 2023, discussed transfer from [facility] to cranioplasty appointment once discharge, patient's family aware and agree with plan     #hx of seizure  #agitation   #-Briviact 50mg BID  discontinued 7/14.   -Continue Depakote 500mg in AM, 750mg in evening   -Cont [id number] BID      - Ordered Valproic acid level, which is wnl. And ammonia     anxiety/depression  - Lexapro increased from 5mg to 10mg on 7/18-tolerating well and improvement in symptoms.   --monitor for SE's    #dysphagia s/p PEG   -PEG tube came out on 7/4, did not replace   -calorie count sufficient, and tolerating PO diet.      #Pain/Neuropathic   #Insomnia  -Botox to Lpec and Lbicep 7/14-tolerated procedure well.    -cont. Tylenol PRN  -cont Trazodone 75mg at bedtime  -cont Gabapentin 200mg at 2pm, which we will continue along with gabapentin 400mg at bedtime, consider increasing   -cont Tizanidine 4mg, monitor CMP. Liver enzymes ALT 54 slightly elevated on 7/17. follow up CMP on 7/20  -cont lidocaine patch     #constipation  -Senna qHs.   -Miralax PRN.     #hx of anemia   -follow up medicine recommendations   -H/H stable     #hx of HTN --BP soft  -Monitor    #DVT ppx   - cont lovenox    IDT 7/13:  Nursing: incontinent B/B  SLP: on Minced and moist with thin liquids, has Pocketing as gets distracted.  mild-mod language deficits, severe cognitive deficits, limited by attention, memory, reasoning deficits.  Mild Dysarthria; Ongoing education to family  OT: Mod assist–eating/grooming; max assist–bathing and dressing; transfers–max assist of 1 person; toilet transfers to be assessed.  PT: Mod -Max A--Bed mobility: Max A--squat pivot transfer & Ambulating by  vinicio 15ft-- tot A--Max A and WCf.  WC tot A.   Goals: 1 Pivot and transfers, bed mobility mod-A, 2 attend to structured tasks for 30 minutes   Dispo: 7/24 Monday to [facility]. Insurance did not improve stay as patient is not undergoing her cranioplasty soon.        ASSESSMENT/PLAN:  38y Female h/o hypothyroid with functional deficits after right frontal IPHs/p right hemicraniectomy.     #Right Hemicraniectomy and EVD on 6/6  - Communication with patients family, neuroplastic surgeon Dr. Sy ([phone]) and PM&R team has taken place.   - neuroendocrine workup ordered, otherwise nml   - cranioplasty appointment on 7/18 with Dr. Sy- due to healing of crani site would tentatively recommend cranioplasty in September 2023, discussed transfer from [facility] to cranioplasty appointment once discharge, patient's family aware and agree with plan     #hx of seizure  #agitation   #-Briviact 50mg BID  discontinued 7/14.   -Continue Depakote 500mg in AM, 750mg in evening   -Cont [id number] BID      - Ordered Valproic acid level, which is wnl. And ammonia     anxiety/depression  - Lexapro increased from 5mg to 10mg on 7/18-tolerating well and improvement in symptoms.   --monitor for SE's    #dysphagia s/p PEG   -PEG tube came out on 7/4, did not replace   -calorie count sufficient, and tolerating PO diet.      #Pain/Neuropathic   #Insomnia  -Botox to Lpec and Lbicep 7/14-tolerated procedure well.    -cont. Tylenol PRN  -cont Trazodone 75mg at bedtime  -Increase Gabapentin to 300mg at 2pm, continue with gabapentin 400mg at bedtime,   -cont Tizanidine 4mg, monitor CMP. Liver enzymes ALT 54 slightly elevated on 7/17. follow up CMP on 7/20  -cont lidocaine patch     #constipation  -Senna qHs.   -Miralax PRN.     #hx of anemia   -follow up medicine recommendations   -H/H stable     #hx of HTN --BP soft  -Monitor    #DVT ppx   - cont lovenox    IDT 7/20:  Nursing: incontinent B/B  SLP: on soft bite sized with thin liquids, .  mild language deficits impacted by cognitive deficits, severe cognitive deficits-- limited by attention, memory, reasoning deficits.  Mild Dysarthria; Ongoing education to family  OT: Mod assist–eating/grooming; Tot assist–bathing and dressing & transfers; Improved sitting balance and progress  PT: Mod -Max A--Bed mobility: Mod --Max A--squat pivot transfer & Ambulating by  rail 20ft--Max A and WCf.  WC tot A.   Goals: 1 Pivot and transfers, bed mobility mod-A, 2 attend to structured tasks for 30 minutes   Dispo: 7/24 Monday to [facility].

## 2023-07-20 NOTE — PROGRESS NOTE ADULT - NUTRITIONAL ASSESSMENT
This patient has been assessed with a concern for Malnutrition and has been determined to have a diagnosis/diagnoses of Severe protein-calorie malnutrition.    This patient is being managed with:   Diet Soft and Bite Sized-  Extra Sauces Gravies  Supplement Feeding Modality:  Oral  Ensure Plus High Protein Cans or Servings Per Day:  1       Frequency:  Daily  Entered: Jul 18 2023  9:27AM

## 2023-07-20 NOTE — PROGRESS NOTE ADULT - SUBJECTIVE AND OBJECTIVE BOX
HPI:  38 year old female w/ PMHx hypothyroidism who was admitted to [facility] 6/6 after being found on floor at home, with CT revealing large right-sided ICH. She required emergent right frontal craniectomy for decompression and EVD placement. EVD subsequently removed 6/16. Angio negative. Patient underwent G tube placement 6/20. Hospital course notable for fluctuating leukocytosis and high-grade temperatures with no identified source of infection including in urine, blood, CSF. Now admitted for multidisciplinary rehab. (26 Jun 2023 13:08)    SUBJECTIVE: No acute overnight events. Patient seen and examined by bedside. Father and wife present. Reports improving sleep and less restlessness. Still tearful at times. Otherwise she is doing well today and participating in therapy. No new complaints. No CP/SOB/NV. VSS. Afebrile.     Vital Signs Last 24 Hrs  T(C): 36.8 (19 Jul 2023 21:11), Max: 36.8 (19 Jul 2023 21:11)  T(F): 98.3 (19 Jul 2023 21:11), Max: 98.3 (19 Jul 2023 21:11)  HR: 82 (19 Jul 2023 21:11) (82 - 82)  BP: 135/78 (19 Jul 2023 21:11) (135/78 - 135/78)  BP(mean): --  RR: 16 (19 Jul 2023 21:11) (16 - 16)  SpO2: 95% (19 Jul 2023 21:11) (95% - 95%)    Parameters below as of 19 Jul 2023 21:11  Patient On (Oxygen Delivery Method): room air        REVIEW OF SYMPTOMS  Neurological deficits  Pain     PHYSICAL EXAM  Constitutional - NAD, slept well   HEENT - Right frontal hemicraniectomy incision site clean dry and intact   Chest - Breathing comfortably on RA   Cardiovascular - RRR,  warm well perfused  Abdomen - BS+, Soft, PEG incisional site healing well.   Extremities - No edema, No calf tenderness   Neurologic Exam -                    Cognitive - Awake, Alert, AAO to self, place, date, year, situation     Communication - Fluent, No dysarthria,       Motor  Left hemiparesis. Right side 5/5.      Sensory - Impaired to left UE and LE.       Coordination - Impaired.   Spasticity-- left shoulder abduction 3, left shoulder flexion 3, left finger flexion 1   Psychiatric - pleasant     LABS:                          11.9   7.22  )-----------( 283      ( 20 Jul 2023 06:45 )             36.1     07-20    135  |  99  |  7   ----------------------------<  96  3.7   |  29  |  0.40<L>    Ca    9.2      20 Jul 2023 06:45    TPro  6.6  /  Alb  2.9<L>  /  TBili  0.1<L>  /  DBili  x   /  AST  16  /  ALT  40  /  AlkPhos  58  07-20               RADIOLOGY:  Reviewed     MEDICATIONS  (STANDING):  divalproex Sprinkle 750 milliGRAM(s) Oral <User Schedule>  divalproex Sprinkle 500 milliGRAM(s) Oral <User Schedule>  enoxaparin Injectable 40 milliGRAM(s) SubCutaneous every 24 hours  escitalopram 5 milliGRAM(s) Oral <User Schedule>  gabapentin 400 milliGRAM(s) Oral at bedtime  gabapentin 200 milliGRAM(s) Oral <User Schedule>  lacosamide Solution 150 milliGRAM(s) Oral two times a day  levothyroxine 75 MICROGram(s) Oral daily  lidocaine   4% Patch 1 Patch Transdermal <User Schedule>  pantoprazole   Suspension 40 milliGRAM(s) Oral daily  senna 2 Tablet(s) Oral at bedtime  tiZANidine 4 milliGRAM(s) Oral at bedtime  traZODone 75 milliGRAM(s) Oral at bedtime    MEDICATIONS  (PRN):  acetaminophen     Tablet .. 975 milliGRAM(s) Oral every 6 hours PRN Mild Pain (1 - 3), Moderate Pain (4 - 6), Severe Pain (7 - 10)  bacitracin   Ointment 1 Application(s) Topical three times a day PRN scalp irritation  polyethylene glycol 3350 17 Gram(s) Oral daily PRN Constipation  Assessment and Plan:   · Assessment	  From primary team notes   HPI:  38 year old female w/ PMHx hypothyroidism who was admitted to [facility] 6/6 after being found on floor at home, with CT revealing large right-sided ICH. She required emergent right frontal craniectomy for decompression and EVD placement. EVD subsequently removed 6/16. Angio negative. Patient underwent G tube placement 6/20. Hospital course notable for fluctuating leukocytosis and high-grade temperatures with no identified source of infection including in urine, blood, CSF. Now admitted for multidisciplinary rehab. (26 Jun 2023 13:08)    SUBJECTIVE: No acute overnight events. Patient seen and examined by bedside. Father and wife present. Reports improving sleep and less restlessness. Still tearful at times. Mentions she has pins and needle sensation in her left arm and leg. Otherwise she is doing well today and participating in therapy. No CP/SOB/NV. VSS. Afebrile.     Vital Signs Last 24 Hrs  T(C): 36.8 (19 Jul 2023 21:11), Max: 36.8 (19 Jul 2023 21:11)  T(F): 98.3 (19 Jul 2023 21:11), Max: 98.3 (19 Jul 2023 21:11)  HR: 82 (19 Jul 2023 21:11) (82 - 82)  BP: 135/78 (19 Jul 2023 21:11) (135/78 - 135/78)  BP(mean): --  RR: 16 (19 Jul 2023 21:11) (16 - 16)  SpO2: 95% (19 Jul 2023 21:11) (95% - 95%)    Parameters below as of 19 Jul 2023 21:11  Patient On (Oxygen Delivery Method): room air        REVIEW OF SYMPTOMS  Neurological deficits  Pain     PHYSICAL EXAM  Constitutional - NAD, slept well   HEENT - Right frontal hemicraniectomy incision site clean dry and intact   Chest - Breathing comfortably on RA   Cardiovascular - RRR,  warm well perfused  Abdomen - BS+, Soft, PEG incisional site healed well.   Extremities - No edema, No calf tenderness   Neurologic Exam -                    Cognitive - Awake, Alert, AAO to self, place, date, year, situation     Communication - Fluent, No dysarthria,       Motor  Left hemiparesis. Right side 5/5.      Sensory - Impaired to left UE and LE.       Coordination - Impaired.   Spasticity-- left shoulder abduction 3, left shoulder flexion 3, left finger flexion 1   Psychiatric - pleasant     LABS:                          11.9   7.22  )-----------( 283      ( 20 Jul 2023 06:45 )             36.1     07-20    135  |  99  |  7   ----------------------------<  96  3.7   |  29  |  0.40<L>    Ca    9.2      20 Jul 2023 06:45    TPro  6.6  /  Alb  2.9<L>  /  TBili  0.1<L>  /  DBili  x   /  AST  16  /  ALT  40  /  AlkPhos  58  07-20               RADIOLOGY:  Reviewed     MEDICATIONS  (STANDING):  divalproex Sprinkle 750 milliGRAM(s) Oral <User Schedule>  divalproex Sprinkle 500 milliGRAM(s) Oral <User Schedule>  enoxaparin Injectable 40 milliGRAM(s) SubCutaneous every 24 hours  escitalopram 5 milliGRAM(s) Oral <User Schedule>  gabapentin 400 milliGRAM(s) Oral at bedtime  gabapentin 200 milliGRAM(s) Oral <User Schedule>  lacosamide Solution 150 milliGRAM(s) Oral two times a day  levothyroxine 75 MICROGram(s) Oral daily  lidocaine   4% Patch 1 Patch Transdermal <User Schedule>  pantoprazole   Suspension 40 milliGRAM(s) Oral daily  senna 2 Tablet(s) Oral at bedtime  tiZANidine 4 milliGRAM(s) Oral at bedtime  traZODone 75 milliGRAM(s) Oral at bedtime    MEDICATIONS  (PRN):  acetaminophen     Tablet .. 975 milliGRAM(s) Oral every 6 hours PRN Mild Pain (1 - 3), Moderate Pain (4 - 6), Severe Pain (7 - 10)  bacitracin   Ointment 1 Application(s) Topical three times a day PRN scalp irritation  polyethylene glycol 3350 17 Gram(s) Oral daily PRN Constipation  Assessment and Plan:   · Assessment	  From primary team notes   HPI:  38 year old female w/ PMHx hypothyroidism who was admitted to [facility] 6/6 after being found on floor at home, with CT revealing large right-sided ICH. She required emergent right frontal craniectomy for decompression and EVD placement. EVD subsequently removed 6/16. Angio negative. Patient underwent G tube placement 6/20. Hospital course notable for fluctuating leukocytosis and high-grade temperatures with no identified source of infection including in urine, blood, CSF. Now admitted for multidisciplinary rehab. (26 Jun 2023 13:08)    SUBJECTIVE: No acute overnight events. Patient seen and examined by bedside. Father and wife present. Reports improved sleep and less restlessness. Still tearful at times. Mentions she has pins and needle sensation in her left arm and leg. Otherwise she is doing well today and participating in therapy. No CP/SOB/NV. VSS. Afebrile.     Vital Signs Last 24 Hrs  T(C): 36.8 (19 Jul 2023 21:11), Max: 36.8 (19 Jul 2023 21:11)  T(F): 98.3 (19 Jul 2023 21:11), Max: 98.3 (19 Jul 2023 21:11)  HR: 82 (19 Jul 2023 21:11) (82 - 82)  BP: 135/78 (19 Jul 2023 21:11) (135/78 - 135/78)  BP(mean): --  RR: 16 (19 Jul 2023 21:11) (16 - 16)  SpO2: 95% (19 Jul 2023 21:11) (95% - 95%)    Parameters below as of 19 Jul 2023 21:11  Patient On (Oxygen Delivery Method): room air        REVIEW OF SYMPTOMS  Neurological deficits  Pain     PHYSICAL EXAM  Constitutional - NAD, slept well   HEENT - Right frontal hemicraniectomy incision site clean dry and intact   Chest - Breathing comfortably on RA   Cardiovascular - RRR,  warm well perfused  Abdomen - BS+, Soft, PEG incisional site healed well.   Extremities - No edema, No calf tenderness   Neurologic Exam -                    Cognitive - Awake, Alert, AAO to self, place, date, year, situation     Communication - Fluent, No dysarthria,       Motor  Left hemiparesis. Right side 5/5.      Sensory - Impaired to left UE and LE.       Coordination - Impaired.   Spasticity-- left shoulder abduction 3, left shoulder flexion 3, left finger flexion 1   Psychiatric - pleasant     LABS:                          11.9   7.22  )-----------( 283      ( 20 Jul 2023 06:45 )             36.1     07-20    135  |  99  |  7   ----------------------------<  96  3.7   |  29  |  0.40<L>    Ca    9.2      20 Jul 2023 06:45    TPro  6.6  /  Alb  2.9<L>  /  TBili  0.1<L>  /  DBili  x   /  AST  16  /  ALT  40  /  AlkPhos  58  07-20               RADIOLOGY:  Reviewed     MEDICATIONS  (STANDING):  divalproex Sprinkle 750 milliGRAM(s) Oral <User Schedule>  divalproex Sprinkle 500 milliGRAM(s) Oral <User Schedule>  enoxaparin Injectable 40 milliGRAM(s) SubCutaneous every 24 hours  escitalopram 5 milliGRAM(s) Oral <User Schedule>  gabapentin 400 milliGRAM(s) Oral at bedtime  gabapentin 200 milliGRAM(s) Oral <User Schedule>  lacosamide Solution 150 milliGRAM(s) Oral two times a day  levothyroxine 75 MICROGram(s) Oral daily  lidocaine   4% Patch 1 Patch Transdermal <User Schedule>  pantoprazole   Suspension 40 milliGRAM(s) Oral daily  senna 2 Tablet(s) Oral at bedtime  tiZANidine 4 milliGRAM(s) Oral at bedtime  traZODone 75 milliGRAM(s) Oral at bedtime    MEDICATIONS  (PRN):  acetaminophen     Tablet .. 975 milliGRAM(s) Oral every 6 hours PRN Mild Pain (1 - 3), Moderate Pain (4 - 6), Severe Pain (7 - 10)  bacitracin   Ointment 1 Application(s) Topical three times a day PRN scalp irritation  polyethylene glycol 3350 17 Gram(s) Oral daily PRN Constipation  Assessment and Plan:   · Assessment	  From primary team notes

## 2023-07-20 NOTE — PROGRESS NOTE ADULT - ATTENDING COMMENTS
Pt. seen with resident.  Agree with documentation above as per resident with amendments made as appropriate. Patient medically stable. Making progress towards rehab goals.     right ICH  Pt. with tingling neuropathy on left with tactile stimulation and pt. anxiety affecting therapy-- will benefit from Increasing Gabapentin to 300mg at 2pm, continue with gabapentin 400mg at bedtime,

## 2023-07-20 NOTE — PROGRESS NOTE ADULT - SUBJECTIVE AND OBJECTIVE BOX
Pt was seen for 20 min for supportive tx. Pt c/o fatigue, pain. Pt reported feeling the same as when last seen. She continues to participate in her rehab txs, but has difficulty remembering her activities. She requires max assist for walking and transfers, bathing and dressing, and mod assistance for eating, grooming and bed transfers., and has significant cognitive deficits. During the session she was able to remain calmer than in previous meetings, but continues to exhibits outbursts of emotionality which she attributes to her fatigue. Pt also expresses feeling bad for being fatigued and feeling that she is eating too much. Explained to Pt that one common sequelae of brain injury is fatigue, and that it will be subsiding over time (actually her cousin Valerie was in the bedroom and reported that Pt appeared more perked up in the past few days around this time of the day), and that nutrition is very important on her recovery. Pt exhibits distractibility, and requires frequent prompting to maintain the focus of the conversation, otherwise, she returns to her complaints of fatigue and pain in her left shoulder. Also, Pt has difficulty acknowledging that she may be doing better in response to the natural course of healing and exposure to tx when given feedback about her demeanor and performance. Pt reports better sleep, and appetite. Support and encouragement were provided.

## 2023-07-20 NOTE — PROGRESS NOTE ADULT - ASSESSMENT
Pt alert, fair attention, relatively intact language, thought processes not goal-directed, no morbid thought contents noted, labile affect, dysphoric mood, denied AH/VH, denied SI/HI/I/P, mild restlessness, coping by emotional means. Plan: Continue supportive tx.

## 2023-07-21 PROCEDURE — 99232 SBSQ HOSP IP/OBS MODERATE 35: CPT

## 2023-07-21 RX ORDER — POLYETHYLENE GLYCOL 3350 17 G/17G
17 POWDER, FOR SOLUTION ORAL AT BEDTIME
Refills: 0 | Status: DISCONTINUED | OUTPATIENT
Start: 2023-07-21 | End: 2023-08-01

## 2023-07-21 RX ADMIN — Medication 975 MILLIGRAM(S): at 11:03

## 2023-07-21 RX ADMIN — DIVALPROEX SODIUM 750 MILLIGRAM(S): 500 TABLET, DELAYED RELEASE ORAL at 17:09

## 2023-07-21 RX ADMIN — LACOSAMIDE 150 MILLIGRAM(S): 50 TABLET ORAL at 17:10

## 2023-07-21 RX ADMIN — Medication 975 MILLIGRAM(S): at 11:46

## 2023-07-21 RX ADMIN — PANTOPRAZOLE SODIUM 40 MILLIGRAM(S): 20 TABLET, DELAYED RELEASE ORAL at 17:10

## 2023-07-21 RX ADMIN — TIZANIDINE 4 MILLIGRAM(S): 4 TABLET ORAL at 21:01

## 2023-07-21 RX ADMIN — Medication 75 MICROGRAM(S): at 06:35

## 2023-07-21 RX ADMIN — POLYETHYLENE GLYCOL 3350 17 GRAM(S): 17 POWDER, FOR SOLUTION ORAL at 11:03

## 2023-07-21 RX ADMIN — Medication 975 MILLIGRAM(S): at 04:14

## 2023-07-21 RX ADMIN — GABAPENTIN 300 MILLIGRAM(S): 400 CAPSULE ORAL at 14:10

## 2023-07-21 RX ADMIN — Medication 975 MILLIGRAM(S): at 05:10

## 2023-07-21 RX ADMIN — Medication 975 MILLIGRAM(S): at 17:53

## 2023-07-21 RX ADMIN — LIDOCAINE 1 PATCH: 4 CREAM TOPICAL at 09:33

## 2023-07-21 RX ADMIN — DIVALPROEX SODIUM 500 MILLIGRAM(S): 500 TABLET, DELAYED RELEASE ORAL at 06:36

## 2023-07-21 RX ADMIN — LIDOCAINE 1 PATCH: 4 CREAM TOPICAL at 21:05

## 2023-07-21 RX ADMIN — GABAPENTIN 400 MILLIGRAM(S): 400 CAPSULE ORAL at 21:02

## 2023-07-21 RX ADMIN — Medication 75 MILLIGRAM(S): at 21:02

## 2023-07-21 RX ADMIN — ENOXAPARIN SODIUM 40 MILLIGRAM(S): 100 INJECTION SUBCUTANEOUS at 06:36

## 2023-07-21 RX ADMIN — LACOSAMIDE 150 MILLIGRAM(S): 50 TABLET ORAL at 06:35

## 2023-07-21 RX ADMIN — LIDOCAINE 1 PATCH: 4 CREAM TOPICAL at 19:15

## 2023-07-21 RX ADMIN — Medication 975 MILLIGRAM(S): at 17:10

## 2023-07-21 RX ADMIN — SENNA PLUS 2 TABLET(S): 8.6 TABLET ORAL at 21:02

## 2023-07-21 RX ADMIN — ESCITALOPRAM OXALATE 10 MILLIGRAM(S): 10 TABLET, FILM COATED ORAL at 17:10

## 2023-07-21 NOTE — PROGRESS NOTE ADULT - ATTENDING COMMENTS
Pt. seen with resident.  Agree with documentation above as per resident with amendments made as appropriate. Patient medically stable. Making progress towards rehab goals.     right ICH  Perseverating on feeling constipated-- PT toileted twice with no bm.  last BM 2 days ago as per nursing.   D/w nursing to give PRN miralax.

## 2023-07-21 NOTE — PROGRESS NOTE ADULT - SUBJECTIVE AND OBJECTIVE BOX
HPI:  38 year old female w/ PMHx hypothyroidism who was admitted to [facility] 6/6 after being found on floor at home, with CT revealing large right-sided ICH. She required emergent right frontal craniectomy for decompression and EVD placement. EVD subsequently removed 6/16. Angio negative. Patient underwent G tube placement 6/20. Hospital course notable for fluctuating leukocytosis and high-grade temperatures with no identified source of infection including in urine, blood, CSF. Now admitted for multidisciplinary rehab. (26 Jun 2023 13:08)    SUBJECTIVE: No acute overnight events. Patient seen and examined by bedside. Father and wife present. Reports improved sleep and less restlessness. Still tearful at times. Mentions she has pins and needle sensation in her left arm and leg. Otherwise she is doing well today and participating in therapy. No CP/SOB/NV. VSS. Afebrile.     Vital Signs Last 24 Hrs  T(C): 36.8 (19 Jul 2023 21:11), Max: 36.8 (19 Jul 2023 21:11)  T(F): 98.3 (19 Jul 2023 21:11), Max: 98.3 (19 Jul 2023 21:11)  HR: 82 (19 Jul 2023 21:11) (82 - 82)  BP: 135/78 (19 Jul 2023 21:11) (135/78 - 135/78)  BP(mean): --  RR: 16 (19 Jul 2023 21:11) (16 - 16)  SpO2: 95% (19 Jul 2023 21:11) (95% - 95%)    Parameters below as of 19 Jul 2023 21:11  Patient On (Oxygen Delivery Method): room air        REVIEW OF SYMPTOMS  Neurological deficits  Pain     PHYSICAL EXAM  Constitutional - NAD, slept well   HEENT - Right frontal hemicraniectomy incision site clean dry and intact   Chest - Breathing comfortably on RA   Cardiovascular - RRR,  warm well perfused  Abdomen - BS+, Soft, PEG incisional site healed well.   Extremities - No edema, No calf tenderness   Neurologic Exam -                    Cognitive - Awake, Alert, AAO to self, place, date, year, situation     Communication - Fluent, No dysarthria,       Motor  Left hemiparesis. Right side 5/5.      Sensory - Impaired to left UE and LE.       Coordination - Impaired.   Spasticity-- left shoulder abduction 3, left shoulder flexion 3, left finger flexion 1   Psychiatric - pleasant     LABS:                          11.9   7.22  )-----------( 283      ( 20 Jul 2023 06:45 )             36.1     07-20    135  |  99  |  7   ----------------------------<  96  3.7   |  29  |  0.40<L>    Ca    9.2      20 Jul 2023 06:45    TPro  6.6  /  Alb  2.9<L>  /  TBili  0.1<L>  /  DBili  x   /  AST  16  /  ALT  40  /  AlkPhos  58  07-20               RADIOLOGY:  Reviewed     MEDICATIONS  (STANDING):  divalproex Sprinkle 750 milliGRAM(s) Oral <User Schedule>  divalproex Sprinkle 500 milliGRAM(s) Oral <User Schedule>  enoxaparin Injectable 40 milliGRAM(s) SubCutaneous every 24 hours  escitalopram 5 milliGRAM(s) Oral <User Schedule>  gabapentin 400 milliGRAM(s) Oral at bedtime  gabapentin 200 milliGRAM(s) Oral <User Schedule>  lacosamide Solution 150 milliGRAM(s) Oral two times a day  levothyroxine 75 MICROGram(s) Oral daily  lidocaine   4% Patch 1 Patch Transdermal <User Schedule>  pantoprazole   Suspension 40 milliGRAM(s) Oral daily  senna 2 Tablet(s) Oral at bedtime  tiZANidine 4 milliGRAM(s) Oral at bedtime  traZODone 75 milliGRAM(s) Oral at bedtime    MEDICATIONS  (PRN):  acetaminophen     Tablet .. 975 milliGRAM(s) Oral every 6 hours PRN Mild Pain (1 - 3), Moderate Pain (4 - 6), Severe Pain (7 - 10)  bacitracin   Ointment 1 Application(s) Topical three times a day PRN scalp irritation  polyethylene glycol 3350 17 Gram(s) Oral daily PRN Constipation  Assessment and Plan:   · Assessment	  From primary team notes   HPI:  38 year old female w/ PMHx hypothyroidism who was admitted to [facility] 6/6 after being found on floor at home, with CT revealing large right-sided ICH. She required emergent right frontal craniectomy for decompression and EVD placement. EVD subsequently removed 6/16. Angio negative. Patient underwent G tube placement 6/20. Hospital course notable for fluctuating leukocytosis and high-grade temperatures with no identified source of infection including in urine, blood, CSF. Now admitted for multidisciplinary rehab. (26 Jun 2023 13:08)    SUBJECTIVE: Requested Tylenol for pain last night, afterwards she slept well. Patient seen and examined this AM with father present. States she is constipated, but PRN last BM 7/19. Otherwise she is doing well today and participating in therapy. No CP/SOB/NV. Morning BP but otherwise VSS. Afebrile.     Vital Signs Last 24 Hrs  T(C): 36.8 (20 Jul 2023 20:31), Max: 36.8 (20 Jul 2023 20:31)  T(F): 98.2 (20 Jul 2023 20:31), Max: 98.2 (20 Jul 2023 20:31)  HR: 91 (20 Jul 2023 20:31) (91 - 91)  BP: 162/89 (20 Jul 2023 20:31) (162/89 - 162/89)  BP(mean): --  RR: 16 (20 Jul 2023 20:31) (16 - 16)  SpO2: 99% (20 Jul 2023 20:31) (99% - 99%)    Parameters below as of 20 Jul 2023 20:31  Patient On (Oxygen Delivery Method): room air          REVIEW OF SYMPTOMS  Neurological deficits  Pain     PHYSICAL EXAM  Constitutional - NAD, slept well   HEENT - Right frontal hemicraniectomy incision site clean dry and intact   Chest - Breathing comfortably on RA   Cardiovascular - RRR,  warm well perfused  Abdomen - BS+, Soft, PEG incisional site healed well.   Extremities - No edema, No calf tenderness   Neurologic Exam -                    Cognitive - Awake, Alert, AAO to self, place, date, year, situation     Communication - Fluent, No dysarthria,       Motor  Left hemiparesis. Right side 5/5.      Sensory - Impaired to left UE and LE.       Coordination - Impaired.   Spasticity-- left shoulder abduction 3, left shoulder flexion 3, left finger flexion 1   Psychiatric - pleasant     LABS:                          11.9   7.22  )-----------( 283      ( 20 Jul 2023 06:45 )    07-20    135  |  99  |  7   ----------------------------<  96  3.7   |  29  |  0.40<L>    Ca    9.2      20 Jul 2023 06:45    TPro  6.6  /  Alb  2.9<L>  /  TBili  0.1<L>  /  DBili  x   /  AST  16  /  ALT  40  /  AlkPhos  58  07-20               RADIOLOGY:  Reviewed       MEDICATIONS  (STANDING):  divalproex Sprinkle 750 milliGRAM(s) Oral <User Schedule>  divalproex Sprinkle 500 milliGRAM(s) Oral <User Schedule>  enoxaparin Injectable 40 milliGRAM(s) SubCutaneous every 24 hours  escitalopram 10 milliGRAM(s) Oral <User Schedule>  gabapentin 300 milliGRAM(s) Oral <User Schedule>  gabapentin 400 milliGRAM(s) Oral at bedtime  lacosamide Solution 150 milliGRAM(s) Oral two times a day  levothyroxine 75 MICROGram(s) Oral daily  lidocaine   4% Patch 1 Patch Transdermal <User Schedule>  pantoprazole   Suspension 40 milliGRAM(s) Oral daily  senna 2 Tablet(s) Oral at bedtime  tiZANidine 4 milliGRAM(s) Oral at bedtime  traZODone 75 milliGRAM(s) Oral at bedtime    MEDICATIONS  (PRN):  acetaminophen     Tablet .. 975 milliGRAM(s) Oral every 6 hours PRN Mild Pain (1 - 3), Moderate Pain (4 - 6), Severe Pain (7 - 10)  bacitracin   Ointment 1 Application(s) Topical three times a day PRN scalp irritation  polyethylene glycol 3350 17 Gram(s) Oral daily PRN Constipation     HPI:  38 year old female w/ PMHx hypothyroidism who was admitted to [facility] 6/6 after being found on floor at home, with CT revealing large right-sided ICH. She required emergent right frontal craniectomy for decompression and EVD placement. EVD subsequently removed 6/16. Angio negative. Patient underwent G tube placement 6/20. Hospital course notable for fluctuating leukocytosis and high-grade temperatures with no identified source of infection including in urine, blood, CSF. Now admitted for multidisciplinary rehab. (26 Jun 2023 13:08)    SUBJECTIVE: Requested Tylenol for pain last night, afterwards she slept well. Patient seen and examined this AM with father present. States she is constipated, but per RN last BM 7/19. Otherwise she is doing well today and participating in therapy. No CP/SOB/NV. Morning BP but otherwise VSS. Afebrile.     Vital Signs Last 24 Hrs  T(C): 36.8 (20 Jul 2023 20:31), Max: 36.8 (20 Jul 2023 20:31)  T(F): 98.2 (20 Jul 2023 20:31), Max: 98.2 (20 Jul 2023 20:31)  HR: 91 (20 Jul 2023 20:31) (91 - 91)  BP: 162/89 (20 Jul 2023 20:31) (162/89 - 162/89)  BP(mean): --  RR: 16 (20 Jul 2023 20:31) (16 - 16)  SpO2: 99% (20 Jul 2023 20:31) (99% - 99%)    Parameters below as of 20 Jul 2023 20:31  Patient On (Oxygen Delivery Method): room air          REVIEW OF SYMPTOMS  Neurological deficits  Pain     PHYSICAL EXAM  Constitutional - NAD, slept well   HEENT - Right frontal hemicraniectomy incision site clean dry and intact   Chest - Breathing comfortably on RA   Cardiovascular - RRR,  warm well perfused  Abdomen - BS+, Soft, PEG incisional site healed well.   Extremities - No edema, No calf tenderness   Neurologic Exam -                    Cognitive - Awake, Alert, AAO to self, place, date, year, situation     Communication - Fluent, No dysarthria,       Motor  Left hemiparesis. Right side 5/5.      Sensory - Impaired to left UE and LE.       Coordination - Impaired.   Spasticity-- left shoulder abduction 3, left shoulder flexion 3, left finger flexion 1   Psychiatric - pleasant     LABS:                          11.9   7.22  )-----------( 283      ( 20 Jul 2023 06:45 )    07-20    135  |  99  |  7   ----------------------------<  96  3.7   |  29  |  0.40<L>    Ca    9.2      20 Jul 2023 06:45    TPro  6.6  /  Alb  2.9<L>  /  TBili  0.1<L>  /  DBili  x   /  AST  16  /  ALT  40  /  AlkPhos  58  07-20               RADIOLOGY:  Reviewed       MEDICATIONS  (STANDING):  divalproex Sprinkle 750 milliGRAM(s) Oral <User Schedule>  divalproex Sprinkle 500 milliGRAM(s) Oral <User Schedule>  enoxaparin Injectable 40 milliGRAM(s) SubCutaneous every 24 hours  escitalopram 10 milliGRAM(s) Oral <User Schedule>  gabapentin 300 milliGRAM(s) Oral <User Schedule>  gabapentin 400 milliGRAM(s) Oral at bedtime  lacosamide Solution 150 milliGRAM(s) Oral two times a day  levothyroxine 75 MICROGram(s) Oral daily  lidocaine   4% Patch 1 Patch Transdermal <User Schedule>  pantoprazole   Suspension 40 milliGRAM(s) Oral daily  senna 2 Tablet(s) Oral at bedtime  tiZANidine 4 milliGRAM(s) Oral at bedtime  traZODone 75 milliGRAM(s) Oral at bedtime    MEDICATIONS  (PRN):  acetaminophen     Tablet .. 975 milliGRAM(s) Oral every 6 hours PRN Mild Pain (1 - 3), Moderate Pain (4 - 6), Severe Pain (7 - 10)  bacitracin   Ointment 1 Application(s) Topical three times a day PRN scalp irritation  polyethylene glycol 3350 17 Gram(s) Oral daily PRN Constipation

## 2023-07-21 NOTE — PROGRESS NOTE ADULT - ASSESSMENT
ASSESSMENT/PLAN:  38y Female h/o hypothyroid with functional deficits after right frontal IPHs/p right hemicraniectomy.     #Right Hemicraniectomy and EVD on 6/6  - Communication with patients family, neuroplastic surgeon Dr. Sy ([phone]) and PM&R team has taken place.   - neuroendocrine workup ordered, otherwise nml   - cranioplasty appointment on 7/18 with Dr. Sy- due to healing of crani site would tentatively recommend cranioplasty in September 2023, discussed transfer from [facility] to cranioplasty appointment once discharge, patient's family aware and agree with plan     #hx of seizure  #agitation   #-Briviact 50mg BID  discontinued 7/14.   -Continue Depakote 500mg in AM, 750mg in evening   -Cont [id number] BID      - Ordered Valproic acid level, which is wnl. And ammonia     anxiety/depression  - Lexapro increased from 5mg to 10mg on 7/18-tolerating well and improvement in symptoms.   --monitor for SE's    #dysphagia s/p PEG   -PEG tube came out on 7/4, did not replace   -calorie count sufficient, and tolerating PO diet.      #Pain/Neuropathic   #Insomnia  -Botox to Lpec and Lbicep 7/14-tolerated procedure well.    -cont. Tylenol PRN  -cont Trazodone 75mg at bedtime  -Increase Gabapentin to 300mg at 2pm, continue with gabapentin 400mg at bedtime,   -cont Tizanidine 4mg, monitor CMP. Liver enzymes ALT 54 slightly elevated on 7/17. follow up CMP on 7/20  -cont lidocaine patch     #constipation  -Senna qHs.   -Miralax PRN.     #hx of anemia   -follow up medicine recommendations   -H/H stable     #hx of HTN --BP soft  -Monitor    #DVT ppx   - cont lovenox    IDT 7/20:  Nursing: incontinent B/B  SLP: on soft bite sized with thin liquids, .  mild language deficits impacted by cognitive deficits, severe cognitive deficits-- limited by attention, memory, reasoning deficits.  Mild Dysarthria; Ongoing education to family  OT: Mod assist–eating/grooming; Tot assist–bathing and dressing & transfers; Improved sitting balance and progress  PT: Mod -Max A--Bed mobility: Mod --Max A--squat pivot transfer & Ambulating by  rail 20ft--Max A and WCf.  WC tot A.   Goals: 1 Pivot and transfers, bed mobility mod-A, 2 attend to structured tasks for 30 minutes   Dispo: 7/24 Monday to [facility].     ASSESSMENT/PLAN:  38y Female h/o hypothyroid with functional deficits after right frontal IPHs/p right hemicraniectomy.     #Right Hemicraniectomy and EVD on 6/6  - Communication with patients family, neuroplastic surgeon Dr. Sy ([phone]) and PM&R team has taken place.   - neuroendocrine workup ordered, otherwise nml   - cranioplasty appointment on 7/18 with Dr. Sy- due to healing of crani site would tentatively recommend cranioplasty in September 2023, discussed transfer from [facility] to cranioplasty appointment once discharge, patient's family aware and agree with plan     #hx of seizure  #agitation   #-Briviact 50mg BID  discontinued 7/14.   -Continue Depakote 500mg in AM, 750mg in evening   -Cont [id number] BID      - Ordered Valproic acid level, which is wnl. And ammonia     anxiety/depression  - Lexapro increased from 5mg to 10mg on 7/18-tolerating well and improvement in symptoms.   --monitor for SE's    #dysphagia s/p PEG   -PEG tube came out on 7/4, did not replace   -calorie count sufficient, and tolerating PO diet.      #Pain/Neuropathic   #Insomnia  -Botox to Lpec and Lbicep 7/14-tolerated procedure well.    -cont. Tylenol PRN  -cont Trazodone 75mg at bedtime  -continue Gabapentin to 300mg at 2pm, continue with gabapentin 400mg at bedtime,   -cont Tizanidine 4mg, monitor CMP for liver toxicity.   -cont lidocaine patch     #constipation  -Senna qHs.   -Miralax standing.     #hx of anemia   -follow up medicine recommendations   -H/H stable     #hx of HTN --BP soft  -Monitor    #DVT ppx   - cont lovenox    IDT 7/20:  Nursing: incontinent B/B  SLP: on soft bite sized with thin liquids, .  mild language deficits impacted by cognitive deficits, severe cognitive deficits-- limited by attention, memory, reasoning deficits.  Mild Dysarthria; Ongoing education to family  OT: Mod assist–eating/grooming; Tot assist–bathing and dressing & transfers; Improved sitting balance and progress  PT: Mod -Max A--Bed mobility: Mod --Max A--squat pivot transfer & Ambulating by  rail 20ft--Max A and WCf.  WC tot A.   Goals: 1 Pivot and transfers, bed mobility mod-A, 2 attend to structured tasks for 30 minutes   Dispo: 7/24 Monday to [facility].

## 2023-07-22 PROCEDURE — 99232 SBSQ HOSP IP/OBS MODERATE 35: CPT

## 2023-07-22 RX ORDER — LACOSAMIDE 50 MG/1
150 TABLET ORAL
Refills: 0 | Status: DISCONTINUED | OUTPATIENT
Start: 2023-07-22 | End: 2023-08-01

## 2023-07-22 RX ORDER — GABAPENTIN 400 MG/1
200 CAPSULE ORAL AT BEDTIME
Refills: 0 | Status: DISCONTINUED | OUTPATIENT
Start: 2023-07-22 | End: 2023-07-23

## 2023-07-22 RX ADMIN — Medication 975 MILLIGRAM(S): at 14:52

## 2023-07-22 RX ADMIN — GABAPENTIN 300 MILLIGRAM(S): 400 CAPSULE ORAL at 13:47

## 2023-07-22 RX ADMIN — Medication 975 MILLIGRAM(S): at 08:52

## 2023-07-22 RX ADMIN — DIVALPROEX SODIUM 500 MILLIGRAM(S): 500 TABLET, DELAYED RELEASE ORAL at 05:42

## 2023-07-22 RX ADMIN — LACOSAMIDE 150 MILLIGRAM(S): 50 TABLET ORAL at 05:43

## 2023-07-22 RX ADMIN — TIZANIDINE 4 MILLIGRAM(S): 4 TABLET ORAL at 21:31

## 2023-07-22 RX ADMIN — ESCITALOPRAM OXALATE 10 MILLIGRAM(S): 10 TABLET, FILM COATED ORAL at 17:15

## 2023-07-22 RX ADMIN — Medication 75 MICROGRAM(S): at 05:42

## 2023-07-22 RX ADMIN — PANTOPRAZOLE SODIUM 40 MILLIGRAM(S): 20 TABLET, DELAYED RELEASE ORAL at 11:37

## 2023-07-22 RX ADMIN — SENNA PLUS 2 TABLET(S): 8.6 TABLET ORAL at 21:30

## 2023-07-22 RX ADMIN — Medication 75 MILLIGRAM(S): at 21:31

## 2023-07-22 RX ADMIN — Medication 975 MILLIGRAM(S): at 13:52

## 2023-07-22 RX ADMIN — LIDOCAINE 1 PATCH: 4 CREAM TOPICAL at 20:16

## 2023-07-22 RX ADMIN — Medication 975 MILLIGRAM(S): at 07:52

## 2023-07-22 RX ADMIN — DIVALPROEX SODIUM 750 MILLIGRAM(S): 500 TABLET, DELAYED RELEASE ORAL at 17:15

## 2023-07-22 RX ADMIN — LIDOCAINE 1 PATCH: 4 CREAM TOPICAL at 07:49

## 2023-07-22 RX ADMIN — GABAPENTIN 200 MILLIGRAM(S): 400 CAPSULE ORAL at 21:29

## 2023-07-22 RX ADMIN — ENOXAPARIN SODIUM 40 MILLIGRAM(S): 100 INJECTION SUBCUTANEOUS at 05:43

## 2023-07-22 RX ADMIN — POLYETHYLENE GLYCOL 3350 17 GRAM(S): 17 POWDER, FOR SOLUTION ORAL at 21:29

## 2023-07-22 RX ADMIN — LIDOCAINE 1 PATCH: 4 CREAM TOPICAL at 21:16

## 2023-07-22 RX ADMIN — LACOSAMIDE 150 MILLIGRAM(S): 50 TABLET ORAL at 17:56

## 2023-07-22 NOTE — PROGRESS NOTE ADULT - SUBJECTIVE AND OBJECTIVE BOX
Slept well. But reports she is fatigued and wants to sleep more.   Pain is controlled.   No other new ROS.  Has been tolerating rehabilitation program.    VITALS  T(C): 36.8 (07-21-23 @ 20:58), Max: 36.8 (07-21-23 @ 20:58)  HR: 65 (07-22-23 @ 07:55) (65 - 92)  BP: 123/85 (07-22-23 @ 07:55) (121/82 - 151/98)  RR: 16 (07-22-23 @ 07:55) (15 - 16)  SpO2: 96% (07-22-23 @ 07:55) (95% - 96%)  Wt(kg): --     MEDICATIONS   acetaminophen     Tablet .. 975 milliGRAM(s) every 6 hours PRN  bacitracin   Ointment 1 Application(s) three times a day PRN  divalproex Sprinkle 500 milliGRAM(s) <User Schedule>  divalproex Sprinkle 750 milliGRAM(s) <User Schedule>  enoxaparin Injectable 40 milliGRAM(s) every 24 hours  escitalopram 10 milliGRAM(s) <User Schedule>  gabapentin 300 milliGRAM(s) <User Schedule>  gabapentin 400 milliGRAM(s) at bedtime  lacosamide Solution 150 milliGRAM(s) two times a day  levothyroxine 75 MICROGram(s) daily  lidocaine   4% Patch 1 Patch <User Schedule>  pantoprazole   Suspension 40 milliGRAM(s) daily  polyethylene glycol 3350 17 Gram(s) at bedtime  senna 2 Tablet(s) at bedtime  tiZANidine 4 milliGRAM(s) at bedtime  traZODone 75 milliGRAM(s) at bedtime      RECENT LABS/IMAGING                     ------------------------------------------  PHYSICAL EXAM  Constitutional - NAD, Comfortable  Pulm - Breathing comfortably, No wheezing  Abd - Nondistended  Extremities - No edema  Neurologic Exam - Awake, Alert, Craniectomy site - intact  Psychiatric - Mood WNL, Fatigued    ASSESSMENT/PLAN  38y Female with impairments in mobility and ADLs   - Continue current rehabilitation program 3hrs a day   - Continue current medications, patient is medically stable   - DVT prophylaxis  - Skin - OOB and mobilization daily

## 2023-07-23 PROCEDURE — 99232 SBSQ HOSP IP/OBS MODERATE 35: CPT

## 2023-07-23 RX ORDER — GABAPENTIN 400 MG/1
400 CAPSULE ORAL AT BEDTIME
Refills: 0 | Status: DISCONTINUED | OUTPATIENT
Start: 2023-07-23 | End: 2023-08-01

## 2023-07-23 RX ADMIN — ENOXAPARIN SODIUM 40 MILLIGRAM(S): 100 INJECTION SUBCUTANEOUS at 06:26

## 2023-07-23 RX ADMIN — LIDOCAINE 1 PATCH: 4 CREAM TOPICAL at 17:38

## 2023-07-23 RX ADMIN — GABAPENTIN 400 MILLIGRAM(S): 400 CAPSULE ORAL at 21:16

## 2023-07-23 RX ADMIN — Medication 75 MICROGRAM(S): at 06:25

## 2023-07-23 RX ADMIN — Medication 975 MILLIGRAM(S): at 16:14

## 2023-07-23 RX ADMIN — LACOSAMIDE 150 MILLIGRAM(S): 50 TABLET ORAL at 06:25

## 2023-07-23 RX ADMIN — PANTOPRAZOLE SODIUM 40 MILLIGRAM(S): 20 TABLET, DELAYED RELEASE ORAL at 12:51

## 2023-07-23 RX ADMIN — Medication 75 MILLIGRAM(S): at 21:16

## 2023-07-23 RX ADMIN — Medication 975 MILLIGRAM(S): at 06:26

## 2023-07-23 RX ADMIN — DIVALPROEX SODIUM 750 MILLIGRAM(S): 500 TABLET, DELAYED RELEASE ORAL at 17:11

## 2023-07-23 RX ADMIN — LACOSAMIDE 150 MILLIGRAM(S): 50 TABLET ORAL at 17:11

## 2023-07-23 RX ADMIN — Medication 975 MILLIGRAM(S): at 06:57

## 2023-07-23 RX ADMIN — ESCITALOPRAM OXALATE 10 MILLIGRAM(S): 10 TABLET, FILM COATED ORAL at 17:10

## 2023-07-23 RX ADMIN — Medication 975 MILLIGRAM(S): at 16:44

## 2023-07-23 RX ADMIN — TIZANIDINE 4 MILLIGRAM(S): 4 TABLET ORAL at 21:16

## 2023-07-23 RX ADMIN — LIDOCAINE 1 PATCH: 4 CREAM TOPICAL at 07:58

## 2023-07-23 RX ADMIN — GABAPENTIN 300 MILLIGRAM(S): 400 CAPSULE ORAL at 14:37

## 2023-07-23 RX ADMIN — DIVALPROEX SODIUM 500 MILLIGRAM(S): 500 TABLET, DELAYED RELEASE ORAL at 06:25

## 2023-07-23 NOTE — PROGRESS NOTE ADULT - SUBJECTIVE AND OBJECTIVE BOX
Feels fatigued, perseverative on whether she will get her medications today.  Pain is controlled.   No other new ROS.  Has been tolerating rehabilitation program.    VITALS  T(C): 36.6 (07-22-23 @ 19:50), Max: 36.6 (07-22-23 @ 19:50)  HR: 77 (07-22-23 @ 19:50) (77 - 77)  BP: 142/94 (07-22-23 @ 19:50) (142/94 - 142/94)  RR: 15 (07-22-23 @ 19:50) (15 - 15)  SpO2: 96% (07-22-23 @ 19:50) (96% - 96%)  Wt(kg): --     MEDICATIONS   acetaminophen     Tablet .. 975 milliGRAM(s) every 6 hours PRN  bacitracin   Ointment 1 Application(s) three times a day PRN  divalproex Sprinkle 500 milliGRAM(s) <User Schedule>  divalproex Sprinkle 750 milliGRAM(s) <User Schedule>  enoxaparin Injectable 40 milliGRAM(s) every 24 hours  escitalopram 10 milliGRAM(s) <User Schedule>  gabapentin 300 milliGRAM(s) <User Schedule>  gabapentin 200 milliGRAM(s) at bedtime  lacosamide 150 milliGRAM(s) two times a day  levothyroxine 75 MICROGram(s) daily  lidocaine   4% Patch 1 Patch <User Schedule>  pantoprazole   Suspension 40 milliGRAM(s) daily  polyethylene glycol 3350 17 Gram(s) at bedtime  senna 2 Tablet(s) at bedtime  tiZANidine 4 milliGRAM(s) at bedtime  traZODone 75 milliGRAM(s) at bedtime      RECENT LABS/IMAGING                     ------------------------------------------  PHYSICAL EXAM  Constitutional - NAD, Comfortable  Pulm - Breathing comfortably, No wheezing  Abd - Nondistended  Extremities - No edema  Neurologic Exam - Awake, Alert  Psychiatric - Mood WNL     ASSESSMENT/PLAN  38y Female with impairments in mobility and ADLs   - Continue current rehabilitation program 3hrs a day   - Continue current medications, patient is medically stable   - DVT prophylaxis  - Skin - OOB and mobilization daily

## 2023-07-24 LAB
ALBUMIN SERPL ELPH-MCNC: 3 G/DL — LOW (ref 3.3–5)
ALP SERPL-CCNC: 58 U/L — SIGNIFICANT CHANGE UP (ref 40–120)
ALT FLD-CCNC: 32 U/L — SIGNIFICANT CHANGE UP (ref 10–45)
ANION GAP SERPL CALC-SCNC: 9 MMOL/L — SIGNIFICANT CHANGE UP (ref 5–17)
AST SERPL-CCNC: 17 U/L — SIGNIFICANT CHANGE UP (ref 10–40)
BASOPHILS # BLD AUTO: 0.02 K/UL — SIGNIFICANT CHANGE UP (ref 0–0.2)
BASOPHILS NFR BLD AUTO: 0.2 % — SIGNIFICANT CHANGE UP (ref 0–2)
BILIRUB SERPL-MCNC: 0.2 MG/DL — SIGNIFICANT CHANGE UP (ref 0.2–1.2)
BUN SERPL-MCNC: 7 MG/DL — SIGNIFICANT CHANGE UP (ref 7–23)
CALCIUM SERPL-MCNC: 9.5 MG/DL — SIGNIFICANT CHANGE UP (ref 8.4–10.5)
CHLORIDE SERPL-SCNC: 97 MMOL/L — SIGNIFICANT CHANGE UP (ref 96–108)
CO2 SERPL-SCNC: 27 MMOL/L — SIGNIFICANT CHANGE UP (ref 22–31)
CREAT SERPL-MCNC: 0.45 MG/DL — LOW (ref 0.5–1.3)
EGFR: 126 ML/MIN/1.73M2 — SIGNIFICANT CHANGE UP
EOSINOPHIL # BLD AUTO: 0.09 K/UL — SIGNIFICANT CHANGE UP (ref 0–0.5)
EOSINOPHIL NFR BLD AUTO: 1.1 % — SIGNIFICANT CHANGE UP (ref 0–6)
GLUCOSE SERPL-MCNC: 101 MG/DL — HIGH (ref 70–99)
HCT VFR BLD CALC: 38.7 % — SIGNIFICANT CHANGE UP (ref 34.5–45)
HGB BLD-MCNC: 12.8 G/DL — SIGNIFICANT CHANGE UP (ref 11.5–15.5)
IMM GRANULOCYTES NFR BLD AUTO: 0.5 % — SIGNIFICANT CHANGE UP (ref 0–0.9)
LYMPHOCYTES # BLD AUTO: 1.89 K/UL — SIGNIFICANT CHANGE UP (ref 1–3.3)
LYMPHOCYTES # BLD AUTO: 23.5 % — SIGNIFICANT CHANGE UP (ref 13–44)
MCHC RBC-ENTMCNC: 31.5 PG — SIGNIFICANT CHANGE UP (ref 27–34)
MCHC RBC-ENTMCNC: 33.1 GM/DL — SIGNIFICANT CHANGE UP (ref 32–36)
MCV RBC AUTO: 95.3 FL — SIGNIFICANT CHANGE UP (ref 80–100)
MONOCYTES # BLD AUTO: 0.59 K/UL — SIGNIFICANT CHANGE UP (ref 0–0.9)
MONOCYTES NFR BLD AUTO: 7.3 % — SIGNIFICANT CHANGE UP (ref 2–14)
NEUTROPHILS # BLD AUTO: 5.41 K/UL — SIGNIFICANT CHANGE UP (ref 1.8–7.4)
NEUTROPHILS NFR BLD AUTO: 67.4 % — SIGNIFICANT CHANGE UP (ref 43–77)
NRBC # BLD: 0 /100 WBCS — SIGNIFICANT CHANGE UP (ref 0–0)
PLATELET # BLD AUTO: 364 K/UL — SIGNIFICANT CHANGE UP (ref 150–400)
POTASSIUM SERPL-MCNC: 3.8 MMOL/L — SIGNIFICANT CHANGE UP (ref 3.5–5.3)
POTASSIUM SERPL-SCNC: 3.8 MMOL/L — SIGNIFICANT CHANGE UP (ref 3.5–5.3)
PROT SERPL-MCNC: 7 G/DL — SIGNIFICANT CHANGE UP (ref 6–8.3)
RBC # BLD: 4.06 M/UL — SIGNIFICANT CHANGE UP (ref 3.8–5.2)
RBC # FLD: 12.8 % — SIGNIFICANT CHANGE UP (ref 10.3–14.5)
SODIUM SERPL-SCNC: 133 MMOL/L — LOW (ref 135–145)
WBC # BLD: 8.04 K/UL — SIGNIFICANT CHANGE UP (ref 3.8–10.5)
WBC # FLD AUTO: 8.04 K/UL — SIGNIFICANT CHANGE UP (ref 3.8–10.5)

## 2023-07-24 PROCEDURE — 99232 SBSQ HOSP IP/OBS MODERATE 35: CPT

## 2023-07-24 PROCEDURE — 70450 CT HEAD/BRAIN W/O DYE: CPT | Mod: 26

## 2023-07-24 PROCEDURE — 76376 3D RENDER W/INTRP POSTPROCES: CPT | Mod: 26

## 2023-07-24 RX ORDER — MEMANTINE HYDROCHLORIDE 10 MG/1
5 TABLET ORAL
Refills: 0 | Status: DISCONTINUED | OUTPATIENT
Start: 2023-07-24 | End: 2023-07-25

## 2023-07-24 RX ORDER — MEMANTINE HYDROCHLORIDE 10 MG/1
5 TABLET ORAL
Refills: 0 | Status: DISCONTINUED | OUTPATIENT
Start: 2023-07-24 | End: 2023-07-24

## 2023-07-24 RX ADMIN — LIDOCAINE 1 PATCH: 4 CREAM TOPICAL at 20:25

## 2023-07-24 RX ADMIN — Medication 975 MILLIGRAM(S): at 22:19

## 2023-07-24 RX ADMIN — LACOSAMIDE 150 MILLIGRAM(S): 50 TABLET ORAL at 17:11

## 2023-07-24 RX ADMIN — Medication 975 MILLIGRAM(S): at 09:17

## 2023-07-24 RX ADMIN — ENOXAPARIN SODIUM 40 MILLIGRAM(S): 100 INJECTION SUBCUTANEOUS at 06:19

## 2023-07-24 RX ADMIN — POLYETHYLENE GLYCOL 3350 17 GRAM(S): 17 POWDER, FOR SOLUTION ORAL at 21:16

## 2023-07-24 RX ADMIN — GABAPENTIN 400 MILLIGRAM(S): 400 CAPSULE ORAL at 21:17

## 2023-07-24 RX ADMIN — DIVALPROEX SODIUM 500 MILLIGRAM(S): 500 TABLET, DELAYED RELEASE ORAL at 06:21

## 2023-07-24 RX ADMIN — PANTOPRAZOLE SODIUM 40 MILLIGRAM(S): 20 TABLET, DELAYED RELEASE ORAL at 11:58

## 2023-07-24 RX ADMIN — Medication 75 MILLIGRAM(S): at 21:17

## 2023-07-24 RX ADMIN — LIDOCAINE 1 PATCH: 4 CREAM TOPICAL at 17:19

## 2023-07-24 RX ADMIN — GABAPENTIN 300 MILLIGRAM(S): 400 CAPSULE ORAL at 13:03

## 2023-07-24 RX ADMIN — DIVALPROEX SODIUM 750 MILLIGRAM(S): 500 TABLET, DELAYED RELEASE ORAL at 17:11

## 2023-07-24 RX ADMIN — Medication 975 MILLIGRAM(S): at 10:17

## 2023-07-24 RX ADMIN — Medication 975 MILLIGRAM(S): at 21:19

## 2023-07-24 RX ADMIN — LACOSAMIDE 150 MILLIGRAM(S): 50 TABLET ORAL at 06:18

## 2023-07-24 RX ADMIN — TIZANIDINE 4 MILLIGRAM(S): 4 TABLET ORAL at 21:17

## 2023-07-24 RX ADMIN — Medication 75 MICROGRAM(S): at 06:21

## 2023-07-24 RX ADMIN — LIDOCAINE 1 PATCH: 4 CREAM TOPICAL at 08:13

## 2023-07-24 RX ADMIN — ESCITALOPRAM OXALATE 10 MILLIGRAM(S): 10 TABLET, FILM COATED ORAL at 17:10

## 2023-07-24 NOTE — PROGRESS NOTE ADULT - ASSESSMENT
ASSESSMENT/PLAN:  38y Female h/o hypothyroid with functional deficits after right frontal IPHs/p right hemicraniectomy.     #Right Hemicraniectomy and EVD on 6/6  - Communication with patients family, neuroplastic surgeon Dr. Sy ([phone]) and PM&R team has taken place.   - neuroendocrine workup ordered, and nml   - cranioplasty appointment on 7/18 with Dr. Sy- due to healing of crani site would tentatively recommend cranioplasty in September 2023, discussed transfer from [facility] to cranioplasty appointment once discharge, patient's family aware. They are anxious about [facility]. I advised and to speak to  supervisor.     #hx of seizure  #agitation   #-Briviact 50mg BID was discontinued 7/14.   -Continue Depakote 500mg in AM, 750mg in evening   -Cont [id number] BID      - Ordered Valproic acid and ammonia level, which is wnl.    anxiety/depression  - Lexapro increased from 5mg to 10mg on 7/18-tolerating well and improvement in symptoms.   --monitor for SE's    #hyponatremia  - Na 133  - Monitor for now.        #Pain/Neuropathic   #Insomnia  -Botox to Lpec and Lbicep 7/14-tolerated procedure well. Therapist reports increase in ROM with pain.   -cont. Tylenol PRN  -cont Trazodone 75mg at bedtime  -continue Gabapentin to 300mg at 2pm, continue with gabapentin 400mg at bedtime,   -cont Tizanidine 4mg, monitor CMP for liver toxicity.   -cont lidocaine patch       #dysphagia s/p PEG   -PEG tube came out on 7/4, did not replace   -calorie count sufficient, and tolerating PO diet.      #constipation  -Senna qHs.   -Miralax standing.     #hx of anemia   -follow up medicine recommendations   -H/H stable     #hx of HTN --BP soft  -Monitor    #DVT ppx   - cont lovenox    IDT 7/20:  Nursing: incontinent B/B  SLP: on soft bite sized with thin liquids, .  mild language deficits impacted by cognitive deficits, severe cognitive deficits-- limited by attention, memory, reasoning deficits.  Mild Dysarthria; Ongoing education to family  OT: Mod assist–eating/grooming; Tot assist–bathing and dressing & transfers; Improved sitting balance and progress  PT: Mod -Max A--Bed mobility: Mod --Max A--squat pivot transfer & Ambulating by  vinicio 20ft--Max A and WCf.  WC tot A.   Goals: 1 Pivot and transfers, bed mobility mod-A, 2 attend to structured tasks for 30 minutes   Dispo: 7/24 Monday to [facility], but now Pending.      ASSESSMENT/PLAN:  38y Female h/o hypothyroid with functional deficits after right frontal IPHs/p right hemicraniectomy.     #Right Hemicraniectomy and EVD on 6/6  - Communication with patients family, neuroplastic surgeon Dr. Sy ([phone]) and PM&R team has taken place.   - neuroendocrine workup ordered, and nml   - cranioplasty appointment on 7/18 with Dr. Sy- due to healing of crani site would tentatively recommend cranioplasty in September 2023, discussed transfer from [facility] to cranioplasty appointment once discharge, patient's family aware. They are anxious about [facility]. I advised and to speak to  supervisor.   - CTH ordered 7/24 for implant planning.     #hx of seizure  #agitation   #-Briviact 50mg BID was discontinued 7/14.   -Continue Depakote 500mg in AM, 750mg in evening   -Cont [id number] BID      - Ordered Valproic acid and ammonia level, which is wnl.    anxiety/depression  - Lexapro increased from 5mg to 10mg on 7/18-tolerating well and improvement in symptoms.   --monitor for SE's    #hyponatremia  - Na 133  - Monitor for now.        #Pain/Neuropathic   #Insomnia  -Botox to Lpec and Lbicep 7/14-tolerated procedure well. Therapist reports increase in ROM with pain.   -cont. Tylenol PRN  -cont Trazodone 75mg at bedtime  -continue Gabapentin to 300mg at 2pm, continue with gabapentin 400mg at bedtime,   -cont Tizanidine 4mg, monitor CMP for liver toxicity.   -cont lidocaine patch       #dysphagia s/p PEG   -PEG tube came out on 7/4, did not replace   -calorie count sufficient, and tolerating PO diet.      #constipation  -Senna qHs.   -Miralax standing.     #hx of anemia   -follow up medicine recommendations   -H/H stable     #hx of HTN --BP soft  -Monitor    #DVT ppx   - cont lovenox    IDT 7/20:  Nursing: incontinent B/B  SLP: on soft bite sized with thin liquids, .  mild language deficits impacted by cognitive deficits, severe cognitive deficits-- limited by attention, memory, reasoning deficits.  Mild Dysarthria; Ongoing education to family  OT: Mod assist–eating/grooming; Tot assist–bathing and dressing & transfers; Improved sitting balance and progress  PT: Mod -Max A--Bed mobility: Mod --Max A--squat pivot transfer & Ambulating by  vinicio 20ft--Max A and WCf.  WC tot A.   Goals: 1 Pivot and transfers, bed mobility mod-A, 2 attend to structured tasks for 30 minutes   Dispo: 7/24 Monday to [facility], but now Pending.      ASSESSMENT/PLAN:  38y Female h/o hypothyroid with functional deficits after right frontal IPHs/p right hemicraniectomy.     #Right Hemicraniectomy and EVD on 6/6  - Communication with patients family, neuroplastic surgeon Dr. Sy ([phone]) and PM&R team has taken place.   - neuroendocrine workup ordered, and nml   - cranioplasty appointment on 7/18 with Dr. Sy- due to healing of crani site would tentatively recommend cranioplasty in September 2023, discussed transfer from [facility] to cranioplasty appointment once discharge, patient's family aware. They are anxious about [facility]. I advised and to speak to  supervisor.   - CTH with 3D reconstruction ordered 7/24 for implant planning.     #hx of seizure  #agitation   #-Briviact 50mg BID was discontinued 7/14.   -Continue Depakote 500mg in AM, 750mg in evening   -Cont [id number] BID      - Ordered Valproic acid and ammonia level, which is wnl.    anxiety/depression  - cont. Lexapro  10mg--increased on 7/18-tolerating well and improvement in symptoms.   --monitor for SE's    #hyponatremia  - Na 133  - Monitor for now.      cognitive deficits--  --Trial memantine 5mg bid.      #Pain/Neuropathic   #Insomnia  -Botox to Lpec and Lbicep 7/14-tolerated procedure well. Therapist reports increase in ROM with pain.   -cont. Tylenol PRN  -cont Trazodone 75mg at bedtime  -continue Gabapentin to 300mg at 2pm, continue with gabapentin 400mg at bedtime,   -cont Tizanidine 4mg, monitor CMP for liver toxicity.   -cont lidocaine patch       #dysphagia s/p PEG   -PEG tube came out on 7/4, did not replace   -calorie count sufficient, and tolerating PO diet.      #constipation  -Senna qHs.   -Miralax standing.     #hx of anemia   -follow up medicine recommendations   -H/H stable     #hx of HTN --BP soft  -Monitor    #DVT ppx   - cont lovenox    IDT 7/20:  Nursing: incontinent B/B  SLP: on soft bite sized with thin liquids, .  mild language deficits impacted by cognitive deficits, severe cognitive deficits-- limited by attention, memory, reasoning deficits.  Mild Dysarthria; Ongoing education to family  OT: Mod assist–eating/grooming; Tot assist–bathing and dressing & transfers; Improved sitting balance and progress  PT: Mod -Max A--Bed mobility: Mod --Max A--squat pivot transfer & Ambulating by  vinicio 20ft--Max A and WCf.  WC tot A.   Goals: 1 Pivot and transfers, bed mobility mod-A, 2 attend to structured tasks for 30 minutes   Dispo: 7/24 Monday to [facility], but now Pending.      ASSESSMENT/PLAN:  38y Female h/o hypothyroid with functional deficits after right frontal IPHs/p right hemicraniectomy.     #Right Hemicraniectomy and EVD on 6/6  - Communication with patients family, neuroplastic surgeon Dr. Sy ([phone]) and PM&R team has taken place.   - neuroendocrine workup ordered, and nml   - cranioplasty appointment on 7/18 with Dr. Sy- due to healing of crani site would tentatively recommend cranioplasty in September 2023, discussed transfer from [facility] to cranioplasty appointment once discharge, patient's family aware. They are anxious about [facility]. I advised and to speak to  supervisor.   - CTH with 3D reconstruction ordered 7/24 for implant planning.     #hx of seizure  #agitation   #-Briviact 50mg BID was discontinued 7/14.   -Continue Depakote 500mg in AM, 750mg in evening   -Cont [id number] BID      - Ordered Valproic acid and ammonia level, which is wnl.    anxiety/depression  - cont. Lexapro  10mg--increased on 7/18-tolerating well and improvement in symptoms.   --monitor for SE's    #hyponatremia  - Na 133  - Monitor for now.      cognitive deficits--  --Trial memantine 5mg bid--risks/benefits d/w pt's mother-- agrees to trial .      #Pain/Neuropathic   #Insomnia  -Botox to Lpec and Lbicep 7/14-tolerated procedure well. Therapist reports increase in ROM with pain.   -cont. Tylenol PRN  -cont Trazodone 75mg at bedtime  -continue Gabapentin to 300mg at 2pm, continue with gabapentin 400mg at bedtime,   -cont Tizanidine 4mg, monitor CMP for liver toxicity.   -cont lidocaine patch       #dysphagia s/p PEG   -PEG tube came out on 7/4, did not replace   -calorie count sufficient, and tolerating PO diet.      #constipation  -Senna qHs.   -Miralax standing.     #hx of anemia   -follow up medicine recommendations   -H/H stable     #hx of HTN --BP soft  -Monitor    #DVT ppx   - cont lovenox    IDT 7/20:  Nursing: incontinent B/B  SLP: on soft bite sized with thin liquids, .  mild language deficits impacted by cognitive deficits, severe cognitive deficits-- limited by attention, memory, reasoning deficits.  Mild Dysarthria; Ongoing education to family  OT: Mod assist–eating/grooming; Tot assist–bathing and dressing & transfers; Improved sitting balance and progress  PT: Mod -Max A--Bed mobility: Mod --Max A--squat pivot transfer & Ambulating by  rail 20ft--Max A and WCf.  WC tot A.   Goals: 1 Pivot and transfers, bed mobility mod-A, 2 attend to structured tasks for 30 minutes   Dispo: 7/24 Monday to [facility], but now Pending.      ASSESSMENT/PLAN:  38y Female h/o hypothyroid with functional deficits after right frontal IPHs/p right hemicraniectomy.     #Right Hemicraniectomy and EVD on 6/6  - Communication with patients family, neuroplastic surgeon Dr. Sy ([phone]) and PM&R team has taken place.   - neuroendocrine workup ordered, and nml   - cranioplasty appointment on 7/18 with Dr. Sy- due to healing of crani site would tentatively recommend cranioplasty in September 2023, discussed transfer from [facility] to cranioplasty appointment once discharge, patient's family aware. They are anxious about [facility]. I advised and to speak to  supervisor.   - CTH with 3D reconstruction ordered 7/24 for implant planning.     #hx of seizure  #agitation   #-Briviact 50mg BID was discontinued 7/14.   -Continue Depakote 500mg in AM, 750mg in evening   -Cont [id number] BID      - Ordered Valproic acid and ammonia level, which is wnl.    anxiety/depression  - cont. Lexapro  10mg--increased on 7/18-tolerating well and improvement in symptoms.   --monitor for SE's    #hyponatremia  - Na 133  - Monitor for now.      cognitive deficits--  --Trial memantine 5mg daily--risks/benefits d/w pt's mother-- agrees to trial .      #Pain/Neuropathic   #Insomnia  -Botox to Lpec and Lbicep 7/14-tolerated procedure well. Therapist reports increase in ROM with pain.   -cont. Tylenol PRN  -cont Trazodone 75mg at bedtime  -continue Gabapentin to 300mg at 2pm, continue with gabapentin 400mg at bedtime,   -cont Tizanidine 4mg, monitor CMP for liver toxicity.   -cont lidocaine patch       #dysphagia s/p PEG   -PEG tube came out on 7/4, did not replace   -calorie count sufficient, and tolerating PO diet.      #constipation  -Senna qHs.   -Miralax standing.     #hx of anemia   -follow up medicine recommendations   -H/H stable     #hx of HTN --BP soft  -Monitor    #DVT ppx   - cont lovenox    IDT 7/20:  Nursing: incontinent B/B  SLP: on soft bite sized with thin liquids, .  mild language deficits impacted by cognitive deficits, severe cognitive deficits-- limited by attention, memory, reasoning deficits.  Mild Dysarthria; Ongoing education to family  OT: Mod assist–eating/grooming; Tot assist–bathing and dressing & transfers; Improved sitting balance and progress  PT: Mod -Max A--Bed mobility: Mod --Max A--squat pivot transfer & Ambulating by  rail 20ft--Max A and WCf.  WC tot A.   Goals: 1 Pivot and transfers, bed mobility mod-A, 2 attend to structured tasks for 30 minutes   Dispo: 7/24 Monday to [facility], but now Pending.

## 2023-07-24 NOTE — PROGRESS NOTE ADULT - ASSESSMENT
37 y/o F w/ PMhx hypothyroidism admitted to [facility] 6/6 after being found unresponsive, found to have large right frontal IPH w/ IVH and hydrocephalus. Now s/p R krishna craniectomy for evacuation on 6/6. PEG placed 6/20. Now admitted for multidisciplinary rehab- pt/ot/dvt ppx    #IPH with IVH, R frontal lobe  - Amantadine, trazodone per rehab   - Antiepileptic regimen: depakote, continue Vimpat 150 mg BID.   - Helmet when OOB      #Normocytic anemia  - H/H stable, monitor for Hb < 7  - Iron/B12/folate normal     #HTN  - Lisinopril held due to hypotension    #Dysphagia s/p PEG  - s/p self removal of PEG tube, tolerating PO diet, PEG not replaced  - GI signed off, monitor tolerating PO diet    #Hypothyroidism  - Continue synthroid 75mcg qd     #DVT ppx - Lovenox    will follow  d/w dr. dale

## 2023-07-24 NOTE — ASSESSMENT
[FreeTextEntry1] : 38 year old female s/p decompressive hemicraniectomy for IPH evacuation presents for evaluation for cranioplasty.  Wound has not healed completely\par Proceed with [facility] neuroplastics CT to plan for PMMA cranioplasty in 2 months. \par \par  \par Patient verbalized understanding and agreement with treatment plan.\par  \par I, Dr. Sy, personally performed the evaluation and management (E/M) services for this new patient. That E/M includes conducting the initial examination, assessing all conditions, and establishing the plan of care. Today, my ACP, Marley Kaplan, was here to observe my evaluation and management services for this patient to be followed going forward.\par  \par Today, I personally spent 30 minutes in direct face to face time with the patient, of which greater than 50% of the time was spent in patient education and counseling as described above.\par  \par \par

## 2023-07-24 NOTE — HISTORY OF PRESENT ILLNESS
[de-identified] : 38 year old female initially found down and and transported to [facility]. Found to have large right hemispheric IPH. S/p right hemicraniectomy 6/6/23 by Dr. Bernstein. Noted to have right frontal seizures on EEG postop. S/p PEG tube placement 6/21/23. \par presents today for consultation regarding cranioplasty. \par She is tearful today. She comes in with her partner and parents. She is currently at [facility]. Her family report labile mood which is improving over the last few weeks.\par \par Scalp: Right craniectomy flap sunken.Scalp incision is wide and atrophic with areas of non healing scabs, without erythema, no drainage, or other signs of infection. Edges are well-approximated. No other erythema. No fluctuance or palpable fluid collections.\par

## 2023-07-24 NOTE — PROGRESS NOTE ADULT - ATTENDING COMMENTS
Pt. seen with resident.  Agree with documentation above as per resident with amendments made as appropriate. Patient medically stable. Making progress towards rehab goals.     right ICH s/p hemicraniectomy   CTH with 3D reconstruction ordered 7/24 for implant planning--d/w Dr. Burleson and radiology.     Cognitive deficits-- would benefit from trial of memantine 5mg bid.  has no interactions with other medications.      Cont. lexapro and gabapentin.      Spoke with pt's father-- family is understandably anxious about [facility] transfer-- Pt's insurance coverage limits facility options available to patient.  She has had 1 facility in [place] accept ([facility]) but family awaiting an answer from [facility].  Discussed that will be difficult to extend pt's stay much longer in [place].  Requested SW supervisor speak with family to help provide guidance on [facility] placement. Pt. seen with resident.  Agree with documentation above as per resident with amendments made as appropriate. Patient medically stable. Making progress towards rehab goals.     right ICH s/p hemicraniectomy   CTH with 3D reconstruction ordered 7/24 for implant planning--d/w Dr. Burleson and radiology.     Cognitive deficits-- would benefit from trial of memantine 5mg bid.  has no interactions with other medications.  risks/benefits d/w pt's mother-- agrees to trial .      Cont. lexapro and gabapentin.      Spoke with pt's father-- family is understandably anxious about [facility] transfer-- Pt's insurance coverage limits facility options available to patient.  She has had 1 facility in [facility]) but family awaiting an answer from [facility].  Discussed that will be difficult to extend pt's stay much longer in [place].  Requested  supervisor speak with family to help provide guidance on [facility] placement.    ** Spoke with pt's  _Mother this afternoon___, to provide update on pt's status,  medical update, medications, progress in therapy,  and discharge planning to [facility]  Informed of appeal to insurance to extend patient's stay written and will be submitted to insurance today.  All questions answered. Pt. seen with resident.  Agree with documentation above as per resident with amendments made as appropriate. Patient medically stable. Making progress towards rehab goals.     right ICH s/p hemicraniectomy   CTH with 3D reconstruction ordered 7/24 for implant planning--d/w Dr. Burleson and radiology.     Cognitive deficits-- would benefit from trial of memantine 5mg daily.  has no interactions with other medications.  risks/benefits d/w pt's mother-- agrees to trial .      Cont. lexapro and gabapentin.      Spoke with pt's father-- family is understandably anxious about [facility] transfer-- Pt's insurance coverage limits facility options available to patient.  She has had 1 facility in [facility]) but family awaiting an answer from [facility].  Discussed that will be difficult to extend pt's stay much longer in [place].  Requested  supervisor speak with family to help provide guidance on [facility] placement.    ** Spoke with pt's  _Mother this afternoon___, to provide update on pt's status,  medical update, medications, progress in therapy,  and discharge planning to [facility].  Informed of appeal to insurance to extend patient's stay written and will be submitted to insurance today.  All questions answered.

## 2023-07-24 NOTE — PROGRESS NOTE ADULT - SUBJECTIVE AND OBJECTIVE BOX
38y old  female who presents with a chief complaint of IPH        Patient seen and examined. No acute events overnight. c/o feeling tired and exhausted,     Vital Signs Last 24 Hrs  T(C): 36.8 (24 Jul 2023 08:16), Max: 36.8 (24 Jul 2023 08:16)  T(F): 98.2 (24 Jul 2023 08:16), Max: 98.2 (24 Jul 2023 08:16)  HR: 80 (24 Jul 2023 08:16) (80 - 89)  BP: 95/69 (24 Jul 2023 08:16) (95/69 - 158/100)  BP(mean): --  RR: 16 (24 Jul 2023 08:16) (16 - 16)  SpO2: 97% (24 Jul 2023 08:16) (96% - 97%)    Parameters below as of 24 Jul 2023 08:16  Patient On (Oxygen Delivery Method): room air      GENERAL- NAD  EAR/NOSE/MOUTH/THROAT - no pharyngeal exudates, no oral leisions,  MMM  EYES- SERGIO, conjunctiva and Sclera clear  NECK- supple  RESPIRATORY-  clear to auscultation bilaterally, non laboured breathing  CARDIOVASCULAR - SIS2, RRR  GI - soft NT BS present  EXTREMITIES- no pedal edema  NEUROLOGY- left sided weakness, right craniectomy  PSYCHIATRY- AAO X 2                    12.8                 x    | x    | x            8.04  >-----------< 364     ------------------------< x                     38.7                 x    | x    | x                                            Ca x     Mg x     Ph x              MEDICATIONS  (STANDING):  divalproex Sprinkle 500 milliGRAM(s) Oral <User Schedule>  divalproex Sprinkle 750 milliGRAM(s) Oral <User Schedule>  enoxaparin Injectable 40 milliGRAM(s) SubCutaneous every 24 hours  escitalopram 10 milliGRAM(s) Oral <User Schedule>  gabapentin 300 milliGRAM(s) Oral <User Schedule>  gabapentin 400 milliGRAM(s) Oral at bedtime  lacosamide 150 milliGRAM(s) Oral two times a day  levothyroxine 75 MICROGram(s) Oral daily  lidocaine   4% Patch 1 Patch Transdermal <User Schedule>  pantoprazole   Suspension 40 milliGRAM(s) Oral daily  polyethylene glycol 3350 17 Gram(s) Oral at bedtime  senna 2 Tablet(s) Oral at bedtime  tiZANidine 4 milliGRAM(s) Oral at bedtime  traZODone 75 milliGRAM(s) Oral at bedtime    MEDICATIONS  (PRN):  acetaminophen     Tablet .. 975 milliGRAM(s) Oral every 6 hours PRN Mild Pain (1 - 3), Moderate Pain (4 - 6), Severe Pain (7 - 10)  bacitracin   Ointment 1 Application(s) Topical three times a day PRN scalp irritation

## 2023-07-24 NOTE — CHART NOTE - NSCHARTNOTEFT_GEN_A_CORE
Nutrition Follow Up Note  Source: Medical Record [X] Patient [X] Family [X] pt's mother at bedside         Diet: Soft and bite-sized, Ensure Plus High Protein (provides 350 kcal, 20 g protein/serving)   Pt tolerating diet with fair-good PO intake per nursing flow sheets, eating % of meals. Pt reports good appetite. Discussed food preferences with pt + pt's mother at bedside. No digestive issues reported.    Enteral/Parenteral Nutrition: N/A    Current Weight: 151.4 lbs (7-23)    Pertinent Medications: MEDICATIONS  (STANDING):  divalproex Sprinkle 500 milliGRAM(s) Oral <User Schedule>  divalproex Sprinkle 750 milliGRAM(s) Oral <User Schedule>  enoxaparin Injectable 40 milliGRAM(s) SubCutaneous every 24 hours  escitalopram 10 milliGRAM(s) Oral <User Schedule>  gabapentin 300 milliGRAM(s) Oral <User Schedule>  gabapentin 400 milliGRAM(s) Oral at bedtime  lacosamide 150 milliGRAM(s) Oral two times a day  levothyroxine 75 MICROGram(s) Oral daily  lidocaine   4% Patch 1 Patch Transdermal <User Schedule>  memantine 5 milliGRAM(s) Oral two times a day  pantoprazole   Suspension 40 milliGRAM(s) Oral daily  polyethylene glycol 3350 17 Gram(s) Oral at bedtime  senna 2 Tablet(s) Oral at bedtime  tiZANidine 4 milliGRAM(s) Oral at bedtime  traZODone 75 milliGRAM(s) Oral at bedtime    MEDICATIONS  (PRN):  acetaminophen     Tablet .. 975 milliGRAM(s) Oral every 6 hours PRN Mild Pain (1 - 3), Moderate Pain (4 - 6), Severe Pain (7 - 10)  bacitracin   Ointment 1 Application(s) Topical three times a day PRN scalp irritation      Pertinent Labs:  07-24 Na133 mmol/L<L> Glu 101 mg/dL<H> K+ 3.8 mmol/L Cr  0.45 mg/dL<L> BUN 7 mg/dL 07-24 Alb 3.0 g/dL<L>        Skin: surgical incision per nursing flow sheets     Edema: No edema per nursing flow sheets     Last BM: on 7-23 Per nursing flowsheets     Estimated Needs:   [X] No Change since Previous Assessment  [ ] Recalculated:     Previous Nutrition Diagnosis:   Severe malnutrition, acute    Nutrition Diagnosis is [X] Ongoing  - continues on Ensure Plus High Protein (provides 350 kcal, 20 g protein/serving)     New Nutrition Diagnosis: [X] Not Applicable  [ ] Inadequate Protein Energy Intake   [ ] Inadequate Oral Intake   [ ] Excessive Energy Intake   [ ] Increased Nutrient Needs   [ ] Obesity   [ ] Altered GI Function   [ ] Unintended Weight Loss   [ ] Food & Nutrition Related Knowledge Deficit  [ ] Limited Adherence to nutrition related recommendations   [ ] Malnutrition      Interventions:   1. Recommend continuing with current plan of care  2. Encourage PO intake  3. Obtain and honor food preferences as able    Monitoring & Evaluation:   [X] Weights   [X] PO Intake   [X] Follow Up (Per Protocol)  [X] Tolerance to Diet Prescription   [X] Other: Labs    RD Remains Available.  Cassidy Ruby RD

## 2023-07-24 NOTE — PROGRESS NOTE ADULT - SUBJECTIVE AND OBJECTIVE BOX
HPI:  38 year old female w/ PMHx hypothyroidism who was admitted to [facility] 6/6 after being found on floor at home, with CT revealing large right-sided ICH. She required emergent right frontal craniectomy for decompression and EVD placement. EVD subsequently removed 6/16. Angio negative. Patient underwent G tube placement 6/20. Hospital course notable for fluctuating leukocytosis and high-grade temperatures with no identified source of infection including in urine, blood, CSF. Now admitted for multidisciplinary rehab. (26 Jun 2023 13:08)    SUBJECTIVE: Stable over the weekend. Per family she slept good. Her gabapentin was increased back to 400mg at PM. Last BM overnight. Family is concerned/anxious about [facility] placement and is interested to speaking to our  supervisor. Patient has no other new complaints. Therapist reports she has an increase in L-arm ROM with pain.       Vital Signs Last 24 Hrs  T(C): 36.8 (24 Jul 2023 08:16), Max: 36.8 (24 Jul 2023 08:16)  T(F): 98.2 (24 Jul 2023 08:16), Max: 98.2 (24 Jul 2023 08:16)  HR: 80 (24 Jul 2023 08:16) (80 - 89)  BP: 95/69 (24 Jul 2023 08:16) (95/69 - 158/100)  BP(mean): --  RR: 16 (24 Jul 2023 08:16) (16 - 16)  SpO2: 97% (24 Jul 2023 08:16) (96% - 97%)    Parameters below as of 24 Jul 2023 08:16  Patient On (Oxygen Delivery Method): room air          REVIEW OF SYMPTOMS  Neurological deficits  Pain     PHYSICAL EXAM  Constitutional - NAD, slept well   HEENT - Right frontal hemicraniectomy incision site clean dry and intact   Chest - Breathing comfortably on RA   Cardiovascular - RRR,  warm well perfused  Abdomen - BS+, Soft, PEG incisional site healed well.   Extremities - No edema, No calf tenderness   Neurologic Exam -                    Cognitive - Awake, Alert, AAO to self, place, date, year, situation     Communication - Fluent, No dysarthria,       Motor  Left hemiparesis. Right side 5/5.      Sensory - Impaired to left UE and LE. Pins and needles sensation.       Coordination - Impaired.   Spasticity-- left shoulder abduction 3, left shoulder flexion 3, left finger flexion 1   Psychiatric - pleasant       LABS:  cret                        12.8   8.04  )-----------( 364      ( 24 Jul 2023 06:40 )             38.7     07-24    133<L>  |  97  |  7   ----------------------------<  101<H>  3.8   |  27  |  0.45<L>    Ca    9.5      24 Jul 2023 06:40    TPro  7.0  /  Alb  3.0<L>  /  TBili  0.2  /  DBili  x   /  AST  17  /  ALT  32  /  AlkPhos  58  07-24                           RADIOLOGY:  Reviewed       MEDICATIONS  (STANDING):  divalproex Sprinkle 500 milliGRAM(s) Oral <User Schedule>  divalproex Sprinkle 750 milliGRAM(s) Oral <User Schedule>  enoxaparin Injectable 40 milliGRAM(s) SubCutaneous every 24 hours  escitalopram 10 milliGRAM(s) Oral <User Schedule>  gabapentin 300 milliGRAM(s) Oral <User Schedule>  gabapentin 400 milliGRAM(s) Oral at bedtime  lacosamide 150 milliGRAM(s) Oral two times a day  levothyroxine 75 MICROGram(s) Oral daily  lidocaine   4% Patch 1 Patch Transdermal <User Schedule>  pantoprazole   Suspension 40 milliGRAM(s) Oral daily  polyethylene glycol 3350 17 Gram(s) Oral at bedtime  senna 2 Tablet(s) Oral at bedtime  tiZANidine 4 milliGRAM(s) Oral at bedtime  traZODone 75 milliGRAM(s) Oral at bedtime    MEDICATIONS  (PRN):  acetaminophen     Tablet .. 975 milliGRAM(s) Oral every 6 hours PRN Mild Pain (1 - 3), Moderate Pain (4 - 6), Severe Pain (7 - 10)  bacitracin   Ointment 1 Application(s) Topical three times a day PRN scalp irritation     HPI:  38 year old female w/ PMHx hypothyroidism who was admitted to [facility] 6/6 after being found on floor at home, with CT revealing large right-sided ICH. She required emergent right frontal craniectomy for decompression and EVD placement. EVD subsequently removed 6/16. Angio negative. Patient underwent G tube placement 6/20. Hospital course notable for fluctuating leukocytosis and high-grade temperatures with no identified source of infection including in urine, blood, CSF. Now admitted for multidisciplinary rehab. (26 Jun 2023 13:08)    SUBJECTIVE: Stable over the weekend. Per family she slept good. Her gabapentin was increased back to 400mg at PM. Last BM overnight. Family is concerned/anxious about [facility] placement and is interested to speaking to our  supervisor. Patient has no other new complaints. Therapist reports she has an increase in L-arm ROM.       Vital Signs Last 24 Hrs  T(C): 36.8 (24 Jul 2023 08:16), Max: 36.8 (24 Jul 2023 08:16)  T(F): 98.2 (24 Jul 2023 08:16), Max: 98.2 (24 Jul 2023 08:16)  HR: 80 (24 Jul 2023 08:16) (80 - 89)  BP: 95/69 (24 Jul 2023 08:16) (95/69 - 158/100)  BP(mean): --  RR: 16 (24 Jul 2023 08:16) (16 - 16)  SpO2: 97% (24 Jul 2023 08:16) (96% - 97%)    Parameters below as of 24 Jul 2023 08:16  Patient On (Oxygen Delivery Method): room air          REVIEW OF SYMPTOMS  Neurological deficits  Pain     PHYSICAL EXAM  Constitutional - NAD, slept well   HEENT - Right frontal hemicraniectomy incision site clean dry and intact   Chest - Breathing comfortably on RA   Cardiovascular - RRR,  warm well perfused  Abdomen - BS+, Soft, PEG incisional site healed well.   Extremities - No edema, No calf tenderness   Neurologic Exam -                    Cognitive - Awake, Alert, AAO to self, place, date, year, situation     Communication - Fluent, No dysarthria,       Motor  Left hemiparesis. Right side 5/5.      Sensory - Impaired to left UE and LE. Pins and needles sensation.       Coordination - Impaired.   Spasticity-- left shoulder abduction 3, left shoulder flexion 3, left finger flexion 1   Psychiatric - pleasant       LABS:  cret                        12.8   8.04  )-----------( 364      ( 24 Jul 2023 06:40 )             38.7     07-24    133<L>  |  97  |  7   ----------------------------<  101<H>  3.8   |  27  |  0.45<L>    Ca    9.5      24 Jul 2023 06:40    TPro  7.0  /  Alb  3.0<L>  /  TBili  0.2  /  DBili  x   /  AST  17  /  ALT  32  /  AlkPhos  58  07-24                           RADIOLOGY:  Reviewed       MEDICATIONS  (STANDING):  divalproex Sprinkle 500 milliGRAM(s) Oral <User Schedule>  divalproex Sprinkle 750 milliGRAM(s) Oral <User Schedule>  enoxaparin Injectable 40 milliGRAM(s) SubCutaneous every 24 hours  escitalopram 10 milliGRAM(s) Oral <User Schedule>  gabapentin 300 milliGRAM(s) Oral <User Schedule>  gabapentin 400 milliGRAM(s) Oral at bedtime  lacosamide 150 milliGRAM(s) Oral two times a day  levothyroxine 75 MICROGram(s) Oral daily  lidocaine   4% Patch 1 Patch Transdermal <User Schedule>  pantoprazole   Suspension 40 milliGRAM(s) Oral daily  polyethylene glycol 3350 17 Gram(s) Oral at bedtime  senna 2 Tablet(s) Oral at bedtime  tiZANidine 4 milliGRAM(s) Oral at bedtime  traZODone 75 milliGRAM(s) Oral at bedtime    MEDICATIONS  (PRN):  acetaminophen     Tablet .. 975 milliGRAM(s) Oral every 6 hours PRN Mild Pain (1 - 3), Moderate Pain (4 - 6), Severe Pain (7 - 10)  bacitracin   Ointment 1 Application(s) Topical three times a day PRN scalp irritation

## 2023-07-25 LAB
ANION GAP SERPL CALC-SCNC: 8 MMOL/L — SIGNIFICANT CHANGE UP (ref 5–17)
BUN SERPL-MCNC: 10 MG/DL — SIGNIFICANT CHANGE UP (ref 7–23)
CALCIUM SERPL-MCNC: 9.3 MG/DL — SIGNIFICANT CHANGE UP (ref 8.4–10.5)
CHLORIDE SERPL-SCNC: 101 MMOL/L — SIGNIFICANT CHANGE UP (ref 96–108)
CO2 SERPL-SCNC: 27 MMOL/L — SIGNIFICANT CHANGE UP (ref 22–31)
CREAT SERPL-MCNC: 0.45 MG/DL — LOW (ref 0.5–1.3)
EGFR: 125 ML/MIN/1.73M2 — SIGNIFICANT CHANGE UP
GLUCOSE SERPL-MCNC: 95 MG/DL — SIGNIFICANT CHANGE UP (ref 70–99)
POTASSIUM SERPL-MCNC: 4.2 MMOL/L — SIGNIFICANT CHANGE UP (ref 3.5–5.3)
POTASSIUM SERPL-SCNC: 4.2 MMOL/L — SIGNIFICANT CHANGE UP (ref 3.5–5.3)
SODIUM SERPL-SCNC: 136 MMOL/L — SIGNIFICANT CHANGE UP (ref 135–145)

## 2023-07-25 PROCEDURE — 99233 SBSQ HOSP IP/OBS HIGH 50: CPT

## 2023-07-25 RX ORDER — MEMANTINE HYDROCHLORIDE 10 MG/1
5 TABLET ORAL
Refills: 0 | Status: DISCONTINUED | OUTPATIENT
Start: 2023-07-25 | End: 2023-08-01

## 2023-07-25 RX ADMIN — GABAPENTIN 400 MILLIGRAM(S): 400 CAPSULE ORAL at 21:16

## 2023-07-25 RX ADMIN — MEMANTINE HYDROCHLORIDE 5 MILLIGRAM(S): 10 TABLET ORAL at 08:45

## 2023-07-25 RX ADMIN — PANTOPRAZOLE SODIUM 40 MILLIGRAM(S): 20 TABLET, DELAYED RELEASE ORAL at 11:39

## 2023-07-25 RX ADMIN — Medication 975 MILLIGRAM(S): at 21:15

## 2023-07-25 RX ADMIN — GABAPENTIN 300 MILLIGRAM(S): 400 CAPSULE ORAL at 13:30

## 2023-07-25 RX ADMIN — Medication 75 MILLIGRAM(S): at 21:16

## 2023-07-25 RX ADMIN — DIVALPROEX SODIUM 500 MILLIGRAM(S): 500 TABLET, DELAYED RELEASE ORAL at 06:04

## 2023-07-25 RX ADMIN — TIZANIDINE 4 MILLIGRAM(S): 4 TABLET ORAL at 21:16

## 2023-07-25 RX ADMIN — ESCITALOPRAM OXALATE 10 MILLIGRAM(S): 10 TABLET, FILM COATED ORAL at 16:45

## 2023-07-25 RX ADMIN — LIDOCAINE 1 PATCH: 4 CREAM TOPICAL at 08:44

## 2023-07-25 RX ADMIN — LIDOCAINE 1 PATCH: 4 CREAM TOPICAL at 20:55

## 2023-07-25 RX ADMIN — Medication 975 MILLIGRAM(S): at 20:15

## 2023-07-25 RX ADMIN — LACOSAMIDE 150 MILLIGRAM(S): 50 TABLET ORAL at 06:04

## 2023-07-25 RX ADMIN — ENOXAPARIN SODIUM 40 MILLIGRAM(S): 100 INJECTION SUBCUTANEOUS at 06:05

## 2023-07-25 RX ADMIN — MEMANTINE HYDROCHLORIDE 5 MILLIGRAM(S): 10 TABLET ORAL at 16:45

## 2023-07-25 RX ADMIN — DIVALPROEX SODIUM 750 MILLIGRAM(S): 500 TABLET, DELAYED RELEASE ORAL at 17:29

## 2023-07-25 RX ADMIN — LACOSAMIDE 150 MILLIGRAM(S): 50 TABLET ORAL at 17:29

## 2023-07-25 RX ADMIN — LIDOCAINE 1 PATCH: 4 CREAM TOPICAL at 18:00

## 2023-07-25 RX ADMIN — POLYETHYLENE GLYCOL 3350 17 GRAM(S): 17 POWDER, FOR SOLUTION ORAL at 21:15

## 2023-07-25 RX ADMIN — Medication 75 MICROGRAM(S): at 06:03

## 2023-07-25 NOTE — PROGRESS NOTE ADULT - ATTENDING COMMENTS
Pt. seen with resident.  Agree with documentation above as per resident with amendments made as appropriate. Patient medically stable. Making progress towards rehab goals.     right ICH s/p hemicraniectomy  -trial increase Memantine to  5mg BID for cognitive deficits.     --Spoke with director at [facility] regarding patient, her deficits and needs, and they are working on a single payor agreement with [employer] to take patient for admission.   Spoke with pt's mother and her wife this AM regarding my conversation with [facility] regarding patient and [facility] did not accept pt. Provided family reassurance that [facility] has brain injury unit and staff is knowledgeable and experienced with severe strokes and this is the best [facility] option for her.  All questions answered.

## 2023-07-25 NOTE — PROGRESS NOTE ADULT - SUBJECTIVE AND OBJECTIVE BOX
HPI:  38 year old female w/ PMHx hypothyroidism who was admitted to [facility] 6/6 after being found on floor at home, with CT revealing large right-sided ICH. She required emergent right frontal craniectomy for decompression and EVD placement. EVD subsequently removed 6/16. Angio negative. Patient underwent G tube placement 6/20. Hospital course notable for fluctuating leukocytosis and high-grade temperatures with no identified source of infection including in urine, blood, CSF. Now admitted for multidisciplinary rehab. (26 Jun 2023 13:08)    SUBJECTIVE: No acute overnight events. Patient seen and examined by bedside. No complaints of new numbness/tingling/weakness. No CP/SOB/NV. Working hard in therapies and improving functionally. VSS. afebrile. Discussed clinical status and future transfer to [facility] with family member by bedside, anxious about being discharged to a new facility.     Vital Signs Last 24 Hrs  T(C): 36.8 (24 Jul 2023 08:16), Max: 36.8 (24 Jul 2023 08:16)  T(F): 98.2 (24 Jul 2023 08:16), Max: 98.2 (24 Jul 2023 08:16)  HR: 80 (24 Jul 2023 08:16) (80 - 89)  BP: 95/69 (24 Jul 2023 08:16) (95/69 - 158/100)  BP(mean): --  RR: 16 (24 Jul 2023 08:16) (16 - 16)  SpO2: 97% (24 Jul 2023 08:16) (96% - 97%)    Parameters below as of 24 Jul 2023 08:16  Patient On (Oxygen Delivery Method): room air    REVIEW OF SYMPTOMS  Neurological deficits      PHYSICAL EXAM  Constitutional - NAD, slept well   HEENT - Right frontal hemicraniectomy incision site clean dry and intact   Chest - Breathing comfortably on RA   Cardiovascular - RRR,  warm well perfused  Abdomen - BS+, Soft, PEG incisional site healed well.   Extremities - No edema, No calf tenderness   Neurologic Exam -                    Cognitive - Awake, Alert, AAO to self, place, date, year, situation     Communication - Fluent, No dysarthria,       Motor  Left hemiparesis. Right side 5/5.      Sensory - Impaired to left UE and LE      Coordination - Impaired.   Spasticity-- left shoulder abduction 3, left shoulder flexion 3, left finger flexion 1   Psychiatric - pleasant     LABS:  cret                        12.8   8.04  )-----------( 364      ( 24 Jul 2023 06:40 )             38.7     07-24    133<L>  |  97  |  7   ----------------------------<  101<H>  3.8   |  27  |  0.45<L>    Ca    9.5      24 Jul 2023 06:40    TPro  7.0  /  Alb  3.0<L>  /  TBili  0.2  /  DBili  x   /  AST  17  /  ALT  32  /  AlkPhos  58  07-24    ASSESSMENT/PLAN:  38y Female h/o hypothyroid with functional deficits after right frontal IPHs/p right hemicraniectomy.     #Right Hemicraniectomy and EVD on 6/6  - Communication with patients family, neuroplastic surgeon Dr. Sy ([phone]) and PM&R team has taken place.   - neuroendocrine workup ordered, and nml   - cranioplasty appointment on 7/18 with Dr. Sy- due to healing of crani site would tentatively recommend cranioplasty in September 2023, discussed transfer from [facility] to cranioplasty appointment once discharge, patient's family aware. They are anxious about [facility]. I advised and to speak to  supervisor.   - CTH with 3D reconstruction ordered 7/24 for implant planning.   - Stable neuro exam     #hx of seizure  #agitation   -Briviact 50mg BID was discontinued 7/14.   -Depakote 500mg in AM, 750mg in evening   -Cont [id number] BID      -Ordered Valproic acid and ammonia level, which is wnl.    #Anxiety/depression/cognitive deficit   - Lexapro 10mg daily   - Memantine 5mg daily     #Hyponatremia  -Na 136->136->135->133->136 (7/25)   - Improved     #Pain/Neuropathic   #Insomnia  -Botox to Lpec and Lbicep 7/14-tolerated procedure well. Therapist reports increase in ROM with pain.   -cont. Tylenol PRN  -cont Trazodone 75mg at bedtime  -continue Gabapentin to 300mg at 2pm, continue with gabapentin 400mg at bedtime,   -cont Tizanidine 4mg   -cont lidocaine patch     #dysphagia s/p PEG   -PEG tube came out on 7/4, did not replace   -calorie count sufficient, and tolerating PO diet.      #constipation  -Senna at bedtime    -Miralax at bedtime     #hx of anemia    -H/H stable     #hx of HTN --BP soft  -Monitor    #DVT ppx   - cont lovenox    IDT 7/20:  Nursing: incontinent B/B  SLP: on soft bite sized with thin liquids, .  mild language deficits impacted by cognitive deficits, severe cognitive deficits-- limited by attention, memory, reasoning deficits.  Mild Dysarthria; Ongoing education to family  OT: Mod assist–eating/grooming; Tot assist–bathing and dressing & transfers; Improved sitting balance and progress  PT: Mod -Max A--Bed mobility: Mod --Max A--squat pivot transfer & Ambulating by  rail 20ft--Max A and WCf.  WC tot A.   Goals: 1 Pivot and transfers, bed mobility mod-A, 2 attend to structured tasks for 30 minutes   Dispo: 7/24 Monday to [facility], but now Pending.                      RADIOLOGY:  Reviewed       MEDICATIONS  (STANDING):  divalproex Sprinkle 500 milliGRAM(s) Oral <User Schedule>  divalproex Sprinkle 750 milliGRAM(s) Oral <User Schedule>  enoxaparin Injectable 40 milliGRAM(s) SubCutaneous every 24 hours  escitalopram 10 milliGRAM(s) Oral <User Schedule>  gabapentin 300 milliGRAM(s) Oral <User Schedule>  gabapentin 400 milliGRAM(s) Oral at bedtime  lacosamide 150 milliGRAM(s) Oral two times a day  levothyroxine 75 MICROGram(s) Oral daily  lidocaine   4% Patch 1 Patch Transdermal <User Schedule>  pantoprazole   Suspension 40 milliGRAM(s) Oral daily  polyethylene glycol 3350 17 Gram(s) Oral at bedtime  senna 2 Tablet(s) Oral at bedtime  tiZANidine 4 milliGRAM(s) Oral at bedtime  traZODone 75 milliGRAM(s) Oral at bedtime    MEDICATIONS  (PRN):  acetaminophen     Tablet .. 975 milliGRAM(s) Oral every 6 hours PRN Mild Pain (1 - 3), Moderate Pain (4 - 6), Severe Pain (7 - 10)  bacitracin   Ointment 1 Application(s) Topical three times a day PRN scalp irritation     HPI:  38 year old female w/ PMHx hypothyroidism who was admitted to [facility] 6/6 after being found on floor at home, with CT revealing large right-sided ICH. She required emergent right frontal craniectomy for decompression and EVD placement. EVD subsequently removed 6/16. Angio negative. Patient underwent G tube placement 6/20. Hospital course notable for fluctuating leukocytosis and high-grade temperatures with no identified source of infection including in urine, blood, CSF. Now admitted for multidisciplinary rehab. (26 Jun 2023 13:08)    SUBJECTIVE: No acute overnight events. Patient seen and examined by bedside. No complaints of new numbness/tingling/weakness. No CP/SOB/NV. Working hard in therapies and improving functionally. VSS. afebrile. Discussed clinical status and future transfer to [facility] with pt's mother by bedside, anxious about being discharged to a new facility.     Vital Signs Last 24 Hrs  T(C): 36.8 (24 Jul 2023 08:16), Max: 36.8 (24 Jul 2023 08:16)  T(F): 98.2 (24 Jul 2023 08:16), Max: 98.2 (24 Jul 2023 08:16)  HR: 80 (24 Jul 2023 08:16) (80 - 89)  BP: 95/69 (24 Jul 2023 08:16) (95/69 - 158/100)  BP(mean): --  RR: 16 (24 Jul 2023 08:16) (16 - 16)  SpO2: 97% (24 Jul 2023 08:16) (96% - 97%)    Parameters below as of 24 Jul 2023 08:16  Patient On (Oxygen Delivery Method): room air    REVIEW OF SYMPTOMS  Neurological deficits      PHYSICAL EXAM  Constitutional - NAD, slept well   HEENT - Right frontal hemicraniectomy incision site clean dry and intact   Chest - Breathing comfortably on RA   Cardiovascular - RRR,  warm well perfused  Abdomen - BS+, Soft, PEG incisional site healed well.   Extremities - No edema, No calf tenderness   Neurologic Exam -                    Cognitive - Awake, Alert, AAO to self, place, date, year, situation     Communication - Fluent, No dysarthria,       Motor  Left hemiparesis. Right side 5/5.      Sensory - Impaired to left UE and LE      Coordination - Impaired.   Spasticity-- left shoulder abduction 3, left shoulder flexion 3, left finger flexion 1   Psychiatric - pleasant     LABS:  cret                        12.8   8.04  )-----------( 364      ( 24 Jul 2023 06:40 )             38.7     07-24    133<L>  |  97  |  7   ----------------------------<  101<H>  3.8   |  27  |  0.45<L>    Ca    9.5      24 Jul 2023 06:40    TPro  7.0  /  Alb  3.0<L>  /  TBili  0.2  /  DBili  x   /  AST  17  /  ALT  32  /  AlkPhos  58  07-24    ASSESSMENT/PLAN:  38y Female h/o hypothyroid with functional deficits after right frontal IPHs/p right hemicraniectomy.     #Right Hemicraniectomy and EVD on 6/6  - Communication with patients family, neuroplastic surgeon Dr. Sy ([phone]) and PM&R team has taken place.   - neuroendocrine workup ordered, and nml   - cranioplasty appointment on 7/18 with Dr. Sy- due to healing of crani site would tentatively recommend cranioplasty in September 2023, discussed transfer from [facility] to cranioplasty appointment once discharge, patient's family aware. They are anxious about [facility]. I advised and to speak to  supervisor.   - CTH with 3D reconstruction ordered 7/24 for implant planning.   - Stable neuro exam     #hx of seizure  #agitation   -Briviact 50mg BID was discontinued 7/14.   -Depakote 500mg in AM, 750mg in evening   -Cont [id number] BID      -Ordered Valproic acid and ammonia level, which is wnl.    #Anxiety/depression/cognitive deficit   - Lexapro 10mg daily   - Memantine--trial increase to  5mg BID    #Hyponatremia  -Na 136->136->135->133->136 (7/25)   - Improved     #Pain/Neuropathic   #Insomnia  -Botox to Lpec and Lbicep 7/14-tolerated procedure well. Therapist reports increase in ROM with pain.   -cont. Tylenol PRN  -cont Trazodone 75mg at bedtime  -continue Gabapentin to 300mg at 2pm, continue with gabapentin 400mg at bedtime,   -cont Tizanidine 4mg   -cont lidocaine patch     #dysphagia s/p PEG   -PEG tube came out on 7/4, did not replace   -calorie count sufficient, and tolerating PO diet.      #constipation  -Senna at bedtime    -Miralax at bedtime     #hx of anemia    -H/H stable     #hx of HTN --BP soft  -Monitor    #DVT ppx   - cont lovenox    IDT 7/20:  Nursing: incontinent B/B  SLP: on soft bite sized with thin liquids, .  mild language deficits impacted by cognitive deficits, severe cognitive deficits-- limited by attention, memory, reasoning deficits.  Mild Dysarthria; Ongoing education to family  OT: Mod assist–eating/grooming; Tot assist–bathing and dressing & transfers; Improved sitting balance and progress  PT: Mod -Max A--Bed mobility: Mod --Max A--squat pivot transfer & Ambulating by  rail 20ft--Max A and WCf.  WC tot A.   Goals: 1 Pivot and transfers, bed mobility mod-A, 2 attend to structured tasks for 30 minutes   Dispo: 7/24 Monday to [facility], but now Pending.                      RADIOLOGY:  Reviewed       MEDICATIONS  (STANDING):  divalproex Sprinkle 500 milliGRAM(s) Oral <User Schedule>  divalproex Sprinkle 750 milliGRAM(s) Oral <User Schedule>  enoxaparin Injectable 40 milliGRAM(s) SubCutaneous every 24 hours  escitalopram 10 milliGRAM(s) Oral <User Schedule>  gabapentin 300 milliGRAM(s) Oral <User Schedule>  gabapentin 400 milliGRAM(s) Oral at bedtime  lacosamide 150 milliGRAM(s) Oral two times a day  levothyroxine 75 MICROGram(s) Oral daily  lidocaine   4% Patch 1 Patch Transdermal <User Schedule>  pantoprazole   Suspension 40 milliGRAM(s) Oral daily  polyethylene glycol 3350 17 Gram(s) Oral at bedtime  senna 2 Tablet(s) Oral at bedtime  tiZANidine 4 milliGRAM(s) Oral at bedtime  traZODone 75 milliGRAM(s) Oral at bedtime    MEDICATIONS  (PRN):  acetaminophen     Tablet .. 975 milliGRAM(s) Oral every 6 hours PRN Mild Pain (1 - 3), Moderate Pain (4 - 6), Severe Pain (7 - 10)  bacitracin   Ointment 1 Application(s) Topical three times a day PRN scalp irritation

## 2023-07-25 NOTE — CHART NOTE - NSCHARTNOTEFT_GEN_A_CORE
Approached Pt for supportive tx, Pt declined due to intense pain and feeling very emotional and tearful today. Will continue efforts to engage Pt in tx.

## 2023-07-26 LAB
CULTURE RESULTS: SIGNIFICANT CHANGE UP
SPECIMEN SOURCE: SIGNIFICANT CHANGE UP

## 2023-07-26 PROCEDURE — 99232 SBSQ HOSP IP/OBS MODERATE 35: CPT

## 2023-07-26 RX ORDER — PANTOPRAZOLE SODIUM 20 MG/1
40 TABLET, DELAYED RELEASE ORAL
Refills: 0 | Status: DISCONTINUED | OUTPATIENT
Start: 2023-07-26 | End: 2023-08-01

## 2023-07-26 RX ADMIN — LACOSAMIDE 150 MILLIGRAM(S): 50 TABLET ORAL at 17:50

## 2023-07-26 RX ADMIN — DIVALPROEX SODIUM 500 MILLIGRAM(S): 500 TABLET, DELAYED RELEASE ORAL at 05:58

## 2023-07-26 RX ADMIN — SENNA PLUS 2 TABLET(S): 8.6 TABLET ORAL at 21:25

## 2023-07-26 RX ADMIN — POLYETHYLENE GLYCOL 3350 17 GRAM(S): 17 POWDER, FOR SOLUTION ORAL at 21:24

## 2023-07-26 RX ADMIN — Medication 75 MICROGRAM(S): at 05:57

## 2023-07-26 RX ADMIN — GABAPENTIN 400 MILLIGRAM(S): 400 CAPSULE ORAL at 21:25

## 2023-07-26 RX ADMIN — LIDOCAINE 1 PATCH: 4 CREAM TOPICAL at 08:40

## 2023-07-26 RX ADMIN — GABAPENTIN 300 MILLIGRAM(S): 400 CAPSULE ORAL at 13:15

## 2023-07-26 RX ADMIN — ENOXAPARIN SODIUM 40 MILLIGRAM(S): 100 INJECTION SUBCUTANEOUS at 05:58

## 2023-07-26 RX ADMIN — DIVALPROEX SODIUM 750 MILLIGRAM(S): 500 TABLET, DELAYED RELEASE ORAL at 17:50

## 2023-07-26 RX ADMIN — TIZANIDINE 4 MILLIGRAM(S): 4 TABLET ORAL at 21:24

## 2023-07-26 RX ADMIN — LACOSAMIDE 150 MILLIGRAM(S): 50 TABLET ORAL at 05:57

## 2023-07-26 RX ADMIN — Medication 75 MILLIGRAM(S): at 21:24

## 2023-07-26 RX ADMIN — MEMANTINE HYDROCHLORIDE 5 MILLIGRAM(S): 10 TABLET ORAL at 08:39

## 2023-07-26 RX ADMIN — MEMANTINE HYDROCHLORIDE 5 MILLIGRAM(S): 10 TABLET ORAL at 17:51

## 2023-07-26 RX ADMIN — ESCITALOPRAM OXALATE 10 MILLIGRAM(S): 10 TABLET, FILM COATED ORAL at 17:51

## 2023-07-26 NOTE — PROGRESS NOTE ADULT - SUBJECTIVE AND OBJECTIVE BOX
38y old  female who presents with a chief complaint of IPH     Patient seen and examined. No acute events overnight. resting, c/o feeling tired and exhausted.       Vital Signs Last 24 Hrs  T(C): 36.8 (26 Jul 2023 08:42), Max: 36.8 (26 Jul 2023 08:42)  T(F): 98.2 (26 Jul 2023 08:42), Max: 98.2 (26 Jul 2023 08:42)  HR: 84 (26 Jul 2023 08:42) (84 - 84)  BP: 128/78 (26 Jul 2023 08:42) (128/78 - 145/91)  BP(mean): --  RR: 16 (26 Jul 2023 08:42) (14 - 16)  SpO2: 97% (26 Jul 2023 08:42) (96% - 97%)    Parameters below as of 26 Jul 2023 08:42  Patient On (Oxygen Delivery Method): room air      GENERAL- NAD  EAR/NOSE/MOUTH/THROAT -  MMM  EYES- SERGIO, conjunctiva and Sclera clear  NECK- supple  RESPIRATORY-  clear to auscultation bilaterally, non laboured breathing  CARDIOVASCULAR - SIS2, RRR  GI - soft NT BS present  EXTREMITIES- no pedal edema  NEUROLOGY- left sided weakness, right craniectomy  PSYCHIATRY- AAO X 2                  x                    136  | 27   | 10           x     >-----------< x       ------------------------< 95                    x                    4.2  | 101  | 0.45                                         Ca 9.3   Mg x     Ph x      Urinalysis Basic - ( 25 Jul 2023 06:17 )    Color: x / Appearance: x / SG: x / pH: x  Gluc: 95 mg/dL / Ketone: x  / Bili: x / Urobili: x   Blood: x / Protein: x / Nitrite: x   Leuk Esterase: x / RBC: x / WBC x   Sq Epi: x / Non Sq Epi: x / Bacteria: x        MEDICATIONS  (STANDING):  divalproex Sprinkle 750 milliGRAM(s) Oral <User Schedule>  divalproex Sprinkle 500 milliGRAM(s) Oral <User Schedule>  enoxaparin Injectable 40 milliGRAM(s) SubCutaneous every 24 hours  escitalopram 10 milliGRAM(s) Oral <User Schedule>  gabapentin 300 milliGRAM(s) Oral <User Schedule>  gabapentin 400 milliGRAM(s) Oral at bedtime  lacosamide 150 milliGRAM(s) Oral two times a day  levothyroxine 75 MICROGram(s) Oral daily  memantine 5 milliGRAM(s) Oral <User Schedule>  pantoprazole    Tablet 40 milliGRAM(s) Oral before breakfast  polyethylene glycol 3350 17 Gram(s) Oral at bedtime  senna 2 Tablet(s) Oral at bedtime  tiZANidine 4 milliGRAM(s) Oral at bedtime  traZODone 75 milliGRAM(s) Oral at bedtime    MEDICATIONS  (PRN):  acetaminophen     Tablet .. 975 milliGRAM(s) Oral every 6 hours PRN Mild Pain (1 - 3), Moderate Pain (4 - 6), Severe Pain (7 - 10)  bacitracin   Ointment 1 Application(s) Topical three times a day PRN scalp irritation

## 2023-07-26 NOTE — PROGRESS NOTE ADULT - ASSESSMENT
37 y/o F w/ PMhx hypothyroidism admitted to [facility] 6/6 after being found unresponsive, found to have large right frontal IPH w/ IVH and hydrocephalus. Now s/p R krishna craniectomy for evacuation on 6/6. PEG placed 6/20. Now admitted for multidisciplinary rehab- pt/ot/dvt ppx    #IPH with IVH, R frontal lobe  - Amantadine, trazodone per rehab   - Antiepileptic regimen: depakote, continue Vimpat 150 mg BID.   - Helmet when OOB    #Normocytic anemia  - H/H stable, monitor for Hb < 7  - Iron/B12/folate normal     #HTN  - Lisinopril held due to hypotension    #Dysphagia s/p PEG  - s/p self removal of PEG tube, tolerating PO diet, PEG not replaced  - GI signed off, monitor tolerating PO diet    #Hypothyroidism  - Continue synthroid 75mcg qd     #DVT ppx - Lovenox    will follow  d/w dr. dale

## 2023-07-26 NOTE — PROGRESS NOTE ADULT - SUBJECTIVE AND OBJECTIVE BOX
HPI:  38 year old female w/ PMHx hypothyroidism who was admitted to [facility] 6/6 after being found on floor at home, with CT revealing large right-sided ICH. She required emergent right frontal craniectomy for decompression and EVD placement. EVD subsequently removed 6/16. Angio negative. Patient underwent G tube placement 6/20. Hospital course notable for fluctuating leukocytosis and high-grade temperatures with no identified source of infection including in urine, blood, CSF. Now admitted for multidisciplinary rehab. (26 Jun 2023 13:08)    SUBJECTIVE: No acute overnight events. Patient seen and examined by bedside. Spoke to family be bedside as well. Patient needs a letter stating she has been hospitalized to give to [employer]. No complaints of new numbness/tingling/weakness. No CP/SOB/NV. Working hard in therapies and improving functionally. VSS. afebrile. Will continue to monitor closely. y.     Vital Signs Last 24 Hrs  T(C): 36.8 (24 Jul 2023 08:16), Max: 36.8 (24 Jul 2023 08:16)  T(F): 98.2 (24 Jul 2023 08:16), Max: 98.2 (24 Jul 2023 08:16)  HR: 80 (24 Jul 2023 08:16) (80 - 89)  BP: 95/69 (24 Jul 2023 08:16) (95/69 - 158/100)  BP(mean): --  RR: 16 (24 Jul 2023 08:16) (16 - 16)  SpO2: 97% (24 Jul 2023 08:16) (96% - 97%)    Parameters below as of 24 Jul 2023 08:16  Patient On (Oxygen Delivery Method): room air    REVIEW OF SYMPTOMS  Neurological deficits    PHYSICAL EXAM  Constitutional - NAD, slept well   HEENT - Right frontal hemicraniectomy incision site clean dry and intact   Chest - Breathing comfortably on RA   Cardiovascular - RRR,  warm well perfused  Abdomen - BS+  Extremities - No edema, No calf tenderness   Neurologic Exam -                    Cognitive - Awake, Alert, AAO to self, place, date, year, situation     Communication - Fluent, No dysarthria,       Motor  Left hemiparesis. Right side 5/5.      Sensory - Impaired to left UE and LE      Coordination - Impaired.   Spasticity-- left shoulder abduction 3, left shoulder flexion 3, left finger flexion 1   Psychiatric - pleasant     LABS:  cret                        12.8   8.04  )-----------( 364      ( 24 Jul 2023 06:40 )             38.7     07-24    133<L>  |  97  |  7   ----------------------------<  101<H>  3.8   |  27  |  0.45<L>    Ca    9.5      24 Jul 2023 06:40    TPro  7.0  /  Alb  3.0<L>  /  TBili  0.2  /  DBili  x   /  AST  17  /  ALT  32  /  AlkPhos  58  07-24    ASSESSMENT/PLAN:  38y Female h/o hypothyroid with functional deficits after right frontal IPHs/p right hemicraniectomy.     #Right Hemicraniectomy and EVD on 6/6  - Communication with patients family, neuroplastic surgeon Dr. Sy ([phone]) and PM&R team has taken place.   - neuroendocrine workup ordered, and nml   - cranioplasty appointment on 7/18 with Dr. Sy- due to healing of crani site would tentatively recommend cranioplasty in September 2023, discussed transfer from [facility] to cranioplasty appointment once discharge, patient's family aware. They are anxious about [facility]. I advised and to speak to  supervisor.   - CTH with 3D reconstruction ordered 7/24 for implant planning.     #hx of seizure  #agitation   -Briviact 50mg BID was discontinued 7/14.   -Depakote 500mg in AM, 750mg in evening   -Cont [id number] BID      -Ordered Valproic acid and ammonia level, which is wnl.    #Anxiety/depression/cognitive deficit   - Lexapro 10mg daily   - Memantine 5mg BID-> tolerating      #Hyponatremia  -Na 136->136->135->133->136 (7/25)   - Improved     #Pain/Neuropathic   #Insomnia  -Botox to Lpec and Lbicep 7/14-tolerated procedure well. Therapist reports increase in ROM with pain.   -cont. Tylenol PRN  -cont Trazodone 75mg at bedtime  -continue Gabapentin to 300mg at 2pm, continue with gabapentin 400mg at bedtime,   -cont Tizanidine 4mg   -lidocaine patch-> discontinue (7/26) patient not using     #dysphagia s/p PEG   -PEG tube came out on 7/4, did not replace   -calorie count sufficient, and tolerating PO diet.      #constipation  -Senna at bedtime    -Miralax at bedtime     #hx of anemia    -H/H stable     #hx of HTN --BP soft  -Monitor    #DVT ppx   - cont lovenox    IDT 7/20:  - Patient needs letter stating she was being treated in the hospital for [employer], will write   Nursing: incontinent B/B  SLP: on soft bite sized with thin liquids, .  mild language deficits impacted by cognitive deficits, severe cognitive deficits-- limited by attention, memory, reasoning deficits.  Mild Dysarthria; Ongoing education to family  OT: Mod assist–eating/grooming; Tot assist–bathing and dressing & transfers; Improved sitting balance and progress  PT: Mod -Max A--Bed mobility: Mod --Max A--squat pivot transfer & Ambulating by  rail 20ft--Max A and WCf.  WC tot A.   Goals: 1 Pivot and transfers, bed mobility mod-A, 2 attend to structured tasks for 30 minutes   Dispo: 7/24 Monday to [facility], but now Pending.                      RADIOLOGY:  Reviewed       MEDICATIONS  (STANDING):  divalproex Sprinkle 500 milliGRAM(s) Oral <User Schedule>  divalproex Sprinkle 750 milliGRAM(s) Oral <User Schedule>  enoxaparin Injectable 40 milliGRAM(s) SubCutaneous every 24 hours  escitalopram 10 milliGRAM(s) Oral <User Schedule>  gabapentin 300 milliGRAM(s) Oral <User Schedule>  gabapentin 400 milliGRAM(s) Oral at bedtime  lacosamide 150 milliGRAM(s) Oral two times a day  levothyroxine 75 MICROGram(s) Oral daily  lidocaine   4% Patch 1 Patch Transdermal <User Schedule>  pantoprazole   Suspension 40 milliGRAM(s) Oral daily  polyethylene glycol 3350 17 Gram(s) Oral at bedtime  senna 2 Tablet(s) Oral at bedtime  tiZANidine 4 milliGRAM(s) Oral at bedtime  traZODone 75 milliGRAM(s) Oral at bedtime    MEDICATIONS  (PRN):  acetaminophen     Tablet .. 975 milliGRAM(s) Oral every 6 hours PRN Mild Pain (1 - 3), Moderate Pain (4 - 6), Severe Pain (7 - 10)  bacitracin   Ointment 1 Application(s) Topical three times a day PRN scalp irritation     HPI:  38 year old female w/ PMHx hypothyroidism who was admitted to [facility] 6/6 after being found on floor at home, with CT revealing large right-sided ICH. She required emergent right frontal craniectomy for decompression and EVD placement. EVD subsequently removed 6/16. Angio negative. Patient underwent G tube placement 6/20. Hospital course notable for fluctuating leukocytosis and high-grade temperatures with no identified source of infection including in urine, blood, CSF. Now admitted for multidisciplinary rehab. (26 Jun 2023 13:08)    SUBJECTIVE: No acute overnight events. Patient seen and examined by bedside. Slept through the night.  Emotional lability at times.  Spoke to family be bedside as well. Patient's wife needs a letter stating patient has been hospitalized to give to [employer]. No complaints of new numbness/tingling/weakness. No CP/SOB/NV. Working hard in therapies and improving functionally. VSS. afebrile. Will continue to monitor closely.     Vital Signs Last 24 Hrs  T(C): 36.8 (24 Jul 2023 08:16), Max: 36.8 (24 Jul 2023 08:16)  T(F): 98.2 (24 Jul 2023 08:16), Max: 98.2 (24 Jul 2023 08:16)  HR: 80 (24 Jul 2023 08:16) (80 - 89)  BP: 95/69 (24 Jul 2023 08:16) (95/69 - 158/100)  BP(mean): --  RR: 16 (24 Jul 2023 08:16) (16 - 16)  SpO2: 97% (24 Jul 2023 08:16) (96% - 97%)    Parameters below as of 24 Jul 2023 08:16  Patient On (Oxygen Delivery Method): room air    REVIEW OF SYMPTOMS  Neurological deficits    PHYSICAL EXAM  Constitutional - NAD, slept well   HEENT - Right frontal hemicraniectomy incision site clean dry and intact   Chest - Breathing comfortably on RA   Cardiovascular - RRR,  warm well perfused  Abdomen - BS+  Extremities - No edema, No calf tenderness   Neurologic Exam -                    Cognitive - Awake, Alert, AAO to self, place, date, year, situation     Communication - Fluent, No dysarthria,       Motor  Left hemiparesis. Right side 5/5.      Sensory - Impaired to left UE and LE      Coordination - Impaired.   Spasticity-- left shoulder abduction 3, left shoulder flexion 3, left finger flexion 1   Psychiatric - pleasant     LABS:  cret                        12.8   8.04  )-----------( 364      ( 24 Jul 2023 06:40 )             38.7     07-24    133<L>  |  97  |  7   ----------------------------<  101<H>  3.8   |  27  |  0.45<L>    Ca    9.5      24 Jul 2023 06:40    TPro  7.0  /  Alb  3.0<L>  /  TBili  0.2  /  DBili  x   /  AST  17  /  ALT  32  /  AlkPhos  58  07-24    ASSESSMENT/PLAN:  38y Female h/o hypothyroid with functional deficits after right frontal IPHs/p right hemicraniectomy.     #Right Hemicraniectomy and EVD on 6/6  - Communication with patients family, neuroplastic surgeon Dr. Sy ([phone]) and PM&R team has taken place.   - neuroendocrine workup ordered, and nml   - cranioplasty appointment on 7/18 with Dr. Sy- due to healing of crani site would tentatively recommend cranioplasty in September 2023, discussed transfer from [facility] to cranioplasty appointment once discharge, patient's family aware. They are anxious about [facility]. I advised and to speak to  supervisor.   - CTH with 3D reconstruction ordered 7/24 for implant planning.     #hx of seizure  #agitation   -Briviact 50mg BID was discontinued 7/14.   -Depakote 500mg in AM, 750mg in evening   -Cont [id number] BID      -Ordered Valproic acid and ammonia level, which is wnl.    #Anxiety/depression/cognitive deficit   - Lexapro 10mg daily   - Memantine 5mg BID-> monitor    #Hyponatremia  -Na 136->136->135->133->136 (7/25)   - Improved     #Pain/Neuropathic   #Insomnia  -Botox to Lpec and Lbicep 7/14-tolerated procedure well. Therapist reports increase in ROM with pain.   -cont. Tylenol PRN  -cont Trazodone 75mg at bedtime  -continue Gabapentin to 300mg at 2pm, continue with gabapentin 400mg at bedtime,   -cont Tizanidine 4mg   -lidocaine patch-> discontinue (7/26) patient not using     #dysphagia s/p PEG   -PEG tube came out on 7/4, did not replace   -calorie count sufficient, and tolerating PO diet.      #constipation  -Senna at bedtime    -Miralax at bedtime     #hx of anemia    -H/H stable     #hx of HTN --BP soft  -Monitor    #DVT ppx   - cont lovenox    IDT 7/20:  Nursing: incontinent B/B  SLP: on soft bite sized with thin liquids, .  mild language deficits impacted by cognitive deficits, severe cognitive deficits-- limited by attention, memory, reasoning deficits.  Mild Dysarthria; Ongoing education to family  OT: Mod assist–eating/grooming; Tot assist–bathing and dressing & transfers; Improved sitting balance and progress  PT: Mod -Max A--Bed mobility: Mod --Max A--squat pivot transfer & Ambulating by  rail 20ft--Max A and WCf.   tot A.   Goals: 1 Pivot and transfers, bed mobility mod-A, 2 attend to structured tasks for 30 minutes   Dispo: 7/24 Monday to [facility], but now Pending.                      RADIOLOGY:  Reviewed       MEDICATIONS  (STANDING):  divalproex Sprinkle 500 milliGRAM(s) Oral <User Schedule>  divalproex Sprinkle 750 milliGRAM(s) Oral <User Schedule>  enoxaparin Injectable 40 milliGRAM(s) SubCutaneous every 24 hours  escitalopram 10 milliGRAM(s) Oral <User Schedule>  gabapentin 300 milliGRAM(s) Oral <User Schedule>  gabapentin 400 milliGRAM(s) Oral at bedtime  lacosamide 150 milliGRAM(s) Oral two times a day  levothyroxine 75 MICROGram(s) Oral daily  lidocaine   4% Patch 1 Patch Transdermal <User Schedule>  pantoprazole   Suspension 40 milliGRAM(s) Oral daily  polyethylene glycol 3350 17 Gram(s) Oral at bedtime  senna 2 Tablet(s) Oral at bedtime  tiZANidine 4 milliGRAM(s) Oral at bedtime  traZODone 75 milliGRAM(s) Oral at bedtime    MEDICATIONS  (PRN):  acetaminophen     Tablet .. 975 milliGRAM(s) Oral every 6 hours PRN Mild Pain (1 - 3), Moderate Pain (4 - 6), Severe Pain (7 - 10)  bacitracin   Ointment 1 Application(s) Topical three times a day PRN scalp irritation

## 2023-07-27 LAB
ALBUMIN SERPL ELPH-MCNC: 2.6 G/DL — LOW (ref 3.3–5)
ALP SERPL-CCNC: 51 U/L — SIGNIFICANT CHANGE UP (ref 40–120)
ALT FLD-CCNC: 24 U/L — SIGNIFICANT CHANGE UP (ref 10–45)
ANION GAP SERPL CALC-SCNC: 11 MMOL/L — SIGNIFICANT CHANGE UP (ref 5–17)
AST SERPL-CCNC: 31 U/L — SIGNIFICANT CHANGE UP (ref 10–40)
BASOPHILS # BLD AUTO: 0.04 K/UL — SIGNIFICANT CHANGE UP (ref 0–0.2)
BASOPHILS NFR BLD AUTO: 0.6 % — SIGNIFICANT CHANGE UP (ref 0–2)
BILIRUB SERPL-MCNC: 0.3 MG/DL — SIGNIFICANT CHANGE UP (ref 0.2–1.2)
BUN SERPL-MCNC: 8 MG/DL — SIGNIFICANT CHANGE UP (ref 7–23)
CALCIUM SERPL-MCNC: 9.4 MG/DL — SIGNIFICANT CHANGE UP (ref 8.4–10.5)
CHLORIDE SERPL-SCNC: 100 MMOL/L — SIGNIFICANT CHANGE UP (ref 96–108)
CO2 SERPL-SCNC: 22 MMOL/L — SIGNIFICANT CHANGE UP (ref 22–31)
CREAT SERPL-MCNC: 0.31 MG/DL — LOW (ref 0.5–1.3)
EGFR: 137 ML/MIN/1.73M2 — SIGNIFICANT CHANGE UP
EOSINOPHIL # BLD AUTO: 0.08 K/UL — SIGNIFICANT CHANGE UP (ref 0–0.5)
EOSINOPHIL NFR BLD AUTO: 1.2 % — SIGNIFICANT CHANGE UP (ref 0–6)
GLUCOSE SERPL-MCNC: 97 MG/DL — SIGNIFICANT CHANGE UP (ref 70–99)
HCT VFR BLD CALC: 39.2 % — SIGNIFICANT CHANGE UP (ref 34.5–45)
HGB BLD-MCNC: 12.7 G/DL — SIGNIFICANT CHANGE UP (ref 11.5–15.5)
IMM GRANULOCYTES NFR BLD AUTO: 0.7 % — SIGNIFICANT CHANGE UP (ref 0–0.9)
LYMPHOCYTES # BLD AUTO: 2.12 K/UL — SIGNIFICANT CHANGE UP (ref 1–3.3)
LYMPHOCYTES # BLD AUTO: 30.8 % — SIGNIFICANT CHANGE UP (ref 13–44)
MCHC RBC-ENTMCNC: 31.8 PG — SIGNIFICANT CHANGE UP (ref 27–34)
MCHC RBC-ENTMCNC: 32.4 GM/DL — SIGNIFICANT CHANGE UP (ref 32–36)
MCV RBC AUTO: 98.2 FL — SIGNIFICANT CHANGE UP (ref 80–100)
MONOCYTES # BLD AUTO: 0.55 K/UL — SIGNIFICANT CHANGE UP (ref 0–0.9)
MONOCYTES NFR BLD AUTO: 8 % — SIGNIFICANT CHANGE UP (ref 2–14)
NEUTROPHILS # BLD AUTO: 4.04 K/UL — SIGNIFICANT CHANGE UP (ref 1.8–7.4)
NEUTROPHILS NFR BLD AUTO: 58.7 % — SIGNIFICANT CHANGE UP (ref 43–77)
NRBC # BLD: 0 /100 WBCS — SIGNIFICANT CHANGE UP (ref 0–0)
PLATELET # BLD AUTO: 359 K/UL — SIGNIFICANT CHANGE UP (ref 150–400)
POTASSIUM SERPL-MCNC: 5 MMOL/L — SIGNIFICANT CHANGE UP (ref 3.5–5.3)
POTASSIUM SERPL-SCNC: 5 MMOL/L — SIGNIFICANT CHANGE UP (ref 3.5–5.3)
PROT SERPL-MCNC: 6.9 G/DL — SIGNIFICANT CHANGE UP (ref 6–8.3)
RBC # BLD: 3.99 M/UL — SIGNIFICANT CHANGE UP (ref 3.8–5.2)
RBC # FLD: 12.9 % — SIGNIFICANT CHANGE UP (ref 10.3–14.5)
SODIUM SERPL-SCNC: 133 MMOL/L — LOW (ref 135–145)
WBC # BLD: 6.88 K/UL — SIGNIFICANT CHANGE UP (ref 3.8–10.5)
WBC # FLD AUTO: 6.88 K/UL — SIGNIFICANT CHANGE UP (ref 3.8–10.5)

## 2023-07-27 PROCEDURE — 99232 SBSQ HOSP IP/OBS MODERATE 35: CPT

## 2023-07-27 PROCEDURE — 99233 SBSQ HOSP IP/OBS HIGH 50: CPT

## 2023-07-27 RX ORDER — TIZANIDINE 4 MG/1
2 TABLET ORAL
Refills: 0 | Status: DISCONTINUED | OUTPATIENT
Start: 2023-07-27 | End: 2023-08-01

## 2023-07-27 RX ADMIN — ENOXAPARIN SODIUM 40 MILLIGRAM(S): 100 INJECTION SUBCUTANEOUS at 05:56

## 2023-07-27 RX ADMIN — PANTOPRAZOLE SODIUM 40 MILLIGRAM(S): 20 TABLET, DELAYED RELEASE ORAL at 05:57

## 2023-07-27 RX ADMIN — GABAPENTIN 300 MILLIGRAM(S): 400 CAPSULE ORAL at 12:41

## 2023-07-27 RX ADMIN — TIZANIDINE 2 MILLIGRAM(S): 4 TABLET ORAL at 14:14

## 2023-07-27 RX ADMIN — Medication 75 MICROGRAM(S): at 05:57

## 2023-07-27 RX ADMIN — Medication 975 MILLIGRAM(S): at 21:38

## 2023-07-27 RX ADMIN — LACOSAMIDE 150 MILLIGRAM(S): 50 TABLET ORAL at 05:56

## 2023-07-27 RX ADMIN — GABAPENTIN 400 MILLIGRAM(S): 400 CAPSULE ORAL at 21:37

## 2023-07-27 RX ADMIN — POLYETHYLENE GLYCOL 3350 17 GRAM(S): 17 POWDER, FOR SOLUTION ORAL at 21:35

## 2023-07-27 RX ADMIN — ESCITALOPRAM OXALATE 10 MILLIGRAM(S): 10 TABLET, FILM COATED ORAL at 18:01

## 2023-07-27 RX ADMIN — LACOSAMIDE 150 MILLIGRAM(S): 50 TABLET ORAL at 17:59

## 2023-07-27 RX ADMIN — SENNA PLUS 2 TABLET(S): 8.6 TABLET ORAL at 21:36

## 2023-07-27 RX ADMIN — DIVALPROEX SODIUM 500 MILLIGRAM(S): 500 TABLET, DELAYED RELEASE ORAL at 05:56

## 2023-07-27 RX ADMIN — MEMANTINE HYDROCHLORIDE 5 MILLIGRAM(S): 10 TABLET ORAL at 18:01

## 2023-07-27 RX ADMIN — MEMANTINE HYDROCHLORIDE 5 MILLIGRAM(S): 10 TABLET ORAL at 08:18

## 2023-07-27 RX ADMIN — Medication 975 MILLIGRAM(S): at 22:20

## 2023-07-27 RX ADMIN — DIVALPROEX SODIUM 750 MILLIGRAM(S): 500 TABLET, DELAYED RELEASE ORAL at 18:00

## 2023-07-27 RX ADMIN — TIZANIDINE 4 MILLIGRAM(S): 4 TABLET ORAL at 21:36

## 2023-07-27 NOTE — PROGRESS NOTE ADULT - SUBJECTIVE AND OBJECTIVE BOX
HPI:  38 year old female w/ PMHx hypothyroidism who was admitted to [facility] 6/6 after being found on floor at home, with CT revealing large right-sided ICH. She required emergent right frontal craniectomy for decompression and EVD placement. EVD subsequently removed 6/16. Angio negative. Patient underwent G tube placement 6/20. Hospital course notable for fluctuating leukocytosis and high-grade temperatures with no identified source of infection including in urine, blood, CSF. Now admitted for multidisciplinary rehab. (26 Jun 2023 13:08)    SUBJECTIVE: No acute overnight events. Patient seen and examined by bedside with Aunt by bedside. Slept through the night. Continues to be emotional labile at times. No complaints of new numbness/tingling/weakness. No CP/SOB/NV. Working hard in therapies and improving functionally. OT mentions she complains of left arm pain during therapy.     Vital Signs Last 24 Hrs  T(C): 36.8 (27 Jul 2023 08:22), Max: 36.9 (26 Jul 2023 19:28)  T(F): 98.2 (27 Jul 2023 08:22), Max: 98.4 (26 Jul 2023 19:28)  HR: 83 (27 Jul 2023 08:22) (82 - 83)  BP: 121/80 (27 Jul 2023 08:22) (121/80 - 126/88)  BP(mean): --  RR: 16 (27 Jul 2023 08:22) (16 - 16)  SpO2: 96% (27 Jul 2023 08:22) (96% - 96%)    Parameters below as of 27 Jul 2023 08:22  Patient On (Oxygen Delivery Method): room air        REVIEW OF SYMPTOMS  Neurological deficits    PHYSICAL EXAM  Constitutional - NAD, slept well   HEENT - Right frontal hemicraniectomy incision site clean dry and intact   Chest - Breathing comfortably on RA   Cardiovascular - RRR,  warm well perfused  Abdomen - BS+  Extremities - No edema, No calf tenderness   Neurologic Exam -                    Cognitive - Awake, Alert, AAO to self, place, date, year, situation     Communication - Fluent, No dysarthria,       Motor  Left hemiparesis. Right side 5/5.      Sensory - Impaired to left UE and LE      Coordination - Impaired.   Spasticity-- left shoulder abduction 3, left shoulder flexion 3, left finger flexion 1   Psychiatric - pleasant       LABS:  cret                        12.7   6.88  )-----------( 359      ( 27 Jul 2023 06:45 )             39.2     07-27    133<L>  |  100  |  8   ----------------------------<  97  5.0   |  22  |  0.31<L>    Ca    9.4      27 Jul 2023 06:45    TPro  6.9  /  Alb  2.6<L>  /  TBili  0.3  /  DBili  x   /  AST  31  /  ALT  24  /  AlkPhos  51  07-27            RADIOLOGY:  Reviewed       MEDICATIONS  (STANDING):  divalproex Sprinkle 500 milliGRAM(s) Oral <User Schedule>  divalproex Sprinkle 750 milliGRAM(s) Oral <User Schedule>  enoxaparin Injectable 40 milliGRAM(s) SubCutaneous every 24 hours  escitalopram 10 milliGRAM(s) Oral <User Schedule>  gabapentin 300 milliGRAM(s) Oral <User Schedule>  gabapentin 400 milliGRAM(s) Oral at bedtime  lacosamide 150 milliGRAM(s) Oral two times a day  levothyroxine 75 MICROGram(s) Oral daily  lidocaine   4% Patch 1 Patch Transdermal <User Schedule>  pantoprazole   Suspension 40 milliGRAM(s) Oral daily  polyethylene glycol 3350 17 Gram(s) Oral at bedtime  senna 2 Tablet(s) Oral at bedtime  tiZANidine 4 milliGRAM(s) Oral at bedtime  traZODone 75 milliGRAM(s) Oral at bedtime    MEDICATIONS  (PRN):  acetaminophen     Tablet .. 975 milliGRAM(s) Oral every 6 hours PRN Mild Pain (1 - 3), Moderate Pain (4 - 6), Severe Pain (7 - 10)  bacitracin   Ointment 1 Application(s) Topical three times a day PRN scalp irritation     HPI:  38 year old female w/ PMHx hypothyroidism who was admitted to [facility] 6/6 after being found on floor at home, with CT revealing large right-sided ICH. She required emergent right frontal craniectomy for decompression and EVD placement. EVD subsequently removed 6/16. Angio negative. Patient underwent G tube placement 6/20. Hospital course notable for fluctuating leukocytosis and high-grade temperatures with no identified source of infection including in urine, blood, CSF. Now admitted for multidisciplinary rehab. (26 Jun 2023 13:08)    SUBJECTIVE: No acute overnight events. Patient seen and examined by bedside with Aunt by bedside. Slept through the night. Continues to be emotional labile at times. No complaints of new numbness/tingling/weakness. No CP/SOB/NV. Working hard in therapies and improving functionally. OT mentions she complains of left arm pain during therapy and will become emotional and irritable in therapy at times and try to hit OT. Therapists' note that this behavior has been off and on.  Pt. seems to be calmer last few days on rounds.  Pt. urinating well as per nursing-- clear, no odor.     Vital Signs Last 24 Hrs  T(C): 36.8 (27 Jul 2023 08:22), Max: 36.9 (26 Jul 2023 19:28)  T(F): 98.2 (27 Jul 2023 08:22), Max: 98.4 (26 Jul 2023 19:28)  HR: 83 (27 Jul 2023 08:22) (82 - 83)  BP: 121/80 (27 Jul 2023 08:22) (121/80 - 126/88)  BP(mean): --  RR: 16 (27 Jul 2023 08:22) (16 - 16)  SpO2: 96% (27 Jul 2023 08:22) (96% - 96%)    Parameters below as of 27 Jul 2023 08:22  Patient On (Oxygen Delivery Method): room air        REVIEW OF SYMPTOMS  Neurological deficits    PHYSICAL EXAM  Constitutional - NAD, slept well   HEENT - Right frontal hemicraniectomy incision site clean dry and intact   Chest - Breathing comfortably on RA   Cardiovascular - RRR,  warm well perfused  Abdomen - BS+  Extremities - No edema, No calf tenderness   Neurologic Exam -                    Cognitive - Awake, Alert, AAO to self, place, date, year, situation     Communication - Fluent, No dysarthria,       Motor  Left hemiparesis. Right side 5/5.      Sensory - Impaired to left UE and LE      Coordination - Impaired.   Spasticity-- left shoulder abduction 3, left shoulder flexion 3, left finger flexion 1   Psychiatric - pleasant       LABS:  cret                        12.7   6.88  )-----------( 359      ( 27 Jul 2023 06:45 )             39.2     07-27    133<L>  |  100  |  8   ----------------------------<  97  5.0   |  22  |  0.31<L>    Ca    9.4      27 Jul 2023 06:45    TPro  6.9  /  Alb  2.6<L>  /  TBili  0.3  /  DBili  x   /  AST  31  /  ALT  24  /  AlkPhos  51  07-27            RADIOLOGY:  Reviewed       MEDICATIONS  (STANDING):  divalproex Sprinkle 500 milliGRAM(s) Oral <User Schedule>  divalproex Sprinkle 750 milliGRAM(s) Oral <User Schedule>  enoxaparin Injectable 40 milliGRAM(s) SubCutaneous every 24 hours  escitalopram 10 milliGRAM(s) Oral <User Schedule>  gabapentin 300 milliGRAM(s) Oral <User Schedule>  gabapentin 400 milliGRAM(s) Oral at bedtime  lacosamide 150 milliGRAM(s) Oral two times a day  levothyroxine 75 MICROGram(s) Oral daily  lidocaine   4% Patch 1 Patch Transdermal <User Schedule>  pantoprazole   Suspension 40 milliGRAM(s) Oral daily  polyethylene glycol 3350 17 Gram(s) Oral at bedtime  senna 2 Tablet(s) Oral at bedtime  tiZANidine 4 milliGRAM(s) Oral at bedtime  traZODone 75 milliGRAM(s) Oral at bedtime    MEDICATIONS  (PRN):  acetaminophen     Tablet .. 975 milliGRAM(s) Oral every 6 hours PRN Mild Pain (1 - 3), Moderate Pain (4 - 6), Severe Pain (7 - 10)  bacitracin   Ointment 1 Application(s) Topical three times a day PRN scalp irritation

## 2023-07-27 NOTE — PROGRESS NOTE ADULT - ASSESSMENT
37 y/o F w/ PMhx hypothyroidism admitted to [facility] 6/6 after being found unresponsive, found to have large right frontal IPH w/ IVH and hydrocephalus. Now s/p R krishna craniectomy for evacuation on 6/6. PEG placed 6/20. Now admitted for multidisciplinary rehab- pt/ot/dvt ppx    #IPH with IVH, R frontal lobe  - Amantadine, trazodone per rehab   - Antiepileptic regimen: depakote, continue Vimpat 150 mg BID.   - Helmet when OOB    #Normocytic anemia  - H/H stable, monitor for Hb < 7  - Iron/B12/folate normal     #HTN  - Lisinopril held due to hypotension    #Dysphagia s/p PEG  - s/p self removal of PEG tube, tolerating PO diet, PEG not replaced  - GI signed off, monitor tolerating PO diet    #Hypothyroidism  - Continue synthroid     #DVT ppx - Lovenox    will follow  d/w dr. dale

## 2023-07-27 NOTE — PROGRESS NOTE ADULT - ASSESSMENT
ASSESSMENT/PLAN:  38y Female h/o hypothyroid with functional deficits after right frontal IPHs/p right hemicraniectomy.     #Right Hemicraniectomy and EVD on 6/6  - Communication with patients family, neuroplastic surgeon Dr. Sy ([phone]) and PM&R team has taken place.   - neuroendocrine workup ordered, and nml   - cranioplasty appointment on 7/18 with Dr. Sy- due to healing of crani site would tentatively recommend cranioplasty in September 2023, discussed transfer from [facility] to cranioplasty appointment once discharge, patient's family aware. They are anxious about [facility]. I advised and to speak to  supervisor.   - CTH with 3D reconstruction completed 7/24 for implant planning.     #hx of seizure  #agitation   -Briviact 50mg BID was discontinued 7/14.   -Depakote 500mg in AM, 750mg in evening   -Cont [id number] BID      -Ordered Valproic acid and ammonia level, which is wnl.    #Anxiety/depression/cognitive deficit   - Lexapro 10mg daily --> anxiety is mildly improving.   - Memantine 5mg BID-> monitor    #Hyponatremia  -Na 136->136->135->133->136 (7/25)   - Improved     #Pain/Neuropathic   #Insomnia  -Botox to Lpec and Lbicep 7/14-tolerated procedure well. Therapist reports increase in ROM with pain.   -cont. Tylenol PRN  -cont Trazodone 75mg at bedtime  -continue Gabapentin to 300mg at 2pm, continue with gabapentin 400mg at bedtime,   -cont Tizanidine 4mg at bedtime. Will start 2mg at 3pm daily.   -lidocaine patch-> discontinue (7/26) patient not using     #dysphagia s/p PEG   -PEG tube came out on 7/4, did not replace   -calorie count sufficient, and tolerating PO diet.      #constipation  -Senna at bedtime    -Miralax at bedtime     #hx of anemia    -H/H stable     #hx of HTN --BP soft  -Monitor    #DVT ppx   - cont lovenox    IDT 7/27:  Nursing: incontinent B/B  SLP: on soft bite sized with thin liquids.  Mild language deficits impacted by cognitive deficits, severe cognitive deficits-- limited by attention, memory, reasoning deficits.  Mild Dysarthria; Ongoing education to family  OT: Mod assist–eating/grooming; Tot assist–bathing and dressing & transfers; Improved sitting balance and progress  PT: Mod -Max A--Bed mobility: Mod --Max A--squat pivot transfer & Ambulating by OLGA mooney 20ft--Max A and WCf.  WC tot A.   Goals: 1 Pivot and transfers, bed mobility mod-A, 2 attend to structured tasks for 30 minutes   Dispo: [facility] once approved.               ASSESSMENT/PLAN:  38y Female h/o hypothyroid with functional deficits after right frontal IPHs/p right hemicraniectomy.     #Right Hemicraniectomy and EVD on 6/6  - Communication with patients family, neuroplastic surgeon Dr. Sy ([phone]) and PM&R team has taken place.   - neuroendocrine workup ordered, and nml   - cranioplasty appointment on 7/18 with Dr. Sy- due to healing of crani site would tentatively recommend cranioplasty in September 2023, discussed transfer from [facility] to cranioplasty appointment once discharge, patient's family aware. They are anxious about [facility]. I advised and to speak to  supervisor.   - CTH with 3D reconstruction completed 7/24 for implant planning.     # seizure Disorder  #agitation   -Briviact 50mg BID was discontinued 7/14.   -Depakote 500mg in AM, 750mg in evening   -Cont [id number] BID      -Valproic acid and ammonia level, 7/20 were normal.  --Will repeat tomorrow AM as therapists have noted concern about intermittent behavior issues.      #Anxiety/depression/cognitive deficit   - Cont. Lexapro 10mg in the evening  - cont. Memantine 5mg BID-> monitor    #Hyponatremia  -Na 136->136->135->133->136 (7/25) -->133 today   - REpeat BMP in AM    #Pain/Neuropathic   #Insomnia  -Botox to Lpec and Lbicep 7/14-tolerated procedure well. Therapist reports increase in ROM with pain.   -cont. Tylenol PRN  -cont Trazodone 75mg at bedtime  -continue Gabapentin to 300mg at 2pm, continue with gabapentin 400mg at bedtime,   -cont Tizanidine 4mg at bedtime. Will start 2mg at 3pm daily.   Pt. limited by left UE spasticity that may be contributing to lability and behavior issues in OT--   -lidocaine patch-> discontinue (7/26) patient not using     #dysphagia s/p PEG   -PEG tube came out on 7/4, did not replace   -calorie count sufficient, and tolerating PO diet.      #constipation  -Senna at bedtime    -Miralax at bedtime     #hx of anemia    -H/H stable     #hx of HTN --BP soft  -Monitor    #DVT ppx   - cont lovenox    IDT 7/27:  Nursing: incontinent B/B  SLP: on soft bite sized with thin liquids.  Mild language deficits impacted by cognitive deficits, severe cognitive deficits-- limited by attention, memory, reasoning deficits.  Mild Dysarthria; Ongoing education to family  OT: Mod assist–eating/grooming; Tot assist–bathing and dressing & transfers; Improved sitting balance and progress  PT: Mod -Max A--Bed mobility: Mod --Max A--squat pivot transfer & Ambulating by  rail 20ft--Max A and WCf.  WC tot A.   Goals: 1 Pivot and transfers, bed mobility mod-A, 2 attend to structured tasks for 30 minutes   Dispo: [facility] once approved.               ASSESSMENT/PLAN:  38y Female h/o hypothyroid with functional deficits after right frontal IPHs/p right hemicraniectomy.     #Right Hemicraniectomy and EVD on 6/6  - Communication with patients family, neuroplastic surgeon Dr. Sy ([phone]) and PM&R team has taken place.   - neuroendocrine workup ordered, and nml   - cranioplasty appointment on 7/18 with Dr. Sy- due to healing of crani site would tentatively recommend cranioplasty in September 2023, discussed transfer from [facility] to cranioplasty appointment once discharge, patient's family aware. They are anxious about [facility]. I advised and to speak to  supervisor.   - CTH with 3D reconstruction completed 7/24 for implant planning.     # seizure Disorder  #agitation   -Briviact 50mg BID was discontinued 7/14.   -Depakote 500mg in AM, 750mg in evening   -Cont [id number] BID      -Valproic acid and ammonia level, 7/20 were normal.  --Will repeat tomorrow AM as therapists have noted concern about intermittent behavior issues.      #Anxiety/depression/cognitive deficit   - Cont. Lexapro 10mg in the evening  - cont. Memantine 5mg BID-> monitor    #Hyponatremia  -Na 136->136->135->133->136 (7/25) -->133 today   - REpeat BMP in AM    #Pain/Neuropathic   #Insomnia  -Botox to Lpec and Lbicep 7/14-tolerated procedure well. Therapist reports increase in ROM with pain.   -cont. Tylenol PRN  -cont Trazodone 75mg at bedtime  -continue Gabapentin to 300mg at 2pm, continue with gabapentin 400mg at bedtime,   -cont Tizanidine 4mg at bedtime. Will start 2mg at 3pm daily.   Pt. limited by left UE spasticity that may be contributing to lability and behavior issues in OT--   -lidocaine patch-> discontinue (7/26) patient not using     #dysphagia s/p PEG   -PEG tube came out on 7/4, did not replace   -calorie count sufficient, and tolerating PO diet.      #constipation  -Senna at bedtime    -Miralax at bedtime     #hx of anemia    -H/H stable     #hx of HTN --BP soft  -Monitor    #DVT ppx   - cont lovenox    IDT 7/27:  Nursing: incontinent B/B  SLP: on soft bite sized with thin liquids.  Mild language deficits impacted by cognitive deficits, severe cognitive deficits-- limited by attention, memory, reasoning deficits.  Not on track for goals--barriers lability.  Attends to Structured tasks for 10min.    OT: Mod assist–eating/grooming, toilet transfers; Tot assist–bathing and dressing & toileting;  PT: Mod -Max A--Bed mobility: Max A--squat pivot transfer & Ambulating by HW rail 20ft--Max A and WCf.  WC tot A.   Goals: 1 Pivot and transfers, bed mobility mod-A, 2 attend to structured tasks for 30 minutes   Dispo: [facility] ASAP

## 2023-07-27 NOTE — PROGRESS NOTE ADULT - TIME BILLING
Review and preparation to see patient, examination and assessment of patient, obtaining nursing subjective report as pt. confused,  Discussion of managment with following consultants: Hospitalist, neurosugery.     Discussion with family, ...., to update on patient status, rehab plan of care, progress in therapy, general prognosis, ELOS, discharge planning.   Botox procedure.
Review and preparation to see patient, examination and assessment of patient, obtaining nursing subjective report as pt. confused,  Discussion of managment with following consultants: Hospitalist, neurosugery.     Discussion with family, ...., to update on patient status, rehab plan of care, progress in therapy, general prognosis, ELOS, discharge planning.   Botox procedure.
Review and preparation to see patient, examination and assessment of patient, obtaining nursing subjective report as pt. confused,  Discussion of management with following consultants: Hospitalist, [facility] facility ,  Discussion of rehabilitation plan of care with SW and SW supervisor.  Review of labs.   Discussion with family,  to update on patient status, rehab plan of care, and discharge planning. documentation time.
Review and preparation to see patient, examination and assessment of patient, obtaining nursing subjective report as pt. confused,  Discussion of management with following consultants: Hospitalist, Neurosurgery.,  Discussion with family, pt's mother...., to update on patient status, rehab plan of care, progress in therapy, general prognosis, ELOS, discharge planning.  All questions answered.  documentation time.
Review and preparation to see patient, examination and assessment of patient, obtaining nursing subjective report as pt. confused,  Discussion of management with following consultants: Hospitalist, Gastroenterology , call resident.   Discussion with family, pt's mother and aunt, to update on patient status, rehab plan of care, and medications.
Review and preparation to see patient, examination and assessment of patient, obtaining nursing subjective report as pt. confused,  Discussion of management with following consultants: Hospitalist, ....  ,  Discussion of rehabilitation plan of care during team meeting with SW, Speech, OT, PT and nursing.  Review of labs. f/u on pt. during the day.  Discussion with family, father at bedside, to address questions and update on patient status,
Review and preparation to see patient, examination and assessment of patient, obtaining nursing subjective report as pt. confused,  Discussion of management with following consultants: Hospitalist, Neurosurgery, radiology,    Review of labs  Discussion with family, ...., to update on patient status, rehab plan of care, progress in therapy, general prognosis, ELOS, discharge planning. documentation time.
Review and preparation to see patient, examination and assessment of patient, obtaining nursing & family subjective report as pt. confused,  Discussion of management with following consultants: Hospitalist, Neurosurgery ,  Discussion of rehabilitation plan of care during team meeting with SW, Speech, OT, PT and nursing.  Review of labs--  Discussion with family, .wife..., to update on patient status, rehab plan of care, Botox risks/benefits, and discharge planning.

## 2023-07-27 NOTE — PROGRESS NOTE ADULT - SUBJECTIVE AND OBJECTIVE BOX
38y old  female who presents with a chief complaint of IPH     Patient seen and examined. No acute events overnight. resting in bed, c/o feeling tired and exhausted.     Vital Signs Last 24 Hrs  T(C): 36.8 (27 Jul 2023 08:22), Max: 36.9 (26 Jul 2023 19:28)  T(F): 98.2 (27 Jul 2023 08:22), Max: 98.4 (26 Jul 2023 19:28)  HR: 83 (27 Jul 2023 08:22) (82 - 83)  BP: 121/80 (27 Jul 2023 08:22) (121/80 - 126/88)  BP(mean): --  RR: 16 (27 Jul 2023 08:22) (16 - 16)  SpO2: 96% (27 Jul 2023 08:22) (96% - 96%)    Parameters below as of 27 Jul 2023 08:22  Patient On (Oxygen Delivery Method): room air      GENERAL- NAD  EAR/NOSE/MOUTH/THROAT -  MMM  EYES- SERGIO, conjunctiva and Sclera clear  NECK- supple  RESPIRATORY-  clear to auscultation bilaterally, non laboured breathing  CARDIOVASCULAR - SIS2, RRR  GI - soft NT BS present  EXTREMITIES- no pedal edema  NEUROLOGY- left sided weakness, right craniectomy                12.7                 133  | 22   | 8            6.88  >-----------< 359     ------------------------< 97                    39.2                 5.0  | 100  | 0.31                                         Ca 9.4   Mg x     Ph x      Urinalysis Basic - ( 27 Jul 2023 06:45 )    Color: x / Appearance: x / SG: x / pH: x  Gluc: 97 mg/dL / Ketone: x  / Bili: x / Urobili: x   Blood: x / Protein: x / Nitrite: x   Leuk Esterase: x / RBC: x / WBC x   Sq Epi: x / Non Sq Epi: x / Bacteria: x        MEDICATIONS  (STANDING):  divalproex Sprinkle 750 milliGRAM(s) Oral <User Schedule>  divalproex Sprinkle 500 milliGRAM(s) Oral <User Schedule>  enoxaparin Injectable 40 milliGRAM(s) SubCutaneous every 24 hours  escitalopram 10 milliGRAM(s) Oral <User Schedule>  gabapentin 300 milliGRAM(s) Oral <User Schedule>  gabapentin 400 milliGRAM(s) Oral at bedtime  lacosamide 150 milliGRAM(s) Oral two times a day  levothyroxine 75 MICROGram(s) Oral daily  memantine 5 milliGRAM(s) Oral <User Schedule>  pantoprazole    Tablet 40 milliGRAM(s) Oral before breakfast  polyethylene glycol 3350 17 Gram(s) Oral at bedtime  senna 2 Tablet(s) Oral at bedtime  tiZANidine 4 milliGRAM(s) Oral at bedtime  traZODone 75 milliGRAM(s) Oral at bedtime    MEDICATIONS  (PRN):  acetaminophen     Tablet .. 975 milliGRAM(s) Oral every 6 hours PRN Mild Pain (1 - 3), Moderate Pain (4 - 6), Severe Pain (7 - 10)  bacitracin   Ointment 1 Application(s) Topical three times a day PRN scalp irritation

## 2023-07-27 NOTE — PROGRESS NOTE ADULT - ATTENDING COMMENTS
Pt. seen with resident.  Agree with documentation above as per resident with amendments made as appropriate. Patient medically stable. Making progress towards rehab goals.     right ICH--   cont Tizanidine 4mg at bedtime. Will start 2mg at 3pm daily.   Pt. limited by left UE spasticity that may be contributing to lability and behavior issues in OT--    OT mentions she complains of left arm pain during therapy and will become emotional and irritable in therapy at times and try to hit OT. Therapists' note that this behavior has been off and on.  Pt. seems to be calmer last few days on rounds.  Pt. urinating well as per nursing-- clear, no odor.  Limited suspicion for UTI-- would need to SC for sample which will be uncomfortable for pt.  will monitor.  Labs -- no leukocytosis or anemia.   Mild hyponatremia-- f/u bmp in AM  --repeat VPA and ammonia in AM given ? concern about behavior and lability. Pt. seen with resident.  Agree with documentation above as per resident with amendments made as appropriate. Patient medically stable. Making progress towards rehab goals.     right ICH--   cont Tizanidine 4mg at bedtime. Will start 2mg at 3pm daily.   Pt. limited by left UE spasticity that may be contributing to lability and behavior issues in OT--    OT mentions she complains of left arm pain during therapy and will become emotional and irritable in therapy at times and try to hit OT. Therapists' note that this behavior has been off and on.  Pt. seems to be calmer last few days on rounds.  Pt. urinating well as per nursing-- clear, no odor.  Limited suspicion for UTI-- would need to SC for sample which will be uncomfortable for pt.  will monitor.  Labs -- no leukocytosis or anemia.   Mild hyponatremia-- f/u bmp in AM  --repeat VPA and ammonia in AM given ? concern about behavior and lability.    --will monitor.    CT head on 7/24 was stable--

## 2023-07-28 LAB
AMMONIA BLD-MCNC: 21 UMOL/L — SIGNIFICANT CHANGE UP (ref 11–55)
ANION GAP SERPL CALC-SCNC: 6 MMOL/L — SIGNIFICANT CHANGE UP (ref 5–17)
BUN SERPL-MCNC: 9 MG/DL — SIGNIFICANT CHANGE UP (ref 7–23)
CALCIUM SERPL-MCNC: 9.5 MG/DL — SIGNIFICANT CHANGE UP (ref 8.4–10.5)
CHLORIDE SERPL-SCNC: 101 MMOL/L — SIGNIFICANT CHANGE UP (ref 96–108)
CO2 SERPL-SCNC: 30 MMOL/L — SIGNIFICANT CHANGE UP (ref 22–31)
CREAT SERPL-MCNC: 0.4 MG/DL — LOW (ref 0.5–1.3)
EGFR: 129 ML/MIN/1.73M2 — SIGNIFICANT CHANGE UP
GLUCOSE SERPL-MCNC: 101 MG/DL — HIGH (ref 70–99)
POTASSIUM SERPL-MCNC: 4.2 MMOL/L — SIGNIFICANT CHANGE UP (ref 3.5–5.3)
POTASSIUM SERPL-SCNC: 4.2 MMOL/L — SIGNIFICANT CHANGE UP (ref 3.5–5.3)
SODIUM SERPL-SCNC: 137 MMOL/L — SIGNIFICANT CHANGE UP (ref 135–145)
VALPROATE SERPL-MCNC: 49 UG/ML — LOW (ref 50–100)

## 2023-07-28 PROCEDURE — 99232 SBSQ HOSP IP/OBS MODERATE 35: CPT

## 2023-07-28 RX ADMIN — LACOSAMIDE 150 MILLIGRAM(S): 50 TABLET ORAL at 05:27

## 2023-07-28 RX ADMIN — Medication 975 MILLIGRAM(S): at 18:00

## 2023-07-28 RX ADMIN — Medication 975 MILLIGRAM(S): at 09:30

## 2023-07-28 RX ADMIN — MEMANTINE HYDROCHLORIDE 5 MILLIGRAM(S): 10 TABLET ORAL at 08:41

## 2023-07-28 RX ADMIN — LACOSAMIDE 150 MILLIGRAM(S): 50 TABLET ORAL at 17:31

## 2023-07-28 RX ADMIN — Medication 975 MILLIGRAM(S): at 08:41

## 2023-07-28 RX ADMIN — GABAPENTIN 400 MILLIGRAM(S): 400 CAPSULE ORAL at 20:08

## 2023-07-28 RX ADMIN — PANTOPRAZOLE SODIUM 40 MILLIGRAM(S): 20 TABLET, DELAYED RELEASE ORAL at 05:26

## 2023-07-28 RX ADMIN — GABAPENTIN 300 MILLIGRAM(S): 400 CAPSULE ORAL at 13:30

## 2023-07-28 RX ADMIN — MEMANTINE HYDROCHLORIDE 5 MILLIGRAM(S): 10 TABLET ORAL at 17:17

## 2023-07-28 RX ADMIN — DIVALPROEX SODIUM 500 MILLIGRAM(S): 500 TABLET, DELAYED RELEASE ORAL at 05:26

## 2023-07-28 RX ADMIN — DIVALPROEX SODIUM 750 MILLIGRAM(S): 500 TABLET, DELAYED RELEASE ORAL at 17:21

## 2023-07-28 RX ADMIN — Medication 75 MICROGRAM(S): at 05:27

## 2023-07-28 RX ADMIN — TIZANIDINE 4 MILLIGRAM(S): 4 TABLET ORAL at 20:08

## 2023-07-28 RX ADMIN — Medication 975 MILLIGRAM(S): at 17:18

## 2023-07-28 RX ADMIN — TIZANIDINE 2 MILLIGRAM(S): 4 TABLET ORAL at 15:12

## 2023-07-28 RX ADMIN — ENOXAPARIN SODIUM 40 MILLIGRAM(S): 100 INJECTION SUBCUTANEOUS at 05:26

## 2023-07-28 RX ADMIN — ESCITALOPRAM OXALATE 10 MILLIGRAM(S): 10 TABLET, FILM COATED ORAL at 17:21

## 2023-07-28 RX ADMIN — Medication 10 MILLIGRAM(S): at 08:52

## 2023-07-28 NOTE — PROGRESS NOTE ADULT - ASSESSMENT
ASSESSMENT/PLAN:  38y Female h/o hypothyroid with functional deficits after right frontal IPHs/p right hemicraniectomy.     #Right Hemicraniectomy and EVD on 6/6  - Communication with patients family, neuroplastic surgeon Dr. Sy ([phone]) and PM&R team has taken place.   - neuroendocrine workup ordered, and nml   - cranioplasty appointment on 7/18 with Dr. Sy- due to healing of crani site would tentatively recommend cranioplasty in September 2023, discussed transfer from [facility] to cranioplasty appointment once discharge, patient's family aware. They are anxious about [facility]. I advised and to speak to  supervisor.   - CTH with 3D reconstruction completed 7/24 for implant planning.     # seizure Disorder  #agitation   -Briviact 50mg BID was discontinued 7/14.   -Depakote 500mg in AM, 750mg in evening   -Cont [id number] BID      -Valproic acid and ammonia level, 7/20 were normal.  --Will repeat tomorrow AM as therapists have noted concern about intermittent behavior issues.      #Anxiety/depression/cognitive deficit   - Cont. Lexapro 10mg in the evening  - cont. Memantine 5mg BID-> monitor    #Hyponatremia  -Na 136->136->135->133->136 (7/25) -->133 today   - REpeat BMP in AM    #Pain/Neuropathic   #Insomnia  -Botox to Lpec and Lbicep 7/14-tolerated procedure well. Therapist reports increase in ROM with pain.   -cont. Tylenol PRN  -cont Trazodone 75mg at bedtime  -continue Gabapentin to 300mg at 2pm, continue with gabapentin 400mg at bedtime,   -cont Tizanidine 4mg at bedtime. Will start 2mg at 3pm daily.   Pt. limited by left UE spasticity that may be contributing to lability and behavior issues in OT--   -lidocaine patch-> discontinue (7/26) patient not using     #dysphagia s/p PEG   -PEG tube came out on 7/4, did not replace   -calorie count sufficient, and tolerating PO diet.      #constipation  -Senna at bedtime    -Miralax at bedtime     #hx of anemia    -H/H stable     #hx of HTN --BP soft  -Monitor    #DVT ppx   - cont lovenox    IDT 7/27:  Nursing: incontinent B/B  SLP: on soft bite sized with thin liquids.  Mild language deficits impacted by cognitive deficits, severe cognitive deficits-- limited by attention, memory, reasoning deficits.  Not on track for goals--barriers lability.  Attends to Structured tasks for 10min.    OT: Mod assist–eating/grooming, toilet transfers; Tot assist–bathing and dressing & toileting;  PT: Mod -Max A--Bed mobility: Max A--squat pivot transfer & Ambulating by HW rail 20ft--Max A and WCf.  WC tot A.   Goals: 1 Pivot and transfers, bed mobility mod-A, 2 attend to structured tasks for 30 minutes   Dispo: [facility] ASAP             ASSESSMENT/PLAN:  38y Female h/o hypothyroid with functional deficits after right frontal IPHs/p right hemicraniectomy.     #Right Hemicraniectomy and EVD on 6/6  - Communication with patients family, neuroplastic surgeon Dr. Sy ([phone]) and PM&R team has taken place.   - neuroendocrine workup ordered, and nml   - cranioplasty appointment on 7/18 with Dr. Sy- due to healing of crani site would tentatively recommend cranioplasty in September 2023, discussed transfer from [facility] to cranioplasty appointment once discharge, patient's family aware. They are anxious about [facility]. I advised and to speak to  supervisor.   - CTH with 3D reconstruction completed 7/24 for implant planning.     # seizure Disorder  #agitation   -Briviact 50mg BID was discontinued 7/14.   -Depakote 500mg in AM, 750mg in evening   -Cont [id number] BID      -Valproic acid and ammonia level, 7/20 were normal.  --BMP on 7/28, wnl      #Anxiety/depression/cognitive deficit   - Cont. Lexapro 10mg in the evening  - cont. Memantine 5mg BID-> monitor    #Hyponatremia  -Na 136->136->135->133->136 (7/25) -->133 (7/27) --> 137 (7/28)  - today Na is wnl, continue to monitor    #Pain/Neuropathic   #Insomnia  -Botox to Lpec and Lbicep 7/14-tolerated procedure well. Therapist reports increase in ROM with pain.   -cont. Tylenol PRN  -cont Trazodone 75mg at bedtime  -continue Gabapentin to 300mg at 2pm, continue with gabapentin 400mg at bedtime,   -cont Tizanidine 4mg at bedtime. Continue 2mg at 3pm daily.    -Pt. limited by left UE spasticity that may be contributing to lability and behavior issues in OT  -lidocaine patch-> discontinue (7/26) patient not using     #dysphagia s/p PEG   -PEG tube came out on 7/4, did not replace   -calorie count sufficient, and tolerating PO diet.      #constipation  -Senna at bedtime    -Miralax at bedtime   -Give suppository PRN    #hx of anemia    -H/H stable     #hx of HTN --BP soft  -Monitor    #DVT ppx   - cont lovenox    IDT 7/27:  Nursing: incontinent B/B  SLP: on soft bite sized with thin liquids.  Mild language deficits impacted by cognitive deficits, severe cognitive deficits-- limited by attention, memory, reasoning deficits.  Not on track for goals--barriers lability.  Attends to Structured tasks for 10min.    OT: Mod assist–eating/grooming, toilet transfers; Tot assist–bathing and dressing & toileting;  PT: Mod -Max A--Bed mobility: Max A--squat pivot transfer & Ambulating by HW rail 20ft--Max A and WCf.  WC tot A.   Goals: 1 Pivot and transfers, bed mobility mod-A, 2 attend to structured tasks for 30 minutes   Dispo: [facility] ASAP             ASSESSMENT/PLAN:  38y Female h/o hypothyroid with functional deficits after right frontal IPHs/p right hemicraniectomy.     #Right Hemicraniectomy and EVD on 6/6  - Communication with patients family, neuroplastic surgeon Dr. Sy ([phone]) and PM&R team has taken place.   - neuroendocrine workup ordered, and nml   - cranioplasty appointment on 7/18 with Dr. Sy- due to healing of crani site would tentatively recommend cranioplasty in September 2023, - CTH with 3D reconstruction completed 7/24 for implant planning.     # seizure Disorder  #agitation   -Briviact 50mg BID was discontinued 7/14.   -Depakote 500mg in AM, 750mg in evening   -Cont [id number] BID      -Valproic acid and ammonia level, 7/28 were normal.  --BMP on 7/28, wnl      #Anxiety/depression/cognitive deficit   - Cont. Lexapro 10mg in the evening  - cont. Memantine 5mg BID-> monitor    #Hyponatremia  -Na 136->136->135->133->136 (7/25) -->133 (7/27) --> 137 (7/28)  - today Na is wnl, continue to monitor    #Pain/Neuropathic   #Insomnia  -Botox to Lpec and Lbicep 7/14-tolerated procedure well. Therapist reports increase in ROM with pain.   -cont. Tylenol PRN  -cont Trazodone 75mg at bedtime  -continue Gabapentin to 300mg at 2pm, continue with gabapentin 400mg at bedtime,   -cont Tizanidine 4mg at bedtime. Continue 2mg at 3pm daily.    -Pt. limited by left UE spasticity that may be contributing to lability and behavior issues in OT  -lidocaine patch-> discontinue (7/26) patient not using     #dysphagia s/p PEG   -PEG tube came out on 7/4, did not replace   -calorie count sufficient, and tolerating PO diet.      #constipation  -Senna at bedtime    -Miralax at bedtime   -Give suppository PRN    #hx of anemia    -H/H stable     #hx of HTN --BP soft  -Monitor    #DVT ppx   - cont lovenox    IDT 7/27:  Nursing: incontinent B/B  SLP: on soft bite sized with thin liquids.  Mild language deficits impacted by cognitive deficits, severe cognitive deficits-- limited by attention, memory, reasoning deficits.  Not on track for goals--barriers lability.  Attends to Structured tasks for 10min.    OT: Mod assist–eating/grooming, toilet transfers; Tot assist–bathing and dressing & toileting;  PT: Mod -Max A--Bed mobility: Max A--squat pivot transfer & Ambulating by HW rail 20ft--Max A and WCf.  WC tot A.   Goals: 1 Pivot and transfers, bed mobility mod-A, 2 attend to structured tasks for 30 minutes   Dispo: [facility] ASAP

## 2023-07-28 NOTE — PROGRESS NOTE ADULT - SUBJECTIVE AND OBJECTIVE BOX
HPI:  38 year old female w/ PMHx hypothyroidism who was admitted to [facility] 6/6 after being found on floor at home, with CT revealing large right-sided ICH. She required emergent right frontal craniectomy for decompression and EVD placement. EVD subsequently removed 6/16. Angio negative. Patient underwent G tube placement 6/20. Hospital course notable for fluctuating leukocytosis and high-grade temperatures with no identified source of infection including in urine, blood, CSF. Now admitted for multidisciplinary rehab. (26 Jun 2023 13:08)    SUBJECTIVE: No acute overnight events. Patient seen and examined by bedside with Aunt by bedside. Slept through the night. Continues to be emotional labile at times. No complaints of new numbness/tingling/weakness. No CP/SOB/NV. Working hard in therapies and improving functionally. OT mentions she complains of left arm pain during therapy and will become emotional and irritable in therapy at times and try to hit OT. Therapists' note that this behavior has been off and on.  Pt. seems to be calmer last few days on rounds.  Pt. urinating well as per nursing-- clear, no odor.     Vital Signs Last 24 Hrs  T(C): 36.8 (27 Jul 2023 08:22), Max: 36.9 (26 Jul 2023 19:28)  T(F): 98.2 (27 Jul 2023 08:22), Max: 98.4 (26 Jul 2023 19:28)  HR: 83 (27 Jul 2023 08:22) (82 - 83)  BP: 121/80 (27 Jul 2023 08:22) (121/80 - 126/88)  BP(mean): --  RR: 16 (27 Jul 2023 08:22) (16 - 16)  SpO2: 96% (27 Jul 2023 08:22) (96% - 96%)    Parameters below as of 27 Jul 2023 08:22  Patient On (Oxygen Delivery Method): room air        REVIEW OF SYMPTOMS  Neurological deficits    PHYSICAL EXAM  Constitutional - NAD, slept well   HEENT - Right frontal hemicraniectomy incision site clean dry and intact   Chest - Breathing comfortably on RA   Cardiovascular - RRR,  warm well perfused  Abdomen - BS+  Extremities - No edema, No calf tenderness   Neurologic Exam -                    Cognitive - Awake, Alert, AAO to self, place, date, year, situation     Communication - Fluent, No dysarthria,       Motor  Left hemiparesis. Right side 5/5.      Sensory - Impaired to left UE and LE      Coordination - Impaired.   Spasticity-- left shoulder abduction 3, left shoulder flexion 3, left finger flexion 1   Psychiatric - pleasant       LABS:  cret                        12.7   6.88  )-----------( 359      ( 27 Jul 2023 06:45 )             39.2     07-27    133<L>  |  100  |  8   ----------------------------<  97  5.0   |  22  |  0.31<L>    Ca    9.4      27 Jul 2023 06:45    TPro  6.9  /  Alb  2.6<L>  /  TBili  0.3  /  DBili  x   /  AST  31  /  ALT  24  /  AlkPhos  51  07-27            RADIOLOGY:  Reviewed       MEDICATIONS  (STANDING):  divalproex Sprinkle 500 milliGRAM(s) Oral <User Schedule>  divalproex Sprinkle 750 milliGRAM(s) Oral <User Schedule>  enoxaparin Injectable 40 milliGRAM(s) SubCutaneous every 24 hours  escitalopram 10 milliGRAM(s) Oral <User Schedule>  gabapentin 300 milliGRAM(s) Oral <User Schedule>  gabapentin 400 milliGRAM(s) Oral at bedtime  lacosamide 150 milliGRAM(s) Oral two times a day  levothyroxine 75 MICROGram(s) Oral daily  lidocaine   4% Patch 1 Patch Transdermal <User Schedule>  pantoprazole   Suspension 40 milliGRAM(s) Oral daily  polyethylene glycol 3350 17 Gram(s) Oral at bedtime  senna 2 Tablet(s) Oral at bedtime  tiZANidine 4 milliGRAM(s) Oral at bedtime  traZODone 75 milliGRAM(s) Oral at bedtime    MEDICATIONS  (PRN):  acetaminophen     Tablet .. 975 milliGRAM(s) Oral every 6 hours PRN Mild Pain (1 - 3), Moderate Pain (4 - 6), Severe Pain (7 - 10)  bacitracin   Ointment 1 Application(s) Topical three times a day PRN scalp irritation     HPI:  38 year old female w/ PMHx hypothyroidism who was admitted to [facility] 6/6 after being found on floor at home, with CT revealing large right-sided ICH. She required emergent right frontal craniectomy for decompression and EVD placement. EVD subsequently removed 6/16. Angio negative. Patient underwent G tube placement 6/20. Hospital course notable for fluctuating leukocytosis and high-grade temperatures with no identified source of infection including in urine, blood, CSF. Now admitted for multidisciplinary rehab. (26 Jun 2023 13:08)    SUBJECTIVE: No acute overnight events. Patient seen and examined by bedside with wife by bedside. Continues to be emotional labile at times and experienced some irritability last night before bed according to wife. Slept through the night but pt reports that she already tired following morning activities. Participates in therapies and improving functionally. Pt reports abdominal discomfort that she believes is related to constipation. No complaints of new numbness/tingling/weakness. No CP/SOB/NV.     Vital Signs Last 24 Hrs  Vital Signs Last 24 Hrs  T(C): 36.7 (28 Jul 2023 11:11), Max: 36.7 (28 Jul 2023 11:11)  T(F): 98.1 (28 Jul 2023 11:11), Max: 98.1 (28 Jul 2023 11:11)  HR: 100 (28 Jul 2023 11:11) (79 - 100)  BP: 130/93 (28 Jul 2023 11:11) (121/85 - 130/93)  BP(mean): --  RR: 15 (28 Jul 2023 11:11) (15 - 16)  SpO2: 96% (28 Jul 2023 11:11) (96% - 96%)    Parameters below as of 28 Jul 2023 11:11  Patient On (Oxygen Delivery Method): room air      REVIEW OF SYMPTOMS  Neurological deficits    PHYSICAL EXAM  Constitutional - NAD, slept well   HEENT - Right frontal hemicraniectomy incision site clean dry and intact   Chest - Breathing comfortably on RA   Cardiovascular - RRR,  warm well perfused  Abdomen - BS+  Extremities - No edema, No calf tenderness   Neurologic Exam -                    Cognitive - Awake, Alert, AAO to self, place, date, year, situation     Communication - Fluent, No dysarthria,       Motor  Left hemiparesis. Right side 5/5.      Sensory - Impaired to left UE and LE      Coordination - Impaired.   Spasticity-- left shoulder abduction 3, left shoulder flexion 3, left finger flexion 1   Psychiatric - pleasant       LABS:                          12.7   6.88  )-----------( 359      ( 27 Jul 2023 06:45 )             39.2     07-28    137  |  101  |  9   ----------------------------<  101<H>  4.2   |  30  |  0.40<L>    Ca    9.5      28 Jul 2023 05:15    TPro  6.9  /  Alb  2.6<L>  /  TBili  0.3  /  DBili  x   /  AST  31  /  ALT  24  /  AlkPhos  51  07-27        RADIOLOGY:  Reviewed       MEDICATIONS  (STANDING):  divalproex Sprinkle 500 milliGRAM(s) Oral <User Schedule>  divalproex Sprinkle 750 milliGRAM(s) Oral <User Schedule>  enoxaparin Injectable 40 milliGRAM(s) SubCutaneous every 24 hours  escitalopram 10 milliGRAM(s) Oral <User Schedule>  gabapentin 300 milliGRAM(s) Oral <User Schedule>  gabapentin 400 milliGRAM(s) Oral at bedtime  lacosamide 150 milliGRAM(s) Oral two times a day  levothyroxine 75 MICROGram(s) Oral daily  lidocaine   4% Patch 1 Patch Transdermal <User Schedule>  pantoprazole   Suspension 40 milliGRAM(s) Oral daily  polyethylene glycol 3350 17 Gram(s) Oral at bedtime  senna 2 Tablet(s) Oral at bedtime  tiZANidine 4 milliGRAM(s) Oral at bedtime  traZODone 75 milliGRAM(s) Oral at bedtime    MEDICATIONS  (PRN):  acetaminophen     Tablet .. 975 milliGRAM(s) Oral every 6 hours PRN Mild Pain (1 - 3), Moderate Pain (4 - 6), Severe Pain (7 - 10)  bacitracin   Ointment 1 Application(s) Topical three times a day PRN scalp irritation     HPI:  38 year old female w/ PMHx hypothyroidism who was admitted to [facility] 6/6 after being found on floor at home, with CT revealing large right-sided ICH. She required emergent right frontal craniectomy for decompression and EVD placement. EVD subsequently removed 6/16. Angio negative. Patient underwent G tube placement 6/20. Hospital course notable for fluctuating leukocytosis and high-grade temperatures with no identified source of infection including in urine, blood, CSF. Now admitted for multidisciplinary rehab. (26 Jun 2023 13:08)    SUBJECTIVE: No acute overnight events. Patient seen and examined by bedside with wife by bedside. Continues to be emotional labile at times and experienced some irritability last night before bed according to wife. Slept through the night but pt reports that she already tired following morning activities. Participates in therapies and improving functionally. Pt reports abdominal discomfort that she believes is related to constipation. Was able to have a BM with bisacodyl supp.  No complaints of new numbness/tingling/weakness. No CP/SOB/NV.     Vital Signs Last 24 Hrs  Vital Signs Last 24 Hrs  T(C): 36.7 (28 Jul 2023 11:11), Max: 36.7 (28 Jul 2023 11:11)  T(F): 98.1 (28 Jul 2023 11:11), Max: 98.1 (28 Jul 2023 11:11)  HR: 100 (28 Jul 2023 11:11) (79 - 100)  BP: 130/93 (28 Jul 2023 11:11) (121/85 - 130/93)  BP(mean): --  RR: 15 (28 Jul 2023 11:11) (15 - 16)  SpO2: 96% (28 Jul 2023 11:11) (96% - 96%)    Parameters below as of 28 Jul 2023 11:11  Patient On (Oxygen Delivery Method): room air      REVIEW OF SYMPTOMS  Neurological deficits    PHYSICAL EXAM  Constitutional - NAD, slept well   HEENT - Right frontal hemicraniectomy incision site clean dry and intact   Chest - Breathing comfortably on RA   Cardiovascular - RRR,  warm well perfused  Abdomen - BS+  Extremities - No edema, No calf tenderness   Neurologic Exam -                    Cognitive - Awake, Alert, AAO to self, place, date, year, situation     Communication - Fluent, No dysarthria,       Motor  Left hemiparesis. Right side 5/5.      Sensory - Impaired to left UE and LE      Coordination - Impaired.   Spasticity-- left shoulder abduction 3, left shoulder flexion 3, left finger flexion 1   Psychiatric - pleasant       LABS:                          12.7   6.88  )-----------( 359      ( 27 Jul 2023 06:45 )             39.2     07-28    137  |  101  |  9   ----------------------------<  101<H>  4.2   |  30  |  0.40<L>    Ca    9.5      28 Jul 2023 05:15    TPro  6.9  /  Alb  2.6<L>  /  TBili  0.3  /  DBili  x   /  AST  31  /  ALT  24  /  AlkPhos  51  07-27        RADIOLOGY:  Reviewed       MEDICATIONS  (STANDING):  divalproex Sprinkle 500 milliGRAM(s) Oral <User Schedule>  divalproex Sprinkle 750 milliGRAM(s) Oral <User Schedule>  enoxaparin Injectable 40 milliGRAM(s) SubCutaneous every 24 hours  escitalopram 10 milliGRAM(s) Oral <User Schedule>  gabapentin 300 milliGRAM(s) Oral <User Schedule>  gabapentin 400 milliGRAM(s) Oral at bedtime  lacosamide 150 milliGRAM(s) Oral two times a day  levothyroxine 75 MICROGram(s) Oral daily  lidocaine   4% Patch 1 Patch Transdermal <User Schedule>  pantoprazole   Suspension 40 milliGRAM(s) Oral daily  polyethylene glycol 3350 17 Gram(s) Oral at bedtime  senna 2 Tablet(s) Oral at bedtime  tiZANidine 4 milliGRAM(s) Oral at bedtime  traZODone 75 milliGRAM(s) Oral at bedtime    MEDICATIONS  (PRN):  acetaminophen     Tablet .. 975 milliGRAM(s) Oral every 6 hours PRN Mild Pain (1 - 3), Moderate Pain (4 - 6), Severe Pain (7 - 10)  bacitracin   Ointment 1 Application(s) Topical three times a day PRN scalp irritation

## 2023-07-28 NOTE — PROGRESS NOTE ADULT - ATTENDING COMMENTS
Pt. seen with resident & student.  Agree with documentation above as per MSIV with amendments made as appropriate. Patient medically stable. Making progress towards rehab goals.     right ICH  Stable.  Uncomfortable this AM due to constipation.  Improved with bisacodyl suppos.    D/w pt's wife that pt. is emotionally labile intermittently and this has improved somewhat but still limits pt.  Cont. current medications for now.

## 2023-07-28 NOTE — CHART NOTE - NSCHARTNOTEFT_GEN_A_CORE
Nutrition Follow Up Note  Hospital Course   (Per Electronic Medical Record)    Source:  Patient [X]  Family Member [X] Father  Nursing Staff [X]   Medical Record [X]      Diet: Diet, Soft and Bite Sized:   Extra Sauces Gravies  Supplement Feeding Modality:  Oral  Ensure Plus High Protein Cans or Servings Per Day:  1       Frequency:  Daily (07-18-23 @ 09:27) [Active]    At this time patient tolerating diet w/ varied appetite/intake consuming % of meals. No issues w/ dentition or chewing and swallowing current Soft and Bite Sized diet texture. Denies N/V/C/D, last BM 7/27 Per nursing flowsheets. Father w/ questions about bringing food from outside, reviewed IDDSI Soft and Bite Sized texture recommendations.     Enteral/Parenteral Nutrition: Not Applicable    Current Weight: 151.4lb on 7/26  151.4 lbs (7-23)  Weight Stable     Pertinent Medications: MEDICATIONS  (STANDING):  divalproex Sprinkle 750 milliGRAM(s) Oral <User Schedule>  divalproex Sprinkle 500 milliGRAM(s) Oral <User Schedule>  enoxaparin Injectable 40 milliGRAM(s) SubCutaneous every 24 hours  escitalopram 10 milliGRAM(s) Oral <User Schedule>  gabapentin 300 milliGRAM(s) Oral <User Schedule>  gabapentin 400 milliGRAM(s) Oral at bedtime  lacosamide 150 milliGRAM(s) Oral two times a day  levothyroxine 75 MICROGram(s) Oral daily  memantine 5 milliGRAM(s) Oral <User Schedule>  pantoprazole    Tablet 40 milliGRAM(s) Oral before breakfast  polyethylene glycol 3350 17 Gram(s) Oral at bedtime  senna 2 Tablet(s) Oral at bedtime  tiZANidine 2 milliGRAM(s) Oral <User Schedule>  tiZANidine 4 milliGRAM(s) Oral at bedtime    MEDICATIONS  (PRN):  acetaminophen     Tablet .. 975 milliGRAM(s) Oral every 6 hours PRN Mild Pain (1 - 3), Moderate Pain (4 - 6), Severe Pain (7 - 10)  bacitracin   Ointment 1 Application(s) Topical three times a day PRN scalp irritation  bisacodyl Suppository 10 milliGRAM(s) Rectal daily PRN Constipation      Pertinent Labs:  07-28 Na137 mmol/L Glu 101 mg/dL<H> K+ 4.2 mmol/L Cr  0.40 mg/dL<L> BUN 9 mg/dL 07-27 Alb 2.6 g/dL<L>    Skin: No pressure injury per nursing flowsheets, Surgical Incision, Site right parietal     Edema: No edema noted per nursing flowsheet    Last Bowel Movement: on 7/27 Per nursing flowsheets     Estimated Needs:   [X] No Change Since Previous Assessment    Previous Nutrition Diagnosis:   Severe malnutrition, acute    Nutrition Diagnosis is [X] Ongoing  - continues on Ensure Plus High Protein (provides 350 kcal, 20 g protein/serving)     New Nutrition Diagnosis: [X] Not Applicable    Interventions:   1. Recommend continuing with current plan of care  2. Encourage PO intake  3. Obtain and honor food preferences as able  4. Ongoing diet education     Monitoring & Evaluation:   [X] Weights   [X] PO Intake   [X] Skin Integrity   [X] Follow Up (Per Protocol)  [X] Tolerance to Diet Prescription   [X] Other: Labs     Registered Dietitian/Nutritionist Remains Available.  Nikia Gentile RD, MS, CDN    Phone# [phone]

## 2023-07-29 PROCEDURE — 99231 SBSQ HOSP IP/OBS SF/LOW 25: CPT

## 2023-07-29 PROCEDURE — 99232 SBSQ HOSP IP/OBS MODERATE 35: CPT

## 2023-07-29 RX ORDER — TRAZODONE HCL 50 MG
50 TABLET ORAL AT BEDTIME
Refills: 0 | Status: DISCONTINUED | OUTPATIENT
Start: 2023-07-29 | End: 2023-08-01

## 2023-07-29 RX ORDER — TRAZODONE HCL 50 MG
75 TABLET ORAL AT BEDTIME
Refills: 0 | Status: DISCONTINUED | OUTPATIENT
Start: 2023-07-29 | End: 2023-07-29

## 2023-07-29 RX ADMIN — Medication 50 MILLIGRAM(S): at 21:07

## 2023-07-29 RX ADMIN — ESCITALOPRAM OXALATE 10 MILLIGRAM(S): 10 TABLET, FILM COATED ORAL at 17:43

## 2023-07-29 RX ADMIN — DIVALPROEX SODIUM 500 MILLIGRAM(S): 500 TABLET, DELAYED RELEASE ORAL at 06:14

## 2023-07-29 RX ADMIN — Medication 75 MICROGRAM(S): at 06:13

## 2023-07-29 RX ADMIN — MEMANTINE HYDROCHLORIDE 5 MILLIGRAM(S): 10 TABLET ORAL at 07:31

## 2023-07-29 RX ADMIN — ENOXAPARIN SODIUM 40 MILLIGRAM(S): 100 INJECTION SUBCUTANEOUS at 06:14

## 2023-07-29 RX ADMIN — Medication 975 MILLIGRAM(S): at 06:13

## 2023-07-29 RX ADMIN — DIVALPROEX SODIUM 750 MILLIGRAM(S): 500 TABLET, DELAYED RELEASE ORAL at 17:42

## 2023-07-29 RX ADMIN — TIZANIDINE 2 MILLIGRAM(S): 4 TABLET ORAL at 13:10

## 2023-07-29 RX ADMIN — Medication 975 MILLIGRAM(S): at 21:08

## 2023-07-29 RX ADMIN — LACOSAMIDE 150 MILLIGRAM(S): 50 TABLET ORAL at 06:14

## 2023-07-29 RX ADMIN — MEMANTINE HYDROCHLORIDE 5 MILLIGRAM(S): 10 TABLET ORAL at 17:42

## 2023-07-29 RX ADMIN — Medication 975 MILLIGRAM(S): at 22:08

## 2023-07-29 RX ADMIN — Medication 975 MILLIGRAM(S): at 07:02

## 2023-07-29 RX ADMIN — LACOSAMIDE 150 MILLIGRAM(S): 50 TABLET ORAL at 17:40

## 2023-07-29 RX ADMIN — PANTOPRAZOLE SODIUM 40 MILLIGRAM(S): 20 TABLET, DELAYED RELEASE ORAL at 06:13

## 2023-07-29 RX ADMIN — GABAPENTIN 300 MILLIGRAM(S): 400 CAPSULE ORAL at 13:09

## 2023-07-29 RX ADMIN — GABAPENTIN 400 MILLIGRAM(S): 400 CAPSULE ORAL at 21:07

## 2023-07-29 RX ADMIN — TIZANIDINE 4 MILLIGRAM(S): 4 TABLET ORAL at 21:06

## 2023-07-29 NOTE — PROGRESS NOTE ADULT - SUBJECTIVE AND OBJECTIVE BOX
HPI:  38 year old female w/ PMHx hypothyroidism who was admitted to [facility] 6/6 after being found on floor at home, with CT revealing large right-sided ICH. She required emergent right frontal craniectomy for decompression and EVD placement. EVD subsequently removed 6/16. Angio negative. Patient underwent G tube placement 6/20. Hospital course notable for fluctuating leukocytosis and high-grade temperatures with no identified source of infection including in urine, blood, CSF. Now admitted for multidisciplinary rehab. (26 Jun 2023 13:08)    SUBJECTIVE: No acute overnight events. Patient seen and examined by bedside with wife by bedside. Continues to be emotional labile at times and experienced some irritability last night before bed according to wife. Slept through the night but pt reports that she already tired following morning activities. Participates in therapies and improving functionally.     I was able to abduct the left arm to 90  and extend elbow to 90 while the patient was distracted. The patient anticipates pain and becomes verbal.      ICU Vital Signs Last 24 Hrs  T(C): 36.8 (29 Jul 2023 07:32), Max: 36.8 (29 Jul 2023 07:32)  T(F): 98.2 (29 Jul 2023 07:32), Max: 98.2 (29 Jul 2023 07:32)  HR: 87 (29 Jul 2023 07:32) (86 - 100)  BP: 115/80 (29 Jul 2023 07:32) (115/80 - 153/93)  RR: 16 (29 Jul 2023 07:32) (15 - 16)  SpO2: 97% (29 Jul 2023 07:32) (95% - 97%)    REVIEW OF SYMPTOMS  Neurological deficits    PHYSICAL EXAM  Constitutional - NAD, slept well   HEENT - Right frontal hemicraniectomy incision site clean dry and intact   Chest - Breathing comfortably on RA   Cardiovascular - RRR,  warm well perfused  Abdomen - BS+  Extremities - No edema, No calf tenderness   Neurologic Exam -                    Cognitive - Awake, Alert, AAO to self, place, date, year, situation     Communication - Fluent, No dysarthria,       Motor  Left hemiparesis. Right side 5/5.      Sensory - Impaired to left UE and LE      Coordination - Impaired.   Spasticity-- left shoulder abduction 3, left shoulder flexion 3, left finger flexion 1   Psychiatric - pleasant       LABS:                          12.7   6.88  )-----------( 359      ( 27 Jul 2023 06:45 )             39.2     07-28    137  |  101  |  9   ----------------------------<  101<H>  4.2   |  30  |  0.40<L>    Ca    9.5      28 Jul 2023 05:15    TPro  6.9  /  Alb  2.6<L>  /  TBili  0.3  /  DBili  x   /  AST  31  /  ALT  24  /  AlkPhos  51  07-27        RADIOLOGY:  Reviewed     MEDICATIONS  (STANDING):  divalproex Sprinkle 500 milliGRAM(s) Oral <User Schedule>  divalproex Sprinkle 750 milliGRAM(s) Oral <User Schedule>  enoxaparin Injectable 40 milliGRAM(s) SubCutaneous every 24 hours  escitalopram 10 milliGRAM(s) Oral <User Schedule>  gabapentin 300 milliGRAM(s) Oral <User Schedule>  gabapentin 400 milliGRAM(s) Oral at bedtime  lacosamide 150 milliGRAM(s) Oral two times a day  levothyroxine 75 MICROGram(s) Oral daily  lidocaine   4% Patch 1 Patch Transdermal <User Schedule>  pantoprazole   Suspension 40 milliGRAM(s) Oral daily  polyethylene glycol 3350 17 Gram(s) Oral at bedtime  senna 2 Tablet(s) Oral at bedtime  tiZANidine 4 milliGRAM(s) Oral at bedtime  traZODone 75 milliGRAM(s) Oral at bedtime    MEDICATIONS  (PRN):  acetaminophen     Tablet .. 975 milliGRAM(s) Oral every 6 hours PRN Mild Pain (1 - 3), Moderate Pain (4 - 6), Severe Pain (7 - 10)  bacitracin   Ointment 1 Application(s) Topical three times a day PRN scalp irritation

## 2023-07-29 NOTE — PROGRESS NOTE ADULT - ASSESSMENT
39 y/o F w/ PMhx hypothyroidism admitted to [facility] 6/6 after being found unresponsive, found to have large right frontal IPH w/ IVH and hydrocephalus. Now s/p R krishna craniectomy for evacuation on 6/6. PEG placed 6/20. Now admitted for multidisciplinary rehab- pt/ot/dvt ppx    #IPH with IVH, R frontal lobe  - Amantadine, trazodone per rehab   - Antiepileptic regimen: depakote, continue Vimpat 150 mg BID.   - Helmet when OOB    #Normocytic anemia  - H/H stable, monitor for Hb < 7  - Iron/B12/folate normal     #HTN  - Lisinopril held due to hypotension    #Dysphagia s/p PEG  - s/p self removal of PEG tube, tolerating PO diet, PEG not replaced  - GI signed off, monitor tolerating PO diet    #Hypothyroidism  - Continue synthroid     #DVT ppx - Lovenox

## 2023-07-29 NOTE — PROGRESS NOTE ADULT - ASSESSMENT
ASSESSMENT/PLAN:  38y Female h/o hypothyroid with functional deficits after right frontal IPHs/p right hemicraniectomy.     #Right Hemicraniectomy and EVD on 6/6  - Communication with patients family, neuroplastic surgeon Dr. Sy ([phone]) and PM&R team has taken place.   - neuroendocrine workup ordered, and nml   - cranioplasty appointment on 7/18 with Dr. Sy- due to healing of crani site would tentatively recommend cranioplasty in September 2023, - CTH with 3D reconstruction completed 7/24 for implant planning.   - increased tone on the left arm- on tizandiine    # seizure Disorder  #agitation   -Briviact 50mg BID was discontinued 7/14.   -Depakote 500mg in AM, 750mg in evening   -Cont [id number] BID      -Valproic acid and ammonia level, 7/28 were normal.  --BMP on 7/28, wnl      #Anxiety/depression/cognitive deficit   - Cont. Lexapro 10mg in the evening  - cont. Memantine 5mg BID-> monitor    #Hyponatremia  -Na 136->136->135->133->136 (7/25) -->133 (7/27) --> 137 (7/28)  - today Na is wnl, continue to monitor    #Pain/Neuropathic   #Insomnia  -Botox to Lpec and Lbicep 7/14-tolerated procedure well. Therapist reports increase in ROM with pain.   -cont. Tylenol PRN  -cont Trazodone 75mg at bedtime  -continue Gabapentin to 300mg at 2pm, continue with gabapentin 400mg at bedtime,   -cont Tizanidine 4mg at bedtime. Continue 2mg at 3pm daily.    -Pt. limited by left UE spasticity that may be contributing to lability and behavior issues in OT  -lidocaine patch-> discontinue (7/26) patient not using     #dysphagia s/p PEG   -PEG tube came out on 7/4, did not replace   -calorie count sufficient, and tolerating PO diet.      #constipation  -Senna at bedtime    -Miralax at bedtime   -Give suppository PRN    #hx of anemia    -H/H stable     #hx of HTN --BP soft  -Monitor    #DVT ppx   - cont lovenox

## 2023-07-29 NOTE — PROGRESS NOTE ADULT - SUBJECTIVE AND OBJECTIVE BOX
Hospitalist: Emi Oglesby DO    CHIEF COMPLAINT: Patient is a 38y old  female who presents with a chief complaint of IPH (29 Jul 2023 10:38)      SUBJECTIVE / OVERNIGHT EVENTS: Patient seen and examined. No acute events overnight. Pain well controlled and patient without any complaints.    MEDICATIONS  (STANDING):  divalproex Sprinkle 750 milliGRAM(s) Oral <User Schedule>  divalproex Sprinkle 500 milliGRAM(s) Oral <User Schedule>  enoxaparin Injectable 40 milliGRAM(s) SubCutaneous every 24 hours  escitalopram 10 milliGRAM(s) Oral <User Schedule>  gabapentin 300 milliGRAM(s) Oral <User Schedule>  gabapentin 400 milliGRAM(s) Oral at bedtime  lacosamide 150 milliGRAM(s) Oral two times a day  levothyroxine 75 MICROGram(s) Oral daily  memantine 5 milliGRAM(s) Oral <User Schedule>  pantoprazole    Tablet 40 milliGRAM(s) Oral before breakfast  polyethylene glycol 3350 17 Gram(s) Oral at bedtime  senna 2 Tablet(s) Oral at bedtime  tiZANidine 2 milliGRAM(s) Oral <User Schedule>  tiZANidine 4 milliGRAM(s) Oral at bedtime  traZODone 50 milliGRAM(s) Oral at bedtime    MEDICATIONS  (PRN):  acetaminophen     Tablet .. 975 milliGRAM(s) Oral every 6 hours PRN Mild Pain (1 - 3), Moderate Pain (4 - 6), Severe Pain (7 - 10)  bacitracin   Ointment 1 Application(s) Topical three times a day PRN scalp irritation  bisacodyl Suppository 10 milliGRAM(s) Rectal daily PRN Constipation      VITALS:  T(F): 98.2 (07-29-23 @ 07:32), Max: 98.2 (07-29-23 @ 07:32)  HR: 87 (07-29-23 @ 07:32) (86 - 87)  BP: 115/80 (07-29-23 @ 07:32) (115/80 - 153/93)  RR: 16 (07-29-23 @ 07:32) (15 - 16)  SpO2: 97% (07-29-23 @ 07:32)      REVIEW OF SYSTEMS:  For ROV please refer back to H&P     PHYSICAL EXAM:  GENERAL- NAD  EAR/NOSE/MOUTH/THROAT -  MMM  EYES- SERGIO, conjunctiva and Sclera clear  NECK- supple  RESPIRATORY-  clear to auscultation bilaterally, non laboured breathing  CARDIOVASCULAR - SIS2, RRR  GI - soft NT BS present  EXTREMITIES- no pedal edema  NEUROLOGY- left sided weakness, right craniectomy        LABS:              x                    137  | 30   | 9            x     >-----------< x       ------------------------< 101                   x                    4.2  | 101  | 0.40                                         Ca 9.5   Mg x     Ph x                  Urinalysis Basic - ( 28 Jul 2023 05:15 )    Color: x / Appearance: x / SG: x / pH: x  Gluc: 101 mg/dL / Ketone: x  / Bili: x / Urobili: x   Blood: x / Protein: x / Nitrite: x   Leuk Esterase: x / RBC: x / WBC x   Sq Epi: x / Non Sq Epi: x / Bacteria: x        MICROBIOLOGY:  Urinalysis Basic - ( 28 Jul 2023 05:15 )    Color: x / Appearance: x / SG: x / pH: x  Gluc: 101 mg/dL / Ketone: x  / Bili: x / Urobili: x   Blood: x / Protein: x / Nitrite: x   Leuk Esterase: x / RBC: x / WBC x   Sq Epi: x / Non Sq Epi: x / Bacteria: x            RADIOLOGY & ADDITIONAL TESTS:    Imaging Personally Reviewed:    [X] Consultant(s) Notes Reviewed:  [X] Care Discussed with Consultants/Other Providers:

## 2023-07-30 PROCEDURE — 99232 SBSQ HOSP IP/OBS MODERATE 35: CPT

## 2023-07-30 PROCEDURE — 99231 SBSQ HOSP IP/OBS SF/LOW 25: CPT

## 2023-07-30 RX ADMIN — DIVALPROEX SODIUM 500 MILLIGRAM(S): 500 TABLET, DELAYED RELEASE ORAL at 06:13

## 2023-07-30 RX ADMIN — Medication 50 MILLIGRAM(S): at 21:18

## 2023-07-30 RX ADMIN — ENOXAPARIN SODIUM 40 MILLIGRAM(S): 100 INJECTION SUBCUTANEOUS at 06:13

## 2023-07-30 RX ADMIN — Medication 75 MICROGRAM(S): at 06:13

## 2023-07-30 RX ADMIN — POLYETHYLENE GLYCOL 3350 17 GRAM(S): 17 POWDER, FOR SOLUTION ORAL at 21:39

## 2023-07-30 RX ADMIN — Medication 975 MILLIGRAM(S): at 21:38

## 2023-07-30 RX ADMIN — MEMANTINE HYDROCHLORIDE 5 MILLIGRAM(S): 10 TABLET ORAL at 17:03

## 2023-07-30 RX ADMIN — SENNA PLUS 2 TABLET(S): 8.6 TABLET ORAL at 21:22

## 2023-07-30 RX ADMIN — Medication 975 MILLIGRAM(S): at 21:13

## 2023-07-30 RX ADMIN — LACOSAMIDE 150 MILLIGRAM(S): 50 TABLET ORAL at 06:13

## 2023-07-30 RX ADMIN — TIZANIDINE 4 MILLIGRAM(S): 4 TABLET ORAL at 21:38

## 2023-07-30 RX ADMIN — GABAPENTIN 400 MILLIGRAM(S): 400 CAPSULE ORAL at 21:18

## 2023-07-30 RX ADMIN — ESCITALOPRAM OXALATE 10 MILLIGRAM(S): 10 TABLET, FILM COATED ORAL at 17:04

## 2023-07-30 RX ADMIN — PANTOPRAZOLE SODIUM 40 MILLIGRAM(S): 20 TABLET, DELAYED RELEASE ORAL at 06:13

## 2023-07-30 RX ADMIN — MEMANTINE HYDROCHLORIDE 5 MILLIGRAM(S): 10 TABLET ORAL at 08:40

## 2023-07-30 RX ADMIN — LACOSAMIDE 150 MILLIGRAM(S): 50 TABLET ORAL at 17:04

## 2023-07-30 RX ADMIN — DIVALPROEX SODIUM 750 MILLIGRAM(S): 500 TABLET, DELAYED RELEASE ORAL at 17:02

## 2023-07-30 RX ADMIN — Medication 975 MILLIGRAM(S): at 22:04

## 2023-07-30 RX ADMIN — GABAPENTIN 300 MILLIGRAM(S): 400 CAPSULE ORAL at 13:08

## 2023-07-30 RX ADMIN — TIZANIDINE 2 MILLIGRAM(S): 4 TABLET ORAL at 15:14

## 2023-07-30 NOTE — PROGRESS NOTE ADULT - SUBJECTIVE AND OBJECTIVE BOX
HPI:  38 year old female w/ PMHx hypothyroidism who was admitted to [facility] 6/6 after being found on floor at home, with CT revealing large right-sided ICH. She required emergent right frontal craniectomy for decompression and EVD placement. EVD subsequently removed 6/16. Angio negative. Patient underwent G tube placement 6/20. Hospital course notable for fluctuating leukocytosis and high-grade temperatures with no identified source of infection including in urine, blood, CSF. Now admitted for multidisciplinary rehab. (26 Jun 2023 13:08)    SUBJECTIVE: No acute overnight events. Patient seen and examined by bedside with father by bedside. Continues to be emotional labile at times . She is very anxious. She is apprehensive about therapy to the left arm. Slept through the night.  I was able to abduct the left arm to 90  and extend elbow to 90 while the patient was distracted. The patient anticipates pain and becomes verbal.      Vital Signs Last 24 Hrs  T(C): 36.7 (30 Jul 2023 08:43), Max: 36.7 (30 Jul 2023 08:43)  T(F): 98.1 (30 Jul 2023 08:43), Max: 98.1 (30 Jul 2023 08:43)  HR: 75 (30 Jul 2023 08:43) (74 - 75)  BP: 113/76 (30 Jul 2023 08:43) (113/76 - 141/94)  RR: 16 (30 Jul 2023 08:43) (15 - 16)  SpO2: 95% (30 Jul 2023 08:43) (95% - 95%)    REVIEW OF SYMPTOMS  Neurological deficits    PHYSICAL EXAM  Constitutional - NAD, slept well   HEENT - Right frontal hemicraniectomy incision site clean dry and intact   Chest - Breathing comfortably on RA   Cardiovascular - RRR,  warm well perfused  Abdomen - BS+  Extremities - No edema, No calf tenderness   Neurologic Exam -                    Cognitive - Awake, Alert, AAO to self, place, date, year, situation     Communication - Fluent, No dysarthria,       Motor  Left hemiparesis. Right side 5/5.      Sensory - Impaired to left UE and LE      Coordination - Impaired.   Spasticity-- left shoulder abduction 3, left shoulder flexion 3, left finger flexion 1   Psychiatric - pleasant       LABS:                          12.7   6.88  )-----------( 359      ( 27 Jul 2023 06:45 )             39.2     07-28    137  |  101  |  9   ----------------------------<  101<H>  4.2   |  30  |  0.40<L>    Ca    9.5      28 Jul 2023 05:15    TPro  6.9  /  Alb  2.6<L>  /  TBili  0.3  /  DBili  x   /  AST  31  /  ALT  24  /  AlkPhos  51  07-27        MEDICATIONS  (STANDING):  divalproex Sprinkle 500 milliGRAM(s) Oral <User Schedule>  divalproex Sprinkle 750 milliGRAM(s) Oral <User Schedule>  enoxaparin Injectable 40 milliGRAM(s) SubCutaneous every 24 hours  escitalopram 10 milliGRAM(s) Oral <User Schedule>  gabapentin 300 milliGRAM(s) Oral <User Schedule>  gabapentin 400 milliGRAM(s) Oral at bedtime  lacosamide 150 milliGRAM(s) Oral two times a day  levothyroxine 75 MICROGram(s) Oral daily  lidocaine   4% Patch 1 Patch Transdermal <User Schedule>  pantoprazole   Suspension 40 milliGRAM(s) Oral daily  polyethylene glycol 3350 17 Gram(s) Oral at bedtime  senna 2 Tablet(s) Oral at bedtime  tiZANidine 4 milliGRAM(s) Oral at bedtime  traZODone 75 milliGRAM(s) Oral at bedtime    MEDICATIONS  (PRN):  acetaminophen     Tablet .. 975 milliGRAM(s) Oral every 6 hours PRN Mild Pain (1 - 3), Moderate Pain (4 - 6), Severe Pain (7 - 10)  bacitracin   Ointment 1 Application(s) Topical three times a day PRN scalp irritation

## 2023-07-30 NOTE — PROGRESS NOTE ADULT - ASSESSMENT
ASSESSMENT/PLAN:  38y Female h/o hypothyroid with functional deficits after right frontal IPHs/p right hemicraniectomy.     #Right Hemicraniectomy and EVD on 6/6  - Communication with patients family, neuroplastic surgeon Dr. Sy ([phone]) and PM&R team has taken place.   - neuroendocrine workup ordered, and nml   - cranioplasty appointment on 7/18 with Dr. Sy- due to healing of crani site would tentatively recommend cranioplasty in September 2023, - CTH with 3D reconstruction completed 7/24 for implant planning.   - increased tone on the left arm- on tizandiine    # seizure Disorder  #agitation   -Briviact 50mg BID was discontinued 7/14.   -Depakote 500mg in AM, 750mg in evening   -Cont [id number] BID      -Valproic acid and ammonia level, 7/28 were normal.  --BMP on 7/28, wnl      #Anxiety/depression/cognitive deficit   - Cont. Lexapro 10mg in the evening  - cont. Memantine 5mg BID-> monitor  - patient reassured    #Hyponatremia  -Na 136->136->135->133->136 (7/25) -->133 (7/27) --> 137 (7/28)  - today Na is wnl, continue to monitor    #Pain/Neuropathic   #Insomnia  -Botox to Lpec and Lbicep 7/14-tolerated procedure well. Therapist reports increase in ROM with pain.   -cont. Tylenol PRN  -cont Trazodone 75mg at bedtime  -continue Gabapentin to 300mg at 2pm, continue with gabapentin 400mg at bedtime,   -cont Tizanidine 4mg at bedtime. Continue 2mg at 3pm daily.    -Pt. limited by left UE spasticity that may be contributing to lability and behavior issues in OT  -lidocaine patch-> discontinue (7/26) patient not using     #dysphagia s/p PEG   -PEG tube came out on 7/4, did not replace   -calorie count sufficient, and tolerating PO diet.      #constipation  -Senna at bedtime    -Miralax at bedtime   -Give suppository PRN    #hx of anemia    -H/H stable     #hx of HTN --BP soft  -Monitor    #DVT ppx   - cont lovenox

## 2023-07-30 NOTE — PROGRESS NOTE ADULT - ASSESSMENT
39 y/o F w/ PMhx hypothyroidism admitted to [facility] 6/6 after being found unresponsive, found to have large right frontal IPH w/ IVH and hydrocephalus. Now s/p R krishna craniectomy for evacuation on 6/6. PEG placed 6/20. Now admitted for multidisciplinary rehab- pt/ot/dvt ppx    #IPH with IVH, R frontal lobe  - Amantadine, trazodone per rehab   - Antiepileptic regimen: depakote, continue Vimpat 150 mg BID.   - Helmet when OOB    #Normocytic anemia  - H/H stable, monitor for Hb < 7  - Iron/B12/folate normal     #HTN  - Lisinopril held due to hypotension    #Dysphagia s/p PEG  - s/p self removal of PEG tube, tolerating PO diet, PEG not replaced  - GI signed off, monitor tolerating PO diet    #Hypothyroidism  - Continue synthroid     #DVT ppx - Lovenox

## 2023-07-30 NOTE — PROGRESS NOTE ADULT - SUBJECTIVE AND OBJECTIVE BOX
Hospitalist: Emi Oglesby DO    CHIEF COMPLAINT: Patient is a 38y old  female who presents with a chief complaint of IPH (29 Jul 2023 14:38)      SUBJECTIVE / OVERNIGHT EVENTS: Patient seen and examined. No acute events overnight. Pain well controlled and patient without any complaints.    MEDICATIONS  (STANDING):  divalproex Sprinkle 750 milliGRAM(s) Oral <User Schedule>  divalproex Sprinkle 500 milliGRAM(s) Oral <User Schedule>  enoxaparin Injectable 40 milliGRAM(s) SubCutaneous every 24 hours  escitalopram 10 milliGRAM(s) Oral <User Schedule>  gabapentin 300 milliGRAM(s) Oral <User Schedule>  gabapentin 400 milliGRAM(s) Oral at bedtime  lacosamide 150 milliGRAM(s) Oral two times a day  levothyroxine 75 MICROGram(s) Oral daily  memantine 5 milliGRAM(s) Oral <User Schedule>  pantoprazole    Tablet 40 milliGRAM(s) Oral before breakfast  polyethylene glycol 3350 17 Gram(s) Oral at bedtime  senna 2 Tablet(s) Oral at bedtime  tiZANidine 4 milliGRAM(s) Oral at bedtime  tiZANidine 2 milliGRAM(s) Oral <User Schedule>  traZODone 50 milliGRAM(s) Oral at bedtime    MEDICATIONS  (PRN):  acetaminophen     Tablet .. 975 milliGRAM(s) Oral every 6 hours PRN Mild Pain (1 - 3), Moderate Pain (4 - 6), Severe Pain (7 - 10)  bacitracin   Ointment 1 Application(s) Topical three times a day PRN scalp irritation  bisacodyl Suppository 10 milliGRAM(s) Rectal daily PRN Constipation      VITALS:  T(F): 98.1 (07-30-23 @ 08:43), Max: 98.1 (07-30-23 @ 08:43)  HR: 75 (07-30-23 @ 08:43) (74 - 75)  BP: 113/76 (07-30-23 @ 08:43) (113/76 - 141/94)  RR: 16 (07-30-23 @ 08:43) (15 - 16)  SpO2: 95% (07-30-23 @ 08:43)      REVIEW OF SYSTEMS:  For ROV please refer back to H&P     PHYSICAL EXAM:  GENERAL- NAD  EAR/NOSE/MOUTH/THROAT -  MMM  EYES- SERGIO, conjunctiva and Sclera clear  NECK- supple  RESPIRATORY-  clear to auscultation bilaterally, non laboured breathing  CARDIOVASCULAR - SIS2, RRR  GI - soft NT BS present  EXTREMITIES- no pedal edema  NEUROLOGY- left sided weakness, right craniectomy      RADIOLOGY & ADDITIONAL TESTS:    Imaging Personally Reviewed:    [X] Consultant(s) Notes Reviewed:  [X] Care Discussed with Consultants/Other Providers:

## 2023-07-31 LAB
ALBUMIN SERPL ELPH-MCNC: 2.8 G/DL — LOW (ref 3.3–5)
ALP SERPL-CCNC: 50 U/L — SIGNIFICANT CHANGE UP (ref 40–120)
ALT FLD-CCNC: 18 U/L — SIGNIFICANT CHANGE UP (ref 10–45)
ANION GAP SERPL CALC-SCNC: 9 MMOL/L — SIGNIFICANT CHANGE UP (ref 5–17)
AST SERPL-CCNC: 13 U/L — SIGNIFICANT CHANGE UP (ref 10–40)
BASOPHILS # BLD AUTO: 0.02 K/UL — SIGNIFICANT CHANGE UP (ref 0–0.2)
BASOPHILS NFR BLD AUTO: 0.3 % — SIGNIFICANT CHANGE UP (ref 0–2)
BILIRUB SERPL-MCNC: 0.2 MG/DL — SIGNIFICANT CHANGE UP (ref 0.2–1.2)
BUN SERPL-MCNC: 9 MG/DL — SIGNIFICANT CHANGE UP (ref 7–23)
CALCIUM SERPL-MCNC: 9.2 MG/DL — SIGNIFICANT CHANGE UP (ref 8.4–10.5)
CHLORIDE SERPL-SCNC: 99 MMOL/L — SIGNIFICANT CHANGE UP (ref 96–108)
CO2 SERPL-SCNC: 27 MMOL/L — SIGNIFICANT CHANGE UP (ref 22–31)
CREAT SERPL-MCNC: 0.44 MG/DL — LOW (ref 0.5–1.3)
EGFR: 126 ML/MIN/1.73M2 — SIGNIFICANT CHANGE UP
EOSINOPHIL # BLD AUTO: 0.07 K/UL — SIGNIFICANT CHANGE UP (ref 0–0.5)
EOSINOPHIL NFR BLD AUTO: 1.1 % — SIGNIFICANT CHANGE UP (ref 0–6)
GLUCOSE SERPL-MCNC: 98 MG/DL — SIGNIFICANT CHANGE UP (ref 70–99)
HCT VFR BLD CALC: 36.8 % — SIGNIFICANT CHANGE UP (ref 34.5–45)
HGB BLD-MCNC: 12.1 G/DL — SIGNIFICANT CHANGE UP (ref 11.5–15.5)
IMM GRANULOCYTES NFR BLD AUTO: 0.6 % — SIGNIFICANT CHANGE UP (ref 0–0.9)
LYMPHOCYTES # BLD AUTO: 2.17 K/UL — SIGNIFICANT CHANGE UP (ref 1–3.3)
LYMPHOCYTES # BLD AUTO: 34.9 % — SIGNIFICANT CHANGE UP (ref 13–44)
MCHC RBC-ENTMCNC: 31.4 PG — SIGNIFICANT CHANGE UP (ref 27–34)
MCHC RBC-ENTMCNC: 32.9 GM/DL — SIGNIFICANT CHANGE UP (ref 32–36)
MCV RBC AUTO: 95.6 FL — SIGNIFICANT CHANGE UP (ref 80–100)
MONOCYTES # BLD AUTO: 0.51 K/UL — SIGNIFICANT CHANGE UP (ref 0–0.9)
MONOCYTES NFR BLD AUTO: 8.2 % — SIGNIFICANT CHANGE UP (ref 2–14)
NEUTROPHILS # BLD AUTO: 3.41 K/UL — SIGNIFICANT CHANGE UP (ref 1.8–7.4)
NEUTROPHILS NFR BLD AUTO: 54.9 % — SIGNIFICANT CHANGE UP (ref 43–77)
NRBC # BLD: 0 /100 WBCS — SIGNIFICANT CHANGE UP (ref 0–0)
PLATELET # BLD AUTO: 280 K/UL — SIGNIFICANT CHANGE UP (ref 150–400)
POTASSIUM SERPL-MCNC: 3.7 MMOL/L — SIGNIFICANT CHANGE UP (ref 3.5–5.3)
POTASSIUM SERPL-SCNC: 3.7 MMOL/L — SIGNIFICANT CHANGE UP (ref 3.5–5.3)
PROT SERPL-MCNC: 6.4 G/DL — SIGNIFICANT CHANGE UP (ref 6–8.3)
RBC # BLD: 3.85 M/UL — SIGNIFICANT CHANGE UP (ref 3.8–5.2)
RBC # FLD: 12.8 % — SIGNIFICANT CHANGE UP (ref 10.3–14.5)
SODIUM SERPL-SCNC: 135 MMOL/L — SIGNIFICANT CHANGE UP (ref 135–145)
VALPROATE SERPL-MCNC: 87 UG/ML — SIGNIFICANT CHANGE UP (ref 50–100)
WBC # BLD: 6.22 K/UL — SIGNIFICANT CHANGE UP (ref 3.8–10.5)
WBC # FLD AUTO: 6.22 K/UL — SIGNIFICANT CHANGE UP (ref 3.8–10.5)

## 2023-07-31 PROCEDURE — 99232 SBSQ HOSP IP/OBS MODERATE 35: CPT

## 2023-07-31 RX ORDER — GABAPENTIN 400 MG/1
400 CAPSULE ORAL DAILY
Refills: 0 | Status: DISCONTINUED | OUTPATIENT
Start: 2023-07-31 | End: 2023-08-01

## 2023-07-31 RX ADMIN — GABAPENTIN 400 MILLIGRAM(S): 400 CAPSULE ORAL at 21:05

## 2023-07-31 RX ADMIN — DIVALPROEX SODIUM 750 MILLIGRAM(S): 500 TABLET, DELAYED RELEASE ORAL at 18:20

## 2023-07-31 RX ADMIN — Medication 975 MILLIGRAM(S): at 14:40

## 2023-07-31 RX ADMIN — ENOXAPARIN SODIUM 40 MILLIGRAM(S): 100 INJECTION SUBCUTANEOUS at 05:26

## 2023-07-31 RX ADMIN — PANTOPRAZOLE SODIUM 40 MILLIGRAM(S): 20 TABLET, DELAYED RELEASE ORAL at 05:21

## 2023-07-31 RX ADMIN — DIVALPROEX SODIUM 500 MILLIGRAM(S): 500 TABLET, DELAYED RELEASE ORAL at 05:21

## 2023-07-31 RX ADMIN — TIZANIDINE 2 MILLIGRAM(S): 4 TABLET ORAL at 14:10

## 2023-07-31 RX ADMIN — POLYETHYLENE GLYCOL 3350 17 GRAM(S): 17 POWDER, FOR SOLUTION ORAL at 21:03

## 2023-07-31 RX ADMIN — Medication 75 MICROGRAM(S): at 05:21

## 2023-07-31 RX ADMIN — LACOSAMIDE 150 MILLIGRAM(S): 50 TABLET ORAL at 05:21

## 2023-07-31 RX ADMIN — Medication 50 MILLIGRAM(S): at 21:03

## 2023-07-31 RX ADMIN — GABAPENTIN 400 MILLIGRAM(S): 400 CAPSULE ORAL at 11:24

## 2023-07-31 RX ADMIN — MEMANTINE HYDROCHLORIDE 5 MILLIGRAM(S): 10 TABLET ORAL at 18:19

## 2023-07-31 RX ADMIN — MEMANTINE HYDROCHLORIDE 5 MILLIGRAM(S): 10 TABLET ORAL at 08:29

## 2023-07-31 RX ADMIN — SENNA PLUS 2 TABLET(S): 8.6 TABLET ORAL at 21:03

## 2023-07-31 RX ADMIN — TIZANIDINE 4 MILLIGRAM(S): 4 TABLET ORAL at 21:03

## 2023-07-31 RX ADMIN — LACOSAMIDE 150 MILLIGRAM(S): 50 TABLET ORAL at 18:20

## 2023-07-31 RX ADMIN — ESCITALOPRAM OXALATE 10 MILLIGRAM(S): 10 TABLET, FILM COATED ORAL at 18:19

## 2023-07-31 RX ADMIN — Medication 975 MILLIGRAM(S): at 21:03

## 2023-07-31 RX ADMIN — Medication 975 MILLIGRAM(S): at 14:10

## 2023-07-31 RX ADMIN — Medication 975 MILLIGRAM(S): at 09:09

## 2023-07-31 RX ADMIN — Medication 975 MILLIGRAM(S): at 09:45

## 2023-07-31 NOTE — PROGRESS NOTE ADULT - SUBJECTIVE AND OBJECTIVE BOX
HPI:  38 year old female w/ PMHx hypothyroidism who was admitted to [facility] 6/6 after being found on floor at home, with CT revealing large right-sided ICH. She required emergent right frontal craniectomy for decompression and EVD placement. EVD subsequently removed 6/16. Angio negative. Patient underwent G tube placement 6/20. Hospital course notable for fluctuating leukocytosis and high-grade temperatures with no identified source of infection including in urine, blood, CSF. Now admitted for multidisciplinary rehab. (26 Jun 2023 13:08)          Subjective:      VITALS  Vital Signs Last 24 Hrs  T(C): 37.1 (30 Jul 2023 19:34), Max: 37.1 (30 Jul 2023 19:34)  T(F): 98.7 (30 Jul 2023 19:34), Max: 98.7 (30 Jul 2023 19:34)  HR: 76 (30 Jul 2023 19:34) (75 - 76)  BP: 125/85 (30 Jul 2023 19:34) (113/76 - 125/85)  BP(mean): --  RR: 16 (30 Jul 2023 19:34) (16 - 16)  SpO2: 97% (30 Jul 2023 19:34) (95% - 97%)    Parameters below as of 30 Jul 2023 19:34  Patient On (Oxygen Delivery Method): room air        REVIEW OF SYMPTOMS  Neurological deficits      PHYSICAL EXAM  Constitutional - NAD,   HEENT - Right frontal hemicraniectomy incision site clean dry and intact   Chest - Breathing comfortably on RA   Cardiovascular - RRR,  warm well perfused  Abdomen - BS+  Extremities - No edema, No calf tenderness   Neurologic Exam -                    Cognitive - Awake, Alert, AAO to self, place, date, year, situation     Communication - Fluent, No dysarthria,       Motor  Left hemiparesis. Right side 5/5.      Sensory - Impaired to left UE and LE      Coordination - Impaired.   Spasticity-- left shoulder abduction 3, left shoulder flexion 3, left finger flexion 1   Psychiatric -      RECENT LABS                        12.1   6.22  )-----------( 280      ( 31 Jul 2023 06:10 )             36.8     07-31    135  |  99  |  9   ----------------------------<  98  3.7   |  27  |  0.44<L>    Ca    9.2      31 Jul 2023 06:10    TPro  6.4  /  Alb  2.8<L>  /  TBili  0.2  /  DBili  x   /  AST  13  /  ALT  18  /  AlkPhos  50  07-31      Urinalysis Basic - ( 31 Jul 2023 06:10 )    Color: x / Appearance: x / SG: x / pH: x  Gluc: 98 mg/dL / Ketone: x  / Bili: x / Urobili: x   Blood: x / Protein: x / Nitrite: x   Leuk Esterase: x / RBC: x / WBC x   Sq Epi: x / Non Sq Epi: x / Bacteria: x          RADIOLOGY/OTHER RESULTS      MEDICATIONS  (STANDING):  divalproex Sprinkle 500 milliGRAM(s) Oral <User Schedule>  divalproex Sprinkle 750 milliGRAM(s) Oral <User Schedule>  enoxaparin Injectable 40 milliGRAM(s) SubCutaneous every 24 hours  escitalopram 10 milliGRAM(s) Oral <User Schedule>  gabapentin 300 milliGRAM(s) Oral <User Schedule>  gabapentin 400 milliGRAM(s) Oral at bedtime  lacosamide 150 milliGRAM(s) Oral two times a day  levothyroxine 75 MICROGram(s) Oral daily  memantine 5 milliGRAM(s) Oral <User Schedule>  pantoprazole    Tablet 40 milliGRAM(s) Oral before breakfast  polyethylene glycol 3350 17 Gram(s) Oral at bedtime  senna 2 Tablet(s) Oral at bedtime  tiZANidine 2 milliGRAM(s) Oral <User Schedule>  tiZANidine 4 milliGRAM(s) Oral at bedtime  traZODone 50 milliGRAM(s) Oral at bedtime    MEDICATIONS  (PRN):  acetaminophen     Tablet .. 975 milliGRAM(s) Oral every 6 hours PRN Mild Pain (1 - 3), Moderate Pain (4 - 6), Severe Pain (7 - 10)  bacitracin   Ointment 1 Application(s) Topical three times a day PRN scalp irritation  bisacodyl Suppository 10 milliGRAM(s) Rectal daily PRN Constipation         HPI:  38 year old female w/ PMHx hypothyroidism who was admitted to [facility] 6/6 after being found on floor at home, with CT revealing large right-sided ICH. She required emergent right frontal craniectomy for decompression and EVD placement. EVD subsequently removed 6/16. Angio negative. Patient underwent G tube placement 6/20. Hospital course notable for fluctuating leukocytosis and high-grade temperatures with no identified source of infection including in urine, blood, CSF. Now admitted for multidisciplinary rehab. (26 Jun 2023 13:08)          Subjective:  Seen this AM.  pt's wife and  at bedside.  Pt. slept well during the night as per spouse.  Pt. reports tingling, pins and needles pain in left UE and becomes emotional when stretched or manipulated for strength testing.  Needs VC to redirect and focus.        VITALS  Vital Signs Last 24 Hrs  T(C): 37.1 (30 Jul 2023 19:34), Max: 37.1 (30 Jul 2023 19:34)  T(F): 98.7 (30 Jul 2023 19:34), Max: 98.7 (30 Jul 2023 19:34)  HR: 76 (30 Jul 2023 19:34) (75 - 76)  BP: 125/85 (30 Jul 2023 19:34) (113/76 - 125/85)  BP(mean): --  RR: 16 (30 Jul 2023 19:34) (16 - 16)  SpO2: 97% (30 Jul 2023 19:34) (95% - 97%)    Parameters below as of 30 Jul 2023 19:34  Patient On (Oxygen Delivery Method): room air        REVIEW OF SYMPTOMS  Neurological deficits      PHYSICAL EXAM  Constitutional - NAD,   HEENT - Right frontal hemicraniectomy incision site clean dry and intact   Chest - Breathing comfortably on RA   Cardiovascular - RRR,  warm well perfused  Abdomen - NT, soft  Extremities - No edema, No calf tenderness   Neurologic Exam -                    Cognitive - Awake, Alert, AAO to self, place, date, year, situation     Communication - Fluent, No dysarthria,       Motor  Left hemiparesis--hip and knee 1/5, 0/5 distally;  shoulder 1/5, Trace elbow flexion; 0/5 distal.  Right side 5/5.      Sensory - Impaired to left UE and LE      Coordination - Impaired.   Spasticity-- left shoulder abduction 3, left shoulder flexion 3, left finger flexion 1; PF 3/4  Psychiatric - anxious      RECENT LABS                        12.1   6.22  )-----------( 280      ( 31 Jul 2023 06:10 )             36.8     07-31    135  |  99  |  9   ----------------------------<  98  3.7   |  27  |  0.44<L>    Ca    9.2      31 Jul 2023 06:10    TPro  6.4  /  Alb  2.8<L>  /  TBili  0.2  /  DBili  x   /  AST  13  /  ALT  18  /  AlkPhos  50  07-31      Urinalysis Basic - ( 31 Jul 2023 06:10 )    Color: x / Appearance: x / SG: x / pH: x  Gluc: 98 mg/dL / Ketone: x  / Bili: x / Urobili: x   Blood: x / Protein: x / Nitrite: x   Leuk Esterase: x / RBC: x / WBC x   Sq Epi: x / Non Sq Epi: x / Bacteria: x          RADIOLOGY/OTHER RESULTS      MEDICATIONS  (STANDING):  divalproex Sprinkle 500 milliGRAM(s) Oral <User Schedule>  divalproex Sprinkle 750 milliGRAM(s) Oral <User Schedule>  enoxaparin Injectable 40 milliGRAM(s) SubCutaneous every 24 hours  escitalopram 10 milliGRAM(s) Oral <User Schedule>  gabapentin 300 milliGRAM(s) Oral <User Schedule>  gabapentin 400 milliGRAM(s) Oral at bedtime  lacosamide 150 milliGRAM(s) Oral two times a day  levothyroxine 75 MICROGram(s) Oral daily  memantine 5 milliGRAM(s) Oral <User Schedule>  pantoprazole    Tablet 40 milliGRAM(s) Oral before breakfast  polyethylene glycol 3350 17 Gram(s) Oral at bedtime  senna 2 Tablet(s) Oral at bedtime  tiZANidine 2 milliGRAM(s) Oral <User Schedule>  tiZANidine 4 milliGRAM(s) Oral at bedtime  traZODone 50 milliGRAM(s) Oral at bedtime    MEDICATIONS  (PRN):  acetaminophen     Tablet .. 975 milliGRAM(s) Oral every 6 hours PRN Mild Pain (1 - 3), Moderate Pain (4 - 6), Severe Pain (7 - 10)  bacitracin   Ointment 1 Application(s) Topical three times a day PRN scalp irritation  bisacodyl Suppository 10 milliGRAM(s) Rectal daily PRN Constipation

## 2023-07-31 NOTE — PROGRESS NOTE ADULT - ASSESSMENT
37 y/o F w/ PMhx hypothyroidism admitted to [facility] 6/6 after being found unresponsive, found to have large right frontal IPH w/ IVH and hydrocephalus. Now s/p R krishna craniectomy for evacuation on 6/6. PEG placed 6/20. Now admitted for multidisciplinary rehab- pt/ot/dvt ppx    #IPH with IVH, R frontal lobe  - Amantadine, trazodone per rehab   - Antiepileptic regimen: depakote 500mg daily, continue Vimpat 150 mg BID.   - Helmet when OOB    #Normocytic anemia  - H/H stable, monitor for Hb < 7  - Iron/B12/folate normal     #HTN  - Lisinopril held due to hypotension    #Dysphagia s/p PEG  - s/p self removal of PEG tube, tolerating PO diet, PEG not replaced  - GI signed off, monitor tolerating PO diet    #Hypothyroidism  - Continue synthroid     #DVT ppx - Lovenox

## 2023-07-31 NOTE — PROGRESS NOTE ADULT - SUBJECTIVE AND OBJECTIVE BOX
Patient is a 38y old  Female who presents with a chief complaint of IP (31 Jul 2023 08:25)    Reoports having nausea, no vomiting  Last BM yesteday AM  Denies chest pain, SOB  Patient seen and examined at bedside.    ALLERGIES:  No Known Allergies    MEDICATIONS  (STANDING):  divalproex Sprinkle 750 milliGRAM(s) Oral <User Schedule>  divalproex Sprinkle 500 milliGRAM(s) Oral <User Schedule>  enoxaparin Injectable 40 milliGRAM(s) SubCutaneous every 24 hours  escitalopram 10 milliGRAM(s) Oral <User Schedule>  gabapentin 300 milliGRAM(s) Oral <User Schedule>  gabapentin 400 milliGRAM(s) Oral at bedtime  lacosamide 150 milliGRAM(s) Oral two times a day  levothyroxine 75 MICROGram(s) Oral daily  memantine 5 milliGRAM(s) Oral <User Schedule>  pantoprazole    Tablet 40 milliGRAM(s) Oral before breakfast  polyethylene glycol 3350 17 Gram(s) Oral at bedtime  senna 2 Tablet(s) Oral at bedtime  tiZANidine 2 milliGRAM(s) Oral <User Schedule>  tiZANidine 4 milliGRAM(s) Oral at bedtime  traZODone 50 milliGRAM(s) Oral at bedtime    MEDICATIONS  (PRN):  acetaminophen     Tablet .. 975 milliGRAM(s) Oral every 6 hours PRN Mild Pain (1 - 3), Moderate Pain (4 - 6), Severe Pain (7 - 10)  bacitracin   Ointment 1 Application(s) Topical three times a day PRN scalp irritation  bisacodyl Suppository 10 milliGRAM(s) Rectal daily PRN Constipation    Vital Signs Last 24 Hrs  T(F): 98.7 (30 Jul 2023 19:34), Max: 98.7 (30 Jul 2023 19:34)  HR: 76 (30 Jul 2023 19:34) (76 - 76)  BP: 125/85 (30 Jul 2023 19:34) (125/85 - 125/85)  RR: 16 (30 Jul 2023 19:34) (16 - 16)  SpO2: 97% (30 Jul 2023 19:34) (97% - 97%)  I&O's Summary      PHYSICAL EXAM:  General: NAD, A/O x 3  ENT: MMM, no scleral icterus  Head: Craniotomy noted  Neck: Supple, No JVD, no thyroidomegaly  Lungs: Clear to auscultation bilaterally, no wheezes, no rales, no rhonchi, good inspiratory effort  Cardio: RRR, S1/S2, No murmurs  Abdomen: +tenderness in RLQ, but Soft, Nondistended; Bowel sounds present  Extremities: left sided weakness noted, No calf tenderness, No pitting edema, no skin changes    LABS:                        12.1   6.22  )-----------( 280      ( 31 Jul 2023 06:10 )             36.8       07-31    135  |  99  |  9   ----------------------------<  98  3.7   |  27  |  0.44    Ca    9.2      31 Jul 2023 06:10    TPro  6.4  /  Alb  2.8  /  TBili  0.2  /  DBili  x   /  AST  13  /  ALT  18  /  AlkPhos  50  07-31     Urinalysis Basic - ( 31 Jul 2023 06:10 )    Color: x / Appearance: x / SG: x / pH: x  Gluc: 98 mg/dL / Ketone: x  / Bili: x / Urobili: x   Blood: x / Protein: x / Nitrite: x   Leuk Esterase: x / RBC: x / WBC x   Sq Epi: x / Non Sq Epi: x / Bacteria: x

## 2023-07-31 NOTE — PROGRESS NOTE ADULT - ASSESSMENT
ASSESSMENT/PLAN:  38y Female h/o hypothyroid with functional deficits after right frontal IPHs/p right hemicraniectomy.     #Right Hemicraniectomy and EVD on 6/6  - Communication with patients family, neuroplastic surgeon Dr. Sy ([phone]) and PM&R team has taken place.   - neuroendocrine workup ordered, and nml   - cranioplasty appointment on 7/18 with Dr. Sy- due to healing of crani site would tentatively recommend cranioplasty in September 2023, - CTH with 3D reconstruction completed 7/24 for implant planning.     # seizure Disorder  #agitation   -Briviact 50mg BID was discontinued 7/14.   -Depakote 500mg in AM, 750mg in evening   -Cont [id number] BID      -Valproic acid and ammonia level, 7/28 were normal.  --BMP on 7/28, wnl    Neuropathic Pain--  --Increase Gabapentin to 400mg and move dosing to earlier at 12pm to see if helps improve OT at 1pm, continue with gabapentin 400mg at bedtime, --d/w pt's mother.       #Anxiety/depression/cognitive deficit   - Cont. Lexapro 10mg in the evening  - cont. Memantine 5mg BID-> monitor    #Hyponatremia  -133 (7/27) --> 137 (7/28)--> 135 (7/31)  - today Na is wnl, continue to monitor    #Insomnia    -cont. Tylenol PRN  -cont Trazodone 50mg at bedtime--decreased as 75mg was making pt. too fatigued.   -Pt. limited by left UE spasticity that may be contributing to lability and behavior issues in OT       Spasticity--   --cont Tizanidine 2mg at 3pm daily & 4mg at bedtime.   -Botox to Lpec and Lbicep 7/14-tolerated procedure well. Therapist reports increase in ROM.     #constipation  -Senna at bedtime    -Miralax at bedtime   -Give suppository PRN    #hx of anemia    -H/H stable     #hx of HTN --BP soft  -Monitor    #DVT ppx   - cont lovenox    IDT 7/27:  Nursing: incontinent B/B  SLP: on soft bite sized with thin liquids.  Mild language deficits impacted by cognitive deficits, severe cognitive deficits-- limited by attention, memory, reasoning deficits.  Not on track for goals--barriers lability.  Attends to Structured tasks for 10min.    OT: Mod assist–eating/grooming, toilet transfers; Tot assist–bathing and dressing & toileting;  PT: Mod -Max A--Bed mobility: Max A--squat pivot transfer & Ambulating by HW rail 20ft--Max A and WCf.  WC tot A.   Goals: 1 Pivot and transfers, bed mobility mod-A, 2 attend to structured tasks for 30 minutes   Dispo: [facility] ASAP      Rehab plan of care d/w pt's family.

## 2023-08-01 ENCOUNTER — TRANSCRIPTION ENCOUNTER (OUTPATIENT)
Age: 38
End: 2023-08-01

## 2023-08-01 VITALS
SYSTOLIC BLOOD PRESSURE: 113 MMHG | DIASTOLIC BLOOD PRESSURE: 77 MMHG | HEART RATE: 82 BPM | TEMPERATURE: 98 F | OXYGEN SATURATION: 96 % | RESPIRATION RATE: 15 BRPM

## 2023-08-01 PROCEDURE — 99232 SBSQ HOSP IP/OBS MODERATE 35: CPT

## 2023-08-01 PROCEDURE — 93010 ELECTROCARDIOGRAM REPORT: CPT

## 2023-08-01 PROCEDURE — 99238 HOSP IP/OBS DSCHRG MGMT 30/<: CPT

## 2023-08-01 RX ORDER — DIVALPROEX SODIUM 500 MG/1
4 TABLET, DELAYED RELEASE ORAL
Qty: 0 | Refills: 0 | DISCHARGE
Start: 2023-08-01

## 2023-08-01 RX ORDER — MEMANTINE HYDROCHLORIDE 10 MG/1
1 TABLET ORAL
Qty: 0 | Refills: 0 | DISCHARGE
Start: 2023-08-01

## 2023-08-01 RX ORDER — LACOSAMIDE 50 MG/1
1 TABLET ORAL
Qty: 0 | Refills: 0 | DISCHARGE
Start: 2023-08-01

## 2023-08-01 RX ORDER — GABAPENTIN 400 MG/1
1 CAPSULE ORAL
Qty: 0 | Refills: 0 | DISCHARGE
Start: 2023-08-01

## 2023-08-01 RX ORDER — ESCITALOPRAM OXALATE 10 MG/1
3 TABLET, FILM COATED ORAL
Qty: 0 | Refills: 0 | DISCHARGE
Start: 2023-08-01

## 2023-08-01 RX ORDER — DIVALPROEX SODIUM 500 MG/1
6 TABLET, DELAYED RELEASE ORAL
Qty: 0 | Refills: 0 | DISCHARGE
Start: 2023-08-01

## 2023-08-01 RX ORDER — SENNA PLUS 8.6 MG/1
2 TABLET ORAL
Qty: 0 | Refills: 0 | DISCHARGE
Start: 2023-08-01

## 2023-08-01 RX ORDER — ESCITALOPRAM OXALATE 10 MG/1
15 TABLET, FILM COATED ORAL
Refills: 0 | Status: DISCONTINUED | OUTPATIENT
Start: 2023-08-01 | End: 2023-08-01

## 2023-08-01 RX ORDER — PANTOPRAZOLE SODIUM 20 MG/1
1 TABLET, DELAYED RELEASE ORAL
Qty: 0 | Refills: 0 | DISCHARGE
Start: 2023-08-01

## 2023-08-01 RX ORDER — TIZANIDINE 4 MG/1
1 TABLET ORAL
Qty: 0 | Refills: 0 | DISCHARGE
Start: 2023-08-01

## 2023-08-01 RX ORDER — BACITRACIN ZINC 500 UNIT/G
1 OINTMENT IN PACKET (EA) TOPICAL
Qty: 0 | Refills: 0 | DISCHARGE
Start: 2023-08-01

## 2023-08-01 RX ORDER — ACETAMINOPHEN 500 MG
3 TABLET ORAL
Qty: 0 | Refills: 0 | DISCHARGE
Start: 2023-08-01

## 2023-08-01 RX ORDER — TRAZODONE HCL 50 MG
1 TABLET ORAL
Qty: 0 | Refills: 0 | DISCHARGE
Start: 2023-08-01

## 2023-08-01 RX ORDER — POLYETHYLENE GLYCOL 3350 17 G/17G
17 POWDER, FOR SOLUTION ORAL
Qty: 0 | Refills: 0 | DISCHARGE
Start: 2023-08-01

## 2023-08-01 RX ORDER — LEVOTHYROXINE SODIUM 125 MCG
1 TABLET ORAL
Qty: 0 | Refills: 0 | DISCHARGE
Start: 2023-08-01

## 2023-08-01 RX ADMIN — DIVALPROEX SODIUM 500 MILLIGRAM(S): 500 TABLET, DELAYED RELEASE ORAL at 05:30

## 2023-08-01 RX ADMIN — LACOSAMIDE 150 MILLIGRAM(S): 50 TABLET ORAL at 05:29

## 2023-08-01 RX ADMIN — ENOXAPARIN SODIUM 40 MILLIGRAM(S): 100 INJECTION SUBCUTANEOUS at 05:29

## 2023-08-01 RX ADMIN — Medication 975 MILLIGRAM(S): at 08:55

## 2023-08-01 RX ADMIN — GABAPENTIN 400 MILLIGRAM(S): 400 CAPSULE ORAL at 12:33

## 2023-08-01 RX ADMIN — MEMANTINE HYDROCHLORIDE 5 MILLIGRAM(S): 10 TABLET ORAL at 08:04

## 2023-08-01 RX ADMIN — Medication 75 MICROGRAM(S): at 05:30

## 2023-08-01 RX ADMIN — PANTOPRAZOLE SODIUM 40 MILLIGRAM(S): 20 TABLET, DELAYED RELEASE ORAL at 05:36

## 2023-08-01 RX ADMIN — Medication 975 MILLIGRAM(S): at 09:36

## 2023-08-01 NOTE — CHART NOTE - NSCHARTNOTESELECT_GEN_ALL_CORE
Neuropsychology
Nutrition Services
Botox injections left UE/Event Note
Event Note
GI Note/Event Note
IPOC/Event Note
Neuropsychology
Nutrition Services

## 2023-08-01 NOTE — PROGRESS NOTE ADULT - REASON FOR ADMISSION
IPH
s/p IPH
IPH

## 2023-08-01 NOTE — DISCHARGE NOTE NURSING/CASE MANAGEMENT/SOCIAL WORK - PATIENT PORTAL LINK FT
You can access the FollowMyHealth Patient Portal offered by [facility] by registering at the following website: [email or web address]. By joining [employer]’s FollowMyHealth portal, you will also be able to view your health information using other applications (apps) compatible with our system.

## 2023-08-01 NOTE — DISCHARGE NOTE PROVIDER - NSDCCPCAREPLAN_GEN_ALL_CORE_FT
PRINCIPAL DISCHARGE DIAGNOSIS  Diagnosis: ICH (intracerebral hemorrhage)  Assessment and Plan of Treatment: Right hemicraniectomy for evacuation of hematoma (bone flap discarded**) and intraop EVD placed, subgaleal drain placed 6/6  Cerebral angiogram negative for vascular malformation 6/9  Subgaleal drain removed 6/10. EVD removed 6/17. Surgical staples removed 6/23   The patient has NO bone flap on R side   CUSTOM FITTED HELMET AT BEDSIDE - TO BE WORN WHEN OUT OF BED  Trazadone 50 mg qhs to help w/ sleep wake cycle  Neuropathic pain treated on Gabapentin 400 BID (afternoon and bedtime to avoid sedation)  Left sided Spasticity on tizanidine 2mg @ 3p and 4mg @ bedtime.  also received Botox (7/14) - next botox for October with Dr. Sanford  Continue seizure medications: Vimpat 150mg twice daily, and depakote 500 @6am/750mg @6p)  You're swallowing is improving, you went from not swallowing on tube feeding to now soft/bite side   PLEASE CONTACT DR MOHAN LEE NEUROPLASTIC SURGEON for follow to discuss CRANIOPLASTY PLANNING   - [phone]  Please make all necessary appointments and follow up. Please DO NOT take any Aspirin and NSAIDs (Advil, Aleve, Motrin, Ibuprofen) until cleared by your Neurosurgeon. Please DO NOT do any heavy lifting, bending, twisting and straining. DO NOT do any scrubbing of surgical site. Pat dry only.

## 2023-08-01 NOTE — PROGRESS NOTE ADULT - SUBJECTIVE AND OBJECTIVE BOX
HPI:  38 year old female w/ PMHx hypothyroidism who was admitted to [facility] 6/6 after being found on floor at home, with CT revealing large right-sided ICH. She required emergent right frontal craniectomy for decompression and EVD placement. EVD subsequently removed 6/16. Angio negative. Patient underwent G tube placement 6/20. Hospital course notable for fluctuating leukocytosis and high-grade temperatures with no identified source of infection including in urine, blood, CSF. Now admitted for multidisciplinary rehab. (26 Jun 2023 13:08)    Subjective:  No acute overnight events. Spoke to patient by bedside. She slept well (about 10 hours). She continues to have pain in her left arm and left leg. She his having regular bowel movements. She denies any CP/SOB/NV. She denies any new numbness tingling or weakness. VSS. afebrile.     VITALS  Vital Signs Last 24 Hrs  T(C): 37.1 (30 Jul 2023 19:34), Max: 37.1 (30 Jul 2023 19:34)  T(F): 98.7 (30 Jul 2023 19:34), Max: 98.7 (30 Jul 2023 19:34)  HR: 76 (30 Jul 2023 19:34) (75 - 76)  BP: 125/85 (30 Jul 2023 19:34) (113/76 - 125/85)  BP(mean): --  RR: 16 (30 Jul 2023 19:34) (16 - 16)  SpO2: 97% (30 Jul 2023 19:34) (95% - 97%)    Parameters below as of 30 Jul 2023 19:34  Patient On (Oxygen Delivery Method): room air    REVIEW OF SYMPTOMS  Neurological deficits      PHYSICAL EXAM  Constitutional - NAD   HEENT - Right frontal hemicraniectomy incision site clean dry and intact   Chest - Breathing comfortably on RA   Cardiovascular - RRR,  warm well perfused  Abdomen - NT, soft  Extremities - No edema, No calf tenderness   Neurologic Exam -                    Cognitive - Awake, Alert, AAO to self, place, date, year, situation     Communication - Fluent, answering questions appropriately     Motor  Left hemiparesis--hip and knee 1/5, 0/5 distally;  shoulder 1/5, Trace elbow flexion; 0/5 distal.  Right side 5/5.      Sensory - Impaired to left UE and LE     Coordination - Impaired   Spasticity-- left shoulder abduction 3, left shoulder flexion 3, left finger flexion 1; PF 3/4  Psychiatric - anxious    RECENT LABS                        12.1   6.22  )-----------( 280      ( 31 Jul 2023 06:10 )             36.8     07-31    135  |  99  |  9   ----------------------------<  98  3.7   |  27  |  0.44<L>    Ca    9.2      31 Jul 2023 06:10    TPro  6.4  /  Alb  2.8<L>  /  TBili  0.2  /  DBili  x   /  AST  13  /  ALT  18  /  AlkPhos  50  07-31      Urinalysis Basic - ( 31 Jul 2023 06:10 )    Color: x / Appearance: x / SG: x / pH: x  Gluc: 98 mg/dL / Ketone: x  / Bili: x / Urobili: x   Blood: x / Protein: x / Nitrite: x   Leuk Esterase: x / RBC: x / WBC x   Sq Epi: x / Non Sq Epi: x / Bacteria: x    RADIOLOGY/OTHER RESULTS  Reviewed     MEDICATIONS  (STANDING):  divalproex Sprinkle 500 milliGRAM(s) Oral <User Schedule>  divalproex Sprinkle 750 milliGRAM(s) Oral <User Schedule>  enoxaparin Injectable 40 milliGRAM(s) SubCutaneous every 24 hours  escitalopram 10 milliGRAM(s) Oral <User Schedule>  gabapentin 300 milliGRAM(s) Oral <User Schedule>  gabapentin 400 milliGRAM(s) Oral at bedtime  lacosamide 150 milliGRAM(s) Oral two times a day  levothyroxine 75 MICROGram(s) Oral daily  memantine 5 milliGRAM(s) Oral <User Schedule>  pantoprazole    Tablet 40 milliGRAM(s) Oral before breakfast  polyethylene glycol 3350 17 Gram(s) Oral at bedtime  senna 2 Tablet(s) Oral at bedtime  tiZANidine 2 milliGRAM(s) Oral <User Schedule>  tiZANidine 4 milliGRAM(s) Oral at bedtime  traZODone 50 milliGRAM(s) Oral at bedtime    MEDICATIONS  (PRN):  acetaminophen     Tablet .. 975 milliGRAM(s) Oral every 6 hours PRN Mild Pain (1 - 3), Moderate Pain (4 - 6), Severe Pain (7 - 10)  bacitracin   Ointment 1 Application(s) Topical three times a day PRN scalp irritation  bisacodyl Suppository 10 milliGRAM(s) Rectal daily PRN Constipation      ASSESSMENT/PLAN:  38y Female h/o hypothyroid with functional deficits after right frontal IPHs/p right hemicraniectomy.     #Right Hemicraniectomy and EVD on 6/6  - Communication with patients family, neuroplastic surgeon Dr. Sy ([phone]) and PM&R team has taken place.   - neuroendocrine workup ordered, and nml   - cranioplasty appointment on 7/18 with Dr. Sy- due to healing of crani site would tentatively recommend cranioplasty in September 2023, - CTH with 3D reconstruction completed 7/24 for implant planning.   -No new neurological deficits     #Seizure Disorder  #Agitation   -Briviact 50mg BID was discontinued 7/14.   -Depakote 500mg in AM, 750mg in evening   -[id number] BID        #Neuropathic Pain  -Gabapentin 400mg daily   -Gabapentin 400mg at bedtime   -Consider increasing if patients pain persists     #Nociceptive pain   -Tylenol PRN     #Anxiety/depression/cognitive deficit   - Lexapro 10mg in the evening-> consider increasing after r/o QTc prolongation on EKG   - Memantine 5mg BID    #Hyponatremia  -133 (7/27) --> 137 (7/28)--> 135 (7/31)  -Resolved     #Insomnia  -Trazodone 50mg at bedtimeT    #Spasticity   -s/p botox Lpec and Lbicep 7/14   -Tizanidine 2mg 1500    -Tizanidine 4mg at bedtime     #Constipation  -Senna at bedtime    -Miralax at bedtime     #Hx of Anemia    -H/H stable     #Hx of HTN   -Monitor    #Hypothyroid   -Levothyroxine     #DVT ppx   -Lovenox    DIET  -Soft Bite Sized     IDT 7/27:  Nursing: incontinent B/B  SLP: on soft bite sized with thin liquids.  Mild language deficits impacted by cognitive deficits, severe cognitive deficits-- limited by attention, memory, reasoning deficits.  Not on track for goals--barriers lability.  Attends to Structured tasks for 10min.    OT: Mod assist–eating/grooming, toilet transfers; Tot assist–bathing and dressing & toileting;  PT: Mod -Max A--Bed mobility: Max A--squat pivot transfer & Ambulating by  rail 20ft--Max A and WCf.  WC tot A.   Goals: 1 Pivot and transfers, bed mobility mod-A, 2 attend to structured tasks for 30 minutes   Dispo: [facility] ASAP    Rehab plan of care d/w pt's family.                   HPI:  38 year old female w/ PMHx hypothyroidism who was admitted to [facility] 6/6 after being found on floor at home, with CT revealing large right-sided ICH. She required emergent right frontal craniectomy for decompression and EVD placement. EVD subsequently removed 6/16. Angio negative. Patient underwent G tube placement 6/20. Hospital course notable for fluctuating leukocytosis and high-grade temperatures with no identified source of infection including in urine, blood, CSF. Now admitted for multidisciplinary rehab. (26 Jun 2023 13:08)    Subjective:  No acute overnight events. Spoke to patient by bedside. She slept well (about 10 hours). She continues to have pain in her left arm and left leg. She his having regular bowel movements. She denies any CP/SOB/NV. She denies any new numbness tingling or weakness. VSS. afebrile. Pt. did better in PT today--less anxious but it is still a barrier.      VITALS  Vital Signs Last 24 Hrs  T(C): 37.1 (30 Jul 2023 19:34), Max: 37.1 (30 Jul 2023 19:34)  T(F): 98.7 (30 Jul 2023 19:34), Max: 98.7 (30 Jul 2023 19:34)  HR: 76 (30 Jul 2023 19:34) (75 - 76)  BP: 125/85 (30 Jul 2023 19:34) (113/76 - 125/85)  BP(mean): --  RR: 16 (30 Jul 2023 19:34) (16 - 16)  SpO2: 97% (30 Jul 2023 19:34) (95% - 97%)    Parameters below as of 30 Jul 2023 19:34  Patient On (Oxygen Delivery Method): room air    REVIEW OF SYMPTOMS  Neurological deficits      PHYSICAL EXAM  Constitutional - NAD   HEENT - Right frontal hemicraniectomy incision site clean dry and intact   Chest - Breathing comfortably on RA   Cardiovascular - RRR,  warm well perfused  Abdomen - NT, soft  Extremities - No edema, No calf tenderness   Neurologic Exam -                    Cognitive - Awake, Alert, AAO to self, place, date, year, situation     Communication - Fluent, answering questions appropriately     Motor  Left hemiparesis--hip and knee 1/5, 0/5 distally;  shoulder 1/5, Trace elbow flexion; 0/5 distal.  Right side 5/5.      Sensory - Impaired to left UE and LE     Coordination - Impaired   Spasticity-- left shoulder abduction 3, left shoulder flexion 3, left finger flexion 1; PF 3/4  Psychiatric - anxious    RECENT LABS                        12.1   6.22  )-----------( 280      ( 31 Jul 2023 06:10 )             36.8     07-31    135  |  99  |  9   ----------------------------<  98  3.7   |  27  |  0.44<L>    Ca    9.2      31 Jul 2023 06:10    TPro  6.4  /  Alb  2.8<L>  /  TBili  0.2  /  DBili  x   /  AST  13  /  ALT  18  /  AlkPhos  50  07-31      Urinalysis Basic - ( 31 Jul 2023 06:10 )    Color: x / Appearance: x / SG: x / pH: x  Gluc: 98 mg/dL / Ketone: x  / Bili: x / Urobili: x   Blood: x / Protein: x / Nitrite: x   Leuk Esterase: x / RBC: x / WBC x   Sq Epi: x / Non Sq Epi: x / Bacteria: x    RADIOLOGY/OTHER RESULTS  Reviewed     MEDICATIONS  (STANDING):  divalproex Sprinkle 500 milliGRAM(s) Oral <User Schedule>  divalproex Sprinkle 750 milliGRAM(s) Oral <User Schedule>  enoxaparin Injectable 40 milliGRAM(s) SubCutaneous every 24 hours  escitalopram 10 milliGRAM(s) Oral <User Schedule>  gabapentin 300 milliGRAM(s) Oral <User Schedule>  gabapentin 400 milliGRAM(s) Oral at bedtime  lacosamide 150 milliGRAM(s) Oral two times a day  levothyroxine 75 MICROGram(s) Oral daily  memantine 5 milliGRAM(s) Oral <User Schedule>  pantoprazole    Tablet 40 milliGRAM(s) Oral before breakfast  polyethylene glycol 3350 17 Gram(s) Oral at bedtime  senna 2 Tablet(s) Oral at bedtime  tiZANidine 2 milliGRAM(s) Oral <User Schedule>  tiZANidine 4 milliGRAM(s) Oral at bedtime  traZODone 50 milliGRAM(s) Oral at bedtime    MEDICATIONS  (PRN):  acetaminophen     Tablet .. 975 milliGRAM(s) Oral every 6 hours PRN Mild Pain (1 - 3), Moderate Pain (4 - 6), Severe Pain (7 - 10)  bacitracin   Ointment 1 Application(s) Topical three times a day PRN scalp irritation  bisacodyl Suppository 10 milliGRAM(s) Rectal daily PRN Constipation      ASSESSMENT/PLAN:  38y Female h/o hypothyroid with functional deficits after right frontal IPHs/p right hemicraniectomy.     Patient received authorization for [facility] at [facility] today.  d/w SW.  Pt. medically stable for discharge today.      #Right Hemicraniectomy and EVD on 6/6  - Communication with patients family, neuroplastic surgeon Dr. Sy ([phone]) and PM&R team has taken place.   - neuroendocrine workup ordered, and nml   - cranioplasty appointment on 7/18 with Dr. Sy- due to healing of crani site would tentatively recommend cranioplasty in September 2023, - CTH with 3D reconstruction completed 7/24 for implant planning.   -No new neurological deficits     #Seizure Disorder  #Agitation   -Briviact 50mg BID was discontinued 7/14.   -Depakote 500mg in AM, 750mg in evening   -[id number] BID        #Neuropathic Pain  -cont. Gabapentin 400mg BID daily & at bedtime       #Nociceptive pain   -Tylenol PRN     #Anxiety/depression/cognitive deficit   - Lexapro 10mg in the evening-> Will increase to 15mg in PM--EKG to verify that QTc interval is normal  - Memantine 5mg BID    #Hyponatremia  -133 (7/27) --> 137 (7/28)--> 135 (7/31)  -Resolved     #Insomnia  -cont. Trazodone 50mg at bedtimeT    #Spasticity   -s/p botox Lpec and Lbicep 7/14   -Tizanidine 2mg 1500    -Tizanidine 4mg at bedtime     #Constipation  -Senna at bedtime    -Miralax at bedtime     #Hx of Anemia    -H/H stable     #Hx of HTN   -Monitor    #Hypothyroid   -Levothyroxine     #DVT ppx   -Lovenox    DIET  -Soft Bite Sized     IDT 7/27:  Nursing: incontinent B/B  SLP: on soft bite sized with thin liquids.  Mild language deficits impacted by cognitive deficits, severe cognitive deficits-- limited by attention, memory, reasoning deficits.  Not on track for goals--barriers lability.  Attends to Structured tasks for 10min.    OT: Mod assist–eating/grooming, toilet transfers; Tot assist–bathing and dressing & toileting;  PT: Mod -Max A--Bed mobility: Max A--squat pivot transfer & Ambulating by HW rail 20ft--Max A and WCf.  WC tot A.   Goals: 1 Pivot and transfers, bed mobility mod-A, 2 attend to structured tasks for 30 minutes   Dispo: [facility] ASAP    Rehab plan of care d/w pt's family.

## 2023-08-01 NOTE — PROGRESS NOTE ADULT - ATTENDING COMMENTS
Pt. seen with fellow.  Agree with documentation above as per fellow with amendments made as appropriate. Patient medically stable. Making progress towards rehab goals.     right ICH s/p hemicraniectomy  Pt. limited in therapy by Anxiety-- on Lexapro 10mg in the evening-> Will increase to 15mg in PM--EKG to verify that QTc interval is normal    Patient received authorization for [facility] at [facility] today.  d/w SW.  Pt. medically stable for discharge today.

## 2023-08-01 NOTE — DISCHARGE NOTE PROVIDER - PROVIDER TOKENS
PROVIDER:[TOKEN:[[id number]],FOLLOWUP:[2 weeks]],PROVIDER:[TOKEN:[[id number]],FOLLOWUP:[1 month]] PROVIDER:[TOKEN:[[id number]],FOLLOWUP:[2 months]],PROVIDER:[TOKEN:[[id number]],FOLLOWUP:[1 month]]

## 2023-08-01 NOTE — DISCHARGE NOTE PROVIDER - CARE PROVIDERS DIRECT ADDRESSES
,DirectAddress_Unknown,[email or web address],DirectAddress_Unknown

## 2023-08-01 NOTE — DISCHARGE NOTE NURSING/CASE MANAGEMENT/SOCIAL WORK - NSDCPEFALRISK_GEN_ALL_CORE
For information on Fall & Injury Prevention, visit: [email or web address] OR  [email or web address] OR  https://www.cdc.gov/steadi/patient.html

## 2023-08-01 NOTE — DISCHARGE NOTE PROVIDER - HOSPITAL COURSE
38 year old female w/ PMHx hypothyroidism who was admitted to [facility] 6/6 after being found on floor at home, with CT revealing large right-sided ICH. She required emergent right frontal craniectomy for decompression and EVD placement. EVD subsequently removed 6/16. Angio negative. Patient underwent G tube placement 6/20. Hospital course notable for fluctuating leukocytosis and high-grade temperatures with no identified source of infection including in urine, blood, CSF.     Pt was stable upon rehab admission [facility]. Admitted with gait instabilty, ADL, and functional impairments.     Rehab Course significant for:  - Diet progression: admitted on NPO with TF, now on soft/bite size   - agitation/anxiety/insomnia.  medication regimen was adjusted.  currently on: depakote 500mg/750mg , lexapro (titrating up - increased to 15mg on 8/41 since QTC is good 393), and trazodone 50mg  - neuropathic pain + Spasticity: Gabapentin 400 (2pm) /400 (at bedtime), Tizanidine 2mg @3p/4mg @bedtime  - Botox to ec and Lbicep 7/14 - next botox for October    All other medical co-morbidities were stable.     Pt tolerated course of inpatient PT/OT/SLP rehab with significant functional improvements and met rehab goals prior to discharge.    Pt was medically cleared on _8/1__  for discharged to __[facility]__   38 year old female w/ PMHx hypothyroidism who was admitted to [facility] 6/6 after being found on floor at home, with CT revealing large right-sided ICH. She required emergent right frontal craniectomy for decompression and EVD placement. EVD subsequently removed 6/16. Angio negative. Patient underwent G tube placement 6/20. Hospital course notable for fluctuating leukocytosis and high-grade temperatures with no identified source of infection including in urine, blood, CSF.     Pt was stable upon rehab admission to  [facility] on [facility]/2023. Admitted with gait instabilty, ADL, and functional impairments.     Rehab Course significant for:  - Diet progression: admitted on NPO with TF, now on soft/bite size   - agitation/anxiety/insomnia.  medication regimen was adjusted.  currently on: depakote 500mg/750mg , lexapro (titrating up - increased to 15mg on 8/41 since QTC is good 393), and trazodone 50mg  - neuropathic pain + Spasticity: Gabapentin 400 (12pm) /400 (at bedtime), Tizanidine 2mg @3p/4mg @bedtime  - Botox to Lpec and Lbicep 7/14 - next botox for October 14th or shortly after    All other medical co-morbidities were stable.     Pt tolerated course of inpatient PT/OT/SLP rehab with significant functional improvements and met rehab goals prior to discharge.    Pt was medically cleared on _8/1__  for discharged to __[facility]    Discharge Function:  SLP: on soft bite sized with thin liquids.  Mild language deficits impacted by cognitive deficits, severe cognitive deficits-- limited by attention, memory, reasoning deficits.  Barriers- lability.  Attends to Structured tasks for Attends to structured tasks for 10min.    OT: Mod assist–eating/grooming, toilet transfers; Tot assist–bathing and dressing & toileting;  PT: Mod -Max A--Bed mobility: Max A--squat pivot transfer & Ambulating by HW rail 20ft--Max A and WCf.  WC tot A.

## 2023-08-01 NOTE — PROGRESS NOTE ADULT - PROVIDER SPECIALTY LIST ADULT
Brain Injury Medicine
Brain Injury Medicine
Hospitalist
Physiatry
Physiatry
Rehab Medicine
Hospitalist
Physiatry
Physiatry
Rehab Medicine
Brain Injury Medicine
Hospitalist
Neuropsychology
Physiatry
Rehab Medicine
Brain Injury Medicine
Hospitalist
Neuropsychology
Physiatry
Rehab Medicine
Rehab Medicine
Gastroenterology
Neuro Rehabilitation
Physiatry

## 2023-08-01 NOTE — DISCHARGE NOTE PROVIDER - CARE PROVIDER_API CALL
Jean Carlos Bernstein  Neurosurgery  [address], [address]  [place] [address]  Phone: [phone]  Fax: [phone]  Follow Up Time: 2 weeks    May Sanford  Physical/Rehab Medicine  [address] [place] [address]  Phone: [phone]  Fax: [phone]  Follow Up Time: 1 month   May Sanford  Physical/Rehab Medicine  [address] [place] [address]  Phone: [phone]  Fax: [phone]  Follow Up Time: 2 months    Juju Sy  Neurosurgery  [address] [place] [address]  Phone: [phone]  Fax: [phone]  Follow Up Time: 1 month

## 2023-08-01 NOTE — PROGRESS NOTE ADULT - ASSESSMENT
39 y/o F w/ PMhx hypothyroidism admitted to [facility] 6/6 after being found unresponsive, found to have large right frontal IPH w/ IVH and hydrocephalus. Now s/p R krishna craniectomy for evacuation on 6/6. PEG placed 6/20. Now admitted for multidisciplinary rehab- pt/ot/dvt ppx    #IPH with IVH, R frontal lobe  - Amantadine, trazodone per rehab   - Antiepileptic regimen: depakote 500mg daily, continue Vimpat 150 mg BID.   - Helmet when OOB    #Normocytic anemia  - H/H stable, monitor for Hb < 7  - Iron/B12/folate normal     #HTN  - Lisinopril held due to hypotension    #Dysphagia s/p PEG  - s/p self removal of PEG tube, tolerating PO diet, PEG not replaced  - GI signed off, monitor tolerating PO diet    #Hypothyroidism  - Continue synthroid     #DVT ppx - Lovenox

## 2023-08-01 NOTE — PROGRESS NOTE ADULT - ATTENDING SUPERVISION STATEMENT
Fellow
Resident/Fellow
Fellow
Resident
Resident
Fellow
Resident
Student
Resident
Resident

## 2023-08-01 NOTE — PROGRESS NOTE ADULT - SUBJECTIVE AND OBJECTIVE BOX
Patient is a 38y old  Female who presents with a chief complaint of IPH     Last BM yesteday AM  Denies chest pain, SOB  Patient seen and examined at bedside.    ALLERGIES:  No Known Allergies    MEDICATIONS  (STANDING):  divalproex Sprinkle 750 milliGRAM(s) Oral <User Schedule>  divalproex Sprinkle 500 milliGRAM(s) Oral <User Schedule>  enoxaparin Injectable 40 milliGRAM(s) SubCutaneous every 24 hours  escitalopram 10 milliGRAM(s) Oral <User Schedule>  gabapentin 300 milliGRAM(s) Oral <User Schedule>  gabapentin 400 milliGRAM(s) Oral at bedtime  lacosamide 150 milliGRAM(s) Oral two times a day  levothyroxine 75 MICROGram(s) Oral daily  memantine 5 milliGRAM(s) Oral <User Schedule>  pantoprazole    Tablet 40 milliGRAM(s) Oral before breakfast  polyethylene glycol 3350 17 Gram(s) Oral at bedtime  senna 2 Tablet(s) Oral at bedtime  tiZANidine 2 milliGRAM(s) Oral <User Schedule>  tiZANidine 4 milliGRAM(s) Oral at bedtime  traZODone 50 milliGRAM(s) Oral at bedtime    MEDICATIONS  (PRN):  acetaminophen     Tablet .. 975 milliGRAM(s) Oral every 6 hours PRN Mild Pain (1 - 3), Moderate Pain (4 - 6), Severe Pain (7 - 10)  bacitracin   Ointment 1 Application(s) Topical three times a day PRN scalp irritation  bisacodyl Suppository 10 milliGRAM(s) Rectal daily PRN Constipation    Vital Signs Last 24 Hrs  T(C): 36.6 (01 Aug 2023 08:05), Max: 36.9 (31 Jul 2023 09:24)  T(F): 97.8 (01 Aug 2023 08:05), Max: 98.5 (31 Jul 2023 09:24)  HR: 82 (01 Aug 2023 08:05) (75 - 82)  BP: 113/77 (01 Aug 2023 08:05) (99/66 - 115/60)  RR: 15 (01 Aug 2023 08:05) (14 - 15)  SpO2: 96% (01 Aug 2023 08:05) (96% - 99%)    Parameters below as of 01 Aug 2023 08:05  Patient On (Oxygen Delivery Method): room air    PHYSICAL EXAM:  General: NAD, A/O x 3  ENT: MMM, no scleral icterus  Head: Craniotomy noted  Neck: Supple, No JVD, no thyroidomegaly  Lungs: Clear to auscultation bilaterally, no wheezes, no rales, no rhonchi, good inspiratory effort  Cardio: RRR, S1/S2, No murmurs  Abdomen: +tenderness in RLQ, but Soft, Nondistended; Bowel sounds present  Extremities: left sided weakness noted, No calf tenderness, No pitting edema, no skin changes    LABS:                        12.1   6.22  )-----------( 280      ( 31 Jul 2023 06:10 )             36.8       07-31    135  |  99  |  9   ----------------------------<  98  3.7   |  27  |  0.44    Ca    9.2      31 Jul 2023 06:10    TPro  6.4  /  Alb  2.8  /  TBili  0.2  /  DBili  x   /  AST  13  /  ALT  18  /  AlkPhos  50  07-31     Urinalysis Basic - ( 31 Jul 2023 06:10 )    Color: x / Appearance: x / SG: x / pH: x  Gluc: 98 mg/dL / Ketone: x  / Bili: x / Urobili: x   Blood: x / Protein: x / Nitrite: x   Leuk Esterase: x / RBC: x / WBC x   Sq Epi: x / Non Sq Epi: x / Bacteria: x

## 2023-08-01 NOTE — DISCHARGE NOTE PROVIDER - NSDCMRMEDTOKEN_GEN_ALL_CORE_FT
acetaminophen 160 mg/5 mL oral suspension: 20.31 milliliter(s) orally every 6 hours as needed for Temp greater or equal to 38C (100.4F), Mild Pain (1 - 3)  albuterol 2.5 mg/3 mL (0.083%) inhalation solution: 1 inhaled every 6 hours as needed for  shortness of breath and/or wheezing  amantadine 50 mg/5 mL oral syrup: 5 milliliter(s) orally once a day at 0700  bacitracin 500 units/g topical ointment: 1 Apply topically to affected area every 6 hours As needed incisional healing  brivaracetam 10 mg/mL oral liquid: 10 milliliter(s) orally every 12 hours  enoxaparin: 40 milligram(s) subcutaneous once a day (at bedtime) at 2200  lacosamide 10 mg/mL oral solution: 15 milliliter(s) orally 2 times a day  Left custom molded AFO: Dx: right ICH with left hemiparesis  levothyroxine 75 mcg (0.075 mg) oral tablet: 1 tab(s) orally once a day  lisinopril 20 mg oral tablet: 1 tab(s) orally once a day  nicotine 21 mg/24 hr transdermal film, extended release: 1 patch transdermal once a day  nystatin 100,000 units/mL oral suspension: 5 milliliter(s) orally every 6 hours  oxyCODONE 5 mg/5 mL oral solution: 5 milliliter(s) orally every 6 hours As needed Moderate Pain (4 - 6)  polyethylene glycol 3350 oral powder for reconstitution: 17 gram(s) orally once a day  senna leaf extract oral tablet: 2 tab(s) orally once a day (at bedtime)  traZODone 50 mg oral tablet: 1 tab(s) orally once a day (at bedtime)   acetaminophen 325 mg oral tablet: 3 tab(s) orally every 6 hours As needed Mild Pain (1 - 3), Moderate Pain (4 - 6), Severe Pain (7 - 10)  bacitracin 500 units/g topical ointment: 1 Apply topically to affected area 3 times a day As needed scalp irritation  bisacodyl 10 mg rectal suppository: 1 suppository(ies) rectal once a day As needed Constipation  divalproex sodium 125 mg oral delayed release capsule: 6 cap(s) orally once a day @ 6PM  divalproex sodium 125 mg oral delayed release capsule: 4 cap(s) orally once a day @ 6 AM  enoxaparin: 40 milligram(s) subcutaneous once a day (at bedtime) at 2200  escitalopram 5 mg oral tablet: 3 tab(s) orally once a day  gabapentin 400 mg oral capsule: 1 cap(s) orally once a day (at bedtime)  gabapentin 400 mg oral capsule: 1 cap(s) orally once a day at noon  Helmet: Use as directed- WHEN OUT OF BED  lacosamide 150 mg oral tablet: 1 tab(s) orally 2 times a day  Left custom molded AFO: Dx: right ICH with left hemiparesis  levothyroxine 75 mcg (0.075 mg) oral tablet: 1 tab(s) orally once a day  memantine 5 mg oral tablet: 1 tab(s) orally 2 times a day @8a, 5p  pantoprazole 40 mg oral delayed release tablet: 1 tab(s) orally once a day (before a meal)  polyethylene glycol 3350 oral powder for reconstitution: 17 gram(s) orally once a day (at bedtime)  senna leaf extract oral tablet: 2 tab(s) orally once a day (at bedtime)  tiZANidine 2 mg oral tablet: 1 tab(s) orally once a day taken at 3pm  tiZANidine 4 mg oral tablet: 1 tab(s) orally once a day (at bedtime)  traZODone 50 mg oral tablet: 1 tab(s) orally once a day (at bedtime)

## 2023-08-01 NOTE — DISCHARGE NOTE PROVIDER - DETAILS OF MALNUTRITION DIAGNOSIS/DIAGNOSES
This patient has been assessed with a concern for Malnutrition and was treated during this hospitalization for the following Nutrition diagnosis/diagnoses:     -  06/27/2023: Severe protein-calorie malnutrition

## 2023-08-01 NOTE — CHART NOTE - NSCHARTNOTEFT_GEN_A_CORE
Pt was seen for a brief session (about 10 min). Pt c/o pain, fatigue. Pt reported doing better today. She reported some improve in her pain, particularly the headaches, but she still has occasional "pounding" headaches. Pt reported continued pain in her body, in her abdomen, which she thinks that to some extent may be her period. She said that also she was feeling gassy but then denied it. Pt reported that her mood was better, which was apparent in her ability to remain calm for the first half of the meeting, in contrast to previous meetings when she began sobbing and crying after the first few  verbal exchanges. She also exhibited confusion, when asked about how she was doing in her therapies, and whether she was experiencing difficulties with pain and anxiety interfering in her performance in tx, and she answered that she was having "therapy every week", that she was not skipping any of her sessions, but it was clear she was alluding to previous episodes of personal counseling (which was validated by her father who was present in the bedroom). Toward the end of the session Pt became visibly upset and tearful, complaining of feeling very tired and achy. Support and encouragement were provided. Pt remains confused, with difficulty coping with her physical and emotional symptoms and to remain calm for supportive tx. After session, this clinician became aware that Pt is being transferred to [facility]. Case is closed.

## 2023-08-01 NOTE — DISCHARGE NOTE PROVIDER - NSDCFUSCHEDAPPT_GEN_ALL_CORE_FT
Juju Sy  [facility]  NEUROSURG [address]  Scheduled Appointment: 08/15/2023

## 2023-08-10 NOTE — ASSESSMENT
[FreeTextEntry1] : 38 year old female s/p decompressive hemicraniectomy for IPH evacuation presents for evaluation for cranioplasty.  Wound has not healed completely Proceed with [place] neuroplastics CT to plan for PMMA cranioplasty in 2 months.     Patient verbalized understanding and agreement with treatment plan.   I, Dr. Sy, personally performed the evaluation and management (E/M) services for this new patient. That E/M includes conducting the initial examination, assessing all conditions, and establishing the plan of care. Today, my ACP, Marley Kaplan, was here to observe my evaluation and management services for this patient to be followed going forward.   Today, I personally spent 30 minutes in direct face to face time with the patient, of which greater than 50% of the time was spent in patient education and counseling as described above.

## 2023-08-10 NOTE — REASON FOR VISIT
[Other Location: e.g. School (Enter Location, City,State)___] : at [unfilled], at the time of the visit. [Medical Office: ([facility])___] : at the medical office located in  [Follow-Up: _____] : a [unfilled] follow-up visit

## 2023-08-10 NOTE — HISTORY OF PRESENT ILLNESS
[de-identified] : 38 year old female initially found down and and transported to [facility]. Found to have large right hemispheric IPH. S/p right hemicraniectomy 6/6/23 by Dr. Bernstein. Noted to have right frontal seizures on EEG postop. S/p PEG tube placement 6/21/23.  presents today for consultation regarding cranioplasty.  Her family report labile mood which is improving over the last few weeks.  She was recently moved to the sub-acute rehab at [place].   Scalp: Right craniectomy flap sunken.Scalp incision is wide and atrophic with areas of non healing scabs, without erythema, no drainage, or other signs of infection. Edges are well-approximated. No other erythema. No fluctuance or palpable fluid collections.

## 2023-08-13 NOTE — PROGRESS NOTE ADULT - ASSESSMENT
38 year old female w/ PMhx hypothyroidism admitted to [facility] 6/6 after being found unresponsive, found to have large right frontal IPH w/ IVH and hydrocephalus. Now s/p R krishna craniectomy for evacuation on 6/6. PEG placed 6/20. Now admitted for multidisciplinary rehab.     #IPH with IVH, R frontal lobe  - Amantadine 50 mg qAM  - Trazodone 50 mg qPM   - Antiepileptic regimen: transitioning from Briviact to depakote, continue Vimpat 150 mg BID  - Helmet when OOB    #Normocytic anemia  - Will montior Hg/Hct, transfuse if Hg<7  - iron/B12/folate normal     #HTN  - will stop lisinopril due to low BP  - Clonidine 0.1 mg PO q12h stopped on day of discharge from [facility]    #Dysphagia s/p PEG  - s/p self removal of PEG tube, tolerating PO diet, PEG not replaced  - GI signed off, monitor tolerating PO diet    #Hypothyroidism  - Continue synthroid 75 mcg daily   - thyroid panel reviewed    #Fever, leukocytosis  - resolved  - episodes of fever during [facility] admission  - prior infectious work up unrevealing including negative Bcx/UA/CXR on 6/18 and unremarkable CT c/a/p on 6/15.  - repeat LE duplex negative on 6/22  - no urinary retention on bladder scan  - repeat Bcx/Ucx (6/21) negative, RVP negative  - As per ID monitor off antibiotics    #DVT ppx  - Lovenox   Xray Chest 2 Views PA/Lat

## 2023-08-16 ENCOUNTER — APPOINTMENT (OUTPATIENT)
Dept: NEUROSURGERY | Facility: CLINIC | Age: 38
End: 2023-08-16
Payer: COMMERCIAL

## 2023-08-16 PROCEDURE — 99214 OFFICE O/P EST MOD 30 MIN: CPT | Mod: 1L,95

## 2023-08-16 PROCEDURE — XXXXX: CPT

## 2023-08-18 ENCOUNTER — APPOINTMENT (OUTPATIENT)
Dept: NEUROSURGERY | Facility: CLINIC | Age: 38
End: 2023-08-18

## 2023-08-22 ENCOUNTER — APPOINTMENT (OUTPATIENT)
Dept: NEUROSURGERY | Facility: CLINIC | Age: 38
End: 2023-08-22

## 2023-09-10 ENCOUNTER — INPATIENT (INPATIENT)
Facility: HOSPITAL | Age: 38
LOS: 10 days | Discharge: NURSING, REHAB OR LONG-TERM CARE | DRG: 580 | End: 2023-09-21
Attending: STUDENT IN AN ORGANIZED HEALTH CARE EDUCATION/TRAINING PROGRAM | Admitting: STUDENT IN AN ORGANIZED HEALTH CARE EDUCATION/TRAINING PROGRAM
Payer: COMMERCIAL

## 2023-09-10 VITALS
DIASTOLIC BLOOD PRESSURE: 87 MMHG | TEMPERATURE: 98 F | OXYGEN SATURATION: 98 % | RESPIRATION RATE: 17 BRPM | SYSTOLIC BLOOD PRESSURE: 137 MMHG | HEART RATE: 75 BPM

## 2023-09-10 DIAGNOSIS — Z98.890 OTHER SPECIFIED POSTPROCEDURAL STATES: ICD-10-CM

## 2023-09-10 DIAGNOSIS — Z98.890 OTHER SPECIFIED POSTPROCEDURAL STATES: Chronic | ICD-10-CM

## 2023-09-10 DIAGNOSIS — N83.209 UNSPECIFIED OVARIAN CYST, UNSPECIFIED SIDE: Chronic | ICD-10-CM

## 2023-09-10 LAB
A1C WITH ESTIMATED AVERAGE GLUCOSE RESULT: 5.6 % — SIGNIFICANT CHANGE UP (ref 4–5.6)
ALBUMIN SERPL ELPH-MCNC: 3.7 G/DL — SIGNIFICANT CHANGE UP (ref 3.3–5)
ALP SERPL-CCNC: 61 U/L — SIGNIFICANT CHANGE UP (ref 40–120)
ALT FLD-CCNC: 11 U/L — SIGNIFICANT CHANGE UP (ref 10–45)
ANION GAP SERPL CALC-SCNC: 10 MMOL/L — SIGNIFICANT CHANGE UP (ref 5–17)
AST SERPL-CCNC: 10 U/L — SIGNIFICANT CHANGE UP (ref 10–40)
BILIRUB SERPL-MCNC: <0.2 MG/DL — SIGNIFICANT CHANGE UP (ref 0.2–1.2)
BLD GP AB SCN SERPL QL: NEGATIVE — SIGNIFICANT CHANGE UP
BUN SERPL-MCNC: 8 MG/DL — SIGNIFICANT CHANGE UP (ref 7–23)
CALCIUM SERPL-MCNC: 9.5 MG/DL — SIGNIFICANT CHANGE UP (ref 8.4–10.5)
CHLORIDE SERPL-SCNC: 98 MMOL/L — SIGNIFICANT CHANGE UP (ref 96–108)
CO2 SERPL-SCNC: 27 MMOL/L — SIGNIFICANT CHANGE UP (ref 22–31)
CREAT SERPL-MCNC: 0.47 MG/DL — LOW (ref 0.5–1.3)
EGFR: 124 ML/MIN/1.73M2 — SIGNIFICANT CHANGE UP
ESTIMATED AVERAGE GLUCOSE: 114 MG/DL — SIGNIFICANT CHANGE UP (ref 68–114)
GLUCOSE SERPL-MCNC: 110 MG/DL — HIGH (ref 70–99)
HCG SERPL-ACNC: <0 MIU/ML — SIGNIFICANT CHANGE UP
HCT VFR BLD CALC: 40.8 % — SIGNIFICANT CHANGE UP (ref 34.5–45)
HGB BLD-MCNC: 13.7 G/DL — SIGNIFICANT CHANGE UP (ref 11.5–15.5)
INR BLD: 0.94 — SIGNIFICANT CHANGE UP (ref 0.85–1.18)
MAGNESIUM SERPL-MCNC: 1.6 MG/DL — SIGNIFICANT CHANGE UP (ref 1.6–2.6)
MCHC RBC-ENTMCNC: 31.4 PG — SIGNIFICANT CHANGE UP (ref 27–34)
MCHC RBC-ENTMCNC: 33.6 GM/DL — SIGNIFICANT CHANGE UP (ref 32–36)
MCV RBC AUTO: 93.4 FL — SIGNIFICANT CHANGE UP (ref 80–100)
NRBC # BLD: 0 /100 WBCS — SIGNIFICANT CHANGE UP (ref 0–0)
PHOSPHATE SERPL-MCNC: 3.9 MG/DL — SIGNIFICANT CHANGE UP (ref 2.5–4.5)
PLATELET # BLD AUTO: 195 K/UL — SIGNIFICANT CHANGE UP (ref 150–400)
POTASSIUM SERPL-MCNC: 4.2 MMOL/L — SIGNIFICANT CHANGE UP (ref 3.5–5.3)
POTASSIUM SERPL-SCNC: 4.2 MMOL/L — SIGNIFICANT CHANGE UP (ref 3.5–5.3)
PROT SERPL-MCNC: 6.5 G/DL — SIGNIFICANT CHANGE UP (ref 6–8.3)
PROTHROM AB SERPL-ACNC: 10.7 SEC — SIGNIFICANT CHANGE UP (ref 9.5–13)
RBC # BLD: 4.37 M/UL — SIGNIFICANT CHANGE UP (ref 3.8–5.2)
RBC # FLD: 12.9 % — SIGNIFICANT CHANGE UP (ref 10.3–14.5)
RH IG SCN BLD-IMP: POSITIVE — SIGNIFICANT CHANGE UP
SODIUM SERPL-SCNC: 135 MMOL/L — SIGNIFICANT CHANGE UP (ref 135–145)
WBC # BLD: 6.59 K/UL — SIGNIFICANT CHANGE UP (ref 3.8–10.5)
WBC # FLD AUTO: 6.59 K/UL — SIGNIFICANT CHANGE UP (ref 3.8–10.5)

## 2023-09-10 RX ORDER — TRAZODONE HCL 50 MG
50 TABLET ORAL AT BEDTIME
Refills: 0 | Status: DISCONTINUED | OUTPATIENT
Start: 2023-09-10 | End: 2023-09-12

## 2023-09-10 RX ORDER — ACETAMINOPHEN 500 MG
650 TABLET ORAL EVERY 6 HOURS
Refills: 0 | Status: DISCONTINUED | OUTPATIENT
Start: 2023-09-10 | End: 2023-09-12

## 2023-09-10 RX ORDER — TRAMADOL HYDROCHLORIDE 50 MG/1
50 TABLET ORAL EVERY 6 HOURS
Refills: 0 | Status: DISCONTINUED | OUTPATIENT
Start: 2023-09-10 | End: 2023-09-12

## 2023-09-10 RX ORDER — LEVOTHYROXINE SODIUM 125 MCG
75 TABLET ORAL DAILY
Refills: 0 | Status: DISCONTINUED | OUTPATIENT
Start: 2023-09-10 | End: 2023-09-12

## 2023-09-10 RX ORDER — DIVALPROEX SODIUM 500 MG/1
750 TABLET, DELAYED RELEASE ORAL
Refills: 0 | Status: DISCONTINUED | OUTPATIENT
Start: 2023-09-10 | End: 2023-09-12

## 2023-09-10 RX ORDER — DIVALPROEX SODIUM 500 MG/1
500 TABLET, DELAYED RELEASE ORAL
Refills: 0 | Status: DISCONTINUED | OUTPATIENT
Start: 2023-09-10 | End: 2023-09-12

## 2023-09-10 RX ORDER — GABAPENTIN 400 MG/1
400 CAPSULE ORAL
Refills: 0 | Status: DISCONTINUED | OUTPATIENT
Start: 2023-09-10 | End: 2023-09-12

## 2023-09-10 RX ORDER — SENNA PLUS 8.6 MG/1
2 TABLET ORAL AT BEDTIME
Refills: 0 | Status: DISCONTINUED | OUTPATIENT
Start: 2023-09-10 | End: 2023-09-12

## 2023-09-10 RX ORDER — PANTOPRAZOLE SODIUM 20 MG/1
40 TABLET, DELAYED RELEASE ORAL
Refills: 0 | Status: DISCONTINUED | OUTPATIENT
Start: 2023-09-10 | End: 2023-09-12

## 2023-09-10 RX ORDER — INFLUENZA VIRUS VACCINE 15; 15; 15; 15 UG/.5ML; UG/.5ML; UG/.5ML; UG/.5ML
0.5 SUSPENSION INTRAMUSCULAR ONCE
Refills: 0 | Status: DISCONTINUED | OUTPATIENT
Start: 2023-09-10 | End: 2023-09-21

## 2023-09-10 RX ORDER — LACOSAMIDE 50 MG/1
150 TABLET ORAL
Refills: 0 | Status: DISCONTINUED | OUTPATIENT
Start: 2023-09-10 | End: 2023-09-10

## 2023-09-10 RX ORDER — ESCITALOPRAM OXALATE 10 MG/1
5 TABLET, FILM COATED ORAL DAILY
Refills: 0 | Status: DISCONTINUED | OUTPATIENT
Start: 2023-09-10 | End: 2023-09-11

## 2023-09-10 RX ORDER — LACOSAMIDE 50 MG/1
150 TABLET ORAL
Refills: 0 | Status: DISCONTINUED | OUTPATIENT
Start: 2023-09-10 | End: 2023-09-12

## 2023-09-10 RX ORDER — MEMANTINE HYDROCHLORIDE 10 MG/1
5 TABLET ORAL
Refills: 0 | Status: DISCONTINUED | OUTPATIENT
Start: 2023-09-10 | End: 2023-09-12

## 2023-09-10 RX ADMIN — SENNA PLUS 2 TABLET(S): 8.6 TABLET ORAL at 21:43

## 2023-09-10 RX ADMIN — GABAPENTIN 400 MILLIGRAM(S): 400 CAPSULE ORAL at 21:43

## 2023-09-10 RX ADMIN — DIVALPROEX SODIUM 750 MILLIGRAM(S): 500 TABLET, DELAYED RELEASE ORAL at 19:27

## 2023-09-10 RX ADMIN — TRAMADOL HYDROCHLORIDE 50 MILLIGRAM(S): 50 TABLET ORAL at 20:15

## 2023-09-10 RX ADMIN — Medication 650 MILLIGRAM(S): at 16:06

## 2023-09-10 RX ADMIN — MEMANTINE HYDROCHLORIDE 5 MILLIGRAM(S): 10 TABLET ORAL at 19:28

## 2023-09-10 RX ADMIN — ESCITALOPRAM OXALATE 5 MILLIGRAM(S): 10 TABLET, FILM COATED ORAL at 19:30

## 2023-09-10 RX ADMIN — Medication 50 MILLIGRAM(S): at 21:43

## 2023-09-10 RX ADMIN — LACOSAMIDE 150 MILLIGRAM(S): 50 TABLET ORAL at 19:27

## 2023-09-10 RX ADMIN — TRAMADOL HYDROCHLORIDE 50 MILLIGRAM(S): 50 TABLET ORAL at 21:15

## 2023-09-10 RX ADMIN — Medication 650 MILLIGRAM(S): at 15:06

## 2023-09-10 NOTE — H&P ADULT - HISTORY OF PRESENT ILLNESS
38F hx hypothyroid was found down by mom unresponsive in the bathroom on 6/6, was airlifted to [facility] for a large R frontal IPH with IVH and hydrocephalus. She was emergently intubated and taken to operating room for right hemicraniectomy and EVD placement with Dr. Bernstein. Patient was deemed medically stable and was sent  to [facility] for 5 weeks then transferred to [facility] for 4 weeks and presents today in preparation for cranioplasty this week.

## 2023-09-10 NOTE — H&P ADULT - NSHPSOCIALHISTORY_GEN_ALL_CORE
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

## 2023-09-10 NOTE — H&P ADULT - ASSESSMENT
38F hx hypothyroid was found down by mom unresponsive in the bathroom on 6/6. SHe airlifted to [facility] for a large R frontal IPH with IVH and hydrocephalus s/p emergent R hemicraniectomy w/ EVD placement. She presents today from rehab in preparation for cranioplasty planning.

## 2023-09-10 NOTE — PATIENT PROFILE ADULT - PATIENT REPRESENTATIVE: ( YOU CAN CHOOSE ANY PERSON THAT CAN ASSIST YOU WITH YOUR HEALTH CARE PREFERENCES, DOES NOT HAVE TO BE A SPOUSE, IMMEDIATE FAMILY OR SIGNIFICANT OTHER/PARTNER)
same name as above Skin Substitute Text: The defect edges were debeveled with a #15 scalpel blade.  Given the location of the defect, shape of the defect and the proximity to free margins a skin substitute graft was deemed most appropriate.  The graft material was trimmed to fit the size of the defect. The graft was then placed in the primary defect and oriented appropriately.

## 2023-09-10 NOTE — H&P ADULT - NSHPPHYSICALEXAM_GEN_ALL_CORE
Constitutional: NAD, well groomed, well nourished  Respiratory: breathing non-labored, symmetrical chest wall movement  Cardiovascuar: RRR, no murmurs  Gastrointestinal: abdomen soft, non tender  Genitourinary: exam deffered  Neurological:  AAOX2-3. Mixed aphasia, L facial, PERRL  Motor: FC RUE/RLE  5/5, LUE/LLE WD   Sensation: intact to light touch in all extremities  Pronator Drift: unable to assess due to L side plegia  Dysmetria: patient uncooperative during exam and could not assess   Extremities: distal pulses 2+ x4  Wound/incision: hemicraniectomy incision c/d/i

## 2023-09-10 NOTE — PATIENT PROFILE ADULT - FALL HARM RISK - HARM RISK INTERVENTIONS

## 2023-09-10 NOTE — PATIENT PROFILE ADULT - NS PRO AD ANY ON CHART
Schedule MRI of lower back   Schedule appointment with pain clinic (make sure MRI is completed before appointment)   Try muscle relaxant (baclofen)   Continue heat/stretches and massage at home   Continue regular over the counter tylenol as needed
Yes

## 2023-09-11 ENCOUNTER — TRANSCRIPTION ENCOUNTER (OUTPATIENT)
Age: 38
End: 2023-09-11

## 2023-09-11 LAB
ANION GAP SERPL CALC-SCNC: 10 MMOL/L — SIGNIFICANT CHANGE UP (ref 5–17)
BLD GP AB SCN SERPL QL: NEGATIVE — SIGNIFICANT CHANGE UP
BUN SERPL-MCNC: 6 MG/DL — LOW (ref 7–23)
CALCIUM SERPL-MCNC: 9.3 MG/DL — SIGNIFICANT CHANGE UP (ref 8.4–10.5)
CHLORIDE SERPL-SCNC: 101 MMOL/L — SIGNIFICANT CHANGE UP (ref 96–108)
CO2 SERPL-SCNC: 27 MMOL/L — SIGNIFICANT CHANGE UP (ref 22–31)
CREAT SERPL-MCNC: 0.47 MG/DL — LOW (ref 0.5–1.3)
EGFR: 124 ML/MIN/1.73M2 — SIGNIFICANT CHANGE UP
GLUCOSE BLDC GLUCOMTR-MCNC: 89 MG/DL — SIGNIFICANT CHANGE UP (ref 70–99)
GLUCOSE SERPL-MCNC: 103 MG/DL — HIGH (ref 70–99)
HCT VFR BLD CALC: 39.4 % — SIGNIFICANT CHANGE UP (ref 34.5–45)
HGB BLD-MCNC: 13.1 G/DL — SIGNIFICANT CHANGE UP (ref 11.5–15.5)
MAGNESIUM SERPL-MCNC: 1.6 MG/DL — SIGNIFICANT CHANGE UP (ref 1.6–2.6)
MCHC RBC-ENTMCNC: 31.1 PG — SIGNIFICANT CHANGE UP (ref 27–34)
MCHC RBC-ENTMCNC: 33.2 GM/DL — SIGNIFICANT CHANGE UP (ref 32–36)
MCV RBC AUTO: 93.6 FL — SIGNIFICANT CHANGE UP (ref 80–100)
NRBC # BLD: 0 /100 WBCS — SIGNIFICANT CHANGE UP (ref 0–0)
PHOSPHATE SERPL-MCNC: 4.6 MG/DL — HIGH (ref 2.5–4.5)
PLATELET # BLD AUTO: 202 K/UL — SIGNIFICANT CHANGE UP (ref 150–400)
POTASSIUM SERPL-MCNC: 3.9 MMOL/L — SIGNIFICANT CHANGE UP (ref 3.5–5.3)
POTASSIUM SERPL-SCNC: 3.9 MMOL/L — SIGNIFICANT CHANGE UP (ref 3.5–5.3)
RBC # BLD: 4.21 M/UL — SIGNIFICANT CHANGE UP (ref 3.8–5.2)
RBC # FLD: 13 % — SIGNIFICANT CHANGE UP (ref 10.3–14.5)
RH IG SCN BLD-IMP: POSITIVE — SIGNIFICANT CHANGE UP
SODIUM SERPL-SCNC: 138 MMOL/L — SIGNIFICANT CHANGE UP (ref 135–145)
WBC # BLD: 4.96 K/UL — SIGNIFICANT CHANGE UP (ref 3.8–10.5)
WBC # FLD AUTO: 4.96 K/UL — SIGNIFICANT CHANGE UP (ref 3.8–10.5)

## 2023-09-11 PROCEDURE — 90791 PSYCH DIAGNOSTIC EVALUATION: CPT

## 2023-09-11 PROCEDURE — 99222 1ST HOSP IP/OBS MODERATE 55: CPT | Mod: GC

## 2023-09-11 PROCEDURE — 70551 MRI BRAIN STEM W/O DYE: CPT | Mod: 26

## 2023-09-11 PROCEDURE — 99232 SBSQ HOSP IP/OBS MODERATE 35: CPT

## 2023-09-11 PROCEDURE — 78999 UNLISTED MISC PX DX NUC MED: CPT | Mod: 26

## 2023-09-11 PROCEDURE — 36000 PLACE NEEDLE IN VEIN: CPT

## 2023-09-11 PROCEDURE — 78608 BRAIN IMAGING (PET): CPT | Mod: 26

## 2023-09-11 PROCEDURE — 99222 1ST HOSP IP/OBS MODERATE 55: CPT

## 2023-09-11 RX ORDER — SODIUM CHLORIDE 9 MG/ML
1000 INJECTION INTRAMUSCULAR; INTRAVENOUS; SUBCUTANEOUS
Refills: 0 | Status: DISCONTINUED | OUTPATIENT
Start: 2023-09-11 | End: 2023-09-12

## 2023-09-11 RX ORDER — CHLORHEXIDINE GLUCONATE 213 G/1000ML
1 SOLUTION TOPICAL EVERY 12 HOURS
Refills: 0 | Status: COMPLETED | OUTPATIENT
Start: 2023-09-11 | End: 2023-09-12

## 2023-09-11 RX ORDER — ESCITALOPRAM OXALATE 10 MG/1
15 TABLET, FILM COATED ORAL DAILY
Refills: 0 | Status: DISCONTINUED | OUTPATIENT
Start: 2023-09-11 | End: 2023-09-12

## 2023-09-11 RX ORDER — MAGNESIUM SULFATE 500 MG/ML
2 VIAL (ML) INJECTION ONCE
Refills: 0 | Status: COMPLETED | OUTPATIENT
Start: 2023-09-11 | End: 2023-09-11

## 2023-09-11 RX ORDER — APREPITANT 80 MG/1
40 CAPSULE ORAL ONCE
Refills: 0 | Status: COMPLETED | OUTPATIENT
Start: 2023-09-12 | End: 2023-09-12

## 2023-09-11 RX ORDER — POTASSIUM CHLORIDE 20 MEQ
20 PACKET (EA) ORAL ONCE
Refills: 0 | Status: COMPLETED | OUTPATIENT
Start: 2023-09-11 | End: 2023-09-11

## 2023-09-11 RX ORDER — ACETAMINOPHEN 500 MG
1000 TABLET ORAL ONCE
Refills: 0 | Status: COMPLETED | OUTPATIENT
Start: 2023-09-12 | End: 2023-09-12

## 2023-09-11 RX ORDER — BACLOFEN 100 %
5 POWDER (GRAM) MISCELLANEOUS EVERY 8 HOURS
Refills: 0 | Status: DISCONTINUED | OUTPATIENT
Start: 2023-09-11 | End: 2023-09-12

## 2023-09-11 RX ORDER — POVIDONE-IODINE 5 %
1 AEROSOL (ML) TOPICAL ONCE
Refills: 0 | Status: DISCONTINUED | OUTPATIENT
Start: 2023-09-11 | End: 2023-09-12

## 2023-09-11 RX ADMIN — CHLORHEXIDINE GLUCONATE 1 APPLICATION(S): 213 SOLUTION TOPICAL at 19:46

## 2023-09-11 RX ADMIN — Medication 1 MILLIGRAM(S): at 18:27

## 2023-09-11 RX ADMIN — LACOSAMIDE 150 MILLIGRAM(S): 50 TABLET ORAL at 19:53

## 2023-09-11 RX ADMIN — ESCITALOPRAM OXALATE 5 MILLIGRAM(S): 10 TABLET, FILM COATED ORAL at 12:41

## 2023-09-11 RX ADMIN — Medication 1 MILLIGRAM(S): at 15:47

## 2023-09-11 RX ADMIN — GABAPENTIN 400 MILLIGRAM(S): 400 CAPSULE ORAL at 12:41

## 2023-09-11 RX ADMIN — DIVALPROEX SODIUM 500 MILLIGRAM(S): 500 TABLET, DELAYED RELEASE ORAL at 06:07

## 2023-09-11 RX ADMIN — LACOSAMIDE 150 MILLIGRAM(S): 50 TABLET ORAL at 06:00

## 2023-09-11 RX ADMIN — PANTOPRAZOLE SODIUM 40 MILLIGRAM(S): 20 TABLET, DELAYED RELEASE ORAL at 05:59

## 2023-09-11 RX ADMIN — MEMANTINE HYDROCHLORIDE 5 MILLIGRAM(S): 10 TABLET ORAL at 06:00

## 2023-09-11 RX ADMIN — Medication 75 MICROGRAM(S): at 06:00

## 2023-09-11 RX ADMIN — DIVALPROEX SODIUM 750 MILLIGRAM(S): 500 TABLET, DELAYED RELEASE ORAL at 19:46

## 2023-09-11 RX ADMIN — MEMANTINE HYDROCHLORIDE 5 MILLIGRAM(S): 10 TABLET ORAL at 19:45

## 2023-09-11 NOTE — PROGRESS NOTE ADULT - SUBJECTIVE AND OBJECTIVE BOX
Surgery: right cranioplasty with implant, local tissue rearrangement, complex closure with local flaps  Surgeon: Dr. Sy  Consent: Signed by HCP/wife: Karma                   NAME/NUMBER of HCP: Karma [phone]    Representative Consent: [ x ] Signed by  HCP                                                     No Known Allergies      SUBJECTIVE: Patient seen and evaluated at bedside, offers no complaints at this time.    T(C): 36.4 (09-11-23 @ 14:48), Max: 37 (09-11-23 @ 08:29)  HR: 0 (09-11-23 @ 15:42) (0 - 83)  BP: 187/121 (09-11-23 @ 15:42) (115/80 - 187/121)  RR: 20 (09-11-23 @ 15:42) (18 - 20)  SpO2: 98% (09-11-23 @ 15:42) (95% - 100%)  Wt(kg): --    EXAM:  Constitutional: NAD, well groomed, well nourished  Respiratory: breathing non-labored, symmetrical chest wall movement  Cardiovascuar: RRR, no murmurs  Gastrointestinal: abdomen soft, non tender  Genitourinary: exam deffered  Neurological:  AAOX2-3. Mixed aphasia, L facial, PERRL  Motor: FC RUE/RLE  5/5, LUE/LLE WD   Sensation: intact to light touch in all extremities  Pronator Drift: unable to assess due to L side plegia  Dysmetria: patient uncooperative during exam and could not assess   Extremities: distal pulses 2+ x4  Wound/incision: hemicraniectomy incision c/d/i      09-11    138  |  101  |  6<L>  ----------------------------<  103<H>  3.9   |  27  |  0.47<L>    Ca    9.3      11 Sep 2023 06:23  Phos  4.6     09-11  Mg     1.6     09-11    TPro  6.5  /  Alb  3.7  /  TBili  <0.2  /  DBili  x   /  AST  10  /  ALT  11  /  AlkPhos  61  09-10    CBC Full  -  ( 11 Sep 2023 06:23 )  WBC Count : 4.96 K/uL  RBC Count : 4.21 M/uL  Hemoglobin : 13.1 g/dL  Hematocrit : 39.4 %  Platelet Count - Automated : 202 K/uL  Mean Cell Volume : 93.6 fl  Mean Cell Hemoglobin : 31.1 pg  Mean Cell Hemoglobin Concentration : 33.2 gm/dL  Auto Neutrophil # : x  Auto Lymphocyte # : x  Auto Monocyte # : x  Auto Eosinophil # : x  Auto Basophil # : x  Auto Neutrophil % : x  Auto Lymphocyte % : x  Auto Monocyte % : x  Auto Eosinophil % : x  Auto Basophil % : x    PT/INR - ( 10 Sep 2023 18:00 )   PT: 10.7 sec;   INR: 0.94              Pregnancy test? (serum hcg for any female < 57 y/o) (within 48hrs): [x ] Negative Result  [ ] Positive Result  [ ] N/A : male or female > 57 y/o    Have there been 2 T&S during this admission?  x Y  _ N  Is there an active T&S within 72hrs?  x Y  _ N    Last dose of antiplatelet/anticoagulation drug? N/A    3M nasal swab ordered?  x Y  _ N    Cranial surgery: Order written for hair to be shampooed night before surgery and morning before surgery? x Y  _ N  Chlorhexidine Wipes ordered for neck down?  x Y  _ N  (twice a day if 1 day before surgery, daily for 3 days if 3 days prior, daily if in ICU)    Implanted Devices (pacemaker, drug pump...etc):   _ Y  x N           If YES --> EPS consulted to interrogate device: _ Y  _ N           If YES --> EPS called to let them know patient going for surgery:                             [ ] device needs to be turned off                               [ ] magnet needs to be placed for surgery                              [ ] nothing to do per EP, may proceed with cautery use in OR                                     EKG: NSR  ECHO: not indicated  Medical Clearances: obtained  Other Clearances: not indicated                 Assessment/Plan:  38F hx hypothyroid was found down by mom unresponsive in the bathroom on 6/6, was airlifted to [facility] for a large R frontal IPH with IVH and hydrocephalus. She was emergently intubated and taken to operating room for right hemicraniectomy and EVD placement with Dr. Bernstein. Patient was deemed medically stable and was sent  to [facility] for 5 weeks then transferred to [facility] for 4 weeks and presents today in preparation for cranioplasty this week.     - OR tomorrow for right cranioplasty with implant, local tissue rearrangement, complex closure with local flaps  - NPO after midnight (except meds)  - IVF at midnight  - Hold all AC  - PRBCs on hold for OR   - 3M nasal swab, shampoo/shower cap, chlorhexidine wipes - ordered   - F/u labs, coags, T&S  - Preop imaging studies performed   - Consent in chart   - Medically optimized for OR, per Dr. Del Toro  - D/w Dr. Sy    Assessment:  Present when checked    []  GCS  E   V  M     Heart Failure: []Acute, [] acute on chronic , []chronic  Heart Failure:  [] Diastolic (HFpEF), [] Systolic (HFrEF), []Combined (HFpEF and HFrEF), [] RHF, [] Pulm HTN, [] Other    [] OLIVIA, [] ATN, [] AIN, [] other  [] CKD1, [] CKD2, [] CKD 3, [] CKD 4, [] CKD 5, []ESRD    Encephalopathy: [] Metabolic, [] Hepatic, [] toxic, [] Neurological, [] Other    Abnormal Nurtitional Status: [] malnurtition (see nutrition note), [ ]underweight: BMI < 19, [] morbid obesity: BMI >40, [] Cachexia    [] Sepsis  [] hypovolemic shock,[] cardiogenic shock, [] hemorrhagic shock, [] neuogenic shock  [] Acute Respiratory Failure  []Cerebral edema, [] Brain compression/ herniation,   [] Functional quadriplegia  [] Acute blood loss anemia

## 2023-09-11 NOTE — CONSULT NOTE ADULT - ASSESSMENT
ASSESSMENT:  38 year old female with history of large R frontal IVH with hydrocephalus s/p emergent right decompressive hemicraniectomy, discharged to acute rehab then [facility], presenting for cranioplasty.     PROBLEMS / IMPAIRMENTS:  IVH s/p decompressive hemicraniectomy - pending cranioplasty  Spasticity  Emotional lability     PLAN / RECOMMENDATIONS:     Rehab / Mobilization:   - Initial therapy assessment: [ ] PT  [ ] OT   - Continue skilled therapy while admitted to prevent secondary complications of immobility focus on transfer training, bed mobility, progressive ambulation, and equipment evaluation.   - Educated patient and family members on post-acute rehabilitation. Discussed anticipated rehab course.  - Therapy precautions: craniectomy, falls     Bracing/Splinting:   - left resting hand splint    Pain Management:   - Recommend baclofen 5mg TID in place of home tizanidine for spasticity   - For left shoulder pain - can trial warm compress or lidocaine patch.   - Avoid/ monitor use sedating medications that may interfere with cognitive recovery     Mood  - On Lexapro and Depakote for mood stabilization  - recommend depakote level with next lab draw  - Neuropsych following   - Behavioral health consulted for assistance with medication mangement    Speech/ Swallow:   - SLP consult for swallow function evaluation and treatment   - Diet:  ERgular - NPO for procedure    GI/ Bowel:  - Continue to monitor bowel patterns. Agree with current bowel regimen.     / Bladder:  - Recommend bladder scans q6 hrs pending OR    DVT Prophylaxis:   -SCDs, chemoprophylaxis per primary team      Disposition:    - Disposition recommendations pending activity tolerance, progress with therapy, clinical course, and treatment plan.

## 2023-09-11 NOTE — CONSULT NOTE ADULT - SUBJECTIVE AND OBJECTIVE BOX
Neuropsychological testing was attempted but could not be completed.  Patient was noted to be quite emotionally labile and distracted. She often cried and complained of feeling tired and experiencing headaches. She was at times disoriented, and she had a very difficult time concentrating on tasks. She often repeated the same statements and was not able to answer questions about her history on target. Patient was provided with supportive tools and her questions were answered. Given patient's inability to engage with the testing process and emotional lability, testing could not be administered.

## 2023-09-11 NOTE — CONSULT NOTE ADULT - SUBJECTIVE AND OBJECTIVE BOX
Patient is a 38y old  Female who presents with a chief complaint of Cranioplasty (11 Sep 2023 01:16)      HPI:  38F hx hypothyroid was found down by mom unresponsive in the bathroom on 6/6, was airlifted to [facility] for a large R frontal IPH with IVH and hydrocephalus. She was emergently intubated and taken to operating room for right hemicraniectomy and EVD placement with Dr. Bernstein. Patient was deemed medically stable and was sent  to [facility] for 5 weeks then transferred to [facility] for 4 weeks and presents today in preparation for cranioplasty this week.  (10 Sep 2023 17:50)      Patient seen and examined.  Feels well this AM, denies any pain.  No fever, chills, CP, SOB, N/V, abdominal pain.   No changes in her medications recently.  She has PCP in  she follows with Dr Mendoza, prior to initial admission a [facility] was on no medications, had prior ovarian cyst removal without any reaction to anesthesia.  Prior to admission was doing pilates multiple times a week, weight lifting, cardio.     REVIEW OF SYSTEMS: see HPI    PAST MEDICAL & SURGICAL HISTORY:  Hypothyroid      Cyst of ovary      Status post craniectomy          FAMILY HISTORY:  HTN in parents  Seizures in brother    SOCIAL HISTORY:  Tobacco use: Former smoker, 1/2 ppd quit prior to last admission  EtOH use: None  Recreational drug use: None    MEDICATIONS:  MEDICATIONS  (STANDING):  divalproex  milliGRAM(s) Oral <User Schedule>  divalproex  milliGRAM(s) Oral <User Schedule>  escitalopram 5 milliGRAM(s) Oral daily  gabapentin 400 milliGRAM(s) Oral <User Schedule>  gabapentin 400 milliGRAM(s) Oral <User Schedule>  influenza   Vaccine 0.5 milliLiter(s) IntraMuscular once  lacosamide 150 milliGRAM(s) Oral <User Schedule>  levothyroxine 75 MICROGram(s) Oral daily  magnesium sulfate  IVPB 2 Gram(s) IV Intermittent once  memantine 5 milliGRAM(s) Oral <User Schedule>  pantoprazole    Tablet 40 milliGRAM(s) Oral before breakfast  potassium chloride   Powder 20 milliEquivalent(s) Oral once  senna 2 Tablet(s) Oral at bedtime  sodium chloride 0.9%. 1000 milliLiter(s) (50 mL/Hr) IV Continuous <Continuous>  traZODone 50 milliGRAM(s) Oral at bedtime    MEDICATIONS  (PRN):  acetaminophen     Tablet .. 650 milliGRAM(s) Oral every 6 hours PRN Temp greater or equal to 38C (100.4F), Mild Pain (1 - 3)  bisacodyl 5 milliGRAM(s) Oral every 12 hours PRN Constipation  traMADol 50 milliGRAM(s) Oral every 6 hours PRN Severe Pain (7 - 10)      ALLERGIES:  Allergies    No Known Allergies    Intolerances        VITAL SIGNS:  Vital Signs Last 24 Hrs  T(C): 37 (11 Sep 2023 08:29), Max: 37 (11 Sep 2023 08:29)  T(F): 98.6 (11 Sep 2023 08:29), Max: 98.6 (11 Sep 2023 08:29)  HR: 79 (11 Sep 2023 08:29) (71 - 80)  BP: 136/89 (11 Sep 2023 08:29) (115/80 - 137/87)  BP(mean): --  RR: 18 (11 Sep 2023 08:29) (17 - 18)  SpO2: 98% (11 Sep 2023 08:29) (95% - 98%)    Parameters below as of 11 Sep 2023 08:29  Patient On (Oxygen Delivery Method): room air        09-10-23 @ 07:01  -  09-11-23 @ 07:00  --------------------------------------------------------  IN:  Total IN: 0 mL    OUT:    Voided (mL): 200 mL  Total OUT: 200 mL    Total NET: -200 mL          PHYSICAL EXAM:  Constitutional: NAD, comfortable in bed.  HEENT: NC/AT, PERRLA, EOMI, no conjunctival pallor or scleral icterus, MMM, R crani site sunken  Neck: Supple  Respiratory: CTA B/L. No w/r/r.   Cardiovascular: RRR, normal S1 and S2, no m/r/g.   Gastrointestinal: +BS, soft NTND  Extremities: wwp; no cyanosis, clubbing or edema.   Vascular: Pulses equal and strong throughout.   Neurological: AAOx2-3, some mixed aphasia and repetitive speech, L side hemiparesis  Skin: No gross skin abnormalities or rashes          LABS:                        13.1   4.96  )-----------( 202      ( 11 Sep 2023 06:23 )             39.4     09-11    138  |  101  |  6<L>  ----------------------------<  103<H>  3.9   |  27  |  0.47<L>    Ca    9.3      11 Sep 2023 06:23  Phos  4.6     09-11  Mg     1.6     09-11    TPro  6.5  /  Alb  3.7  /  TBili  <0.2  /  DBili  x   /  AST  10  /  ALT  11  /  AlkPhos  61  09-10    PT/INR - ( 10 Sep 2023 18:00 )   PT: 10.7 sec;   INR: 0.94            Urinalysis Basic - ( 11 Sep 2023 06:23 )    Color: x / Appearance: x / SG: x / pH: x  Gluc: 103 mg/dL / Ketone: x  / Bili: x / Urobili: x   Blood: x / Protein: x / Nitrite: x   Leuk Esterase: x / RBC: x / WBC x   Sq Epi: x / Non Sq Epi: x / Bacteria: x          CAPILLARY BLOOD GLUCOSE              RADIOLOGY & ADDITIONAL TESTS: Reviewed.

## 2023-09-11 NOTE — PROGRESS NOTE ADULT - SUBJECTIVE AND OBJECTIVE BOX
HPI:  38F hx hypothyroid was found down by mom unresponsive in the bathroom on 6/6, was airlifted to [facility] for a large R frontal IPH with IVH and hydrocephalus. She was emergently intubated and taken to operating room for right hemicraniectomy and EVD placement with Dr. Bernstein. Patient was deemed medically stable and was sent  to [facility] for 5 weeks then transferred to [facility] for 4 weeks and presents today in preparation for cranioplasty this week.  (10 Sep 2023 17:50)    9/10: transferred for cranioplasty Tuesday 9/11: ALETHEA overnight  OVERNIGHT EVENTS: no acute events overnight   Vital Signs Last 24 Hrs  T(C): 36.8 (10 Sep 2023 20:16), Max: 36.8 (10 Sep 2023 20:16)  T(F): 98.3 (10 Sep 2023 20:16), Max: 98.3 (10 Sep 2023 20:16)  HR: 71 (10 Sep 2023 20:16) (71 - 75)  BP: 115/80 (10 Sep 2023 20:16) (115/80 - 137/87)  BP(mean): --  RR: 18 (10 Sep 2023 20:16) (17 - 18)  SpO2: 98% (10 Sep 2023 20:16) (98% - 98%)    Parameters below as of 10 Sep 2023 20:16  Patient On (Oxygen Delivery Method): room air        I&O's Summary    10 Sep 2023 07:01  -  11 Sep 2023 01:17  --------------------------------------------------------  IN: 0 mL / OUT: 200 mL / NET: -200 mL      Physical Exam: Constitutional: NAD, well groomed, well nourished  Respiratory: breathing non-labored, symmetrical chest wall movement  Cardiovascuar: RRR, no murmurs  Gastrointestinal: abdomen soft, non tender  Genitourinary: exam deffered  Neurological:  AAOX2-3. Mixed aphasia, L facial, PERRL  Motor: FC RUE/RLE  5/5, LUE/LLE WD   Sensation: intact to light touch in all extremities  Pronator Drift: unable to assess due to L side plegia  Dysmetria: patient uncooperative during exam and could not assess   Extremities: distal pulses 2+ x4  Wound/incision: hemicraniectomy incision c/d/i    LABS:                        13.7   6.59  )-----------( 195      ( 10 Sep 2023 18:00 )             40.8     09-10    135  |  98  |  8   ----------------------------<  110<H>  4.2   |  27  |  0.47<L>    Ca    9.5      10 Sep 2023 18:00  Phos  3.9     09-10  Mg     1.6     09-10    TPro  6.5  /  Alb  3.7  /  TBili  <0.2  /  DBili  x   /  AST  10  /  ALT  11  /  AlkPhos  61  09-10    PT/INR - ( 10 Sep 2023 18:00 )   PT: 10.7 sec;   INR: 0.94            Urinalysis Basic - ( 10 Sep 2023 18:00 )    Color: x / Appearance: x / SG: x / pH: x  Gluc: 110 mg/dL / Ketone: x  / Bili: x / Urobili: x   Blood: x / Protein: x / Nitrite: x   Leuk Esterase: x / RBC: x / WBC x   Sq Epi: x / Non Sq Epi: x / Bacteria: x        Allergies    No Known Allergies    Intolerances      MEDICATIONS:  Antibiotics:    Neuro:  acetaminophen     Tablet .. 650 milliGRAM(s) Oral every 6 hours PRN  divalproex  milliGRAM(s) Oral <User Schedule>  divalproex  milliGRAM(s) Oral <User Schedule>  escitalopram 5 milliGRAM(s) Oral daily  gabapentin 400 milliGRAM(s) Oral <User Schedule>  gabapentin 400 milliGRAM(s) Oral <User Schedule>  lacosamide 150 milliGRAM(s) Oral <User Schedule>  memantine 5 milliGRAM(s) Oral <User Schedule>  traMADol 50 milliGRAM(s) Oral every 6 hours PRN  traZODone 50 milliGRAM(s) Oral at bedtime    Anticoagulation:    OTHER:  bisacodyl 5 milliGRAM(s) Oral every 12 hours PRN  influenza   Vaccine 0.5 milliLiter(s) IntraMuscular once  levothyroxine 75 MICROGram(s) Oral daily  pantoprazole    Tablet 40 milliGRAM(s) Oral before breakfast  senna 2 Tablet(s) Oral at bedtime  38F hx hypothyroid was found down by mom unresponsive in the bathroom on 6/6, was airlifted to [facility] for a large R frontal IPH with IVH and hydrocephalus. She was emergently intubated and taken to operating room for right hemicraniectomy and EVD placement with Dr. Bernstein. Patient was deemed medically stable and was sent  to [facility] for 5 weeks then transferred to [facility] for 4 weeks and presents today in preparation for cranioplasty this week.     Neuro  -neuro/vitals q4h  -pain control as needed  -continue AEDs as ordered  -pending MRI quicktome, brain PET, PT/OT, neuropsych eval    Cardio  -normotensive    Pulm  -RA    GI  -regular diet  -bowel regimen    Renal  -IVL  -primafit    Endo  -continue synthroid    Heme  -no active issues    ID  -afebrile    admit to [facility] full code, family updated, d/w Dr. Sy

## 2023-09-11 NOTE — CONSULT NOTE ADULT - SUBJECTIVE AND OBJECTIVE BOX
38yF was admitted on 09-10    HPI:  " 38F hx hypothyroid was found down by mom unresponsive in the bathroom on 6/6, was airlifted to [facility] for a large R frontal IPH with IVH and hydrocephalus. She was emergently intubated and taken to operating room for right hemicraniectomy and EVD placement with Dr. Bernstein. Patient was deemed medically stable and was sent  to [facility] for 5 weeks then transferred to [facility] for 4 weeks and presents today in preparation for cranioplasty this week.  (10 Sep 2023 17:50) "       -----------------------------------------------------------------------  REVIEW OF SYSTEMS      -----------------------------------------------------------------------  FUNCTIONAL HISTORY  Lives   Independent    CURRENT FUNCTIONAL STATUS  Date:   Bed mobility -   Sit to Stand -   Transfers -   Gait -   ADL's -  -----------------------------------------------------------------------  PAST MEDICAL & SURGICAL HISTORY  Hypothyroid    Cyst of ovary    Status post craniectomy        FAMILY HISTORY       SOCIAL HISTORY  Smoking - Denied  EtOH - Denied   Drugs - Denied  -----------------------------------------------------------------------  VITALS  T(C): 36.4 (09-11-23 @ 14:48), Max: 37 (09-11-23 @ 08:29)  HR: 0 (09-11-23 @ 15:42) (0 - 83)  BP: 187/121 (09-11-23 @ 15:42) (115/80 - 187/121)  RR: 20 (09-11-23 @ 15:42) (18 - 20)  SpO2: 98% (09-11-23 @ 15:42) (95% - 100%)  Wt(kg): --    PHYSICAL EXAM        -----------------------------------------------------------------------  RECENT LABS  CBC Full  -  ( 11 Sep 2023 06:23 )  WBC Count : 4.96 K/uL  RBC Count : 4.21 M/uL  Hemoglobin : 13.1 g/dL  Hematocrit : 39.4 %  Platelet Count - Automated : 202 K/uL  Mean Cell Volume : 93.6 fl  Mean Cell Hemoglobin : 31.1 pg  Mean Cell Hemoglobin Concentration : 33.2 gm/dL  Auto Neutrophil # : x  Auto Lymphocyte # : x  Auto Monocyte # : x  Auto Eosinophil # : x  Auto Basophil # : x  Auto Neutrophil % : x  Auto Lymphocyte % : x  Auto Monocyte % : x  Auto Eosinophil % : x  Auto Basophil % : x    09-11    138  |  101  |  6<L>  ----------------------------<  103<H>  3.9   |  27  |  0.47<L>    Ca    9.3      11 Sep 2023 06:23  Phos  4.6     09-11  Mg     1.6     09-11    TPro  6.5  /  Alb  3.7  /  TBili  <0.2  /  DBili  x   /  AST  10  /  ALT  11  /  AlkPhos  61  09-10    Urinalysis Basic - ( 11 Sep 2023 06:23 )    Color: x / Appearance: x / SG: x / pH: x  Gluc: 103 mg/dL / Ketone: x  / Bili: x / Urobili: x   Blood: x / Protein: x / Nitrite: x   Leuk Esterase: x / RBC: x / WBC x   Sq Epi: x / Non Sq Epi: x / Bacteria: x        -----------------------------------------------------------------------  IMAGING:    -----------------------------------------------------------------------  ALLERGIES  No Known Allergies      MEDICATIONS   acetaminophen     Tablet .. 650 milliGRAM(s) Oral every 6 hours PRN  baclofen 5 milliGRAM(s) Oral every 8 hours PRN  bisacodyl 5 milliGRAM(s) Oral every 12 hours PRN  divalproex  milliGRAM(s) Oral <User Schedule>  divalproex  milliGRAM(s) Oral <User Schedule>  escitalopram 15 milliGRAM(s) Oral daily  gabapentin 400 milliGRAM(s) Oral <User Schedule>  gabapentin 400 milliGRAM(s) Oral <User Schedule>  influenza   Vaccine 0.5 milliLiter(s) IntraMuscular once  lacosamide 150 milliGRAM(s) Oral <User Schedule>  levothyroxine 75 MICROGram(s) Oral daily  LORazepam   Injectable 1 milliGRAM(s) IV Push once PRN  magnesium sulfate  IVPB 2 Gram(s) IV Intermittent once  memantine 5 milliGRAM(s) Oral <User Schedule>  pantoprazole    Tablet 40 milliGRAM(s) Oral before breakfast  potassium chloride   Powder 20 milliEquivalent(s) Oral once  senna 2 Tablet(s) Oral at bedtime  sodium chloride 0.9%. 1000 milliLiter(s) IV Continuous <Continuous>  traMADol 50 milliGRAM(s) Oral every 6 hours PRN  traZODone 50 milliGRAM(s) Oral at bedtime    -----------------------------------------------------------------------   38yF was admitted on 09-10    HPI:  " 38F hx hypothyroid was found down by mom unresponsive in the bathroom on 6/6, was airlifted to [facility] for a large R frontal IPH with IVH and hydrocephalus. She was emergently intubated and taken to operating room for right hemicraniectomy and EVD placement with Dr. Bernstein. Patient was deemed medically stable and was sent  to [facility] for 5 weeks then transferred to [facility] for 4 weeks and presents today in preparation for cranioplasty this week.  (10 Sep 2023 17:50) "     Patient seen at bedside this afternoon, accompanied by her wife Karma.  She appeared to be emotionally labile and confused. Reported pain with movement of left arm/shoulder. Karma reports she was at [facility] where she was making some good functional gains, states she was able to stand with assistance. She was then discharged to [facility] where she felt her functional improvement plateaued Dr. Sanford (her physiatrist at rehab) performed botox injections for left arm spasticity and noted improved ROM.  She was also put on lexapro and depakote for mood.      -----------------------------------------------------------------------  REVIEW OF SYSTEMS  unable to reliably assess. Patient distracted and not able to answer questions. Needed frequent redirection.     -----------------------------------------------------------------------  FUNCTIONAL HISTORY  Admitted from [facility]. Otherwise lives with her wife in a private home with 4 IVONNE.  Independent    CURRENT FUNCTIONAL STATUS  pending PT/OT assessments  -----------------------------------------------------------------------  PAST MEDICAL & SURGICAL HISTORY  Hypothyroid  Cyst of ovary  Status post craniectomy    SOCIAL HISTORY  as above  -----------------------------------------------------------------------  VITALS  T(C): 36.4 (09-11-23 @ 14:48), Max: 37 (09-11-23 @ 08:29)  HR: 0 (09-11-23 @ 15:42) (0 - 83)  BP: 187/121 (09-11-23 @ 15:42) (115/80 - 187/121)  RR: 20 (09-11-23 @ 15:42) (18 - 20)  SpO2: 98% (09-11-23 @ 15:42) (95% - 100%)  Wt(kg): --    PHYSICAL EXAM  Constitutional - NAD, distracted  HEENT - s/p right craniectomy   Neck - Supple  Chest - Breathing comfortably, No Respiratory distress  Cardiovascular - Regular pulse  Abdomen - No visible abdominal distension  Extremities - left upper extremity paresis - resting hand splint donned   Neurologic Exam -                    Cognitive - Awake, Alert, AAO to self, place; unable to cooperate with exam due to being distracted      Communication - Fluent, No dysarthria, repetition intact     Cranial Nerves -  EOMI, No facial asymmetry     Motor - left spastic hemiplegia. spontaneous movements of right upper extremity      Sensory - unable to reliably assess  Psychiatric - emotionally labile    -----------------------------------------------------------------------  RECENT LABS  CBC Full  -  ( 11 Sep 2023 06:23 )  WBC Count : 4.96 K/uL  RBC Count : 4.21 M/uL  Hemoglobin : 13.1 g/dL  Hematocrit : 39.4 %  Platelet Count - Automated : 202 K/uL  Mean Cell Volume : 93.6 fl  Mean Cell Hemoglobin : 31.1 pg  Mean Cell Hemoglobin Concentration : 33.2 gm/dL  Auto Neutrophil # : x  Auto Lymphocyte # : x  Auto Monocyte # : x  Auto Eosinophil # : x  Auto Basophil # : x  Auto Neutrophil % : x  Auto Lymphocyte % : x  Auto Monocyte % : x  Auto Eosinophil % : x  Auto Basophil % : x    09-11    138  |  101  |  6<L>  ----------------------------<  103<H>  3.9   |  27  |  0.47<L>    Ca    9.3      11 Sep 2023 06:23  Phos  4.6     09-11  Mg     1.6     09-11    TPro  6.5  /  Alb  3.7  /  TBili  <0.2  /  DBili  x   /  AST  10  /  ALT  11  /  AlkPhos  61  09-10    Urinalysis Basic - ( 11 Sep 2023 06:23 )    Color: x / Appearance: x / SG: x / pH: x  Gluc: 103 mg/dL / Ketone: x  / Bili: x / Urobili: x   Blood: x / Protein: x / Nitrite: x   Leuk Esterase: x / RBC: x / WBC x   Sq Epi: x / Non Sq Epi: x / Bacteria: x        -----------------------------------------------------------------------  IMAGING:  < from: MR Head No Cont (09.11.23 @ 19:35) >  FINDINGS: The MRI examination of the brain demonstrates the patient to be   status post right frontotemporoparietal craniectomy. There is concavity   of the overlying skinflap. There is a thin fluid collection overlying   the frontal convexity deep to the craniectomy defect. There is a slitlike   cavity extending from the anterior right insula to the corona radiata   with surrounding FLAIR/T2 signal abnormality. The SWI images demonstrates   a dominant focus of susceptibility within the anterior right insular   region with multiple punctate foci within the right frontal and parietal   lobes secondary previous hemorrhagic products. There also is   susceptibility within the left occipital horn from previous   intraventricular hemorrhage.    DTI obtained for further processing by outside software.    IMPRESSION: Patient status post right decompressive craniectomy with   encephalomalacia cavity with surrounding gliosis within the right frontal   lobe. Images are available surgical planning.    < end of copied text >    -----------------------------------------------------------------------  ALLERGIES  No Known Allergies      MEDICATIONS   acetaminophen     Tablet .. 650 milliGRAM(s) Oral every 6 hours PRN  baclofen 5 milliGRAM(s) Oral every 8 hours PRN  bisacodyl 5 milliGRAM(s) Oral every 12 hours PRN  divalproex  milliGRAM(s) Oral <User Schedule>  divalproex  milliGRAM(s) Oral <User Schedule>  escitalopram 15 milliGRAM(s) Oral daily  gabapentin 400 milliGRAM(s) Oral <User Schedule>  gabapentin 400 milliGRAM(s) Oral <User Schedule>  influenza   Vaccine 0.5 milliLiter(s) IntraMuscular once  lacosamide 150 milliGRAM(s) Oral <User Schedule>  levothyroxine 75 MICROGram(s) Oral daily  LORazepam   Injectable 1 milliGRAM(s) IV Push once PRN  magnesium sulfate  IVPB 2 Gram(s) IV Intermittent once  memantine 5 milliGRAM(s) Oral <User Schedule>  pantoprazole    Tablet 40 milliGRAM(s) Oral before breakfast  potassium chloride   Powder 20 milliEquivalent(s) Oral once  senna 2 Tablet(s) Oral at bedtime  sodium chloride 0.9%. 1000 milliLiter(s) IV Continuous <Continuous>  traMADol 50 milliGRAM(s) Oral every 6 hours PRN  traZODone 50 milliGRAM(s) Oral at bedtime    -----------------------------------------------------------------------

## 2023-09-11 NOTE — CONSULT NOTE ADULT - ASSESSMENT
38F hx hypothyroid was found down by mom unresponsive in the bathroom on 6/6, was airlifted to [facility] for a large R frontal IPH with IVH and hydrocephalus. She was emergently intubated and taken to operating room for right hemicraniectomy and EVD placement with Dr. Bernstein who presents for cranioplasty this week.     #R frontal IPH s/p hemicraniectomy  #Cranioplasty  - plan per NSG  - c/w AEDs: Depakote, Gabapentin, Vimpat  - plan for cranioplasty this week    #Preoperative assessment  - RCRI 0, class 1 risk.  Ureña score  - METs prior to event significantly > 4  - EKG ***    #Hypothyroidism  - c/w synthroid 75mcg    #Depression  - c/w home lexapro 5mg 38F hx hypothyroid was found down by mom unresponsive in the bathroom on 6/6, was airlifted to [facility] for a large R frontal IPH with IVH and hydrocephalus. She was emergently intubated and taken to operating room for right hemicraniectomy and EVD placement with Dr. Bernstein who presents for cranioplasty this week.     #R frontal IPH s/p hemicraniectomy  #Cranioplasty  - plan per NSG  - c/w AEDs: Depakote, Gabapentin, Vimpat  - plan for cranioplasty this week    #Preoperative assessment  - RCRI 0, class 1 risk.  Ureña score 0.2%  - METs prior to event significantly > 4  - EKG NSR  - patient is low risk for intermediate risk procedure.  No further cardiac workup indicated, medically optimized for procedure    #Hypothyroidism  - c/w synthroid 75mcg    #Depression  - c/w home lexapro 5mg

## 2023-09-12 ENCOUNTER — TRANSCRIPTION ENCOUNTER (OUTPATIENT)
Age: 38
End: 2023-09-12

## 2023-09-12 ENCOUNTER — APPOINTMENT (OUTPATIENT)
Dept: NEUROSURGERY | Facility: HOSPITAL | Age: 38
End: 2023-09-12

## 2023-09-12 ENCOUNTER — RESULT REVIEW (OUTPATIENT)
Age: 38
End: 2023-09-12

## 2023-09-12 LAB
AMMONIA BLD-MCNC: 25 UMOL/L — SIGNIFICANT CHANGE UP (ref 11–55)
ANION GAP SERPL CALC-SCNC: 12 MMOL/L — SIGNIFICANT CHANGE UP (ref 5–17)
ANION GAP SERPL CALC-SCNC: 9 MMOL/L — SIGNIFICANT CHANGE UP (ref 5–17)
APTT BLD: 30.9 SEC — SIGNIFICANT CHANGE UP (ref 24.5–35.6)
APTT BLD: 31.4 SEC — SIGNIFICANT CHANGE UP (ref 24.5–35.6)
BUN SERPL-MCNC: 5 MG/DL — LOW (ref 7–23)
BUN SERPL-MCNC: 6 MG/DL — LOW (ref 7–23)
CALCIUM SERPL-MCNC: 8.8 MG/DL — SIGNIFICANT CHANGE UP (ref 8.4–10.5)
CALCIUM SERPL-MCNC: 9.2 MG/DL — SIGNIFICANT CHANGE UP (ref 8.4–10.5)
CHLORIDE SERPL-SCNC: 100 MMOL/L — SIGNIFICANT CHANGE UP (ref 96–108)
CHLORIDE SERPL-SCNC: 98 MMOL/L — SIGNIFICANT CHANGE UP (ref 96–108)
CO2 SERPL-SCNC: 24 MMOL/L — SIGNIFICANT CHANGE UP (ref 22–31)
CO2 SERPL-SCNC: 25 MMOL/L — SIGNIFICANT CHANGE UP (ref 22–31)
CREAT SERPL-MCNC: 0.41 MG/DL — LOW (ref 0.5–1.3)
CREAT SERPL-MCNC: 0.43 MG/DL — LOW (ref 0.5–1.3)
EGFR: 127 ML/MIN/1.73M2 — SIGNIFICANT CHANGE UP
EGFR: 128 ML/MIN/1.73M2 — SIGNIFICANT CHANGE UP
GLUCOSE SERPL-MCNC: 102 MG/DL — HIGH (ref 70–99)
GLUCOSE SERPL-MCNC: 146 MG/DL — HIGH (ref 70–99)
HCT VFR BLD CALC: 31.2 % — LOW (ref 34.5–45)
HCT VFR BLD CALC: 41.8 % — SIGNIFICANT CHANGE UP (ref 34.5–45)
HGB BLD-MCNC: 10.8 G/DL — LOW (ref 11.5–15.5)
HGB BLD-MCNC: 13.8 G/DL — SIGNIFICANT CHANGE UP (ref 11.5–15.5)
INR BLD: 0.98 — SIGNIFICANT CHANGE UP (ref 0.85–1.18)
INR BLD: 1.08 — SIGNIFICANT CHANGE UP (ref 0.85–1.18)
MAGNESIUM SERPL-MCNC: 1.4 MG/DL — LOW (ref 1.6–2.6)
MAGNESIUM SERPL-MCNC: 1.6 MG/DL — SIGNIFICANT CHANGE UP (ref 1.6–2.6)
MCHC RBC-ENTMCNC: 30.9 PG — SIGNIFICANT CHANGE UP (ref 27–34)
MCHC RBC-ENTMCNC: 31.9 PG — SIGNIFICANT CHANGE UP (ref 27–34)
MCHC RBC-ENTMCNC: 33 GM/DL — SIGNIFICANT CHANGE UP (ref 32–36)
MCHC RBC-ENTMCNC: 34.6 GM/DL — SIGNIFICANT CHANGE UP (ref 32–36)
MCV RBC AUTO: 92 FL — SIGNIFICANT CHANGE UP (ref 80–100)
MCV RBC AUTO: 93.7 FL — SIGNIFICANT CHANGE UP (ref 80–100)
NRBC # BLD: 0 /100 WBCS — SIGNIFICANT CHANGE UP (ref 0–0)
NRBC # BLD: 0 /100 WBCS — SIGNIFICANT CHANGE UP (ref 0–0)
PHOSPHATE SERPL-MCNC: 4.5 MG/DL — SIGNIFICANT CHANGE UP (ref 2.5–4.5)
PHOSPHATE SERPL-MCNC: 4.8 MG/DL — HIGH (ref 2.5–4.5)
PLATELET # BLD AUTO: 195 K/UL — SIGNIFICANT CHANGE UP (ref 150–400)
PLATELET # BLD AUTO: 217 K/UL — SIGNIFICANT CHANGE UP (ref 150–400)
POTASSIUM SERPL-MCNC: 4.4 MMOL/L — SIGNIFICANT CHANGE UP (ref 3.5–5.3)
POTASSIUM SERPL-MCNC: 4.4 MMOL/L — SIGNIFICANT CHANGE UP (ref 3.5–5.3)
POTASSIUM SERPL-SCNC: 4.4 MMOL/L — SIGNIFICANT CHANGE UP (ref 3.5–5.3)
POTASSIUM SERPL-SCNC: 4.4 MMOL/L — SIGNIFICANT CHANGE UP (ref 3.5–5.3)
PROTHROM AB SERPL-ACNC: 11.2 SEC — SIGNIFICANT CHANGE UP (ref 9.5–13)
PROTHROM AB SERPL-ACNC: 12.3 SEC — SIGNIFICANT CHANGE UP (ref 9.5–13)
RBC # BLD: 3.39 M/UL — LOW (ref 3.8–5.2)
RBC # BLD: 4.46 M/UL — SIGNIFICANT CHANGE UP (ref 3.8–5.2)
RBC # FLD: 12.9 % — SIGNIFICANT CHANGE UP (ref 10.3–14.5)
RBC # FLD: 12.9 % — SIGNIFICANT CHANGE UP (ref 10.3–14.5)
SODIUM SERPL-SCNC: 134 MMOL/L — LOW (ref 135–145)
SODIUM SERPL-SCNC: 134 MMOL/L — LOW (ref 135–145)
VALPROATE SERPL-MCNC: 119.2 UG/ML — HIGH (ref 50–100)
WBC # BLD: 12.75 K/UL — HIGH (ref 3.8–10.5)
WBC # BLD: 8.49 K/UL — SIGNIFICANT CHANGE UP (ref 3.8–10.5)
WBC # FLD AUTO: 12.75 K/UL — HIGH (ref 3.8–10.5)
WBC # FLD AUTO: 8.49 K/UL — SIGNIFICANT CHANGE UP (ref 3.8–10.5)

## 2023-09-12 PROCEDURE — 99233 SBSQ HOSP IP/OBS HIGH 50: CPT

## 2023-09-12 PROCEDURE — 99291 CRITICAL CARE FIRST HOUR: CPT

## 2023-09-12 PROCEDURE — 14301 TIS TRNFR ANY 30.1-60 SQ CM: CPT

## 2023-09-12 PROCEDURE — 70450 CT HEAD/BRAIN W/O DYE: CPT | Mod: 26

## 2023-09-12 PROCEDURE — 88300 SURGICAL PATH GROSS: CPT | Mod: 26

## 2023-09-12 PROCEDURE — 62141 CRNOP SKULL DEFECT>5 CM DIAM: CPT

## 2023-09-12 PROCEDURE — 62141 CRNOP SKULL DEFECT>5 CM DIAM: CPT | Mod: AS

## 2023-09-12 PROCEDURE — 14301 TIS TRNFR ANY 30.1-60 SQ CM: CPT | Mod: AS

## 2023-09-12 DEVICE — SURGIFLO HEMOSTATIC MATRIX KIT: Type: IMPLANTABLE DEVICE | Site: RIGHT | Status: FUNCTIONAL

## 2023-09-12 DEVICE — IMP CRANIAL CLEARFIT XL: Type: IMPLANTABLE DEVICE | Site: RIGHT | Status: FUNCTIONAL

## 2023-09-12 DEVICE — IMPLANTABLE DEVICE: Type: IMPLANTABLE DEVICE | Site: RIGHT | Status: FUNCTIONAL

## 2023-09-12 DEVICE — PLATE LO PROF 8 HOLE: Type: IMPLANTABLE DEVICE | Site: RIGHT | Status: FUNCTIONAL

## 2023-09-12 DEVICE — SURGIFOAM PAD 8CM X 12.5CM X 10MM (100): Type: IMPLANTABLE DEVICE | Site: RIGHT | Status: FUNCTIONAL

## 2023-09-12 DEVICE — PLATE UN3 STRAIGHT 8 HOLE: Type: IMPLANTABLE DEVICE | Site: RIGHT | Status: FUNCTIONAL

## 2023-09-12 DEVICE — MESH DYNAMIC 1.7MM 90X90X.3MM: Type: IMPLANTABLE DEVICE | Site: RIGHT | Status: FUNCTIONAL

## 2023-09-12 DEVICE — SCREW UN3 AXS SELF DRILL 1.5X4MM: Type: IMPLANTABLE DEVICE | Site: RIGHT | Status: FUNCTIONAL

## 2023-09-12 RX ORDER — ACETAMINOPHEN 500 MG
1000 TABLET ORAL EVERY 8 HOURS
Refills: 0 | Status: COMPLETED | OUTPATIENT
Start: 2023-09-12 | End: 2023-09-14

## 2023-09-12 RX ORDER — DEXTROSE 50 % IN WATER 50 %
15 SYRINGE (ML) INTRAVENOUS ONCE
Refills: 0 | Status: DISCONTINUED | OUTPATIENT
Start: 2023-09-12 | End: 2023-09-13

## 2023-09-12 RX ORDER — LABETALOL HCL 100 MG
10 TABLET ORAL
Refills: 0 | Status: DISCONTINUED | OUTPATIENT
Start: 2023-09-12 | End: 2023-09-16

## 2023-09-12 RX ORDER — LACOSAMIDE 50 MG/1
150 TABLET ORAL EVERY 12 HOURS
Refills: 0 | Status: DISCONTINUED | OUTPATIENT
Start: 2023-09-12 | End: 2023-09-18

## 2023-09-12 RX ORDER — DEXTROSE 50 % IN WATER 50 %
25 SYRINGE (ML) INTRAVENOUS ONCE
Refills: 0 | Status: DISCONTINUED | OUTPATIENT
Start: 2023-09-12 | End: 2023-09-13

## 2023-09-12 RX ORDER — INSULIN LISPRO 100/ML
VIAL (ML) SUBCUTANEOUS EVERY 6 HOURS
Refills: 0 | Status: DISCONTINUED | OUTPATIENT
Start: 2023-09-12 | End: 2023-09-14

## 2023-09-12 RX ORDER — DIVALPROEX SODIUM 500 MG/1
750 TABLET, DELAYED RELEASE ORAL
Refills: 0 | Status: DISCONTINUED | OUTPATIENT
Start: 2023-09-12 | End: 2023-09-12

## 2023-09-12 RX ORDER — MAGNESIUM SULFATE 500 MG/ML
4 VIAL (ML) INJECTION ONCE
Refills: 0 | Status: COMPLETED | OUTPATIENT
Start: 2023-09-12 | End: 2023-09-12

## 2023-09-12 RX ORDER — DIVALPROEX SODIUM 500 MG/1
500 TABLET, DELAYED RELEASE ORAL
Refills: 0 | Status: DISCONTINUED | OUTPATIENT
Start: 2023-09-12 | End: 2023-09-12

## 2023-09-12 RX ORDER — TRAMADOL HYDROCHLORIDE 50 MG/1
50 TABLET ORAL EVERY 6 HOURS
Refills: 0 | Status: DISCONTINUED | OUTPATIENT
Start: 2023-09-12 | End: 2023-09-19

## 2023-09-12 RX ORDER — PANTOPRAZOLE SODIUM 20 MG/1
40 TABLET, DELAYED RELEASE ORAL
Refills: 0 | Status: DISCONTINUED | OUTPATIENT
Start: 2023-09-12 | End: 2023-09-21

## 2023-09-12 RX ORDER — ESCITALOPRAM OXALATE 10 MG/1
15 TABLET, FILM COATED ORAL DAILY
Refills: 0 | Status: DISCONTINUED | OUTPATIENT
Start: 2023-09-12 | End: 2023-09-14

## 2023-09-12 RX ORDER — GLUCAGON INJECTION, SOLUTION 0.5 MG/.1ML
1 INJECTION, SOLUTION SUBCUTANEOUS ONCE
Refills: 0 | Status: DISCONTINUED | OUTPATIENT
Start: 2023-09-12 | End: 2023-09-14

## 2023-09-12 RX ORDER — VALPROIC ACID (AS SODIUM SALT) 250 MG/5ML
500 SOLUTION, ORAL ORAL
Refills: 0 | Status: DISCONTINUED | OUTPATIENT
Start: 2023-09-12 | End: 2023-09-12

## 2023-09-12 RX ORDER — GABAPENTIN 400 MG/1
400 CAPSULE ORAL
Refills: 0 | Status: DISCONTINUED | OUTPATIENT
Start: 2023-09-12 | End: 2023-09-20

## 2023-09-12 RX ORDER — LEVOTHYROXINE SODIUM 125 MCG
75 TABLET ORAL DAILY
Refills: 0 | Status: DISCONTINUED | OUTPATIENT
Start: 2023-09-12 | End: 2023-09-21

## 2023-09-12 RX ORDER — VALPROIC ACID (AS SODIUM SALT) 250 MG/5ML
500 SOLUTION, ORAL ORAL
Refills: 0 | Status: DISCONTINUED | OUTPATIENT
Start: 2023-09-12 | End: 2023-09-13

## 2023-09-12 RX ORDER — HYDROMORPHONE HYDROCHLORIDE 2 MG/ML
0.5 INJECTION INTRAMUSCULAR; INTRAVENOUS; SUBCUTANEOUS
Refills: 0 | Status: DISCONTINUED | OUTPATIENT
Start: 2023-09-12 | End: 2023-09-13

## 2023-09-12 RX ORDER — SODIUM CHLORIDE 9 MG/ML
1000 INJECTION, SOLUTION INTRAVENOUS
Refills: 0 | Status: DISCONTINUED | OUTPATIENT
Start: 2023-09-12 | End: 2023-09-13

## 2023-09-12 RX ORDER — CEFAZOLIN SODIUM 1 G
2000 VIAL (EA) INJECTION EVERY 8 HOURS
Refills: 0 | Status: COMPLETED | OUTPATIENT
Start: 2023-09-12 | End: 2023-09-15

## 2023-09-12 RX ORDER — BACLOFEN 100 %
5 POWDER (GRAM) MISCELLANEOUS EVERY 8 HOURS
Refills: 0 | Status: DISCONTINUED | OUTPATIENT
Start: 2023-09-12 | End: 2023-09-21

## 2023-09-12 RX ORDER — ACETAMINOPHEN 500 MG
1000 TABLET ORAL ONCE
Refills: 0 | Status: COMPLETED | OUTPATIENT
Start: 2023-09-12 | End: 2023-09-12

## 2023-09-12 RX ORDER — SODIUM CHLORIDE 9 MG/ML
1000 INJECTION INTRAMUSCULAR; INTRAVENOUS; SUBCUTANEOUS
Refills: 0 | Status: DISCONTINUED | OUTPATIENT
Start: 2023-09-12 | End: 2023-09-15

## 2023-09-12 RX ORDER — ONDANSETRON 8 MG/1
4 TABLET, FILM COATED ORAL EVERY 6 HOURS
Refills: 0 | Status: COMPLETED | OUTPATIENT
Start: 2023-09-12 | End: 2023-09-14

## 2023-09-12 RX ORDER — HYDROMORPHONE HYDROCHLORIDE 2 MG/ML
0.5 INJECTION INTRAMUSCULAR; INTRAVENOUS; SUBCUTANEOUS
Refills: 0 | Status: DISCONTINUED | OUTPATIENT
Start: 2023-09-12 | End: 2023-09-12

## 2023-09-12 RX ORDER — DEXTROSE 50 % IN WATER 50 %
12.5 SYRINGE (ML) INTRAVENOUS ONCE
Refills: 0 | Status: DISCONTINUED | OUTPATIENT
Start: 2023-09-12 | End: 2023-09-13

## 2023-09-12 RX ORDER — TRAZODONE HCL 50 MG
50 TABLET ORAL AT BEDTIME
Refills: 0 | Status: DISCONTINUED | OUTPATIENT
Start: 2023-09-12 | End: 2023-09-21

## 2023-09-12 RX ORDER — SENNA PLUS 8.6 MG/1
2 TABLET ORAL AT BEDTIME
Refills: 0 | Status: DISCONTINUED | OUTPATIENT
Start: 2023-09-12 | End: 2023-09-21

## 2023-09-12 RX ORDER — LACOSAMIDE 50 MG/1
150 TABLET ORAL
Refills: 0 | Status: DISCONTINUED | OUTPATIENT
Start: 2023-09-12 | End: 2023-09-12

## 2023-09-12 RX ORDER — MEMANTINE HYDROCHLORIDE 10 MG/1
5 TABLET ORAL
Refills: 0 | Status: DISCONTINUED | OUTPATIENT
Start: 2023-09-12 | End: 2023-09-21

## 2023-09-12 RX ADMIN — CHLORHEXIDINE GLUCONATE 1 APPLICATION(S): 213 SOLUTION TOPICAL at 05:16

## 2023-09-12 RX ADMIN — Medication 1000 MILLIGRAM(S): at 11:34

## 2023-09-12 RX ADMIN — Medication 75 MICROGRAM(S): at 05:17

## 2023-09-12 RX ADMIN — Medication 55 MILLIGRAM(S): at 18:49

## 2023-09-12 RX ADMIN — MEMANTINE HYDROCHLORIDE 5 MILLIGRAM(S): 10 TABLET ORAL at 05:17

## 2023-09-12 RX ADMIN — APREPITANT 40 MILLIGRAM(S): 80 CAPSULE ORAL at 11:34

## 2023-09-12 RX ADMIN — HYDROMORPHONE HYDROCHLORIDE 0.5 MILLIGRAM(S): 2 INJECTION INTRAMUSCULAR; INTRAVENOUS; SUBCUTANEOUS at 23:00

## 2023-09-12 RX ADMIN — Medication 400 MILLIGRAM(S): at 17:50

## 2023-09-12 RX ADMIN — PANTOPRAZOLE SODIUM 40 MILLIGRAM(S): 20 TABLET, DELAYED RELEASE ORAL at 05:16

## 2023-09-12 RX ADMIN — SODIUM CHLORIDE 50 MILLILITER(S): 9 INJECTION INTRAMUSCULAR; INTRAVENOUS; SUBCUTANEOUS at 00:52

## 2023-09-12 RX ADMIN — LACOSAMIDE 150 MILLIGRAM(S): 50 TABLET ORAL at 05:17

## 2023-09-12 RX ADMIN — Medication 25 GRAM(S): at 18:50

## 2023-09-12 RX ADMIN — HYDROMORPHONE HYDROCHLORIDE 0.5 MILLIGRAM(S): 2 INJECTION INTRAMUSCULAR; INTRAVENOUS; SUBCUTANEOUS at 22:36

## 2023-09-12 RX ADMIN — Medication 1000 MILLIGRAM(S): at 18:05

## 2023-09-12 RX ADMIN — DIVALPROEX SODIUM 500 MILLIGRAM(S): 500 TABLET, DELAYED RELEASE ORAL at 05:17

## 2023-09-12 RX ADMIN — Medication 5 MILLIGRAM(S): at 07:13

## 2023-09-12 RX ADMIN — Medication 100 MILLIGRAM(S): at 21:56

## 2023-09-12 RX ADMIN — LACOSAMIDE 130 MILLIGRAM(S): 50 TABLET ORAL at 20:01

## 2023-09-12 NOTE — PROGRESS NOTE ADULT - SUBJECTIVE AND OBJECTIVE BOX
Patient is a 38y old  Female who presents with a chief complaint of Cranioplasty (11 Sep 2023 17:05)      SUBJECTIVE:  Patient seen and examined at bedside with mom and wife at bedside.  More tearful/emotional this AM, anxious.  Some complaint of L shoulder pain    ROS:  Denies fevers, chills, vision changes, neck pain, cough, SOB, chest pain, Abdominal pain, N/V.  All other ROS negative except as above    Vital Signs Last 12 Hrs  T(F): 99.1 (09-12-23 @ 11:37), Max: 99.1 (09-12-23 @ 05:00)  HR: 75 (09-12-23 @ 11:37) (75 - 75)  BP: 148/85 (09-12-23 @ 11:37) (148/85 - 158/102)  BP(mean): --  RR: 17 (09-12-23 @ 11:37) (17 - 18)  SpO2: 97% (09-12-23 @ 11:37) (97% - 97%)  I&O's Summary      PHYSICAL EXAM:  Constitutional: Tearful, anxious  HEENT: NC/AT, PERRLA, EOMI, MMM, R crani site sunken  Neck: Supple  Respiratory: CTA B/L. No w/r/r.   Cardiovascular: RRR, normal S1 and S2, no m/r/g.   Gastrointestinal: +BS, soft NTND  Extremities: wwp; no cyanosis, clubbing or edema.   Vascular: Pulses equal and strong throughout.   Neurological: AAOx2-3, some mixed aphasia and repetitive speech, L side hemiparesis  Skin: No gross skin abnormalities or rashes        LABS:                        13.8   8.49  )-----------( 195      ( 12 Sep 2023 05:30 )             41.8     09-12    134<L>  |  98  |  6<L>  ----------------------------<  102<H>  4.4   |  24  |  0.41<L>    Ca    9.2      12 Sep 2023 05:30  Phos  4.5     09-12  Mg     1.6     09-12    TPro  6.5  /  Alb  3.7  /  TBili  <0.2  /  DBili  x   /  AST  10  /  ALT  11  /  AlkPhos  61  09-10    PT/INR - ( 12 Sep 2023 05:30 )   PT: 11.2 sec;   INR: 0.98          PTT - ( 12 Sep 2023 05:30 )  PTT:31.4 sec  Urinalysis Basic - ( 12 Sep 2023 05:30 )    Color: x / Appearance: x / SG: x / pH: x  Gluc: 102 mg/dL / Ketone: x  / Bili: x / Urobili: x   Blood: x / Protein: x / Nitrite: x   Leuk Esterase: x / RBC: x / WBC x   Sq Epi: x / Non Sq Epi: x / Bacteria: x          RADIOLOGY & ADDITIONAL TESTS:  < from: MR Head No Cont (09.11.23 @ 19:35) >  IMPRESSION: Patient status post right decompressive craniectomy with   encephalomalacia cavity with surrounding gliosis within the right frontal   lobe. Images are available surgical planning.      < end of copied text >      MEDICATIONS  (STANDING):  divalproex  milliGRAM(s) Oral <User Schedule>  divalproex  milliGRAM(s) Oral <User Schedule>  escitalopram 15 milliGRAM(s) Oral daily  gabapentin 400 milliGRAM(s) Oral <User Schedule>  gabapentin 400 milliGRAM(s) Oral <User Schedule>  influenza   Vaccine 0.5 milliLiter(s) IntraMuscular once  lacosamide 150 milliGRAM(s) Oral <User Schedule>  levothyroxine 75 MICROGram(s) Oral daily  memantine 5 milliGRAM(s) Oral <User Schedule>  pantoprazole    Tablet 40 milliGRAM(s) Oral before breakfast  povidone iodine 5% Nasal Swab 1 Application(s) Both Nostrils once  senna 2 Tablet(s) Oral at bedtime  sodium chloride 0.9%. 1000 milliLiter(s) (50 mL/Hr) IV Continuous <Continuous>  traZODone 50 milliGRAM(s) Oral at bedtime    MEDICATIONS  (PRN):  acetaminophen     Tablet .. 650 milliGRAM(s) Oral every 6 hours PRN Temp greater or equal to 38C (100.4F), Mild Pain (1 - 3)  baclofen 5 milliGRAM(s) Oral every 8 hours PRN Muscle Spasm  bisacodyl 5 milliGRAM(s) Oral every 12 hours PRN Constipation  traMADol 50 milliGRAM(s) Oral every 6 hours PRN Severe Pain (7 - 10)

## 2023-09-12 NOTE — PROGRESS NOTE ADULT - ASSESSMENT
38F hx hypothyroid was found down by mom unresponsive in the bathroom on 6/6, was airlifted to [facility] for a large R frontal IPH with IVH and hydrocephalus. She was emergently intubated and taken to operating room for right hemicraniectomy and EVD placement with Dr. Bernstein who presents for cranioplasty     #R frontal IPH s/p hemicraniectomy  #Cranioplasty  - plan per NSG  - c/w AEDs: Depakote, Gabapentin, Vimpat  - plan for cranioplasty this week    #Preoperative assessment  - RCRI 0, class 1 risk.  Ureña score 0.2%  - METs prior to event significantly > 4  - EKG NSR  - patient is low risk for intermediate risk procedure.  No further cardiac workup indicated, medically optimized for procedure    #Hypothyroidism  - c/w synthroid 75mcg    #Depression  - c/w home lexapro 15mg

## 2023-09-12 NOTE — PROGRESS NOTE ADULT - SUBJECTIVE AND OBJECTIVE BOX
=========================  NSICU ATTENDING PROGRESS NOTE  ========================    HPI:  37 y/o F with hypothyroid was found down by mom unresponsive in the bathroom on 6/6, was airlifted to [facility] for a large R frontal IPH with IVH and hydrocephalus. She was emergently intubated and taken to operating room for right hemicraniectomy and EVD placement with Dr. Bernstein. Patient was deemed medically stable and was sent  to [facility] for 5 weeks then transferred to [facility] for 4 weeks and presents today in preparation for cranioplasty this week.  (10 Sep 2023 17:50)    On admission, the patient was:  GCS:  Mejia-Borden:  modified Moreno:  ICH score:  NIHSS:    *** HIGH RISK OF DVT PRESENT ON ADMISSION ***    ICU Vital Signs Last 24 Hrs  T(C): 36.3 (12 Sep 2023 16:30), Max: 37.3 (12 Sep 2023 05:00)  T(F): 97.4 (12 Sep 2023 16:30), Max: 99.1 (12 Sep 2023 05:00)  HR: 70 (12 Sep 2023 17:00) (68 - 84)  BP: 94/61 (12 Sep 2023 16:45) (94/61 - 158/102)  BP(mean): 72 (12 Sep 2023 16:45) (70 - 72)  ABP: 106/62 (12 Sep 2023 17:00) (100/56 - 108/54)  ABP(mean): 76 (12 Sep 2023 17:00) (72 - 76)  RR: 17 (12 Sep 2023 17:00) (16 - 18)  SpO2: 100% (12 Sep 2023 17:00) (97% - 100%)      09-12-23 @ 07:01  -  09-12-23 @ 17:35  --------------------------------------------------------  IN: 120 mL / OUT: 105 mL / NET: 15 mL      EXAMINATION:  General: Calm  HEENT:  MMM  Neuro: Pupils reactive by pupillometer, grimacing in pain, immediate post anesthesia, not oriented  Right side antigravity  LUE 0/5  LLE TF  Cards: S1/S2, RRR  Respiratory: Clear, no wheeze  Abdomen: Soft, nontender  Extremities: No edema  Skin: Warm/dry      MEDICATIONS:  acetaminophen     Tablet .. 1000 milliGRAM(s) Oral every 8 hours  baclofen 5 milliGRAM(s) Oral every 8 hours  bisacodyl 5 milliGRAM(s) Oral every 12 hours PRN  ceFAZolin   IVPB 2000 milliGRAM(s) IV Intermittent every 8 hours  dextrose 5%. 1000 milliLiter(s) (100 mL/Hr) IV Continuous <Continuous>  dextrose 5%. 1000 milliLiter(s) (50 mL/Hr) IV Continuous <Continuous>  dextrose 50% Injectable 25 Gram(s) IV Push once  dextrose 50% Injectable 12.5 Gram(s) IV Push once  dextrose 50% Injectable 25 Gram(s) IV Push once  dextrose Oral Gel 15 Gram(s) Oral once PRN  divalproex  milliGRAM(s) Oral <User Schedule>  divalproex  milliGRAM(s) Oral <User Schedule>  escitalopram 15 milliGRAM(s) Oral daily  gabapentin 400 milliGRAM(s) Oral <User Schedule>  gabapentin 400 milliGRAM(s) Oral <User Schedule>  glucagon  Injectable 1 milliGRAM(s) IntraMuscular once  HYDROmorphone  Injectable 0.5 milliGRAM(s) IV Push every 3 hours PRN  influenza   Vaccine 0.5 milliLiter(s) IntraMuscular once  insulin lispro (ADMELOG) corrective regimen sliding scale   SubCutaneous three times a day before meals  labetalol Injectable 10 milliGRAM(s) IV Push every 2 hours PRN  lacosamide 150 milliGRAM(s) Oral <User Schedule>  levothyroxine 75 MICROGram(s) Oral daily  memantine 5 milliGRAM(s) Oral <User Schedule>  ondansetron   Disintegrating Tablet 4 milliGRAM(s) Oral every 6 hours  pantoprazole    Tablet 40 milliGRAM(s) Oral before breakfast  senna 2 Tablet(s) Oral at bedtime  sodium chloride 0.9%. 1000 milliLiter(s) (60 mL/Hr) IV Continuous <Continuous>  traMADol 50 milliGRAM(s) Oral every 6 hours PRN  traZODone 50 milliGRAM(s) Oral at bedtime    LABS                     10.8   12.75 )-----------( 217      ( 12 Sep 2023 16:41 )             31.2     09-12    134<L>  |  100  |  5<L>  ----------------------------<  146<H>  4.4   |  25  |  0.43<L>    Ca    8.8      12 Sep 2023 16:41  Phos  4.8     09-12  Mg     1.4     09-12    TPro  6.5  /  Alb  3.7  /  TBili  <0.2  /  DBili  x   /  AST  10  /  ALT  11  /  AlkPhos  61  09-10    LIVER FUNCTIONS - ( 10 Sep 2023 18:00 )  Alb: 3.7 g/dL / Pro: 6.5 g/dL / ALK PHOS: 61 U/L / ALT: 11 U/L / AST: 10 U/L / GGT: x

## 2023-09-12 NOTE — CONSULT NOTE ADULT - SUBJECTIVE AND OBJECTIVE BOX
EPILEPSY CONSULT NOTE    HPI  38F hx hypothyroid was found down by mom unresponsive in the bathroom on 6/6, was airlifted to [facility] for a large R frontal IPH with IVH and hydrocephalus. She was emergently intubated and taken to operating room for right hemicraniectomy and EVD placement with Dr. Bernstein. Patient was deemed medically stable and was sent  to [facility] for 5 weeks then transferred to [facility] for 4 weeks and presented for cranioplasty. Now POD# 0 S/P right cranioplasty with longevity implant. Epilepsy consulted for antiseizure medication management due to elevated VPA level of 119.2. As per patient's partner at bedside when she had the initial event and was at [facility] on the 3rd day of her admission, she was noted to have a gaze deviation, had an urgent head CT which was unrevealing. They then placed her vEEG and noted that she was having seizures. As per partner she believes she had 5 seizures tota          REVIEW OF SYSTEMS  CONSTITUTIONAL:  No weight loss, fever, chills, weakness or fatigue.  HEENT:  Eyes:  No visual loss, blurred vision, double vision or yellow sclerae. Ears, Nose, Throat:  No hearing loss, sneezing, congestion, runny nose or sore throat.  SKIN:  No rash or itching.  CARDIOVASCULAR:  No chest pain, chest pressure or chest discomfort. No palpitations or edema.  RESPIRATORY:  No shortness of breath, cough or sputum.  GASTROINTESTINAL:  No anorexia, nausea, vomiting or diarrhea. No abdominal pain or blood.  GENITOURINARY: No Burning on urination.   NEUROLOGICAL:  see HPI  MUSCULOSKELETAL:  No muscle, back pain, joint pain or stiffness.  HEMATOLOGIC:  No anemia, bleeding or bruising.  LYMPHATICS:  No enlarged nodes. No history of splenectomy.  PSYCHIATRIC:  No history of depression or anxiety.  ENDOCRINOLOGIC:  No reports of sweating, cold or heat intolerance. No polyuria or polydipsia.  ALLERGIES:  No history of asthma, hives, eczema or rhinitis.       PAST MEDICAL & SURGICAL HISTORY:  Hypothyroid      Cyst of ovary      Status post craniectomy           FAMILY HISTORY:       Social History:  Alcohol, illicits, smoking?:  Driving?:  Occupation:     Allergies  No Known Allergies       MEDICATIONS  (STANDING):  acetaminophen     Tablet .. 1000 milliGRAM(s) Oral every 8 hours  baclofen 5 milliGRAM(s) Oral every 8 hours  ceFAZolin   IVPB 2000 milliGRAM(s) IV Intermittent every 8 hours  dextrose 5%. 1000 milliLiter(s) (100 mL/Hr) IV Continuous <Continuous>  dextrose 5%. 1000 milliLiter(s) (50 mL/Hr) IV Continuous <Continuous>  dextrose 50% Injectable 25 Gram(s) IV Push once  dextrose 50% Injectable 12.5 Gram(s) IV Push once  dextrose 50% Injectable 25 Gram(s) IV Push once  escitalopram 15 milliGRAM(s) Oral daily  gabapentin 400 milliGRAM(s) Oral <User Schedule>  gabapentin 400 milliGRAM(s) Oral <User Schedule>  glucagon  Injectable 1 milliGRAM(s) IntraMuscular once  influenza   Vaccine 0.5 milliLiter(s) IntraMuscular once  insulin lispro (ADMELOG) corrective regimen sliding scale   SubCutaneous three times a day before meals  lacosamide IVPB 150 milliGRAM(s) IV Intermittent every 12 hours  levothyroxine 75 MICROGram(s) Oral daily  magnesium sulfate  IVPB 4 Gram(s) IV Intermittent once  memantine 5 milliGRAM(s) Oral <User Schedule>  ondansetron   Disintegrating Tablet 4 milliGRAM(s) Oral every 6 hours  pantoprazole    Tablet 40 milliGRAM(s) Oral before breakfast  senna 2 Tablet(s) Oral at bedtime  sodium chloride 0.9%. 1000 milliLiter(s) (60 mL/Hr) IV Continuous <Continuous>  traZODone 50 milliGRAM(s) Oral at bedtime  valproate sodium  IVPB 500 milliGRAM(s) IV Intermittent <User Schedule>    MEDICATIONS  (PRN):  bisacodyl 5 milliGRAM(s) Oral every 12 hours PRN Constipation  dextrose Oral Gel 15 Gram(s) Oral once PRN Blood Glucose LESS THAN 70 milliGRAM(s)/deciliter  labetalol Injectable 10 milliGRAM(s) IV Push every 2 hours PRN Systolic blood pressure > 140 mmHg  traMADol 50 milliGRAM(s) Oral every 6 hours PRN Severe Pain (7 - 10)       T(C): 36.3 (09-12-23 @ 16:30), Max: 37.3 (09-12-23 @ 05:00)  HR: 82 (09-12-23 @ 18:30) (68 - 88)  BP: 94/61 (09-12-23 @ 16:45) (94/61 - 158/102)  RR: 17 (09-12-23 @ 18:30) (16 - 18)  SpO2: 98% (09-12-23 @ 18:30) (97% - 100%)  Wt(kg): --    General:  Constitutional:  Sitting comfortably in NAD.  Psychiatric: calm, normal affect, no overt anxiety or internal preoccupation  Ears, Nose, Throat: no abnormalities, mucus membranes moist  Neck: supple, no lymphadenopathy or nodules palpable  Cardiovascular: regular rate and rhythm, normal S1/S2, no murmurs   Chest: Clear to bases. 	  Abdomen: soft, non-tender, no hepatosplenomegaly   Extremities: no edema, clubbing or cyanosis  Skin: no rash or neurocutaneous signs     Cognitive:  Orientation, language, memory and knowledge screens intact.  Registration: 	4/4		Serial 7’s: normal		Recall 4/4    Cranial Nerves:  II: Full to confrontation. III/IV/VI: PERRL EOMF No nystagmus  V1V2V3: Symmetric, VII: Face appears symmetric VIII: Normal to screening, IX/X: Palate Elevates Symmetrical  XI: Trapezius Symmetric  XII: Tongue midline  Motor:  Power: 5/5 throughout, tone: normal x 4 limbs, no tremor   Sensation:  Intact to light touch. Intact to pinprick/temperature and vibration.  Coordination/Gait:  Finger-nose-finger intact, normal rapid-alternating movements.  Fine motor normal with normal rapid finger taps and heel-toe tapping   narrow based gait, tandem forward and back ok  hops well on both feet, heel and toe walking normal   Reflexes:  DTR: 2+ symmetric all 4 limbs, no clonus  Plantar responses: Down bilaterally         Labs  CBC Full  -  ( 12 Sep 2023 16:41 )  WBC Count : 12.75 K/uL  RBC Count : 3.39 M/uL  Hemoglobin : 10.8 g/dL  Hematocrit : 31.2 %  Platelet Count - Automated : 217 K/uL  Mean Cell Volume : 92.0 fl  Mean Cell Hemoglobin : 31.9 pg  Mean Cell Hemoglobin Concentration : 34.6 gm/dL  Auto Neutrophil # : x  Auto Lymphocyte # : x  Auto Monocyte # : x  Auto Eosinophil # : x  Auto Basophil # : x  Auto Neutrophil % : x  Auto Lymphocyte % : x  Auto Monocyte % : x  Auto Eosinophil % : x  Auto Basophil % : x    09-12    134<L>  |  100  |  5<L>  ----------------------------<  146<H>  4.4   |  25  |  0.43<L>    Ca    8.8      12 Sep 2023 16:41  Phos  4.8     09-12  Mg     1.4     09-12        PT/INR - ( 12 Sep 2023 16:41 )   PT: 12.3 sec;   INR: 1.08          PTT - ( 12 Sep 2023 16:41 )  PTT:30.9 sec  Valproic Acid Level, Serum: 119.2 ug/mL *H* [50.0 - 100.0] (09-12 @ 05:30)      EEG: EPILEPSY CONSULT NOTE    HPI  38F hx hypothyroid was found down by mom unresponsive in the bathroom on 6/6, was airlifted to [facility] for a large R frontal IPH with IVH and hydrocephalus. She was emergently intubated and taken to operating room for right hemicraniectomy and EVD placement with Dr. Bernstein. Patient was deemed medically stable and was sent  [facility] for 5 weeks then transferred to [facility] for 4 weeks and presented for cranioplasty. Now POD# 0 S/P right cranioplasty with longevity implant. Epilepsy consulted for antiseizure medication management due to elevated VPA level of 119.2. As per patient's partner at bedside when she had the initial event and was at [facility] on the 3rd day of her admission, she was noted to have a gaze deviation, had an urgent head CT which was unrevealing. They then placed her vEEG and noted that she was having seizures. As per partner she believes she had 5 seizures total and was initially started on Keppra. She was then changed from Keppra to VPA due to agitation. When she was discharged to [facility] Vimpat was added, unclear reason as partner stated she did not have any more seizures. Then when she went from [facility] to [facility] her VPA was increased to 500 mg AM/750 mg PM due to ?low level.        REVIEW OF SYSTEMS  TABATHA as patient is waking up from anesthesia       PAST MEDICAL & SURGICAL HISTORY:  Hypothyroid      Cyst of ovary      Status post craniectomy           FAMILY HISTORY:       Social History:  Alcohol, illicits, smoking?:  Driving?:  Occupation:     Allergies  No Known Allergies       MEDICATIONS  (STANDING):  acetaminophen     Tablet .. 1000 milliGRAM(s) Oral every 8 hours  baclofen 5 milliGRAM(s) Oral every 8 hours  ceFAZolin   IVPB 2000 milliGRAM(s) IV Intermittent every 8 hours  dextrose 5%. 1000 milliLiter(s) (100 mL/Hr) IV Continuous <Continuous>  dextrose 5%. 1000 milliLiter(s) (50 mL/Hr) IV Continuous <Continuous>  dextrose 50% Injectable 25 Gram(s) IV Push once  dextrose 50% Injectable 12.5 Gram(s) IV Push once  dextrose 50% Injectable 25 Gram(s) IV Push once  escitalopram 15 milliGRAM(s) Oral daily  gabapentin 400 milliGRAM(s) Oral <User Schedule>  gabapentin 400 milliGRAM(s) Oral <User Schedule>  glucagon  Injectable 1 milliGRAM(s) IntraMuscular once  influenza   Vaccine 0.5 milliLiter(s) IntraMuscular once  insulin lispro (ADMELOG) corrective regimen sliding scale   SubCutaneous three times a day before meals  lacosamide IVPB 150 milliGRAM(s) IV Intermittent every 12 hours  levothyroxine 75 MICROGram(s) Oral daily  magnesium sulfate  IVPB 4 Gram(s) IV Intermittent once  memantine 5 milliGRAM(s) Oral <User Schedule>  ondansetron   Disintegrating Tablet 4 milliGRAM(s) Oral every 6 hours  pantoprazole    Tablet 40 milliGRAM(s) Oral before breakfast  senna 2 Tablet(s) Oral at bedtime  sodium chloride 0.9%. 1000 milliLiter(s) (60 mL/Hr) IV Continuous <Continuous>  traZODone 50 milliGRAM(s) Oral at bedtime  valproate sodium  IVPB 500 milliGRAM(s) IV Intermittent <User Schedule>    MEDICATIONS  (PRN):  bisacodyl 5 milliGRAM(s) Oral every 12 hours PRN Constipation  dextrose Oral Gel 15 Gram(s) Oral once PRN Blood Glucose LESS THAN 70 milliGRAM(s)/deciliter  labetalol Injectable 10 milliGRAM(s) IV Push every 2 hours PRN Systolic blood pressure > 140 mmHg  traMADol 50 milliGRAM(s) Oral every 6 hours PRN Severe Pain (7 - 10)    VITAL SIGNS  T(C): 36.3 (09-12-23 @ 16:30), Max: 37.3 (09-12-23 @ 05:00)  HR: 82 (09-12-23 @ 18:30) (68 - 88)  BP: 94/61 (09-12-23 @ 16:45) (94/61 - 158/102)  RR: 17 (09-12-23 @ 18:30) (16 - 18)  SpO2: 98% (09-12-23 @ 18:30) (97% - 100%)  Wt(kg): --    PHYSICAL EXAM  General:  Constitutional:  Sitting comfortably in NAD.  Psychiatric: calm, normal affect, no overt anxiety or internal preoccupation  Ears, Nose, Throat: no abnormalities, mucus membranes moist  Neck: supple, no lymphadenopathy or nodules palpable  Cardiovascular: regular rate and rhythm, normal S1/S2, no murmurs   Chest: Clear to bases. 	  Abdomen: soft, non-tender, no hepatosplenomegaly   Extremities: no edema, clubbing or cyanosis  Skin: no rash or neurocutaneous signs     Cognitive:  Orientation, language, memory and knowledge screens intact.  Registration: 	4/4		Serial 7’s: normal		Recall 4/4    Cranial Nerves:  II: Full to confrontation. III/IV/VI: PERRL EOMF No nystagmus  V1V2V3: Symmetric, VII: Face appears symmetric VIII: Normal to screening, IX/X: Palate Elevates Symmetrical  XI: Trapezius Symmetric  XII: Tongue midline  Motor:  Power: 5/5 throughout, tone: normal x 4 limbs, no tremor   Sensation:  Intact to light touch. Intact to pinprick/temperature and vibration.  Coordination/Gait:  Finger-nose-finger intact, normal rapid-alternating movements.  Fine motor normal with normal rapid finger taps and heel-toe tapping   narrow based gait, tandem forward and back ok  hops well on both feet, heel and toe walking normal   Reflexes:  DTR: 2+ symmetric all 4 limbs, no clonus  Plantar responses: Down bilaterally         Labs  CBC Full  -  ( 12 Sep 2023 16:41 )  WBC Count : 12.75 K/uL  RBC Count : 3.39 M/uL  Hemoglobin : 10.8 g/dL  Hematocrit : 31.2 %  Platelet Count - Automated : 217 K/uL  Mean Cell Volume : 92.0 fl  Mean Cell Hemoglobin : 31.9 pg  Mean Cell Hemoglobin Concentration : 34.6 gm/dL  Auto Neutrophil # : x  Auto Lymphocyte # : x  Auto Monocyte # : x  Auto Eosinophil # : x  Auto Basophil # : x  Auto Neutrophil % : x  Auto Lymphocyte % : x  Auto Monocyte % : x  Auto Eosinophil % : x  Auto Basophil % : x    09-12    134<L>  |  100  |  5<L>  ----------------------------<  146<H>  4.4   |  25  |  0.43<L>    Ca    8.8      12 Sep 2023 16:41  Phos  4.8     09-12  Mg     1.4     09-12        PT/INR - ( 12 Sep 2023 16:41 )   PT: 12.3 sec;   INR: 1.08          PTT - ( 12 Sep 2023 16:41 )  PTT:30.9 sec  Valproic Acid Level, Serum: 119.2 ug/mL *H* [50.0 - 100.0] (09-12 @ 05:30)      EEG: EPILEPSY CONSULT NOTE    HPI  38F hx hypothyroid was found down by mom unresponsive in the bathroom on 6/6, was airlifted to [facility] for a large R frontal IPH with IVH and hydrocephalus. She was emergently intubated and taken to operating room for right hemicraniectomy and EVD placement with Dr. Bernstein. Patient was deemed medically stable and was sent  to [facility] for [facility] weeks then transferred to [facility] for 4 weeks and presented for cranioplasty. Now POD# 0 S/P right cranioplasty with longevity implant. Epilepsy consulted for antiseizure medication management due to elevated VPA level of 119.2. As per patient's partner at bedside when she had the initial event and was at [facility] on the 3rd day of her admission, she was noted to have a gaze deviation, had an urgent head CT which was unrevealing. They then placed her vEEG and noted that she was having seizures. As per partner she believes she had 5 seizures total and was initially started on Keppra. She was then changed from Keppra to VPA due to agitation. When she was discharged to the [facility] Vimpat was added, unclear reason as partner stated she did not have any more seizures. Then when she went from [facility] to [facility] her VPA was increased to 500 mg AM/750 mg PM due to ?low level.        REVIEW OF SYSTEMS  TABATHA as patient is waking up from anesthesia       PAST MEDICAL & SURGICAL HISTORY:  Hypothyroid      Cyst of ovary      Status post craniectomy           FAMILY HISTORY:         Allergies  No Known Allergies       MEDICATIONS  (STANDING):  acetaminophen     Tablet .. 1000 milliGRAM(s) Oral every 8 hours  baclofen 5 milliGRAM(s) Oral every 8 hours  ceFAZolin   IVPB 2000 milliGRAM(s) IV Intermittent every 8 hours  dextrose 5%. 1000 milliLiter(s) (100 mL/Hr) IV Continuous <Continuous>  dextrose 5%. 1000 milliLiter(s) (50 mL/Hr) IV Continuous <Continuous>  dextrose 50% Injectable 25 Gram(s) IV Push once  dextrose 50% Injectable 12.5 Gram(s) IV Push once  dextrose 50% Injectable 25 Gram(s) IV Push once  escitalopram 15 milliGRAM(s) Oral daily  gabapentin 400 milliGRAM(s) Oral <User Schedule>  gabapentin 400 milliGRAM(s) Oral <User Schedule>  glucagon  Injectable 1 milliGRAM(s) IntraMuscular once  influenza   Vaccine 0.5 milliLiter(s) IntraMuscular once  insulin lispro (ADMELOG) corrective regimen sliding scale   SubCutaneous three times a day before meals  lacosamide IVPB 150 milliGRAM(s) IV Intermittent every 12 hours  levothyroxine 75 MICROGram(s) Oral daily  magnesium sulfate  IVPB 4 Gram(s) IV Intermittent once  memantine 5 milliGRAM(s) Oral <User Schedule>  ondansetron   Disintegrating Tablet 4 milliGRAM(s) Oral every 6 hours  pantoprazole    Tablet 40 milliGRAM(s) Oral before breakfast  senna 2 Tablet(s) Oral at bedtime  sodium chloride 0.9%. 1000 milliLiter(s) (60 mL/Hr) IV Continuous <Continuous>  traZODone 50 milliGRAM(s) Oral at bedtime  valproate sodium  IVPB 500 milliGRAM(s) IV Intermittent <User Schedule>    MEDICATIONS  (PRN):  bisacodyl 5 milliGRAM(s) Oral every 12 hours PRN Constipation  dextrose Oral Gel 15 Gram(s) Oral once PRN Blood Glucose LESS THAN 70 milliGRAM(s)/deciliter  labetalol Injectable 10 milliGRAM(s) IV Push every 2 hours PRN Systolic blood pressure > 140 mmHg  traMADol 50 milliGRAM(s) Oral every 6 hours PRN Severe Pain (7 - 10)    VITAL SIGNS  T(C): 36.3 (09-12-23 @ 16:30), Max: 37.3 (09-12-23 @ 05:00)  HR: 82 (09-12-23 @ 18:30) (68 - 88)  BP: 94/61 (09-12-23 @ 16:45) (94/61 - 158/102)  RR: 17 (09-12-23 @ 18:30) (16 - 18)  SpO2: 98% (09-12-23 @ 18:30) (97% - 100%)  Wt(kg): --    PHYSICAL EXAM  Patient in the process of waking up from anesthesia, family requested not to examine at this time due to agitation and tearfulness since coming back from surgery.  -Prior documented exams patient with mixed aphasia & L hemiplegia.       LABS  CBC Full  -  ( 12 Sep 2023 16:41 )  WBC Count : 12.75 K/uL  RBC Count : 3.39 M/uL  Hemoglobin : 10.8 g/dL  Hematocrit : 31.2 %  Platelet Count - Automated : 217 K/uL  Mean Cell Volume : 92.0 fl  Mean Cell Hemoglobin : 31.9 pg  Mean Cell Hemoglobin Concentration : 34.6 gm/dL  Auto Neutrophil # : x  Auto Lymphocyte # : x  Auto Monocyte # : x  Auto Eosinophil # : x  Auto Basophil # : x  Auto Neutrophil % : x  Auto Lymphocyte % : x  Auto Monocyte % : x  Auto Eosinophil % : x  Auto Basophil % : x    09-12    134<L>  |  100  |  5<L>  ----------------------------<  146<H>  4.4   |  25  |  0.43<L>    Ca    8.8      12 Sep 2023 16:41  Phos  4.8     09-12  Mg     1.4     09-12        PT/INR - ( 12 Sep 2023 16:41 )   PT: 12.3 sec;   INR: 1.08          PTT - ( 12 Sep 2023 16:41 )  PTT:30.9 sec  Valproic Acid Level, Serum: 119.2 ug/mL *H* [50.0 - 100.0] (09-12 @ 05:30)      EEG:

## 2023-09-12 NOTE — CONSULT NOTE ADULT - CONSULT REASON
Antiseizure medication management
Pre surgical work-up for cranioplasty
Co management
Evaluation of rehab needs, pain

## 2023-09-12 NOTE — BRIEF OPERATIVE NOTE - NSICDXBRIEFPROCEDURE_GEN_ALL_CORE_FT
PROCEDURES:  Cranioplasty for skull defect larger than 5 cm diameter 12-Sep-2023 15:56:14 Right Ena Khanna

## 2023-09-12 NOTE — BRIEF OPERATIVE NOTE - COMMENTS
Ena CHAN first assisted for the entirety of the procedure. No qualified resident or fellow was available to assist.

## 2023-09-12 NOTE — BRIEF OPERATIVE NOTE - NSICDXBRIEFPREOP_GEN_ALL_CORE_FT
PRE-OP DIAGNOSIS:  Skull defect 12-Sep-2023 15:54:50  Ena Khanna  History of intracranial hemorrhage 12-Sep-2023 15:55:25  Ena Khanna

## 2023-09-12 NOTE — BRIEF OPERATIVE NOTE - NSEVIDNCEINFORABSCESSFT_GEN_ALL_CORE
healing scabs noted on scalp and spitting suture also noted at start of procedure (original craniectomy 6/6/23)

## 2023-09-12 NOTE — PROGRESS NOTE ADULT - SUBJECTIVE AND OBJECTIVE BOX
NEUROCRITICAL CARE EVENING NOTE    DAY EVENTS:    - POD 0 s/p Rt cranioplasty      VITALS/IMAGING/DATA  - Reviewed    ALLERGIES:   - Reviewed      MEDICATIONS:  - Reviewed      PHYSICAL EXAM:  Aox1 (name), tearful, SERGIO, Rt side 3/5, MARIAN WD, DASHAWNE TF

## 2023-09-12 NOTE — PROGRESS NOTE ADULT - ASSESSMENT
s/p Rt cranioplasty    neurochecsk q1h  pain control  c/w AEDs, epilepsy following  hold SQL d/t fresh post op  Repeat CBC/BMP  Rest of plan per day team

## 2023-09-12 NOTE — BRIEF OPERATIVE NOTE - OPERATION/FINDINGS
s/p right cranioplasty with pericranial onlay technique, scar revision, complex closure, using alloplastic implant longeviti clearfit.

## 2023-09-12 NOTE — PROGRESS NOTE ADULT - SUBJECTIVE AND OBJECTIVE BOX
NEUROSURGERY POST OP NOTE:    POD# 0 S/P right cranioplasty with longevity implant     S: Pt seen and examined at bedside. Pt moaning and groaning, not fully compliant with exam.      T(C): 36.3 (09-12-23 @ 16:30), Max: 37.3 (09-12-23 @ 05:00)  HR: 72 (09-12-23 @ 17:30) (68 - 84)  BP: 94/61 (09-12-23 @ 16:45) (94/61 - 158/102)  RR: 16 (09-12-23 @ 17:30) (16 - 18)  SpO2: 100% (09-12-23 @ 17:30) (97% - 100%)      09-12-23 @ 07:01  -  09-12-23 @ 18:11  --------------------------------------------------------  IN: 120 mL / OUT: 105 mL / NET: 15 mL        acetaminophen     Tablet .. 1000 milliGRAM(s) Oral every 8 hours  acetaminophen   IVPB .. 1000 milliGRAM(s) IV Intermittent once  baclofen 5 milliGRAM(s) Oral every 8 hours  bisacodyl 5 milliGRAM(s) Oral every 12 hours PRN  ceFAZolin   IVPB 2000 milliGRAM(s) IV Intermittent every 8 hours  dextrose 5%. 1000 milliLiter(s) IV Continuous <Continuous>  dextrose 5%. 1000 milliLiter(s) IV Continuous <Continuous>  dextrose 50% Injectable 25 Gram(s) IV Push once  dextrose 50% Injectable 12.5 Gram(s) IV Push once  dextrose 50% Injectable 25 Gram(s) IV Push once  dextrose Oral Gel 15 Gram(s) Oral once PRN  escitalopram 15 milliGRAM(s) Oral daily  gabapentin 400 milliGRAM(s) Oral <User Schedule>  gabapentin 400 milliGRAM(s) Oral <User Schedule>  glucagon  Injectable 1 milliGRAM(s) IntraMuscular once  influenza   Vaccine 0.5 milliLiter(s) IntraMuscular once  insulin lispro (ADMELOG) corrective regimen sliding scale   SubCutaneous three times a day before meals  labetalol Injectable 10 milliGRAM(s) IV Push every 2 hours PRN  lacosamide IVPB 150 milliGRAM(s) IV Intermittent every 12 hours  levothyroxine 75 MICROGram(s) Oral daily  magnesium sulfate  IVPB 4 Gram(s) IV Intermittent once  memantine 5 milliGRAM(s) Oral <User Schedule>  ondansetron   Disintegrating Tablet 4 milliGRAM(s) Oral every 6 hours  pantoprazole    Tablet 40 milliGRAM(s) Oral before breakfast  senna 2 Tablet(s) Oral at bedtime  sodium chloride 0.9%. 1000 milliLiter(s) IV Continuous <Continuous>  traMADol 50 milliGRAM(s) Oral every 6 hours PRN  traZODone 50 milliGRAM(s) Oral at bedtime  valproate sodium  IVPB 500 milliGRAM(s) IV Intermittent <User Schedule>    Exam:  Constitutional: Pt moaning/groaning, laying in bed, well groomed, well nourished  Respiratory: breathing non-labored, symmetrical chest wall movement  Cardiovascuar: RRR, no murmurs  Gastrointestinal: abdomen soft, non tender  Neurological: Awake, alert, not following commands, moaning/groaning  CNII-XII: PERRL, L facial, unable to assess EOM due to lack of Pt participation   Motor: RUE/RLE 3/5, LUE withdrawal to noxious stimuli, LLE TF  Sensation: intact to light touch in all extremities  Extremities: distal pulses 2+ x4  Wound/incision: headwrap in place C/D/I    Lines/Drains:  [ ] R radial a-line (9/12- )  [ ] Munson (9/12- )  [ ] Subgaleal HAROON x2 (9/12- )      Assessment: 38F hx hypothyroid was found down by mom unresponsive in the bathroom on 6/6, was airlifted to [facility] for a large R frontal IPH with IVH and hydrocephalus. She was emergently intubated and taken to operating room for right hemicraniectomy and EVD placement with Dr. Bernstein. Patient was deemed medically stable and was sent  to [facility] for 5 weeks then transferred to [facility] for 4 weeks. Now s/p right cranioplasty with longevity implant 9/12/23.     Plan:    Neuro  - neuro/vitals q1h  - pain control as needed tylenol  - continue AEDs as ordered: vimpat 150mg bid and depakote 500mg bid  - monitor subgaleal HAROON drains x2  - postop CTH completed  - keep HOB flat  - baclofen 5mg q8 for muscle spasms per rehab medicine attending (alternative to tizanidine that was being given at rehab)   - pending PT/OT, neuropsych eval    Cardio  - -140    Pulm  - Non-rebreather for pneumocephalus     GI  - ADAT to regular diet  - bowel regimen    Renal  - IVF  - munson in place, remove    Endo  - continue synthroid  - ISS    Heme  - no active issues  - SCDs for DVT prophylaxis    ID  - afebrile  - postop ancef    Psych  - lexapro 15mg daily    Dispo: ICU status, full code, dispo pending  D/w Dr. Sy and Dr. Romero

## 2023-09-12 NOTE — BRIEF OPERATIVE NOTE - NSICDXBRIEFPOSTOP_GEN_ALL_CORE_FT
POST-OP DIAGNOSIS:  Skull defect 12-Sep-2023 15:55:32  Ena Khanna  History of intracranial hemorrhage 12-Sep-2023 15:55:42  Ena Khanan

## 2023-09-12 NOTE — CONSULT NOTE ADULT - ASSESSMENT
Patient is a 38 year old female with a pmh of hypothyroidism and recent R frontal IPH w/IVH & hydrocephalus on 6/6 with subsequent seizures on /750 & Vimpat 150 mg BID. Admitted to neurosurgery from [facility] for R cranioplasty. Now POD# 0 S/P right cranioplasty with longevity implant.     Epilepsy consulted for antiseizure medication management due to elevated VPA trough this AM, level 119. As per family no seizures since started on antiseizure medication in 6/2023.    Recommendations:  - Continue  mg IV BID  - Repeat VPA level 30 min prior to AM dose  - Continue Vimpat 150 mg BID  - Seizure/Fall precautions  - Please place on vEEG once safe from surgical stand point  - Rest of care per primary team    Patient discussed with epilepsy attending Dr. Faustin Patient is a 38 year old female with a pmh of hypothyroidism and recent R frontal IPH w/IVH & hydrocephalus on 6/6 with subsequent seizures on /750 & Vimpat 150 mg BID. Admitted to neurosurgery from [facility] for R cranioplasty. Now POD# 0 S/P right cranioplasty with longevity implant.     Epilepsy consulted for antiseizure medication management due to elevated VPA trough this AM, level 119. As per family no seizures since started on antiseizure medication in 6/2023.    Recommendations:  - Reduce VPA dose slightly to  mg IV BID  - Repeat VPA level 30 min prior to AM dose tomorrow  - Continue Vimpat 150 mg BID  - Seizure/Fall precautions  - Please place on vEEG once safe from surgical stand point to r/o subclinical seizure  - Rest of care per primary team    Patient discussed with epilepsy attending Dr. Faustin

## 2023-09-12 NOTE — PROGRESS NOTE ADULT - ASSESSMENT
ASSESSMENT/PLAN:  POD 0 s/p cranioplasty    NEURO:  Q1 neurochecks  Continue AEDs: Depakote (decrease dose to 500mg bid based on level) Gabapentin, Vimpat  Repeat VPA and ammonia level in am. Epilepsy consult  Analgesia prn  Cranial ERAS  CT post op [place]  SG HAROON x 2- monitor output  Continue lexapro  Continue baclofen for spasticity  Activity: Flat for now with NRB. [] Bedrest [] PT [] OT [] PMNR    PULM:  NRB for post op pneumocephalus  Keep sats >92%    CV:  SBP goal 100- 140    RENAL:  Fluids: Continue NS at 60cc/hr  Monitor post op labs  Replete lytes prn    GI:  Diet: Dysphagia and advance as tolerated  GI prophylaxis [] not indicated [] PPI [] other:  Bowel regimen [] colace [] senna [] other:    ENDO:   Goal euglycemia (-180)    HEME/ONC: Rule out coagulopathy  VTE prophylaxis: [x] SCDs [] chemoprophylaxis [x] hold chemoprophylaxis due to:   Monitor coags    ID:  Ancef    MISC:    SOCIAL/FAMILY:  [x] awaiting [] updated at bedside [] family meeting    CODE STATUS:  [x] Full Code [] DNR [] DNI [] Palliative/Comfort Care    DISPOSITION:  [x] ICU [] Stroke Unit [] Floor [] EMU [] RCU [] PCU    Time spent: 60 critical care minutes   ASSESSMENT/PLAN:  POD 0 s/p cranioplasty    NEURO:  Q1 neurochecks  Continue AEDs: Depakote (decrease dose to 500mg bid based on level) Gabapentin, Vimpat  Repeat VPA and ammonia level in am. Epilepsy consult  Analgesia prn  Cranial ERAS  CT post op [place]  SG HAROON x 2- monitor output  Continue lexapro  Continue baclofen for spasticity  Activity: Flat for now with NRB. [] Bedrest [] PT [] OT [] PMNR    PULM:  NRB for post op pneumocephalus  Keep sats >92%    CV:  SBP goal 100- 140    RENAL:  Fluids: Continue NS at 60cc/hr  Monitor post op labs  Replete lytes     GI:  Diet: Dysphagia and advance as tolerated  GI prophylaxis [] not indicated [] PPI [] other:  Bowel regimen [] colace [] senna [] other:    ENDO:   Goal euglycemia (-180)    HEME/ONC: Rule out coagulopathy  VTE prophylaxis: [x] SCDs [] chemoprophylaxis [x] hold chemoprophylaxis due to: fresh post op  Monitor CBC, coags  Repeat H/H    ID:  Ancef    MISC:    SOCIAL/FAMILY:  [x] awaiting [] updated at bedside [] family meeting    CODE STATUS:  [x] Full Code [] DNR [] DNI [] Palliative/Comfort Care    DISPOSITION:  [x] ICU [] Stroke Unit [] Floor [] EMU [] RCU [] PCU    Time spent: 60 critical care minutes

## 2023-09-13 LAB
ANION GAP SERPL CALC-SCNC: 7 MMOL/L — SIGNIFICANT CHANGE UP (ref 5–17)
ANION GAP SERPL CALC-SCNC: 9 MMOL/L — SIGNIFICANT CHANGE UP (ref 5–17)
BUN SERPL-MCNC: 4 MG/DL — LOW (ref 7–23)
BUN SERPL-MCNC: 4 MG/DL — LOW (ref 7–23)
CALCIUM SERPL-MCNC: 8.1 MG/DL — LOW (ref 8.4–10.5)
CALCIUM SERPL-MCNC: 8.2 MG/DL — LOW (ref 8.4–10.5)
CHLORIDE SERPL-SCNC: 100 MMOL/L — SIGNIFICANT CHANGE UP (ref 96–108)
CHLORIDE SERPL-SCNC: 98 MMOL/L — SIGNIFICANT CHANGE UP (ref 96–108)
CO2 SERPL-SCNC: 26 MMOL/L — SIGNIFICANT CHANGE UP (ref 22–31)
CO2 SERPL-SCNC: 27 MMOL/L — SIGNIFICANT CHANGE UP (ref 22–31)
CREAT ?TM UR-MCNC: 82 MG/DL — SIGNIFICANT CHANGE UP
CREAT SERPL-MCNC: 0.33 MG/DL — LOW (ref 0.5–1.3)
CREAT SERPL-MCNC: 0.34 MG/DL — LOW (ref 0.5–1.3)
CREAT SERPL-MCNC: 0.35 MG/DL — LOW (ref 0.5–1.3)
EGFR: 133 ML/MIN/1.73M2 — SIGNIFICANT CHANGE UP
EGFR: 134 ML/MIN/1.73M2 — SIGNIFICANT CHANGE UP
EGFR: 135 ML/MIN/1.73M2 — SIGNIFICANT CHANGE UP
GLUCOSE BLDC GLUCOMTR-MCNC: 106 MG/DL — HIGH (ref 70–99)
GLUCOSE BLDC GLUCOMTR-MCNC: 114 MG/DL — HIGH (ref 70–99)
GLUCOSE BLDC GLUCOMTR-MCNC: 122 MG/DL — HIGH (ref 70–99)
GLUCOSE BLDC GLUCOMTR-MCNC: 95 MG/DL — SIGNIFICANT CHANGE UP (ref 70–99)
GLUCOSE SERPL-MCNC: 132 MG/DL — HIGH (ref 70–99)
GLUCOSE SERPL-MCNC: 134 MG/DL — HIGH (ref 70–99)
HCT VFR BLD CALC: 28.8 % — LOW (ref 34.5–45)
HCT VFR BLD CALC: 30.8 % — LOW (ref 34.5–45)
HGB BLD-MCNC: 10.6 G/DL — LOW (ref 11.5–15.5)
HGB BLD-MCNC: 9.7 G/DL — LOW (ref 11.5–15.5)
MAGNESIUM SERPL-MCNC: 2 MG/DL — SIGNIFICANT CHANGE UP (ref 1.6–2.6)
MCHC RBC-ENTMCNC: 31.1 PG — SIGNIFICANT CHANGE UP (ref 27–34)
MCHC RBC-ENTMCNC: 31.6 PG — SIGNIFICANT CHANGE UP (ref 27–34)
MCHC RBC-ENTMCNC: 33.7 GM/DL — SIGNIFICANT CHANGE UP (ref 32–36)
MCHC RBC-ENTMCNC: 34.4 GM/DL — SIGNIFICANT CHANGE UP (ref 32–36)
MCV RBC AUTO: 91.9 FL — SIGNIFICANT CHANGE UP (ref 80–100)
MCV RBC AUTO: 92.3 FL — SIGNIFICANT CHANGE UP (ref 80–100)
NRBC # BLD: 0 /100 WBCS — SIGNIFICANT CHANGE UP (ref 0–0)
NRBC # BLD: 0 /100 WBCS — SIGNIFICANT CHANGE UP (ref 0–0)
OSMOLALITY UR: 734 MOSM/KG — SIGNIFICANT CHANGE UP (ref 300–900)
PHOSPHATE SERPL-MCNC: 4.1 MG/DL — SIGNIFICANT CHANGE UP (ref 2.5–4.5)
PLATELET # BLD AUTO: 179 K/UL — SIGNIFICANT CHANGE UP (ref 150–400)
PLATELET # BLD AUTO: 202 K/UL — SIGNIFICANT CHANGE UP (ref 150–400)
POTASSIUM SERPL-MCNC: 4.1 MMOL/L — SIGNIFICANT CHANGE UP (ref 3.5–5.3)
POTASSIUM SERPL-MCNC: 4.4 MMOL/L — SIGNIFICANT CHANGE UP (ref 3.5–5.3)
POTASSIUM SERPL-SCNC: 4.1 MMOL/L — SIGNIFICANT CHANGE UP (ref 3.5–5.3)
POTASSIUM SERPL-SCNC: 4.4 MMOL/L — SIGNIFICANT CHANGE UP (ref 3.5–5.3)
RBC # BLD: 3.12 M/UL — LOW (ref 3.8–5.2)
RBC # BLD: 3.35 M/UL — LOW (ref 3.8–5.2)
RBC # FLD: 13.1 % — SIGNIFICANT CHANGE UP (ref 10.3–14.5)
RBC # FLD: 13.3 % — SIGNIFICANT CHANGE UP (ref 10.3–14.5)
SODIUM SERPL-SCNC: 132 MMOL/L — LOW (ref 135–145)
SODIUM SERPL-SCNC: 135 MMOL/L — SIGNIFICANT CHANGE UP (ref 135–145)
SODIUM UR-SCNC: 67 MMOL/L — SIGNIFICANT CHANGE UP
VALPROATE SERPL-MCNC: 72.9 UG/ML — SIGNIFICANT CHANGE UP (ref 50–100)
WBC # BLD: 11.26 K/UL — HIGH (ref 3.8–10.5)
WBC # BLD: 15.23 K/UL — HIGH (ref 3.8–10.5)
WBC # FLD AUTO: 11.26 K/UL — HIGH (ref 3.8–10.5)
WBC # FLD AUTO: 15.23 K/UL — HIGH (ref 3.8–10.5)

## 2023-09-13 PROCEDURE — 99222 1ST HOSP IP/OBS MODERATE 55: CPT

## 2023-09-13 PROCEDURE — 71045 X-RAY EXAM CHEST 1 VIEW: CPT | Mod: 26

## 2023-09-13 PROCEDURE — 99232 SBSQ HOSP IP/OBS MODERATE 35: CPT

## 2023-09-13 RX ORDER — HYDROMORPHONE HYDROCHLORIDE 2 MG/ML
0.5 INJECTION INTRAMUSCULAR; INTRAVENOUS; SUBCUTANEOUS ONCE
Refills: 0 | Status: DISCONTINUED | OUTPATIENT
Start: 2023-09-13 | End: 2023-09-13

## 2023-09-13 RX ORDER — ACETAMINOPHEN 500 MG
1000 TABLET ORAL ONCE
Refills: 0 | Status: COMPLETED | OUTPATIENT
Start: 2023-09-13 | End: 2023-09-13

## 2023-09-13 RX ORDER — VALPROIC ACID (AS SODIUM SALT) 250 MG/5ML
250 SOLUTION, ORAL ORAL EVERY 6 HOURS
Refills: 0 | Status: DISCONTINUED | OUTPATIENT
Start: 2023-09-13 | End: 2023-09-14

## 2023-09-13 RX ORDER — CHLORHEXIDINE GLUCONATE 213 G/1000ML
1 SOLUTION TOPICAL
Refills: 0 | Status: DISCONTINUED | OUTPATIENT
Start: 2023-09-13 | End: 2023-09-13

## 2023-09-13 RX ADMIN — Medication 100 MILLIGRAM(S): at 05:33

## 2023-09-13 RX ADMIN — Medication 52.5 MILLIGRAM(S): at 19:38

## 2023-09-13 RX ADMIN — TRAMADOL HYDROCHLORIDE 50 MILLIGRAM(S): 50 TABLET ORAL at 21:16

## 2023-09-13 RX ADMIN — SODIUM CHLORIDE 60 MILLILITER(S): 9 INJECTION INTRAMUSCULAR; INTRAVENOUS; SUBCUTANEOUS at 16:46

## 2023-09-13 RX ADMIN — Medication 5 MILLIGRAM(S): at 22:49

## 2023-09-13 RX ADMIN — ONDANSETRON 4 MILLIGRAM(S): 8 TABLET, FILM COATED ORAL at 19:38

## 2023-09-13 RX ADMIN — LACOSAMIDE 130 MILLIGRAM(S): 50 TABLET ORAL at 19:37

## 2023-09-13 RX ADMIN — Medication 1000 MILLIGRAM(S): at 22:49

## 2023-09-13 RX ADMIN — Medication 10 MILLIGRAM(S): at 16:45

## 2023-09-13 RX ADMIN — ESCITALOPRAM OXALATE 15 MILLIGRAM(S): 10 TABLET, FILM COATED ORAL at 11:04

## 2023-09-13 RX ADMIN — Medication 50 MILLIGRAM(S): at 20:46

## 2023-09-13 RX ADMIN — HYDROMORPHONE HYDROCHLORIDE 0.5 MILLIGRAM(S): 2 INJECTION INTRAMUSCULAR; INTRAVENOUS; SUBCUTANEOUS at 19:15

## 2023-09-13 RX ADMIN — SENNA PLUS 2 TABLET(S): 8.6 TABLET ORAL at 22:49

## 2023-09-13 RX ADMIN — TRAMADOL HYDROCHLORIDE 50 MILLIGRAM(S): 50 TABLET ORAL at 20:46

## 2023-09-13 RX ADMIN — HYDROMORPHONE HYDROCHLORIDE 0.5 MILLIGRAM(S): 2 INJECTION INTRAMUSCULAR; INTRAVENOUS; SUBCUTANEOUS at 02:45

## 2023-09-13 RX ADMIN — Medication 100 MILLIGRAM(S): at 11:04

## 2023-09-13 RX ADMIN — Medication 10 MILLIGRAM(S): at 12:38

## 2023-09-13 RX ADMIN — HYDROMORPHONE HYDROCHLORIDE 0.5 MILLIGRAM(S): 2 INJECTION INTRAMUSCULAR; INTRAVENOUS; SUBCUTANEOUS at 09:15

## 2023-09-13 RX ADMIN — Medication 1000 MILLIGRAM(S): at 23:36

## 2023-09-13 RX ADMIN — Medication 100 MILLIGRAM(S): at 19:31

## 2023-09-13 RX ADMIN — HYDROMORPHONE HYDROCHLORIDE 0.5 MILLIGRAM(S): 2 INJECTION INTRAMUSCULAR; INTRAVENOUS; SUBCUTANEOUS at 09:00

## 2023-09-13 RX ADMIN — HYDROMORPHONE HYDROCHLORIDE 0.5 MILLIGRAM(S): 2 INJECTION INTRAMUSCULAR; INTRAVENOUS; SUBCUTANEOUS at 17:00

## 2023-09-13 RX ADMIN — HYDROMORPHONE HYDROCHLORIDE 0.5 MILLIGRAM(S): 2 INJECTION INTRAMUSCULAR; INTRAVENOUS; SUBCUTANEOUS at 02:06

## 2023-09-13 RX ADMIN — Medication 1000 MILLIGRAM(S): at 17:07

## 2023-09-13 RX ADMIN — Medication 1000 MILLIGRAM(S): at 06:00

## 2023-09-13 RX ADMIN — CHLORHEXIDINE GLUCONATE 1 APPLICATION(S): 213 SOLUTION TOPICAL at 10:54

## 2023-09-13 RX ADMIN — Medication 400 MILLIGRAM(S): at 05:10

## 2023-09-13 RX ADMIN — Medication 52.5 MILLIGRAM(S): at 14:54

## 2023-09-13 RX ADMIN — MEMANTINE HYDROCHLORIDE 5 MILLIGRAM(S): 10 TABLET ORAL at 19:38

## 2023-09-13 RX ADMIN — Medication 55 MILLIGRAM(S): at 06:07

## 2023-09-13 RX ADMIN — GABAPENTIN 400 MILLIGRAM(S): 400 CAPSULE ORAL at 20:46

## 2023-09-13 RX ADMIN — Medication 400 MILLIGRAM(S): at 16:37

## 2023-09-13 RX ADMIN — LACOSAMIDE 130 MILLIGRAM(S): 50 TABLET ORAL at 07:20

## 2023-09-13 NOTE — PROVIDER CONTACT NOTE (CHANGE IN STATUS NOTIFICATION) - ACTION/TREATMENT ORDERED:
Vicky iLra is calling to report a patient fall. Patient fell on Early Sunday Morning and hit her head. She has a Bump on the right side of the head with small skin tear   She is experiencing soreness in her ribs on the right side. She is Larthargic and having difficulty standing.    Vicky Lira is taking patient to [facility] ED Nothing at this time.

## 2023-09-13 NOTE — DIETITIAN INITIAL EVALUATION ADULT - PERTINENT LABORATORY DATA
09-13    135  |  100  |  4<L>  ----------------------------<  132<H>  4.1   |  26  |  0.35<L>    Ca    8.2<L>      13 Sep 2023 05:38  Phos  4.1     09-13  Mg     2.0     09-13    POCT Blood Glucose.: 106 mg/dL (09-13-23 @ 11:15)  A1C with Estimated Average Glucose Result: 5.6 % (09-10-23 @ 18:00)

## 2023-09-13 NOTE — PROGRESS NOTE ADULT - SUBJECTIVE AND OBJECTIVE BOX
EPILEPSY PROGRESS NOTE    HPI  38F hx hypothyroid was found down by mom unresponsive in the bathroom on , was airlifted to [facility] for a large R frontal IPH with IVH and hydrocephalus. She was emergently intubated and taken to operating room for right hemicraniectomy and EVD placement with Dr. Bernstein. Patient was deemed medically stable and was sent  to [facility] for 5 weeks then transferred to [facility] for 4 weeks and presented for cranioplasty. Now POD# 0 S/P right cranioplasty with longevity implant. Epilepsy consulted for antiseizure medication management due to elevated VPA level of 119.2. As per patient's partner at bedside when she had the initial event and was at [facility] on the 3rd day of her admission, she was noted to have a gaze deviation, had an urgent head CT which was unrevealing. They then placed her vEEG and noted that she was having seizures. As per partner she believes she had 5 seizures total and was initially started on Keppra. She was then changed from Keppra to VPA due to agitation. When she was discharged to the [facility] Vimpat was added, unclear reason as partner stated she did not have any more seizures. Then when she went from [facility] to [facility] her VPA was increased to 500 mg AM/750 mg PM due to ?low level.        REVIEW OF SYSTEMS  TABATHA as patient is waking up from anesthesia       PAST MEDICAL & SURGICAL HISTORY:  Hypothyroid      Cyst of ovary      Status post craniectomy           FAMILY HISTORY:         Allergies  No Known Allergies       MEDICATIONS  (STANDING):  acetaminophen     Tablet .. 1000 milliGRAM(s) Oral every 8 hours  baclofen 5 milliGRAM(s) Oral every 8 hours  ceFAZolin   IVPB 2000 milliGRAM(s) IV Intermittent every 8 hours  escitalopram 15 milliGRAM(s) Oral daily  gabapentin 400 milliGRAM(s) Oral <User Schedule>  gabapentin 400 milliGRAM(s) Oral <User Schedule>  glucagon  Injectable 1 milliGRAM(s) IntraMuscular once  influenza   Vaccine 0.5 milliLiter(s) IntraMuscular once  insulin lispro (ADMELOG) corrective regimen sliding scale   SubCutaneous every 6 hours  lacosamide IVPB 150 milliGRAM(s) IV Intermittent every 12 hours  levothyroxine 75 MICROGram(s) Oral daily  memantine 5 milliGRAM(s) Oral <User Schedule>  ondansetron   Disintegrating Tablet 4 milliGRAM(s) Oral every 6 hours  pantoprazole    Tablet 40 milliGRAM(s) Oral before breakfast  senna 2 Tablet(s) Oral at bedtime  sodium chloride 0.9%. 1000 milliLiter(s) (60 mL/Hr) IV Continuous <Continuous>  traZODone 50 milliGRAM(s) Oral at bedtime  valproate sodium  IVPB 250 milliGRAM(s) IV Intermittent every 6 hours    MEDICATIONS  (PRN):  bisacodyl 5 milliGRAM(s) Oral every 12 hours PRN Constipation  labetalol Injectable 10 milliGRAM(s) IV Push every 2 hours PRN Systolic blood pressure > 140 mmHg  traMADol 50 milliGRAM(s) Oral every 6 hours PRN Severe Pain (7 - 10)      VITAL SIGNS   Vital Signs Last 24 Hrs  T(C): 37.1 (13 Sep 2023 13:55), Max: 37.1 (13 Sep 2023 13:55)  T(F): 98.7 (13 Sep 2023 13:55), Max: 98.7 (13 Sep 2023 13:55)  HR: 92 (13 Sep 2023 13:00) (68 - 116)  BP: 140/93 (13 Sep 2023 12:40) (94/61 - 140/93)  BP(mean): 111 (13 Sep 2023 12:40) (72 - 111)  ABP: 134/74 (13 Sep 2023 13:00) (100/56 - 184/98)  ABP(mean): 98 (13 Sep 2023 13:00) (72 - 132)  RR: 20 (13 Sep 2023 13:00) (14 - 22)  SpO2: 94% (13 Sep 2023 13:00) (93% - 100%)    O2 Parameters below as of 13 Sep 2023 13:00  Patient On (Oxygen Delivery Method): room air          PHYSICAL EXAM  Exam limited. Patient tearful throughout the exam, and repeating "I am in pain, I am sorry" when asked questions.  She was unable to follow commands. Left sided Spastic hemiparesis noted. L toes upgoing. Brisk reflexes on all extremities.        LABS   @ 05:29                      9.7 g/dL<L> [11.5 - 15.5]              11.26 K/uL<H> [3.80 - 10.50] )-----------( 179 K/uL [150 - 400]             28.8 %<L> [34.5 - 45.0]    %N: --  /  %L: --  /  %M: --  / %E: --   @ 00:20                      10.6 g/dL<L> [11.5 - 15.5]              15.23 K/uL<H> [3.80 - 10.50] )-----------( 202 K/uL [150 - 400]             30.8 %<L> [34.5 - 45.0]    %N: --  /  %L: --  /  %M: --  / %E: --   @ 16:41                      10.8 g/dL<L> [11.5 - 15.5]              12.75 K/uL<H> [3.80 - 10.50] )-----------( 217 K/uL [150 - 400]             31.2 %<L> [34.5 - 45.0]    %N: --  /  %L: --  /  %M: --  / %E: --       @ 05:38    135  |  100  |  4  ----------------------------<  132  4.1   |  26  |  0.35    Ca: 8.2  Ma.0  Phos: 4.1   @ 01:41    --  |  --  |  --  ----------------------------<  --  --   |  --  |  0.34    Ca: --  Mag: --  Phos: --   @ 00:20    132  |  98  |  4  ----------------------------<  134  4.4   |  27  |  0.33    Ca: 8.1  Mag: --  Phos: --   @ 16:41    134  |  100  |  5  ----------------------------<  146  4.4   |  25  |  0.43    Ca: 8.8  Ma.4  Phos: 4.8         EPILEPSY PROGRESS NOTE    HPI  38F hx hypothyroid was found down by mom unresponsive in the bathroom on , was airlifted to [facility] for a large R frontal IPH with IVH and hydrocephalus. She was emergently intubated and taken to operating room for right hemicraniectomy and EVD placement with Dr. Bernstein. Patient was deemed medically stable and was sent  to [facility] for 5 weeks then transferred to [facility] for 4 weeks and presented for cranioplasty at [facility]. Now POD# 0 S/P right cranioplasty with longevity implant. Epilepsy consulted for antiseizure medication management due to elevated VPA level of 119.2. As per patient's partner at bedside when she had the initial event and was at [facility] on the 3rd day of her admission, she was noted to have a gaze deviation, had an urgent head CT which was unrevealing. They then placed her vEEG and noted that she was having seizures. As per partner she believes she had 5 seizures total and was initially started on Keppra. She was then changed from Keppra to VPA due to agitation. When she was discharged to the [facility] Vimpat was added, unclear reason as partner stated she did not have any more reported seizures. Then when she went from [facility] to [facility] her VPA was increased to 500 mg AM/750 mg PM due to ?low level.      Has not had any seizures during current hospitalization at [facility].       REVIEW OF SYSTEMS  TABATHA as patient is waking up from anesthesia       PAST MEDICAL & SURGICAL HISTORY:  Hypothyroid      Cyst of ovary      Status post craniectomy           FAMILY HISTORY:         Allergies  No Known Allergies       MEDICATIONS  (STANDING):  acetaminophen     Tablet .. 1000 milliGRAM(s) Oral every 8 hours  baclofen 5 milliGRAM(s) Oral every 8 hours  ceFAZolin   IVPB 2000 milliGRAM(s) IV Intermittent every 8 hours  escitalopram 15 milliGRAM(s) Oral daily  gabapentin 400 milliGRAM(s) Oral <User Schedule>  gabapentin 400 milliGRAM(s) Oral <User Schedule>  glucagon  Injectable 1 milliGRAM(s) IntraMuscular once  influenza   Vaccine 0.5 milliLiter(s) IntraMuscular once  insulin lispro (ADMELOG) corrective regimen sliding scale   SubCutaneous every 6 hours  lacosamide IVPB 150 milliGRAM(s) IV Intermittent every 12 hours  levothyroxine 75 MICROGram(s) Oral daily  memantine 5 milliGRAM(s) Oral <User Schedule>  ondansetron   Disintegrating Tablet 4 milliGRAM(s) Oral every 6 hours  pantoprazole    Tablet 40 milliGRAM(s) Oral before breakfast  senna 2 Tablet(s) Oral at bedtime  sodium chloride 0.9%. 1000 milliLiter(s) (60 mL/Hr) IV Continuous <Continuous>  traZODone 50 milliGRAM(s) Oral at bedtime  valproate sodium  IVPB 250 milliGRAM(s) IV Intermittent every 6 hours    MEDICATIONS  (PRN):  bisacodyl 5 milliGRAM(s) Oral every 12 hours PRN Constipation  labetalol Injectable 10 milliGRAM(s) IV Push every 2 hours PRN Systolic blood pressure > 140 mmHg  traMADol 50 milliGRAM(s) Oral every 6 hours PRN Severe Pain (7 - 10)      VITAL SIGNS   Vital Signs Last 24 Hrs  T(C): 37.1 (13 Sep 2023 13:55), Max: 37.1 (13 Sep 2023 13:55)  T(F): 98.7 (13 Sep 2023 13:55), Max: 98.7 (13 Sep 2023 13:55)  HR: 92 (13 Sep 2023 13:00) (68 - 116)  BP: 140/93 (13 Sep 2023 12:40) (94/61 - 140/93)  BP(mean): 111 (13 Sep 2023 12:40) (72 - 111)  ABP: 134/74 (13 Sep 2023 13:00) (100/56 - 184/98)  ABP(mean): 98 (13 Sep 2023 13:00) (72 - 132)  RR: 20 (13 Sep 2023 13:00) (14 - 22)  SpO2: 94% (13 Sep 2023 13:00) (93% - 100%)    O2 Parameters below as of 13 Sep 2023 13:00  Patient On (Oxygen Delivery Method): room air          PHYSICAL EXAM  Exam limited. Patient tearful throughout the exam, and repeating "I am in pain, I am sorry" when asked questions.  She was unable to follow commands. Left sided Spastic hemiparesis noted. L toes upgoing. Brisk reflexes on all extremities.        LABS  - @ 05:29                      9.7 g/dL<L> [11.5 - 15.5]              11.26 K/uL<H> [3.80 - 10.50] )-----------( 179 K/uL [150 - 400]             28.8 %<L> [34.5 - 45.0]    %N: --  /  %L: --  /  %M: --  / %E: --   @ 00:20                      10.6 g/dL<L> [11.5 - 15.5]              15.23 K/uL<H> [3.80 - 10.50] )-----------( 202 K/uL [150 - 400]             30.8 %<L> [34.5 - 45.0]    %N: --  /  %L: --  /  %M: --  / %E: --   @ 16:41                      10.8 g/dL<L> [11.5 - 15.5]              12.75 K/uL<H> [3.80 - 10.50] )-----------( 217 K/uL [150 - 400]             31.2 %<L> [34.5 - 45.0]    %N: --  /  %L: --  /  %M: --  / %E: --       @ 05:38    135  |  100  |  4  ----------------------------<  132  4.1   |  26  |  0.35    Ca: 8.2  Ma.0  Phos: 4.1   @ 01:41    --  |  --  |  --  ----------------------------<  --  --   |  --  |  0.34    Ca: --  Mag: --  Phos: --   @ 00:20    132  |  98  |  4  ----------------------------<  134  4.4   |  27  |  0.33    Ca: 8.1  Mag: --  Phos: --   @ 16:41    134  |  100  |  5  ----------------------------<  146  4.4   |  25  |  0.43    Ca: 8.8  Ma.4  Phos: 4.8

## 2023-09-13 NOTE — PROGRESS NOTE ADULT - ASSESSMENT
ASSESSMENT/PLAN:  POD 1 s/p cranioplasty    NEURO:  Q4 neurochecks  Continue AEDs: Depakote (250mg Q6 based on level) Gabapentin, Vimpat  Epilepsy to opine on AEDs  Analgesia prn. Cranial ERAS  CT post op [place]  SG HAROON x 2- monitor output  Continue lexapro. Psych consult for mood lability  Holistic medicine  Continue baclofen for spasticity  Activity:  [] Bedrest [x] PT [x] OT [] PMNR    PULM:  Keep sats >92%  Room air    CV:  SBP goal 100- 140    RENAL:  Fluids: Continue NS at 60cc/hr. IVL once tolerating  Replete lytes     GI:  Diet: Dysphagia and advance as tolerated  GI prophylaxis [] not indicated [x] PPI [] other:  Bowel regimen [] colace [] senna [] other:    ENDO:   Goal euglycemia (-180)    HEME/ONC:   VTE prophylaxis: [x] SCDs [] chemoprophylaxis [x] hold chemoprophylaxis due to: fresh post op  Monitor CBC, coags  H/H stable    ID:  Ancef per NSGY  Afebrile    MISC:    SOCIAL/FAMILY:  [x] awaiting [] updated at bedside [] family meeting    CODE STATUS:  [x] Full Code [] DNR [] DNI [] Palliative/Comfort Care    DISPOSITION:  [x] ICU [] Stroke Unit [] Floor [] EMU [] RCU [] PCU    Time spent: 60 critical care minutes

## 2023-09-13 NOTE — DIETITIAN INITIAL EVALUATION ADULT - ADD RECOMMEND
1. Continue regular diet  --Recommend add ensure enlive BID  2. Follow intake closely, adjust prn  3. Monitor electrolytes, adjust and replete prn  4. Pain and bowel regimen per team   5. RD diet edu prn   1. Continue regular diet  --Recommend add ensure enlive BID instead of ensure high protein  2. Follow intake closely, adjust prn  3. Monitor electrolytes, adjust and replete prn  4. Pain and bowel regimen per team   5. RD diet edu prn

## 2023-09-13 NOTE — PROGRESS NOTE ADULT - ASSESSMENT
Patient is a 38 year old female with a pmh of hypothyroidism and recent R frontal IPH w/IVH & hydrocephalus on 6/6 with subsequent seizures on /750 & Vimpat 150 mg BID. Admitted to neurosurgery from [facility] for R cranioplasty. Now POD# 0 S/P right cranioplasty with longevity implant. Epilepsy consulted for antiseizure medication management due to elevated VPA trough this AM, level 119. As per family no seizures since started on antiseizure medication in 6/2023. Repeated levels of VPA at therapeutic levels (72.9).    Recommendations:  - Continue  mg IV BID  - Continue Vimpat 150 mg BID  - Seizure/Fall precautions  - Please place on vEEG once safe from surgical stand point  - Rest of care per primary team     Patient is a 38 year old female with a pmh of hypothyroidism and recent R frontal IPH w/IVH & hydrocephalus on 6/6 with subsequent seizures on /750 & Vimpat 150 mg BID. Admitted to neurosurgery from [facility] for R cranioplasty. Now POD# 0 S/P right cranioplasty with longevity implant. Epilepsy consulted for antiseizure medication management due to elevated VPA trough this AM, level 119. As per family no seizures since started on antiseizure medication in 6/2023. VPA lowered slightly yesterday and Repeated levels of VPA are now therapeutic levels (72.9).    Recommendations:  - Continue  mg IV BID  - Continue Vimpat 150 mg BID  - Seizure/Fall precautions  - Please place on vEEG once safe from surgical stand point for several hours of recording to ensure no subclinical seizures with medication reduction.  - Rest of care per primary team

## 2023-09-13 NOTE — PROVIDER CONTACT NOTE (CHANGE IN STATUS NOTIFICATION) - BACKGROUND
Pt had R cranioplasty yesterday. Pt has been very sleepy but now is opening her eyes more often. She used to be able to open the R eye and now it seems too swollen to open.

## 2023-09-13 NOTE — DIETITIAN INITIAL EVALUATION ADULT - PERTINENT MEDS FT
MEDICATIONS  (STANDING):  acetaminophen     Tablet .. 1000 milliGRAM(s) Oral every 8 hours  baclofen 5 milliGRAM(s) Oral every 8 hours  ceFAZolin   IVPB 2000 milliGRAM(s) IV Intermittent every 8 hours  escitalopram 15 milliGRAM(s) Oral daily  gabapentin 400 milliGRAM(s) Oral <User Schedule>  gabapentin 400 milliGRAM(s) Oral <User Schedule>  glucagon  Injectable 1 milliGRAM(s) IntraMuscular once  influenza   Vaccine 0.5 milliLiter(s) IntraMuscular once  insulin lispro (ADMELOG) corrective regimen sliding scale   SubCutaneous every 6 hours  lacosamide IVPB 150 milliGRAM(s) IV Intermittent every 12 hours  levothyroxine 75 MICROGram(s) Oral daily  memantine 5 milliGRAM(s) Oral <User Schedule>  ondansetron   Disintegrating Tablet 4 milliGRAM(s) Oral every 6 hours  pantoprazole    Tablet 40 milliGRAM(s) Oral before breakfast  senna 2 Tablet(s) Oral at bedtime  sodium chloride 0.9%. 1000 milliLiter(s) (60 mL/Hr) IV Continuous <Continuous>  traZODone 50 milliGRAM(s) Oral at bedtime  valproate sodium  IVPB 250 milliGRAM(s) IV Intermittent every 6 hours    MEDICATIONS  (PRN):  bisacodyl 5 milliGRAM(s) Oral every 12 hours PRN Constipation  labetalol Injectable 10 milliGRAM(s) IV Push every 2 hours PRN Systolic blood pressure > 140 mmHg  traMADol 50 milliGRAM(s) Oral every 6 hours PRN Severe Pain (7 - 10)

## 2023-09-13 NOTE — DIETITIAN INITIAL EVALUATION ADULT - NS FNS DIET ORDER
Diet, Regular:   Supplement Feeding Modality:  Oral  Ensure Plus High Protein Cans or Servings Per Day:  1       Frequency:  Two Times a day (09-12-23 @ 15:53)

## 2023-09-13 NOTE — DIETITIAN INITIAL EVALUATION ADULT - OTHER INFO
38F hx hypothyroid was found down by mom unresponsive in the bathroom on 6/6, was airlifted to [facility] for a large R frontal IPH with IVH and hydrocephalus. She was emergently intubated and taken to operating room for right hemicraniectomy and EVD placement with Dr. Bernstein. Patient was deemed medically stable and was sent  to [facility] for 5 weeks then transferred to [facility] for 4 weeks. Now s/p right cranioplasty with longevity implant 9/12/23.  38F hx hypothyroid was found down by mom unresponsive in the bathroom on 6/6, was airlifted to [facility] for a large R frontal IPH with IVH and hydrocephalus. She was emergently intubated and taken to operating room for right hemicraniectomy and EVD placement with Dr. Bernstein. Patient was deemed medically stable and was sent  to [facility] for 5 weeks then transferred to [facility] for 4 weeks. Now s/p right cranioplasty with longevity implant 9/12/23.     Pt assessed at bedside. Resting in bed. Started on regular diet + ensure high protein post-op. Tolerated well, no N/V/D/C. Fair PO intake post-op. Pt reports no intake or weight changes PTA. Reviewed adequate intake parameters, encouraged nutrient dense high protein options. Diet preferences reviewed. No pain noted. River score 12. Surgical incisions, HAROON drains noted. RD to follow.

## 2023-09-13 NOTE — PROGRESS NOTE ADULT - SUBJECTIVE AND OBJECTIVE BOX
HPI:  38F hx hypothyroid was found down by mom unresponsive in the bathroom on 6/6, was airlifted to [facility] for a large R frontal IPH with IVH and hydrocephalus. She was emergently intubated and taken to operating room for right hemicraniectomy and EVD placement with Dr. Bernstein. Patient was deemed medically stable and was sent  to [facility] for 5 weeks then transferred to [facility] for 4 weeks and presents today in preparation for cranioplasty this week.  (10 Sep 2023 17:50)    HOSPITAL COURSE:       OVERNIGHT EVENTS:  Vital Signs Last 24 Hrs  T(C): 36.7 (13 Sep 2023 04:44), Max: 37.3 (12 Sep 2023 11:37)  T(F): 98 (13 Sep 2023 04:44), Max: 99.1 (12 Sep 2023 11:37)  HR: 100 (13 Sep 2023 04:00) (68 - 102)  BP: 94/61 (12 Sep 2023 16:45) (94/61 - 148/85)  BP(mean): 72 (12 Sep 2023 16:45) (70 - 72)  RR: 16 (13 Sep 2023 04:00) (14 - 18)  SpO2: 95% (13 Sep 2023 04:00) (94% - 100%)    Parameters below as of 13 Sep 2023 04:00  Patient On (Oxygen Delivery Method): room air        I&O's Summary    12 Sep 2023 07:01  -  13 Sep 2023 05:58  --------------------------------------------------------  IN: 1130 mL / OUT: 705 mL / NET: 425 mL        PHYSICAL EXAM:  Neurological:    Motor exam:         [] Upper extremity              Bi(c5)  WE(c6)  EE(c7)   FF(c8)                                                R         5/5        5/5        5/5       5/5                                               L          5/5        5/5        5/5       5/5         [] Lower extremeity          HF(l2)   KE(l3)    TA(l4)   EHL(l5)  GS(s1)                                                 R        5/5        5/5        5/5       5/5         5/5                                               L         5/5        5/5       5/5       5/5          5/5                                                        [] warm well perfused; capillary refill <3 seconds     Sensation: [] intact to light touch  [] decreased:       Cardiovascular:  Respiratory:  Gastrointestinal:  Genitourinary:  Extremities:  Incision/Wound:    TUBES/LINES:  [] Trevino  [] Lumbar Drain  [] Wound Drains  [] Others      DIET:  [] NPO  [] Mechanical  [] Tube feeds    LABS:                        10.6   15.23 )-----------( 202      ( 13 Sep 2023 00:20 )             30.8     09-13    x   |  x   |  x   ----------------------------<  x   x    |  x   |  0.34<L>    Ca    8.1<L>      13 Sep 2023 00:20  Phos  4.8     09-12  Mg     1.4     09-12      PT/INR - ( 12 Sep 2023 16:41 )   PT: 12.3 sec;   INR: 1.08          PTT - ( 12 Sep 2023 16:41 )  PTT:30.9 sec  Urinalysis Basic - ( 13 Sep 2023 00:20 )    Color: x / Appearance: x / SG: x / pH: x  Gluc: 134 mg/dL / Ketone: x  / Bili: x / Urobili: x   Blood: x / Protein: x / Nitrite: x   Leuk Esterase: x / RBC: x / WBC x   Sq Epi: x / Non Sq Epi: x / Bacteria: x          CAPILLARY BLOOD GLUCOSE      POCT Blood Glucose.: 122 mg/dL (13 Sep 2023 05:55)      Drug Levels: [] N/A  Valproic Acid Level, Serum: 119.2 ug/mL (09-12 @ 05:30)    CSF Analysis: [] N/A      Allergies    No Known Allergies    Intolerances      MEDICATIONS:  Antibiotics:  ceFAZolin   IVPB 2000 milliGRAM(s) IV Intermittent every 8 hours    Neuro:  acetaminophen     Tablet .. 1000 milliGRAM(s) Oral every 8 hours  baclofen 5 milliGRAM(s) Oral every 8 hours  escitalopram 15 milliGRAM(s) Oral daily  gabapentin 400 milliGRAM(s) Oral <User Schedule>  gabapentin 400 milliGRAM(s) Oral <User Schedule>  lacosamide IVPB 150 milliGRAM(s) IV Intermittent every 12 hours  memantine 5 milliGRAM(s) Oral <User Schedule>  ondansetron   Disintegrating Tablet 4 milliGRAM(s) Oral every 6 hours  traMADol 50 milliGRAM(s) Oral every 6 hours PRN  traZODone 50 milliGRAM(s) Oral at bedtime  valproate sodium  IVPB 500 milliGRAM(s) IV Intermittent <User Schedule>    Anticoagulation:    OTHER:  bisacodyl 5 milliGRAM(s) Oral every 12 hours PRN  glucagon  Injectable 1 milliGRAM(s) IntraMuscular once  influenza   Vaccine 0.5 milliLiter(s) IntraMuscular once  insulin lispro (ADMELOG) corrective regimen sliding scale   SubCutaneous three times a day before meals  labetalol Injectable 10 milliGRAM(s) IV Push every 2 hours PRN  levothyroxine 75 MICROGram(s) Oral daily  pantoprazole    Tablet 40 milliGRAM(s) Oral before breakfast  senna 2 Tablet(s) Oral at bedtime    IVF:  sodium chloride 0.9%. 1000 milliLiter(s) IV Continuous <Continuous>    CULTURES:    RADIOLOGY & ADDITIONAL TESTS:      ASSESSMENT:  38y Female s/p    M95.2    Handoff    MEWS Score    Hypothyroid    Skull defect    History of intracranial hemorrhage    Skull defect    History of intracranial hemorrhage    Status post craniectomy    Cranioplasty for skull defect larger than 5 cm diameter    Cyst of ovary    Status post craniectomy    Room Service Assist    [email or web address]        PLAN:  NEURO:    CARDIOVASCULAR:    PULMONARY:    RENAL:    GI:    HEME:    ID:    ENDO:    DVT PROPHYLAXIS:  [] Venodynes                                [] Heparin/Lovenox    DISPOSITION: HPI:  38F hx hypothyroid was found down by mom unresponsive in the bathroom on 6/6, was airlifted to [facility] for a large R frontal IPH with IVH and hydrocephalus. She was emergently intubated and taken to operating room for right hemicraniectomy and EVD placement with Dr. Bernstein. Patient was deemed medically stable and was sent  to [facility] for 5 weeks then transferred to [facility] for 4 weeks and presents today in preparation for cranioplasty this week.  (10 Sep 2023 17:50)    HOSPITAL COURSE:   9/10: transferred for cranioplasty Tuesday 9/11: [facility]THE[facility] overnight. MRI he completed  9/12: preop OR today.   POD 0 right cranioplasty with longevity implant   9/13: POD1 R cranioplasty w/ longevity implant. Overnight NAD, pt difficult to assess and not taking PO meds, given IV tylenol and IV dialudid for pain control. F/u TOV. Na downtrending overnight 132, f/u repeat, urine studies sent consistent w/ salt wasting, consider 3% bolus.     OVERNIGHT EVENTS:  Vital Signs Last 24 Hrs  T(C): 36.7 (13 Sep 2023 04:44), Max: 37.3 (12 Sep 2023 11:37)  T(F): 98 (13 Sep 2023 04:44), Max: 99.1 (12 Sep 2023 11:37)  HR: 100 (13 Sep 2023 04:00) (68 - 102)  BP: 94/61 (12 Sep 2023 16:45) (94/61 - 148/85)  BP(mean): 72 (12 Sep 2023 16:45) (70 - 72)  RR: 16 (13 Sep 2023 04:00) (14 - 18)  SpO2: 95% (13 Sep 2023 04:00) (94% - 100%)    Parameters below as of 13 Sep 2023 04:00  Patient On (Oxygen Delivery Method): room air        I&O's Summary    12 Sep 2023 07:01  -  13 Sep 2023 05:58  --------------------------------------------------------  IN: 1130 mL / OUT: 705 mL / NET: 425 mL        PHYSICAL EXAM:  General: NAD, pt is comfortably sitting up in bed, A&O x 0 (2 at best, not compliant w/ most of neuro exam), on RA  HEENT: PERRL 3mm, resisting eye opening, unable to assess EOM, face appears symmetric   Cardiovascular: RRR, normal S1 and S2   Respiratory: lungs CTAB, no wheezing, rhonchi, or crackles   GI: normoactive BS to auscultation, abd soft, NTND   Neuro: no aphasia, unable to assess pronator drift   PETERS antigravity and purposeful but non-compliant with strength exam   Extremities: distal pulses 2+ x4   Wound/incision: R crani incision site w/ headwrap C/D/I   Drains: SG HAROON x2     LABS:                        10.6   15.23 )-----------( 202      ( 13 Sep 2023 00:20 )             30.8     09-13    x   |  x   |  x   ----------------------------<  x   x    |  x   |  0.34<L>    Ca    8.1<L>      13 Sep 2023 00:20  Phos  4.8     09-12  Mg     1.4     09-12      PT/INR - ( 12 Sep 2023 16:41 )   PT: 12.3 sec;   INR: 1.08          PTT - ( 12 Sep 2023 16:41 )  PTT:30.9 sec  Urinalysis Basic - ( 13 Sep 2023 00:20 )    Color: x / Appearance: x / SG: x / pH: x  Gluc: 134 mg/dL / Ketone: x  / Bili: x / Urobili: x   Blood: x / Protein: x / Nitrite: x   Leuk Esterase: x / RBC: x / WBC x   Sq Epi: x / Non Sq Epi: x / Bacteria: x          CAPILLARY BLOOD GLUCOSE      POCT Blood Glucose.: 122 mg/dL (13 Sep 2023 05:55)      Drug Levels: [] N/A  Valproic Acid Level, Serum: 119.2 ug/mL (09-12 @ 05:30)    CSF Analysis: [] N/A      Allergies    No Known Allergies    Intolerances      MEDICATIONS:  Antibiotics:  ceFAZolin   IVPB 2000 milliGRAM(s) IV Intermittent every 8 hours    Neuro:  acetaminophen     Tablet .. 1000 milliGRAM(s) Oral every 8 hours  baclofen 5 milliGRAM(s) Oral every 8 hours  escitalopram 15 milliGRAM(s) Oral daily  gabapentin 400 milliGRAM(s) Oral <User Schedule>  gabapentin 400 milliGRAM(s) Oral <User Schedule>  lacosamide IVPB 150 milliGRAM(s) IV Intermittent every 12 hours  memantine 5 milliGRAM(s) Oral <User Schedule>  ondansetron   Disintegrating Tablet 4 milliGRAM(s) Oral every 6 hours  traMADol 50 milliGRAM(s) Oral every 6 hours PRN  traZODone 50 milliGRAM(s) Oral at bedtime  valproate sodium  IVPB 500 milliGRAM(s) IV Intermittent <User Schedule>    Anticoagulation:    OTHER:  bisacodyl 5 milliGRAM(s) Oral every 12 hours PRN  glucagon  Injectable 1 milliGRAM(s) IntraMuscular once  influenza   Vaccine 0.5 milliLiter(s) IntraMuscular once  insulin lispro (ADMELOG) corrective regimen sliding scale   SubCutaneous three times a day before meals  labetalol Injectable 10 milliGRAM(s) IV Push every 2 hours PRN  levothyroxine 75 MICROGram(s) Oral daily  pantoprazole    Tablet 40 milliGRAM(s) Oral before breakfast  senna 2 Tablet(s) Oral at bedtime    IVF:  sodium chloride 0.9%. 1000 milliLiter(s) IV Continuous <Continuous>    CULTURES:    RADIOLOGY & ADDITIONAL TESTS:      ASSESSMENT:  38F hx hypothyroid was found down by mom unresponsive in the bathroom on 6/6, was airlifted to [facility] for a large R frontal IPH with IVH and hydrocephalus. She was emergently intubated and taken to operating room for right hemicraniectomy and EVD placement with Dr. Bernstein. Patient was deemed medically stable and was sent  to [facility] for 5 weeks then transferred to [facility] for 4 weeks. Now s/p right cranioplasty with longevity implant 9/12/23.       M95.2    Handoff    MEWS Score    Hypothyroid    Skull defect    History of intracranial hemorrhage    Skull defect    History of intracranial hemorrhage    Status post craniectomy    Cranioplasty for skull defect larger than 5 cm diameter    Cyst of ovary    Status post craniectomy    Room Service Assist    SysAdmin_VstLnk        PLAN:  Neuro  - neuro/vitals q1h  - pain control as needed tylenol  - continue AEDs as ordered: vimpat 150mg bid and depakote 500mg bid  - monitor subgaleal HAROON drains x2  - postop CTH completed  - keep HOB flat  - baclofen 5mg q8 for muscle spasms per rehab medicine attending (alternative to tizanidine that was being given at rehab)   - pending PT/OT, neuropsych eval    Cardio  - -140    Pulm  - Non-rebreather for pneumocephalus     GI  - ADAT to regular diet  - bowel regimen    Renal  - IVF  - munson in place, remove    Endo  - continue synthroid  - ISS    Heme  - no active issues  - SCDs for DVT prophylaxis    ID  - afebrile  - postop ancef    Psych  - lexapro 15mg daily    Dispo: ICU status, full code, dispo pending  D/w Dr. Sy and Dr. Romero HPI:  38F hx hypothyroid was found down by mom unresponsive in the bathroom on 6/6, was airlifted to [facility] for a large R frontal IPH with IVH and hydrocephalus. She was emergently intubated and taken to operating room for right hemicraniectomy and EVD placement with Dr. Bernstein. Patient was deemed medically stable and was sent  to [facility] for 5 weeks then transferred to [facility] for 4 weeks and presents today in preparation for cranioplasty this week.  (10 Sep 2023 17:50)    HOSPITAL COURSE:   9/10: transferred for cranioplasty Tuesday 9/11: [facility] overnight. MRI he completed  9/12: preop OR today.   POD 0 right cranioplasty with longevity implant   9/13: POD1 R cranioplasty w/ longevity implant. Overnight NAD, pt difficult to assess and not taking PO meds, given IV tylenol and IV dialudid for pain control. F/u TOV. Na downtrending overnight 132, f/u repeat, urine studies sent consistent w/ salt wasting, consider 3% bolus.     OVERNIGHT EVENTS:  Vital Signs Last 24 Hrs  T(C): 36.7 (13 Sep 2023 04:44), Max: 37.3 (12 Sep 2023 11:37)  T(F): 98 (13 Sep 2023 04:44), Max: 99.1 (12 Sep 2023 11:37)  HR: 100 (13 Sep 2023 04:00) (68 - 102)  BP: 94/61 (12 Sep 2023 16:45) (94/61 - 148/85)  BP(mean): 72 (12 Sep 2023 16:45) (70 - 72)  RR: 16 (13 Sep 2023 04:00) (14 - 18)  SpO2: 95% (13 Sep 2023 04:00) (94% - 100%)    Parameters below as of 13 Sep 2023 04:00  Patient On (Oxygen Delivery Method): room air        I&O's Summary    12 Sep 2023 07:01  -  13 Sep 2023 05:58  --------------------------------------------------------  IN: 1130 mL / OUT: 705 mL / NET: 425 mL        PHYSICAL EXAM:  General: NAD, pt is comfortably sitting up in bed, A&O x 0 (2 at best, not compliant w/ most of neuro exam), on RA  HEENT: PERRL 3mm, resisting eye opening, unable to assess EOM, face appears symmetric   Cardiovascular: RRR, normal S1 and S2   Respiratory: lungs CTAB, no wheezing, rhonchi, or crackles   GI: normoactive BS to auscultation, abd soft, NTND   Neuro: no aphasia, unable to assess pronator drift   PETERS antigravity and purposeful but non-compliant with strength exam   Extremities: distal pulses 2+ x4   Wound/incision: R crani incision site w/ headwrap C/D/I   Drains: SG HAROON x2     LABS:                        10.6   15.23 )-----------( 202      ( 13 Sep 2023 00:20 )             30.8     09-13    x   |  x   |  x   ----------------------------<  x   x    |  x   |  0.34<L>    Ca    8.1<L>      13 Sep 2023 00:20  Phos  4.8     09-12  Mg     1.4     09-12      PT/INR - ( 12 Sep 2023 16:41 )   PT: 12.3 sec;   INR: 1.08          PTT - ( 12 Sep 2023 16:41 )  PTT:30.9 sec  Urinalysis Basic - ( 13 Sep 2023 00:20 )    Color: x / Appearance: x / SG: x / pH: x  Gluc: 134 mg/dL / Ketone: x  / Bili: x / Urobili: x   Blood: x / Protein: x / Nitrite: x   Leuk Esterase: x / RBC: x / WBC x   Sq Epi: x / Non Sq Epi: x / Bacteria: x          CAPILLARY BLOOD GLUCOSE      POCT Blood Glucose.: 122 mg/dL (13 Sep 2023 05:55)      Drug Levels: [] N/A  Valproic Acid Level, Serum: 119.2 ug/mL (09-12 @ 05:30)    CSF Analysis: [] N/A      Allergies    No Known Allergies    Intolerances      MEDICATIONS:  Antibiotics:  ceFAZolin   IVPB 2000 milliGRAM(s) IV Intermittent every 8 hours    Neuro:  acetaminophen     Tablet .. 1000 milliGRAM(s) Oral every 8 hours  baclofen 5 milliGRAM(s) Oral every 8 hours  escitalopram 15 milliGRAM(s) Oral daily  gabapentin 400 milliGRAM(s) Oral <User Schedule>  gabapentin 400 milliGRAM(s) Oral <User Schedule>  lacosamide IVPB 150 milliGRAM(s) IV Intermittent every 12 hours  memantine 5 milliGRAM(s) Oral <User Schedule>  ondansetron   Disintegrating Tablet 4 milliGRAM(s) Oral every 6 hours  traMADol 50 milliGRAM(s) Oral every 6 hours PRN  traZODone 50 milliGRAM(s) Oral at bedtime  valproate sodium  IVPB 500 milliGRAM(s) IV Intermittent <User Schedule>    Anticoagulation:    OTHER:  bisacodyl 5 milliGRAM(s) Oral every 12 hours PRN  glucagon  Injectable 1 milliGRAM(s) IntraMuscular once  influenza   Vaccine 0.5 milliLiter(s) IntraMuscular once  insulin lispro (ADMELOG) corrective regimen sliding scale   SubCutaneous three times a day before meals  labetalol Injectable 10 milliGRAM(s) IV Push every 2 hours PRN  levothyroxine 75 MICROGram(s) Oral daily  pantoprazole    Tablet 40 milliGRAM(s) Oral before breakfast  senna 2 Tablet(s) Oral at bedtime    IVF:  sodium chloride 0.9%. 1000 milliLiter(s) IV Continuous <Continuous>    CULTURES:    RADIOLOGY & ADDITIONAL TESTS:      ASSESSMENT:  38F hx hypothyroid was found down by mom unresponsive in the bathroom on 6/6, was airlifted to [facility] for a large R frontal IPH with IVH and hydrocephalus. She was emergently intubated and taken to operating room for right hemicraniectomy and EVD placement with Dr. Bernstein. Patient was deemed medically stable and was sent  to [facility] for 5 weeks then transferred to [facility] for 4 weeks. Now s/p right cranioplasty with longevity implant 9/12/23.       M95.2    Handoff    MEWS Score    Hypothyroid    Skull defect    History of intracranial hemorrhage    Skull defect    History of intracranial hemorrhage    Status post craniectomy    Cranioplasty for skull defect larger than 5 cm diameter    Cyst of ovary    Status post craniectomy    Room Service Assist    SysAdmin_VstLnk        PLAN:  Neuro  - neuro/vitals q1h  - pain control as needed tylenol  - continue AEDs as ordered: vimpat 150mg bid and depakote 500mg bid  - monitor subgaleal HAROON drains x2  - postop CTH completed  - keep HOB flat  - baclofen 5mg q8 for muscle spasms per rehab medicine attending (alternative to tizanidine that was being given at rehab)    - pending PT/OT, neuropsych eval    Cardio  - -140    Pulm  - Non-rebreather for pneumocephalus     GI  - ADAT to regular diet  - bowel regimen    Renal  - IVF  - munson in place, remove    Endo  - continue synthroid  - ISS    Heme  - no active issues  - SCDs for DVT prophylaxis    ID  - afebrile  - postop ancef    Psych  - lexapro 15mg daily    Dispo: ICU status, full code, dispo pending  D/w Dr. Sy and Dr. Romero

## 2023-09-14 ENCOUNTER — TRANSCRIPTION ENCOUNTER (OUTPATIENT)
Age: 38
End: 2023-09-14

## 2023-09-14 LAB — GLUCOSE BLDC GLUCOMTR-MCNC: 101 MG/DL — HIGH (ref 70–99)

## 2023-09-14 PROCEDURE — 99222 1ST HOSP IP/OBS MODERATE 55: CPT

## 2023-09-14 PROCEDURE — 99233 SBSQ HOSP IP/OBS HIGH 50: CPT

## 2023-09-14 RX ORDER — HYDROMORPHONE HYDROCHLORIDE 2 MG/ML
0.25 INJECTION INTRAMUSCULAR; INTRAVENOUS; SUBCUTANEOUS ONCE
Refills: 0 | Status: DISCONTINUED | OUTPATIENT
Start: 2023-09-14 | End: 2023-09-14

## 2023-09-14 RX ORDER — ESCITALOPRAM OXALATE 10 MG/1
20 TABLET, FILM COATED ORAL DAILY
Refills: 0 | Status: DISCONTINUED | OUTPATIENT
Start: 2023-09-14 | End: 2023-09-21

## 2023-09-14 RX ORDER — VALPROIC ACID (AS SODIUM SALT) 250 MG/5ML
500 SOLUTION, ORAL ORAL EVERY 12 HOURS
Refills: 0 | Status: DISCONTINUED | OUTPATIENT
Start: 2023-09-14 | End: 2023-09-18

## 2023-09-14 RX ADMIN — Medication 100 MILLIGRAM(S): at 03:23

## 2023-09-14 RX ADMIN — TRAMADOL HYDROCHLORIDE 50 MILLIGRAM(S): 50 TABLET ORAL at 21:35

## 2023-09-14 RX ADMIN — SODIUM CHLORIDE 60 MILLILITER(S): 9 INJECTION INTRAMUSCULAR; INTRAVENOUS; SUBCUTANEOUS at 11:04

## 2023-09-14 RX ADMIN — Medication 55 MILLIGRAM(S): at 17:55

## 2023-09-14 RX ADMIN — MEMANTINE HYDROCHLORIDE 5 MILLIGRAM(S): 10 TABLET ORAL at 06:46

## 2023-09-14 RX ADMIN — Medication 5 MILLIGRAM(S): at 21:16

## 2023-09-14 RX ADMIN — ONDANSETRON 4 MILLIGRAM(S): 8 TABLET, FILM COATED ORAL at 11:07

## 2023-09-14 RX ADMIN — GABAPENTIN 400 MILLIGRAM(S): 400 CAPSULE ORAL at 21:16

## 2023-09-14 RX ADMIN — Medication 100 MILLIGRAM(S): at 17:56

## 2023-09-14 RX ADMIN — Medication 100 MILLIGRAM(S): at 11:07

## 2023-09-14 RX ADMIN — HYDROMORPHONE HYDROCHLORIDE 0.25 MILLIGRAM(S): 2 INJECTION INTRAMUSCULAR; INTRAVENOUS; SUBCUTANEOUS at 23:47

## 2023-09-14 RX ADMIN — Medication 1000 MILLIGRAM(S): at 05:11

## 2023-09-14 RX ADMIN — TRAMADOL HYDROCHLORIDE 50 MILLIGRAM(S): 50 TABLET ORAL at 05:59

## 2023-09-14 RX ADMIN — LACOSAMIDE 130 MILLIGRAM(S): 50 TABLET ORAL at 06:46

## 2023-09-14 RX ADMIN — HYDROMORPHONE HYDROCHLORIDE 0.25 MILLIGRAM(S): 2 INJECTION INTRAMUSCULAR; INTRAVENOUS; SUBCUTANEOUS at 12:59

## 2023-09-14 RX ADMIN — Medication 1000 MILLIGRAM(S): at 05:59

## 2023-09-14 RX ADMIN — ESCITALOPRAM OXALATE 20 MILLIGRAM(S): 10 TABLET, FILM COATED ORAL at 11:07

## 2023-09-14 RX ADMIN — SENNA PLUS 2 TABLET(S): 8.6 TABLET ORAL at 21:16

## 2023-09-14 RX ADMIN — PANTOPRAZOLE SODIUM 40 MILLIGRAM(S): 20 TABLET, DELAYED RELEASE ORAL at 05:11

## 2023-09-14 RX ADMIN — MEMANTINE HYDROCHLORIDE 5 MILLIGRAM(S): 10 TABLET ORAL at 18:33

## 2023-09-14 RX ADMIN — Medication 75 MICROGRAM(S): at 05:11

## 2023-09-14 RX ADMIN — Medication 52.5 MILLIGRAM(S): at 07:51

## 2023-09-14 RX ADMIN — Medication 52.5 MILLIGRAM(S): at 02:06

## 2023-09-14 RX ADMIN — ONDANSETRON 4 MILLIGRAM(S): 8 TABLET, FILM COATED ORAL at 00:00

## 2023-09-14 RX ADMIN — GABAPENTIN 400 MILLIGRAM(S): 400 CAPSULE ORAL at 11:07

## 2023-09-14 RX ADMIN — Medication 1000 MILLIGRAM(S): at 15:45

## 2023-09-14 RX ADMIN — Medication 5 MILLIGRAM(S): at 06:46

## 2023-09-14 RX ADMIN — LACOSAMIDE 130 MILLIGRAM(S): 50 TABLET ORAL at 18:33

## 2023-09-14 RX ADMIN — ONDANSETRON 4 MILLIGRAM(S): 8 TABLET, FILM COATED ORAL at 06:47

## 2023-09-14 RX ADMIN — Medication 5 MILLIGRAM(S): at 14:48

## 2023-09-14 RX ADMIN — HYDROMORPHONE HYDROCHLORIDE 0.25 MILLIGRAM(S): 2 INJECTION INTRAMUSCULAR; INTRAVENOUS; SUBCUTANEOUS at 13:20

## 2023-09-14 RX ADMIN — TRAMADOL HYDROCHLORIDE 50 MILLIGRAM(S): 50 TABLET ORAL at 05:10

## 2023-09-14 RX ADMIN — Medication 50 MILLIGRAM(S): at 21:17

## 2023-09-14 RX ADMIN — Medication 1000 MILLIGRAM(S): at 14:48

## 2023-09-14 NOTE — DISCHARGE NOTE PROVIDER - NSDCFUADDINST_GEN_ALL_CORE_FT
Neurosurgery follow up appointment date/time:  - are staples/sutures in place?  - what day should staples/sutures be removed (POD 10-14)?  - please call the office to confirm appointment: [phone]    Wound Care:  - can patient shower?  - does dressing need to be changed/removed?  - no picking at incision  - wears glasses?   - pressure ulcer?     Devices/Drains/Lines:      Activity:  - fatigue is common after surgery, rest if you feel tired   - no bending, lifting, twisting or heavy lifting   - walking is recommended, ambulate as tolerated  - you may shower when you get home, keep your incision dry  - no soaking in a tub/pool/hot tub   - no driving within 24 hours of anesthesia or while taking prescription pain medications   - keep hydrated, drink plenty of water     Inpatient consults:    Please also follow up with your primary care doctor.     Pain Expectations:  - pain after surgery is expected  - please take pain meds as prescribed     Medications:  - changes to home meds (ex. AED's)?  - new meds?  - pain meds?  - when can antiplatelets or anticoagulants be restarted?  - were adverse affects of meds discussed with patients?   - pain medications can cause constipation, you should eat a high fiber diet and may take a stool softener while on pain meds   - Avoid taking Advil (ibuprofen), Motrin (naproxen), or Aspirin for pain as they can cause bleeding     Call the office or come to ED if:  - wound has drainage or bleeding, increased redness or pain at incision site, neurological change, fever (>101), chills, night sweats, syncope, nausea/vomiting, chest pain, shortness of breath      Playback:  - see playback health for a copy of your discharge paperwork     WITHIN 24 HOURS OF DISCHARGE, PLEASE CONTACT NEURO PA  WITH ANY QUESTIONS OR CONCERNS: [phone]   OTHERWISE, PLEASE CALL THE OFFICE WITH ANY QUESTIONS OR CONCERNS: [phone] Neurosurgery follow up appointment date/time: 10/10 at 10AM with Dr. Agustin Kwan  - Follow up in the office for a wound check and suture removal   - please call the office to confirm appointment: [phone]    Wound Care:  - shower/sponge bath daily and having your hair washed with shampoo every day  - pat dry incision with a clean towel after showering  - leave incision uncovered, open to air   - bacitracin to wound daily for 1 week   - no picking at incision    Activity:  - fatigue is common after surgery, rest if you feel tired   - no bending, lifting, twisting or heavy lifting   - walking is recommended, ambulate as tolerated  - you may shower when you get home, keep your incision dry  - no soaking in a tub/pool/hot tub   - no driving within 24 hours of anesthesia or while taking prescription pain medications   - keep hydrated, drink plenty of water     Inpatient consults:  Epilepsy: Follow up with Dr. Santoro outpatient  Behavioral Health: Lexapro increased to 20mg from 15mg    Please also follow up with your primary care doctor.     Pain Expectations:  - pain after surgery is expected  - please take pain meds as prescribed     Medications:  current medications:  Baclofen 5mg every 8 hours for muscle tightness/spasm (can cause sleepiness)  Cephalexin 500mg every 12 hours for wound infection (last day = 10/2, can cause GI upset)  Depakote 500mg every 12 hours for seizure prevention (can cause sleepiness)  Vimpat 150mg every 12 hours for seizure prevention (can cause double vision)   Lexapro 20mg daily for depression (can cause sleepiness)  Gabapentin 600mg every 12 hours for nerve pain (can cause sleepiness)  Synthroid 75mcg daily for hypothyroidism  Memantine 5mg twice daily at 6AM and 6PM for neurostimulation (can cause dizziness)  Protonix 40mg daily for GERD   Trazodone 50mg at bedtime for depression/insomnia (can cause sleepiness)  - pain meds: Tylenol as needed for mild to moderate pain, Tramadol 50mg every 6 hours as needed for severe pain, Baclofen as above, Gabapentin as above   - adverse affects of meds discussed with patient  - pain medications can cause constipation, you should eat a high fiber diet and may take a stool softener while on pain meds   - Avoid taking Advil (ibuprofen), Motrin (naproxen), or Aspirin for pain as they can cause bleeding     Call the office or come to ED if:  - wound has drainage or bleeding, increased redness or pain at incision site, neurological change, fever (>101), chills, night sweats, syncope, nausea/vomiting, chest pain, shortness of breath      Playback:  - see [facility] for a copy of your discharge paperwork     WITHIN 24 HOURS OF DISCHARGE, PLEASE CONTACT NEURO PA  WITH ANY QUESTIONS OR CONCERNS: [phone]   OTHERWISE, PLEASE CALL THE OFFICE WITH ANY QUESTIONS OR CONCERNS: [phone]

## 2023-09-14 NOTE — BH CONSULTATION LIAISON ASSESSMENT NOTE - NSSUICPROTFACT_PSY_ALL_CORE
Responsibility to children, family, or others/Supportive social network of family or friends/Engaged in work or school/Positive therapeutic relationships Responsibility to children, family, or others/Supportive social network of family or friends/Positive therapeutic relationships

## 2023-09-14 NOTE — DISCHARGE NOTE PROVIDER - CARE PROVIDERS DIRECT ADDRESSES
,DirectAddress_Unknown ,DirectAddress_Unknown,[email or web address]

## 2023-09-14 NOTE — PROGRESS NOTE ADULT - SUBJECTIVE AND OBJECTIVE BOX
HPI:  38F hx hypothyroid was found down by mom unresponsive in the bathroom on 6/6, was airlifted to [facility] for a large R frontal IPH with IVH and hydrocephalus. She was emergently intubated and taken to operating room for right hemicraniectomy and EVD placement with Dr. Bernstein. Patient was deemed medically stable and was sent  to [facility] for 5 weeks then transferred to [facility] for 4 weeks and presents today in preparation for cranioplasty this week.  (10 Sep 2023 17:50)  9/10: transferred for cranioplasty Tuesday 9/11: ALETHEA overnight. MRI he completed  9/12: preop OR today.   POD 0 right cranioplasty with longevity implant   9/13: POD1 R cranioplasty w/ longevity implant. Overnight NAD, pt difficult to assess and not taking PO meds, given IV tylenol and IV dialudid for pain control.  Na downtrending overnight 132, f/u repeat, urine studies sent consistent w/ salt wasting, consider 3% bolus.   9/14: POD 2 R cranioplasty, increased lexapro to 20 daily x 5 days per psych recs OVERNIGHT EVENTS: emotionally labile and tearful, otherwise ALETHEA   Vital Signs Last 24 Hrs  T(C): 36.8 (13 Sep 2023 21:49), Max: 37.1 (13 Sep 2023 13:55)  T(F): 98.2 (13 Sep 2023 21:49), Max: 98.8 (13 Sep 2023 17:30)  HR: 82 (14 Sep 2023 00:05) (82 - 116)  BP: 134/67 (14 Sep 2023 00:05) (94/51 - 159/109)  BP(mean): 95 (14 Sep 2023 00:05) (70 - 130)  RR: 18 (14 Sep 2023 00:05) (16 - 22)  SpO2: 97% (14 Sep 2023 00:05) (93% - 98%)    Parameters below as of 14 Sep 2023 00:05  Patient On (Oxygen Delivery Method): room air        I&O's Summary    12 Sep 2023 07:01  -  13 Sep 2023 07:00  --------------------------------------------------------  IN: 1560 mL / OUT: 875 mL / NET: 685 mL    13 Sep 2023 07:01  -  14 Sep 2023 03:22  --------------------------------------------------------  IN: 1080 mL / OUT: 990 mL / NET: 90 mL      PHYSICAL EXAM:  General: Emotional, sitting up in bed, non compliant with orientation questions   HEENT: PERRL 3mm, resisting eye opening, unable to assess EOM, face appears symmetric   Cardiovascular: RRR, normal S1 and S2   Respiratory: lungs CTAB, no wheezing, rhonchi, or crackles   GI: normoactive BS to auscultation, abd soft, NTND   Neuro: no aphasia, unable to assess pronator drift   PETERS antigravity and purposeful but non-compliant with strength exam   Extremities: distal pulses 2+ x4   Wound/incision: R crani incision site w/ headwrap C/D/I   Drains: SG HAROON x2     LABS:                        9.7    11.26 )-----------( 179      ( 13 Sep 2023 05:29 )             28.8     09-13    135  |  100  |  4<L>  ----------------------------<  132<H>  4.1   |  26  |  0.35<L>    Ca    8.2<L>      13 Sep 2023 05:38  Phos  4.1     09-13  Mg     2.0     09-13      PT/INR - ( 12 Sep 2023 16:41 )   PT: 12.3 sec;   INR: 1.08          PTT - ( 12 Sep 2023 16:41 )  PTT:30.9 sec  Urinalysis Basic - ( 13 Sep 2023 05:38 )    Color: x / Appearance: x / SG: x / pH: x  Gluc: 132 mg/dL / Ketone: x  / Bili: x / Urobili: x   Blood: x / Protein: x / Nitrite: x   Leuk Esterase: x / RBC: x / WBC x   Sq Epi: x / Non Sq Epi: x / Bacteria: x          CAPILLARY BLOOD GLUCOSE      POCT Blood Glucose.: 95 mg/dL (13 Sep 2023 23:38)  POCT Blood Glucose.: 114 mg/dL (13 Sep 2023 19:28)  POCT Blood Glucose.: 106 mg/dL (13 Sep 2023 11:15)  POCT Blood Glucose.: 122 mg/dL (13 Sep 2023 05:55)      Drug Levels: [] N/A  Valproic Acid Level, Serum: 72.9 ug/mL (09-13 @ 05:29)  Valproic Acid Level, Serum: 119.2 ug/mL (09-12 @ 05:30)      Allergies    No Known Allergies    Intolerances      MEDICATIONS:  Antibiotics:  ceFAZolin   IVPB 2000 milliGRAM(s) IV Intermittent every 8 hours    Neuro:  acetaminophen     Tablet .. 1000 milliGRAM(s) Oral every 8 hours  baclofen 5 milliGRAM(s) Oral every 8 hours  escitalopram 15 milliGRAM(s) Oral daily  gabapentin 400 milliGRAM(s) Oral <User Schedule>  gabapentin 400 milliGRAM(s) Oral <User Schedule>  lacosamide IVPB 150 milliGRAM(s) IV Intermittent every 12 hours  memantine 5 milliGRAM(s) Oral <User Schedule>  ondansetron   Disintegrating Tablet 4 milliGRAM(s) Oral every 6 hours  traMADol 50 milliGRAM(s) Oral every 6 hours PRN  traZODone 50 milliGRAM(s) Oral at bedtime  valproate sodium  IVPB 250 milliGRAM(s) IV Intermittent every 6 hours    Anticoagulation:    OTHER:  bisacodyl 5 milliGRAM(s) Oral every 12 hours PRN  glucagon  Injectable 1 milliGRAM(s) IntraMuscular once  influenza   Vaccine 0.5 milliLiter(s) IntraMuscular once  insulin lispro (ADMELOG) corrective regimen sliding scale   SubCutaneous every 6 hours  labetalol Injectable 10 milliGRAM(s) IV Push every 2 hours PRN  levothyroxine 75 MICROGram(s) Oral daily  pantoprazole    Tablet 40 milliGRAM(s) Oral before breakfast  senna 2 Tablet(s) Oral at bedtime    IVF:  sodium chloride 0.9%. 1000 milliLiter(s) IV Continuous <Continuous>  38F hx hypothyroid was found down by mom unresponsive in the bathroom on 6/6, was airlifted to [facility] for a large R frontal IPH with IVH and hydrocephalus. She was emergently intubated and taken to operating room for right hemicraniectomy and EVD placement with Dr. Bernstein. Patient was deemed medically stable and was sent to [facility] for 5 weeks then transferred to [facility] for 4 weeks. Now s/p right cranioplasty with longevity implant 9/12/23.     Neuro  - neuro/vitals q4h  - pain control as needed tylenol  - continue AEDs as ordered: vimpat 150mg bid and depakote 250q6  - monitor subgaleal HAROON drains x2  - postop CTH completed  - baclofen 5mg q8 for muscle spasms per rehab medicine attending (alternative to tizanidine that was being given at rehab)   - pending PT/OT, neuropsych eval  - psych consulted, f/u recs  - epilepsy following for AED management     Cardio  - -140    Pulm  - RA    GI  - ADAT to regular diet  - bowel regimen    Renal  - IVF until adequate PO intake   - voiding    Endo  - continue synthroid  - ISS    Heme  - no active issues  - SCDs for DVT prophylaxis    ID  - afebrile  - postop ancef    Psych  - lexapro 15mg daily    Dispo: ICU status, full code, dispo pending  D/w Dr. Sy and Dr. Romero

## 2023-09-14 NOTE — BH CONSULTATION LIAISON ASSESSMENT NOTE - SUMMARY
Pt is a 39 yo  F, domiciled with family, no pph/IPP, pmhx of hx hypothyroid who was found by mom unresponsive in the bathroom on 6/6, was airlifted to [facility] for a large R frontal IPH with IVH and hydrocephalus. She was emergently intubated and taken to operating room for right hemicraniectomy and EVD placement with Dr. Bernstein. Patient was sent  to [facility] for 5 weeks then transferred to [facility] for 4 weeks and yesterday had a cranioplasty (9/13).Psych was consulted to assess the patient's mood and to see if it is due to her recent surgery vs. something psychiatric related.  Pt is not alert or oriented to time or situation. Speech is slurred together and is repeating "I can't take it" and that "Everything hurts". Is not able to give a psychiatric hx and kept her eyes closed for the whole interview. History was obtained from significant other who was in the room. Pt did not express these mood symptoms before her surgery citing that she was mostly "calm" and cooperative while at her rehab. Pt had some mild odd behavior relaying unusual stories that did not make sense while in her rehab. None of these behaviors were exhibited before 6/6.     Plan:  -  Pt is a 37 yo  F, domiciled with family, no pph/IPP, pmhx of hx hypothyroid who was found by mom unresponsive in the bathroom on 6/6, was airlifted to [facility] for a large R frontal IPH with IVH and hydrocephalus. She was emergently intubated and taken to operating room for right hemicraniectomy and EVD placement with Dr. Bernstein. Patient was sent  to [facility] for 5 weeks then transferred to [facility] for 4 weeks and yesterday had a cranioplasty (9/13).Psych was consulted to assess the patient's mood and to see if it is due to her recent surgery vs. something psychiatric related.  Pt is not alert or oriented to time or situation. Speech is slurred together and is repeating "I can't take it" and that "Everything hurts". Is not able to give a psychiatric hx and kept her eyes closed for the whole interview. History was obtained from significant other who was in the room. Pt did not express these mood symptoms before her surgery citing that she was mostly "calm" and cooperative while at her rehab. Pt had some mild odd behavior relaying unusual stories that did not make sense while in her rehab. None of these behaviors were exhibited before 6/6.     Diagnosis: medically-induced delirium vs. underlying mood symptoms vs. pseudobulbar defect    Plan:  - no indication for IPP admission at this time  - continue with Ativan 2mg PO/IM/IV PRN for agitation  - continue with lexapro 20mg po daily  - continue to monitor mood symptoms and neurocognitive changes  - post-operative care per Neurosurgery team  - no contraindications to discharge  - re-consult if questions arise   Pt is a 37 yo  F, domiciled with family, no pph/IPP, pmhx of hx hypothyroid who was found by mom unresponsive in the bathroom on 6/6, was airlifted to [facility] for a large R frontal IPH with IVH and hydrocephalus. She was emergently intubated and taken to operating room for right hemicraniectomy and EVD placement with Dr. Bernstein. Patient was sent  to [facility] for 5 weeks then transferred to [facility] for 4 weeks and yesterday had a cranioplasty (9/13).Psych was consulted to assess the patient's mood and to see if it is due to her recent surgery vs. something psychiatric related.  Pt is not alert or oriented to time or situation. Speech is slurred together and is repeating "I can't take it" and that "Everything hurts". Is not able to give a psychiatric hx and kept her eyes closed for the whole interview. History was obtained from significant other who was in the room. Pt did not express these mood symptoms before her surgery citing that she was mostly "calm" and cooperative while at her rehab. Pt had some mild odd behavior relaying unusual stories that did not make sense while in her rehab. None of these behaviors were exhibited before 6/6. Symptoms unlikely due to primary psychiatric diagnosis, more consistent with delirium secondary to neuroanatomical changes vs. pseudobulbar defects.    Plan:  - no indication for IPP admission at this time  - continue with Ativan 2mg PO/IM/IV PRN for physical agitation  - verbal reassurance and talk therapy for crying and mood lability  - continue to monitor mood symptoms and neurocognitive changes  - post-operative care per Neurosurgery team  - no contraindications to discharge  - re-consult if questions arise   Pt is a 39 yo  F, domiciled with family, no pph/IPP, pmhx of hx hypothyroid who was found by mom unresponsive in the bathroom on 6/6, was airlifted to [facility] for a large R frontal IPH with IVH and hydrocephalus. She was emergently intubated and taken to operating room for right hemicraniectomy and EVD placement with Dr. Bernstein. Patient was sent  to [facility] for 5 weeks then transferred to [facility] for 4 weeks and yesterday had a cranioplasty (9/13).Psych was consulted to assess the patient's mood (frequent crying episodes) and to see if it is due to her recent surgery vs. something psychiatric related.  Pt is not alert or oriented to time or situation. Speech is soft and she is repeating "I can't take it" and that "Everything hurts". Is not able to give a psychiatric hx and kept her eyes closed for the whole interview. History was obtained from significant other who was in the room. Per pt's wife at bedside, pt had not been expressing depression or sadness when at rehab, citing that recently she had been mostly "calm" and cooperative without crying episodes. Wife does relay that pt started to appear more upset ever since she arrived to hospital yesterday. 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    Given current presentation and history, pt's current symptoms unlikely due to primary psychiatric diagnosis, more consistent with delirium secondary to neuroanatomical changes vs. expressions of distress in context of impaired cognitive baseline. Could consider pseudobulbar affect, though per wife pt has not had crying episodes like this before surgery.    Plan:  - no indication for IPP admission at this time  - Can continue Lexapro 20mg daily  - Would provide verbal reassurance for crying and mood lability not associated with violence.  - Would not medicate pt for behaviors such as expressing distress or crying. Would reserve agitation PRN for only if pt becomes physically agitated/violent/significantly interfering with her medical care. Would use Ativan 1mg PO/IM/IV BID PRN for physical agitation.  - Would avoid use of restraints as can worsen delirium.  - Employ delirium precautions: try to maintain sunlit room and promote sleep/wake cycle regulation. Provide frequent re-orientation.  - Regarding depakote -- would clarify if indication is for seizure prophylaxis or agitation/mood lability. Per primary team, home dose is depakote 500mg BID. After restarting would obtain depakote level on the morning of third day of treatment, 30 minutes before morning dose due. Monitor LFTs and platelets.  - Psychiatry will continue to follow.

## 2023-09-14 NOTE — BH CONSULTATION LIAISON ASSESSMENT NOTE - RISK ASSESSMENT
Low risk  Risk factors: recent surgery and hospitalizations, depressed mood  Protective factors: no access to lethal means, strong social support, previously engaged in work Pt is at low chronic and acute risk for harm to self or others.  Risk factors: hx of brain injury and possibly resulting poor impulse control, depressed mood  Protective factors: no access to lethal means, strong social support, previously engaged in work, no psychiatric history of hospitalization or suicide or violence. Supportive family involved.

## 2023-09-14 NOTE — DISCHARGE NOTE PROVIDER - HOSPITAL COURSE
HPI:  38F hx hypothyroid was found down by mom unresponsive in the bathroom on 6/6, was airlifted to [facility] for a large R frontal IPH with IVH and hydrocephalus. She was emergently intubated and taken to operating room for right hemicraniectomy and EVD placement with Dr. Bernstein. Patient was deemed medically stable and was sent  to [facility] for 5 weeks then transferred to [facility] for 4 weeks and presents today in preparation for cranioplasty this week.     Hospital Course:  9/10: transferred for cranioplasty Tuesday 9/11: ALETHEA overnight. MRI he completed  9/12: preop OR today.   POD 0 right cranioplasty with longevity implant   9/13: POD1 R cranioplasty w/ longevity implant. Overnight NAD, pt difficult to assess and not taking PO meds, given IV tylenol and IV dialudid for pain control.  Na downtrending overnight 132, f/u repeat, urine studies sent consistent w/ salt wasting, consider 3% bolus.   9/14: POD 2 R cranioplasty, increased lexapro to 20 daily x 5 days per psych recs OVERNIGHT EVENTS: emotionally labile and tearful, otherwise ALETHEA     Patient evaluated by PT/OT who recommended:  Patient is going home? rehab? hospice? Facility Name:     Hospital course c/b: emotionally labile     Exam on day of discharge:    Checklist:   - Obtained follow up appointment from NP  - Reviewed final recommendations of inpatient consults  - review discharge planning on provider handoff  - post op imaging completed  - Neurologically stable for discharge  - Vitals stable for discharge   - Afebrile for discharge  - WBC is stable  - Sodium level is normal  - Pain is adequately controlled  - Pt has PICC/walker/brace/collar   - LACE score (10 or > needs PCP apt)   - stroke patient? Discharge NIHSS score   HPI:  38F hx hypothyroid was found down by mom unresponsive in the bathroom on 6/6, was airlifted to [facility] for a large R frontal IPH with IVH and hydrocephalus. She was emergently intubated and taken to operating room for right hemicraniectomy and EVD placement with Dr. Bernstein. Patient was deemed medically stable and was sent  to [facility] for 5 weeks then transferred to [facility] for 4 weeks and presents today in preparation for cranioplasty this week.     Hospital Course:  9/10: transferred for cranioplasty Tuesday 9/11: ALETHEA overnight. MRI he completed  9/12: preop OR today.   POD 0 right cranioplasty with longevity implant   9/13: POD1 R cranioplasty w/ longevity implant. Overnight NAD, pt difficult to assess and not taking PO meds, given IV tylenol and IV dialudid for pain control.  Na downtrending overnight 132, f/u repeat, urine studies sent consistent w/ salt wasting, consider 3% bolus.   9/14: POD 2 R cranioplasty, increased lexapro to 20 daily x 5 days per psych recs OVERNIGHT EVENTS: emotionally labile and tearful, otherwise ALETHEA     Patient evaluated by PT/OT who recommended:  Patient is going home? rehab? hospice? Facility Name:     Hospital course c/b: emotionally labile (improved with increasing Lexapro), L sided pain, especially with ambulation (improved with increasing Gabapentin dose)     Exam on day of discharge:  General: NAD, pt is comfortably resting in bed, A&O x2 (not to year), on RA  HEENT: PERRL 3mm, EOMI b/l, +R facial droop   Neuro: +mild expressive aphasia, speech clear  RUE/RLE strength 4/5 (effort limited), LUE/LLE plegia  Extremities: distal pulses 2+ x4   Wound/incision: R crani incision site C/D/I with sutures in place     Patient is neuro stable, vitals stable, afebrile, medically ready for discharge

## 2023-09-14 NOTE — BH CONSULTATION LIAISON ASSESSMENT NOTE - DIFFERENTIAL
delirium vs. neurocognitive deficit medically-induced delirium vs. underlying mood symptoms vs. pseudobulbar defect   delirium secondary to neuroanatomical changes vs. pseudobulbar defects   Delirium  Neurocognitive disorder  R/o pseudobulbar affect

## 2023-09-14 NOTE — DISCHARGE NOTE PROVIDER - NSDCMRMEDTOKEN_GEN_ALL_CORE_FT
Colace 100 mg oral capsule: 1 tab(s) orally 2 times a day  Depakote 500 mg oral delayed release tablet: 1 tab(s) orally once a day 6am  Depakote 500 mg oral delayed release tablet: 750 milligram(s) orally once a day 6pm  Dulcolax Laxative 10 mg rectal suppository: 1 application rectally once a day as needed for  constipation  gabapentin 400 mg oral tablet: 1 tab(s) orally 2 times a day 12pm and 9pm  lacosamide 150 mg oral tablet: 1 tab(s) orally 2 times a day  levothyroxine 75 mcg (0.075 mg) oral tablet: 1 tab(s) orally once a day  Lexapro 5 mg oral tablet: 1 tab(s) orally once a day  memantine 5 mg oral tablet: 1 tab(s) orally once a day  Milk of Magnesia 1200 mg/15 mL oral liquid: 30 milliliter(s) orally once a day as needed for  constipation  pantoprazole 40 mg oral delayed release tablet: 1 tab(s) orally once a day  polyethylene glycol 3350 oral powder for reconstitution: 1 application orally once a day (at bedtime)  senna (sennosides) 8.6 mg oral tablet: 1 tab(s) orally once a day (at bedtime)  tiZANidine 2 mg oral tablet: 2 milligram(s) orally once a day 9am and 4pm  traMADol 50 mg oral tablet: 1 tab(s) orally every 6 hours as needed for  severe pain   acetaminophen 325 mg oral tablet: 2 tab(s) orally every 6 hours As needed Mild Pain (1 - 3)  bacitracin 500 units/g topical ointment: Apply topically to affected area once a day 1 Apply topically to affected area once a day  baclofen 5 mg oral tablet: 1 tab(s) orally every 8 hours  cephalexin 500 mg oral capsule: 1 cap(s) orally every 12 hours last day = 10/2  Colace 100 mg oral capsule: 1 tab(s) orally 2 times a day  divalproex sodium 500 mg oral delayed release tablet: 1 tab(s) orally every 12 hours  Dulcolax Laxative 10 mg rectal suppository: 1 application rectally once a day as needed for  constipation  enoxaparin: 40 milligram(s) subcutaneous once a day (at bedtime) for DVT prevention while at rehab  escitalopram 20 mg oral tablet: 1 tab(s) orally once a day  gabapentin 600 mg oral tablet: 1 tab(s) orally 2 times a day at 12pm and 9pm  lacosamide 150 mg oral tablet: 1 tab(s) orally every 12 hours  levothyroxine 75 mcg (0.075 mg) oral tablet: 1 tab(s) orally once a day  memantine 5 mg oral tablet: 1 tab(s) orally 2 times a day at 6AM and 6PM  pantoprazole 40 mg oral delayed release tablet: 1 tab(s) orally once a day  polyethylene glycol 3350 oral powder for reconstitution: 1 application orally once a day (at bedtime)  senna (sennosides) 8.6 mg oral tablet: 1 tab(s) orally once a day (at bedtime)  traMADol 50 mg oral tablet: 1 tab(s) orally every 6 hours as needed for  severe pain  traZODone 50 mg oral tablet: 1 tab(s) orally once a day (at bedtime)

## 2023-09-14 NOTE — DISCHARGE NOTE PROVIDER - NSDCFUSCHEDAPPT_GEN_ALL_CORE_FT
Juju Sy  [facility]  NEUROSURG [address]  Scheduled Appointment: 10/10/2023

## 2023-09-14 NOTE — BH CONSULTATION LIAISON ASSESSMENT NOTE - NSBHATTESTCOMMENTATTENDFT_PSY_A_CORE
39 yo  F, previously domiciled with wife and family, [facility] and IV in June 2023 after being found down by Mom, airlifted to [facility] where hemicraniectomy and EVD placement occurred. Since then pt has been at rehabilitation; she returns for cranioplasty on 9/13, currently POD1. Psychiatry consulted due to frequent crying episodes and concern for mood lability.  On exam, pt appears delirious; solely repeating phrases and crying out "I'm not good ...I'm fine". Pt then proceeds to fall asleep.  Per history provided by wife, pt has not shown this behavior at rehab; though at baseline since injury she shows significant short term memory impairment and is not fully oriented. Would favor likelihood of delirium superimposed on impaired cognitive baseline. Do not suspect a primary underlying major depressive disorder as origin. Would also consider presence of pseudobulbar affect, though would need more information from rehab center to investigate previous behavorial issues and initial indication for starting lexapro.  No indication for psychiatric hospitalization at this time. Psychiatry will continue to follow to provide support. Agree with med recs outlined above.

## 2023-09-14 NOTE — BH CONSULTATION LIAISON ASSESSMENT NOTE - DESCRIPTION
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 She had been living with her wife Karma, mother, and brother in [place]. Since injury in June, after [facility] discharge has been at rehabilitation.

## 2023-09-14 NOTE — BH CONSULTATION LIAISON ASSESSMENT NOTE - OTHER
head wrapped in bandages due to recent cranioplasty pt is mostly lethargic, unable to answer most questions at this time has her masters degree repeats the same phrases "I can't take it anymore" and "Everything hurts" pt provides repeated, automatic responses to questions usually of crying out and stating "I don't feel good .....I'm fine!"

## 2023-09-14 NOTE — BH CONSULTATION LIAISON ASSESSMENT NOTE - VIOLENCE PROTECTIVE FACTORS:
Residential stability/Relationship stability/Employment stability Residential stability/Relationship stability

## 2023-09-14 NOTE — DISCHARGE NOTE PROVIDER - NSDCCPTREATMENT_GEN_ALL_CORE_FT
PRINCIPAL PROCEDURE  Procedure: Cranioplasty for skull defect larger than 5 cm diameter  Findings and Treatment: Right

## 2023-09-14 NOTE — DISCHARGE NOTE PROVIDER - NSDCCPCAREPLAN_GEN_ALL_CORE_FT
PRINCIPAL DISCHARGE DIAGNOSIS  Diagnosis: Skull defect  Assessment and Plan of Treatment: s/p s/p right cranioplasty with pericranial onlay technique, scar revision, complex closure, using alloplastic implant daisy gusman 9/12     PRINCIPAL DISCHARGE DIAGNOSIS  Diagnosis: Skull defect  Assessment and Plan of Treatment: s/p s/p right cranioplasty with pericranial onlay technique, scar revision, complex closure, using alloplastic implant daisy gusman 9/12  Continue Cephalexin through 10/2  Follow up with Dr. Agustin Kwan in the office for suture removal on 10/10 at 10AM      SECONDARY DISCHARGE DIAGNOSES  Diagnosis: Hypothyroid  Assessment and Plan of Treatment: continue Synthroid    Diagnosis: Major depression  Assessment and Plan of Treatment: Lexapro increased to 20mg (from 15mg)    Diagnosis: Seizures  Assessment and Plan of Treatment: continue Vimpat and Depakote, follow up with Epilepsy outpatient

## 2023-09-14 NOTE — BH CONSULTATION LIAISON ASSESSMENT NOTE - HPI (INCLUDE ILLNESS QUALITY, SEVERITY, DURATION, TIMING, CONTEXT, MODIFYING FACTORS, ASSOCIATED SIGNS AND SYMPTOMS)
Pt is a 39 yo  F, domiciled with family, no pph/IPP, pmhx of hx hypothyroid who was found by mom unresponsive in the bathroom on 6/6, was airlifted to [facility] for a large R frontal IPH with IVH and hydrocephalus. She was emergently intubated and taken to operating room for right hemicraniectomy and EVD placement with Dr. Bernstein. Patient was sent  to [facility] for 5 weeks then transferred to [facility] for 4 weeks and yesterday had a cranioplasty (9/13). Last night the primary team noted that the patient has been crying for "hours" since she has been out of her surgery and cried herself to sleep last night. Pt is in distress every time she is woken to get any procedures done on her. Was seen last night but was sedated so not able to get an appropriate psych hx. Psych was consulted to assess the patient's mood and to see if it is due to her recent surgery vs. something psychiatric related.  On eval, patient is laying down on the bed with her eyes fully closed. Is crying and says that she "can't take it anymore" which she repeats every time a question is asked. Frequently mentions that she is in pain and is unresponsive to most questions asked. Since patient was not able to respond to most questions, her significant other was asked a couple questions instead. Wife mentions that ever since she got out of her surgery the pt has been crying and acting this way. Before the surgery while the pt was in rehab she was mostly calm and cooperative. Noted that the pt used to "make up stories" that were not real and her short term memory was impaired. 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 On pmhx, pt smoked 1 ppd for the past 5 years.  Frequently saw a therapist but had no underlying psych illnesses and denied any psychotropic medications. Denies SI/SA/IPP/AVH. Denies any delusions, psychosis, or hallucinations. No signs of agitation or irritability at this time. Denies any illicit drug use or alcohol use.  Pt is a 39 yo  F, domiciled with family, no pph/IPP, pmhx of hx hypothyroid who was found by mom unresponsive in the bathroom on 6/6, was airlifted to [facility] for a large R frontal IPH with IVH and hydrocephalus. She was emergently intubated and taken to operating room for right hemicraniectomy and EVD placement with Dr. Bernstein. Patient was sent  [facility] 5 weeks then transferred to [facility] for 4 weeks and yesterday had a cranioplasty (9/13). Last night the primary team noted that the patient has been crying for "hours" since she has been out of her surgery and cried herself to sleep last night. Pt is in distress every time she is woken to get any procedures done on her. Was seen last night but was sedated so not able to get an appropriate psych hx. Psych was consulted to assess the patient's mood and to see if it is due to her recent surgery vs. something psychiatric related.  On eval, patient is laying down on the bed with her eyes fully closed. Is crying and says that she "can't take it anymore" which she repeats every time a question is asked. Frequently mentions that she is in pain and is doesn't respond to most questions asked -- however, she was found to repeatedly cry out and say "I don't feel good....I'm fine!" to every question asked.     Collateral information was obtained from pt's wife Karma at bedside. Wife mentions that ever since she got out of her surgery the pt has been crying and acting this way - however she did start to notice pt feeling more upset since even arriving the hospital yesterday. Before the surgery while the pt was in rehab she was mostly calm and cooperative. However, she has had significant cognitive limitations since her brain injury in June 2023 - notes that at rehab pt would "make up stories" that didn't seem real and her short term memory was significantly impaired. She was intermittently oriented x1 -2. 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 On pmhx, pt smoked 1 ppd for the past 5 years.  Frequently saw a therapist but had no underlying psych illnesses and denied any psychotropic medications. Denies SI/SA/IPP/AVH. Denies any delusions, psychosis, or hallucinations. No signs of agitation or irritability at this time. Denies any illicit drug use or alcohol use. Karma reports that lexapro was started during pt's rehab stay.

## 2023-09-14 NOTE — DISCHARGE NOTE PROVIDER - CARE PROVIDER_API CALL
Juju Sy  Neurosurgery  [address] [place] [address]  Phone: [phone]  Fax: [phone]  Follow Up Time:    Juju Sy  Neurosurgery  [address] [place] [address]  Phone: [phone]  Fax: [phone]  Follow Up Time:     Eunice Santoro  Neurology  [address] [address]  Phone: [phone]  Fax: [phone]  Follow Up Time:

## 2023-09-14 NOTE — BH CONSULTATION LIAISON ASSESSMENT NOTE - ADDITIONAL DETAILS / COMMENTS
Pt appears delirious at time of assessment; repeated speech and crying out; unable to provide meaningful responses to most questions.

## 2023-09-14 NOTE — BH CONSULTATION LIAISON ASSESSMENT NOTE - CURRENT MEDICATION
MEDICATIONS  (STANDING):  acetaminophen     Tablet .. 1000 milliGRAM(s) Oral every 8 hours  baclofen 5 milliGRAM(s) Oral every 8 hours  ceFAZolin   IVPB 2000 milliGRAM(s) IV Intermittent every 8 hours  escitalopram 20 milliGRAM(s) Oral daily  gabapentin 400 milliGRAM(s) Oral <User Schedule>  gabapentin 400 milliGRAM(s) Oral <User Schedule>  influenza   Vaccine 0.5 milliLiter(s) IntraMuscular once  lacosamide IVPB 150 milliGRAM(s) IV Intermittent every 12 hours  levothyroxine 75 MICROGram(s) Oral daily  memantine 5 milliGRAM(s) Oral <User Schedule>  ondansetron   Disintegrating Tablet 4 milliGRAM(s) Oral every 6 hours  pantoprazole    Tablet 40 milliGRAM(s) Oral before breakfast  senna 2 Tablet(s) Oral at bedtime  sodium chloride 0.9%. 1000 milliLiter(s) (60 mL/Hr) IV Continuous <Continuous>  traZODone 50 milliGRAM(s) Oral at bedtime  valproate sodium  IVPB 250 milliGRAM(s) IV Intermittent every 6 hours    MEDICATIONS  (PRN):  bisacodyl 5 milliGRAM(s) Oral every 12 hours PRN Constipation  labetalol Injectable 10 milliGRAM(s) IV Push every 2 hours PRN Systolic blood pressure > 140 mmHg  traMADol 50 milliGRAM(s) Oral every 6 hours PRN Severe Pain (7 - 10)

## 2023-09-14 NOTE — PROGRESS NOTE ADULT - ASSESSMENT
38F hx hypothyroid was found down by mom unresponsive in the bathroom on 6/6, was airlifted to [facility] for a large R frontal IPH with IVH and hydrocephalus. She was emergently intubated and taken to operating room for right hemicraniectomy and EVD placement with Dr. Bernstein who presented for cranioplasty, s/p right cranioplasty with longevity implant 9/12/23     #R frontal IPH s/p hemicraniectomy  #Cranioplasty  #Postop state  - s/p right cranioplasty with longevity implant 9/12/23  - plan per NSG  - c/w AEDs: Depakote, Gabapentin, Vimpat.  Depakote reduced to 500mg BID, level elevated on admit  - epilepsy recommend EEG when able  - SCDs for DVT ppx    #HTN  - BP elevated to 150s, labile with SBP 90s in last 24 hours  - hold off on any BP meds at this time, likely pain and anxiety related  - would stop IVF now that patient taking PO    #Hypothyroidism  - c/w synthroid 75mcg    #Depression  - home lexapro 15mg increased to 20mg per psych recs

## 2023-09-14 NOTE — BH CONSULTATION LIAISON ASSESSMENT NOTE - NSBHCHARTREVIEWVS_PSY_A_CORE FT
Vital Signs Last 24 Hrs  T(C): 36.4 (14 Sep 2023 09:38), Max: 37.1 (13 Sep 2023 13:55)  T(F): 97.6 (14 Sep 2023 09:38), Max: 98.8 (13 Sep 2023 17:30)  HR: 78 (14 Sep 2023 08:15) (78 - 112)  BP: 144/84 (14 Sep 2023 08:15) (94/51 - 159/109)  BP(mean): 108 (14 Sep 2023 08:15) (70 - 130)  RR: 17 (14 Sep 2023 08:15) (16 - 22)  SpO2: 98% (14 Sep 2023 08:15) (93% - 98%)    Parameters below as of 14 Sep 2023 08:15  Patient On (Oxygen Delivery Method): room air

## 2023-09-14 NOTE — DISCHARGE NOTE PROVIDER - NSDCFUADDAPPT_GEN_ALL_CORE_FT
Please follow up with Dr. Agustin Kwan    Please follow up with your primary care doctor  Please follow up with Dr. Agustin Kwan on 10/10 at 10AM, call the office to confirm appointment at [phone]    Please follow up with Dr. Peng from Epilepsy outpatient    Please follow up with your primary care doctor

## 2023-09-15 LAB
ANION GAP SERPL CALC-SCNC: 9 MMOL/L — SIGNIFICANT CHANGE UP (ref 5–17)
BUN SERPL-MCNC: 2 MG/DL — LOW (ref 7–23)
CALCIUM SERPL-MCNC: 8.5 MG/DL — SIGNIFICANT CHANGE UP (ref 8.4–10.5)
CHLORIDE SERPL-SCNC: 101 MMOL/L — SIGNIFICANT CHANGE UP (ref 96–108)
CO2 SERPL-SCNC: 27 MMOL/L — SIGNIFICANT CHANGE UP (ref 22–31)
CREAT SERPL-MCNC: 0.39 MG/DL — LOW (ref 0.5–1.3)
EGFR: 130 ML/MIN/1.73M2 — SIGNIFICANT CHANGE UP
GLUCOSE SERPL-MCNC: 111 MG/DL — HIGH (ref 70–99)
HCT VFR BLD CALC: 26.9 % — LOW (ref 34.5–45)
HGB BLD-MCNC: 8.9 G/DL — LOW (ref 11.5–15.5)
MAGNESIUM SERPL-MCNC: 1.5 MG/DL — LOW (ref 1.6–2.6)
MCHC RBC-ENTMCNC: 31.7 PG — SIGNIFICANT CHANGE UP (ref 27–34)
MCHC RBC-ENTMCNC: 33.1 GM/DL — SIGNIFICANT CHANGE UP (ref 32–36)
MCV RBC AUTO: 95.7 FL — SIGNIFICANT CHANGE UP (ref 80–100)
NRBC # BLD: 0 /100 WBCS — SIGNIFICANT CHANGE UP (ref 0–0)
PHOSPHATE SERPL-MCNC: 3.1 MG/DL — SIGNIFICANT CHANGE UP (ref 2.5–4.5)
PLATELET # BLD AUTO: 167 K/UL — SIGNIFICANT CHANGE UP (ref 150–400)
POTASSIUM SERPL-MCNC: 3.7 MMOL/L — SIGNIFICANT CHANGE UP (ref 3.5–5.3)
POTASSIUM SERPL-SCNC: 3.7 MMOL/L — SIGNIFICANT CHANGE UP (ref 3.5–5.3)
RBC # BLD: 2.81 M/UL — LOW (ref 3.8–5.2)
RBC # FLD: 13.2 % — SIGNIFICANT CHANGE UP (ref 10.3–14.5)
SODIUM SERPL-SCNC: 137 MMOL/L — SIGNIFICANT CHANGE UP (ref 135–145)
WBC # BLD: 8.87 K/UL — SIGNIFICANT CHANGE UP (ref 3.8–10.5)
WBC # FLD AUTO: 8.87 K/UL — SIGNIFICANT CHANGE UP (ref 3.8–10.5)

## 2023-09-15 PROCEDURE — 99233 SBSQ HOSP IP/OBS HIGH 50: CPT

## 2023-09-15 RX ORDER — ENOXAPARIN SODIUM 100 MG/ML
40 INJECTION SUBCUTANEOUS EVERY 24 HOURS
Refills: 0 | Status: DISCONTINUED | OUTPATIENT
Start: 2023-09-15 | End: 2023-09-21

## 2023-09-15 RX ADMIN — Medication 100 MILLIGRAM(S): at 18:50

## 2023-09-15 RX ADMIN — Medication 5 MILLIGRAM(S): at 13:15

## 2023-09-15 RX ADMIN — Medication 55 MILLIGRAM(S): at 05:34

## 2023-09-15 RX ADMIN — ENOXAPARIN SODIUM 40 MILLIGRAM(S): 100 INJECTION SUBCUTANEOUS at 22:19

## 2023-09-15 RX ADMIN — Medication 100 MILLIGRAM(S): at 02:24

## 2023-09-15 RX ADMIN — LACOSAMIDE 130 MILLIGRAM(S): 50 TABLET ORAL at 05:38

## 2023-09-15 RX ADMIN — SENNA PLUS 2 TABLET(S): 8.6 TABLET ORAL at 22:18

## 2023-09-15 RX ADMIN — HYDROMORPHONE HYDROCHLORIDE 0.25 MILLIGRAM(S): 2 INJECTION INTRAMUSCULAR; INTRAVENOUS; SUBCUTANEOUS at 00:12

## 2023-09-15 RX ADMIN — Medication 50 MILLIGRAM(S): at 22:18

## 2023-09-15 RX ADMIN — LACOSAMIDE 130 MILLIGRAM(S): 50 TABLET ORAL at 17:13

## 2023-09-15 RX ADMIN — MEMANTINE HYDROCHLORIDE 5 MILLIGRAM(S): 10 TABLET ORAL at 05:35

## 2023-09-15 RX ADMIN — Medication 100 MILLIGRAM(S): at 11:11

## 2023-09-15 RX ADMIN — Medication 5 MILLIGRAM(S): at 05:38

## 2023-09-15 RX ADMIN — Medication 5 MILLIGRAM(S): at 22:18

## 2023-09-15 RX ADMIN — PANTOPRAZOLE SODIUM 40 MILLIGRAM(S): 20 TABLET, DELAYED RELEASE ORAL at 05:38

## 2023-09-15 RX ADMIN — GABAPENTIN 400 MILLIGRAM(S): 400 CAPSULE ORAL at 22:18

## 2023-09-15 RX ADMIN — Medication 55 MILLIGRAM(S): at 17:37

## 2023-09-15 RX ADMIN — MEMANTINE HYDROCHLORIDE 5 MILLIGRAM(S): 10 TABLET ORAL at 17:13

## 2023-09-15 RX ADMIN — ESCITALOPRAM OXALATE 20 MILLIGRAM(S): 10 TABLET, FILM COATED ORAL at 11:12

## 2023-09-15 RX ADMIN — TRAMADOL HYDROCHLORIDE 50 MILLIGRAM(S): 50 TABLET ORAL at 05:38

## 2023-09-15 RX ADMIN — TRAMADOL HYDROCHLORIDE 50 MILLIGRAM(S): 50 TABLET ORAL at 23:45

## 2023-09-15 RX ADMIN — GABAPENTIN 400 MILLIGRAM(S): 400 CAPSULE ORAL at 11:11

## 2023-09-15 RX ADMIN — TRAMADOL HYDROCHLORIDE 50 MILLIGRAM(S): 50 TABLET ORAL at 15:12

## 2023-09-15 RX ADMIN — TRAMADOL HYDROCHLORIDE 50 MILLIGRAM(S): 50 TABLET ORAL at 22:18

## 2023-09-15 RX ADMIN — Medication 75 MICROGRAM(S): at 05:34

## 2023-09-15 RX ADMIN — TRAMADOL HYDROCHLORIDE 50 MILLIGRAM(S): 50 TABLET ORAL at 14:12

## 2023-09-15 NOTE — PROGRESS NOTE ADULT - ASSESSMENT
38F hx hypothyroid was found down by mom unresponsive in the bathroom on 6/6, was airlifted to [facility] for a large R frontal IPH with IVH and hydrocephalus. She was emergently intubated and taken to operating room for right hemicraniectomy and EVD placement with Dr. Bernstein who presented for cranioplasty, s/p right cranioplasty with longevity implant 9/12/23     #R frontal IPH s/p hemicraniectomy  #Cranioplasty  #Postop state  - s/p right cranioplasty with longevity implant 9/12/23  - plan per NSG  - c/w AEDs: Depakote, Gabapentin, Vimpat.  Depakote reduced to 500mg BID, level elevated on admit  - epilepsy recommend EEG when able  - SCDs for DVT ppx  - pending PT eval post op    #HTN  - BP elevated to 150s/160s at max, labile with SBPs as low as 90s.  More consistently 120-130s now  - hold off on any BP meds at this time, likely pain and anxiety related  - would stop IVF now that patient taking PO    #Anemia  - likely component of surgical blood loss as well as inflammatory anemia post op  - downtrend but all cell lines down, no clinical signs of blood loss  - can send iron panel with next lab draw    #Hypothyroidism  - c/w synthroid 75mcg    #Depression  - home lexapro 15mg increased to 20mg per psych recs  - psych following, appreciate recs for labile mood

## 2023-09-15 NOTE — OCCUPATIONAL THERAPY INITIAL EVALUATION ADULT - GENERAL OBSERVATIONS, REHAB EVAL
PT Malinda present. Patient mom, wife and aunt present. Patient A&Ox1-2, agreeable and tolerated session poorly. Patient received semisupine in bed +heplock IV, +crani headwrap CD/ PT Malinda present. Patient mom, wife and aunt present. Patient A&Ox1-2, agreeable and tolerated session poorly. Patient received semisupine in bed +heplock IV, +crani headwrap C/D/I, +b/l SCD's, +primafit, room air, NAD. Patient A&Ox1-2 agreeable and tolerated session poorly.

## 2023-09-15 NOTE — PHYSICAL THERAPY INITIAL EVALUATION ADULT - RANGE OF MOTION EXAMINATION, REHAB EVAL
LUE in flexion rested position/Right UE ROM was WFL (within functional limits)/Right LE ROM was WFL (within functional limits)

## 2023-09-15 NOTE — PHYSICAL THERAPY INITIAL EVALUATION ADULT - PHYSICAL ASSIST/NONPHYSICAL ASSIST, REHAB EVAL
to left side, pt able to  with R hand/set-up required/supervision/verbal cues/nonverbal cues (demo/gestures)/2 person assist

## 2023-09-15 NOTE — PROGRESS NOTE ADULT - NS ATTEST RISK PROBLEM GEN_ALL_CORE FT
Postoperative state  Cranioplasty  HTN
Hemicraniectomy for cranioplasty  Preoperative assessment
Postoperative state  Cranioplasty  HTN

## 2023-09-15 NOTE — PHYSICAL THERAPY INITIAL EVALUATION ADULT - MODALITIES TREATMENT COMMENTS
Pt is able to open left eye and make eye contact. Mouth is grossly symmetric. Pt is able to speak, perseverates and has difficulty finding words, is emotionally labile but can be re-directed and soothed with 1 person speaking directly to her and re-orienting her to task.

## 2023-09-15 NOTE — BH CONSULTATION LIAISON PROGRESS NOTE - NSBHCHARTREVIEWVS_PSY_A_CORE FT
Vital Signs Last 24 Hrs  T(C): 36.8 (15 Sep 2023 08:23), Max: 36.9 (14 Sep 2023 17:17)  T(F): 98.2 (15 Sep 2023 08:23), Max: 98.5 (14 Sep 2023 17:17)  HR: 80 (15 Sep 2023 08:23) (76 - 94)  BP: 136/76 (15 Sep 2023 08:23) (123/73 - 181/112)  BP(mean): 116 (15 Sep 2023 05:23) (93 - 136)  RR: 18 (15 Sep 2023 08:23) (16 - 19)  SpO2: 97% (15 Sep 2023 08:23) (96% - 100%)    Parameters below as of 15 Sep 2023 08:23  Patient On (Oxygen Delivery Method): room air

## 2023-09-15 NOTE — OCCUPATIONAL THERAPY INITIAL EVALUATION ADULT - ADDITIONAL COMMENTS
Patient able to follow 25% single step commands, A&Ox1-2, PLOF and social history obtained from wife at bedside. Patient was at acute rehab for 5 weeks, [facility] 4 weeks, from [facility], was ambulating with PT/OT with 1-2 person assist and krishna-walker however was hovered from bed<>w/c most days. Patient with occasional emotional lability at rehab with discomfort to the L side. Patient has a L AFO and L resting hand splint. Patient is R hand dominant. Prior to original hospital admission patient was independent with all ADL's, IADL's and functional mobility with no AD.

## 2023-09-15 NOTE — PHYSICAL THERAPY INITIAL EVALUATION ADULT - IMPAIRMENTS FOUND, PT EVAL
arousal, attention, and cognition/cognitive impairment/gait, locomotion, and balance/gross motor/sensory integrity

## 2023-09-15 NOTE — PROGRESS NOTE ADULT - SUBJECTIVE AND OBJECTIVE BOX
Patient is a 38y old  Female who presents with a chief complaint of Cranioplasty (15 Sep 2023 00:11)      SUBJECTIVE:  Patient seen and examined at bedside.  Reports headache but overall feels improved from yesterday.  slept well after 0.25mg IV dilaudid last night.  HAROON drain removed this AM.  Pain on L side as well at baseline    ROS:  Denies fevers, chills, neck pain, cough, SOB, chest pain, Abdominal pain, N/V.  All other ROS negative except as above    Vital Signs Last 12 Hrs  T(F): 98.2 (09-15-23 @ 08:23), Max: 98.4 (09-15-23 @ 05:23)  HR: 80 (09-15-23 @ 08:23) (80 - 94)  BP: 136/76 (09-15-23 @ 08:23) (136/76 - 163/92)  BP(mean): 116 (09-15-23 @ 05:23) (116 - 116)  RR: 18 (09-15-23 @ 08:23) (18 - 19)  SpO2: 97% (09-15-23 @ 08:23) (97% - 100%)  I&O's Summary    14 Sep 2023 07:01  -  15 Sep 2023 07:00  --------------------------------------------------------  IN: 1140 mL / OUT: 742 mL / NET: 398 mL        PHYSICAL EXAM:  Constitutional: NAD  HEENT: R eyelid edematous but improving, MMM, head wrapped with dressing c/d/i  Neck: Supple  Respiratory: CTA B/L. No w/r/r.   Cardiovascular: RRR, normal S1 and S2, no m/r/g.   Gastrointestinal: +BS, soft NTND  Extremities: wwp; no cyanosis, clubbing or edema.   Neurological: Awake, repetitive speech, expressive aphasia, not cooperative with strength exam  Skin: No gross skin abnormalities or rashes      LABS:                        8.9    8.87  )-----------( 167      ( 15 Sep 2023 06:50 )             26.9     09-15    137  |  101  |  2<L>  ----------------------------<  111<H>  3.7   |  27  |  0.39<L>    Ca    8.5      15 Sep 2023 06:50  Phos  3.1     09-15  Mg     1.5     09-15        Urinalysis Basic - ( 15 Sep 2023 06:50 )    Color: x / Appearance: x / SG: x / pH: x  Gluc: 111 mg/dL / Ketone: x  / Bili: x / Urobili: x   Blood: x / Protein: x / Nitrite: x   Leuk Esterase: x / RBC: x / WBC x   Sq Epi: x / Non Sq Epi: x / Bacteria: x          RADIOLOGY & ADDITIONAL TESTS:  No new imaging    MEDICATIONS  (STANDING):  baclofen 5 milliGRAM(s) Oral every 8 hours  ceFAZolin   IVPB 2000 milliGRAM(s) IV Intermittent every 8 hours  enoxaparin Injectable 40 milliGRAM(s) SubCutaneous every 24 hours  escitalopram 20 milliGRAM(s) Oral daily  gabapentin 400 milliGRAM(s) Oral <User Schedule>  gabapentin 400 milliGRAM(s) Oral <User Schedule>  influenza   Vaccine 0.5 milliLiter(s) IntraMuscular once  lacosamide IVPB 150 milliGRAM(s) IV Intermittent every 12 hours  levothyroxine 75 MICROGram(s) Oral daily  memantine 5 milliGRAM(s) Oral <User Schedule>  pantoprazole    Tablet 40 milliGRAM(s) Oral before breakfast  senna 2 Tablet(s) Oral at bedtime  traZODone 50 milliGRAM(s) Oral at bedtime  valproate sodium  IVPB 500 milliGRAM(s) IV Intermittent every 12 hours    MEDICATIONS  (PRN):  bisacodyl 5 milliGRAM(s) Oral every 12 hours PRN Constipation  labetalol Injectable 10 milliGRAM(s) IV Push every 2 hours PRN Systolic blood pressure > 140 mmHg  traMADol 50 milliGRAM(s) Oral every 6 hours PRN Severe Pain (7 - 10)

## 2023-09-15 NOTE — OCCUPATIONAL THERAPY INITIAL EVALUATION ADULT - SENSORY TESTS
Patient opend L eye and made eye contact, able to visually track to the L and R, face/mouth grossly symmetric, patient able to speak with dysarthria- emotionally labile, tends to perseverate on one topic, requiring max verbal cueing to re-direct/orient to task and direction

## 2023-09-15 NOTE — PHYSICAL THERAPY INITIAL EVALUATION ADULT - ADDITIONAL COMMENTS
Information obtained from spouse at bedside: Pt is right handed, wears a L AFO. At AR, pt was ambulating with L AFO, krishna-walker and 1-2 assist.  As per spouse, pt with some emotional lability at subacute rehab and complaints of pain in L side.  This admission post-op pt with increased emotional lability and complaints of pain hypersensitivity throughout.

## 2023-09-15 NOTE — OCCUPATIONAL THERAPY INITIAL EVALUATION ADULT - ORIENTATION, REHAB EVAL
Patient can state aunt, mother and wife name, states shes in a hospital however not the name/person/place

## 2023-09-15 NOTE — PHYSICAL THERAPY INITIAL EVALUATION ADULT - PERTINENT HX OF CURRENT PROBLEM, REHAB EVAL
38F hx hypothyroid was found down by mom unresponsive in the bathroom on 6/6, was airlifted to [facility] for a large R frontal IPH with IVH and hydrocephalus. She was emergently intubated and taken to operating room for right hemicraniectomy and EVD placement with Dr. Bernstein. Patient was deemed medically stable and was sent  to [facility] for 5 weeks then transferred to [facility] for 4 weeks and presents today in preparation for cranioplasty this week.

## 2023-09-15 NOTE — OCCUPATIONAL THERAPY INITIAL EVALUATION ADULT - DIAGNOSIS, OT EVAL
Patient POD #3 s/p R cranioplasty with implant, PMH Large R frontal IPH with IVH and hydrocephalus, hemicraniectomy and EVD, mod-sev emotionally labile, able to follow 25% single step commands, presents with no movement to LUE/LLE, spontaneous/functional movement of RUE/RLE, requiring max verbal cues for initiation, directions, re-direction, environmental awareness throughout, hypersensitivity throughout body, decreased overall postural control, activity tolerance impacting independence with functional activities and mobility.

## 2023-09-15 NOTE — OCCUPATIONAL THERAPY INITIAL EVALUATION ADULT - PERTINENT HX OF CURRENT PROBLEM, REHAB EVAL
38F hx hypothyroid was found down by mom unresponsive in the bathroom on 6/6, was airlifted to [facility] for a large R frontal IPH with IVH and hydrocephalus. She was emergently intubated and taken to operating room for right hemicraniectomy and EVD placement with Dr. Bernstein. Patient was deemed medically stable and was sent to [facility] for 5 weeks then transferred to [facility] for 4 weeks. Now s/p right cranioplasty with longevity implant 9/12/23.

## 2023-09-15 NOTE — PROGRESS NOTE ADULT - SUBJECTIVE AND OBJECTIVE BOX
HPI:  38F hx hypothyroid was found down by mom unresponsive in the bathroom on 6/6, was airlifted to [facility] for a large R frontal IPH with IVH and hydrocephalus. She was emergently intubated and taken to operating room for right hemicraniectomy and EVD placement with Dr. Bernstein. Patient was deemed medically stable and was sent  to [facility] for 5 weeks then transferred to [facility] for 4 weeks and presents today in preparation for cranioplasty this week.  (10 Sep 2023 17:50)  9/10: transferred for cranioplasty Tuesday 9/11: ALETHEA overnight. MRI he completed  9/12: preop OR today.   POD 0 right cranioplasty with longevity implant   9/13: POD1 R cranioplasty w/ longevity implant. Overnight NAD, pt difficult to assess and not taking PO meds, given IV tylenol and IV dialudid for pain control.  Na downtrending overnight 132, f/u repeat, urine studies sent consistent w/ salt wasting, consider 3% bolus.   9/14: POD 2 R cranioplasty. Per psych, increase lexapro to 20mg daily, HAROON 1 d'cced today, JP2 remains in place,   9/15: POD 3 R cranioplasty, ALETHEA  OVERNIGHT EVENTS: no acute events overnight   Vital Signs Last 24 Hrs  T(C): 36.8 (14 Sep 2023 21:52), Max: 37.1 (14 Sep 2023 05:39)  T(F): 98.3 (14 Sep 2023 21:52), Max: 98.7 (14 Sep 2023 05:39)  HR: 85 (14 Sep 2023 21:52) (76 - 92)  BP: 138/89 (14 Sep 2023 21:52) (123/73 - 181/112)  BP(mean): 105 (14 Sep 2023 21:52) (93 - 136)  RR: 16 (14 Sep 2023 21:52) (16 - 18)  SpO2: 96% (14 Sep 2023 21:52) (93% - 100%)    Parameters below as of 14 Sep 2023 21:52  Patient On (Oxygen Delivery Method): room air        I&O's Summary    13 Sep 2023 07:01  -  14 Sep 2023 07:00  --------------------------------------------------------  IN: 1440 mL / OUT: 1395.5 mL / NET: 44.5 mL    14 Sep 2023 07:01  -  15 Sep 2023 00:11  --------------------------------------------------------  IN: 1140 mL / OUT: 735 mL / NET: 405 mL      PHYSICAL EXAM:  General: Emotional, sitting up in bed, non compliant with orientation questions   HEENT: PERRL 3mm, resisting eye opening, unable to assess EOM, face appears symmetric   Cardiovascular: RRR, normal S1 and S2   Respiratory: lungs CTAB, no wheezing, rhonchi, or crackles   GI: normoactive BS to auscultation, abd soft, NTND   Neuro: no aphasia, unable to assess pronator drift   PETERS antigravity and purposeful but non-compliant with strength exam   Extremities: distal pulses 2+ x4   Wound/incision: R crani incision site w/ headwrap C/D/I   Drains: SG HAROON x1    LABS:                        9.7    11.26 )-----------( 179      ( 13 Sep 2023 05:29 )             28.8     09-13    135  |  100  |  4<L>  ----------------------------<  132<H>  4.1   |  26  |  0.35<L>    Ca    8.2<L>      13 Sep 2023 05:38  Phos  4.1     09-13  Mg     2.0     09-13        Urinalysis Basic - ( 13 Sep 2023 05:38 )    Color: x / Appearance: x / SG: x / pH: x  Gluc: 132 mg/dL / Ketone: x  / Bili: x / Urobili: x   Blood: x / Protein: x / Nitrite: x   Leuk Esterase: x / RBC: x / WBC x   Sq Epi: x / Non Sq Epi: x / Bacteria: x          CAPILLARY BLOOD GLUCOSE      POCT Blood Glucose.: 101 mg/dL (14 Sep 2023 06:19)      Drug Levels: [] N/A  Valproic Acid Level, Serum: 72.9 ug/mL (09-13 @ 05:29)  Valproic Acid Level, Serum: 119.2 ug/mL (09-12 @ 05:30)        Allergies    No Known Allergies    Intolerances      MEDICATIONS:  Antibiotics:  ceFAZolin   IVPB 2000 milliGRAM(s) IV Intermittent every 8 hours    Neuro:  baclofen 5 milliGRAM(s) Oral every 8 hours  escitalopram 20 milliGRAM(s) Oral daily  gabapentin 400 milliGRAM(s) Oral <User Schedule>  gabapentin 400 milliGRAM(s) Oral <User Schedule>  lacosamide IVPB 150 milliGRAM(s) IV Intermittent every 12 hours  memantine 5 milliGRAM(s) Oral <User Schedule>  traMADol 50 milliGRAM(s) Oral every 6 hours PRN  traZODone 50 milliGRAM(s) Oral at bedtime  valproate sodium  IVPB 500 milliGRAM(s) IV Intermittent every 12 hours    Anticoagulation:    OTHER:  bisacodyl 5 milliGRAM(s) Oral every 12 hours PRN  influenza   Vaccine 0.5 milliLiter(s) IntraMuscular once  labetalol Injectable 10 milliGRAM(s) IV Push every 2 hours PRN  levothyroxine 75 MICROGram(s) Oral daily  pantoprazole    Tablet 40 milliGRAM(s) Oral before breakfast  senna 2 Tablet(s) Oral at bedtime    IVF:  sodium chloride 0.9%. 1000 milliLiter(s) IV Continuous <Continuous>  38F hx hypothyroid was found down by mom unresponsive in the bathroom on 6/6, was airlifted to [facility] for a large R frontal IPH with IVH and hydrocephalus. She was emergently intubated and taken to operating room for right hemicraniectomy and EVD placement with Dr. Bernstein. Patient was deemed medically stable and was sent to [facility] for 5 weeks then transferred to [facility] for 4 weeks. Now s/p right cranioplasty with longevity implant 9/12/23.     Neuro  - neuro/vitals q4h  - pain control as needed tylenol  - continue AEDs as ordered: vimpat 150mg bid and depakote 500 BID  - monitor subgaleal HAROON drains x1  - postop CTH completed  - baclofen 5mg q8 for muscle spasms per rehab medicine attending (alternative to tizanidine that was being given at rehab)   - pending PT/OT, neuropsych eval  - psych consulted, increase Lexapro to 20 daily, can give Ativan prn   - epilepsy following for AED management     Cardio  - -140    Pulm  - RA    GI  - ADAT to regular diet  - bowel regimen    Renal  - IVF until adequate PO intake   - voiding    Endo  - continue synthroid    Heme  - no active issues  - SCDs for DVT prophylaxis    ID  - afebrile  - postop ancef    Psych  - lexapro increase to 20mg daily    Dispo: ICU status, full code, dispo pending  D/w Dr. Sy and Dr. Romero

## 2023-09-15 NOTE — OCCUPATIONAL THERAPY INITIAL EVALUATION ADULT - LEVEL OF INDEPENDENCE, REHAB EVAL
rolling to the L/R, able to grasp L bed railing with R hand when rolling to the L/maximum assist (25% patients effort)

## 2023-09-15 NOTE — PROCEDURE NOTE - ADDITIONAL PROCEDURE DETAILS
Drain was first taken off suction. Site was cleaned with chlorhexidene. Sutures were cut and the drain was removed. Baci and gauze placed on site and head was re-wrapped. Patient tolerated procedure well.
L subgaleal BHAVANI site was prepped w/ chlorhexidine, anchoring suture was removed, L subgaleal bhavani drain was removed, tip of catheter was visualized. Drain exit site was closed with 2 staples. Pt tolerated procedure well, no blood loss, no complications.

## 2023-09-16 PROCEDURE — 99232 SBSQ HOSP IP/OBS MODERATE 35: CPT

## 2023-09-16 RX ORDER — ACETAMINOPHEN 500 MG
650 TABLET ORAL EVERY 6 HOURS
Refills: 0 | Status: DISCONTINUED | OUTPATIENT
Start: 2023-09-16 | End: 2023-09-21

## 2023-09-16 RX ADMIN — ESCITALOPRAM OXALATE 20 MILLIGRAM(S): 10 TABLET, FILM COATED ORAL at 12:26

## 2023-09-16 RX ADMIN — Medication 55 MILLIGRAM(S): at 05:33

## 2023-09-16 RX ADMIN — SENNA PLUS 2 TABLET(S): 8.6 TABLET ORAL at 21:22

## 2023-09-16 RX ADMIN — MEMANTINE HYDROCHLORIDE 5 MILLIGRAM(S): 10 TABLET ORAL at 05:32

## 2023-09-16 RX ADMIN — TRAMADOL HYDROCHLORIDE 50 MILLIGRAM(S): 50 TABLET ORAL at 10:59

## 2023-09-16 RX ADMIN — Medication 75 MICROGRAM(S): at 05:32

## 2023-09-16 RX ADMIN — Medication 5 MILLIGRAM(S): at 13:19

## 2023-09-16 RX ADMIN — Medication 650 MILLIGRAM(S): at 13:18

## 2023-09-16 RX ADMIN — TRAMADOL HYDROCHLORIDE 50 MILLIGRAM(S): 50 TABLET ORAL at 11:59

## 2023-09-16 RX ADMIN — TRAMADOL HYDROCHLORIDE 50 MILLIGRAM(S): 50 TABLET ORAL at 05:32

## 2023-09-16 RX ADMIN — LACOSAMIDE 130 MILLIGRAM(S): 50 TABLET ORAL at 06:49

## 2023-09-16 RX ADMIN — TRAMADOL HYDROCHLORIDE 50 MILLIGRAM(S): 50 TABLET ORAL at 17:52

## 2023-09-16 RX ADMIN — TRAMADOL HYDROCHLORIDE 50 MILLIGRAM(S): 50 TABLET ORAL at 18:52

## 2023-09-16 RX ADMIN — Medication 5 MILLIGRAM(S): at 21:22

## 2023-09-16 RX ADMIN — Medication 55 MILLIGRAM(S): at 17:52

## 2023-09-16 RX ADMIN — MEMANTINE HYDROCHLORIDE 5 MILLIGRAM(S): 10 TABLET ORAL at 17:52

## 2023-09-16 RX ADMIN — Medication 50 MILLIGRAM(S): at 21:22

## 2023-09-16 RX ADMIN — GABAPENTIN 400 MILLIGRAM(S): 400 CAPSULE ORAL at 12:26

## 2023-09-16 RX ADMIN — GABAPENTIN 400 MILLIGRAM(S): 400 CAPSULE ORAL at 21:22

## 2023-09-16 RX ADMIN — ENOXAPARIN SODIUM 40 MILLIGRAM(S): 100 INJECTION SUBCUTANEOUS at 21:22

## 2023-09-16 RX ADMIN — TRAMADOL HYDROCHLORIDE 50 MILLIGRAM(S): 50 TABLET ORAL at 06:11

## 2023-09-16 RX ADMIN — Medication 5 MILLIGRAM(S): at 05:32

## 2023-09-16 RX ADMIN — LACOSAMIDE 130 MILLIGRAM(S): 50 TABLET ORAL at 19:07

## 2023-09-16 RX ADMIN — Medication 650 MILLIGRAM(S): at 14:18

## 2023-09-16 RX ADMIN — PANTOPRAZOLE SODIUM 40 MILLIGRAM(S): 20 TABLET, DELAYED RELEASE ORAL at 05:32

## 2023-09-16 NOTE — PROGRESS NOTE ADULT - SUBJECTIVE AND OBJECTIVE BOX
INTERVAL EVENTS:  Having headaches, but otherwise in better spirits/mood.     PAST MEDICAL & SURGICAL HISTORY:  Hypothyroid    Cyst of ovary    Status post craniectomy        MEDICATIONS  (STANDING):  baclofen 5 milliGRAM(s) Oral every 8 hours  enoxaparin Injectable 40 milliGRAM(s) SubCutaneous every 24 hours  escitalopram 20 milliGRAM(s) Oral daily  gabapentin 400 milliGRAM(s) Oral <User Schedule>  gabapentin 400 milliGRAM(s) Oral <User Schedule>  influenza   Vaccine 0.5 milliLiter(s) IntraMuscular once  lacosamide IVPB 150 milliGRAM(s) IV Intermittent every 12 hours  levothyroxine 75 MICROGram(s) Oral daily  memantine 5 milliGRAM(s) Oral <User Schedule>  pantoprazole    Tablet 40 milliGRAM(s) Oral before breakfast  senna 2 Tablet(s) Oral at bedtime  traZODone 50 milliGRAM(s) Oral at bedtime  valproate sodium  IVPB 500 milliGRAM(s) IV Intermittent every 12 hours    MEDICATIONS  (PRN):  acetaminophen     Tablet .. 650 milliGRAM(s) Oral every 6 hours PRN Mild Pain (1 - 3)  bisacodyl 5 milliGRAM(s) Oral every 12 hours PRN Constipation  traMADol 50 milliGRAM(s) Oral every 6 hours PRN Severe Pain (7 - 10)      Vital Signs Last 24 Hrs  T(C): 36.9 (16 Sep 2023 08:51), Max: 36.9 (16 Sep 2023 05:55)  T(F): 98.4 (16 Sep 2023 08:51), Max: 98.5 (16 Sep 2023 05:55)  HR: 79 (16 Sep 2023 08:51) (79 - 91)  BP: 136/83 (16 Sep 2023 08:51) (124/82 - 150/88)  BP(mean): 100 (16 Sep 2023 08:51) (100 - 100)  RR: 18 (16 Sep 2023 08:51) (18 - 18)  SpO2: 99% (16 Sep 2023 08:51) (98% - 99%)    Parameters below as of 16 Sep 2023 08:51  Patient On (Oxygen Delivery Method): room air     PHYSICAL EXAM:  GEN: Awake, alert. NAD.   HEENT: crani wrapped in bandage  RESP: no iwob  CV: warm, well perfused  ABD: Soft.  EXT: Warm. No edema.   NEURO: AAOx3. No focal deficits.     LABS:                        8.9    8.87  )-----------( 167      ( 15 Sep 2023 06:50 )             26.9     09-15    137  |  101  |  2<L>  ----------------------------<  111<H>  3.7   |  27  |  0.39<L>    Ca    8.5      15 Sep 2023 06:50  Phos  3.1     09-15  Mg     1.5     09-15            Urinalysis Basic - ( 15 Sep 2023 06:50 )    Color: x / Appearance: x / SG: x / pH: x  Gluc: 111 mg/dL / Ketone: x  / Bili: x / Urobili: x   Blood: x / Protein: x / Nitrite: x   Leuk Esterase: x / RBC: x / WBC x   Sq Epi: x / Non Sq Epi: x / Bacteria: x      I&O's Summary

## 2023-09-16 NOTE — PROGRESS NOTE ADULT - SUBJECTIVE AND OBJECTIVE BOX
SUBJECTIVE: Patient states today was better than the other days. Patient cries intermittently while head is being wrapped and repeats "sorry". Overall patient and wife at bedside report that the emotional lability has improved. Denies new weakness, numbness, tingling, nausea, vomiting, chest pain, palpitations, shortness of breath, vision changes.      HOSPITAL COURSE:  9/10: transferred for cranioplasty Tuesday 9/11: ALETHEA overnight. MRI he completed  9/12: preop OR today.   POD 0 right cranioplasty with longevity implant   9/13: POD1 R cranioplasty w/ longevity implant. Overnight NAD, pt difficult to assess and not taking PO meds, given IV tylenol and IV dialudid for pain control.  Na downtrending overnight 132, f/u repeat, urine studies sent consistent w/ salt wasting, consider 3% bolus.   9/14: POD 2 R cranioplasty. Per psych, increase lexapro to 20mg daily, HAROON 1 d'cced today, JP2 remains in place,   9/15: POD 3 R cranioplasty, ALETHEA. Started SQL. Dc'd IVF. HAROON drain removed.   9/16: POD 4 R cranioplasty. Headwrap re-wrapped.     Vital Signs Last 24 Hrs  T(C): 36.7 (15 Sep 2023 21:05), Max: 36.9 (15 Sep 2023 05:23)  T(F): 98.1 (15 Sep 2023 21:05), Max: 98.4 (15 Sep 2023 05:23)  HR: 87 (15 Sep 2023 21:05) (80 - 94)  BP: 150/88 (15 Sep 2023 21:05) (136/76 - 163/92)  BP(mean): 116 (15 Sep 2023 05:23) (116 - 116)  RR: 18 (15 Sep 2023 21:05) (18 - 19)  SpO2: 99% (15 Sep 2023 21:05) (97% - 100%)    Parameters below as of 15 Sep 2023 21:05  Patient On (Oxygen Delivery Method): room air        I&O's Summary    14 Sep 2023 07:01  -  15 Sep 2023 07:00  --------------------------------------------------------  IN: 1140 mL / OUT: 742 mL / NET: 398 mL        PHYSICAL EXAM:  General: Emotional, sitting up in bed   HEENT: PERRL 3mm, right eye swelling reduced, face appears symmetric   Cardiovascular: RRR, normal S1 and S2   Respiratory: lungs CTAB, no wheezing, rhonchi, or crackles   GI: normoactive BS to auscultation, abd soft, NTND   Neuro: AAO x 3, expressive aphasia, L plegia, RUE/RLE 5/5  Extremities: distal pulses 2+ x4   Wound/incision: R crani incision site w/ headwrap C/D/I     LABS:                        8.9    8.87  )-----------( 167      ( 15 Sep 2023 06:50 )             26.9     09-15    137  |  101  |  2<L>  ----------------------------<  111<H>  3.7   |  27  |  0.39<L>    Ca    8.5      15 Sep 2023 06:50  Phos  3.1     09-15  Mg     1.5     09-15        Urinalysis Basic - ( 15 Sep 2023 06:50 )    Color: x / Appearance: x / SG: x / pH: x  Gluc: 111 mg/dL / Ketone: x  / Bili: x / Urobili: x   Blood: x / Protein: x / Nitrite: x   Leuk Esterase: x / RBC: x / WBC x   Sq Epi: x / Non Sq Epi: x / Bacteria: x          CAPILLARY BLOOD GLUCOSE          Drug Levels: [] N/A  Valproic Acid Level, Serum: 72.9 ug/mL (09-13 @ 05:29)  Valproic Acid Level, Serum: 119.2 ug/mL (09-12 @ 05:30)    CSF Analysis: [] N/A      Allergies    No Known Allergies    Intolerances      MEDICATIONS:  Antibiotics:    Neuro:  baclofen 5 milliGRAM(s) Oral every 8 hours  escitalopram 20 milliGRAM(s) Oral daily  gabapentin 400 milliGRAM(s) Oral <User Schedule>  gabapentin 400 milliGRAM(s) Oral <User Schedule>  lacosamide IVPB 150 milliGRAM(s) IV Intermittent every 12 hours  memantine 5 milliGRAM(s) Oral <User Schedule>  traMADol 50 milliGRAM(s) Oral every 6 hours PRN  traZODone 50 milliGRAM(s) Oral at bedtime  valproate sodium  IVPB 500 milliGRAM(s) IV Intermittent every 12 hours    Anticoagulation:  enoxaparin Injectable 40 milliGRAM(s) SubCutaneous every 24 hours    OTHER:  bisacodyl 5 milliGRAM(s) Oral every 12 hours PRN  influenza   Vaccine 0.5 milliLiter(s) IntraMuscular once  labetalol Injectable 10 milliGRAM(s) IV Push every 2 hours PRN  levothyroxine 75 MICROGram(s) Oral daily  pantoprazole    Tablet 40 milliGRAM(s) Oral before breakfast  senna 2 Tablet(s) Oral at bedtime    IVF:    CULTURES:    RADIOLOGY & ADDITIONAL TESTS:      ASSESSMENT:  38F hx hypothyroid was found down by mom unresponsive in the bathroom on 6/6, was airlifted to [facility] for a large R frontal IPH with IVH and hydrocephalus. She was emergently intubated and taken to operating room for right hemicraniectomy and EVD placement with Dr. Bernstein. Patient was deemed medically stable and was sent to [facility] for 5 weeks then transferred to [facility] for 4 weeks. Now s/p right cranioplasty with longevity implant 9/12/23.     Neuro  - neuro/vitals q4h  - pain control as needed tylenol  - continue AEDs as ordered: vimpat 150mg bid and depakote 500 BID  - postop CTH completed  - baclofen 5mg q8 for muscle spasms per rehab medicine attending (alternative to tizanidine that was being given at rehab)   - pending PT/OT, neuropsych eval  - psych consulted, increase Lexapro to 20 daily, can give Ativan prn   - epilepsy following for AED management   - speech consulted    Cardio  - -140    Pulm  - RA    GI  - ADAT to regular diet  - bowel regimen    Renal  - IVL  - voiding    Endo  - continue synthroid    Heme  - SCDs, SQL for DVT prophylaxis    ID  - afebrile    Psych  - lexapro increase to 20mg daily    Dispo: ICU status, full code, dispo pending    D/w Dr. Sy

## 2023-09-16 NOTE — SPEECH LANGUAGE PATHOLOGY EVALUATION - SLP GENERAL OBSERVATIONS
Pt was seen fully awake and alert, HOB fully elevated, on room air. Pt oriented to self only, followed simple directives, and communicated wants/needs.

## 2023-09-16 NOTE — SPEECH LANGUAGE PATHOLOGY EVALUATION - SLP PERTINENT HISTORY OF CURRENT PROBLEM
PMHx hypothyroid was found down by mom unresponsive in the bathroom on 6/6, was airlifted to [facility] for a large R frontal IPH with IVH and hydrocephalus. She was emergently intubated and taken to operating room for right hemicraniectomy and EVD placement with Dr. Bernstein. Patient was deemed medically stable and was sent to [facility] for 5 weeks then transferred to [facility] for 4 weeks. Now s/p right cranioplasty with longevity implant 9/12/23.

## 2023-09-16 NOTE — SPEECH LANGUAGE PATHOLOGY EVALUATION - COMMENTS
-Recommend standardized cognitive linguistic assessment (MIRBI vs CLQT)     LT: Patient will communicate wants/needs.  ST: 1) Patient will complete functional short term memory tasks with 70% accuracy with max cues.   2) Patient will complete simple attention tasks with 70% accuracy with max cues.   3) Patient will complete generative naming tasks with 70% accuracy with max cues. The [employer] Mental Status ([facility]) Examination The [employer] Mental Status ([facility]) Examination (2006) was administered. The UMS is designed to assess the presence of cognitive dysfunction. It assesses cognitive domains including: orientation, attention, short term and working memory, recognition, calculations, visuospatial skills, and executive function.    Attention, immediate recall, orientation (Questions 1-3): 1/3  Numeric calculations and registration (Question 5): 0/3  Memory (Question 6): 0/3  Delayed recall with inference (Questions 4 and 7):  0/5  Registration (Question 8): 1/2  Visuospatial (Question 9 -10) 2/6  Executive function (Question 11): 4/8  Total Score 8/30    Range  High School Education or greater   Less than High School Education  Normal   27-30                                          20-30  Mild cog def   20-26                    14-19  Sev cog def    1-19                                     1-14 PLOF: (history obtained by wife)  Education-[employer] and a Masters in Mental Health   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   She was reportedly independent with money/medication management. She managed the couples finances. She was also actively driving. Pt lives with her wife. Per wife, Pt made substantial gains in mobility and physical strength in acute rehabilitation, though cognitively made slow and minimal gains. Wife reported patient was able to complete tasks with the speech pathologist, though with continued severe cognitive linguistic deficits.     Oral motor exam: Left lower quadrant asymmetry noted, reduced labial retraction on L.   WNL lingual, mandibular ROM, and soft palate elevation.   Motor speech: No dysarthria noted  Voice: No dysphonia     No concerns for swallowing difficulty reported. Pt is currently on a regular consistency and thin liquid diet. -Recommend standardized cognitive linguistic assessment (MIRBI-2 vs CLQT)     LT: Patient will communicate wants/needs.  ST: 1) Patient will complete functional short term memory tasks with 70% accuracy with max cues.   2) Patient will complete simple attention tasks with 70% accuracy with max cues.   3) Patient will complete generative naming tasks with 70% accuracy with max cues.

## 2023-09-16 NOTE — PROGRESS NOTE ADULT - ASSESSMENT
38F hx hypothyroid was found down by mom unresponsive in the bathroom on 6/6, was airlifted to [facility] for a large R frontal IPH with IVH and hydrocephalus. She was emergently intubated and taken to operating room for right hemicraniectomy and EVD placement with Dr. Bernstein who presented for cranioplasty, s/p right cranioplasty with longevity implant 9/12/23.    #R frontal IPH s/p hemicraniectomy  #Cranioplasty  #Postop state  - s/p right cranioplasty with longevity implant 9/12/23  - plan per NSG  - c/w AEDs: Depakote, Gabapentin, Vimpat.  Depakote reduced to 500mg BID, level elevated on admit  - epilepsy recommend EEG when able  - SCDs for DVT ppx  - pending PT eval post op    #HTN  - BP elevated to 150s/160s at max, labile with SBPs as low as 90s.  More consistently 120-130s now  - hold off on any BP meds at this time, likely pain and anxiety related  - would stop IVF now that patient taking PO    #Anemia  - likely component of surgical blood loss as well as inflammatory anemia post op  - downtrend but all cell lines down, no clinical signs of blood loss  - can send iron panel with next lab draw    #Hypothyroidism  - c/w synthroid 75mcg    #Depression  - home lexapro 15mg increased to 20mg per psych recs  - psych following, appreciate recs for labile mood 38F hx hypothyroid was found down by mom unresponsive in the bathroom on 6/6, was airlifted to [facility] for a large R frontal IPH with IVH and hydrocephalus. She was emergently intubated and taken to operating room for right hemicraniectomy and EVD placement with Dr. Bernstein who presented for cranioplasty, s/p right cranioplasty with longevity implant 9/12/23.    #R frontal IPH s/p hemicraniectomy  #Cranioplasty  #Postop state  - s/p right cranioplasty with longevity implant 9/12/23  - pain control per NSG  - c/w AEDs: Depakote, Gabapentin, Vimpat  - SCDs for DVT ppx    #HTN  - BP labile, but now more controlled with improved mood and pain control  - continue to observe off antihypertensives    #Anemia  - likely component of surgical blood loss as well as inflammatory anemia post op  - downtrend but all cell lines down, no clinical signs of blood loss  - can send iron panel with next lab draw    #Hypothyroidism  - c/w synthroid 75mcg    #Depression  - home lexapro 15mg increased to 20mg per psych recs  - psych following, appreciate recs for labile mood

## 2023-09-16 NOTE — SPEECH LANGUAGE PATHOLOGY EVALUATION - SLP DIAGNOSIS
Per the SLUMS, Pt presented with severe cognitive linguistic deficits in the areas of short term memory, attention, problem solving, visuospatial and mental flexibility, and naming. Pt oriented to self, though disoriented to place and date. Confabulations noted in conversation. Pt required frequent redirection and repetition of information. Delayed response time noted. Left neglect noted. No dysarthria. No dysphonia. Presentation c/w right hemispheric involvement. Improvement in presentation dependent on ongoing post-operative recovery and speech/language intervention to maximize functional outcomes. This service to continue to follow.

## 2023-09-17 PROCEDURE — 99232 SBSQ HOSP IP/OBS MODERATE 35: CPT

## 2023-09-17 RX ORDER — BACITRACIN ZINC 500 UNIT/G
1 OINTMENT IN PACKET (EA) TOPICAL DAILY
Refills: 0 | Status: DISCONTINUED | OUTPATIENT
Start: 2023-09-17 | End: 2023-09-21

## 2023-09-17 RX ORDER — BENZOCAINE AND MENTHOL 5; 1 G/100ML; G/100ML
1 LIQUID ORAL THREE TIMES A DAY
Refills: 0 | Status: DISCONTINUED | OUTPATIENT
Start: 2023-09-17 | End: 2023-09-21

## 2023-09-17 RX ADMIN — Medication 5 MILLIGRAM(S): at 14:34

## 2023-09-17 RX ADMIN — SENNA PLUS 2 TABLET(S): 8.6 TABLET ORAL at 21:39

## 2023-09-17 RX ADMIN — Medication 5 MILLIGRAM(S): at 21:39

## 2023-09-17 RX ADMIN — Medication 650 MILLIGRAM(S): at 11:54

## 2023-09-17 RX ADMIN — Medication 55 MILLIGRAM(S): at 05:25

## 2023-09-17 RX ADMIN — Medication 650 MILLIGRAM(S): at 20:11

## 2023-09-17 RX ADMIN — PANTOPRAZOLE SODIUM 40 MILLIGRAM(S): 20 TABLET, DELAYED RELEASE ORAL at 05:26

## 2023-09-17 RX ADMIN — TRAMADOL HYDROCHLORIDE 50 MILLIGRAM(S): 50 TABLET ORAL at 05:26

## 2023-09-17 RX ADMIN — TRAMADOL HYDROCHLORIDE 50 MILLIGRAM(S): 50 TABLET ORAL at 15:50

## 2023-09-17 RX ADMIN — ENOXAPARIN SODIUM 40 MILLIGRAM(S): 100 INJECTION SUBCUTANEOUS at 21:38

## 2023-09-17 RX ADMIN — TRAMADOL HYDROCHLORIDE 50 MILLIGRAM(S): 50 TABLET ORAL at 06:26

## 2023-09-17 RX ADMIN — Medication 75 MICROGRAM(S): at 05:26

## 2023-09-17 RX ADMIN — LACOSAMIDE 130 MILLIGRAM(S): 50 TABLET ORAL at 19:11

## 2023-09-17 RX ADMIN — LACOSAMIDE 130 MILLIGRAM(S): 50 TABLET ORAL at 06:14

## 2023-09-17 RX ADMIN — Medication 650 MILLIGRAM(S): at 10:54

## 2023-09-17 RX ADMIN — Medication 1 APPLICATION(S): at 19:11

## 2023-09-17 RX ADMIN — GABAPENTIN 400 MILLIGRAM(S): 400 CAPSULE ORAL at 10:54

## 2023-09-17 RX ADMIN — TRAMADOL HYDROCHLORIDE 50 MILLIGRAM(S): 50 TABLET ORAL at 22:38

## 2023-09-17 RX ADMIN — GABAPENTIN 400 MILLIGRAM(S): 400 CAPSULE ORAL at 21:39

## 2023-09-17 RX ADMIN — MEMANTINE HYDROCHLORIDE 5 MILLIGRAM(S): 10 TABLET ORAL at 05:26

## 2023-09-17 RX ADMIN — TRAMADOL HYDROCHLORIDE 50 MILLIGRAM(S): 50 TABLET ORAL at 21:38

## 2023-09-17 RX ADMIN — Medication 50 MILLIGRAM(S): at 21:39

## 2023-09-17 RX ADMIN — Medication 55 MILLIGRAM(S): at 18:03

## 2023-09-17 RX ADMIN — ESCITALOPRAM OXALATE 20 MILLIGRAM(S): 10 TABLET, FILM COATED ORAL at 10:55

## 2023-09-17 RX ADMIN — TRAMADOL HYDROCHLORIDE 50 MILLIGRAM(S): 50 TABLET ORAL at 14:50

## 2023-09-17 RX ADMIN — Medication 650 MILLIGRAM(S): at 19:11

## 2023-09-17 RX ADMIN — Medication 5 MILLIGRAM(S): at 05:26

## 2023-09-17 RX ADMIN — MEMANTINE HYDROCHLORIDE 5 MILLIGRAM(S): 10 TABLET ORAL at 18:03

## 2023-09-17 NOTE — PROGRESS NOTE ADULT - ASSESSMENT
38F hx hypothyroid was found down by mom unresponsive in the bathroom on 6/6, was airlifted to [facility] for a large R frontal IPH with IVH and hydrocephalus. She was emergently intubated and taken to operating room for right hemicraniectomy and EVD placement with Dr. Bernstein who presented for cranioplasty, s/p right cranioplasty with longevity implant 9/12/23.    #R frontal IPH s/p hemicraniectomy  #Cranioplasty  #Postop state  - s/p right cranioplasty with longevity implant 9/12/23  - pain control per NSG  - c/w AEDs: Depakote, Gabapentin, Vimpat  - SCDs for DVT ppx    #Elevated BP  - BP previously labile in the setting of pain and anxiety  - continue to observe off antihypertensives    #Anemia  - likely component of surgical blood loss as well as inflammatory anemia post op  - downtrend but all cell lines down, no clinical signs of blood loss  - consider iron panel with next lab draw    #Hypothyroidism  - c/w synthroid 75mcg    #Depression  - home lexapro 15mg increased to 20mg per psych recs  - psych following, appreciate recs for labile mood

## 2023-09-17 NOTE — PROGRESS NOTE ADULT - SUBJECTIVE AND OBJECTIVE BOX
SUBJECTIVE: Patient states she feels good and would like to get some rest.     HOSPITAL COURSE:  9/10: transferred for cranioplasty Tuesday 9/11: ALETHEA overnight. MRI he completed  9/12: preop OR today.   POD 0 right cranioplasty with longevity implant   9/13: POD1 R cranioplasty w/ longevity implant. Overnight NAD, pt difficult to assess and not taking PO meds, given IV tylenol and IV dialudid for pain control.  Na downtrending overnight 132, f/u repeat, urine studies sent consistent w/ salt wasting, consider 3% bolus.   9/14: POD 2 R cranioplasty. Per psych, increase lexapro to 20mg daily, HAROON 1 d'cced today, JP2 remains in place,   9/15: POD 3 R cranioplasty, ALETHEA. Started SQL. Dc'd IVF. HAROON drain removed.   9/16: POD 4 R cranioplasty. Headwrap re-wrapped.   9/17: POD 4 R cranioplasty. Pending rehab    Vital Signs Last 24 Hrs  T(C): 36.4 (16 Sep 2023 20:31), Max: 36.9 (16 Sep 2023 05:55)  T(F): 97.6 (16 Sep 2023 20:31), Max: 98.5 (16 Sep 2023 05:55)  HR: 85 (16 Sep 2023 20:31) (77 - 85)  BP: 114/77 (16 Sep 2023 20:31) (114/77 - 136/83)  BP(mean): 100 (16 Sep 2023 08:51) (100 - 100)  RR: 17 (16 Sep 2023 20:31) (17 - 18)  SpO2: 96% (16 Sep 2023 20:31) (96% - 99%)    Parameters below as of 16 Sep 2023 20:31  Patient On (Oxygen Delivery Method): room air    I&O's Summary    PHYSICAL EXAM:  General: Emotional, sitting up in bed   HEENT: PERRL 3mm, right eye swelling reduced, face appears symmetric   Cardiovascular: RRR, normal S1 and S2   Respiratory: lungs CTAB, no wheezing, rhonchi, or crackles   GI: normoactive BS to auscultation, abd soft, NTND   Neuro: AAO x 3, expressive aphasia, L plegia, RUE/RLE 5/5  Extremities: distal pulses 2+ x4   Wound/incision: R crani incision site w/ headwrap C/D/I     LABS:                        8.9    8.87  )-----------( 167      ( 15 Sep 2023 06:50 )             26.9     09-15    137  |  101  |  2<L>  ----------------------------<  111<H>  3.7   |  27  |  0.39<L>    Ca    8.5      15 Sep 2023 06:50  Phos  3.1     09-15  Mg     1.5     09-15    Urinalysis Basic - ( 15 Sep 2023 06:50 )    Color: x / Appearance: x / SG: x / pH: x  Gluc: 111 mg/dL / Ketone: x  / Bili: x / Urobili: x   Blood: x / Protein: x / Nitrite: x   Leuk Esterase: x / RBC: x / WBC x   Sq Epi: x / Non Sq Epi: x / Bacteria: x    CAPILLARY BLOOD GLUCOSE    Drug Levels: [] N/A  Valproic Acid Level, Serum: 72.9 ug/mL (09-13 @ 05:29)  Valproic Acid Level, Serum: 119.2 ug/mL (09-12 @ 05:30)    CSF Analysis: [] N/A    Allergies    No Known Allergies    Intolerances      MEDICATIONS:  Antibiotics:    Neuro:  acetaminophen     Tablet .. 650 milliGRAM(s) Oral every 6 hours PRN  baclofen 5 milliGRAM(s) Oral every 8 hours  escitalopram 20 milliGRAM(s) Oral daily  gabapentin 400 milliGRAM(s) Oral <User Schedule>  gabapentin 400 milliGRAM(s) Oral <User Schedule>  lacosamide IVPB 150 milliGRAM(s) IV Intermittent every 12 hours  memantine 5 milliGRAM(s) Oral <User Schedule>  traMADol 50 milliGRAM(s) Oral every 6 hours PRN  traZODone 50 milliGRAM(s) Oral at bedtime  valproate sodium  IVPB 500 milliGRAM(s) IV Intermittent every 12 hours    Anticoagulation:  enoxaparin Injectable 40 milliGRAM(s) SubCutaneous every 24 hours    OTHER:  bisacodyl 5 milliGRAM(s) Oral every 12 hours PRN  influenza   Vaccine 0.5 milliLiter(s) IntraMuscular once  levothyroxine 75 MICROGram(s) Oral daily  pantoprazole    Tablet 40 milliGRAM(s) Oral before breakfast  senna 2 Tablet(s) Oral at bedtime    IVF:    CULTURES:    RADIOLOGY & ADDITIONAL TESTS:      ASSESSMENT:  38F hx hypothyroid was found down by mom unresponsive in the bathroom on 6/6, was airlifted to [facility] for a large R frontal IPH with IVH and hydrocephalus. She was emergently intubated and taken to operating room for right hemicraniectomy and EVD placement with Dr. Bernstein. Patient was deemed medically stable and was sent [facility] for 5 weeks then transferred to [facility] for 4 weeks. Now s/p right cranioplasty with longevity implant 9/12/23.     Neuro  - neuro/vitals q4h  - pain control as needed tylenol  - continue AEDs as ordered: vimpat 150mg bid and depakote 500 BID  - postop CTH completed  - baclofen 5mg q8 for muscle spasms per rehab medicine attending (alternative to tizanidine that was being given at rehab)   - pending PT/OT, neuropsych eval  - psych consulted, increase Lexapro to 20 daily, can give Ativan prn   - epilepsy following for AED management   - speech pathology assessment 9/16    Cardio  - -140    Pulm  - RA    GI  - Regular diet  - bowel regimen    Renal  - voiding    Endo  - continue synthroid    Heme  - SCDs, SQL for DVT prophylaxis    ID  - afebrile    Psych  - lexapro increase to 20mg daily    Dispo: ICU status, full code, dispo pending    D/w Dr. Sy

## 2023-09-17 NOTE — PROGRESS NOTE ADULT - SUBJECTIVE AND OBJECTIVE BOX
INTERVAL EVENTS:  ALETHEA. Headaches improved. Appetite decent.   PAST MEDICAL & SURGICAL HISTORY:  Hypothyroid    Cyst of ovary    Status post craniectomy        MEDICATIONS  (STANDING):  baclofen 5 milliGRAM(s) Oral every 8 hours  enoxaparin Injectable 40 milliGRAM(s) SubCutaneous every 24 hours  escitalopram 20 milliGRAM(s) Oral daily  gabapentin 400 milliGRAM(s) Oral <User Schedule>  gabapentin 400 milliGRAM(s) Oral <User Schedule>  influenza   Vaccine 0.5 milliLiter(s) IntraMuscular once  lacosamide IVPB 150 milliGRAM(s) IV Intermittent every 12 hours  levothyroxine 75 MICROGram(s) Oral daily  memantine 5 milliGRAM(s) Oral <User Schedule>  pantoprazole    Tablet 40 milliGRAM(s) Oral before breakfast  senna 2 Tablet(s) Oral at bedtime  traZODone 50 milliGRAM(s) Oral at bedtime  valproate sodium  IVPB 500 milliGRAM(s) IV Intermittent every 12 hours    MEDICATIONS  (PRN):  acetaminophen     Tablet .. 650 milliGRAM(s) Oral every 6 hours PRN Mild Pain (1 - 3)  bisacodyl 5 milliGRAM(s) Oral every 12 hours PRN Constipation  traMADol 50 milliGRAM(s) Oral every 6 hours PRN Severe Pain (7 - 10)      Vital Signs Last 24 Hrs  T(C): 36.4 (17 Sep 2023 08:38), Max: 36.8 (17 Sep 2023 05:55)  T(F): 97.6 (17 Sep 2023 08:38), Max: 98.2 (17 Sep 2023 05:55)  HR: 86 (17 Sep 2023 08:38) (77 - 86)  BP: 149/96 (17 Sep 2023 08:38) (114/77 - 149/96)  BP(mean): 114 (17 Sep 2023 08:38) (114 - 114)  RR: 16 (17 Sep 2023 08:38) (16 - 18)  SpO2: 98% (17 Sep 2023 08:38) (96% - 99%)    Parameters below as of 17 Sep 2023 08:38  Patient On (Oxygen Delivery Method): room air         PHYSICAL EXAM:  GEN: Awake, alert. NAD.   HEENT: crani incision c/d/i  RESP: no iwob  CV: warm, well perfused  ABD: Soft.  EXT: Warm. No edema.   NEURO: AAOx3. No focal deficits     LABS:        I&O's Summary    16 Sep 2023 07:01  -  17 Sep 2023 07:00  --------------------------------------------------------  IN: 260 mL / OUT: 900 mL / NET: -640 mL    17 Sep 2023 07:01  -  17 Sep 2023 12:11  --------------------------------------------------------  IN: 0 mL / OUT: 1000 mL / NET: -1000 mL

## 2023-09-18 LAB
ANION GAP SERPL CALC-SCNC: 8 MMOL/L — SIGNIFICANT CHANGE UP (ref 5–17)
BUN SERPL-MCNC: 5 MG/DL — LOW (ref 7–23)
CALCIUM SERPL-MCNC: 8.8 MG/DL — SIGNIFICANT CHANGE UP (ref 8.4–10.5)
CHLORIDE SERPL-SCNC: 100 MMOL/L — SIGNIFICANT CHANGE UP (ref 96–108)
CO2 SERPL-SCNC: 29 MMOL/L — SIGNIFICANT CHANGE UP (ref 22–31)
CREAT SERPL-MCNC: 0.31 MG/DL — LOW (ref 0.5–1.3)
EGFR: 137 ML/MIN/1.73M2 — SIGNIFICANT CHANGE UP
FERRITIN SERPL-MCNC: 306 NG/ML — HIGH (ref 15–150)
GLUCOSE SERPL-MCNC: 105 MG/DL — HIGH (ref 70–99)
HCT VFR BLD CALC: 26.7 % — LOW (ref 34.5–45)
HGB BLD-MCNC: 9 G/DL — LOW (ref 11.5–15.5)
IRON SATN MFR SERPL: 25 % — SIGNIFICANT CHANGE UP (ref 14–50)
IRON SATN MFR SERPL: 52 UG/DL — SIGNIFICANT CHANGE UP (ref 30–160)
MAGNESIUM SERPL-MCNC: 1.6 MG/DL — SIGNIFICANT CHANGE UP (ref 1.6–2.6)
MCHC RBC-ENTMCNC: 32 PG — SIGNIFICANT CHANGE UP (ref 27–34)
MCHC RBC-ENTMCNC: 33.7 GM/DL — SIGNIFICANT CHANGE UP (ref 32–36)
MCV RBC AUTO: 95 FL — SIGNIFICANT CHANGE UP (ref 80–100)
NRBC # BLD: 0 /100 WBCS — SIGNIFICANT CHANGE UP (ref 0–0)
PHOSPHATE SERPL-MCNC: 4.5 MG/DL — SIGNIFICANT CHANGE UP (ref 2.5–4.5)
PLATELET # BLD AUTO: 226 K/UL — SIGNIFICANT CHANGE UP (ref 150–400)
POTASSIUM SERPL-MCNC: 3.6 MMOL/L — SIGNIFICANT CHANGE UP (ref 3.5–5.3)
POTASSIUM SERPL-SCNC: 3.6 MMOL/L — SIGNIFICANT CHANGE UP (ref 3.5–5.3)
RBC # BLD: 2.81 M/UL — LOW (ref 3.8–5.2)
RBC # FLD: 13.5 % — SIGNIFICANT CHANGE UP (ref 10.3–14.5)
SODIUM SERPL-SCNC: 137 MMOL/L — SIGNIFICANT CHANGE UP (ref 135–145)
TIBC SERPL-MCNC: 204 UG/DL — LOW (ref 220–430)
TRANSFERRIN SERPL-MCNC: 163 MG/DL — LOW (ref 200–360)
UIBC SERPL-MCNC: 152 UG/DL — SIGNIFICANT CHANGE UP (ref 110–370)
WBC # BLD: 5.67 K/UL — SIGNIFICANT CHANGE UP (ref 3.8–10.5)
WBC # FLD AUTO: 5.67 K/UL — SIGNIFICANT CHANGE UP (ref 3.8–10.5)

## 2023-09-18 PROCEDURE — 99232 SBSQ HOSP IP/OBS MODERATE 35: CPT

## 2023-09-18 PROCEDURE — 99231 SBSQ HOSP IP/OBS SF/LOW 25: CPT

## 2023-09-18 RX ORDER — POTASSIUM CHLORIDE 20 MEQ
40 PACKET (EA) ORAL ONCE
Refills: 0 | Status: COMPLETED | OUTPATIENT
Start: 2023-09-18 | End: 2023-09-18

## 2023-09-18 RX ORDER — CEPHALEXIN 500 MG
500 CAPSULE ORAL EVERY 12 HOURS
Refills: 0 | Status: DISCONTINUED | OUTPATIENT
Start: 2023-09-18 | End: 2023-09-21

## 2023-09-18 RX ORDER — LACOSAMIDE 50 MG/1
150 TABLET ORAL EVERY 12 HOURS
Refills: 0 | Status: DISCONTINUED | OUTPATIENT
Start: 2023-09-18 | End: 2023-09-21

## 2023-09-18 RX ORDER — MAGNESIUM SULFATE 500 MG/ML
4 VIAL (ML) INJECTION ONCE
Refills: 0 | Status: COMPLETED | OUTPATIENT
Start: 2023-09-18 | End: 2023-09-18

## 2023-09-18 RX ORDER — POLYETHYLENE GLYCOL 3350 17 G/17G
17 POWDER, FOR SOLUTION ORAL
Refills: 0 | Status: DISCONTINUED | OUTPATIENT
Start: 2023-09-18 | End: 2023-09-21

## 2023-09-18 RX ORDER — CEPHALEXIN 500 MG
500 CAPSULE ORAL EVERY 12 HOURS
Refills: 0 | Status: DISCONTINUED | OUTPATIENT
Start: 2023-09-18 | End: 2023-09-18

## 2023-09-18 RX ORDER — DIVALPROEX SODIUM 500 MG/1
500 TABLET, DELAYED RELEASE ORAL EVERY 12 HOURS
Refills: 0 | Status: DISCONTINUED | OUTPATIENT
Start: 2023-09-18 | End: 2023-09-21

## 2023-09-18 RX ADMIN — LACOSAMIDE 150 MILLIGRAM(S): 50 TABLET ORAL at 19:17

## 2023-09-18 RX ADMIN — Medication 650 MILLIGRAM(S): at 19:17

## 2023-09-18 RX ADMIN — ESCITALOPRAM OXALATE 20 MILLIGRAM(S): 10 TABLET, FILM COATED ORAL at 11:11

## 2023-09-18 RX ADMIN — DIVALPROEX SODIUM 500 MILLIGRAM(S): 500 TABLET, DELAYED RELEASE ORAL at 19:18

## 2023-09-18 RX ADMIN — GABAPENTIN 400 MILLIGRAM(S): 400 CAPSULE ORAL at 11:11

## 2023-09-18 RX ADMIN — Medication 650 MILLIGRAM(S): at 05:31

## 2023-09-18 RX ADMIN — Medication 25 GRAM(S): at 11:11

## 2023-09-18 RX ADMIN — TRAMADOL HYDROCHLORIDE 50 MILLIGRAM(S): 50 TABLET ORAL at 14:24

## 2023-09-18 RX ADMIN — Medication 5 MILLIGRAM(S): at 05:31

## 2023-09-18 RX ADMIN — GABAPENTIN 400 MILLIGRAM(S): 400 CAPSULE ORAL at 21:53

## 2023-09-18 RX ADMIN — MEMANTINE HYDROCHLORIDE 5 MILLIGRAM(S): 10 TABLET ORAL at 05:31

## 2023-09-18 RX ADMIN — Medication 1 APPLICATION(S): at 11:11

## 2023-09-18 RX ADMIN — Medication 650 MILLIGRAM(S): at 06:31

## 2023-09-18 RX ADMIN — Medication 5 MILLIGRAM(S): at 21:54

## 2023-09-18 RX ADMIN — MEMANTINE HYDROCHLORIDE 5 MILLIGRAM(S): 10 TABLET ORAL at 19:17

## 2023-09-18 RX ADMIN — Medication 5 MILLIGRAM(S): at 14:24

## 2023-09-18 RX ADMIN — Medication 75 MICROGRAM(S): at 05:31

## 2023-09-18 RX ADMIN — Medication 55 MILLIGRAM(S): at 05:30

## 2023-09-18 RX ADMIN — LACOSAMIDE 130 MILLIGRAM(S): 50 TABLET ORAL at 07:23

## 2023-09-18 RX ADMIN — Medication 500 MILLIGRAM(S): at 14:23

## 2023-09-18 RX ADMIN — Medication 50 MILLIGRAM(S): at 21:54

## 2023-09-18 RX ADMIN — POLYETHYLENE GLYCOL 3350 17 GRAM(S): 17 POWDER, FOR SOLUTION ORAL at 19:20

## 2023-09-18 RX ADMIN — Medication 40 MILLIEQUIVALENT(S): at 11:12

## 2023-09-18 RX ADMIN — SENNA PLUS 2 TABLET(S): 8.6 TABLET ORAL at 21:54

## 2023-09-18 RX ADMIN — TRAMADOL HYDROCHLORIDE 50 MILLIGRAM(S): 50 TABLET ORAL at 15:24

## 2023-09-18 RX ADMIN — PANTOPRAZOLE SODIUM 40 MILLIGRAM(S): 20 TABLET, DELAYED RELEASE ORAL at 05:31

## 2023-09-18 RX ADMIN — ENOXAPARIN SODIUM 40 MILLIGRAM(S): 100 INJECTION SUBCUTANEOUS at 21:53

## 2023-09-18 NOTE — BH CONSULTATION LIAISON PROGRESS NOTE - NSBHASSESSMENTFT_PSY_ALL_CORE
Pt is a 39 yo  F, domiciled with family, no pph/IPP, pmhx of hx hypothyroid who was found by mom unresponsive in the bathroom on 6/6, was airlifted to [facility] for a large R frontal IPH with IVH and hydrocephalus. She was emergently intubated and taken to operating room for right hemicraniectomy and EVD placement with Dr. Bernstein. Patient was sent  to [facility] weeks then transferred to [facility] for 4 weeks and now returned to [facility] for cranioplasty on 9/13.Psych was consulted to assess the patient's mood (frequent crying episodes) and to see if it is due to her recent surgery vs. something psychiatric related.  On initial eval, pt not alert and had frequent crying episodes. Currently, pt much improved and per family pt appearing at baseline. Not expressing any acute symptoms of depression; appears calm and at baseline level of alertness/orientation.  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    Likely diagnosis for prior crying episodes is delirium related to post-op state and pain; currently resolved.    Plan:  - no indication for IPP admission at this time  - Can continue Lexapro 20mg daily  - Would provide verbal reassurance for crying and mood lability not associated with violence.  - Employ delirium precautions: try to maintain sunlit room and promote sleep/wake cycle regulation. Provide frequent re-orientation.  - Regarding depakote -- would clarify if indication is for seizure prophylaxis or agitation/mood lability.   - Psychiatry will sign off for now - please call back if new concerns arise.

## 2023-09-18 NOTE — BH CONSULTATION LIAISON PROGRESS NOTE - NSBHCONSULTFOLLOWAFTERCARE_PSY_A_CORE FT
See prior documentation; per chart pt to be transferred to acute rehab following hospitalization. Would continue Lexapro 20mg daily.

## 2023-09-18 NOTE — PROGRESS NOTE ADULT - ASSESSMENT
38F hx hypothyroid was found down by mom unresponsive in the bathroom on 6/6, was airlifted to [facility] for a large R frontal IPH with IVH and hydrocephalus. She was emergently intubated and taken to operating room for right hemicraniectomy and EVD placement with Dr. Bernstein who presented for cranioplasty, s/p right cranioplasty with longevity implant 9/12/23.    #R frontal IPH s/p hemicraniectomy  #Cranioplasty  #Postop state  - s/p right cranioplasty with longevity implant 9/12/23  - pain control per NSG  - c/w AEDs: Depakote, Gabapentin, Vimpat  - SCDs for DVT ppx    #Elevated BP  - BP previously labile in the setting of pain and anxiety  - continue to observe off antihypertensives    #Anemia  - likely component of surgical blood loss as well as inflammatory anemia post op  - Hgb stable 8-9  - f/u iron panel    #Hypothyroidism  - c/w synthroid 75mcg    #Depression  - home lexapro 15mg increased to 20mg per psych recs  - psych following, appreciate recs for labile mood

## 2023-09-18 NOTE — PROGRESS NOTE ADULT - SUBJECTIVE AND OBJECTIVE BOX
Patient is a 38y old  Female who presents with a chief complaint of Cranioplasty (18 Sep 2023 00:36)      SUBJECTIVE:  Patient seen and examined at bedside.  Resting comfortably, swelling significantly improved.  No complaints, mood improving    ROS:  Denies fevers, chills, vision changes, neck pain, cough, SOB, chest pain, Abdominal pain, N/V.  All other ROS negative except as above    Vital Signs Last 12 Hrs  T(F): 97.8 (09-18-23 @ 08:45), Max: 98.4 (09-18-23 @ 04:56)  HR: 76 (09-18-23 @ 08:45) (76 - 79)  BP: 138/91 (09-18-23 @ 08:45) (111/76 - 138/91)  BP(mean): 106 (09-18-23 @ 08:45) (106 - 106)  RR: 16 (09-18-23 @ 08:45) (16 - 16)  SpO2: 96% (09-18-23 @ 08:45) (96% - 98%)  I&O's Summary    17 Sep 2023 07:01  -  18 Sep 2023 07:00  --------------------------------------------------------  IN: 200 mL / OUT: 1750 mL / NET: -1550 mL        PHYSICAL EXAM:  Constitutional: NAD, comfortable in bed.  HEENT: PERRLA, EOMI, MMM, R cranioplasty site c/d/i, facial edema significantly improved  Neck: Supple  Respiratory: CTA B/L. No w/r/r.   Cardiovascular: RRR, normal S1 and S2, no m/r/g.   Gastrointestinal: +BS, soft NTND   Extremities: wwp; no cyanosis, clubbing or edema.   Vascular: Pulses equal and strong throughout.   Neurological: AAOx3, expressive aphasia, L hemiplegia  Skin: No gross skin abnormalities or rashes        LABS:                        9.0    5.67  )-----------( 226      ( 18 Sep 2023 10:17 )             26.7     09-18    137  |  100  |  5<L>  ----------------------------<  105<H>  3.6   |  29  |  0.31<L>    Ca    8.8      18 Sep 2023 10:17  Phos  4.5     09-18  Mg     1.6     09-18        Urinalysis Basic - ( 18 Sep 2023 10:17 )    Color: x / Appearance: x / SG: x / pH: x  Gluc: 105 mg/dL / Ketone: x  / Bili: x / Urobili: x   Blood: x / Protein: x / Nitrite: x   Leuk Esterase: x / RBC: x / WBC x   Sq Epi: x / Non Sq Epi: x / Bacteria: x          RADIOLOGY & ADDITIONAL TESTS:  No new imaging    MEDICATIONS  (STANDING):  bacitracin   Ointment 1 Application(s) Topical daily  baclofen 5 milliGRAM(s) Oral every 8 hours  cephalexin 500 milliGRAM(s) Oral every 12 hours  divalproex  milliGRAM(s) Oral every 12 hours  enoxaparin Injectable 40 milliGRAM(s) SubCutaneous every 24 hours  escitalopram 20 milliGRAM(s) Oral daily  gabapentin 400 milliGRAM(s) Oral <User Schedule>  gabapentin 400 milliGRAM(s) Oral <User Schedule>  influenza   Vaccine 0.5 milliLiter(s) IntraMuscular once  lacosamide 150 milliGRAM(s) Oral every 12 hours  levothyroxine 75 MICROGram(s) Oral daily  magnesium sulfate  IVPB 4 Gram(s) IV Intermittent once  memantine 5 milliGRAM(s) Oral <User Schedule>  pantoprazole    Tablet 40 milliGRAM(s) Oral before breakfast  potassium chloride   Powder 40 milliEquivalent(s) Oral once  senna 2 Tablet(s) Oral at bedtime  traZODone 50 milliGRAM(s) Oral at bedtime    MEDICATIONS  (PRN):  acetaminophen     Tablet .. 650 milliGRAM(s) Oral every 6 hours PRN Mild Pain (1 - 3)  benzocaine/menthol Lozenge 1 Lozenge Oral three times a day PRN Sore Throat  bisacodyl 5 milliGRAM(s) Oral every 12 hours PRN Constipation  traMADol 50 milliGRAM(s) Oral every 6 hours PRN Severe Pain (7 - 10)

## 2023-09-18 NOTE — PROGRESS NOTE ADULT - SUBJECTIVE AND OBJECTIVE BOX
SUBJECTIVE: Patient reports mild sore throat, given lozenge. States headache improving. Denies cough, weakness, numbness, tingling, nausea, vomiting, chest pain, palpitations, shortness of breath, vision changes.     HOSPITAL COURSE:  9/10: transferred for cranioplasty Tuesday 9/11: ALETHEA overnight. MRI he completed  9/12: preop OR today.   POD 0 right cranioplasty with longevity implant   9/13: POD1 R cranioplasty w/ longevity implant. Overnight NAD, pt difficult to assess and not taking PO meds, given IV tylenol and IV dialudid for pain control.  Na downtrending overnight 132, f/u repeat, urine studies sent consistent w/ salt wasting, consider 3% bolus.   9/14: POD 2 R cranioplasty. Per psych, increase lexapro to 20mg daily, HAROON 1 d'cced today, JP2 remains in place,   9/15: POD 3 R cranioplasty, ALETHEA. Started SQL. Dc'd IVF. HAROON drain removed.   9/16: POD 4 R cranioplasty. Headwrap re-wrapped.   9/17: POD 5 R cranioplasty. Pending rehab. Sore throat, given lozenges. Headache improved. Baci/xeroform on incision changed.   9/18: POD 6 R cranioplasty. Rehab pending.     Vital Signs Last 24 Hrs  T(C): 36.3 (17 Sep 2023 20:44), Max: 36.8 (17 Sep 2023 05:55)  T(F): 97.4 (17 Sep 2023 20:44), Max: 98.2 (17 Sep 2023 05:55)  HR: 80 (17 Sep 2023 20:44) (78 - 86)  BP: 130/87 (17 Sep 2023 20:44) (125/89 - 149/96)  BP(mean): 114 (17 Sep 2023 08:38) (114 - 114)  RR: 16 (17 Sep 2023 20:44) (16 - 18)  SpO2: 96% (17 Sep 2023 20:44) (96% - 99%)    Parameters below as of 17 Sep 2023 20:44  Patient On (Oxygen Delivery Method): room air    I&O's Summary    16 Sep 2023 07:01  -  17 Sep 2023 07:00  --------------------------------------------------------  IN: 260 mL / OUT: 900 mL / NET: -640 mL    17 Sep 2023 07:01  -  18 Sep 2023 00:37  --------------------------------------------------------  IN: 100 mL / OUT: 1350 mL / NET: -1250 mL    PHYSICAL EXAM:  General: Emotional, sitting up in bed   HEENT: PERRL 3mm, right eye swelling reduced, face appears symmetric   Cardiovascular: RRR, normal S1 and S2   Respiratory: lungs CTAB, no wheezing, rhonchi, or crackles   GI: normoactive BS to auscultation, abd soft, NTND   Neuro: AAO x 3, expressive aphasia, L plegia, RUE/RLE 5/5  Extremities: distal pulses 2+ x4   Wound/incision: R crani incision site w/ xeroform    LABS:    CAPILLARY BLOOD GLUCOSE    Drug Levels: [] N/A  Valproic Acid Level, Serum: 72.9 ug/mL (09-13 @ 05:29)  Valproic Acid Level, Serum: 119.2 ug/mL (09-12 @ 05:30)    CSF Analysis: [] N/A      Allergies    No Known Allergies    Intolerances      MEDICATIONS:  Antibiotics:    Neuro:  acetaminophen     Tablet .. 650 milliGRAM(s) Oral every 6 hours PRN  baclofen 5 milliGRAM(s) Oral every 8 hours  escitalopram 20 milliGRAM(s) Oral daily  gabapentin 400 milliGRAM(s) Oral <User Schedule>  gabapentin 400 milliGRAM(s) Oral <User Schedule>  lacosamide IVPB 150 milliGRAM(s) IV Intermittent every 12 hours  memantine 5 milliGRAM(s) Oral <User Schedule>  traMADol 50 milliGRAM(s) Oral every 6 hours PRN  traZODone 50 milliGRAM(s) Oral at bedtime  valproate sodium  IVPB 500 milliGRAM(s) IV Intermittent every 12 hours    Anticoagulation:  enoxaparin Injectable 40 milliGRAM(s) SubCutaneous every 24 hours    OTHER:  bacitracin   Ointment 1 Application(s) Topical daily  benzocaine/menthol Lozenge 1 Lozenge Oral three times a day PRN  bisacodyl 5 milliGRAM(s) Oral every 12 hours PRN  influenza   Vaccine 0.5 milliLiter(s) IntraMuscular once  levothyroxine 75 MICROGram(s) Oral daily  pantoprazole    Tablet 40 milliGRAM(s) Oral before breakfast  senna 2 Tablet(s) Oral at bedtime    IVF:    CULTURES:    RADIOLOGY & ADDITIONAL TESTS:      ASSESSMENT:  38F hx hypothyroid was found down by mom unresponsive in the bathroom on 6/6, was airlifted to [facility] for a large R frontal IPH with IVH and hydrocephalus. She was emergently intubated and taken to operating room for right hemicraniectomy and EVD placement with Dr. Bernstein. Patient was deemed medically stable and was sent to [facility] for 5 weeks then transferred to [facility] for 4 weeks. Now s/p right cranioplasty with longevity implant 9/12/23.     Neuro  - neuro/vitals q4h  - pain control as needed tylenol  - continue AEDs as ordered: vimpat 150mg bid and depakote 500 BID  - postop CTH completed  - baclofen 5mg q8 for muscle spasms per rehab medicine attending (alternative to tizanidine that was being given at rehab)   - pending PT/OT, neuropsych eval  - psych consulted, increase Lexapro to 20 daily, can give Ativan prn   - epilepsy following for AED management   - speech pathology assessment 9/16    Cardio  - -140    Pulm  - RA    GI  - Regular diet  - bowel regimen    Renal  - voiding    Endo  - continue synthroid    Heme  - SCDs, SQL for DVT prophylaxis    ID  - afebrile    Dispo:   - ICU status, full code, dispo pending    D/w Dr. Sy

## 2023-09-18 NOTE — BH CONSULTATION LIAISON PROGRESS NOTE - NSBHFUPINTERVALHXFT_PSY_A_CORE
Chart reviewed. Pt continues to make gains in post-op course. Continues on lexapro 20mg daily.   Pt seen at bedside this morning for follow up. Pt's wife, aunt, and mother also at bedside. Family members report pt's mood is much improved. No further agitated episodes. Pt appears calmer, more engaged. Describes feeling tired. Knows she is in a hospital; initially believes she is at [facility]. Unclear knowledge of current date but reads it from the board posted by her bedside. Appears to have insight into her deficits -- is aware she doesn't correctly remember the date. Denies any acute depressive symptoms or SI/HI/AVH.

## 2023-09-18 NOTE — BH CONSULTATION LIAISON PROGRESS NOTE - CURRENT MEDICATION
MEDICATIONS  (STANDING):  baclofen 5 milliGRAM(s) Oral every 8 hours  ceFAZolin   IVPB 2000 milliGRAM(s) IV Intermittent every 8 hours  enoxaparin Injectable 40 milliGRAM(s) SubCutaneous every 24 hours  escitalopram 20 milliGRAM(s) Oral daily  gabapentin 400 milliGRAM(s) Oral <User Schedule>  gabapentin 400 milliGRAM(s) Oral <User Schedule>  influenza   Vaccine 0.5 milliLiter(s) IntraMuscular once  lacosamide IVPB 150 milliGRAM(s) IV Intermittent every 12 hours  levothyroxine 75 MICROGram(s) Oral daily  memantine 5 milliGRAM(s) Oral <User Schedule>  pantoprazole    Tablet 40 milliGRAM(s) Oral before breakfast  senna 2 Tablet(s) Oral at bedtime  sodium chloride 0.9%. 1000 milliLiter(s) (60 mL/Hr) IV Continuous <Continuous>  traZODone 50 milliGRAM(s) Oral at bedtime  valproate sodium  IVPB 500 milliGRAM(s) IV Intermittent every 12 hours    MEDICATIONS  (PRN):  bisacodyl 5 milliGRAM(s) Oral every 12 hours PRN Constipation  labetalol Injectable 10 milliGRAM(s) IV Push every 2 hours PRN Systolic blood pressure > 140 mmHg  traMADol 50 milliGRAM(s) Oral every 6 hours PRN Severe Pain (7 - 10)  
MEDICATIONS  (STANDING):  bacitracin   Ointment 1 Application(s) Topical daily  baclofen 5 milliGRAM(s) Oral every 8 hours  cephalexin 500 milliGRAM(s) Oral every 12 hours  divalproex  milliGRAM(s) Oral every 12 hours  enoxaparin Injectable 40 milliGRAM(s) SubCutaneous every 24 hours  escitalopram 20 milliGRAM(s) Oral daily  gabapentin 400 milliGRAM(s) Oral <User Schedule>  gabapentin 400 milliGRAM(s) Oral <User Schedule>  influenza   Vaccine 0.5 milliLiter(s) IntraMuscular once  lacosamide 150 milliGRAM(s) Oral every 12 hours  levothyroxine 75 MICROGram(s) Oral daily  memantine 5 milliGRAM(s) Oral <User Schedule>  pantoprazole    Tablet 40 milliGRAM(s) Oral before breakfast  senna 2 Tablet(s) Oral at bedtime  traZODone 50 milliGRAM(s) Oral at bedtime    MEDICATIONS  (PRN):  acetaminophen     Tablet .. 650 milliGRAM(s) Oral every 6 hours PRN Mild Pain (1 - 3)  benzocaine/menthol Lozenge 1 Lozenge Oral three times a day PRN Sore Throat  bisacodyl 5 milliGRAM(s) Oral every 12 hours PRN Constipation  traMADol 50 milliGRAM(s) Oral every 6 hours PRN Severe Pain (7 - 10)

## 2023-09-19 LAB
ANION GAP SERPL CALC-SCNC: 10 MMOL/L — SIGNIFICANT CHANGE UP (ref 5–17)
BUN SERPL-MCNC: 6 MG/DL — LOW (ref 7–23)
CALCIUM SERPL-MCNC: 8.7 MG/DL — SIGNIFICANT CHANGE UP (ref 8.4–10.5)
CHLORIDE SERPL-SCNC: 101 MMOL/L — SIGNIFICANT CHANGE UP (ref 96–108)
CO2 SERPL-SCNC: 28 MMOL/L — SIGNIFICANT CHANGE UP (ref 22–31)
CREAT SERPL-MCNC: 0.35 MG/DL — LOW (ref 0.5–1.3)
EGFR: 133 ML/MIN/1.73M2 — SIGNIFICANT CHANGE UP
GLUCOSE SERPL-MCNC: 108 MG/DL — HIGH (ref 70–99)
HCT VFR BLD CALC: 28.1 % — LOW (ref 34.5–45)
HGB BLD-MCNC: 9.6 G/DL — LOW (ref 11.5–15.5)
MAGNESIUM SERPL-MCNC: 1.7 MG/DL — SIGNIFICANT CHANGE UP (ref 1.6–2.6)
MCHC RBC-ENTMCNC: 31.9 PG — SIGNIFICANT CHANGE UP (ref 27–34)
MCHC RBC-ENTMCNC: 34.2 GM/DL — SIGNIFICANT CHANGE UP (ref 32–36)
MCV RBC AUTO: 93.4 FL — SIGNIFICANT CHANGE UP (ref 80–100)
NRBC # BLD: 0 /100 WBCS — SIGNIFICANT CHANGE UP (ref 0–0)
PHOSPHATE SERPL-MCNC: 3.8 MG/DL — SIGNIFICANT CHANGE UP (ref 2.5–4.5)
PLATELET # BLD AUTO: 297 K/UL — SIGNIFICANT CHANGE UP (ref 150–400)
POTASSIUM SERPL-MCNC: 4.2 MMOL/L — SIGNIFICANT CHANGE UP (ref 3.5–5.3)
POTASSIUM SERPL-SCNC: 4.2 MMOL/L — SIGNIFICANT CHANGE UP (ref 3.5–5.3)
RBC # BLD: 3.01 M/UL — LOW (ref 3.8–5.2)
RBC # FLD: 13.5 % — SIGNIFICANT CHANGE UP (ref 10.3–14.5)
SODIUM SERPL-SCNC: 139 MMOL/L — SIGNIFICANT CHANGE UP (ref 135–145)
SURGICAL PATHOLOGY STUDY: SIGNIFICANT CHANGE UP
WBC # BLD: 7.81 K/UL — SIGNIFICANT CHANGE UP (ref 3.8–10.5)
WBC # FLD AUTO: 7.81 K/UL — SIGNIFICANT CHANGE UP (ref 3.8–10.5)

## 2023-09-19 PROCEDURE — 99233 SBSQ HOSP IP/OBS HIGH 50: CPT

## 2023-09-19 PROCEDURE — 99232 SBSQ HOSP IP/OBS MODERATE 35: CPT

## 2023-09-19 PROCEDURE — 99232 SBSQ HOSP IP/OBS MODERATE 35: CPT | Mod: GC

## 2023-09-19 RX ORDER — POTASSIUM CHLORIDE 20 MEQ
40 PACKET (EA) ORAL ONCE
Refills: 0 | Status: COMPLETED | OUTPATIENT
Start: 2023-09-19 | End: 2023-09-19

## 2023-09-19 RX ORDER — TRAMADOL HYDROCHLORIDE 50 MG/1
50 TABLET ORAL EVERY 6 HOURS
Refills: 0 | Status: DISCONTINUED | OUTPATIENT
Start: 2023-09-19 | End: 2023-09-21

## 2023-09-19 RX ORDER — MAGNESIUM OXIDE 400 MG ORAL TABLET 241.3 MG
400 TABLET ORAL ONCE
Refills: 0 | Status: COMPLETED | OUTPATIENT
Start: 2023-09-19 | End: 2023-09-19

## 2023-09-19 RX ORDER — MAGNESIUM SULFATE 500 MG/ML
1 VIAL (ML) INJECTION ONCE
Refills: 0 | Status: COMPLETED | OUTPATIENT
Start: 2023-09-19 | End: 2023-09-19

## 2023-09-19 RX ADMIN — TRAMADOL HYDROCHLORIDE 50 MILLIGRAM(S): 50 TABLET ORAL at 17:32

## 2023-09-19 RX ADMIN — Medication 5 MILLIGRAM(S): at 06:04

## 2023-09-19 RX ADMIN — MEMANTINE HYDROCHLORIDE 5 MILLIGRAM(S): 10 TABLET ORAL at 06:04

## 2023-09-19 RX ADMIN — ENOXAPARIN SODIUM 40 MILLIGRAM(S): 100 INJECTION SUBCUTANEOUS at 21:48

## 2023-09-19 RX ADMIN — MEMANTINE HYDROCHLORIDE 5 MILLIGRAM(S): 10 TABLET ORAL at 17:55

## 2023-09-19 RX ADMIN — GABAPENTIN 400 MILLIGRAM(S): 400 CAPSULE ORAL at 21:49

## 2023-09-19 RX ADMIN — LACOSAMIDE 150 MILLIGRAM(S): 50 TABLET ORAL at 17:55

## 2023-09-19 RX ADMIN — SENNA PLUS 2 TABLET(S): 8.6 TABLET ORAL at 21:49

## 2023-09-19 RX ADMIN — Medication 5 MILLIGRAM(S): at 14:55

## 2023-09-19 RX ADMIN — Medication 40 MILLIEQUIVALENT(S): at 17:56

## 2023-09-19 RX ADMIN — TRAMADOL HYDROCHLORIDE 50 MILLIGRAM(S): 50 TABLET ORAL at 16:32

## 2023-09-19 RX ADMIN — POLYETHYLENE GLYCOL 3350 17 GRAM(S): 17 POWDER, FOR SOLUTION ORAL at 06:02

## 2023-09-19 RX ADMIN — Medication 75 MICROGRAM(S): at 06:03

## 2023-09-19 RX ADMIN — ESCITALOPRAM OXALATE 20 MILLIGRAM(S): 10 TABLET, FILM COATED ORAL at 11:36

## 2023-09-19 RX ADMIN — DIVALPROEX SODIUM 500 MILLIGRAM(S): 500 TABLET, DELAYED RELEASE ORAL at 17:55

## 2023-09-19 RX ADMIN — TRAMADOL HYDROCHLORIDE 50 MILLIGRAM(S): 50 TABLET ORAL at 22:48

## 2023-09-19 RX ADMIN — Medication 50 MILLIGRAM(S): at 21:49

## 2023-09-19 RX ADMIN — Medication 500 MILLIGRAM(S): at 14:55

## 2023-09-19 RX ADMIN — TRAMADOL HYDROCHLORIDE 50 MILLIGRAM(S): 50 TABLET ORAL at 21:48

## 2023-09-19 RX ADMIN — Medication 1 APPLICATION(S): at 11:36

## 2023-09-19 RX ADMIN — GABAPENTIN 400 MILLIGRAM(S): 400 CAPSULE ORAL at 11:35

## 2023-09-19 RX ADMIN — Medication 5 MILLIGRAM(S): at 21:49

## 2023-09-19 RX ADMIN — PANTOPRAZOLE SODIUM 40 MILLIGRAM(S): 20 TABLET, DELAYED RELEASE ORAL at 06:04

## 2023-09-19 RX ADMIN — Medication 100 GRAM(S): at 17:53

## 2023-09-19 RX ADMIN — LACOSAMIDE 150 MILLIGRAM(S): 50 TABLET ORAL at 06:03

## 2023-09-19 RX ADMIN — Medication 500 MILLIGRAM(S): at 02:55

## 2023-09-19 RX ADMIN — DIVALPROEX SODIUM 500 MILLIGRAM(S): 500 TABLET, DELAYED RELEASE ORAL at 06:04

## 2023-09-19 RX ADMIN — MAGNESIUM OXIDE 400 MG ORAL TABLET 400 MILLIGRAM(S): 241.3 TABLET ORAL at 21:49

## 2023-09-19 RX ADMIN — POLYETHYLENE GLYCOL 3350 17 GRAM(S): 17 POWDER, FOR SOLUTION ORAL at 17:55

## 2023-09-19 NOTE — PROGRESS NOTE ADULT - SUBJECTIVE AND OBJECTIVE BOX
INTERVAL COURSE:      -----------------------------------------------------------------------  FUNCTIONAL PROGRESS:  PT 9/18:  " A&Ox2-3 but demo ability to follow single step commands ~50-75% of the time; primarily limited with continued crying/labile demeanor although improved ability to re-direct to task as compared to previous encounters. *IMPROVED activity tolerance as compared to initial evaluation allowing for mobilization to EOB although continues to benefit from 2 person assist. Demo good potential to continue to progress to OOB transfers within 1-2 treatment sessions pending improvement in overall demeanor."    Bed Mobility  Bed Mobility Training Symptoms Noted During/After Treatment: none  Bed Mobility Training Rolling/Turning: maximum assist (25% patient effort);  2 person assist;  nonverbal cues (demo/gestures);  verbal cues;  rolling to (L) side  Bed Mobility Training Scooting: maximum assist (25% patient effort);  2 person assist;  nonverbal cues (demo/gestures);  verbal cues;  scooting to EOB in sitting with yael-pad assist  Bed Mobility Training Sit-to-Supine: maximum assist (25% patient effort);  2 person assist;  nonverbal cues (demo/gestures);  verbal cues;  poor eccentric trunk control; *increased assist for B/L LEs onto bed surface;  HOB < 20 degrees  Bed Mobility Training Supine-to-Sit: maximum assist (25% patient effort);  2 person assist;  nonverbal cues (demo/gestures);  verbal cues;  able to bring (R)LE to EOB with max verbal cueing; *increased assist for trunk mobility;  HOB elevated ~30 degrees  Bed Mobility Training Limitations: decreased flexibility;  decreased ROM;  decreased strength;  impaired balance;  impaired coordination;  impaired motor control;  impaired postural control    Sit-Stand Transfer Training  Sit-to-Stand Transfer Training Treatment not Performed: Therapists deferred 2/2 poor static sitting balance and postural control    Therapeutic Exercise  Therapeutic Exercise Detail: Grossly assessed with functional movement, Right UE/LE greater than or equal to 3+/5 upon spontaneous movement; (L)UE no active movement noted; (L)LE no active movement noted // Demo ability to follow single step commands for (R)LE light-touch sensation and single-step active ROM tasks through (R)UE and (R)LE // Therapist performed PROM to (L)ankle DF for 3 reps x 20-30 seconds each (noted mildly decreased DF to neutral); **will discuss with nsx team to order resting night splint     Functional Endurance  Functional Endurance Detail: Dangled EOB x ~10 minutes with max assist x 1 to maintain static standing balance as patient demo (L)LOB/leaning throughout // Therapist attempted to obtain sitting BP although norma-map unable to determine BP, patient returned to semi-supine and BP: 78/54 and HR:84 (JASWANT Johnson at bedside and re-assess BP; patient remained alert and verbal throughout despite hypotension).       -----------------------------------------------------------------------  REVIEW OF SYSTEMS:  unable to reliably assess. Patient asleep. Awakens to voice. Answers "no" to pain, nausea, or headaches.     -----------------------------------------------------------------------  VITALS  T(C): 36.9 (09-19-23 @ 08:15), Max: 37 (09-18-23 @ 16:04)  HR: 67 (09-19-23 @ 08:15) (67 - 81)  BP: 120/84 (09-19-23 @ 08:15) (120/84 - 145/95)  RR: 17 (09-19-23 @ 08:15) (16 - 17)  SpO2: 97% (09-19-23 @ 08:15) (93% - 99%)  Wt(kg): --    PHYSICAL EXAM  Constitutional - NAD, distracted  HEENT - s/p right cranioplasty  Neck - Supple  Chest - Breathing comfortably, No Respiratory distress  Cardiovascular - Regular pulse  Abdomen - No visible abdominal distension  Extremities - left upper extremity paresis - resting hand splint donned   Neurologic Exam -                    Cognitive - Asleep but awakens to voice. Follows 1-2 step commands     Communication - Fluent, No dysarthria, repetition intact     Cranial Nerves -  EOMI, No facial asymmetry     Motor - left spastic hemiplegia. spontaneous movements of right upper extremity      Sensory - unable to reliably assess  Psychiatric - calm    -----------------------------------------------------------------------    MEDICATIONS   acetaminophen     Tablet .. 650 milliGRAM(s) every 6 hours PRN  bacitracin   Ointment 1 Application(s) daily  baclofen 5 milliGRAM(s) every 8 hours  benzocaine/menthol Lozenge 1 Lozenge three times a day PRN  bisacodyl 5 milliGRAM(s) every 12 hours PRN  cephalexin 500 milliGRAM(s) every 12 hours  divalproex  milliGRAM(s) every 12 hours  enoxaparin Injectable 40 milliGRAM(s) every 24 hours  escitalopram 20 milliGRAM(s) daily  gabapentin 400 milliGRAM(s) <User Schedule>  gabapentin 400 milliGRAM(s) <User Schedule>  influenza   Vaccine 0.5 milliLiter(s) once  lacosamide 150 milliGRAM(s) every 12 hours  levothyroxine 75 MICROGram(s) daily  magnesium sulfate  IVPB 1 Gram(s) once  memantine 5 milliGRAM(s) <User Schedule>  pantoprazole    Tablet 40 milliGRAM(s) before breakfast  polyethylene glycol 3350 17 Gram(s) two times a day  potassium chloride    Tablet ER 40 milliEquivalent(s) once  senna 2 Tablet(s) at bedtime  traMADol 50 milliGRAM(s) every 6 hours PRN  traZODone 50 milliGRAM(s) at bedtime      RECENT LABS  CBC Full  -  ( 18 Sep 2023 10:17 )  WBC Count : 5.67 K/uL  RBC Count : 2.81 M/uL  Hemoglobin : 9.0 g/dL  Hematocrit : 26.7 %  Platelet Count - Automated : 226 K/uL  Mean Cell Volume : 95.0 fl  Mean Cell Hemoglobin : 32.0 pg  Mean Cell Hemoglobin Concentration : 33.7 gm/dL  Auto Neutrophil # : x  Auto Lymphocyte # : x  Auto Monocyte # : x  Auto Eosinophil # : x  Auto Basophil # : x  Auto Neutrophil % : x  Auto Lymphocyte % : x  Auto Monocyte % : x  Auto Eosinophil % : x  Auto Basophil % : x    09-18    137  |  100  |  5<L>  ----------------------------<  105<H>  3.6   |  29  |  0.31<L>    Ca    8.8      18 Sep 2023 10:17  Phos  4.5     09-18  Mg     1.6     09-18      Urinalysis Basic - ( 18 Sep 2023 10:17 )    Color: x / Appearance: x / SG: x / pH: x  Gluc: 105 mg/dL / Ketone: x  / Bili: x / Urobili: x   Blood: x / Protein: x / Nitrite: x   Leuk Esterase: x / RBC: x / WBC x   Sq Epi: x / Non Sq Epi: x / Bacteria: x        -----------------------------------------------------------------------  IMAGING      -----------------------------------------------------------------------             INTERVAL COURSE:  Working with PT/OT. Tolerating therapy sessions. Reported neuropathic pain on left side.    -----------------------------------------------------------------------  FUNCTIONAL PROGRESS:  PT 9/18:  " A&Ox2-3 but demo ability to follow single step commands ~50-75% of the time; primarily limited with continued crying/labile demeanor although improved ability to re-direct to task as compared to previous encounters. *IMPROVED activity tolerance as compared to initial evaluation allowing for mobilization to EOB although continues to benefit from 2 person assist. Demo good potential to continue to progress to OOB transfers within 1-2 treatment sessions pending improvement in overall demeanor."    Bed Mobility  Bed Mobility Training Symptoms Noted During/After Treatment: none  Bed Mobility Training Rolling/Turning: maximum assist (25% patient effort);  2 person assist;  nonverbal cues (demo/gestures);  verbal cues;  rolling to (L) side  Bed Mobility Training Scooting: maximum assist (25% patient effort);  2 person assist;  nonverbal cues (demo/gestures);  verbal cues;  scooting to EOB in sitting with yael-pad assist  Bed Mobility Training Sit-to-Supine: maximum assist (25% patient effort);  2 person assist;  nonverbal cues (demo/gestures);  verbal cues;  poor eccentric trunk control; *increased assist for B/L LEs onto bed surface;  HOB < 20 degrees  Bed Mobility Training Supine-to-Sit: maximum assist (25% patient effort);  2 person assist;  nonverbal cues (demo/gestures);  verbal cues;  able to bring (R)LE to EOB with max verbal cueing; *increased assist for trunk mobility;  HOB elevated ~30 degrees  Bed Mobility Training Limitations: decreased flexibility;  decreased ROM;  decreased strength;  impaired balance;  impaired coordination;  impaired motor control;  impaired postural control    Sit-Stand Transfer Training  Sit-to-Stand Transfer Training Treatment not Performed: Therapists deferred 2/2 poor static sitting balance and postural control    Therapeutic Exercise  Therapeutic Exercise Detail: Grossly assessed with functional movement, Right UE/LE greater than or equal to 3+/5 upon spontaneous movement; (L)UE no active movement noted; (L)LE no active movement noted // Demo ability to follow single step commands for (R)LE light-touch sensation and single-step active ROM tasks through (R)UE and (R)LE // Therapist performed PROM to (L)ankle DF for 3 reps x 20-30 seconds each (noted mildly decreased DF to neutral); **will discuss with nsx team to order resting night splint     Functional Endurance  Functional Endurance Detail: Dangled EOB x ~10 minutes with max assist x 1 to maintain static standing balance as patient demo (L)LOB/leaning throughout // Therapist attempted to obtain sitting BP although norma-map unable to determine BP, patient returned to semi-supine and BP: 78/54 and HR:84 (JASWANT Johnson at bedside and re-assess BP; patient remained alert and verbal throughout despite hypotension).       -----------------------------------------------------------------------  REVIEW OF SYSTEMS:  unable to reliably assess. Patient asleep. Awakens to voice. Answers "no" to pain, nausea, or headaches.     -----------------------------------------------------------------------  VITALS  T(C): 36.9 (09-19-23 @ 08:15), Max: 37 (09-18-23 @ 16:04)  HR: 67 (09-19-23 @ 08:15) (67 - 81)  BP: 120/84 (09-19-23 @ 08:15) (120/84 - 145/95)  RR: 17 (09-19-23 @ 08:15) (16 - 17)  SpO2: 97% (09-19-23 @ 08:15) (93% - 99%)  Wt(kg): --    PHYSICAL EXAM  Constitutional - NAD, distracted  HEENT - s/p right cranioplasty  Neck - Supple  Chest - Breathing comfortably, No Respiratory distress  Cardiovascular - Regular pulse  Abdomen - No visible abdominal distension  Extremities - left upper extremity paresis - resting hand splint donned   Neurologic Exam -                    Cognitive - Asleep but awakens to voice. Follows 1-2 step commands     Communication - Fluent, No dysarthria, repetition intact     Cranial Nerves -  EOMI, No facial asymmetry     Motor - left spastic hemiplegia. spontaneous movements of right upper extremity      Sensory - unable to reliably assess  Psychiatric - calm    -----------------------------------------------------------------------    MEDICATIONS   acetaminophen     Tablet .. 650 milliGRAM(s) every 6 hours PRN  bacitracin   Ointment 1 Application(s) daily  baclofen 5 milliGRAM(s) every 8 hours  benzocaine/menthol Lozenge 1 Lozenge three times a day PRN  bisacodyl 5 milliGRAM(s) every 12 hours PRN  cephalexin 500 milliGRAM(s) every 12 hours  divalproex  milliGRAM(s) every 12 hours  enoxaparin Injectable 40 milliGRAM(s) every 24 hours  escitalopram 20 milliGRAM(s) daily  gabapentin 400 milliGRAM(s) <User Schedule>  gabapentin 400 milliGRAM(s) <User Schedule>  influenza   Vaccine 0.5 milliLiter(s) once  lacosamide 150 milliGRAM(s) every 12 hours  levothyroxine 75 MICROGram(s) daily  magnesium sulfate  IVPB 1 Gram(s) once  memantine 5 milliGRAM(s) <User Schedule>  pantoprazole    Tablet 40 milliGRAM(s) before breakfast  polyethylene glycol 3350 17 Gram(s) two times a day  potassium chloride    Tablet ER 40 milliEquivalent(s) once  senna 2 Tablet(s) at bedtime  traMADol 50 milliGRAM(s) every 6 hours PRN  traZODone 50 milliGRAM(s) at bedtime      RECENT LABS  CBC Full  -  ( 18 Sep 2023 10:17 )  WBC Count : 5.67 K/uL  RBC Count : 2.81 M/uL  Hemoglobin : 9.0 g/dL  Hematocrit : 26.7 %  Platelet Count - Automated : 226 K/uL  Mean Cell Volume : 95.0 fl  Mean Cell Hemoglobin : 32.0 pg  Mean Cell Hemoglobin Concentration : 33.7 gm/dL  Auto Neutrophil # : x  Auto Lymphocyte # : x  Auto Monocyte # : x  Auto Eosinophil # : x  Auto Basophil # : x  Auto Neutrophil % : x  Auto Lymphocyte % : x  Auto Monocyte % : x  Auto Eosinophil % : x  Auto Basophil % : x    09-18    137  |  100  |  5<L>  ----------------------------<  105<H>  3.6   |  29  |  0.31<L>    Ca    8.8      18 Sep 2023 10:17  Phos  4.5     09-18  Mg     1.6     09-18      Urinalysis Basic - ( 18 Sep 2023 10:17 )    Color: x / Appearance: x / SG: x / pH: x  Gluc: 105 mg/dL / Ketone: x  / Bili: x / Urobili: x   Blood: x / Protein: x / Nitrite: x   Leuk Esterase: x / RBC: x / WBC x   Sq Epi: x / Non Sq Epi: x / Bacteria: x        -----------------------------------------------------------------------  IMAGING      -----------------------------------------------------------------------

## 2023-09-19 NOTE — SWALLOW BEDSIDE ASSESSMENT ADULT - PHARYNGEAL PHASE
Seemingly timely swallow, hyolaryngeal movement appreciated, with no clinical indicators of penetration/aspiration and pharyngeal inefficiency noted.

## 2023-09-19 NOTE — SWALLOW BEDSIDE ASSESSMENT ADULT - SLP GENERAL OBSERVATIONS
Pt was seen fully awake and alert, HOB fully elevated, on room air. A&Ox2, followed simple directives, and communicated wants/needs. No dysarthria noted. No dysphonia. Pt with worsened emotional lability in today's session. Pt required frequent redirection.

## 2023-09-19 NOTE — SWALLOW BEDSIDE ASSESSMENT ADULT - SWALLOW EVAL: RECOMMENDED FEEDING/EATING TECHNIQUES
maintain upright posture during/after eating for 30 mins/oral hygiene/position upright (90 degrees) alternate food with liquid/maintain upright posture during/after eating for 30 mins/oral hygiene/position upright (90 degrees)

## 2023-09-19 NOTE — SWALLOW BEDSIDE ASSESSMENT ADULT - SLP PERTINENT HISTORY OF CURRENT PROBLEM
PMHx hypothyroid was found down by mom unresponsive in the bathroom on 6/6, was airlifted to [facility] for a large R frontal IPH with IVH and hydrocephalus. She was emergently intubated and taken to operating room for right hemicraniectomy and EVD placement with Dr. Bernstein. Patient was deemed medically stable and was sent to [facility] for 5 weeks then transferred to [facility] for 4 weeks. Now s/p right cranioplasty with longevity implant 9/12/23.

## 2023-09-19 NOTE — PROGRESS NOTE ADULT - SUBJECTIVE AND OBJECTIVE BOX
Patient is a 38y old  Female who presents with a chief complaint of Cranioplasty (19 Sep 2023 08:56)      SUBJECTIVE:  Patient seen and examined at bedside.  Resting comfortably in bed.  Has been a few days since last BM but feels urge to have BM today    ROS:  Denies fevers, chills, cough, SOB, chest pain, Abdominal pain, N/V.  All other ROS negative except as above    Vital Signs Last 12 Hrs  T(F): 98.4 (09-19-23 @ 08:15), Max: 98.4 (09-19-23 @ 08:15)  HR: 67 (09-19-23 @ 08:15) (67 - 74)  BP: 120/84 (09-19-23 @ 08:15) (120/84 - 131/82)  BP(mean): --  RR: 17 (09-19-23 @ 08:15) (16 - 17)  SpO2: 97% (09-19-23 @ 08:15) (97% - 99%)  I&O's Summary    18 Sep 2023 07:01  -  19 Sep 2023 07:00  --------------------------------------------------------  IN: 0 mL / OUT: 700 mL / NET: -700 mL        PHYSICAL EXAM:  Constitutional: NAD  HEENT: PERRLA, EOMI, MMM, R cranioplasty site c/d/i, facial edema improving  Neck: Supple  Respiratory: CTA B/L. No w/r/r.   Cardiovascular: RRR, normal S1 and S2, no m/r/g.   Gastrointestinal: +BS, soft NTND   Extremities: wwp; no cyanosis, clubbing or edema.   Vascular: Pulses equal and strong throughout.   Neurological: AAOx3, expressive aphasia, L hemiplegia  Skin: No gross skin abnormalities or rashes other than crani site incision      LABS:                        9.0    5.67  )-----------( 226      ( 18 Sep 2023 10:17 )             26.7     09-18    137  |  100  |  5<L>  ----------------------------<  105<H>  3.6   |  29  |  0.31<L>    Ca    8.8      18 Sep 2023 10:17  Phos  4.5     09-18  Mg     1.6     09-18        Urinalysis Basic - ( 18 Sep 2023 10:17 )    Color: x / Appearance: x / SG: x / pH: x  Gluc: 105 mg/dL / Ketone: x  / Bili: x / Urobili: x   Blood: x / Protein: x / Nitrite: x   Leuk Esterase: x / RBC: x / WBC x   Sq Epi: x / Non Sq Epi: x / Bacteria: x          RADIOLOGY & ADDITIONAL TESTS:  No new imaging    MEDICATIONS  (STANDING):  bacitracin   Ointment 1 Application(s) Topical daily  baclofen 5 milliGRAM(s) Oral every 8 hours  cephalexin 500 milliGRAM(s) Oral every 12 hours  divalproex  milliGRAM(s) Oral every 12 hours  enoxaparin Injectable 40 milliGRAM(s) SubCutaneous every 24 hours  escitalopram 20 milliGRAM(s) Oral daily  gabapentin 400 milliGRAM(s) Oral <User Schedule>  gabapentin 400 milliGRAM(s) Oral <User Schedule>  influenza   Vaccine 0.5 milliLiter(s) IntraMuscular once  lacosamide 150 milliGRAM(s) Oral every 12 hours  levothyroxine 75 MICROGram(s) Oral daily  magnesium sulfate  IVPB 1 Gram(s) IV Intermittent once  memantine 5 milliGRAM(s) Oral <User Schedule>  pantoprazole    Tablet 40 milliGRAM(s) Oral before breakfast  polyethylene glycol 3350 17 Gram(s) Oral two times a day  potassium chloride    Tablet ER 40 milliEquivalent(s) Oral once  senna 2 Tablet(s) Oral at bedtime  traZODone 50 milliGRAM(s) Oral at bedtime    MEDICATIONS  (PRN):  acetaminophen     Tablet .. 650 milliGRAM(s) Oral every 6 hours PRN Mild Pain (1 - 3)  benzocaine/menthol Lozenge 1 Lozenge Oral three times a day PRN Sore Throat  bisacodyl 5 milliGRAM(s) Oral every 12 hours PRN Constipation  traMADol 50 milliGRAM(s) Oral every 6 hours PRN Severe Pain (7 - 10)

## 2023-09-19 NOTE — PROGRESS NOTE ADULT - ASSESSMENT
38F hx hypothyroid was found down by mom unresponsive in the bathroom on 6/6, was airlifted to [facility] for a large R frontal IPH with IVH and hydrocephalus. She was emergently intubated and taken to operating room for right hemicraniectomy and EVD placement with Dr. Bernstein who presented for cranioplasty, s/p right cranioplasty with longevity implant 9/12/23.    #R frontal IPH s/p hemicraniectomy  #Cranioplasty  #Postop state  - s/p right cranioplasty with longevity implant 9/12/23  - pain control per NSG  - c/w AEDs: Depakote, Gabapentin, Vimpat  - SCDs and Lovenox for DVT ppx    #Elevated BP  - BP previously labile in the setting of pain and anxiety  - continue to observe off antihypertensives    #Anemia  - likely component of surgical blood loss as well as inflammatory anemia post op  - Hgb stable 8-9  - iron panel consistent with AOCD    #Hypothyroidism  - c/w synthroid 75mcg    #Depression  - home lexapro 15mg increased to 20mg per psych recs  - psych following, appreciate recs for labile mood

## 2023-09-19 NOTE — PROGRESS NOTE ADULT - SUBJECTIVE AND OBJECTIVE BOX
HPI:  38F hx hypothyroid was found down by mom unresponsive in the bathroom on 6/6, was airlifted to [facility] for a large R frontal IPH with IVH and hydrocephalus. She was emergently intubated and taken to operating room for right hemicraniectomy and EVD placement with Dr. Bernstein. Patient was deemed medically stable and was sent  to [facility] for 5 weeks then transferred to [facility] for 4 weeks and presents today in preparation for cranioplasty this week.  (10 Sep 2023 17:50)  9/10: transferred for cranioplasty Tuesday 9/11: ALETHEA overnight. MRI he completed  9/12: preop OR today.   POD 0 right cranioplasty with longevity implant   9/13: POD1 R cranioplasty w/ longevity implant. Overnight NAD, pt difficult to assess and not taking PO meds, given IV tylenol and IV dialudid for pain control.  Na downtrending overnight 132, f/u repeat, urine studies sent consistent w/ salt wasting, consider 3% bolus.   9/14: POD 2 R cranioplasty. Per psych, increase lexapro to 20mg daily, HAROON 1 d'cced today, JP2 remains in place,   9/15: POD 3 R cranioplasty, ALETHEA. Started SQL. Dc'd IVF. HAROON drain removed.   9/16: POD 4 R cranioplasty. Headwrap re-wrapped.   9/17: POD 5 R cranioplasty. Pending rehab. Sore throat, given lozenges. Headache improved. Baci/xeroform on incision changed.   9/18: POD 6 R cranioplasty. Rehab pending.   9/19: POD 7 R cranioplasty, Pending rehabOVERNIGHT EVENTS: no acute events overnight   Vital Signs Last 24 Hrs  T(C): 36.6 (18 Sep 2023 20:52), Max: 37 (18 Sep 2023 16:04)  T(F): 97.8 (18 Sep 2023 20:52), Max: 98.6 (18 Sep 2023 16:04)  HR: 79 (18 Sep 2023 20:52) (70 - 81)  BP: 137/94 (18 Sep 2023 20:52) (111/76 - 145/95)  BP(mean): 108 (18 Sep 2023 20:52) (104 - 112)  RR: 17 (18 Sep 2023 20:52) (16 - 17)  SpO2: 98% (18 Sep 2023 20:52) (93% - 98%)    Parameters below as of 18 Sep 2023 20:52  Patient On (Oxygen Delivery Method): room air        I&O's Summary    17 Sep 2023 07:01  -  18 Sep 2023 07:00  --------------------------------------------------------  IN: 200 mL / OUT: 1750 mL / NET: -1550 mL    18 Sep 2023 07:01  -  19 Sep 2023 01:04  --------------------------------------------------------  IN: 0 mL / OUT: 700 mL / NET: -700 mL        PHYSICAL EXAM:  General: Emotional, sitting up in bed   HEENT: PERRL 3mm, right eye swelling reduced, face appears symmetric   Cardiovascular: RRR, normal S1 and S2   Respiratory: lungs CTAB, no wheezing, rhonchi, or crackles   GI: normoactive BS to auscultation, abd soft, NTND   Neuro: AAO x 2-3, expressive aphasia, L plegia, RUE/RLE 5/5  Extremities: distal pulses 2+ x4   Wound/incision: R crani incision site w/ xeroform    LABS:                        9.0    5.67  )-----------( 226      ( 18 Sep 2023 10:17 )             26.7     09-18    137  |  100  |  5<L>  ----------------------------<  105<H>  3.6   |  29  |  0.31<L>    Ca    8.8      18 Sep 2023 10:17  Phos  4.5     09-18  Mg     1.6     09-18        Urinalysis Basic - ( 18 Sep 2023 10:17 )    Color: x / Appearance: x / SG: x / pH: x  Gluc: 105 mg/dL / Ketone: x  / Bili: x / Urobili: x   Blood: x / Protein: x / Nitrite: x   Leuk Esterase: x / RBC: x / WBC x   Sq Epi: x / Non Sq Epi: x / Bacteria: x          CAPILLARY BLOOD GLUCOSE          Drug Levels: [] N/A  Valproic Acid Level, Serum: 72.9 ug/mL (09-13 @ 05:29)  Valproic Acid Level, Serum: 119.2 ug/mL (09-12 @ 05:30)    CSF Analysis: [] N/A      Allergies    No Known Allergies    Intolerances      MEDICATIONS:  Antibiotics:  cephalexin 500 milliGRAM(s) Oral every 12 hours    Neuro:  acetaminophen     Tablet .. 650 milliGRAM(s) Oral every 6 hours PRN  baclofen 5 milliGRAM(s) Oral every 8 hours  divalproex  milliGRAM(s) Oral every 12 hours  escitalopram 20 milliGRAM(s) Oral daily  gabapentin 400 milliGRAM(s) Oral <User Schedule>  gabapentin 400 milliGRAM(s) Oral <User Schedule>  lacosamide 150 milliGRAM(s) Oral every 12 hours  memantine 5 milliGRAM(s) Oral <User Schedule>  traMADol 50 milliGRAM(s) Oral every 6 hours PRN  traZODone 50 milliGRAM(s) Oral at bedtime    Anticoagulation:  enoxaparin Injectable 40 milliGRAM(s) SubCutaneous every 24 hours    OTHER:  bacitracin   Ointment 1 Application(s) Topical daily  benzocaine/menthol Lozenge 1 Lozenge Oral three times a day PRN  bisacodyl 5 milliGRAM(s) Oral every 12 hours PRN  influenza   Vaccine 0.5 milliLiter(s) IntraMuscular once  levothyroxine 75 MICROGram(s) Oral daily  pantoprazole    Tablet 40 milliGRAM(s) Oral before breakfast  polyethylene glycol 3350 17 Gram(s) Oral two times a day  senna 2 Tablet(s) Oral at bedtime    38F hx hypothyroid was found down by mom unresponsive in the bathroom on 6/6, was airlifted to [facility] for a large R frontal IPH with IVH and hydrocephalus. She was emergently intubated and taken to operating room for right hemicraniectomy and EVD placement with Dr. Bernstein. Patient was deemed medically stable and was sent to [facility] for 5 weeks then transferred to [facility] [place] for 4 weeks. Now s/p right cranioplasty with longevity implant 9/12/23.     Neuro  - neuro/vitals q4h  - pain control as needed tylenol  - continue AEDs as ordered: vimpat 150mg bid and depakote 500 BID  - postop CTH completed  - baclofen 5mg q8 for muscle spasms per rehab medicine attending (alternative to tizanidine that was being given at rehab)   - psych consulted, increase Lexapro to 20 daily, can give Ativan prn   - epilepsy following for AED management   - speech pathology assessment 9/16    Cardio  - -140    Pulm  - RA    GI  - Regular diet  - bowel regimen    Renal  - voiding    Endo  - continue synthroid    Heme  - SCDs, SQL for DVT prophylaxis    ID  - afebrile  - Keflex x2 wks neurplastics protocol from 9/18    Psych  - lexapro increase to 20mg daily    Dispo: Regional status, full code, dispo pending    D/w Dr. Sy    High Risk (score 12 or above)

## 2023-09-19 NOTE — SWALLOW BEDSIDE ASSESSMENT ADULT - SWALLOW EVAL: DIAGNOSIS
Mildly prolonged oral phase. No pocketing noted, though per wife pt is utilizing a liquid wash to clear oral cavity. Left lower quadrant asymmetry persists and will likely contribute to retention, though no significant impact to function noted. No clinical indicators of penetration/aspiration and pharyngeal inefficiency noted. Presentation c/w R hemispheric involvement. Anticipate improvement in clinical presentation with ongoing neuro recovery. No further acute dysphagia intervention deemed warranted.

## 2023-09-19 NOTE — PROGRESS NOTE ADULT - ASSESSMENT
ASSESSMENT:  38 year old female with history of large R frontal IVH with hydrocephalus s/p emergent right decompressive hemicraniectomy, discharged to acute rehab then [facility], presenting for cranioplasty.     PROBLEMS / IMPAIRMENTS:  IVH s/p decompressive hemicraniectomy - s/p cranioplasty  Spasticity  Emotional lability     PLAN / RECOMMENDATIONS:     Rehab / Mobilization:   - Initial therapy assessment: [X] PT  [X] OT   - Continue skilled therapy while admitted to prevent secondary complications of immobility focus on transfer training, bed mobility, progressive ambulation, and equipment evaluation.   - Educated patient and family members on post-acute rehabilitation. Discussed anticipated rehab course.  - Therapy precautions: craniectomy, falls     Bracing/Splinting:   - left resting hand splint    Pain Management:   - continue baclofen 5mg TID in place of home tizanidine for spasticity   - For left shoulder pain - can trial warm compress or lidocaine patch.   - Avoid/ monitor use sedating medications that may interfere with cognitive recovery     Mood  - On Lexapro and Depakote for mood stabilization  - Behavioral health consulted for assistance with medication mangement    Speech/ Swallow:   - SLP following for cognitive/ linguistic evaluation  - Diet: Regular + thins     GI/ Bowel:  - Continue to monitor bowel patterns. Agree with current bowel regimen.     / Bladder:  - Voiding via primafit.   - Recommend bladder scans for PVR daily to screen for urinary retention. If PVR normal, can discontinue.     DVT Prophylaxis:   -SCDs, chemoprophylaxis per primary team      Disposition:    - Evaluated by PT/OT - currently recommended for Acute Inpatient Rehabilitation. Final disposition recommendations pending activity tolerance and progress with therapy today.     Discussed with NSGY.    ASSESSMENT:  38 year old female with history of large R frontal IVH with hydrocephalus s/p emergent right decompressive hemicraniectomy, discharged to acute rehab then [facility], presenting for cranioplasty.     PROBLEMS / IMPAIRMENTS:  IVH s/p decompressive hemicraniectomy - s/p cranioplasty  Spasticity  Emotional lability   Neuropathic pain    PLAN / RECOMMENDATIONS:     Rehab / Mobilization:   - Initial therapy assessment: [X] PT  [X] OT   - Continue skilled therapy while admitted to prevent secondary complications of immobility focus on transfer training, bed mobility, progressive ambulation, and equipment evaluation.   - Educated patient and family members on post-acute rehabilitation. Discussed anticipated rehab course.  - Therapy precautions: craniectomy, falls     Bracing/Splinting:   - left resting hand splint  - Will order left stretching splint for contracture prevention    Pain Management:   - continue baclofen 5mg TID in place of home tizanidine for spasticity   - For left shoulder pain - can trial warm compress or lidocaine patch.   - Consider increasing gabapentin for neuropathic pain to 400mg TID.  - Avoid/ monitor use sedating medications that may interfere with cognitive recovery     Mood  - On Lexapro and Depakote for mood stabilization  - Behavioral health consulted for assistance with medication mangement    Speech/ Swallow:   - SLP following for cognitive/ linguistic evaluation  - Diet: Regular + thins     GI/ Bowel:  - Continue to monitor bowel patterns. Agree with current bowel regimen.     / Bladder:  - Voiding via primafit.   - Recommend bladder scans for PVR daily to screen for urinary retention. If PVR normal, can discontinue.     DVT Prophylaxis:   -SCDs, chemoprophylaxis per primary team      Disposition:  Acute Inpatient Rehabilitation  - Patient has significant functional impairments and would benefit from continued rehabilitation in the ACUTE inpatient rehab setting. She has both medical and functional needs appropriate for acute inpatient rehabilitation and would benefit from comprehensive interdisciplinary care, including a physiatrist, rehabilitation nursing, PT, OT, SLT and case management/social work. We anticipate that she would be able to tolerate 3 hours of PT/OT/SLT per day for 5 to 6 days per week to focus on mobility, transfers, wheelchair training, ADL training, speech, swallow, cognition, and patient education/safety. Medical necessity includes medication management, close monitoring of neurologic status,     Discussed with NSGY and interdisciplinary team.

## 2023-09-19 NOTE — PROGRESS NOTE ADULT - SUBJECTIVE AND OBJECTIVE BOX
INTERVAL HPI/OVERNIGHT EVENTS:  Patient seen and examined. Reports headache , her partner at bedside. Reports tolerating the current dose of meds well, deies any seizure or seizure like activity.     MEDICATIONS  (STANDING):  bacitracin   Ointment 1 Application(s) Topical daily  baclofen 5 milliGRAM(s) Oral every 8 hours  cephalexin 500 milliGRAM(s) Oral every 12 hours  divalproex  milliGRAM(s) Oral every 12 hours  enoxaparin Injectable 40 milliGRAM(s) SubCutaneous every 24 hours  escitalopram 20 milliGRAM(s) Oral daily  gabapentin 400 milliGRAM(s) Oral <User Schedule>  gabapentin 400 milliGRAM(s) Oral <User Schedule>  influenza   Vaccine 0.5 milliLiter(s) IntraMuscular once  lacosamide 150 milliGRAM(s) Oral every 12 hours  levothyroxine 75 MICROGram(s) Oral daily  magnesium sulfate  IVPB 1 Gram(s) IV Intermittent once  memantine 5 milliGRAM(s) Oral <User Schedule>  pantoprazole    Tablet 40 milliGRAM(s) Oral before breakfast  polyethylene glycol 3350 17 Gram(s) Oral two times a day  potassium chloride    Tablet ER 40 milliEquivalent(s) Oral once  senna 2 Tablet(s) Oral at bedtime  traZODone 50 milliGRAM(s) Oral at bedtime    MEDICATIONS  (PRN):  acetaminophen     Tablet .. 650 milliGRAM(s) Oral every 6 hours PRN Mild Pain (1 - 3)  benzocaine/menthol Lozenge 1 Lozenge Oral three times a day PRN Sore Throat  bisacodyl 5 milliGRAM(s) Oral every 12 hours PRN Constipation  traMADol 50 milliGRAM(s) Oral every 6 hours PRN Severe Pain (7 - 10)      Allergies    No Known Allergies    Intolerances        Vital Signs Last 24 Hrs  T(C): 36.9 (19 Sep 2023 15:50), Max: 36.9 (19 Sep 2023 08:15)  T(F): 98.5 (19 Sep 2023 15:50), Max: 98.5 (19 Sep 2023 15:50)  HR: 85 (19 Sep 2023 15:50) (67 - 85)  BP: 124/89 (19 Sep 2023 15:50) (120/84 - 137/94)  BP(mean): 108 (18 Sep 2023 20:52) (108 - 108)  RR: 17 (19 Sep 2023 15:50) (16 - 17)  SpO2: 98% (19 Sep 2023 15:50) (97% - 99%)    Parameters below as of 19 Sep 2023 15:50  Patient On (Oxygen Delivery Method): room air        Physical exam:    Mental status: Awake, alert and oriented x3.  Attention/concentration intact.  No dysarthria, no aphasia.   Cranial nerves: no nystagmus, extraocular muscles intact, face symmetric,  Motor: left sided hemiparesis  Coordination: No dysmetria on finger-to-nose           LABS:                        9.0    5.67  )-----------( 226      ( 18 Sep 2023 10:17 )             26.7     09-18    137  |  100  |  5<L>  ----------------------------<  105<H>  3.6   |  29  |  0.31<L>    Ca    8.8      18 Sep 2023 10:17  Phos  4.5     09-18  Mg     1.6     09-18        Urinalysis Basic - ( 18 Sep 2023 10:17 )    Color: x / Appearance: x / SG: x / pH: x  Gluc: 105 mg/dL / Ketone: x  / Bili: x / Urobili: x   Blood: x / Protein: x / Nitrite: x   Leuk Esterase: x / RBC: x / WBC x   Sq Epi: x / Non Sq Epi: x / Bacteria: x                 INTERVAL HPI/OVERNIGHT EVENTS:  Patient seen and examined. Reports headache , her partner at bedside. Reports tolerating the current dose of meds well, denies any seizure or seizure like activity.     MEDICATIONS  (STANDING):  bacitracin   Ointment 1 Application(s) Topical daily  baclofen 5 milliGRAM(s) Oral every 8 hours  cephalexin 500 milliGRAM(s) Oral every 12 hours  divalproex  milliGRAM(s) Oral every 12 hours  enoxaparin Injectable 40 milliGRAM(s) SubCutaneous every 24 hours  escitalopram 20 milliGRAM(s) Oral daily  gabapentin 400 milliGRAM(s) Oral <User Schedule>  gabapentin 400 milliGRAM(s) Oral <User Schedule>  influenza   Vaccine 0.5 milliLiter(s) IntraMuscular once  lacosamide 150 milliGRAM(s) Oral every 12 hours  levothyroxine 75 MICROGram(s) Oral daily  magnesium sulfate  IVPB 1 Gram(s) IV Intermittent once  memantine 5 milliGRAM(s) Oral <User Schedule>  pantoprazole    Tablet 40 milliGRAM(s) Oral before breakfast  polyethylene glycol 3350 17 Gram(s) Oral two times a day  potassium chloride    Tablet ER 40 milliEquivalent(s) Oral once  senna 2 Tablet(s) Oral at bedtime  traZODone 50 milliGRAM(s) Oral at bedtime    MEDICATIONS  (PRN):  acetaminophen     Tablet .. 650 milliGRAM(s) Oral every 6 hours PRN Mild Pain (1 - 3)  benzocaine/menthol Lozenge 1 Lozenge Oral three times a day PRN Sore Throat  bisacodyl 5 milliGRAM(s) Oral every 12 hours PRN Constipation  traMADol 50 milliGRAM(s) Oral every 6 hours PRN Severe Pain (7 - 10)      Allergies    No Known Allergies    Intolerances        Vital Signs Last 24 Hrs  T(C): 36.9 (19 Sep 2023 15:50), Max: 36.9 (19 Sep 2023 08:15)  T(F): 98.5 (19 Sep 2023 15:50), Max: 98.5 (19 Sep 2023 15:50)  HR: 85 (19 Sep 2023 15:50) (67 - 85)  BP: 124/89 (19 Sep 2023 15:50) (120/84 - 137/94)  BP(mean): 108 (18 Sep 2023 20:52) (108 - 108)  RR: 17 (19 Sep 2023 15:50) (16 - 17)  SpO2: 98% (19 Sep 2023 15:50) (97% - 99%)    Parameters below as of 19 Sep 2023 15:50  Patient On (Oxygen Delivery Method): room air        Physical exam:    Mental status: Awake, alert and oriented x3.  Attention/concentration intact.  No dysarthria, no aphasia.   Cranial nerves: no nystagmus, extraocular muscles intact, face symmetric,  Motor: left sided hemiparesis  Coordination: No dysmetria on finger-to-nose           LABS:                        9.0    5.67  )-----------( 226      ( 18 Sep 2023 10:17 )             26.7     09-18    137  |  100  |  5<L>  ----------------------------<  105<H>  3.6   |  29  |  0.31<L>    Ca    8.8      18 Sep 2023 10:17  Phos  4.5     09-18  Mg     1.6     09-18        Urinalysis Basic - ( 18 Sep 2023 10:17 )    Color: x / Appearance: x / SG: x / pH: x  Gluc: 105 mg/dL / Ketone: x  / Bili: x / Urobili: x   Blood: x / Protein: x / Nitrite: x   Leuk Esterase: x / RBC: x / WBC x   Sq Epi: x / Non Sq Epi: x / Bacteria: x

## 2023-09-19 NOTE — SWALLOW BEDSIDE ASSESSMENT ADULT - ORAL PHASE
Adequate oral containment, mildly prolonged mastication with solids, with functional oral clearance.

## 2023-09-19 NOTE — PROGRESS NOTE ADULT - ASSESSMENT
38 year old female with a pmh of hypothyroidism and recent R frontal IPH w/IVH & hydrocephalus on 6/6 with subsequent seizures on /750 & Vimpat 150 mg BID. Epilepsy consulted for supra therapeutic VPA level, now therapeutic after reduction of dose. Unable to obtain EEG due to incision.   Recommendations:  - Repeat VPA trough level tomorrow am.   - Continue  mg IV BID  - Continue Vimpat 150 mg BID  - Seizure/Fall precautions  - Can obtain EEG as outpatient once incision is healed  - Rest of care per primary team 38 year old female with a pmh of hypothyroidism and recent R frontal IPH w/IVH & hydrocephalus on 6/6 with subsequent seizures on /750 & Vimpat 150 mg BID. Epilepsy consulted for supra therapeutic VPA level  Pt pre-op had been extremely tremulous, which is not typical for her, and possibly she may have had an extra dose during the transfer from the rehab center and the hospital depending on the timing of medications.  Pt reportedly had difficulty keeping levels up during rehab.    Most recent dose now therapeutic after reduction of dose, however will need to ensure the supratherapeutic level was not due to an extra dose given, and unnecessarily dropping the dose.  Unable to obtain EEG due to incision.     Recommendations:  - Repeat VPA trough level tomorrow am.   - Continue  mg IV BID  - Continue Vimpat 150 mg BID  - Seizure/Fall precautions  - Can obtain EEG as outpatient once incision more healed  - Rest of care per primary team

## 2023-09-19 NOTE — PROGRESS NOTE ADULT - ATTENDING COMMENTS
Appreciate Dr. Kumar's note above.    concern re: supratherapeutic depakote level, taken prior to cranioplasty.  However patient was also exhibiting significant tremor at the time, and possibly due to medication toxicity - and may be due to extra dose given during the transition.  So will need to continue to draw levels to ensure we have not decreased the dose for not reason.  Pt not exhibiting toxic symptoms currently.    Plan:  1) level tomorrow.  2) will help to arrange f/u at [facility] with Dr. Santoro to help manage the epilepsy medications and seizures, which have not occurred in some time on treatment.

## 2023-09-20 PROCEDURE — 99024 POSTOP FOLLOW-UP VISIT: CPT

## 2023-09-20 PROCEDURE — 99232 SBSQ HOSP IP/OBS MODERATE 35: CPT

## 2023-09-20 PROCEDURE — 99233 SBSQ HOSP IP/OBS HIGH 50: CPT

## 2023-09-20 RX ORDER — GABAPENTIN 400 MG/1
600 CAPSULE ORAL
Refills: 0 | Status: DISCONTINUED | OUTPATIENT
Start: 2023-09-20 | End: 2023-09-21

## 2023-09-20 RX ORDER — MAGNESIUM SULFATE 500 MG/ML
1 VIAL (ML) INJECTION ONCE
Refills: 0 | Status: COMPLETED | OUTPATIENT
Start: 2023-09-20 | End: 2023-09-20

## 2023-09-20 RX ADMIN — TRAMADOL HYDROCHLORIDE 50 MILLIGRAM(S): 50 TABLET ORAL at 06:42

## 2023-09-20 RX ADMIN — Medication 1 APPLICATION(S): at 12:35

## 2023-09-20 RX ADMIN — TRAMADOL HYDROCHLORIDE 50 MILLIGRAM(S): 50 TABLET ORAL at 05:42

## 2023-09-20 RX ADMIN — Medication 5 MILLIGRAM(S): at 22:12

## 2023-09-20 RX ADMIN — ENOXAPARIN SODIUM 40 MILLIGRAM(S): 100 INJECTION SUBCUTANEOUS at 22:12

## 2023-09-20 RX ADMIN — Medication 5 MILLIGRAM(S): at 18:24

## 2023-09-20 RX ADMIN — POLYETHYLENE GLYCOL 3350 17 GRAM(S): 17 POWDER, FOR SOLUTION ORAL at 18:24

## 2023-09-20 RX ADMIN — GABAPENTIN 600 MILLIGRAM(S): 400 CAPSULE ORAL at 22:11

## 2023-09-20 RX ADMIN — Medication 500 MILLIGRAM(S): at 14:51

## 2023-09-20 RX ADMIN — TRAMADOL HYDROCHLORIDE 50 MILLIGRAM(S): 50 TABLET ORAL at 22:12

## 2023-09-20 RX ADMIN — MEMANTINE HYDROCHLORIDE 5 MILLIGRAM(S): 10 TABLET ORAL at 18:23

## 2023-09-20 RX ADMIN — DIVALPROEX SODIUM 500 MILLIGRAM(S): 500 TABLET, DELAYED RELEASE ORAL at 05:42

## 2023-09-20 RX ADMIN — LACOSAMIDE 150 MILLIGRAM(S): 50 TABLET ORAL at 05:42

## 2023-09-20 RX ADMIN — MEMANTINE HYDROCHLORIDE 5 MILLIGRAM(S): 10 TABLET ORAL at 05:43

## 2023-09-20 RX ADMIN — Medication 500 MILLIGRAM(S): at 05:43

## 2023-09-20 RX ADMIN — ESCITALOPRAM OXALATE 20 MILLIGRAM(S): 10 TABLET, FILM COATED ORAL at 14:52

## 2023-09-20 RX ADMIN — Medication 50 MILLIGRAM(S): at 22:12

## 2023-09-20 RX ADMIN — LACOSAMIDE 150 MILLIGRAM(S): 50 TABLET ORAL at 18:23

## 2023-09-20 RX ADMIN — TRAMADOL HYDROCHLORIDE 50 MILLIGRAM(S): 50 TABLET ORAL at 23:12

## 2023-09-20 RX ADMIN — SENNA PLUS 2 TABLET(S): 8.6 TABLET ORAL at 22:12

## 2023-09-20 RX ADMIN — Medication 5 MILLIGRAM(S): at 14:51

## 2023-09-20 RX ADMIN — Medication 5 MILLIGRAM(S): at 05:43

## 2023-09-20 RX ADMIN — Medication 75 MICROGRAM(S): at 05:43

## 2023-09-20 RX ADMIN — PANTOPRAZOLE SODIUM 40 MILLIGRAM(S): 20 TABLET, DELAYED RELEASE ORAL at 05:43

## 2023-09-20 RX ADMIN — Medication 100 GRAM(S): at 12:35

## 2023-09-20 RX ADMIN — DIVALPROEX SODIUM 500 MILLIGRAM(S): 500 TABLET, DELAYED RELEASE ORAL at 18:23

## 2023-09-20 RX ADMIN — GABAPENTIN 600 MILLIGRAM(S): 400 CAPSULE ORAL at 12:35

## 2023-09-20 RX ADMIN — POLYETHYLENE GLYCOL 3350 17 GRAM(S): 17 POWDER, FOR SOLUTION ORAL at 05:43

## 2023-09-20 NOTE — PROGRESS NOTE ADULT - SUBJECTIVE AND OBJECTIVE BOX
Patient is a 38y old  Female who presents with a chief complaint of Cranioplasty (20 Sep 2023 01:20)      SUBJECTIVE:  Patient seen and examined at bedside.  Reports that PT session yesterday went ok, biggest complaint is of L sided neuropathic pain.  Overall feels she is improving    ROS:  Denies fevers, chills, neck pain, cough, SOB, chest pain, Abdominal pain, N/V.  All other ROS negative except as above    Vital Signs Last 12 Hrs  T(F): 97.6 (09-20-23 @ 08:10), Max: 97.9 (09-20-23 @ 05:04)  HR: 79 (09-20-23 @ 08:10) (79 - 89)  BP: 134/84 (09-20-23 @ 08:10) (134/84 - 151/84)  BP(mean): --  RR: 17 (09-20-23 @ 08:10) (17 - 17)  SpO2: 79% (09-20-23 @ 08:10) (79% - 98%)  I&O's Summary      PHYSICAL EXAM:  Constitutional: NAD  HEENT: PERRLA, EOMI, MMM, R cranioplasty site c/d/i, facial edema nearly resolved  Neck: Supple  Respiratory: CTA B/L. No w/r/r.   Cardiovascular: RRR, normal S1 and S2, no m/r/g.   Gastrointestinal: +BS, soft NTND   Extremities: wwp; no cyanosis, clubbing or edema.   Vascular: Pulses equal and strong throughout.   Neurological: AAOx3, expressive aphasia, L hemiplegia  Skin: No gross skin abnormalities or rashes other than crani site incision          LABS:                        9.6    7.81  )-----------( 297      ( 19 Sep 2023 16:42 )             28.1     09-19    139  |  101  |  6<L>  ----------------------------<  108<H>  4.2   |  28  |  0.35<L>    Ca    8.7      19 Sep 2023 16:42  Phos  3.8     09-19  Mg     1.7     09-19        Urinalysis Basic - ( 19 Sep 2023 16:42 )    Color: x / Appearance: x / SG: x / pH: x  Gluc: 108 mg/dL / Ketone: x  / Bili: x / Urobili: x   Blood: x / Protein: x / Nitrite: x   Leuk Esterase: x / RBC: x / WBC x   Sq Epi: x / Non Sq Epi: x / Bacteria: x          RADIOLOGY & ADDITIONAL TESTS:  No new imaging    MEDICATIONS  (STANDING):  bacitracin   Ointment 1 Application(s) Topical daily  baclofen 5 milliGRAM(s) Oral every 8 hours  bisacodyl 5 milliGRAM(s) Oral every 12 hours  cephalexin 500 milliGRAM(s) Oral every 12 hours  divalproex  milliGRAM(s) Oral every 12 hours  enoxaparin Injectable 40 milliGRAM(s) SubCutaneous every 24 hours  escitalopram 20 milliGRAM(s) Oral daily  gabapentin 600 milliGRAM(s) Oral <User Schedule>  gabapentin 600 milliGRAM(s) Oral <User Schedule>  influenza   Vaccine 0.5 milliLiter(s) IntraMuscular once  lacosamide 150 milliGRAM(s) Oral every 12 hours  levothyroxine 75 MICROGram(s) Oral daily  magnesium sulfate  IVPB 1 Gram(s) IV Intermittent once  memantine 5 milliGRAM(s) Oral <User Schedule>  pantoprazole    Tablet 40 milliGRAM(s) Oral before breakfast  polyethylene glycol 3350 17 Gram(s) Oral two times a day  senna 2 Tablet(s) Oral at bedtime  traZODone 50 milliGRAM(s) Oral at bedtime    MEDICATIONS  (PRN):  acetaminophen     Tablet .. 650 milliGRAM(s) Oral every 6 hours PRN Mild Pain (1 - 3)  benzocaine/menthol Lozenge 1 Lozenge Oral three times a day PRN Sore Throat  traMADol 50 milliGRAM(s) Oral every 6 hours PRN Severe Pain (7 - 10)

## 2023-09-20 NOTE — PROGRESS NOTE ADULT - TIME BILLING
Review of hospital course, labs, vitals, medical records.   Bedside exam and interview    Discussed plan of care with primary team   Documenting the encounter

## 2023-09-20 NOTE — PROGRESS NOTE ADULT - SUBJECTIVE AND OBJECTIVE BOX
HPI:  38F hx hypothyroid was found down by mom unresponsive in the bathroom on 6/6, was airlifted to [facility] for a large R frontal IPH with IVH and hydrocephalus. She was emergently intubated and taken to operating room for right hemicraniectomy and EVD placement with Dr. Bernstein. Patient was deemed medically stable and was sent  to [facility] for 5 weeks then transferred to [facility] for 4 weeks and presents today in preparation for cranioplasty this week.  (10 Sep 2023 17:50)    HOSPITAL COURSE:   9/10: transferred for cranioplasty Tuesday 9/11: ALETHEA overnight. MRI he completed  9/12: preop OR today.   POD 0 right cranioplasty with longevity implant   9/13: POD1 R cranioplasty w/ longevity implant. Overnight NAD, pt difficult to assess and not taking PO meds, given IV tylenol and IV dialudid for pain control.  Na downtrending overnight 132, f/u repeat, urine studies sent consistent w/ salt wasting, consider 3% bolus.   9/14: POD 2 R cranioplasty. Per psych, increase lexapro to 20mg daily, HAROON 1 d'cced today, JP2 remains in place,   9/15: POD 3 R cranioplasty, ALETHEA. Started SQL. Dc'd IVF. HAROON drain removed.   9/16: POD 4 R cranioplasty. Headwrap re-wrapped.   9/17: POD 5 R cranioplasty. Pending rehab. Sore throat, given lozenges. Headache improved. Baci/xeroform on incision changed.   9/18: POD 6 R cranioplasty. Rehab pending.   9/19: POD 7 R cranioplasty, Pending rehab  9/20: POD8 R cranioplasty. ALETHEA overnight. Pend valproic acid level in am. Cont Keflex x2 weeks.     OVERNIGHT EVENTS:  Vital Signs Last 24 Hrs  T(C): 36.3 (19 Sep 2023 20:15), Max: 36.9 (19 Sep 2023 08:15)  T(F): 97.4 (19 Sep 2023 20:15), Max: 98.5 (19 Sep 2023 15:50)  HR: 85 (19 Sep 2023 20:15) (67 - 85)  BP: 153/95 (19 Sep 2023 20:15) (120/84 - 153/95)  BP(mean): --  RR: 16 (19 Sep 2023 20:15) (16 - 17)  SpO2: 98% (19 Sep 2023 20:15) (97% - 99%)    Parameters below as of 19 Sep 2023 20:15  Patient On (Oxygen Delivery Method): room air        I&O's Summary    18 Sep 2023 07:01  -  19 Sep 2023 07:00  --------------------------------------------------------  IN: 0 mL / OUT: 700 mL / NET: -700 mL        PHYSICAL EXAM:  General: NAD, pt is comfortably resting in bed, A&O x2 (not to year), on RA  HEENT: PERRL 3mm, EOMI b/l, +R mild facial droop   Cardiovascular: RRR, normal S1 and S2   Respiratory: lungs CTAB, no wheezing, rhonchi, or crackles   GI: normoactive BS to auscultation, abd soft, NTND   Neuro: +mild expressive aphasia, speech clear, no pronator drift  RUE/RLE strength 5/5, LUE/LLE plegia  Extremities: distal pulses 2+ x4   Wound/incision: R crani incision site C/D/I with sutures     TUBES/LINES:  [] Trevino  [] Lumbar Drain  [] Wound Drains  [] Others    DIET:  [] NPO  [X] Mechanical  [] Tube feeds    LABS:                        9.6    7.81  )-----------( 297      ( 19 Sep 2023 16:42 )             28.1     09-19    139  |  101  |  6<L>  ----------------------------<  108<H>  4.2   |  28  |  0.35<L>    Ca    8.7      19 Sep 2023 16:42  Phos  3.8     09-19  Mg     1.7     09-19        Urinalysis Basic - ( 19 Sep 2023 16:42 )    Color: x / Appearance: x / SG: x / pH: x  Gluc: 108 mg/dL / Ketone: x  / Bili: x / Urobili: x   Blood: x / Protein: x / Nitrite: x   Leuk Esterase: x / RBC: x / WBC x   Sq Epi: x / Non Sq Epi: x / Bacteria: x          CAPILLARY BLOOD GLUCOSE          Drug Levels: [] N/A  Valproic Acid Level, Serum: 72.9 ug/mL (09-13 @ 05:29)    CSF Analysis: [] N/A      Allergies    No Known Allergies    Intolerances      MEDICATIONS:  Antibiotics:  cephalexin 500 milliGRAM(s) Oral every 12 hours    Neuro:  acetaminophen     Tablet .. 650 milliGRAM(s) Oral every 6 hours PRN  baclofen 5 milliGRAM(s) Oral every 8 hours  divalproex  milliGRAM(s) Oral every 12 hours  escitalopram 20 milliGRAM(s) Oral daily  gabapentin 400 milliGRAM(s) Oral <User Schedule>  gabapentin 400 milliGRAM(s) Oral <User Schedule>  lacosamide 150 milliGRAM(s) Oral every 12 hours  memantine 5 milliGRAM(s) Oral <User Schedule>  traMADol 50 milliGRAM(s) Oral every 6 hours PRN  traZODone 50 milliGRAM(s) Oral at bedtime    Anticoagulation:  enoxaparin Injectable 40 milliGRAM(s) SubCutaneous every 24 hours    OTHER:  bacitracin   Ointment 1 Application(s) Topical daily  benzocaine/menthol Lozenge 1 Lozenge Oral three times a day PRN  bisacodyl 5 milliGRAM(s) Oral every 12 hours PRN  influenza   Vaccine 0.5 milliLiter(s) IntraMuscular once  levothyroxine 75 MICROGram(s) Oral daily  pantoprazole    Tablet 40 milliGRAM(s) Oral before breakfast  polyethylene glycol 3350 17 Gram(s) Oral two times a day  senna 2 Tablet(s) Oral at bedtime    IVF:    CULTURES:    RADIOLOGY & ADDITIONAL TESTS:      ASSESSMENT:  38F hx hypothyroid was found down by mom unresponsive in the bathroom on 6/6, was airlifted to [facility] for a large R frontal IPH with IVH and hydrocephalus. She was emergently intubated and taken to operating room for right hemicraniectomy and EVD placement with Dr. Bernstein. Patient was deemed medically stable and was sent to [facility] for 5 weeks then transferred to [facility] for 4 weeks. Now s/p right cranioplasty with longevity implant 9/12/23.       M95.2    Handoff    MEWS Score    Hypothyroid    Skull defect    History of intracranial hemorrhage    Skull defect    History of intracranial hemorrhage    Delirium    Skull defect    Status post craniectomy    Cranioplasty for skull defect larger than 5 cm diameter    Cyst of ovary    Status post craniectomy    Room Service Assist    Room Service Assist    [id number]        PLAN:  Neuro  - neuro/vitals q4h  - pain control as needed tylenol  - continue AEDs as ordered: vimpat 150mg bid and depakote 500 BID pend [facility] level in am, defer EEG at this time until sutures removed (per Dr. Sy)   - postop CTH completed  - baclofen 5mg q8 for muscle spasms per rehab medicine attending (alternative to tizanidine that was being given at rehab)   - pending PT/OT, neuropsych eval  - psych consulted, Lexapro 20mg daily, can give Ativan prn   - epilepsy following for AED management   - speech pathology assessment 9/16 and 9/19; no difficulty with swallowing     Cardio  - -140    Pulm  - RA    GI  - Regular diet  - bowel regimen  - protonix 40mg daily     Renal  - voiding    Endo  - continue synthroid    Heme  - SCDs, SQL for DVT prophylaxis    ID  - afebrile  - Keflex x2 wks neurplastics protocol     Psych  - lexapro increase to 20mg daily    Dispo: Regional status, full code, dispo pending [facility]     D/w Dr. Sy

## 2023-09-20 NOTE — PROGRESS NOTE ADULT - ASSESSMENT
38F hx hypothyroid was found down by mom unresponsive in the bathroom on 6/6, was airlifted to [facility] for a large R frontal IPH with IVH and hydrocephalus. She was emergently intubated and taken to operating room for right hemicraniectomy and EVD placement with Dr. Bernstein who presented for cranioplasty, s/p right cranioplasty with longevity implant 9/12/23.    #R frontal IPH s/p hemicraniectomy  #Cranioplasty  #Postop state  - s/p right cranioplasty with longevity implant 9/12/23  - pain control per NSG  - c/w AEDs: Depakote, Gabapentin, Vimpat.  Repeat depakote level per Epilepsy  - SCDs and Lovenox for DVT ppx    #Elevated BP  - BP previously labile in the setting of pain and anxiety  - continue to observe off antihypertensives    #Anemia  - likely component of surgical blood loss as well as inflammatory anemia post op  - Hgb stable 8-9  - iron panel consistent with AOCD    #Hypothyroidism  - c/w synthroid 75mcg    #Depression  - home lexapro 15mg increased to 20mg per psych recs  - psych following, appreciate recs for labile mood    Dispo: Pending AR

## 2023-09-20 NOTE — PROGRESS NOTE ADULT - ATTENDING COMMENTS
Appreciate Dr. Kumar's note above.    concern re: supratherapeutic depakote level, taken prior to cranioplasty.    However patient was also exhibiting significant tremor at the time, and possibly due to medication toxicity - and may be due to extra dose given during the transition.  So will need to continue to draw levels to ensure we have not decreased the dose for not reason.  Pt not exhibiting toxic symptoms currently.    Plan:  1) level tomorrow.  2) will help to arrange f/u at [facility] with Dr. Santoro to help manage the epilepsy medications and seizures, which have not occurred in some time on treatment. Appreciate Dr. Kumar's note above.    concern re: supratherapeutic depakote level, taken prior to cranioplasty.    However patient was also exhibiting significant tremor at the time, and possibly due to medication toxicity - and may be due to extra dose given during the transition.    So will need to continue to draw levels to ensure we have not decreased the dose for not reason.  Today's level was not drawn.  Pt not exhibiting toxic symptoms currently and in fact may be benefiting with decrease in VPA dosing (less sedation).    Plan:  1) VPA level tomorrow.  2) will help to arrange f/u at [facility] with Dr. Santoro to help manage the epilepsy medications and seizures, which have not occurred in some time on treatment.

## 2023-09-20 NOTE — PROGRESS NOTE ADULT - SUBJECTIVE AND OBJECTIVE BOX
Neurology Epilepsy Progress Note     BROOKE CAIN 38y Female [id number]    Hospital Day: 10d     HOSPITAL COURSE:   38F. PMH: hypothyroid, recent hx of R frontal IPH with IVH and hydrocephalus s/p emergent R hemicraniectomy w/ EVD placement (6/2023).   Presented (9/10) for cranioplasty. Hx started 6/6/23, when pt was found down by mother, was airlifted to [facility] for a large R frontal IPH with IVH and hydrocephalus. She was emergently intubated and taken to operating room for right hemicraniectomy and EVD placement with Dr. Bernstein. On admission Day3, she was noted to have a gaze deviation, CT unrevealing, vEEG (+) seizures. Pt reports 5 seizures total  and was initially started on Keppra. She was then changed from Keppra to VPA due to agitation. When she was discharged to the [facility] Vimpat was added, unclear reason as partner stated she did not have any more seizures. Patient was deemed medically stable and was sent to [facility] for 5 weeks then transferred to [facility] for 4 weeks. Then when she went from [facility] to [facility] her VPA was increased to 500 mg AM/750 mg PM due to ?low level.    Now presenting for cranioplasty. S/P right cranioplasty with longevity implant (9/12). Epilepsy consulted for antiseizure medication management due to elevated VPA  level (9/12) of 119.2. Pt pre-op had been extremely tremulous, which is not typical for her. 9/12: VPA dose reduced slightly to  mg IV BID. (9/13) VPA level 72.9. Upon repeat discussion with pt/partner at bedside (9/19), discussed she possibly she may have had an extra dose during the transfer from the rehab center and the hospital depending on the timing of medications. Unable to obtain EEG due to incision.     Overnight events:  None    Subjective complaints:  Pt evaluated at bedside. Pt has no acute complaints.       PAST MEDICAL & SURGICAL HISTORY:  Hypothyroid    Cyst of ovary    Status post craniectomy            Medications:  acetaminophen     Tablet .. 650 milliGRAM(s) Oral every 6 hours PRN  bacitracin   Ointment 1 Application(s) Topical daily  baclofen 5 milliGRAM(s) Oral every 8 hours  benzocaine/menthol Lozenge 1 Lozenge Oral three times a day PRN  bisacodyl 5 milliGRAM(s) Oral every 12 hours  cephalexin 500 milliGRAM(s) Oral every 12 hours  divalproex  milliGRAM(s) Oral every 12 hours  enoxaparin Injectable 40 milliGRAM(s) SubCutaneous every 24 hours  escitalopram 20 milliGRAM(s) Oral daily  gabapentin 600 milliGRAM(s) Oral <User Schedule>  gabapentin 600 milliGRAM(s) Oral <User Schedule>  influenza   Vaccine 0.5 milliLiter(s) IntraMuscular once  lacosamide 150 milliGRAM(s) Oral every 12 hours  levothyroxine 75 MICROGram(s) Oral daily  memantine 5 milliGRAM(s) Oral <User Schedule>  pantoprazole    Tablet 40 milliGRAM(s) Oral before breakfast  polyethylene glycol 3350 17 Gram(s) Oral two times a day  senna 2 Tablet(s) Oral at bedtime  traMADol 50 milliGRAM(s) Oral every 6 hours PRN  traZODone 50 milliGRAM(s) Oral at bedtime      Vital Signs  Vital Signs Last 24 Hrs  T(C): 36.4 (20 Sep 2023 08:10), Max: 36.9 (19 Sep 2023 15:50)  T(F): 97.6 (20 Sep 2023 08:10), Max: 98.5 (19 Sep 2023 15:50)  HR: 79 (20 Sep 2023 08:10) (79 - 89)  BP: 134/84 (20 Sep 2023 08:10) (124/89 - 153/95)  BP(mean): --  RR: 17 (20 Sep 2023 08:10) (16 - 17)  SpO2: 79% (20 Sep 2023 08:10) (79% - 98%)    Parameters below as of 20 Sep 2023 08:10  Patient On (Oxygen Delivery Method): room air        Neurological Exam:   Mental status: Awake, alert and oriented x3.  Attention/concentration intact.  No dysarthria, no aphasia. Possibly confabulates, and occasionally does not comprehend questions but is able to repeat and remember terms/phrases short term.   Cranial nerves: no nystagmus, extraocular muscles intact, face symmetric,  Motor: left sided hemiparesis  Coordination: No dysmetria on finger-to-nose     Labs:  CBC Full  -  ( 19 Sep 2023 16:42 )  WBC Count : 7.81 K/uL  RBC Count : 3.01 M/uL  Hemoglobin : 9.6 g/dL  Hematocrit : 28.1 %  Platelet Count - Automated : 297 K/uL  Mean Cell Volume : 93.4 fl  Mean Cell Hemoglobin : 31.9 pg  Mean Cell Hemoglobin Concentration : 34.2 gm/dL  Auto Neutrophil # : x  Auto Lymphocyte # : x  Auto Monocyte # : x  Auto Eosinophil # : x  Auto Basophil # : x  Auto Neutrophil % : x  Auto Lymphocyte % : x  Auto Monocyte % : x  Auto Eosinophil % : x  Auto Basophil % : x    09-19    139  |  101  |  6<L>  ----------------------------<  108<H>  4.2   |  28  |  0.35<L>    Ca    8.7      19 Sep 2023 16:42  Phos  3.8     09-19  Mg     1.7     09-19          Urinalysis Basic - ( 19 Sep 2023 16:42 )    Color: x / Appearance: x / SG: x / pH: x  Gluc: 108 mg/dL / Ketone: x  / Bili: x / Urobili: x   Blood: x / Protein: x / Nitrite: x   Leuk Esterase: x / RBC: x / WBC x   Sq Epi: x / Non Sq Epi: x / Bacteria: x

## 2023-09-21 ENCOUNTER — TRANSCRIPTION ENCOUNTER (OUTPATIENT)
Age: 38
End: 2023-09-21

## 2023-09-21 VITALS
SYSTOLIC BLOOD PRESSURE: 124 MMHG | DIASTOLIC BLOOD PRESSURE: 89 MMHG | RESPIRATION RATE: 16 BRPM | OXYGEN SATURATION: 96 % | HEART RATE: 87 BPM | TEMPERATURE: 99 F

## 2023-09-21 LAB — SARS-COV-2 RNA SPEC QL NAA+PROBE: NEGATIVE — SIGNIFICANT CHANGE UP

## 2023-09-21 PROCEDURE — 82140 ASSAY OF AMMONIA: CPT

## 2023-09-21 PROCEDURE — C1713: CPT

## 2023-09-21 PROCEDURE — A9552: CPT

## 2023-09-21 PROCEDURE — 88300 SURGICAL PATH GROSS: CPT

## 2023-09-21 PROCEDURE — 80053 COMPREHEN METABOLIC PANEL: CPT

## 2023-09-21 PROCEDURE — 99231 SBSQ HOSP IP/OBS SF/LOW 25: CPT

## 2023-09-21 PROCEDURE — C1889: CPT

## 2023-09-21 PROCEDURE — 83036 HEMOGLOBIN GLYCOSYLATED A1C: CPT

## 2023-09-21 PROCEDURE — 82570 ASSAY OF URINE CREATININE: CPT

## 2023-09-21 PROCEDURE — 85730 THROMBOPLASTIN TIME PARTIAL: CPT

## 2023-09-21 PROCEDURE — 86850 RBC ANTIBODY SCREEN: CPT

## 2023-09-21 PROCEDURE — 84300 ASSAY OF URINE SODIUM: CPT

## 2023-09-21 PROCEDURE — 83935 ASSAY OF URINE OSMOLALITY: CPT

## 2023-09-21 PROCEDURE — 84702 CHORIONIC GONADOTROPIN TEST: CPT

## 2023-09-21 PROCEDURE — 70551 MRI BRAIN STEM W/O DYE: CPT

## 2023-09-21 PROCEDURE — 97163 PT EVAL HIGH COMPLEX 45 MIN: CPT

## 2023-09-21 PROCEDURE — 36415 COLL VENOUS BLD VENIPUNCTURE: CPT

## 2023-09-21 PROCEDURE — 92523 SPEECH SOUND LANG COMPREHEN: CPT

## 2023-09-21 PROCEDURE — C9254: CPT

## 2023-09-21 PROCEDURE — 84466 ASSAY OF TRANSFERRIN: CPT

## 2023-09-21 PROCEDURE — 80164 ASSAY DIPROPYLACETIC ACD TOT: CPT

## 2023-09-21 PROCEDURE — 85027 COMPLETE CBC AUTOMATED: CPT

## 2023-09-21 PROCEDURE — 85610 PROTHROMBIN TIME: CPT

## 2023-09-21 PROCEDURE — 84100 ASSAY OF PHOSPHORUS: CPT

## 2023-09-21 PROCEDURE — 83540 ASSAY OF IRON: CPT

## 2023-09-21 PROCEDURE — 70450 CT HEAD/BRAIN W/O DYE: CPT

## 2023-09-21 PROCEDURE — 71045 X-RAY EXAM CHEST 1 VIEW: CPT

## 2023-09-21 PROCEDURE — 80048 BASIC METABOLIC PNL TOTAL CA: CPT

## 2023-09-21 PROCEDURE — 92610 EVALUATE SWALLOWING FUNCTION: CPT

## 2023-09-21 PROCEDURE — 78999 UNLISTED MISC PX DX NUC MED: CPT

## 2023-09-21 PROCEDURE — C9399: CPT

## 2023-09-21 PROCEDURE — 86900 BLOOD TYPING SEROLOGIC ABO: CPT

## 2023-09-21 PROCEDURE — 87635 SARS-COV-2 COVID-19 AMP PRB: CPT

## 2023-09-21 PROCEDURE — 99232 SBSQ HOSP IP/OBS MODERATE 35: CPT | Mod: GC

## 2023-09-21 PROCEDURE — 82728 ASSAY OF FERRITIN: CPT

## 2023-09-21 PROCEDURE — 97110 THERAPEUTIC EXERCISES: CPT

## 2023-09-21 PROCEDURE — 82962 GLUCOSE BLOOD TEST: CPT

## 2023-09-21 PROCEDURE — 97112 NEUROMUSCULAR REEDUCATION: CPT

## 2023-09-21 PROCEDURE — 83735 ASSAY OF MAGNESIUM: CPT

## 2023-09-21 PROCEDURE — 78608 BRAIN IMAGING (PET): CPT

## 2023-09-21 PROCEDURE — 97165 OT EVAL LOW COMPLEX 30 MIN: CPT

## 2023-09-21 PROCEDURE — 83550 IRON BINDING TEST: CPT

## 2023-09-21 PROCEDURE — 97530 THERAPEUTIC ACTIVITIES: CPT

## 2023-09-21 PROCEDURE — 86901 BLOOD TYPING SEROLOGIC RH(D): CPT

## 2023-09-21 PROCEDURE — 82565 ASSAY OF CREATININE: CPT

## 2023-09-21 RX ORDER — DIVALPROEX SODIUM 500 MG/1
1 TABLET, DELAYED RELEASE ORAL
Qty: 0 | Refills: 0 | DISCHARGE
Start: 2023-09-21

## 2023-09-21 RX ORDER — BACITRACIN ZINC 500 UNIT/G
1 OINTMENT IN PACKET (EA) TOPICAL
Qty: 0 | Refills: 0 | DISCHARGE
Start: 2023-09-21

## 2023-09-21 RX ORDER — ACETAMINOPHEN 500 MG
2 TABLET ORAL
Qty: 0 | Refills: 0 | DISCHARGE
Start: 2023-09-21

## 2023-09-21 RX ORDER — LACOSAMIDE 50 MG/1
1 TABLET ORAL
Qty: 0 | Refills: 0 | DISCHARGE
Start: 2023-09-21

## 2023-09-21 RX ORDER — GABAPENTIN 400 MG/1
1 CAPSULE ORAL
Qty: 0 | Refills: 0 | DISCHARGE
Start: 2023-09-21

## 2023-09-21 RX ORDER — ENOXAPARIN SODIUM 100 MG/ML
40 INJECTION SUBCUTANEOUS
Qty: 0 | Refills: 0 | DISCHARGE
Start: 2023-09-21

## 2023-09-21 RX ORDER — BACLOFEN 100 %
1 POWDER (GRAM) MISCELLANEOUS
Qty: 0 | Refills: 0 | DISCHARGE
Start: 2023-09-21

## 2023-09-21 RX ORDER — TRAZODONE HCL 50 MG
1 TABLET ORAL
Qty: 0 | Refills: 0 | DISCHARGE
Start: 2023-09-21

## 2023-09-21 RX ORDER — ESCITALOPRAM OXALATE 10 MG/1
1 TABLET, FILM COATED ORAL
Qty: 0 | Refills: 0 | DISCHARGE
Start: 2023-09-21

## 2023-09-21 RX ORDER — CEPHALEXIN 500 MG
1 CAPSULE ORAL
Qty: 0 | Refills: 0 | DISCHARGE
Start: 2023-09-21

## 2023-09-21 RX ADMIN — MEMANTINE HYDROCHLORIDE 5 MILLIGRAM(S): 10 TABLET ORAL at 17:47

## 2023-09-21 RX ADMIN — LACOSAMIDE 150 MILLIGRAM(S): 50 TABLET ORAL at 05:36

## 2023-09-21 RX ADMIN — LACOSAMIDE 150 MILLIGRAM(S): 50 TABLET ORAL at 17:46

## 2023-09-21 RX ADMIN — DIVALPROEX SODIUM 500 MILLIGRAM(S): 500 TABLET, DELAYED RELEASE ORAL at 17:46

## 2023-09-21 RX ADMIN — Medication 5 MILLIGRAM(S): at 17:46

## 2023-09-21 RX ADMIN — DIVALPROEX SODIUM 500 MILLIGRAM(S): 500 TABLET, DELAYED RELEASE ORAL at 05:35

## 2023-09-21 RX ADMIN — Medication 500 MILLIGRAM(S): at 17:18

## 2023-09-21 RX ADMIN — Medication 75 MICROGRAM(S): at 05:36

## 2023-09-21 RX ADMIN — Medication 500 MILLIGRAM(S): at 05:36

## 2023-09-21 RX ADMIN — GABAPENTIN 600 MILLIGRAM(S): 400 CAPSULE ORAL at 13:18

## 2023-09-21 RX ADMIN — Medication 5 MILLIGRAM(S): at 05:36

## 2023-09-21 RX ADMIN — ESCITALOPRAM OXALATE 20 MILLIGRAM(S): 10 TABLET, FILM COATED ORAL at 13:18

## 2023-09-21 RX ADMIN — PANTOPRAZOLE SODIUM 40 MILLIGRAM(S): 20 TABLET, DELAYED RELEASE ORAL at 05:36

## 2023-09-21 RX ADMIN — POLYETHYLENE GLYCOL 3350 17 GRAM(S): 17 POWDER, FOR SOLUTION ORAL at 05:35

## 2023-09-21 RX ADMIN — Medication 5 MILLIGRAM(S): at 13:18

## 2023-09-21 RX ADMIN — Medication 1 APPLICATION(S): at 13:18

## 2023-09-21 RX ADMIN — MEMANTINE HYDROCHLORIDE 5 MILLIGRAM(S): 10 TABLET ORAL at 05:35

## 2023-09-21 RX ADMIN — Medication 5 MILLIGRAM(S): at 05:37

## 2023-09-21 NOTE — DISCHARGE NOTE NURSING/CASE MANAGEMENT/SOCIAL WORK - NSDCFUADDAPPT_GEN_ALL_CORE_FT
Please follow up with Dr. Agustin Kwan on 10/10 at 10AM, call the office to confirm appointment at [phone]    Please follow up with Dr. Peng from Epilepsy outpatient    Please follow up with your primary care doctor

## 2023-09-21 NOTE — PROGRESS NOTE ADULT - ATTENDING COMMENTS
VPA levels not drawn, but patient doing well, and remains on dual therapy.  Planned transfer to [facility] today.  F/U will be planned with Dr. Santoro at [facility].

## 2023-09-21 NOTE — PROGRESS NOTE ADULT - SUBJECTIVE AND OBJECTIVE BOX
HPI:  38F hx hypothyroid was found down by mom unresponsive in the bathroom on 6/6, was airlifted to [facility] for a large R frontal IPH with IVH and hydrocephalus. She was emergently intubated and taken to operating room for right hemicraniectomy and EVD placement with Dr. Bernstein. Patient was deemed medically stable and was sent  to [facility] for 5 weeks then transferred to [facility] for 4 weeks and presents today in preparation for cranioplasty this week.  (10 Sep 2023 17:50)  9/10: transferred for cranioplasty Tuesday 9/11: ALETHEA overnight. MRI he completed  9/12: preop OR today.   POD 0 right cranioplasty with longevity implant   9/13: POD1 R cranioplasty w/ longevity implant. Overnight NAD, pt difficult to assess and not taking PO meds, given IV tylenol and IV dialudid for pain control.  Na downtrending overnight 132, f/u repeat, urine studies sent consistent w/ salt wasting, consider 3% bolus.   9/14: POD 2 R cranioplasty. Per psych, increase lexapro to 20mg daily, HAROON 1 d'cced today, JP2 remains in place,   9/15: POD 3 R cranioplasty, ALETHEA. Started SQL. Dc'd IVF. HAROON drain removed.   9/16: POD 4 R cranioplasty. Headwrap re-wrapped.   9/17: POD 5 R cranioplasty. Pending rehab. Sore throat, given lozenges. Headache improved. Baci/xeroform on incision changed.   9/18: POD 6 R cranioplasty. Rehab pending.   9/19: POD 7 R cranioplasty, Pending rehab  9/20: POD8 R cranioplasty. ALETHEA overnight. Pend valproic acid level in am. Cont Keflex x2 weeks.   9/21: POD9 R cranioplasty, ALETHEA    OVERNIGHT EVENTS: ALETHEA  Vital Signs Last 24 Hrs  T(C): 37.2 (20 Sep 2023 20:20), Max: 37.2 (20 Sep 2023 20:20)  T(F): 98.9 (20 Sep 2023 20:20), Max: 98.9 (20 Sep 2023 20:20)  HR: 81 (20 Sep 2023 20:20) (79 - 94)  BP: 117/84 (20 Sep 2023 20:20) (117/84 - 151/84)  BP(mean): --  RR: 16 (20 Sep 2023 20:20) (16 - 17)  SpO2: 97% (20 Sep 2023 20:20) (79% - 98%)    Parameters below as of 20 Sep 2023 20:20  Patient On (Oxygen Delivery Method): room air        I&O's Summary    20 Sep 2023 07:01  -  21 Sep 2023 00:44  --------------------------------------------------------  IN: 0 mL / OUT: 500 mL / NET: -500 mL    PHYSICAL EXAM:  General: NAD, pt is comfortably resting in bed, A&O x2 (not to year), on RA  HEENT: PERRL 3mm, EOMI b/l, +R mild facial droop   Cardiovascular: RRR, normal S1 and S2   Respiratory: lungs CTAB, no wheezing, rhonchi, or crackles   GI: normoactive BS to auscultation, abd soft, NTND   Neuro: +mild expressive aphasia, speech clear, no pronator drift  RUE/RLE strength 5/5, LUE/LLE plegia  Extremities: distal pulses 2+ x4   Wound/incision: R crani incision site C/D/I with sutures   LABS:                        9.6    7.81  )-----------( 297      ( 19 Sep 2023 16:42 )             28.1     09-19    139  |  101  |  6<L>  ----------------------------<  108<H>  4.2   |  28  |  0.35<L>    Ca    8.7      19 Sep 2023 16:42  Phos  3.8     09-19  Mg     1.7     09-19        Urinalysis Basic - ( 19 Sep 2023 16:42 )    Color: x / Appearance: x / SG: x / pH: x  Gluc: 108 mg/dL / Ketone: x  / Bili: x / Urobili: x   Blood: x / Protein: x / Nitrite: x   Leuk Esterase: x / RBC: x / WBC x   Sq Epi: x / Non Sq Epi: x / Bacteria: x          CAPILLARY BLOOD GLUCOSE        38F hx hypothyroid was found down by mom unresponsive in the bathroom on 6/6, was airlifted to [facility] for a large R frontal IPH with IVH and hydrocephalus. She was emergently intubated and taken to operating room for right hemicraniectomy and EVD placement with Dr. Bernstein. Patient was deemed medically stable and was sent to [facility] for 5 weeks then transferred to [facility] for 4 weeks. Now s/p right cranioplasty with longevity implant 9/12/23.     Neuro  - neuro/vitals q4h  - pain control as needed tylenol  - continue AEDs as ordered: vimpat 150mg bid and depakote 500 BID pend VA level in am, defer EEG at this time until sutures removed (per Dr. Sy)   - postop CTH completed  - baclofen 5mg q8 for muscle spasms per rehab medicine attending (alternative to tizanidine that was being given at rehab)   - pending PT/OT, neuropsych eval  - psych consulted, Lexapro 20mg daily, can give Ativan prn   - epilepsy following for AED management   - speech pathology assessment 9/16 and 9/19; no difficulty with swallowing     Cardio  - -140    Pulm  - RA    GI  - Regular diet  - bowel regimen  - protonix 40mg daily     Renal  - voiding    Endo  - continue synthroid    Heme  - SCDs, SQL for DVT prophylaxis    ID  - afebrile  - Keflex x2 wks neurplastics protocol     Psych  - lexapro increase to 20mg daily    Dispo: Regional status, full code, dispo pending [facility]     D/w Dr. Sy

## 2023-09-21 NOTE — DISCHARGE NOTE NURSING/CASE MANAGEMENT/SOCIAL WORK - NSDPFAC_GEN_ALL_CORE
[facility], [address] [place] [address], [place] [address]

## 2023-09-21 NOTE — PROGRESS NOTE ADULT - SUBJECTIVE AND OBJECTIVE BOX
Neurology General Progress Note     BROOKE CAIN 38y Female [id number]    Hospital Day: 11d     HOSPITAL COURSE:   38F. PMH: hypothyroid, recent hx of R frontal IPH with IVH and hydrocephalus s/p emergent R hemicraniectomy w/ EVD placement (6/2023).   Presented (9/10) for cranioplasty. Hx started 6/6/23, when pt was found down by mother, was airlifted to [facility] for a large R frontal IPH with IVH and hydrocephalus. She was emergently intubated and taken to operating room for right hemicraniectomy and EVD placement with Dr. Bernstein. On admission Day3, she was noted to have a gaze deviation, CT unrevealing, vEEG (+) seizures. Pt reports 5 seizures total  and was initially started on Keppra. She was then changed from Keppra to VPA due to agitation. When she was discharged to the [facility] Vimpat was added, unclear reason as partner stated she did not have any more seizures. Patient was deemed medically stable and was sent to [facility] for 5 weeks then transferred to [facility] for 4 weeks. Then when she went from [facility] to [facility] her VPA was increased to 500 mg AM/750 mg PM due to ?low level.    Now presenting for cranioplasty. S/P right cranioplasty with longevity implant (9/12). Epilepsy consulted for antiseizure medication management due to elevated VPA  level (9/12) of 119.2. Pt pre-op had been extremely tremulous, which is not typical for her. 9/12: VPA dose reduced slightly to  mg IV BID. (9/13) VPA level 72.9. Upon repeat discussion with pt/partner at bedside (9/19), discussed she possibly she may have had an extra dose during the transfer from the rehab center and the hospital depending on the timing of medications. Unable to obtain EEG due to incision.     Overnight events:  None    Subjective complaints:  Pt evaluated at bedside.        PAST MEDICAL & SURGICAL HISTORY:  Hypothyroid    Cyst of ovary    Status post craniectomy            Medications:  acetaminophen     Tablet .. 650 milliGRAM(s) Oral every 6 hours PRN  bacitracin   Ointment 1 Application(s) Topical daily  baclofen 5 milliGRAM(s) Oral every 8 hours  benzocaine/menthol Lozenge 1 Lozenge Oral three times a day PRN  bisacodyl 5 milliGRAM(s) Oral every 12 hours  cephalexin 500 milliGRAM(s) Oral every 12 hours  divalproex  milliGRAM(s) Oral every 12 hours  enoxaparin Injectable 40 milliGRAM(s) SubCutaneous every 24 hours  escitalopram 20 milliGRAM(s) Oral daily  gabapentin 600 milliGRAM(s) Oral <User Schedule>  gabapentin 600 milliGRAM(s) Oral <User Schedule>  influenza   Vaccine 0.5 milliLiter(s) IntraMuscular once  lacosamide 150 milliGRAM(s) Oral every 12 hours  levothyroxine 75 MICROGram(s) Oral daily  memantine 5 milliGRAM(s) Oral <User Schedule>  pantoprazole    Tablet 40 milliGRAM(s) Oral before breakfast  polyethylene glycol 3350 17 Gram(s) Oral two times a day  senna 2 Tablet(s) Oral at bedtime  traMADol 50 milliGRAM(s) Oral every 6 hours PRN  traZODone 50 milliGRAM(s) Oral at bedtime      Vital Signs  Vital Signs Last 24 Hrs  T(C): 37.2 (21 Sep 2023 05:23), Max: 37.2 (20 Sep 2023 20:20)  T(F): 98.9 (21 Sep 2023 05:23), Max: 98.9 (20 Sep 2023 20:20)  HR: 87 (21 Sep 2023 05:23) (81 - 94)  BP: 121/84 (21 Sep 2023 05:23) (117/84 - 133/92)  BP(mean): --  RR: 17 (21 Sep 2023 05:23) (16 - 17)  SpO2: 97% (21 Sep 2023 05:23) (97% - 98%)    Parameters below as of 21 Sep 2023 05:23  Patient On (Oxygen Delivery Method): room air        Neurological Exam:       Labs:  CBC Full  -  ( 19 Sep 2023 16:42 )  WBC Count : 7.81 K/uL  RBC Count : 3.01 M/uL  Hemoglobin : 9.6 g/dL  Hematocrit : 28.1 %  Platelet Count - Automated : 297 K/uL  Mean Cell Volume : 93.4 fl  Mean Cell Hemoglobin : 31.9 pg  Mean Cell Hemoglobin Concentration : 34.2 gm/dL  Auto Neutrophil # : x  Auto Lymphocyte # : x  Auto Monocyte # : x  Auto Eosinophil # : x  Auto Basophil # : x  Auto Neutrophil % : x  Auto Lymphocyte % : x  Auto Monocyte % : x  Auto Eosinophil % : x  Auto Basophil % : x    09-19    139  |  101  |  6<L>  ----------------------------<  108<H>  4.2   |  28  |  0.35<L>    Ca    8.7      19 Sep 2023 16:42  Phos  3.8     09-19  Mg     1.7     09-19          Urinalysis Basic - ( 19 Sep 2023 16:42 )    Color: x / Appearance: x / SG: x / pH: x  Gluc: 108 mg/dL / Ketone: x  / Bili: x / Urobili: x   Blood: x / Protein: x / Nitrite: x   Leuk Esterase: x / RBC: x / WBC x   Sq Epi: x / Non Sq Epi: x / Bacteria: x         Neurology Epilepsy Progress Note     BROOKE CAIN 38y Female [id number]    Hospital Day: 11d     HOSPITAL COURSE:   38F. PMH: hypothyroid, recent hx of R frontal IPH with IVH and hydrocephalus s/p emergent R hemicraniectomy w/ EVD placement (6/2023).   Presented (9/10) for cranioplasty. Hx started 6/6/23, when pt was found down by mother, was airlifted to [facility] for a large R frontal IPH with IVH and hydrocephalus. She was emergently intubated and taken to operating room for right hemicraniectomy and EVD placement with Dr. Bernstein. On admission Day3, she was noted to have a gaze deviation, CT unrevealing, vEEG (+) seizures. Pt reports 5 seizures total  and was initially started on Keppra. She was then changed from Keppra to VPA due to agitation. When she was discharged to the [facility] Vimpat was added, unclear reason as partner stated she did not have any more seizures. Patient was deemed medically stable and was sent to [facility] for 5 weeks then transferred to [facility] for 4 weeks. Then when she went from [facility] to [facility] her VPA was increased to 500 mg AM/750 mg PM due to ?low level.    Now presenting for cranioplasty. S/P right cranioplasty with longevity implant (9/12). Epilepsy consulted for antiseizure medication management due to elevated VPA  level (9/12) of 119.2. Pt pre-op had been extremely tremulous, which is not typical for her. 9/12: VPA dose reduced slightly to  mg IV BID. (9/13) VPA level 72.9. Upon repeat discussion with pt/partner at bedside (9/19), discussed she possibly she may have had an extra dose during the transfer from the rehab center and the hospital depending on the timing of medications. Unable to obtain EEG due to incision.     Overnight events:  None    Subjective complaints:  Pt evaluated at bedside.        PAST MEDICAL & SURGICAL HISTORY:  Hypothyroid    Cyst of ovary    Status post craniectomy            Medications:  acetaminophen     Tablet .. 650 milliGRAM(s) Oral every 6 hours PRN  bacitracin   Ointment 1 Application(s) Topical daily  baclofen 5 milliGRAM(s) Oral every 8 hours  benzocaine/menthol Lozenge 1 Lozenge Oral three times a day PRN  bisacodyl 5 milliGRAM(s) Oral every 12 hours  cephalexin 500 milliGRAM(s) Oral every 12 hours  divalproex  milliGRAM(s) Oral every 12 hours  enoxaparin Injectable 40 milliGRAM(s) SubCutaneous every 24 hours  escitalopram 20 milliGRAM(s) Oral daily  gabapentin 600 milliGRAM(s) Oral <User Schedule>  gabapentin 600 milliGRAM(s) Oral <User Schedule>  influenza   Vaccine 0.5 milliLiter(s) IntraMuscular once  lacosamide 150 milliGRAM(s) Oral every 12 hours  levothyroxine 75 MICROGram(s) Oral daily  memantine 5 milliGRAM(s) Oral <User Schedule>  pantoprazole    Tablet 40 milliGRAM(s) Oral before breakfast  polyethylene glycol 3350 17 Gram(s) Oral two times a day  senna 2 Tablet(s) Oral at bedtime  traMADol 50 milliGRAM(s) Oral every 6 hours PRN  traZODone 50 milliGRAM(s) Oral at bedtime      Vital Signs  Vital Signs Last 24 Hrs  T(C): 37.2 (21 Sep 2023 05:23), Max: 37.2 (20 Sep 2023 20:20)  T(F): 98.9 (21 Sep 2023 05:23), Max: 98.9 (20 Sep 2023 20:20)  HR: 87 (21 Sep 2023 05:23) (81 - 94)  BP: 121/84 (21 Sep 2023 05:23) (117/84 - 133/92)  BP(mean): --  RR: 17 (21 Sep 2023 05:23) (16 - 17)  SpO2: 97% (21 Sep 2023 05:23) (97% - 98%)    Parameters below as of 21 Sep 2023 05:23  Patient On (Oxygen Delivery Method): room air        Neurological Exam:       Labs:  CBC Full  -  ( 19 Sep 2023 16:42 )  WBC Count : 7.81 K/uL  RBC Count : 3.01 M/uL  Hemoglobin : 9.6 g/dL  Hematocrit : 28.1 %  Platelet Count - Automated : 297 K/uL  Mean Cell Volume : 93.4 fl  Mean Cell Hemoglobin : 31.9 pg  Mean Cell Hemoglobin Concentration : 34.2 gm/dL  Auto Neutrophil # : x  Auto Lymphocyte # : x  Auto Monocyte # : x  Auto Eosinophil # : x  Auto Basophil # : x  Auto Neutrophil % : x  Auto Lymphocyte % : x  Auto Monocyte % : x  Auto Eosinophil % : x  Auto Basophil % : x    09-19    139  |  101  |  6<L>  ----------------------------<  108<H>  4.2   |  28  |  0.35<L>    Ca    8.7      19 Sep 2023 16:42  Phos  3.8     09-19  Mg     1.7     09-19          Urinalysis Basic - ( 19 Sep 2023 16:42 )    Color: x / Appearance: x / SG: x / pH: x  Gluc: 108 mg/dL / Ketone: x  / Bili: x / Urobili: x   Blood: x / Protein: x / Nitrite: x   Leuk Esterase: x / RBC: x / WBC x   Sq Epi: x / Non Sq Epi: x / Bacteria: x         Neurology Epilepsy Progress Note     BROOKE CAIN 38y Female [id number]    Hospital Day: 11d     HOSPITAL COURSE:   38F. PMH: hypothyroid, recent hx of R frontal IPH with IVH and hydrocephalus s/p emergent R hemicraniectomy w/ EVD placement (6/2023).   Presented (9/10) for cranioplasty. Hx started 6/6/23, when pt was found down by mother, was airlifted to [facility] for a large R frontal IPH with IVH and hydrocephalus. She was emergently intubated and taken to operating room for right hemicraniectomy and EVD placement with Dr. Bernstein. On admission Day3, she was noted to have a gaze deviation, CT unrevealing, vEEG (+) seizures. Pt reports 5 seizures total  and was initially started on Keppra. She was then changed from Keppra to VPA due to agitation. When she was discharged to the [facility] Vimpat was added, unclear reason as partner stated she did not have any more seizures. Patient was deemed medically stable and was sent to [facility] for 5 weeks then transferred to [facility] for 4 weeks. Then when she went from [facility] to [facility] her VPA was increased to 500 mg AM/750 mg PM due to ?low level.    Now presenting for cranioplasty. S/P right cranioplasty with longevity implant (9/12). Epilepsy consulted for antiseizure medication management due to elevated VPA  level (9/12) of 119.2. Pt pre-op had been extremely tremulous, which is not typical for her. 9/12: VPA dose reduced slightly to  mg IV BID. (9/13) VPA level 72.9. Upon repeat discussion with pt/partner at bedside (9/19), discussed she possibly she may have had an extra dose during the transfer from the rehab center and the hospital depending on the timing of medications. Unable to obtain EEG due to incision.     Overnight events:  None    Subjective complaints:  Pt evaluated at bedside.        PAST MEDICAL & SURGICAL HISTORY:  Hypothyroid    Cyst of ovary    Status post craniectomy            Medications:  acetaminophen     Tablet .. 650 milliGRAM(s) Oral every 6 hours PRN  bacitracin   Ointment 1 Application(s) Topical daily  baclofen 5 milliGRAM(s) Oral every 8 hours  benzocaine/menthol Lozenge 1 Lozenge Oral three times a day PRN  bisacodyl 5 milliGRAM(s) Oral every 12 hours  cephalexin 500 milliGRAM(s) Oral every 12 hours  divalproex  milliGRAM(s) Oral every 12 hours  enoxaparin Injectable 40 milliGRAM(s) SubCutaneous every 24 hours  escitalopram 20 milliGRAM(s) Oral daily  gabapentin 600 milliGRAM(s) Oral <User Schedule>  gabapentin 600 milliGRAM(s) Oral <User Schedule>  influenza   Vaccine 0.5 milliLiter(s) IntraMuscular once  lacosamide 150 milliGRAM(s) Oral every 12 hours  levothyroxine 75 MICROGram(s) Oral daily  memantine 5 milliGRAM(s) Oral <User Schedule>  pantoprazole    Tablet 40 milliGRAM(s) Oral before breakfast  polyethylene glycol 3350 17 Gram(s) Oral two times a day  senna 2 Tablet(s) Oral at bedtime  traMADol 50 milliGRAM(s) Oral every 6 hours PRN  traZODone 50 milliGRAM(s) Oral at bedtime      Vital Signs  Vital Signs Last 24 Hrs  T(C): 37.2 (21 Sep 2023 05:23), Max: 37.2 (20 Sep 2023 20:20)  T(F): 98.9 (21 Sep 2023 05:23), Max: 98.9 (20 Sep 2023 20:20)  HR: 87 (21 Sep 2023 05:23) (81 - 94)  BP: 121/84 (21 Sep 2023 05:23) (117/84 - 133/92)  BP(mean): --  RR: 17 (21 Sep 2023 05:23) (16 - 17)  SpO2: 97% (21 Sep 2023 05:23) (97% - 98%)    Parameters below as of 21 Sep 2023 05:23  Patient On (Oxygen Delivery Method): room air        Neurological Exam:   Mental status: Awake, alert and oriented x3.  Attention/concentration intact.  No dysarthria, no aphasia.   Cranial nerves: no nystagmus, extraocular muscles intact, face symmetric,  Motor: left sided hemiparesis    Labs:  CBC Full  -  ( 19 Sep 2023 16:42 )  WBC Count : 7.81 K/uL  RBC Count : 3.01 M/uL  Hemoglobin : 9.6 g/dL  Hematocrit : 28.1 %  Platelet Count - Automated : 297 K/uL  Mean Cell Volume : 93.4 fl  Mean Cell Hemoglobin : 31.9 pg  Mean Cell Hemoglobin Concentration : 34.2 gm/dL  Auto Neutrophil # : x  Auto Lymphocyte # : x  Auto Monocyte # : x  Auto Eosinophil # : x  Auto Basophil # : x  Auto Neutrophil % : x  Auto Lymphocyte % : x  Auto Monocyte % : x  Auto Eosinophil % : x  Auto Basophil % : x    09-19    139  |  101  |  6<L>  ----------------------------<  108<H>  4.2   |  28  |  0.35<L>    Ca    8.7      19 Sep 2023 16:42  Phos  3.8     09-19  Mg     1.7     09-19          Urinalysis Basic - ( 19 Sep 2023 16:42 )    Color: x / Appearance: x / SG: x / pH: x  Gluc: 108 mg/dL / Ketone: x  / Bili: x / Urobili: x   Blood: x / Protein: x / Nitrite: x   Leuk Esterase: x / RBC: x / WBC x   Sq Epi: x / Non Sq Epi: x / Bacteria: x

## 2023-09-21 NOTE — DISCHARGE NOTE NURSING/CASE MANAGEMENT/SOCIAL WORK - PATIENT PORTAL LINK FT
You can access the FollowMyHealth Patient Portal offered by [facility] by registering at the following website: [email or web address]. By joining [facility]’s FollowMyHealth portal, you will also be able to view your health information using other applications (apps) compatible with our system.

## 2023-09-21 NOTE — DISCHARGE NOTE NURSING/CASE MANAGEMENT/SOCIAL WORK - NSDCPEFALRISK_GEN_ALL_CORE
For information on Fall & Injury Prevention, visit: [email or web address] OR  [email or web address] OR  https://www.cdc.gov/steadi/patient.html

## 2023-09-21 NOTE — PROGRESS NOTE ADULT - ASSESSMENT
38 year old female with a pmh of hypothyroidism and recent R frontal IPH w/IVH & hydrocephalus on 6/6 with subsequent seizures on /750 & Vimpat 150 mg BID. Epilepsy consulted for supra therapeutic VPA level  Pt pre-op had been extremely tremulous, which is not typical for her, and possibly she may have had an extra dose during the transfer from the rehab center and the hospital depending on the timing of medications.  Pt reportedly had difficulty keeping levels up during rehab.    Most recent dose now therapeutic after reduction of dose, would ideally like to evaluate if pt was supratherapeutic iso extra dose, and continued maintenance level on reduced dose, however given pt is still seizure free and appears more alert today can continue with reduced VPA dose.  Unable to obtain EEG due to incision.     Recommendations:  - Seizure/Fall precautions  - Repeat VPA trough level tomorrow am for true trough if pt still in hospital. (ordered)  - Continue divalproex DR 500mg Q12H  - Continue Vimpat 150 mg Q12H  - Can obtain EEG as outpatient once incision more healed  - Rest of care per primary team 38 year old female with a pmh of hypothyroidism and recent R frontal IPH w/IVH & hydrocephalus on 6/6 with subsequent seizures on /750 & Vimpat 150 mg BID. Epilepsy consulted for supra therapeutic VPA level  Pt pre-op had been extremely tremulous, which is not typical for her, and possibly she may have had an extra dose during the transfer from the rehab center and the hospital depending on the timing of medications.  Pt reportedly had difficulty keeping levels up during rehab.    Most recent dose now therapeutic after reduction of dose, would ideally like to evaluate if pt was supratherapeutic iso extra dose, and continued maintenance level on reduced dose, however given pt is still seizure free and appears more alert today can continue with reduced VPA dose.  Unable to obtain EEG due to incision.     Recommendations:  - Seizure/Fall precautions  - Repeat VPA trough level tomorrow am for true trough if pt still in hospital. (ordered)  - Continue divalproex DR 500mg Q12H  - Continue Vimpat 150 mg Q12H  - Can obtain EEG as outpatient once incision more healed  - Rest of care per primary team    Kimmy Lizarraga MD - PGY2 Neurology Resident  Pager: [phone]   38 year old female with a pmh of hypothyroidism and recent R frontal IPH w/IVH & hydrocephalus on 6/6 with subsequent seizures on /750 & Vimpat 150 mg BID. Epilepsy consulted for supra therapeutic VPA level  Pt pre-op had been extremely tremulous, which is not typical for her, and possibly she may have had an extra dose during the transfer from the rehab center and the hospital depending on the timing of medications.  Pt reportedly had difficulty keeping levels up during rehab.    Most recent dose now therapeutic after reduction of dose, would ideally like to evaluate if pt was supratherapeutic iso extra dose, and continued maintenance level on reduced dose, however given pt is still seizure free and appears more alert today can continue with reduced VPA dose.  Unable to obtain EEG due to incision.     Recommendations:  - Seizure/Fall precautions  - Repeat VPA trough level tomorrow am for true trough if pt still in hospital, will not hold up discharge if unable to collect  - Continue divalproex DR 500mg Q12H  - Continue Vimpat 150 mg Q12H  - Can obtain EEG as outpatient once incision more healed  - Rest of care per primary team    Kimmy Lizarraga MD - PGY2 Neurology Resident  Pager: [phone]   38 year old female with a pmh of hypothyroidism and recent R frontal IPH w/IVH & hydrocephalus on 6/6 with subsequent seizures on /750 & Vimpat 150 mg BID. Epilepsy consulted for supra therapeutic VPA level  Pt pre-op had been extremely tremulous, which is not typical for her, and possibly she may have had an extra dose during the transfer from the rehab center and the hospital depending on the timing of medications.  Pt reportedly had difficulty keeping levels up during rehab.    Most recent dose now therapeutic after reduction of dose, would ideally like to evaluate if pt was supratherapeutic iso extra dose, and continued maintenance level on reduced dose, however given pt is still seizure free and appears more alert today can continue with reduced VPA dose.  Unable to obtain EEG due to incision.     Recommendations:  - Seizure/Fall precautions  - Repeat VPA trough level tomorrow am for true trough if pt still in hospital, will not hold up discharge if unable to collect  - Continue divalproex DR 500mg Q12H  - Continue Vimpat 150 mg Q12H  - Can obtain EEG as outpatient once incision more healed  - Rest of care per primary team  - F/u with Dr. Santoro outpatient     Kimmy Lizarraga MD - PGY2 Neurology Resident  Pager: [phone]

## 2023-09-21 NOTE — PROGRESS NOTE ADULT - REASON FOR ADMISSION
Cranioplasty

## 2023-09-21 NOTE — PROGRESS NOTE ADULT - PROVIDER SPECIALTY LIST ADULT
Epilepsy
Epilepsy
Hospitalist
Hospitalist
Neurosurgery
Epilepsy
Hospitalist
Neurosurgery
Epilepsy
NSICU
Neurosurgery
Neurosurgery
Hospitalist
Hospitalist
NSICU
NSICU
Neurosurgery
Rehab Medicine
Hospitalist

## 2023-09-21 NOTE — PROGRESS NOTE ADULT - THIS PATIENT HAS THE FOLLOWING CONDITION(S)/DIAGNOSES ON THIS ADMISSION:
None
Brain Compression / Herniation
None

## 2023-09-22 NOTE — CHART NOTE - NSCHARTNOTEFT_GEN_A_CORE
Admitting Diagnosis:   Patient is a 38y old  Female who presents with a chief complaint of Cranioplasty (18 Sep 2023 11:00)    PAST MEDICAL & SURGICAL HISTORY:  Hypothyroid  Cyst of ovary  Status post craniectomy    Current Nutrition Order:  Regular  Ensure Plus High Protein x2/day     PO Intake: Good (%) [ x ]  Fair (50-75%) [   ] Poor (<25%) [   ]    GI Issues:   Per wife, no nausea/vomiting/diarrhea/constipation   Last recorded BM  - bowel regimen ordered  No abdominal distension/discomfort noted     Pain:  No pain reported, pt sleeping comfortably on assessment    Skin Integrity:  No pressure injuries or edema documented , River score 13    Labs:       137  |  100  |  5<L>  ----------------------------<  105<H>  3.6   |  29  |  0.31<L>    Ca    8.8      18 Sep 2023 10:17  Phos  4.5       Mg     1.6         Medications:  MEDICATIONS  (STANDING):  bacitracin   Ointment 1 Application(s) Topical daily  baclofen 5 milliGRAM(s) Oral every 8 hours  cephalexin 500 milliGRAM(s) Oral every 12 hours  divalproex  milliGRAM(s) Oral every 12 hours  enoxaparin Injectable 40 milliGRAM(s) SubCutaneous every 24 hours  escitalopram 20 milliGRAM(s) Oral daily  gabapentin 400 milliGRAM(s) Oral <User Schedule>  gabapentin 400 milliGRAM(s) Oral <User Schedule>  influenza   Vaccine 0.5 milliLiter(s) IntraMuscular once  lacosamide 150 milliGRAM(s) Oral every 12 hours  levothyroxine 75 MICROGram(s) Oral daily  memantine 5 milliGRAM(s) Oral <User Schedule>  pantoprazole    Tablet 40 milliGRAM(s) Oral before breakfast  senna 2 Tablet(s) Oral at bedtime  traZODone 50 milliGRAM(s) Oral at bedtime    MEDICATIONS  (PRN):  acetaminophen     Tablet .. 650 milliGRAM(s) Oral every 6 hours PRN Mild Pain (1 - 3)  benzocaine/menthol Lozenge 1 Lozenge Oral three times a day PRN Sore Throat  bisacodyl 5 milliGRAM(s) Oral every 12 hours PRN Constipation  traMADol 50 milliGRAM(s) Oral every 6 hours PRN Severe Pain (7 - 10)    Anthropometrics:  Height: 5'3"  Weight: 140lb/63.5kg  BMI: 24.8    IBW: 115lb/52.3kg    122% IBW    Weight Change:   No new weights obtained since admission. Recommend nursing to trend weights weekly. RD to continue monitoring weights as able.     Estimated energy needs:   Calories: 25-30kcal/k-1569kcal/d  Protein: 1.2-1.4g/k-89g/d  Fluid: 25-30mL/k-52120qJ/d  Estimated needs based on IBW as CBW >120% IBW. Needs adjusted for age, BMI, and status post OR.     Subjective:   38F hx hypothyroid was found down by mom unresponsive in the bathroom on , was airlifted to [facility] for a large R frontal IPH with IVH and hydrocephalus. She was emergently intubated and taken to operating room for right hemicraniectomy and EVD placement with Dr. Bernstein who presented for cranioplasty, s/p right cranioplasty with longevity implant 23.    On follow-up, pt resting in bed with wife at bedside. Labs and medication orders reviewed. Ordered for PO synthroid. Electrolytes WNL, BUN/Cr 5/0.31 <L>. On Regular diet with Ensure x2/day. Pt asleep and unable to rouse on assessment, interview deferred to wife. Reports pt with good appetite and intake. Denies pt with GI concerns or difficulty chewing/swallowing. See nutrition recommendations. RD to follow-up per protocol.     Previous Nutrition Diagnosis:  Increased nutrient needs related to status post cranioplasty as evidenced by increased metabolic demand for nutrients, healing    Active [ x ]  Resolved [   ]    Goal:  Consistently meet >75% nutrient needs.     Recommendations:  1. Continue Regular diet as ordered. Defer consistency to team/SLP.   >>Encourage & monitor PO intake. [place] dietary preferences as able.   2. Monitor GI tolerance, weight trends, labs, & skin integrity.  3. Defer bowel and pain regimens to team.   4. RD to remain available for diet education/intervention prn.     Education:   Educated deferred as pt asleep, wife aware RD remains available for questions/concerns.     Risk Level: High [   ] Moderate [ x ] Low [   ]
Patient requires \[facility]\"" ambulance for safe transport to rehab. She requires seizure precautions and has left sided plegia.
Brain compression has been ruled out
Patient an tolerates 3 hours of physical activity with physical therapy daily.
Psychiatry Consult Note  Reason: Psychiatry consulted for agitated behavior and labile mood.    HPI: 38F hx hypothyroid was found down by mom unresponsive in the bathroom on 6/6, was airlifted to [facility] for a large R frontal IPH with IVH and hydrocephalus. She was emergently intubated and taken to operating room for right hemicraniectomy and EVD placement with Dr. Bernstein. Patient was deemed medically stable and was sent to [facility] for 5 weeks then transferred to [facility] for 4 weeks and presented for cranioplasty at [facility]. Now POD# 0 S/P right cranioplasty with longevity implant. Psychiatry consulted for agitated behavior and labile mood.     MEDICATIONS  (STANDING):  acetaminophen     Tablet .. 1000 milliGRAM(s) Oral every 8 hours  baclofen 5 milliGRAM(s) Oral every 8 hours  ceFAZolin   IVPB 2000 milliGRAM(s) IV Intermittent every 8 hours  escitalopram 15 milliGRAM(s) Oral daily  gabapentin 400 milliGRAM(s) Oral <User Schedule>  gabapentin 400 milliGRAM(s) Oral <User Schedule>  glucagon  Injectable 1 milliGRAM(s) IntraMuscular once  influenza   Vaccine 0.5 milliLiter(s) IntraMuscular once  insulin lispro (ADMELOG) corrective regimen sliding scale   SubCutaneous every 6 hours  lacosamide IVPB 150 milliGRAM(s) IV Intermittent every 12 hours  levothyroxine 75 MICROGram(s) Oral daily  memantine 5 milliGRAM(s) Oral <User Schedule>  ondansetron   Disintegrating Tablet 4 milliGRAM(s) Oral every 6 hours  pantoprazole    Tablet 40 milliGRAM(s) Oral before breakfast  senna 2 Tablet(s) Oral at bedtime  sodium chloride 0.9%. 1000 milliLiter(s) (60 mL/Hr) IV Continuous <Continuous>  traZODone 50 milliGRAM(s) Oral at bedtime  valproate sodium  IVPB 250 milliGRAM(s) IV Intermittent every 6 hours    MEDICATIONS  (PRN):  bisacodyl 5 milliGRAM(s) Oral every 12 hours PRN Constipation  labetalol Injectable 10 milliGRAM(s) IV Push every 2 hours PRN Systolic blood pressure > 140 mmHg  traMADol 50 milliGRAM(s) Oral every 6 hours PRN Severe Pain (7 - 10)      Assessment: Patient is a 38F hx hypothyroid was found down by mom unresponsive in the bathroom on 6/6, was airlifted to [facility] for a large R frontal IPH with IVH and hydrocephalus. She was emergently intubated and taken to operating room for right hemicraniectomy and EVD placement with Dr. Bernstein. Patient was deemed medically stable and was sent to [facility] for 5 weeks then transferred to [facility] for 4 weeks and presented for cranioplasty at [facility]. Now POD# 0 S/P right cranioplasty with longevity implant. Psychiatry consulted for agitated behavior and labile mood. Assessment severely limited as patient was too sedated due to having received 0.5 mg Dilaudid IVP several minutes prior to arrival. Patient uncooperative with evaluation. Psychiatry spoke to patient's partner, who was bedside, and reviewed all standing/PRN medications, recommendations as follows:    Recommendations:  - For agitation, please attempt to verbally deescalate and redirect patient. If patient is not redirectable, may offer Ativan 2 mg PO/IV/IM, PRN q8h for severe agitation  - Increase Lexapro to 20 mg orally daily, after 5 days of 15 mg orally daily   - Continue medical care as per primary team  - Psychiatry will continue to follow up, and see patient tomorrow for further evaluation

## 2023-09-22 NOTE — CHART NOTE - NSCHARTNOTESELECT_GEN_ALL_CORE
Event Note
Psych CL note/Off Service Note
chart note/Event Note
Nutrition Services
transport to rehab/Event Note

## 2023-09-27 DIAGNOSIS — D62 ACUTE POSTHEMORRHAGIC ANEMIA: ICD-10-CM

## 2023-09-27 DIAGNOSIS — Z42.8 ENCOUNTER FOR OTHER PLASTIC AND RECONSTRUCTIVE SURGERY FOLLOWING MEDICAL PROCEDURE OR HEALED INJURY: ICD-10-CM

## 2023-09-27 DIAGNOSIS — I69.154 HEMIPLEGIA AND HEMIPARESIS FOLLOWING NONTRAUMATIC INTRACEREBRAL HEMORRHAGE AFFECTING LEFT NON-DOMINANT SIDE: ICD-10-CM

## 2023-09-27 DIAGNOSIS — Z79.899 OTHER LONG TERM (CURRENT) DRUG THERAPY: ICD-10-CM

## 2023-09-27 DIAGNOSIS — Z79.890 HORMONE REPLACEMENT THERAPY: ICD-10-CM

## 2023-09-27 DIAGNOSIS — E03.9 HYPOTHYROIDISM, UNSPECIFIED: ICD-10-CM

## 2023-09-27 DIAGNOSIS — R47.01 APHASIA: ICD-10-CM

## 2023-09-27 DIAGNOSIS — G93.89 OTHER SPECIFIED DISORDERS OF BRAIN: ICD-10-CM

## 2023-09-27 DIAGNOSIS — F32.A DEPRESSION, UNSPECIFIED: ICD-10-CM

## 2023-09-27 DIAGNOSIS — Z86.73 PERSONAL HISTORY OF TRANSIENT ISCHEMIC ATTACK (TIA), AND CEREBRAL INFARCTION WITHOUT RESIDUAL DEFICITS: ICD-10-CM

## 2023-09-27 DIAGNOSIS — D63.8 ANEMIA IN OTHER CHRONIC DISEASES CLASSIFIED ELSEWHERE: ICD-10-CM

## 2023-09-27 DIAGNOSIS — E83.42 HYPOMAGNESEMIA: ICD-10-CM

## 2023-09-27 DIAGNOSIS — F05 DELIRIUM DUE TO KNOWN PHYSIOLOGICAL CONDITION: ICD-10-CM

## 2023-09-27 DIAGNOSIS — Z95.2 PRESENCE OF PROSTHETIC HEART VALVE: ICD-10-CM

## 2023-09-27 DIAGNOSIS — Z87.891 PERSONAL HISTORY OF NICOTINE DEPENDENCE: ICD-10-CM

## 2023-09-28 PROBLEM — Z00.00 ENCOUNTER FOR PREVENTIVE HEALTH EXAMINATION: Status: ACTIVE | Noted: 2023-09-28

## 2023-10-04 RX ORDER — DIVALPROEX SODIUM 500 MG/1
750 TABLET, DELAYED RELEASE ORAL
Refills: 0 | DISCHARGE

## 2023-10-04 RX ORDER — DIVALPROEX SODIUM 500 MG/1
1 TABLET, DELAYED RELEASE ORAL
Refills: 0 | DISCHARGE

## 2023-10-04 RX ORDER — TIZANIDINE 4 MG/1
2 TABLET ORAL
Refills: 0 | DISCHARGE

## 2023-10-04 RX ORDER — MAGNESIUM HYDROXIDE 400 MG/1
30 TABLET, CHEWABLE ORAL
Refills: 0 | DISCHARGE

## 2023-10-04 RX ORDER — ESCITALOPRAM OXALATE 10 MG/1
1 TABLET, FILM COATED ORAL
Refills: 0 | DISCHARGE

## 2023-10-04 RX ORDER — LACOSAMIDE 50 MG/1
1 TABLET ORAL
Refills: 0 | DISCHARGE

## 2023-10-04 RX ORDER — GABAPENTIN 400 MG/1
1 CAPSULE ORAL
Refills: 0 | DISCHARGE

## 2023-10-12 ENCOUNTER — APPOINTMENT (OUTPATIENT)
Dept: NEUROSURGERY | Facility: CLINIC | Age: 38
End: 2023-10-12

## 2023-10-23 PROBLEM — E03.9 HYPOTHYROIDISM, UNSPECIFIED: Chronic | Status: ACTIVE | Noted: 2023-09-10

## 2023-10-28 ENCOUNTER — EMERGENCY (EMERGENCY)
Facility: HOSPITAL | Age: 38
LOS: 1 days | Discharge: HOME | End: 2023-10-28
Attending: EMERGENCY MEDICINE | Admitting: STUDENT IN AN ORGANIZED HEALTH CARE EDUCATION/TRAINING PROGRAM
Payer: MEDICAID

## 2023-10-28 VITALS
HEIGHT: 62.99 IN | TEMPERATURE: 98 F | WEIGHT: 130.07 LBS | RESPIRATION RATE: 18 BRPM | DIASTOLIC BLOOD PRESSURE: 86 MMHG | SYSTOLIC BLOOD PRESSURE: 132 MMHG | OXYGEN SATURATION: 96 % | HEART RATE: 98 BPM

## 2023-10-28 VITALS
SYSTOLIC BLOOD PRESSURE: 144 MMHG | OXYGEN SATURATION: 95 % | DIASTOLIC BLOOD PRESSURE: 90 MMHG | RESPIRATION RATE: 17 BRPM | HEART RATE: 88 BPM

## 2023-10-28 DIAGNOSIS — N83.209 UNSPECIFIED OVARIAN CYST, UNSPECIFIED SIDE: Chronic | ICD-10-CM

## 2023-10-28 DIAGNOSIS — Z98.890 OTHER SPECIFIED POSTPROCEDURAL STATES: Chronic | ICD-10-CM

## 2023-10-28 PROCEDURE — 97760 ORTHOTIC MGMT&TRAING 1ST ENC: CPT

## 2023-10-28 PROCEDURE — 72131 CT LUMBAR SPINE W/O DYE: CPT | Mod: 26,MA

## 2023-10-28 PROCEDURE — 85025 COMPLETE CBC W/AUTO DIFF WBC: CPT

## 2023-10-28 PROCEDURE — 70450 CT HEAD/BRAIN W/O DYE: CPT | Mod: 26,MA

## 2023-10-28 PROCEDURE — 83001 ASSAY OF GONADOTROPIN (FSH): CPT

## 2023-10-28 PROCEDURE — 80053 COMPREHEN METABOLIC PANEL: CPT

## 2023-10-28 PROCEDURE — 97163 PT EVAL HIGH COMPLEX 45 MIN: CPT

## 2023-10-28 PROCEDURE — 36415 COLL VENOUS BLD VENIPUNCTURE: CPT

## 2023-10-28 PROCEDURE — 99284 EMERGENCY DEPT VISIT MOD MDM: CPT

## 2023-10-28 PROCEDURE — 76376 3D RENDER W/INTRP POSTPROCES: CPT

## 2023-10-28 PROCEDURE — 92523 SPEECH SOUND LANG COMPREHEN: CPT

## 2023-10-28 PROCEDURE — 97530 THERAPEUTIC ACTIVITIES: CPT

## 2023-10-28 PROCEDURE — 92526 ORAL FUNCTION THERAPY: CPT

## 2023-10-28 PROCEDURE — 97112 NEUROMUSCULAR REEDUCATION: CPT

## 2023-10-28 PROCEDURE — 70450 CT HEAD/BRAIN W/O DYE: CPT | Mod: MA

## 2023-10-28 PROCEDURE — 97535 SELF CARE MNGMENT TRAINING: CPT

## 2023-10-28 PROCEDURE — 97116 GAIT TRAINING THERAPY: CPT

## 2023-10-28 PROCEDURE — 97167 OT EVAL HIGH COMPLEX 60 MIN: CPT

## 2023-10-28 PROCEDURE — 82533 TOTAL CORTISOL: CPT

## 2023-10-28 PROCEDURE — 82746 ASSAY OF FOLIC ACID SERUM: CPT

## 2023-10-28 PROCEDURE — 74230 X-RAY XM SWLNG FUNCJ C+: CPT

## 2023-10-28 PROCEDURE — 74018 RADEX ABDOMEN 1 VIEW: CPT

## 2023-10-28 PROCEDURE — 84443 ASSAY THYROID STIM HORMONE: CPT

## 2023-10-28 PROCEDURE — 84305 ASSAY OF SOMATOMEDIN: CPT

## 2023-10-28 PROCEDURE — 99285 EMERGENCY DEPT VISIT HI MDM: CPT

## 2023-10-28 PROCEDURE — 97110 THERAPEUTIC EXERCISES: CPT

## 2023-10-28 PROCEDURE — 92507 TX SP LANG VOICE COMM INDIV: CPT

## 2023-10-28 PROCEDURE — 71250 CT THORAX DX C-: CPT | Mod: MA

## 2023-10-28 PROCEDURE — 83550 IRON BINDING TEST: CPT

## 2023-10-28 PROCEDURE — 80165 DIPROPYLACETIC ACID FREE: CPT

## 2023-10-28 PROCEDURE — 71250 CT THORAX DX C-: CPT | Mod: 26,MA

## 2023-10-28 PROCEDURE — 82140 ASSAY OF AMMONIA: CPT

## 2023-10-28 PROCEDURE — 72131 CT LUMBAR SPINE W/O DYE: CPT | Mod: MA

## 2023-10-28 PROCEDURE — 92610 EVALUATE SWALLOWING FUNCTION: CPT

## 2023-10-28 PROCEDURE — 82670 ASSAY OF TOTAL ESTRADIOL: CPT

## 2023-10-28 PROCEDURE — 93005 ELECTROCARDIOGRAM TRACING: CPT

## 2023-10-28 PROCEDURE — C9254: CPT

## 2023-10-28 PROCEDURE — 70450 CT HEAD/BRAIN W/O DYE: CPT

## 2023-10-28 PROCEDURE — 80048 BASIC METABOLIC PNL TOTAL CA: CPT

## 2023-10-28 PROCEDURE — 93010 ELECTROCARDIOGRAM REPORT: CPT

## 2023-10-28 PROCEDURE — 82607 VITAMIN B-12: CPT

## 2023-10-28 PROCEDURE — 83002 ASSAY OF GONADOTROPIN (LH): CPT

## 2023-10-28 PROCEDURE — 92611 MOTION FLUOROSCOPY/SWALLOW: CPT

## 2023-10-28 PROCEDURE — 80164 ASSAY DIPROPYLACETIC ACD TOT: CPT

## 2023-10-28 PROCEDURE — 83540 ASSAY OF IRON: CPT

## 2023-10-28 PROCEDURE — 84439 ASSAY OF FREE THYROXINE: CPT

## 2023-10-28 NOTE — ED ADULT TRIAGE NOTE - CHIEF COMPLAINT QUOTE
Pt BIBA from home s/p fall off commode while trying to get up. Pt has left side deficit from prior brain bleed. Denies LOC.

## 2023-10-28 NOTE — ED PROVIDER NOTE - PATIENT PORTAL LINK FT
You can access the FollowMyHealth Patient Portal offered by [facility] by registering at the following website: [email or web address]. By joining "[employer]’s FollowMyHealth portal, you will also be able to view your health information using other applications (apps) compatible with our system.

## 2023-10-28 NOTE — ED PROVIDER NOTE - CLINICAL SUMMARY MEDICAL DECISION MAKING FREE TEXT BOX
38 year old female today for the evaluation of the fall,history of R frontal IPH with IVH and hydrocephalus s/p emergent R hemicraniectomy w/ EVD placement (6/2023), s/p cranioplasty(sep/2023). CT reivewed- showing compression fracture of L1,3,4,5. Patient declined further neuro exam including rectal exam. Offered admission for pain control and ortho eval, and patient/mother declined, she is already on 'too much medication' 38 year old female today for the evaluation of the fall, history of R frontal IPH with IVH and hydrocephalus s/p emergent R hemicraniectomy w/ EVD placement (6/2023), s/p cranioplasty(sep/2023). CT reviewed- showing compression fracture of L1,3,4,5. Patient occasionally screams in pain, burst into tears.   Patient declined further neuro exam including rectal exam. Offered admission for pain control and further evaluation of the compression fracture +_ brace, and patient/mother declined, says she is already on 'too much medication' and does not want any rx, they have spent too much time on different hospitals, she wants to go home.  D/w spine on Call, , can follow up outpatient if no severe neuro deficit     The patient has expressed the desire to leave against medical advice.  It has been explained to the patient that leaving prior to completion of work up and treatment may result in recurrent or worsening of symptoms, severe permanent disability, pain and suffering, and/or even death.  The patient has demonstrated comprehension and verbalizes understanding of these risks.  The patient has been told that he/she must return to the ER immediately for return of symptoms, worsening symptoms, or any concerning symptoms.  The patient has also been told that he/she may return at any time if he/she wishes to resume care. The patient has been given the opportunity to ask questions. Patient/mother declined to signed the AMA form

## 2023-10-28 NOTE — ED ADULT NURSE NOTE - NSFALLHARMRISKINTERV_ED_ALL_ED

## 2023-10-28 NOTE — ED PROVIDER NOTE - CONSTITUTIONAL, MLM
chronic ill appearing, awake, alert, oriented to person, place, time/situation and in no apparent distress. normal...

## 2023-10-28 NOTE — ED ADULT NURSE NOTE - OBJECTIVE STATEMENT
Pt accompanied by parent,  presents to ED s/p fall from commode at home.  Hx of TBI, with left sided residual.  Per pt mother, heard a noise in the bathroom and she found her daughter on the floor on the left side of her Pt accompanied by parent,  presents to ED s/p fall from commode at home.  Hx of TBI, with left sided residual.  Per pt mother, heard a noise in the bathroom and she found her daughter on the floor on her left side.  Unknown head strike.  Denies any LOC.  Per mother, no change in mental status noted.

## 2023-10-28 NOTE — ED PROVIDER NOTE - OBJECTIVE STATEMENT
38 year old female today for the evaluation of the fall today. Patient had last TUesday from rehab, she was on comode and fell. 38 year old female today for the evaluation of the fall today.  Patient has history of R frontal IPH with IVH and hydrocephalus s/p emergent R hemicraniectomy w/ EVD placement (6/2023), s/p cranioplasty(sep/2023). Patient has residual left sided hemiparesis, was discharged from rehab to home last tuesday. Today, she was on comode and had unwitnessed fall. Patient cannot recall the event, and as per mother, patient has short term memory loss. Family member immediately attended and patient was on the floor on her left side. Denies seizure like activity after the fall. Also reports patient 38 year old female today for the evaluation of the fall today.  Patient has history of R frontal IPH with IVH and hydrocephalus s/p emergent R hemicraniectomy w/ EVD placement (6/2023), s/p cranioplasty(sep/2023). Patient has residual left sided hemiparesis, was discharged from rehab to home last tuesday. Today, she was on comode and had unwitnessed fall. Patient cannot recall the event, and as per mother, patient has short term memory loss. Family member immediately attended and patient was on the floor on her left side. Denies seizure like activity after the fall. Also reports patient has lower back pain for months, denies any previous imaging. Denies fever, NVD, cough, sob, any other symptoms. 38 year old female today for the evaluation of the fall today.  Patient has history of R frontal IPH with IVH and hydrocephalus s/p emergent R hemicraniectomy w/ EVD placement (6/2023), s/p cranioplasty(sep/2023). Patient has residual left sided hemiparesis, was discharged from rehab to home last tuesday. Today, she was on comode and had unwitnessed fall. Patient cannot recall the event, and as per mother, patient has short term memory loss. Family member immediately attended and patient was on the floor on her left side, did not notice any new neurological deficit.  Denies seizure like activity after the fall. Also reports patient has lower back pain for months, denies any previous imaging. Denies fever, NVD, cough, sob, any other symptoms.

## 2023-10-28 NOTE — ED PROVIDER NOTE - PROGRESS NOTE DETAILS
Ct scan ntoed- compression fracture of L 1,3,4,5. Explained the need/importance for further neuro exam including rectal tone, but patient/mother declined, states that she does not have incontinence, and she is able to tell when she need to use bathroom.

## 2023-10-28 NOTE — ED PROVIDER NOTE - NSFOLLOWUPINSTRUCTIONS_ED_ALL_ED_FT
Please follow up with spine doctor for the compression fracture. Referral is made     Please return to the nearest ED immediately if incontinence, worsening weakness/pain

## 2023-10-30 ENCOUNTER — APPOINTMENT (OUTPATIENT)
Dept: FAMILY MEDICINE | Facility: CLINIC | Age: 38
End: 2023-10-30
Payer: MEDICAID

## 2023-10-30 ENCOUNTER — NON-APPOINTMENT (OUTPATIENT)
Age: 38
End: 2023-10-30

## 2023-10-30 VITALS
TEMPERATURE: 97.8 F | HEART RATE: 109 BPM | SYSTOLIC BLOOD PRESSURE: 120 MMHG | RESPIRATION RATE: 16 BRPM | OXYGEN SATURATION: 100 % | DIASTOLIC BLOOD PRESSURE: 81 MMHG

## 2023-10-30 DIAGNOSIS — E03.9 HYPOTHYROIDISM, UNSPECIFIED: ICD-10-CM

## 2023-10-30 DIAGNOSIS — F32.9 MAJOR DEPRESSIVE DISORDER, SINGLE EPISODE, UNSPECIFIED: ICD-10-CM

## 2023-10-30 DIAGNOSIS — Z00.00 ENCOUNTER FOR GENERAL ADULT MEDICAL EXAMINATION W/OUT ABNORMAL FINDINGS: ICD-10-CM

## 2023-10-30 DIAGNOSIS — Z87.09 PERSONAL HISTORY OF OTHER DISEASES OF THE RESPIRATORY SYSTEM: ICD-10-CM

## 2023-10-30 DIAGNOSIS — R03.0 ELEVATED BLOOD-PRESSURE READING, W/OUT DIAGNOSIS OF HYPERTENSION: ICD-10-CM

## 2023-10-30 DIAGNOSIS — K21.9 GASTRO-ESOPHAGEAL REFLUX DISEASE W/OUT ESOPHAGITIS: ICD-10-CM

## 2023-10-30 DIAGNOSIS — Z11.59 ENCOUNTER FOR SCREENING FOR OTHER VIRAL DISEASES: ICD-10-CM

## 2023-10-30 DIAGNOSIS — Z86.79 PERSONAL HISTORY OF OTHER DISEASES OF THE CIRCULATORY SYSTEM: ICD-10-CM

## 2023-10-30 DIAGNOSIS — Z98.890 OTHER SPECIFIED POSTPROCEDURAL STATES: ICD-10-CM

## 2023-10-30 DIAGNOSIS — S32.000D WEDGE COMPRESSION FRACTURE OF UNSPECIFIED LUMBAR VERTEBRA, SUBSEQUENT ENCOUNTER FOR FRACTURE WITH ROUTINE HEALING: ICD-10-CM

## 2023-10-30 DIAGNOSIS — D75.89 OTHER SPECIFIED DISEASES OF BLOOD AND BLOOD-FORMING ORGANS: ICD-10-CM

## 2023-10-30 PROCEDURE — 99205 OFFICE O/P NEW HI 60 MIN: CPT

## 2023-10-30 RX ORDER — LEVOTHYROXINE SODIUM 75 MCG
75 TABLET ORAL
Refills: 0 | Status: DISCONTINUED | COMMUNITY
End: 2023-10-30

## 2023-10-30 RX ORDER — OMEPRAZOLE 40 MG/1
40 CAPSULE, DELAYED RELEASE ORAL
Refills: 0 | Status: DISCONTINUED | COMMUNITY
Start: 2023-10-30 | End: 2023-10-30

## 2023-10-30 RX ORDER — VARENICLINE 1 MG/1
1 TABLET, FILM COATED ORAL
Qty: 1 | Refills: 0 | Status: DISCONTINUED | COMMUNITY
Start: 2021-10-19 | End: 2023-10-30

## 2023-10-30 RX ORDER — ENOXAPARIN SODIUM 100 MG/ML
40 INJECTION SUBCUTANEOUS
Refills: 0 | Status: DISCONTINUED | COMMUNITY
End: 2023-10-30

## 2023-10-30 RX ORDER — TIZANIDINE 4 MG/1
4 TABLET ORAL
Refills: 0 | Status: DISCONTINUED | COMMUNITY
End: 2023-10-30

## 2023-10-30 RX ORDER — PANTOPRAZOLE 40 MG/1
TABLET, DELAYED RELEASE ORAL
Refills: 0 | Status: ACTIVE | COMMUNITY

## 2023-10-30 RX ORDER — LEVOTHYROXINE SODIUM 0.07 MG/1
75 TABLET ORAL
Qty: 90 | Refills: 0 | Status: ACTIVE | COMMUNITY
Start: 2023-10-30

## 2023-10-31 ENCOUNTER — NON-APPOINTMENT (OUTPATIENT)
Age: 38
End: 2023-10-31

## 2023-10-31 DIAGNOSIS — E55.9 VITAMIN D DEFICIENCY, UNSPECIFIED: ICD-10-CM

## 2023-10-31 DIAGNOSIS — D72.829 ELEVATED WHITE BLOOD CELL COUNT, UNSPECIFIED: ICD-10-CM

## 2023-11-01 ENCOUNTER — NON-APPOINTMENT (OUTPATIENT)
Age: 38
End: 2023-11-01

## 2023-11-01 ENCOUNTER — APPOINTMENT (OUTPATIENT)
Dept: ORTHOPEDIC SURGERY | Facility: CLINIC | Age: 38
End: 2023-11-01
Payer: MEDICAID

## 2023-11-01 VITALS — WEIGHT: 130 LBS | HEIGHT: 62 IN | BODY MASS INDEX: 23.92 KG/M2

## 2023-11-01 DIAGNOSIS — S32.009A UNSPECIFIED FRACTURE OF UNSPECIFIED LUMBAR VERTEBRA, INITIAL ENCOUNTER FOR CLOSED FRACTURE: ICD-10-CM

## 2023-11-01 DIAGNOSIS — E87.0 HYPEROSMOLALITY AND HYPERNATREMIA: ICD-10-CM

## 2023-11-01 DIAGNOSIS — D64.9 ANEMIA, UNSPECIFIED: ICD-10-CM

## 2023-11-01 PROCEDURE — 99203 OFFICE O/P NEW LOW 30 MIN: CPT

## 2023-11-03 RX ORDER — ACETAMINOPHEN 325 MG/1
325 TABLET ORAL EVERY 6 HOURS
Qty: 30 | Refills: 0 | Status: ACTIVE | COMMUNITY
Start: 2023-11-03 | End: 1900-01-01

## 2023-11-03 RX ORDER — ACETAMINOPHEN 500 MG
1 TABLET ORAL
Qty: 0 | Refills: 0 | DISCHARGE
Start: 2023-11-03

## 2023-11-03 RX ORDER — OXYCODONE 5 MG/1
5 TABLET ORAL EVERY 6 HOURS
Qty: 15 | Refills: 0 | Status: ACTIVE | COMMUNITY
Start: 2023-11-03 | End: 1900-01-01

## 2023-11-07 ENCOUNTER — APPOINTMENT (OUTPATIENT)
Dept: NEUROSURGERY | Facility: CLINIC | Age: 38
End: 2023-11-07

## 2023-11-09 RX ORDER — ONABOTULINUMTOXINA 100 [USP'U]/1
100 INJECTION, POWDER, LYOPHILIZED, FOR SOLUTION INTRADERMAL; INTRAMUSCULAR
Qty: 4 | Refills: 0 | Status: ACTIVE | OUTPATIENT
Start: 2023-11-09

## 2023-11-14 ENCOUNTER — APPOINTMENT (OUTPATIENT)
Dept: NEUROSURGERY | Facility: CLINIC | Age: 38
End: 2023-11-14

## 2023-11-17 ENCOUNTER — TRANSCRIPTION ENCOUNTER (OUTPATIENT)
Age: 38
End: 2023-11-17

## 2023-11-30 ENCOUNTER — APPOINTMENT (OUTPATIENT)
Dept: PHYSICAL MEDICINE AND REHAB | Facility: CLINIC | Age: 38
End: 2023-11-30
Payer: MEDICAID

## 2023-11-30 VITALS
SYSTOLIC BLOOD PRESSURE: 115 MMHG | BODY MASS INDEX: 23 KG/M2 | DIASTOLIC BLOOD PRESSURE: 83 MMHG | OXYGEN SATURATION: 100 % | WEIGHT: 125 LBS | HEIGHT: 62 IN | HEART RATE: 96 BPM

## 2023-11-30 PROCEDURE — 64643 CHEMODENERV 1 EXTREM 1-4 EA: CPT

## 2023-11-30 PROCEDURE — 95874 GUIDE NERV DESTR NEEDLE EMG: CPT

## 2023-11-30 PROCEDURE — 64644 CHEMODENERV 1 EXTREM 5/> MUS: CPT

## 2023-11-30 PROCEDURE — 99214 OFFICE O/P EST MOD 30 MIN: CPT | Mod: 25

## 2023-12-01 ENCOUNTER — EMERGENCY (EMERGENCY)
Facility: HOSPITAL | Age: 38
LOS: 1 days | Discharge: ANOTHER HOSPITAL | End: 2023-12-01
Attending: EMERGENCY MEDICINE | Admitting: EMERGENCY MEDICINE
Payer: MEDICAID

## 2023-12-01 VITALS
HEIGHT: 62.99 IN | SYSTOLIC BLOOD PRESSURE: 121 MMHG | TEMPERATURE: 98 F | WEIGHT: 130.07 LBS | RESPIRATION RATE: 18 BRPM | DIASTOLIC BLOOD PRESSURE: 83 MMHG | OXYGEN SATURATION: 98 % | HEART RATE: 98 BPM

## 2023-12-01 DIAGNOSIS — Z98.890 OTHER SPECIFIED POSTPROCEDURAL STATES: Chronic | ICD-10-CM

## 2023-12-01 DIAGNOSIS — N83.209 UNSPECIFIED OVARIAN CYST, UNSPECIFIED SIDE: Chronic | ICD-10-CM

## 2023-12-01 LAB
ALBUMIN SERPL ELPH-MCNC: 3.1 G/DL — LOW (ref 3.3–5)
ALBUMIN SERPL ELPH-MCNC: 3.1 G/DL — LOW (ref 3.3–5)
ALP SERPL-CCNC: 97 U/L — SIGNIFICANT CHANGE UP (ref 40–120)
ALP SERPL-CCNC: 97 U/L — SIGNIFICANT CHANGE UP (ref 40–120)
ALT FLD-CCNC: 13 U/L — SIGNIFICANT CHANGE UP (ref 10–45)
ALT FLD-CCNC: 13 U/L — SIGNIFICANT CHANGE UP (ref 10–45)
ANION GAP SERPL CALC-SCNC: 9 MMOL/L — SIGNIFICANT CHANGE UP (ref 5–17)
ANION GAP SERPL CALC-SCNC: 9 MMOL/L — SIGNIFICANT CHANGE UP (ref 5–17)
AST SERPL-CCNC: 11 U/L — SIGNIFICANT CHANGE UP (ref 10–40)
AST SERPL-CCNC: 11 U/L — SIGNIFICANT CHANGE UP (ref 10–40)
BASOPHILS # BLD AUTO: 0.03 K/UL — SIGNIFICANT CHANGE UP (ref 0–0.2)
BASOPHILS # BLD AUTO: 0.03 K/UL — SIGNIFICANT CHANGE UP (ref 0–0.2)
BASOPHILS NFR BLD AUTO: 0.4 % — SIGNIFICANT CHANGE UP (ref 0–2)
BASOPHILS NFR BLD AUTO: 0.4 % — SIGNIFICANT CHANGE UP (ref 0–2)
BILIRUB SERPL-MCNC: 0.4 MG/DL — SIGNIFICANT CHANGE UP (ref 0.2–1.2)
BILIRUB SERPL-MCNC: 0.4 MG/DL — SIGNIFICANT CHANGE UP (ref 0.2–1.2)
BUN SERPL-MCNC: 10 MG/DL — SIGNIFICANT CHANGE UP (ref 7–23)
BUN SERPL-MCNC: 10 MG/DL — SIGNIFICANT CHANGE UP (ref 7–23)
CALCIUM SERPL-MCNC: 9 MG/DL — SIGNIFICANT CHANGE UP (ref 8.4–10.5)
CALCIUM SERPL-MCNC: 9 MG/DL — SIGNIFICANT CHANGE UP (ref 8.4–10.5)
CHLORIDE SERPL-SCNC: 102 MMOL/L — SIGNIFICANT CHANGE UP (ref 96–108)
CHLORIDE SERPL-SCNC: 102 MMOL/L — SIGNIFICANT CHANGE UP (ref 96–108)
CO2 SERPL-SCNC: 28 MMOL/L — SIGNIFICANT CHANGE UP (ref 22–31)
CO2 SERPL-SCNC: 28 MMOL/L — SIGNIFICANT CHANGE UP (ref 22–31)
CREAT SERPL-MCNC: 0.48 MG/DL — LOW (ref 0.5–1.3)
CREAT SERPL-MCNC: 0.48 MG/DL — LOW (ref 0.5–1.3)
EGFR: 123 ML/MIN/1.73M2 — SIGNIFICANT CHANGE UP
EGFR: 123 ML/MIN/1.73M2 — SIGNIFICANT CHANGE UP
EOSINOPHIL # BLD AUTO: 0.03 K/UL — SIGNIFICANT CHANGE UP (ref 0–0.5)
EOSINOPHIL # BLD AUTO: 0.03 K/UL — SIGNIFICANT CHANGE UP (ref 0–0.5)
EOSINOPHIL NFR BLD AUTO: 0.4 % — SIGNIFICANT CHANGE UP (ref 0–6)
EOSINOPHIL NFR BLD AUTO: 0.4 % — SIGNIFICANT CHANGE UP (ref 0–6)
FLUAV AG NPH QL: SIGNIFICANT CHANGE UP
FLUAV AG NPH QL: SIGNIFICANT CHANGE UP
FLUBV AG NPH QL: SIGNIFICANT CHANGE UP
FLUBV AG NPH QL: SIGNIFICANT CHANGE UP
GLUCOSE SERPL-MCNC: 114 MG/DL — HIGH (ref 70–99)
GLUCOSE SERPL-MCNC: 114 MG/DL — HIGH (ref 70–99)
HCG SERPL-ACNC: <1 MIU/ML — SIGNIFICANT CHANGE UP
HCG SERPL-ACNC: <1 MIU/ML — SIGNIFICANT CHANGE UP
HCT VFR BLD CALC: 41.2 % — SIGNIFICANT CHANGE UP (ref 34.5–45)
HCT VFR BLD CALC: 41.2 % — SIGNIFICANT CHANGE UP (ref 34.5–45)
HGB BLD-MCNC: 13.5 G/DL — SIGNIFICANT CHANGE UP (ref 11.5–15.5)
HGB BLD-MCNC: 13.5 G/DL — SIGNIFICANT CHANGE UP (ref 11.5–15.5)
IMM GRANULOCYTES NFR BLD AUTO: 0.3 % — SIGNIFICANT CHANGE UP (ref 0–0.9)
IMM GRANULOCYTES NFR BLD AUTO: 0.3 % — SIGNIFICANT CHANGE UP (ref 0–0.9)
LIDOCAIN IGE QN: 23 U/L — SIGNIFICANT CHANGE UP (ref 16–77)
LIDOCAIN IGE QN: 23 U/L — SIGNIFICANT CHANGE UP (ref 16–77)
LYMPHOCYTES # BLD AUTO: 0.92 K/UL — LOW (ref 1–3.3)
LYMPHOCYTES # BLD AUTO: 0.92 K/UL — LOW (ref 1–3.3)
LYMPHOCYTES # BLD AUTO: 11.9 % — LOW (ref 13–44)
LYMPHOCYTES # BLD AUTO: 11.9 % — LOW (ref 13–44)
MCHC RBC-ENTMCNC: 31.6 PG — SIGNIFICANT CHANGE UP (ref 27–34)
MCHC RBC-ENTMCNC: 31.6 PG — SIGNIFICANT CHANGE UP (ref 27–34)
MCHC RBC-ENTMCNC: 32.8 GM/DL — SIGNIFICANT CHANGE UP (ref 32–36)
MCHC RBC-ENTMCNC: 32.8 GM/DL — SIGNIFICANT CHANGE UP (ref 32–36)
MCV RBC AUTO: 96.5 FL — SIGNIFICANT CHANGE UP (ref 80–100)
MCV RBC AUTO: 96.5 FL — SIGNIFICANT CHANGE UP (ref 80–100)
MONOCYTES # BLD AUTO: 0.49 K/UL — SIGNIFICANT CHANGE UP (ref 0–0.9)
MONOCYTES # BLD AUTO: 0.49 K/UL — SIGNIFICANT CHANGE UP (ref 0–0.9)
MONOCYTES NFR BLD AUTO: 6.4 % — SIGNIFICANT CHANGE UP (ref 2–14)
MONOCYTES NFR BLD AUTO: 6.4 % — SIGNIFICANT CHANGE UP (ref 2–14)
NEUTROPHILS # BLD AUTO: 6.22 K/UL — SIGNIFICANT CHANGE UP (ref 1.8–7.4)
NEUTROPHILS # BLD AUTO: 6.22 K/UL — SIGNIFICANT CHANGE UP (ref 1.8–7.4)
NEUTROPHILS NFR BLD AUTO: 80.6 % — HIGH (ref 43–77)
NEUTROPHILS NFR BLD AUTO: 80.6 % — HIGH (ref 43–77)
NRBC # BLD: 0 /100 WBCS — SIGNIFICANT CHANGE UP (ref 0–0)
NRBC # BLD: 0 /100 WBCS — SIGNIFICANT CHANGE UP (ref 0–0)
PLATELET # BLD AUTO: 219 K/UL — SIGNIFICANT CHANGE UP (ref 150–400)
PLATELET # BLD AUTO: 219 K/UL — SIGNIFICANT CHANGE UP (ref 150–400)
POTASSIUM SERPL-MCNC: 4.1 MMOL/L — SIGNIFICANT CHANGE UP (ref 3.5–5.3)
POTASSIUM SERPL-MCNC: 4.1 MMOL/L — SIGNIFICANT CHANGE UP (ref 3.5–5.3)
POTASSIUM SERPL-SCNC: 4.1 MMOL/L — SIGNIFICANT CHANGE UP (ref 3.5–5.3)
POTASSIUM SERPL-SCNC: 4.1 MMOL/L — SIGNIFICANT CHANGE UP (ref 3.5–5.3)
PROT SERPL-MCNC: 6.7 G/DL — SIGNIFICANT CHANGE UP (ref 6–8.3)
PROT SERPL-MCNC: 6.7 G/DL — SIGNIFICANT CHANGE UP (ref 6–8.3)
RBC # BLD: 4.27 M/UL — SIGNIFICANT CHANGE UP (ref 3.8–5.2)
RBC # BLD: 4.27 M/UL — SIGNIFICANT CHANGE UP (ref 3.8–5.2)
RBC # FLD: 13.6 % — SIGNIFICANT CHANGE UP (ref 10.3–14.5)
RBC # FLD: 13.6 % — SIGNIFICANT CHANGE UP (ref 10.3–14.5)
RSV RNA NPH QL NAA+NON-PROBE: SIGNIFICANT CHANGE UP
RSV RNA NPH QL NAA+NON-PROBE: SIGNIFICANT CHANGE UP
SARS-COV-2 RNA SPEC QL NAA+PROBE: SIGNIFICANT CHANGE UP
SARS-COV-2 RNA SPEC QL NAA+PROBE: SIGNIFICANT CHANGE UP
SODIUM SERPL-SCNC: 139 MMOL/L — SIGNIFICANT CHANGE UP (ref 135–145)
SODIUM SERPL-SCNC: 139 MMOL/L — SIGNIFICANT CHANGE UP (ref 135–145)
WBC # BLD: 7.71 K/UL — SIGNIFICANT CHANGE UP (ref 3.8–10.5)
WBC # BLD: 7.71 K/UL — SIGNIFICANT CHANGE UP (ref 3.8–10.5)
WBC # FLD AUTO: 7.71 K/UL — SIGNIFICANT CHANGE UP (ref 3.8–10.5)
WBC # FLD AUTO: 7.71 K/UL — SIGNIFICANT CHANGE UP (ref 3.8–10.5)

## 2023-12-01 PROCEDURE — 93005 ELECTROCARDIOGRAM TRACING: CPT

## 2023-12-01 PROCEDURE — 36415 COLL VENOUS BLD VENIPUNCTURE: CPT

## 2023-12-01 PROCEDURE — 93010 ELECTROCARDIOGRAM REPORT: CPT

## 2023-12-01 PROCEDURE — 83690 ASSAY OF LIPASE: CPT

## 2023-12-01 PROCEDURE — 99285 EMERGENCY DEPT VISIT HI MDM: CPT | Mod: 25

## 2023-12-01 PROCEDURE — 80164 ASSAY DIPROPYLACETIC ACD TOT: CPT

## 2023-12-01 PROCEDURE — 70450 CT HEAD/BRAIN W/O DYE: CPT | Mod: MA

## 2023-12-01 PROCEDURE — 99284 EMERGENCY DEPT VISIT MOD MDM: CPT

## 2023-12-01 PROCEDURE — 87637 SARSCOV2&INF A&B&RSV AMP PRB: CPT

## 2023-12-01 PROCEDURE — 71045 X-RAY EXAM CHEST 1 VIEW: CPT | Mod: 26

## 2023-12-01 PROCEDURE — 70450 CT HEAD/BRAIN W/O DYE: CPT | Mod: 26,MA

## 2023-12-01 PROCEDURE — 84702 CHORIONIC GONADOTROPIN TEST: CPT

## 2023-12-01 PROCEDURE — 96360 HYDRATION IV INFUSION INIT: CPT

## 2023-12-01 PROCEDURE — 80053 COMPREHEN METABOLIC PANEL: CPT

## 2023-12-01 PROCEDURE — 71045 X-RAY EXAM CHEST 1 VIEW: CPT

## 2023-12-01 PROCEDURE — 85025 COMPLETE CBC W/AUTO DIFF WBC: CPT

## 2023-12-01 RX ORDER — SODIUM CHLORIDE 9 MG/ML
1000 INJECTION INTRAMUSCULAR; INTRAVENOUS; SUBCUTANEOUS ONCE
Refills: 0 | Status: COMPLETED | OUTPATIENT
Start: 2023-12-01 | End: 2023-12-01

## 2023-12-01 RX ADMIN — SODIUM CHLORIDE 1000 MILLILITER(S): 9 INJECTION INTRAMUSCULAR; INTRAVENOUS; SUBCUTANEOUS at 21:59

## 2023-12-01 RX ADMIN — SODIUM CHLORIDE 1000 MILLILITER(S): 9 INJECTION INTRAMUSCULAR; INTRAVENOUS; SUBCUTANEOUS at 22:59

## 2023-12-01 NOTE — ED PROVIDER NOTE - PATIENT PORTAL LINK FT
You can access the FollowMyHealth Patient Portal offered by [facility] by registering at the following website: [email or web address]. By joining [employer]’s FollowMyHealth portal, you will also be able to view your health information using other applications (apps) compatible with our system.

## 2023-12-01 NOTE — ED PROVIDER NOTE - IV ALTEPLASE INCLUSION HIDDEN
show Rhofade Counseling: Rhofade is a topical medication which can decrease superficial blood flow where applied. Side effects are uncommon and include stinging, redness and allergic reactions.

## 2023-12-01 NOTE — ED PROVIDER NOTE - NSFOLLOWUPINSTRUCTIONS_ED_ALL_ED_FT
Seizure, Adult  A seizure is a sudden burst of abnormal electrical and chemical activity in the brain. Seizures usually last from 30 seconds to 2 minutes. The abnormal activity temporarily interrupts normal brain function.    Many types of seizures can affect adults. A seizure can cause many different symptoms depending on where in the brain it starts.    What are the causes?  Common causes of this condition include:  Fever or infection.  Brain injury, head trauma, bleeding in the brain, or a brain tumor.  Low levels of blood sugar or salt (sodium).  Kidney problems or liver problems.  Metabolic disorders or other conditions that are passed from parent to child (are inherited).  Reaction to a substance, such as a drug or a medicine, or suddenly stopping the use of a substance (withdrawal).  A stroke.  Developmental disorders such as autism spectrum disorder or cerebral palsy.  In some cases, the cause of a seizure may not be known. Some people who have a seizure never have another one. A person who has repeated seizures over time without a clear cause has a condition called epilepsy.    What increases the risk?  You are more likely to develop this condition if:  You have a family history of epilepsy.  You have had a tonic–clonic seizure before. This type of seizure causes tightening (contraction) of the muscles of the whole body and loss of consciousness.  You have a history of head trauma, lack of oxygen at birth, or strokes.  What are the signs or symptoms?  There are many different types of seizures. The symptoms vary depending on the type of seizure you have. Symptoms occur during the seizure. They may also occur before a seizure (aura) and after a seizure (postictal). Symptoms may include the following:    Symptoms during a seizure    Uncontrollable shaking (convulsions) with fast, jerky movements of muscles.  Stiffening of the body.  Breathing problems.  Confusion, staring, or unresponsiveness.  Head nodding, eye blinking or fluttering, or rapid eye movements.  Drooling, grunting, or making clicking sounds with your mouth.  Loss of bladder control and bowel control.  Symptoms before a seizure    Fear or anxiety.  Nausea.  Vertigo. This is a feeling like:  You are moving when you are not.  Your surroundings are moving when they are not.  Déjà vu. This is a feeling of having seen or heard something before.  Odd tastes or smells.  Changes in vision, such as seeing flashing lights or spots.  Symptoms after a seizure    Confusion.  Sleepiness.  Headache.  Sore muscles.  How is this diagnosed?  This condition may be diagnosed based on:  A description of your symptoms. Video of your seizures can be helpful.  Your medical history.  A physical exam.  You may also have tests, including:  Blood tests.  CT scan.  MRI.  Electroencephalogram (EEG). This test measures electrical activity in the brain. An EEG can predict whether seizures will return.  A spinal tap, also called a lumbar puncture. This is the removal and testing of fluid that surrounds the brain and spinal cord.  How is this treated?  Most seizures will stop on their own in less than 5 minutes, and no treatment is needed. Seizures that last longer than 5 minutes will usually need treatment.    Seizures may be treated with:  Medicines given through an IV.  Avoiding known triggers, such as medicines that you take for another condition.  Medicines to control seizures or prevent future seizures (antiepileptics), if epilepsy caused your seizures.  Medical devices to prevent and control seizures.  Surgery to stop seizures or to reduce how often seizures happen, if you have epilepsy that does not respond to medicines.  A diet low in carbohydrates and high in fat (ketogenic diet).  Follow these instructions at home:  Medicines    Take over-the-counter and prescription medicines only as told by your health care provider.  Avoid any substances that may prevent your medicine from working properly, such as alcohol.  Activity    Follow instructions about activities, such as driving or swimming, that would be dangerous if you had another seizure. Wait until your health care provider says it is safe to do them.  If you live in the [place] check with your local department of motor vehicles (DMV) to find out about local driving laws. Each state has specific rules about when you can legally drive again.  Get enough rest. Lack of sleep can make seizures more likely to occur.  Educating others      Teach friends and family what to do if you have a seizure. They should:  Help you get down to the ground, to prevent a fall.  Cushion your head and move items away from your body.  Loosen any tight clothing around your neck.  Turn you on your side. If you vomit, this helps keep your airway clear.  Know whether or not you need emergency care.  Stay with you until you recover.  Also, tell them what not to do if you have a seizure. Tell them:  They should not hold you down. Holding you down will not stop the seizure.  They should not put anything in your mouth.  General instructions    Avoid anything that has ever triggered a seizure for you.  Keep a seizure diary. Record what you remember about each seizure, especially anything that might have triggered it.  Keep all follow-up visits. This is important.  Contact a health care provider if:  You have another seizure or seizures. Call each time you have a seizure.  Your seizure pattern changes.  You continue to have seizures with treatment.  You have symptoms of an infection or illness. Either of these might increase your risk of having a seizure.  You are unable to take your medicine.  Get help right away if:  You have:  A seizure that does not stop after 5 minutes.  Several seizures in a row without a complete recovery between seizures.  A seizure that makes it harder to breathe.  A seizure that leaves you unable to speak or use a part of your body.  You do not wake up right away after a seizure.  You injure yourself during a seizure.  You have confusion or pain right after a seizure.  These symptoms may represent a serious problem that is an emergency. Do not wait to see if the symptoms will go away. Get medical help right away. Call your local emergency services (911 in the [place] Do not drive yourself to the hospital.    Summary  Seizures are caused by abnormal electrical and chemical activity in the brain. The activity disrupts normal brain function and can cause various symptoms.  Seizures have many causes, including illness, head injuries, low levels of blood sugar or salt, and certain conditions.  Most seizures will stop on their own in less than 5 minutes. Seizures that last longer than 5 minutes are a medical emergency and need treatment right away.  Many medicines are used to treat seizures. Take over-the-counter and prescription medicines only as told by your health care provider.  This information is not intended to replace advice given to you by your health care provider. Make sure you discuss any questions you have with your health care provider.    Document Revised: 06/25/2021 Document Reviewed: 06/25/2021

## 2023-12-01 NOTE — ED ADULT NURSE NOTE - CADM POA PRESS ULCER
Requested Prescriptions     Pending Prescriptions Disp Refills    pravastatin (PRAVACHOL) 40 mg tablet 90 Tablet 1     Sig: TAKE 1 TABLET BY MOUTH EVERY EVENING     [facility] [id number] [place]  [phone] No

## 2023-12-01 NOTE — ED ADULT NURSE NOTE - NSFALLHARMRISKINTERV_ED_ALL_ED
Assistance OOB with selected safe patient handling equipment if applicable/Assistance with ambulation/Communicate risk of Fall with Harm to all staff, patient, and family/Monitor gait and stability/Provide visual cue: red socks, yellow wristband, yellow gown, etc/Reinforce activity limits and safety measures with patient and family/Bed in lowest position, wheels locked, appropriate side rails in place/Call bell, personal items and telephone in reach/Instruct patient to call for assistance before getting out of bed/chair/stretcher/Non-slip footwear applied when patient is off stretcher/[place] to call system/Physically safe environment - no spills, clutter or unnecessary equipment/Purposeful Proactive Rounding/Room/bathroom lighting operational, light cord in reach

## 2023-12-01 NOTE — ED PROVIDER NOTE - OBJECTIVE STATEMENT
37 yo female biba for 2 seizures at home tonight R frontal IPH with IVH and hydrocephalus s/p emergent R hemicraniectomy w/ EVD placement (6/2023), s/p cranioplasty(sep/2023). Patient has residual left sided hemiparesis, was discharged from rehab to home last tuesday. Today, she was on comode an 37 yo female biba for 2 seizures at home tonight R frontal IPH with IVH and hydrocephalus s/p emergent R hemicraniectomy w/ EVD placement (6/2023), s/p cranioplasty(sep/2023). Patient has residual left sided hemiparesis, was discharged from rehab to home last tuesday. Today, she was on comode and had a seizure generalized lasting less than 1 minute, pt noted to have non bloody diarrhea then later pt had another gen seizure lasting less than 1 minute mother gave extra vimpat.Pt states she does not feel well . no fever

## 2023-12-01 NOTE — ED PROVIDER NOTE - PHYSICAL EXAMINATION
Gen:  chronically ill appearing appearing in NAD, aao x 3  Head:  NC/AT  HEENT: pupils perrl,no pharyngeal erythema, uvula midline  Cardiac: S1S2, RRR  Abd: Soft, non tender  Resp: No distress, CTA   musculoskeletal:: no deformities, no swelling, strength +5/+5 left sided wekaness s/p ich  Skin: warm and dry as visualized, no rashes  Neuro: PETERS, aao x 4  Psych:alert, cooperative, appropriate mood and affect for situation

## 2023-12-01 NOTE — ED PROVIDER NOTE - CLINICAL SUMMARY MEDICAL DECISION MAKING FREE TEXT BOX
pt with ho seizures s/p spontaneous ich with craniectomy and replacement subsequently last seizure a while ago. no fever, urine clear, labs normal ct shows no ich will dc to home

## 2023-12-01 NOTE — ED ADULT NURSE NOTE - OBJECTIVE STATEMENT
Patient A&Ox4 BIBEMS s/p two seizures at home while using the commode. Hx of brain bleed in June. Was discharged home from the rehab last Tuesday. Hx of seizures since the bleed. On medications. Denies CP/ SOB. No seizure activity at this time.

## 2023-12-02 VITALS
RESPIRATION RATE: 18 BRPM | TEMPERATURE: 98 F | OXYGEN SATURATION: 98 % | DIASTOLIC BLOOD PRESSURE: 65 MMHG | HEART RATE: 99 BPM | SYSTOLIC BLOOD PRESSURE: 101 MMHG

## 2023-12-02 LAB
VALPROATE SERPL-MCNC: 68 UG/ML — SIGNIFICANT CHANGE UP (ref 50–100)
VALPROATE SERPL-MCNC: 68 UG/ML — SIGNIFICANT CHANGE UP (ref 50–100)

## 2023-12-03 ENCOUNTER — NON-APPOINTMENT (OUTPATIENT)
Age: 38
End: 2023-12-03

## 2023-12-05 ENCOUNTER — APPOINTMENT (OUTPATIENT)
Dept: NEUROSURGERY | Facility: CLINIC | Age: 38
End: 2023-12-05
Payer: MEDICAID

## 2023-12-05 PROBLEM — I61.9 NONTRAUMATIC INTRACEREBRAL HEMORRHAGE, UNSPECIFIED: Chronic | Status: ACTIVE | Noted: 2023-12-02

## 2023-12-05 PROCEDURE — 99214 OFFICE O/P EST MOD 30 MIN: CPT | Mod: 95

## 2023-12-11 ENCOUNTER — INPATIENT (INPATIENT)
Facility: HOSPITAL | Age: 38
LOS: 2 days | Discharge: HOME | DRG: 872 | End: 2023-12-14
Attending: INTERNAL MEDICINE | Admitting: STUDENT IN AN ORGANIZED HEALTH CARE EDUCATION/TRAINING PROGRAM
Payer: MEDICAID

## 2023-12-11 ENCOUNTER — APPOINTMENT (OUTPATIENT)
Dept: FAMILY MEDICINE | Facility: CLINIC | Age: 38
End: 2023-12-11
Payer: MEDICAID

## 2023-12-11 VITALS
RESPIRATION RATE: 20 BRPM | DIASTOLIC BLOOD PRESSURE: 78 MMHG | TEMPERATURE: 100 F | SYSTOLIC BLOOD PRESSURE: 118 MMHG | OXYGEN SATURATION: 95 % | HEIGHT: 62.99 IN | HEART RATE: 112 BPM

## 2023-12-11 VITALS — OXYGEN SATURATION: 91 % | HEART RATE: 120 BPM

## 2023-12-11 DIAGNOSIS — N83.209 UNSPECIFIED OVARIAN CYST, UNSPECIFIED SIDE: Chronic | ICD-10-CM

## 2023-12-11 DIAGNOSIS — A41.9 SEPSIS, UNSPECIFIED ORGANISM: ICD-10-CM

## 2023-12-11 DIAGNOSIS — Z98.890 OTHER SPECIFIED POSTPROCEDURAL STATES: Chronic | ICD-10-CM

## 2023-12-11 DIAGNOSIS — R50.9 FEVER, UNSPECIFIED: ICD-10-CM

## 2023-12-11 LAB
ALBUMIN SERPL ELPH-MCNC: 2.5 G/DL — LOW (ref 3.3–5)
ALBUMIN SERPL ELPH-MCNC: 2.5 G/DL — LOW (ref 3.3–5)
ALP SERPL-CCNC: 77 U/L — SIGNIFICANT CHANGE UP (ref 40–120)
ALP SERPL-CCNC: 77 U/L — SIGNIFICANT CHANGE UP (ref 40–120)
ALT FLD-CCNC: 11 U/L — SIGNIFICANT CHANGE UP (ref 10–45)
ALT FLD-CCNC: 11 U/L — SIGNIFICANT CHANGE UP (ref 10–45)
ANION GAP SERPL CALC-SCNC: 11 MMOL/L — SIGNIFICANT CHANGE UP (ref 5–17)
ANION GAP SERPL CALC-SCNC: 11 MMOL/L — SIGNIFICANT CHANGE UP (ref 5–17)
APPEARANCE UR: CLEAR — SIGNIFICANT CHANGE UP
APPEARANCE UR: CLEAR — SIGNIFICANT CHANGE UP
APTT BLD: 34.1 SEC — SIGNIFICANT CHANGE UP (ref 24.5–35.6)
APTT BLD: 34.1 SEC — SIGNIFICANT CHANGE UP (ref 24.5–35.6)
AST SERPL-CCNC: 16 U/L — SIGNIFICANT CHANGE UP (ref 10–40)
AST SERPL-CCNC: 16 U/L — SIGNIFICANT CHANGE UP (ref 10–40)
BASOPHILS # BLD AUTO: 0 K/UL — SIGNIFICANT CHANGE UP (ref 0–0.2)
BASOPHILS # BLD AUTO: 0 K/UL — SIGNIFICANT CHANGE UP (ref 0–0.2)
BASOPHILS NFR BLD AUTO: 0 % — SIGNIFICANT CHANGE UP (ref 0–2)
BASOPHILS NFR BLD AUTO: 0 % — SIGNIFICANT CHANGE UP (ref 0–2)
BILIRUB SERPL-MCNC: 0.4 MG/DL — SIGNIFICANT CHANGE UP (ref 0.2–1.2)
BILIRUB SERPL-MCNC: 0.4 MG/DL — SIGNIFICANT CHANGE UP (ref 0.2–1.2)
BILIRUB UR-MCNC: NEGATIVE — SIGNIFICANT CHANGE UP
BILIRUB UR-MCNC: NEGATIVE — SIGNIFICANT CHANGE UP
BUN SERPL-MCNC: 7 MG/DL — SIGNIFICANT CHANGE UP (ref 7–23)
BUN SERPL-MCNC: 7 MG/DL — SIGNIFICANT CHANGE UP (ref 7–23)
CALCIUM SERPL-MCNC: 8.8 MG/DL — SIGNIFICANT CHANGE UP (ref 8.4–10.5)
CALCIUM SERPL-MCNC: 8.8 MG/DL — SIGNIFICANT CHANGE UP (ref 8.4–10.5)
CHLORIDE SERPL-SCNC: 101 MMOL/L — SIGNIFICANT CHANGE UP (ref 96–108)
CHLORIDE SERPL-SCNC: 101 MMOL/L — SIGNIFICANT CHANGE UP (ref 96–108)
CO2 SERPL-SCNC: 25 MMOL/L — SIGNIFICANT CHANGE UP (ref 22–31)
CO2 SERPL-SCNC: 25 MMOL/L — SIGNIFICANT CHANGE UP (ref 22–31)
COLOR SPEC: YELLOW — SIGNIFICANT CHANGE UP
COLOR SPEC: YELLOW — SIGNIFICANT CHANGE UP
CREAT SERPL-MCNC: 0.54 MG/DL — SIGNIFICANT CHANGE UP (ref 0.5–1.3)
CREAT SERPL-MCNC: 0.54 MG/DL — SIGNIFICANT CHANGE UP (ref 0.5–1.3)
DIFF PNL FLD: NEGATIVE — SIGNIFICANT CHANGE UP
DIFF PNL FLD: NEGATIVE — SIGNIFICANT CHANGE UP
EGFR: 120 ML/MIN/1.73M2 — SIGNIFICANT CHANGE UP
EGFR: 120 ML/MIN/1.73M2 — SIGNIFICANT CHANGE UP
EOSINOPHIL # BLD AUTO: 0 K/UL — SIGNIFICANT CHANGE UP (ref 0–0.5)
EOSINOPHIL # BLD AUTO: 0 K/UL — SIGNIFICANT CHANGE UP (ref 0–0.5)
EOSINOPHIL NFR BLD AUTO: 0 % — SIGNIFICANT CHANGE UP (ref 0–6)
EOSINOPHIL NFR BLD AUTO: 0 % — SIGNIFICANT CHANGE UP (ref 0–6)
GLUCOSE SERPL-MCNC: 111 MG/DL — HIGH (ref 70–99)
GLUCOSE SERPL-MCNC: 111 MG/DL — HIGH (ref 70–99)
GLUCOSE UR QL: NEGATIVE MG/DL — SIGNIFICANT CHANGE UP
GLUCOSE UR QL: NEGATIVE MG/DL — SIGNIFICANT CHANGE UP
HCT VFR BLD CALC: 36.2 % — SIGNIFICANT CHANGE UP (ref 34.5–45)
HCT VFR BLD CALC: 36.2 % — SIGNIFICANT CHANGE UP (ref 34.5–45)
HGB BLD-MCNC: 12.3 G/DL — SIGNIFICANT CHANGE UP (ref 11.5–15.5)
HGB BLD-MCNC: 12.3 G/DL — SIGNIFICANT CHANGE UP (ref 11.5–15.5)
INR BLD: 1.18 RATIO — SIGNIFICANT CHANGE UP (ref 0.85–1.18)
INR BLD: 1.18 RATIO — SIGNIFICANT CHANGE UP (ref 0.85–1.18)
KETONES UR-MCNC: 15 MG/DL
KETONES UR-MCNC: 15 MG/DL
LACTATE SERPL-SCNC: 1 MMOL/L — SIGNIFICANT CHANGE UP (ref 0.7–2)
LACTATE SERPL-SCNC: 1 MMOL/L — SIGNIFICANT CHANGE UP (ref 0.7–2)
LEUKOCYTE ESTERASE UR-ACNC: NEGATIVE — SIGNIFICANT CHANGE UP
LEUKOCYTE ESTERASE UR-ACNC: NEGATIVE — SIGNIFICANT CHANGE UP
LYMPHOCYTES # BLD AUTO: 1.88 K/UL — SIGNIFICANT CHANGE UP (ref 1–3.3)
LYMPHOCYTES # BLD AUTO: 1.88 K/UL — SIGNIFICANT CHANGE UP (ref 1–3.3)
LYMPHOCYTES # BLD AUTO: 10 % — LOW (ref 13–44)
LYMPHOCYTES # BLD AUTO: 10 % — LOW (ref 13–44)
MANUAL SMEAR VERIFICATION: SIGNIFICANT CHANGE UP
MANUAL SMEAR VERIFICATION: SIGNIFICANT CHANGE UP
MCHC RBC-ENTMCNC: 31.6 PG — SIGNIFICANT CHANGE UP (ref 27–34)
MCHC RBC-ENTMCNC: 31.6 PG — SIGNIFICANT CHANGE UP (ref 27–34)
MCHC RBC-ENTMCNC: 34 GM/DL — SIGNIFICANT CHANGE UP (ref 32–36)
MCHC RBC-ENTMCNC: 34 GM/DL — SIGNIFICANT CHANGE UP (ref 32–36)
MCV RBC AUTO: 93.1 FL — SIGNIFICANT CHANGE UP (ref 80–100)
MCV RBC AUTO: 93.1 FL — SIGNIFICANT CHANGE UP (ref 80–100)
MONOCYTES # BLD AUTO: 1.13 K/UL — HIGH (ref 0–0.9)
MONOCYTES # BLD AUTO: 1.13 K/UL — HIGH (ref 0–0.9)
MONOCYTES NFR BLD AUTO: 6 % — SIGNIFICANT CHANGE UP (ref 2–14)
MONOCYTES NFR BLD AUTO: 6 % — SIGNIFICANT CHANGE UP (ref 2–14)
NEUTROPHILS # BLD AUTO: 15.82 K/UL — HIGH (ref 1.8–7.4)
NEUTROPHILS # BLD AUTO: 15.82 K/UL — HIGH (ref 1.8–7.4)
NEUTROPHILS NFR BLD AUTO: 80 % — HIGH (ref 43–77)
NEUTROPHILS NFR BLD AUTO: 80 % — HIGH (ref 43–77)
NEUTS BAND # BLD: 4 % — SIGNIFICANT CHANGE UP (ref 0–8)
NEUTS BAND # BLD: 4 % — SIGNIFICANT CHANGE UP (ref 0–8)
NITRITE UR-MCNC: NEGATIVE — SIGNIFICANT CHANGE UP
NITRITE UR-MCNC: NEGATIVE — SIGNIFICANT CHANGE UP
NRBC # BLD: 0 /100 — SIGNIFICANT CHANGE UP (ref 0–0)
NRBC # BLD: 0 /100 — SIGNIFICANT CHANGE UP (ref 0–0)
PH UR: 5.5 — SIGNIFICANT CHANGE UP (ref 5–8)
PH UR: 5.5 — SIGNIFICANT CHANGE UP (ref 5–8)
PLAT MORPH BLD: NORMAL — SIGNIFICANT CHANGE UP
PLAT MORPH BLD: NORMAL — SIGNIFICANT CHANGE UP
PLATELET # BLD AUTO: 237 K/UL — SIGNIFICANT CHANGE UP (ref 150–400)
PLATELET # BLD AUTO: 237 K/UL — SIGNIFICANT CHANGE UP (ref 150–400)
POTASSIUM SERPL-MCNC: 4.3 MMOL/L — SIGNIFICANT CHANGE UP (ref 3.5–5.3)
POTASSIUM SERPL-MCNC: 4.3 MMOL/L — SIGNIFICANT CHANGE UP (ref 3.5–5.3)
POTASSIUM SERPL-SCNC: 4.3 MMOL/L — SIGNIFICANT CHANGE UP (ref 3.5–5.3)
POTASSIUM SERPL-SCNC: 4.3 MMOL/L — SIGNIFICANT CHANGE UP (ref 3.5–5.3)
PROT SERPL-MCNC: 6.4 G/DL — SIGNIFICANT CHANGE UP (ref 6–8.3)
PROT SERPL-MCNC: 6.4 G/DL — SIGNIFICANT CHANGE UP (ref 6–8.3)
PROT UR-MCNC: NEGATIVE MG/DL — SIGNIFICANT CHANGE UP
PROT UR-MCNC: NEGATIVE MG/DL — SIGNIFICANT CHANGE UP
PROTHROM AB SERPL-ACNC: 13.4 SEC — HIGH (ref 9.5–13)
PROTHROM AB SERPL-ACNC: 13.4 SEC — HIGH (ref 9.5–13)
RAPID RVP RESULT: SIGNIFICANT CHANGE UP
RAPID RVP RESULT: SIGNIFICANT CHANGE UP
RBC # BLD: 3.89 M/UL — SIGNIFICANT CHANGE UP (ref 3.8–5.2)
RBC # BLD: 3.89 M/UL — SIGNIFICANT CHANGE UP (ref 3.8–5.2)
RBC # FLD: 14.3 % — SIGNIFICANT CHANGE UP (ref 10.3–14.5)
RBC # FLD: 14.3 % — SIGNIFICANT CHANGE UP (ref 10.3–14.5)
RBC BLD AUTO: NORMAL — SIGNIFICANT CHANGE UP
RBC BLD AUTO: NORMAL — SIGNIFICANT CHANGE UP
SARS-COV-2 RNA SPEC QL NAA+PROBE: SIGNIFICANT CHANGE UP
SARS-COV-2 RNA SPEC QL NAA+PROBE: SIGNIFICANT CHANGE UP
SODIUM SERPL-SCNC: 137 MMOL/L — SIGNIFICANT CHANGE UP (ref 135–145)
SODIUM SERPL-SCNC: 137 MMOL/L — SIGNIFICANT CHANGE UP (ref 135–145)
SP GR SPEC: 1.01 — SIGNIFICANT CHANGE UP (ref 1–1.03)
SP GR SPEC: 1.01 — SIGNIFICANT CHANGE UP (ref 1–1.03)
UROBILINOGEN FLD QL: 0.2 MG/DL — SIGNIFICANT CHANGE UP (ref 0.2–1)
UROBILINOGEN FLD QL: 0.2 MG/DL — SIGNIFICANT CHANGE UP (ref 0.2–1)
WBC # BLD: 18.83 K/UL — HIGH (ref 3.8–10.5)
WBC # BLD: 18.83 K/UL — HIGH (ref 3.8–10.5)
WBC # FLD AUTO: 18.83 K/UL — HIGH (ref 3.8–10.5)
WBC # FLD AUTO: 18.83 K/UL — HIGH (ref 3.8–10.5)

## 2023-12-11 PROCEDURE — 93010 ELECTROCARDIOGRAM REPORT: CPT

## 2023-12-11 PROCEDURE — 99213 OFFICE O/P EST LOW 20 MIN: CPT | Mod: 95

## 2023-12-11 PROCEDURE — 71045 X-RAY EXAM CHEST 1 VIEW: CPT | Mod: 26

## 2023-12-11 PROCEDURE — 99223 1ST HOSP IP/OBS HIGH 75: CPT

## 2023-12-11 PROCEDURE — 74176 CT ABD & PELVIS W/O CONTRAST: CPT | Mod: 26

## 2023-12-11 PROCEDURE — 99285 EMERGENCY DEPT VISIT HI MDM: CPT

## 2023-12-11 RX ORDER — LEVOTHYROXINE SODIUM 125 MCG
1 TABLET ORAL
Refills: 0 | DISCHARGE

## 2023-12-11 RX ORDER — BACLOFEN 100 %
1 POWDER (GRAM) MISCELLANEOUS
Refills: 0 | DISCHARGE

## 2023-12-11 RX ORDER — POLYETHYLENE GLYCOL 3350 17 G/17G
1 POWDER, FOR SOLUTION ORAL
Refills: 0 | DISCHARGE

## 2023-12-11 RX ORDER — ONDANSETRON 8 MG/1
4 TABLET, FILM COATED ORAL EVERY 8 HOURS
Refills: 0 | Status: DISCONTINUED | OUTPATIENT
Start: 2023-12-11 | End: 2023-12-14

## 2023-12-11 RX ORDER — PANTOPRAZOLE SODIUM 20 MG/1
1 TABLET, DELAYED RELEASE ORAL
Refills: 0 | DISCHARGE

## 2023-12-11 RX ORDER — SENNA PLUS 8.6 MG/1
1 TABLET ORAL
Refills: 0 | DISCHARGE

## 2023-12-11 RX ORDER — SODIUM CHLORIDE 9 MG/ML
1000 INJECTION INTRAMUSCULAR; INTRAVENOUS; SUBCUTANEOUS ONCE
Refills: 0 | Status: COMPLETED | OUTPATIENT
Start: 2023-12-11 | End: 2023-12-11

## 2023-12-11 RX ORDER — LANOLIN ALCOHOL/MO/W.PET/CERES
3 CREAM (GRAM) TOPICAL AT BEDTIME
Refills: 0 | Status: DISCONTINUED | OUTPATIENT
Start: 2023-12-11 | End: 2023-12-14

## 2023-12-11 RX ORDER — DIVALPROEX SODIUM 500 MG/1
750 TABLET, DELAYED RELEASE ORAL
Refills: 0 | Status: DISCONTINUED | OUTPATIENT
Start: 2023-12-11 | End: 2023-12-14

## 2023-12-11 RX ORDER — DOCUSATE SODIUM 100 MG
1 CAPSULE ORAL
Refills: 0 | DISCHARGE

## 2023-12-11 RX ORDER — GABAPENTIN 400 MG/1
2 CAPSULE ORAL
Refills: 0 | DISCHARGE

## 2023-12-11 RX ORDER — ESCITALOPRAM OXALATE 10 MG/1
20 TABLET, FILM COATED ORAL DAILY
Refills: 0 | Status: DISCONTINUED | OUTPATIENT
Start: 2023-12-11 | End: 2023-12-14

## 2023-12-11 RX ORDER — GABAPENTIN 400 MG/1
600 CAPSULE ORAL
Refills: 0 | Status: DISCONTINUED | OUTPATIENT
Start: 2023-12-11 | End: 2023-12-14

## 2023-12-11 RX ORDER — DIVALPROEX SODIUM 500 MG/1
3 TABLET, DELAYED RELEASE ORAL
Refills: 0 | DISCHARGE

## 2023-12-11 RX ORDER — KETOROLAC TROMETHAMINE 30 MG/ML
15 SYRINGE (ML) INJECTION ONCE
Refills: 0 | Status: DISCONTINUED | OUTPATIENT
Start: 2023-12-11 | End: 2023-12-11

## 2023-12-11 RX ORDER — DEXTROMETHORPHAN HYDROBROMIDE AND QUINIDINE SULFATE 20; 10 MG/1; MG/1
1 CAPSULE, GELATIN COATED ORAL
Refills: 0 | DISCHARGE

## 2023-12-11 RX ORDER — METHYLPHENIDATE HCL 5 MG
1 TABLET ORAL
Refills: 0 | DISCHARGE

## 2023-12-11 RX ORDER — VANCOMYCIN HCL 1 G
1000 VIAL (EA) INTRAVENOUS ONCE
Refills: 0 | Status: COMPLETED | OUTPATIENT
Start: 2023-12-11 | End: 2023-12-11

## 2023-12-11 RX ORDER — ACETAMINOPHEN 500 MG
650 TABLET ORAL EVERY 6 HOURS
Refills: 0 | Status: DISCONTINUED | OUTPATIENT
Start: 2023-12-11 | End: 2023-12-14

## 2023-12-11 RX ORDER — LACOSAMIDE 50 MG/1
150 TABLET ORAL
Refills: 0 | Status: DISCONTINUED | OUTPATIENT
Start: 2023-12-11 | End: 2023-12-14

## 2023-12-11 RX ORDER — PIPERACILLIN AND TAZOBACTAM 4; .5 G/20ML; G/20ML
3.38 INJECTION, POWDER, LYOPHILIZED, FOR SOLUTION INTRAVENOUS ONCE
Refills: 0 | Status: COMPLETED | OUTPATIENT
Start: 2023-12-11 | End: 2023-12-11

## 2023-12-11 RX ORDER — PIPERACILLIN AND TAZOBACTAM 4; .5 G/20ML; G/20ML
3.38 INJECTION, POWDER, LYOPHILIZED, FOR SOLUTION INTRAVENOUS EVERY 8 HOURS
Refills: 0 | Status: DISCONTINUED | OUTPATIENT
Start: 2023-12-11 | End: 2023-12-14

## 2023-12-11 RX ORDER — METHYLPHENIDATE HCL 5 MG
10 TABLET ORAL
Refills: 0 | Status: DISCONTINUED | OUTPATIENT
Start: 2023-12-11 | End: 2023-12-14

## 2023-12-11 RX ORDER — ACETAMINOPHEN 500 MG
1000 TABLET ORAL ONCE
Refills: 0 | Status: DISCONTINUED | OUTPATIENT
Start: 2023-12-11 | End: 2023-12-11

## 2023-12-11 RX ORDER — MEMANTINE HYDROCHLORIDE 10 MG/1
1 TABLET ORAL
Qty: 0 | Refills: 0 | DISCHARGE

## 2023-12-11 RX ORDER — BACLOFEN 100 %
5 POWDER (GRAM) MISCELLANEOUS EVERY 8 HOURS
Refills: 0 | Status: DISCONTINUED | OUTPATIENT
Start: 2023-12-11 | End: 2023-12-14

## 2023-12-11 RX ORDER — TRAMADOL HYDROCHLORIDE 50 MG/1
1 TABLET ORAL
Refills: 0 | DISCHARGE

## 2023-12-11 RX ORDER — LEVOTHYROXINE SODIUM 125 MCG
75 TABLET ORAL DAILY
Refills: 0 | Status: DISCONTINUED | OUTPATIENT
Start: 2023-12-11 | End: 2023-12-14

## 2023-12-11 RX ORDER — LACOSAMIDE 50 MG/1
1 TABLET ORAL
Refills: 0 | DISCHARGE

## 2023-12-11 RX ORDER — ESCITALOPRAM OXALATE 10 MG/1
1 TABLET, FILM COATED ORAL
Refills: 0 | DISCHARGE

## 2023-12-11 RX ADMIN — GABAPENTIN 600 MILLIGRAM(S): 400 CAPSULE ORAL at 23:30

## 2023-12-11 RX ADMIN — Medication 650 MILLIGRAM(S): at 20:42

## 2023-12-11 RX ADMIN — PIPERACILLIN AND TAZOBACTAM 200 GRAM(S): 4; .5 INJECTION, POWDER, LYOPHILIZED, FOR SOLUTION INTRAVENOUS at 18:36

## 2023-12-11 RX ADMIN — DIVALPROEX SODIUM 750 MILLIGRAM(S): 500 TABLET, DELAYED RELEASE ORAL at 23:31

## 2023-12-11 RX ADMIN — Medication 250 MILLIGRAM(S): at 19:45

## 2023-12-11 RX ADMIN — PIPERACILLIN AND TAZOBACTAM 25 GRAM(S): 4; .5 INJECTION, POWDER, LYOPHILIZED, FOR SOLUTION INTRAVENOUS at 23:31

## 2023-12-11 RX ADMIN — Medication 15 MILLIGRAM(S): at 16:12

## 2023-12-11 RX ADMIN — LACOSAMIDE 150 MILLIGRAM(S): 50 TABLET ORAL at 19:50

## 2023-12-11 RX ADMIN — Medication 5 MILLIGRAM(S): at 23:30

## 2023-12-11 RX ADMIN — SODIUM CHLORIDE 1000 MILLILITER(S): 9 INJECTION INTRAMUSCULAR; INTRAVENOUS; SUBCUTANEOUS at 15:59

## 2023-12-11 RX ADMIN — SODIUM CHLORIDE 1000 MILLILITER(S): 9 INJECTION INTRAMUSCULAR; INTRAVENOUS; SUBCUTANEOUS at 18:36

## 2023-12-11 NOTE — ED ADULT TRIAGE NOTE - ARRIVAL INFO ADDITIONAL COMMENTS
Patient arrives to ED via EMS from home with fevers, and low back pain. Patient with history of brain bleed in June with subsequent left sided paralysis who had telehealth call this AM and sent to ED for evaluation. Denies dysuria. History of vertebral fractures. Temp 102 at home sp tylenol, temp now 100.4 oral in triage. Baseline confusion present.

## 2023-12-11 NOTE — H&P ADULT - NSHPPHYSICALEXAM_GEN_ALL_CORE
Vital Signs Last 24 Hrs  T(F): 103.6 (11 Dec 2023 16:04), Max: 103.6 (11 Dec 2023 16:04)  HR: 102 (11 Dec 2023 16:04) (102 - 112)  BP: 116/77 (11 Dec 2023 16:04) (116/77 - 118/78)  RR: 22 (11 Dec 2023 16:04) (20 - 22)  SpO2: 97% (11 Dec 2023 16:04) (95% - 97%)    PHYSICAL EXAM:  GENERAL: NAD, well-groomed, weak  HEAD:  Atraumatic, Normocephalic  EYES: EOMI, conjunctiva and sclera clear  ENMT: Moist mucous membranes, Good dentition, no thrush  NECK: Supple, No JVD  CHEST/LUNG: Clear to auscultation bilaterally, good air entry, non-labored breathing  HEART: RRR; S1/S2, No murmur  ABDOMEN: Soft, Nontender, Nondistended; Bowel sounds present  VASCULAR: Normal pulses, Normal capillary refill  EXTREMITIES: No calf tenderness, No cyanosis, No edema  LYMPH: Normal; No lymphadenopathy noted  SKIN: Warm, Intact  PSYCH: Normal mood, Normal affect  NERVOUS SYSTEM:  Alert with intermittent confusion, Good concentration; CN 2-12 intact, No focal deficits Vital Signs Last 24 Hrs  T(F): 103.6 (11 Dec 2023 16:04), Max: 103.6 (11 Dec 2023 16:04)  HR: 102 (11 Dec 2023 16:04) (102 - 112)  BP: 116/77 (11 Dec 2023 16:04) (116/77 - 118/78)  RR: 22 (11 Dec 2023 16:04) (20 - 22)  SpO2: 97% (11 Dec 2023 16:04) (95% - 97%)    PHYSICAL EXAM:  GENERAL: NAD, well-groomed, weak  HEAD:  Atraumatic, Normocephalic, cranial incision site healing well  EYES: PEERL, conjunctiva and sclera clear  ENMT: Moist mucous membranes, Good dentition, no thrush  NECK: Supple, No JVD  CHEST/LUNG: Clear to auscultation bilaterally, good air entry, non-labored breathing  HEART: RRR; S1/S2, No murmur  ABDOMEN: Soft, Nontender, Nondistended; Bowel sounds present  VASCULAR: Normal pulses, Normal capillary refill  EXTREMITIES: No calf tenderness, No cyanosis, No edema  SKIN: Warm, Intact  PSYCH: Normal mood, Normal affect  NERVOUS SYSTEM:  Alert with intermittent confusion, poor historian, follows commands

## 2023-12-11 NOTE — H&P ADULT - NS ATTEND AMEND GEN_ALL_CORE FT
38 year old F PMH  R frontal IPH with IVH and hydrocephalus s/p emergent R hemicraniectomy w/ EVD placement (6/2023), s/p cranioplastysep/2023, left sided hemiparesis, baseline intermittent confusion since June events, seizure earlier Dec 2023 and vertebral fractures coming in for fever, admitted for fever of unknown etiology    #Fever   #Leukocytosis with left shift  - RVP/COVID negative, CXR negative  - continue zosyn for now, hold off on vanco for now   - possible  vs GI etiology, pending further data  - follow up UA and UCx- could be source given new reoccuring back pain  - follow up CT abd/pelvis, with recent nausea and vomiting at home, r/o colon etiology vs stone    discussed with spouse and mother at bedside, all questions answered 38 year old F PMH  R frontal IPH with IVH and hydrocephalus s/p emergent R hemicraniectomy w/ EVD placement (6/2023), s/p cranioplastysep/2023, left sided hemiparesis, baseline intermittent confusion since June events, seizure earlier Dec 2023 and vertebral fractures coming in for fever, admitted for fever of unknown etiology    #Fever   #Leukocytosis with left shift  - RVP/COVID negative, CXR negative  - continue zosyn for now, hold off on vanco for now   - possible  vs GI etiology, pending further data  - follow up UA and UCx- could be source given new reoccuring back pain  - follow up CT abd/pelvis, with recent nausea and vomiting at home, r/o colon etiology vs stone    Patient is NOT good historian, please be mindful when talking to patient alone- she is not able to get up on her own either    discussed with spouse and mother at bedside, all questions answered

## 2023-12-11 NOTE — ED ADULT NURSE NOTE - NSFALLUNIVINTERV_ED_ALL_ED
Bed/Stretcher in lowest position, wheels locked, appropriate side rails in place/Call bell, personal items and telephone in reach/Instruct patient to call for assistance before getting out of bed/chair/stretcher/Non-slip footwear applied when patient is off stretcher/[place] to call system/Physically safe environment - no spills, clutter or unnecessary equipment/Purposeful proactive rounding/Room/bathroom lighting operational, light cord in reach Bed/Stretcher in lowest position, wheels locked, appropriate side rails in place/Call bell, personal items and telephone in reach/Instruct patient to call for assistance before getting out of bed/chair/stretcher/Non-slip footwear applied when patient is off stretcher/[place] to call system/Physically safe environment - no spills, clutter or unnecessary equipment/Purposeful proactive rounding/Room/bathroom lighting operational, light cord in reach

## 2023-12-11 NOTE — ED ADULT NURSE NOTE - OBJECTIVE STATEMENT
Pt. to ED with wife.  As per wife pt. has had a fever since yesterday.  Pt. got Tylenol at 130pm.  Pt. febrile.  Dr. Harden aware.  Skin hot and dry.  Pt. alert and oriented with periods of confusion and asking repetitive questions.  As per wife this is baseline.  Pt. denies any cough but states her breathing felt different earlier.

## 2023-12-11 NOTE — H&P ADULT - HISTORY OF PRESENT ILLNESS
38F hx of  R frontal IPH with IVH and hydrocephalus s/p emergent R hemicraniectomy w/ EVD placement (6/2023), s/p cranioplastysep/2023, left sided hemiparesis, baseline intermittent confusion since June events, Seizure earlier Dec 2023 and vertebral fractures.  Pt presents today with fever starting yesterday as high as 103 minimally relieved with Tylenol. Pt c/o diffuse body pain, although family state this is normal for her but now slightly worse. Wife Karma reports other family members ill with viral like symptoms. Of note patient c/o nausea and vomiting last week. Patient and family at bedside deny further complaints including chest pain, shortness of breath, seizure like activity, dysuria or urinary complaints.      38F hx of  R frontal IPH with IVH and hydrocephalus s/p emergent R hemicraniectomy w/ EVD placement (6/2023), s/p cranioplastysep/2023, left sided hemiparesis, baseline intermittent confusion since June events, Seizure earlier Dec 2023 and vertebral fractures.  Pt presents today with fever starting yesterday as high as 103 minimally relieved with Tylenol. Pt c/o diffuse body pain, although family state this is normal for her but now slightly worse. Wife Karma reports other family members ill with viral like symptoms. Of note patient c/o nausea and vomiting last week. Patient and family at bedside deny further complaints including chest pain, shortness of breath, seizure like activity, dysuria or urinary complaints.

## 2023-12-11 NOTE — H&P ADULT - CONVERSATION DETAILS
Full Code   DIscussed with spouse Karma [phone] and mother Full Code   DIscussed with spouse Karma [phone] and mother Full Code   Discussed with spouse Karma [phone] and mother Full Code   Discussed with spouse Karma [phone] and mother

## 2023-12-11 NOTE — ED PROVIDER NOTE - PHYSICAL EXAMINATION
VITAL SIGNS: I have reviewed nursing notes and confirm.   GEN: Well-developed; well-nourished; tired appearing.  SKIN: Warm, pink, no rash, no diaphoresis, no cyanosis, well perfused.   HEAD: Normocephalic; atraumatic. No scalp lacerations, no abrasions.   NECK: Supple; non tender. Non-meningitic  EYES: Pupils 3mm equal, round, reactive to light and accomodation, conjunctiva and sclera clear. Extra-ocular movements intact bilaterally.  ENT: No nasal discharge; airway clear. Trachea is midline. Normal dentition.  CV: (+) tachycardia, S1, S2 normal; no murmurs, gallops, or rubs. Capillary refill < 2 seconds throughout. Distal pulses intact 2+ throughout.  RESP: CTA bilaterally. no wheezing, rhonchi, or rales.  Mild coughing, good air entry.  ABD: Normal bowel sounds, soft, non-distended, non-tender, no rebound, no guarding, no rigidity,   MSK: Normal range of motion and movement of all 4 extremities.    NEURO: Alert & oriented x name (+) LEFT sided upper and lower extremity flaccid at baseline.

## 2023-12-11 NOTE — ED ADULT TRIAGE NOTE - NS ED NURSE AMBULANCES
[facility] [facility] [place] [facility]

## 2023-12-11 NOTE — H&P ADULT - NSHPLABSRESULTS_GEN_ALL_CORE
12.3   18.83 )-----------( 237      ( 11 Dec 2023 15:53 )             36.2     Lactate, Blood: 1.0 mmol/L (12-11 @ 15:53)    12-11    137  |  101  |  7   ----------------------------<  111  4.3   |  25  |  0.54    Ca    8.8      11 Dec 2023 15:53    TPro  6.4  /  Alb  2.5  /  TBili  0.4  /  DBili  x   /  AST  16  /  ALT  11  /  AlkPhos  77  12-11    PT/INR - ( 11 Dec 2023 15:53 )   PT: 13.4 sec;   INR: 1.18 ratio         PTT - ( 11 Dec 2023 15:53 )  PTT:34.1 sec        Urinalysis Basic - ( 11 Dec 2023 15:53 )    Color: x / Appearance: x / SG: x / pH: x  Gluc: 111 mg/dL / Ketone: x  / Bili: x / Urobili: x   Blood: x / Protein: x / Nitrite: x   Leuk Esterase: x / RBC: x / WBC x   Sq Epi: x / Non Sq Epi: x / Bacteria: x

## 2023-12-11 NOTE — ED PROVIDER NOTE - CLINICAL SUMMARY MEDICAL DECISION MAKING FREE TEXT BOX
The patient meets SIRS criteria with (+)  Temp > 100.4F or < 96.8F,  HR > 90 BPM,   WBC >12k or <4k, or >10% bands. Suspected source of infection is: UTI vs PNA vs viral; Patient is currently hemodynamically stable, normotensive, mentating at her baseline.  - Two large-bore IV's, 30ml/kg lactated ringers fluid resuscitation,   - CBC, CMP, PT/PTT/INR, Troponin, BNP, UA/UCx, 2 x BCx, ABG with lactate    - CXR negative  - Unknown Source: Vancomycin 1g IVPB + zosyn  - Re-assessment for fluid responsiveness then admit    555pm wbc 18k, w bandemia, to be admitted, pending UA.

## 2023-12-11 NOTE — ED PROVIDER NOTE - OBJECTIVE STATEMENT
38F PHMX of R frontal IPH with IVH and hydrocephalus s/p emergent R hemicraniectomy w/ EVD placement (6/2023), s/p cranioplasty(sep/2023), s/p left sided residual weakness, presenting with fever today. History from wife. Her baseline is a/ox 1 name but unreliable historian. Had a telehealth appt for fever and cough today. Sent to the ED for evaluation. In the ED, tachycardic 120s, coughing, 99% RA, at baseline mental status but tired appearing.

## 2023-12-11 NOTE — H&P ADULT - ASSESSMENT
38F hx of  R frontal IPH with IVH and hydrocephalus s/p emergent R hemicraniectomy w/ EVD placement (6/2023), s/p cranioplastysep/2023, left sided hemiparesis, baseline intermittent confusion since June events, Seizure earlier Dec 2023 and vertebral fractures.  Pt presents today with fever starting 12/10.     #Fever   #Leukocytosis with left shift  -admit to medicine  -RVP/COVID neg  -chest x ray negative  -FU UA and UC  -FU BC  -CT abdomen/pelvis (reported vomiting and nausea last week)  -Ofirmev. Judicious use of NSAIDs due to prior brain bleed  -Zosyn  -monitor for fever  -repeat labs in am     #large R frontal IPH with IVH and hydrocephalus  #right hemicraniectomy and EVD placement  #cranioplasty(sep/2023)  #Seizure  -home medication regimen   -regular diet   -fall precautions    #Hypothyroid  -synthroid     #GOC  Full code    #DVT ppx     Updated spouse aKrma [phone] and mother at bedside.   38F hx of  R frontal IPH with IVH and hydrocephalus s/p emergent R hemicraniectomy w/ EVD placement (6/2023), s/p cranioplastysep/2023, left sided hemiparesis, baseline intermittent confusion since June events, Seizure earlier Dec 2023 and vertebral fractures.  Pt presents today with fever starting 12/10.     #Fever   #Leukocytosis with left shift  -admit to medicine  -RVP/COVID neg  -chest x ray negative  -FU UA and UC  -FU BC  -CT abdomen/pelvis (reported vomiting and nausea last week)  -Ofirmev. Judicious use of NSAIDs due to prior brain bleed  -Zosyn  -monitor for fever  -repeat labs in am     #large R frontal IPH with IVH and hydrocephalus  #right hemicraniectomy and EVD placement  #cranioplasty(sep/2023)  #Seizure  -home medication regimen   -regular diet   -fall precautions    #Hypothyroid  -synthroid     #GOC  Full code    #DVT ppx     Updated spouse Karma [phone] and mother at bedside.   38F hx of  R frontal IPH with IVH and hydrocephalus s/p emergent R hemicraniectomy w/ EVD placement (6/2023), s/p cranioplastysep/2023, left sided hemiparesis, baseline intermittent confusion since June events, Seizure earlier Dec 2023 and vertebral fractures.  Pt presents today with fever starting 12/10.     #Fever   #Leukocytosis with left shift  -admit to medicine  -RVP/COVID neg  -chest x ray negative  -FU UA and UC  -FU BC  -CT abdomen/pelvis (reported vomiting and nausea last week)  -Ofirmev. Judicious use of NSAIDs due to prior brain bleed  -Zosyn  -monitor for fever  -repeat labs in am     #large R frontal IPH with IVH and hydrocephalus  #right hemicraniectomy and EVD placement  #cranioplasty(sep/2023)  #Seizure  -home medication regimen   -regular diet   -fall precautions    #Hypothyroid  -synthroid     #GOC  Full code    #DVT ppx     Updated spouse Karma [phone] and mother at bedside.   38F hx of  R frontal IPH with IVH and hydrocephalus s/p emergent R hemicraniectomy w/ EVD placement (6/2023), s/p cranioplastysep/2023, left sided hemiparesis, baseline intermittent confusion since June events, Seizure earlier Dec 2023 and vertebral fractures.  Pt presents today with fever starting 12/10.     #Fever   #Leukocytosis with left shift  -admit to medicine  -RVP/COVID neg  -chest x ray negative  -FU UA and UC  -FU BC  -CT abdomen/pelvis (reported vomiting and nausea last week)  -Ofirmev. Judicious use of NSAIDs due to prior brain bleed  -Zosyn  -monitor for fever  -repeat labs in am     #large R frontal IPH with IVH and hydrocephalus  #right hemicraniectomy and EVD placement  #cranioplasty(sep/2023)  #Seizure  -home medication regimen   -regular diet   -fall precautions    #Hypothyroid  -synthroid     #GOC  Full code    #DVT ppx     Updated spouse Karma [phone] and mother at bedside.

## 2023-12-12 LAB
ALBUMIN SERPL ELPH-MCNC: 2.2 G/DL — LOW (ref 3.3–5)
ALBUMIN SERPL ELPH-MCNC: 2.2 G/DL — LOW (ref 3.3–5)
ALP SERPL-CCNC: 75 U/L — SIGNIFICANT CHANGE UP (ref 40–120)
ALP SERPL-CCNC: 75 U/L — SIGNIFICANT CHANGE UP (ref 40–120)
ALT FLD-CCNC: 10 U/L — SIGNIFICANT CHANGE UP (ref 10–45)
ALT FLD-CCNC: 10 U/L — SIGNIFICANT CHANGE UP (ref 10–45)
ANION GAP SERPL CALC-SCNC: 10 MMOL/L — SIGNIFICANT CHANGE UP (ref 5–17)
ANION GAP SERPL CALC-SCNC: 10 MMOL/L — SIGNIFICANT CHANGE UP (ref 5–17)
AST SERPL-CCNC: 10 U/L — SIGNIFICANT CHANGE UP (ref 10–40)
AST SERPL-CCNC: 10 U/L — SIGNIFICANT CHANGE UP (ref 10–40)
BASOPHILS # BLD AUTO: 0.07 K/UL — SIGNIFICANT CHANGE UP (ref 0–0.2)
BASOPHILS # BLD AUTO: 0.07 K/UL — SIGNIFICANT CHANGE UP (ref 0–0.2)
BASOPHILS NFR BLD AUTO: 0.4 % — SIGNIFICANT CHANGE UP (ref 0–2)
BASOPHILS NFR BLD AUTO: 0.4 % — SIGNIFICANT CHANGE UP (ref 0–2)
BILIRUB SERPL-MCNC: 0.2 MG/DL — SIGNIFICANT CHANGE UP (ref 0.2–1.2)
BILIRUB SERPL-MCNC: 0.2 MG/DL — SIGNIFICANT CHANGE UP (ref 0.2–1.2)
BUN SERPL-MCNC: 8 MG/DL — SIGNIFICANT CHANGE UP (ref 7–23)
BUN SERPL-MCNC: 8 MG/DL — SIGNIFICANT CHANGE UP (ref 7–23)
CALCIUM SERPL-MCNC: 8.7 MG/DL — SIGNIFICANT CHANGE UP (ref 8.4–10.5)
CALCIUM SERPL-MCNC: 8.7 MG/DL — SIGNIFICANT CHANGE UP (ref 8.4–10.5)
CHLORIDE SERPL-SCNC: 104 MMOL/L — SIGNIFICANT CHANGE UP (ref 96–108)
CHLORIDE SERPL-SCNC: 104 MMOL/L — SIGNIFICANT CHANGE UP (ref 96–108)
CO2 SERPL-SCNC: 25 MMOL/L — SIGNIFICANT CHANGE UP (ref 22–31)
CO2 SERPL-SCNC: 25 MMOL/L — SIGNIFICANT CHANGE UP (ref 22–31)
CREAT SERPL-MCNC: 0.55 MG/DL — SIGNIFICANT CHANGE UP (ref 0.5–1.3)
CREAT SERPL-MCNC: 0.55 MG/DL — SIGNIFICANT CHANGE UP (ref 0.5–1.3)
CULTURE RESULTS: SIGNIFICANT CHANGE UP
CULTURE RESULTS: SIGNIFICANT CHANGE UP
EGFR: 120 ML/MIN/1.73M2 — SIGNIFICANT CHANGE UP
EGFR: 120 ML/MIN/1.73M2 — SIGNIFICANT CHANGE UP
EOSINOPHIL # BLD AUTO: 1.02 K/UL — HIGH (ref 0–0.5)
EOSINOPHIL # BLD AUTO: 1.02 K/UL — HIGH (ref 0–0.5)
EOSINOPHIL NFR BLD AUTO: 5.2 % — SIGNIFICANT CHANGE UP (ref 0–6)
EOSINOPHIL NFR BLD AUTO: 5.2 % — SIGNIFICANT CHANGE UP (ref 0–6)
GLUCOSE SERPL-MCNC: 108 MG/DL — HIGH (ref 70–99)
GLUCOSE SERPL-MCNC: 108 MG/DL — HIGH (ref 70–99)
HCT VFR BLD CALC: 35.1 % — SIGNIFICANT CHANGE UP (ref 34.5–45)
HCT VFR BLD CALC: 35.1 % — SIGNIFICANT CHANGE UP (ref 34.5–45)
HGB BLD-MCNC: 11.9 G/DL — SIGNIFICANT CHANGE UP (ref 11.5–15.5)
HGB BLD-MCNC: 11.9 G/DL — SIGNIFICANT CHANGE UP (ref 11.5–15.5)
IMM GRANULOCYTES NFR BLD AUTO: 1.3 % — HIGH (ref 0–0.9)
IMM GRANULOCYTES NFR BLD AUTO: 1.3 % — HIGH (ref 0–0.9)
LYMPHOCYTES # BLD AUTO: 1.68 K/UL — SIGNIFICANT CHANGE UP (ref 1–3.3)
LYMPHOCYTES # BLD AUTO: 1.68 K/UL — SIGNIFICANT CHANGE UP (ref 1–3.3)
LYMPHOCYTES # BLD AUTO: 8.6 % — LOW (ref 13–44)
LYMPHOCYTES # BLD AUTO: 8.6 % — LOW (ref 13–44)
MCHC RBC-ENTMCNC: 32.2 PG — SIGNIFICANT CHANGE UP (ref 27–34)
MCHC RBC-ENTMCNC: 32.2 PG — SIGNIFICANT CHANGE UP (ref 27–34)
MCHC RBC-ENTMCNC: 33.9 GM/DL — SIGNIFICANT CHANGE UP (ref 32–36)
MCHC RBC-ENTMCNC: 33.9 GM/DL — SIGNIFICANT CHANGE UP (ref 32–36)
MCV RBC AUTO: 94.9 FL — SIGNIFICANT CHANGE UP (ref 80–100)
MCV RBC AUTO: 94.9 FL — SIGNIFICANT CHANGE UP (ref 80–100)
MONOCYTES # BLD AUTO: 1.19 K/UL — HIGH (ref 0–0.9)
MONOCYTES # BLD AUTO: 1.19 K/UL — HIGH (ref 0–0.9)
MONOCYTES NFR BLD AUTO: 6.1 % — SIGNIFICANT CHANGE UP (ref 2–14)
MONOCYTES NFR BLD AUTO: 6.1 % — SIGNIFICANT CHANGE UP (ref 2–14)
NEUTROPHILS # BLD AUTO: 15.3 K/UL — HIGH (ref 1.8–7.4)
NEUTROPHILS # BLD AUTO: 15.3 K/UL — HIGH (ref 1.8–7.4)
NEUTROPHILS NFR BLD AUTO: 78.4 % — HIGH (ref 43–77)
NEUTROPHILS NFR BLD AUTO: 78.4 % — HIGH (ref 43–77)
NRBC # BLD: 0 /100 WBCS — SIGNIFICANT CHANGE UP (ref 0–0)
NRBC # BLD: 0 /100 WBCS — SIGNIFICANT CHANGE UP (ref 0–0)
PLATELET # BLD AUTO: 203 K/UL — SIGNIFICANT CHANGE UP (ref 150–400)
PLATELET # BLD AUTO: 203 K/UL — SIGNIFICANT CHANGE UP (ref 150–400)
POTASSIUM SERPL-MCNC: 3.8 MMOL/L — SIGNIFICANT CHANGE UP (ref 3.5–5.3)
POTASSIUM SERPL-MCNC: 3.8 MMOL/L — SIGNIFICANT CHANGE UP (ref 3.5–5.3)
POTASSIUM SERPL-SCNC: 3.8 MMOL/L — SIGNIFICANT CHANGE UP (ref 3.5–5.3)
POTASSIUM SERPL-SCNC: 3.8 MMOL/L — SIGNIFICANT CHANGE UP (ref 3.5–5.3)
PROT SERPL-MCNC: 5.9 G/DL — LOW (ref 6–8.3)
PROT SERPL-MCNC: 5.9 G/DL — LOW (ref 6–8.3)
RBC # BLD: 3.7 M/UL — LOW (ref 3.8–5.2)
RBC # BLD: 3.7 M/UL — LOW (ref 3.8–5.2)
RBC # FLD: 14.1 % — SIGNIFICANT CHANGE UP (ref 10.3–14.5)
RBC # FLD: 14.1 % — SIGNIFICANT CHANGE UP (ref 10.3–14.5)
SODIUM SERPL-SCNC: 139 MMOL/L — SIGNIFICANT CHANGE UP (ref 135–145)
SODIUM SERPL-SCNC: 139 MMOL/L — SIGNIFICANT CHANGE UP (ref 135–145)
SPECIMEN SOURCE: SIGNIFICANT CHANGE UP
SPECIMEN SOURCE: SIGNIFICANT CHANGE UP
WBC # BLD: 19.52 K/UL — HIGH (ref 3.8–10.5)
WBC # BLD: 19.52 K/UL — HIGH (ref 3.8–10.5)
WBC # FLD AUTO: 19.52 K/UL — HIGH (ref 3.8–10.5)
WBC # FLD AUTO: 19.52 K/UL — HIGH (ref 3.8–10.5)

## 2023-12-12 PROCEDURE — 99232 SBSQ HOSP IP/OBS MODERATE 35: CPT

## 2023-12-12 RX ORDER — SODIUM CHLORIDE 9 MG/ML
1000 INJECTION, SOLUTION INTRAVENOUS
Refills: 0 | Status: DISCONTINUED | OUTPATIENT
Start: 2023-12-12 | End: 2023-12-13

## 2023-12-12 RX ORDER — INFLUENZA VIRUS VACCINE 15; 15; 15; 15 UG/.5ML; UG/.5ML; UG/.5ML; UG/.5ML
0.5 SUSPENSION INTRAMUSCULAR ONCE
Refills: 0 | Status: DISCONTINUED | OUTPATIENT
Start: 2023-12-12 | End: 2023-12-14

## 2023-12-12 RX ADMIN — PIPERACILLIN AND TAZOBACTAM 25 GRAM(S): 4; .5 INJECTION, POWDER, LYOPHILIZED, FOR SOLUTION INTRAVENOUS at 14:38

## 2023-12-12 RX ADMIN — PIPERACILLIN AND TAZOBACTAM 25 GRAM(S): 4; .5 INJECTION, POWDER, LYOPHILIZED, FOR SOLUTION INTRAVENOUS at 06:05

## 2023-12-12 RX ADMIN — DIVALPROEX SODIUM 750 MILLIGRAM(S): 500 TABLET, DELAYED RELEASE ORAL at 05:55

## 2023-12-12 RX ADMIN — ESCITALOPRAM OXALATE 20 MILLIGRAM(S): 10 TABLET, FILM COATED ORAL at 11:36

## 2023-12-12 RX ADMIN — Medication 650 MILLIGRAM(S): at 09:47

## 2023-12-12 RX ADMIN — GABAPENTIN 600 MILLIGRAM(S): 400 CAPSULE ORAL at 17:17

## 2023-12-12 RX ADMIN — SODIUM CHLORIDE 75 MILLILITER(S): 9 INJECTION, SOLUTION INTRAVENOUS at 12:08

## 2023-12-12 RX ADMIN — LACOSAMIDE 150 MILLIGRAM(S): 50 TABLET ORAL at 17:17

## 2023-12-12 RX ADMIN — PIPERACILLIN AND TAZOBACTAM 25 GRAM(S): 4; .5 INJECTION, POWDER, LYOPHILIZED, FOR SOLUTION INTRAVENOUS at 22:17

## 2023-12-12 RX ADMIN — Medication 5 MILLIGRAM(S): at 14:40

## 2023-12-12 RX ADMIN — Medication 650 MILLIGRAM(S): at 16:50

## 2023-12-12 RX ADMIN — Medication 5 MILLIGRAM(S): at 22:17

## 2023-12-12 RX ADMIN — Medication 75 MICROGRAM(S): at 06:01

## 2023-12-12 RX ADMIN — Medication 5 MILLIGRAM(S): at 06:01

## 2023-12-12 RX ADMIN — Medication 650 MILLIGRAM(S): at 22:17

## 2023-12-12 RX ADMIN — Medication 650 MILLIGRAM(S): at 03:44

## 2023-12-12 RX ADMIN — LACOSAMIDE 150 MILLIGRAM(S): 50 TABLET ORAL at 06:01

## 2023-12-12 RX ADMIN — DIVALPROEX SODIUM 750 MILLIGRAM(S): 500 TABLET, DELAYED RELEASE ORAL at 17:17

## 2023-12-12 RX ADMIN — Medication 650 MILLIGRAM(S): at 22:47

## 2023-12-12 RX ADMIN — Medication 650 MILLIGRAM(S): at 16:06

## 2023-12-12 RX ADMIN — Medication 650 MILLIGRAM(S): at 11:29

## 2023-12-12 RX ADMIN — GABAPENTIN 600 MILLIGRAM(S): 400 CAPSULE ORAL at 06:02

## 2023-12-12 NOTE — OCCUPATIONAL THERAPY INITIAL EVALUATION ADULT - GENERAL OBSERVATIONS, REHAB EVAL
Pt received supine in bed, A&O x2-3, pt's mother bedside, +primafit, +left IV lock , +tele monitors, pt noted with continuous c/o pain verbalizing "mom make the pain stop" and writhing in bed; however overall agreeable and responding to all questions with mother confirmation.

## 2023-12-12 NOTE — CONSULT NOTE ADULT - SUBJECTIVE AND OBJECTIVE BOX
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  She has a left sided hemiparesis and cognitive impairment. She had been evaluated 1 week ago for seizures, had her meds adjusted, had a normal wbc count.  She had episode of N/V on 12/10 with some diarrhea, spike to 103, was febrile yesterday and brought to ER.  She c/o pain all over, apparently this is not new. No use of antibiotics since discharge from [place], no new meds. She has been exposed to viral URI's at home.Her RVP and her narrow RVP were negative. She has not been experiencing N/V or diarrhea in the ER.  She was raised on [place], no history of any serious medical  illness prior to ICH and no prodromal illness.    REVIEW OF SYSTEMS:  All other review of systems negative (Comprehensive ROS)    PAST MEDICAL & SURGICAL HISTORY:  Hypothyroid      ICH (intracerebral hemorrhage)      Cyst of ovary      Status post craniectomy          Allergies    No Known Allergies    Intolerances        Antimicrobials Day #  :day 2  piperacillin/tazobactam IVPB.. 3.375 Gram(s) IV Intermittent every 8 hours    Other Medications:  acetaminophen     Tablet .. 650 milliGRAM(s) Oral every 6 hours PRN  aluminum hydroxide/magnesium hydroxide/simethicone Suspension 30 milliLiter(s) Oral every 4 hours PRN  baclofen 5 milliGRAM(s) Oral every 8 hours  divalproex  milliGRAM(s) Oral two times a day  escitalopram 20 milliGRAM(s) Oral daily  gabapentin 600 milliGRAM(s) Oral two times a day  lacosamide 150 milliGRAM(s) Oral two times a day  levothyroxine 75 MICROGram(s) Oral daily  melatonin 3 milliGRAM(s) Oral at bedtime PRN  methylphenidate 10 milliGRAM(s) Oral <User Schedule>  ondansetron Injectable 4 milliGRAM(s) IV Push every 8 hours PRN      FAMILY HISTORY:  No pertinent family history in first degree relatives        SOCIAL HISTORY:  Smoking:  ex   ETOH: x    Drug Use: x        T(F): 103.6 (12-11-23 @ 16:04), Max: 103.6 (12-11-23 @ 16:04)  HR: 105 (12-12-23 @ 05:51)  BP: 121/100 (12-12-23 @ 05:51)  RR: 17 (12-12-23 @ 05:51)  SpO2: 98% (12-12-23 @ 05:51)  Wt(kg): --    PHYSICAL EXAM:  General: sleepy, uncomfortable when awakened. Craniotomy incisions well healed  Eyes:  anicteric, no conjunctival injection, no discharge  Oropharynx: no lesions or injection 	  Neck: supple, without adenopathy  Lungs: clear to auscultation  Heart: regular rate and rhythm; no murmur, rubs or gallops  Abdomen: soft, nondistended, nontender, without mass or organomegaly  Skin: no rash  Extremities: no clubbing, cyanosis, or edema  Neurologic: alert, attentive, left sided hemiparesis, follows simple commands    LAB RESULTS:                        11.9   19.52 )-----------( 203      ( 12 Dec 2023 07:25 )             35.1     12-12    139  |  104  |  8   ----------------------------<  108<H>  3.8   |  25  |  0.55    Ca    8.7      12 Dec 2023 07:25    TPro  5.9<L>  /  Alb  2.2<L>  /  TBili  0.2  /  DBili  x   /  AST  10  /  ALT  10  /  AlkPhos  75  12-12    LIVER FUNCTIONS - ( 12 Dec 2023 07:25 )  Alb: 2.2 g/dL / Pro: 5.9 g/dL / ALK PHOS: 75 U/L / ALT: 10 U/L / AST: 10 U/L / GGT: x             UA negative      MICROBIOLOGY:  RECENT CULTURES:    RVP negative    RADIOLOGY REVIEWED:  < from: CT Abdomen and Pelvis No Cont (12.11.23 @ 19:23) >  IMPRESSION:  A few small nonobstructive calculi in the left kidney. No evidence for a   ureteral calculus. No hydronephrosis.    New mild age indeterminate compression fractures at the superior endplate   of L1, L3, L4, and L5.    --- End of Report ---    < end of copied text >  < from: Xray Chest 1 View- PORTABLE-Urgent (12.11.23 @ 16:37) >  IMPRESSION: No acute finding or change.    --- End of Report -    < end of copied text >  < from: CT Head No Cont (12.01.23 @ 22:56) >    FINDINGS:  Gliosis and encephalomalacia is again seen in the right frontal region   likely postsurgical in nature. Ex vacuo dilatation of the anterior horn   of the right lateral ventricle is again noted. An old lacunar infarct is   seen in the right basal ganglia. No acute territorial infarct is   demonstrated.    There is no evidence of an acute hemorrhage or mass-effect in the   posterior fossa or in the supratentorial region.    The patient is status post a right frontoparietal craniectomy. Evaluation   of the remaining osseous structures with the appropriate window appears   unremarkable. Mild mucosal thickening is seen in the left sphenoid sinus.   Complete opacification of the right maxillary sinus is noted. The   remaining visualized paranasal sinuses and mastoid air cells are clear.    IMPRESSION:  No acute intracranial hemorrhage, territorial infarct or mass effect. No   significant interval change compared with a CT of the head from   10/28/2023.    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  She has a left sided hemiparesis and cognitive impairment. She had been evaluated 1 week ago for seizures, had her meds adjusted, had a normal wbc count.  She had episode of N/V on 12/10 with some diarrhea, spike to 103, was febrile yesterday and brought to ER.  She c/o pain all over, apparently this is not new. No use of antibiotics since discharge from [place], no new meds. She has been exposed to viral URI's at home.Her RVP and her narrow RVP were negative. She has not been experiencing N/V or diarrhea in the ER.  She was raised on [place], no history of any serious medical  illness prior to ICH and no prodromal illness.    REVIEW OF SYSTEMS:  All other review of systems negative (Comprehensive ROS)    PAST MEDICAL & SURGICAL HISTORY:  Hypothyroid      ICH (intracerebral hemorrhage)      Cyst of ovary      Status post craniectomy          Allergies    No Known Allergies    Intolerances        Antimicrobials Day #  :day 2  piperacillin/tazobactam IVPB.. 3.375 Gram(s) IV Intermittent every 8 hours    Other Medications:  acetaminophen     Tablet .. 650 milliGRAM(s) Oral every 6 hours PRN  aluminum hydroxide/magnesium hydroxide/simethicone Suspension 30 milliLiter(s) Oral every 4 hours PRN  baclofen 5 milliGRAM(s) Oral every 8 hours  divalproex  milliGRAM(s) Oral two times a day  escitalopram 20 milliGRAM(s) Oral daily  gabapentin 600 milliGRAM(s) Oral two times a day  lacosamide 150 milliGRAM(s) Oral two times a day  levothyroxine 75 MICROGram(s) Oral daily  melatonin 3 milliGRAM(s) Oral at bedtime PRN  methylphenidate 10 milliGRAM(s) Oral <User Schedule>  ondansetron Injectable 4 milliGRAM(s) IV Push every 8 hours PRN      FAMILY HISTORY:  No pertinent family history in first degree relatives        SOCIAL HISTORY:  Smoking:  ex   ETOH: x    Drug Use: x        T(F): 103.6 (12-11-23 @ 16:04), Max: 103.6 (12-11-23 @ 16:04)  HR: 105 (12-12-23 @ 05:51)  BP: 121/100 (12-12-23 @ 05:51)  RR: 17 (12-12-23 @ 05:51)  SpO2: 98% (12-12-23 @ 05:51)  Wt(kg): --    PHYSICAL EXAM:  General: sleepy, uncomfortable when awakened. Craniotomy incisions well healed  Eyes:  anicteric, no conjunctival injection, no discharge  Oropharynx: no lesions or injection 	  Neck: supple, without adenopathy  Lungs: clear to auscultation  Heart: regular rate and rhythm; no murmur, rubs or gallops  Abdomen: soft, nondistended, nontender, without mass or organomegaly  Skin: no rash  Extremities: no clubbing, cyanosis, or edema  Neurologic: alert, attentive, left sided hemiparesis, follows simple commands    LAB RESULTS:                        11.9   19.52 )-----------( 203      ( 12 Dec 2023 07:25 )             35.1     12-12    139  |  104  |  8   ----------------------------<  108<H>  3.8   |  25  |  0.55    Ca    8.7      12 Dec 2023 07:25    TPro  5.9<L>  /  Alb  2.2<L>  /  TBili  0.2  /  DBili  x   /  AST  10  /  ALT  10  /  AlkPhos  75  12-12    LIVER FUNCTIONS - ( 12 Dec 2023 07:25 )  Alb: 2.2 g/dL / Pro: 5.9 g/dL / ALK PHOS: 75 U/L / ALT: 10 U/L / AST: 10 U/L / GGT: x             UA negative      MICROBIOLOGY:  RECENT CULTURES:    RVP negative    RADIOLOGY REVIEWED:  < from: CT Abdomen and Pelvis No Cont (12.11.23 @ 19:23) >  IMPRESSION:  A few small nonobstructive calculi in the left kidney. No evidence for a   ureteral calculus. No hydronephrosis.    New mild age indeterminate compression fractures at the superior endplate   of L1, L3, L4, and L5.    --- End of Report ---    < end of copied text >  < from: Xray Chest 1 View- PORTABLE-Urgent (12.11.23 @ 16:37) >  IMPRESSION: No acute finding or change.    --- End of Report -    < end of copied text >  < from: CT Head No Cont (12.01.23 @ 22:56) >    FINDINGS:  Gliosis and encephalomalacia is again seen in the right frontal region   likely postsurgical in nature. Ex vacuo dilatation of the anterior horn   of the right lateral ventricle is again noted. An old lacunar infarct is   seen in the right basal ganglia. No acute territorial infarct is   demonstrated.    There is no evidence of an acute hemorrhage or mass-effect in the   posterior fossa or in the supratentorial region.    The patient is status post a right frontoparietal craniectomy. Evaluation   of the remaining osseous structures with the appropriate window appears   unremarkable. Mild mucosal thickening is seen in the left sphenoid sinus.   Complete opacification of the right maxillary sinus is noted. The   remaining visualized paranasal sinuses and mastoid air cells are clear.    IMPRESSION:  No acute intracranial hemorrhage, territorial infarct or mass effect. No   significant interval change compared with a CT of the head from   10/28/2023.    --- End of Report ---    < end of copied text >

## 2023-12-12 NOTE — OCCUPATIONAL THERAPY INITIAL EVALUATION ADULT - NSWHEELCHAIREQUIP_GEN_A_OT
Pt's mother report having standard wheelchair within the home; however increased difficulty lifting into trunk - would benefit from transport wheelchair./transport

## 2023-12-12 NOTE — PROGRESS NOTE ADULT - SUBJECTIVE AND OBJECTIVE BOX
Patient is a 38y old  Female who presents with a chief complaint of fever (11 Dec 2023 18:21)      SUBJECTIVE / OVERNIGHT EVENTS:  Pt seen and examined at bedside in the presence of pt's wife and mother. No acute events overnight. Unclear why pt is having high fevers and more than usual lethargic     Allergies    No Known Allergies    Intolerances        MEDICATIONS  (STANDING):  baclofen 5 milliGRAM(s) Oral every 8 hours  divalproex  milliGRAM(s) Oral two times a day  escitalopram 20 milliGRAM(s) Oral daily  gabapentin 600 milliGRAM(s) Oral two times a day  lacosamide 150 milliGRAM(s) Oral two times a day  levothyroxine 75 MICROGram(s) Oral daily  methylphenidate 10 milliGRAM(s) Oral <User Schedule>  piperacillin/tazobactam IVPB.. 3.375 Gram(s) IV Intermittent every 8 hours    MEDICATIONS  (PRN):  acetaminophen     Tablet .. 650 milliGRAM(s) Oral every 6 hours PRN Temp greater or equal to 38C (100.4F), Mild Pain (1 - 3)  aluminum hydroxide/magnesium hydroxide/simethicone Suspension 30 milliLiter(s) Oral every 4 hours PRN Dyspepsia  melatonin 3 milliGRAM(s) Oral at bedtime PRN Insomnia  ondansetron Injectable 4 milliGRAM(s) IV Push every 8 hours PRN Nausea and/or Vomiting      Vital Signs Last 24 Hrs  T(C): 39.8 (11 Dec 2023 16:04), Max: 39.8 (11 Dec 2023 16:04)  T(F): 103.6 (11 Dec 2023 16:04), Max: 103.6 (11 Dec 2023 16:04)  HR: 105 (12 Dec 2023 05:51) (85 - 112)  BP: 121/100 (12 Dec 2023 05:51) (110/57 - 131/94)  BP(mean): --  RR: 17 (12 Dec 2023 05:51) (16 - 22)  SpO2: 98% (12 Dec 2023 05:51) (94% - 100%)    Parameters below as of 12 Dec 2023 05:51  Patient On (Oxygen Delivery Method): room air      CAPILLARY BLOOD GLUCOSE        I&O's Summary      PHYSICAL EXAM:  GENERAL: NAD, well-developed  HEAD:  Atraumatic, Normocephalic  EYES: EOMI, PERRLA, conjunctiva and sclera clear  NECK: Supple, No JVD  CHEST/LUNG: Clear to auscultation bilaterally; No wheeze, nonlabored breathing  HEART: Regular rate and rhythm; No murmurs, rubs, or gallops  ABDOMEN: Soft, Nontender, Nondistended; Bowel sounds present  EXTREMITIES:  2+ Peripheral Pulses, No clubbing, cyanosis, or edema  NEUROLOGY: AAOx3, non-focal  PSYCH: calm, appropriate mood  SKIN: No rashes or lesions, warm intact    LABS:                        11.9   19.52 )-----------( 203      ( 12 Dec 2023 07:25 )             35.1     12-12    139  |  104  |  8   ----------------------------<  108<H>  3.8   |  25  |  0.55    Ca    8.7      12 Dec 2023 07:25    TPro  5.9<L>  /  Alb  2.2<L>  /  TBili  0.2  /  DBili  x   /  AST  10  /  ALT  10  /  AlkPhos  75  12-12    PT/INR - ( 11 Dec 2023 15:53 )   PT: 13.4 sec;   INR: 1.18 ratio         PTT - ( 11 Dec 2023 15:53 )  PTT:34.1 sec      Urinalysis Basic - ( 12 Dec 2023 07:25 )    Color: x / Appearance: x / SG: x / pH: x  Gluc: 108 mg/dL / Ketone: x  / Bili: x / Urobili: x   Blood: x / Protein: x / Nitrite: x   Leuk Esterase: x / RBC: x / WBC x   Sq Epi: x / Non Sq Epi: x / Bacteria: x        RADIOLOGY & ADDITIONAL TESTS:  Results Reviewed:   Imaging Personally Reviewed:  Electrocardiogram Personally Reviewed:    COORDINATION OF CARE:  Care Discussed with Consultants/Other Providers [Y/N]:  Prior or Outpatient Records Reviewed [Y/N]: Patient is a 38y old  Female who presents with a chief complaint of fever (11 Dec 2023 18:21)      SUBJECTIVE / OVERNIGHT EVENTS:  Pt seen and examined at bedside in the presence of pt's wife and mother. No acute events overnight. Unclear why pt is having high fevers and more than usual lethargic     Allergies    No Known Allergies    Intolerances        MEDICATIONS  (STANDING):  baclofen 5 milliGRAM(s) Oral every 8 hours  divalproex  milliGRAM(s) Oral two times a day  escitalopram 20 milliGRAM(s) Oral daily  gabapentin 600 milliGRAM(s) Oral two times a day  lacosamide 150 milliGRAM(s) Oral two times a day  levothyroxine 75 MICROGram(s) Oral daily  methylphenidate 10 milliGRAM(s) Oral <User Schedule>  piperacillin/tazobactam IVPB.. 3.375 Gram(s) IV Intermittent every 8 hours    MEDICATIONS  (PRN):  acetaminophen     Tablet .. 650 milliGRAM(s) Oral every 6 hours PRN Temp greater or equal to 38C (100.4F), Mild Pain (1 - 3)  aluminum hydroxide/magnesium hydroxide/simethicone Suspension 30 milliLiter(s) Oral every 4 hours PRN Dyspepsia  melatonin 3 milliGRAM(s) Oral at bedtime PRN Insomnia  ondansetron Injectable 4 milliGRAM(s) IV Push every 8 hours PRN Nausea and/or Vomiting      Vital Signs Last 24 Hrs  T(C): 39.8 (11 Dec 2023 16:04), Max: 39.8 (11 Dec 2023 16:04)  T(F): 103.6 (11 Dec 2023 16:04), Max: 103.6 (11 Dec 2023 16:04)  HR: 105 (12 Dec 2023 05:51) (85 - 112)  BP: 121/100 (12 Dec 2023 05:51) (110/57 - 131/94)  BP(mean): --  RR: 17 (12 Dec 2023 05:51) (16 - 22)  SpO2: 98% (12 Dec 2023 05:51) (94% - 100%)    Parameters below as of 12 Dec 2023 05:51  Patient On (Oxygen Delivery Method): room air      CAPILLARY BLOOD GLUCOSE        I&O's Summary      PHYSICAL EXAM:  GENERAL: NAD, well-developed female   HEAD:  S/p hemicraniectomy  NECK: Supple, No JVD  CHEST/LUNG: Clear to auscultation bilaterally; No wheeze, nonlabored breathing  HEART: Regular rate and rhythm; No murmurs, rubs, or gallops  ABDOMEN: Soft, Nontender, Nondistended; Bowel sounds present  EXTREMITIES:  No clubbing, cyanosis, or edema  NEUROLOGY: sleeping, not awake to assess. Baseline alert and awake, communicative although not reliable historian  SKIN: No rashes or lesions, warm intact    LABS:                        11.9   19.52 )-----------( 203      ( 12 Dec 2023 07:25 )             35.1     12-12    139  |  104  |  8   ----------------------------<  108<H>  3.8   |  25  |  0.55    Ca    8.7      12 Dec 2023 07:25    TPro  5.9<L>  /  Alb  2.2<L>  /  TBili  0.2  /  DBili  x   /  AST  10  /  ALT  10  /  AlkPhos  75  12-12    PT/INR - ( 11 Dec 2023 15:53 )   PT: 13.4 sec;   INR: 1.18 ratio         PTT - ( 11 Dec 2023 15:53 )  PTT:34.1 sec      Urinalysis Basic - ( 12 Dec 2023 07:25 )    Color: x / Appearance: x / SG: x / pH: x  Gluc: 108 mg/dL / Ketone: x  / Bili: x / Urobili: x   Blood: x / Protein: x / Nitrite: x   Leuk Esterase: x / RBC: x / WBC x   Sq Epi: x / Non Sq Epi: x / Bacteria: x        RADIOLOGY & ADDITIONAL TESTS:  Results Reviewed:   Imaging Personally Reviewed:  Electrocardiogram Personally Reviewed:    COORDINATION OF CARE:  Care Discussed with Consultants/Other Providers [Y/N]:  Prior or Outpatient Records Reviewed [Y/N]:

## 2023-12-12 NOTE — PATIENT PROFILE ADULT - FALL HARM RISK - HARM RISK INTERVENTIONS
Assistance OOB with selected safe patient handling equipment/Communicate Risk of Fall with Harm to all staff/Discuss with provider need for PT consult/Monitor gait and stability/Provide patient with walking aids - walker, cane, crutches/Reinforce activity limits and safety measures with patient and family/Tailored Fall Risk Interventions/Visual Cue: Yellow wristband and red socks/Bed in lowest position, wheels locked, appropriate side rails in place/Call bell, personal items and telephone in reach/Instruct patient to call for assistance before getting out of bed or chair/Non-slip footwear when patient is out of bed/[place] to call system/Physically safe environment - no spills, clutter or unnecessary equipment/Purposeful Proactive Rounding/Room/bathroom lighting operational, light cord in reach Assistance OOB with selected safe patient handling equipment/Communicate Risk of Fall with Harm to all staff/Discuss with provider need for PT consult/Monitor gait and stability/Provide patient with walking aids - walker, cane, crutches/Reinforce activity limits and safety measures with patient and family/Tailored Fall Risk Interventions/Visual Cue: Yellow wristband and red socks/Bed in lowest position, wheels locked, appropriate side rails in place/Call bell, personal items and telephone in reach/Instruct patient to call for assistance before getting out of bed or chair/Non-slip footwear when patient is out of bed/[place] to call system/Physically safe environment - no spills, clutter or unnecessary equipment/Purposeful Proactive Rounding/Room/bathroom lighting operational, light cord in reach

## 2023-12-12 NOTE — OCCUPATIONAL THERAPY INITIAL EVALUATION ADULT - MUSCLE TONE ASSESSMENT, REHAB EVAL
difficut to assess 2/2 pt increased pain with left UE P/AROM/Left UE/severely increased tone/spasticity

## 2023-12-12 NOTE — CONSULT NOTE ADULT - ASSESSMENT
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  She has a left sided hemiparesis and cognitive impairment. She had been evaluated 1 week ago for seizures, had her meds adjusted, had a normal wbc count.  She had episode of N/V on 12/10 with some diarrhea, spike to 103, was febrile yesterday and brought to ER.  She c/o pain all over, apparently this is not new. No use of antibiotics since discharge from [place], no new meds. She has been exposed to viral URI's at home.Her RVP and her narrow RVP were negative. She has not been experiencing N/V or diarrhea in the ER.  She was raised on [place], no history of any serious medical  illness prior to ICH and no prodromal illness.  She was started on zosyn in ER. Her UA is negative, CXR is negative, CT scan of A/P with nonobstructive rt sided stones and head CT without acute changes.  A Viral/bacterial  GI syndrome possible although her GI symptoms have subsided and  CT of A/P without any bowel pathology.  Elevation in wbc is a little higher than typically seen with viral infections, yet this could be stress mediated.  Suggest:  1 Agree with zosyn pending blood cultures  2 If she develops more diarrhea would send a stool culture and GI PCR panel  3 If all micro remains negative, will have an antibiotic time out in 24-48 hours to consider d/c  4 Additional w/u pending clinical course. Case reviewed with mother at bedside. 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  She has a left sided hemiparesis and cognitive impairment. She had been evaluated 1 week ago for seizures, had her meds adjusted, had a normal wbc count.  She had episode of N/V on 12/10 with some diarrhea, spike to 103, was febrile yesterday and brought to ER.  She c/o pain all over, apparently this is not new. No use of antibiotics since discharge from [place], no new meds. She has been exposed to viral URI's at home.Her RVP and her narrow RVP were negative. She has not been experiencing N/V or diarrhea in the ER.  She was raised on [place], no history of any serious medical  illness prior to ICH and no prodromal illness.  She was started on zosyn in ER. Her UA is negative, CXR is negative, CT scan of A/P with nonobstructive rt sided stones and head CT without acute changes.  A Viral/bacterial  GI syndrome possible although her GI symptoms have subsided and  CT of A/P without any bowel pathology.  Elevation in wbc is a little higher than typically seen with viral infections, yet this could be stress mediated.  Suggest:  1 Agree with zosyn pending blood cultures  2 If she develops more diarrhea would send a stool culture and GI PCR panel  3 If all micro remains negative, will have an antibiotic time out in 24-48 hours to consider d/c  4 Additional w/u pending clinical course. Case reviewed with mother at bedside.

## 2023-12-12 NOTE — OCCUPATIONAL THERAPY INITIAL EVALUATION ADULT - RANGE OF MOTION EXAMINATION, UPPER EXTREMITY
Left UE hemiparesis - no functional movement noted - receiving Botox injections to manage tone/spasticity/Right UE Active ROM was WNL (within normal limits)

## 2023-12-12 NOTE — OCCUPATIONAL THERAPY INITIAL EVALUATION ADULT - PREDICTED DURATION OF THERAPY (DAYS/WKS), OT EVAL
2 weeks - short course rehab to reduce caregiver burden and improve pt's overall performance in ADL/transfer performance

## 2023-12-12 NOTE — OCCUPATIONAL THERAPY INITIAL EVALUATION ADULT - TOILETING, PREVIOUS LEVEL OF FUNCTION, OT EVAL
Date/Time of Note


Date/Time of Note


DATE: 7/15/19 


TIME: 11:30





Anesthesia Eval and Record


Evaluation


Time Pre-Procedure Interview


DATE: 7/15/19 


TIME: 11:30


Age


19


Sex


female


NPO:  8 hrs


Preoperative diagnosis


L5-S1 HNP


Planned procedure


L5-S1 microdiscectomy





Past Medical History


Past Medical History:  None





Surgery & Anesthesia Issues


No known issue





Meds


Anticoagulation:  No


Beta Blocker within 24 hr:  No


Reason Beta Blocker not given:  Pt. not on B-Blocker


No Active Prescriptions or Reported Meds


Meds reviewed:  Yes





Allergies


Coded Allergies:  


     No Known Allergies (Verified  Allergy, Unknown, 7/12/19)


Allergies Reviewed:  Yes





Labs/Studies


Labs Reviewed:  Reviewed by anesthesiologist


Pregnancy test:  Negative





Pre-procedure Exam


Airway:  Adequate mouth opening


Mallampati:  Mallampati IV


Teeth:  Normal


Lung:  Normal


Heart:  Normal





ASA Physical Status


ASA physical status:  1


Emergency:  None





Planned Anesthetic


General/MAC:  ETT





Planned Pain Management


Parenteral pain med





Pre-operative Attestations


Prior to commencing anesthesia and surgery, the patient was re-evaluated, there 


was verification of:


*The patient's identity


*The results of appropriate recent lab work and preoperative vital signs


*The above evaluation not changing prior to induction


*Anesthetic plan, risk benefits, alternative and complications discussed with 


patient/family; questions answered; patient/family understands, accepts and 


wishes to proceed.











SANTANA ALBARRAN MD               Jul 15, 2019 11:31
needed assist

## 2023-12-12 NOTE — OCCUPATIONAL THERAPY INITIAL EVALUATION ADULT - LEVEL OF INDEPENDENCE: SUPINE/SIT, REHAB EVAL
Pt limited in mobility due to increased screams of pain with any/all movement/maximum assist (25% patients effort)

## 2023-12-12 NOTE — OCCUPATIONAL THERAPY INITIAL EVALUATION ADULT - ADDITIONAL COMMENTS
All information gathered has been from pt's chart, pt, and pt's mother who was bedside. Pt was in with a past history of a Rt frontal IPH in June of 2023, requiring emergency decompressive rt craniotomy with placement of EVD, eventual removal of EVD, rehab at , Rt sided cranioplasty in September at [facility], rehab at [place], home x 6 weeks, who is now being evaluated for fever to 102 x 36 hours at home. PLOF, pt at home pt was assist of 1 for transfers from bed to w/c and assist of 2 for toilet/tub transfers. Pt requires assist of 1-2 for all self-care tasks performed in w/c or regular bed. Pt resides in a private home with spouse and assist of someone at all times (whether it is mother, spouse, etc.) with ramp access. Pt resides on main/1st floor with tub +curtain with tub bench and rolling commode chair for toileting tasks. Pt also has wheelchair at home for all mobility-related ADLs. All information gathered has been from pt's chart, pt, and pt's mother who was bedside. Pt was in with a past history of a Rt frontal IPH in June of 2023, requiring emergency decompressive rt craniotomy with placement of EVD, eventual removal of EVD, rehab at , Rt sided cranioplasty in September at [facility], rehab at [place], home x 6 weeks, who is now being evaluated for fever to 102 x 36 hours at home. PLOF, pt at home pt was assist of 1 for transfers from bed to w/c and assist of 2 for toilet/tub transfers. Pt requires assist of 1-2 for all self-care tasks performed in w/c or regular bed. Pt resides in a private home with spouse and assist of someone at all times (whether it is mother, spouse, etc.) with ramp access. Pt resides on main/1st floor with tub +curtain with tub bench and rolling commode chair for toileting tasks. Pt also has wheelchair at home for all mobility-related ADLs.

## 2023-12-12 NOTE — PATIENT PROFILE ADULT - FUNCTIONAL ASSESSMENT - BASIC MOBILITY 6.
2-calculated by average/Not able to assess (calculate score using [facility] averaging method)  2-calculated by average/Not able to assess (calculate score using [facility] averaging method)

## 2023-12-12 NOTE — OCCUPATIONAL THERAPY INITIAL EVALUATION ADULT - BALANCE TRAINING, PT EVAL
Pt to improve static/dynamic sitting edge of bed balance one grade to improve edge of bed dressing tasks and in preparation for transfers within 2 weeks.

## 2023-12-12 NOTE — OCCUPATIONAL THERAPY INITIAL EVALUATION ADULT - PHYSICAL ASSIST/NONPHYSICAL ASSIST:DRESS UPPER BODY, OT EVAL
Hospitalist Progress Note      PCP: Jennifer Baxter    Date of Admission: 1/16/2021    CC hip pain, fall  Hospital course  Patient 48-year-old female with history of COPD, former tobacco abuse quit 5 years ago, hypertension, hyperlipidemia, hypothyroid, type 2 diabetes mellitus admitted after she had a mechanical fall at home. Patient was noted to be hypoxic. Imaging suggestive of right femoral neck fracture. Chart reviewed patient has hospitalization on 11/2020 for TIA. S/p right hip arthroplasty on 1/18. Estimated blood loss 300 cc. Subjective  Feeling better today. . Denies dizziness, CP/N/V/F/C. Hb stable.      Medications:  Reviewed    Infusion Medications    dextrose       Scheduled Medications    albuterol-ipratropium  1 puff Inhalation Q6H WA    SMOG Enema  330 mL Rectal Once    sodium chloride flush  10 mL Intravenous 2 times per day    sennosides-docusate sodium  1 tablet Oral BID    acetaminophen  1,000 mg Oral Q8H    gabapentin  100 mg Oral BID    enoxaparin  40 mg Subcutaneous Daily    insulin lispro  0-6 Units Subcutaneous TID WC    insulin lispro  0-3 Units Subcutaneous Nightly    buPROPion  300 mg Oral QAM    sertraline  200 mg Oral Daily    pantoprazole  40 mg Oral Daily    [Held by provider] metoprolol tartrate  50 mg Oral BID    [Held by provider] amLODIPine  5 mg Oral Daily    sodium chloride flush  10 mL Intravenous 2 times per day     PRN Meds: bisacodyl, iopamidol, sodium chloride flush, oxyCODONE **OR** oxyCODONE, promethazine **OR** ondansetron, magnesium hydroxide, polyethylene glycol, sodium chloride flush, potassium chloride, magnesium sulfate, famotidine, glucose, dextrose, glucagon (rDNA), dextrose      Intake/Output Summary (Last 24 hours) at 1/21/2021 1218  Last data filed at 1/21/2021 0841  Gross per 24 hour   Intake 920 ml   Output 800 ml   Net 120 ml       Physical Exam Performed:    /74   Pulse 90   Temp 97.5 °F (36.4 °C) (Oral)   Resp 18 Ht 5' 6\" (1.676 m)   Wt 165 lb (74.8 kg)   SpO2 93%   BMI 26.63 kg/m²     General NAD. HEENT head atraumatic, eyes bilateral PERRLA, neck supple  Cardiac S1, S2 audible, tachycardia. No murmur noted  Respiratory bilateral clear to auscultate, no wheeze no rhonchi  Abdomen soft, nontender, bowel sounds present  MSK no edema bilaterally. Right hip surgical site C/D/I  Neuro alert oriented x3, no focal neural deficit noted  Pulses palpable bilateral radial    Labs:   Recent Labs     01/19/21  0546 01/20/21  0557 01/21/21  0536   WBC 7.4 6.3 6.5   HGB 8.3* 7.7* 7.6*   HCT 25.9* 23.4* 23.9*   * 128* 143     Recent Labs     01/19/21  0545 01/21/21  0536   * 133*   K 5.1 4.9    101   CO2 21 22   BUN 31* 46*   CREATININE 1.2 1.3*   CALCIUM 8.6 8.7     No results for input(s): AST, ALT, BILIDIR, BILITOT, ALKPHOS in the last 72 hours. No results for input(s): INR in the last 72 hours. No results for input(s): Tony Panchito in the last 72 hours. Urinalysis:      Lab Results   Component Value Date    NITRU POSITIVE 01/16/2021    WBCUA 3-5 01/16/2021    BACTERIA 4+ 01/16/2021    RBCUA None seen 01/16/2021    BLOODU Negative 01/16/2021    SPECGRAV 1.025 01/16/2021    GLUCOSEU Negative 01/16/2021       Radiology:  CTA PULMONARY W CONTRAST   Final Result   1. No evidence of acute or chronic pulmonary embolus      2. Central lobar emphysema and evidence of underlying interstitial lung disease   3. Basilar dependent atelectasis, subpleural consolidation, left greater than right. Infectious inflammatory etiologies not excluded   4. Abnormal right hilar and subcarinal lymphadenopathy. 5. Hiatal hernia      An addendum to this report will be made when prior CT lung screening is available for comparison. XR HIP 1 VW W PELVIS RIGHT   Final Result   Impression:    Status post right total hip arthroplasty.          FLUORO FOR SURGICAL PROCEDURES   Final Result   Impression:   Intra-operative images as above. XR HIP RIGHT (2-3 VIEWS)   Final Result   Impression:   Intra-operative images as above. XR CHEST PORTABLE   Final Result      Slightly increased streaky bilateral opacities, worse on the left. XR CHEST PORTABLE   Final Result      Borderline cardiomegaly. No acute cardiopulmonary findings. XR ELBOW RIGHT (MIN 3 VIEWS)   Final Result      Negative study. XR HIP 2-3 VW W PELVIS RIGHT   Final Result      Acute displaced fracture of the right femoral neck. Assessment/Plan:    Active Hospital Problems    Diagnosis Date Noted    Closed fracture of right femur, initial encounter ([facility] [address] Jadyn Mojica 01/16/2021     #Acute respiratory failure with hypoxia on 3 L of oxygen  Patient has history of COPD not in exacerbation. Could be a combination of atelectasis and pain medication. Encourage patient to use incentive spirometer. Echo 11/2020 EF 66 to 70%. No evidence of pulmonary hypertension. CT lung screen 2019 pulmonary nodules. Patient is a still hypoxic requiring 4 to 5 L of high flow oxygen. CT with contrast obtained Showed central lobar emphysema and evidence of underlying interstitial lung disease. Basilar dependent atelectasis, pleural consolidation left greater than right. Abnormal right hilar and subcarinal lymphadenopathy. Schedule breathing treatments every 6 hours. Patient is at [facility] at home. We will monitor consult reviewed and appreciated recommended repeat CT scan in 3 to 6 months. #Acute blood loss anemia suspected postoperative  Hb 7.7 Per operative report 300 cc blood loss. We will monitor. No active sign of bleed. We will check occult blood. #Hypertension currently hypotensive hold amlodipine and metoprolol. #Diabetes mellitus type 2  Hold glipizide, Metformin. Continue insulin sliding scale. HbA1c 7.0% on 1/17/2021    #Mechanical fall  PT/OT on board.     #Acute right femur fracture  S/p right hip total arthroplasty on 1/18  Pain control with oxycodone. #Constipation  As needed MiraLAX, senna after noted a bowel movement we will give a rectal suppository. Will order Enema    DVT Prophylaxis: SCD  Diet: Dietary Nutrition Supplements: Standard High Calorie Oral Supplement  DIET CARB CONTROL;  Code Status: Full Code    PT/OT Eval Status: lovenox    Dispo - inpatient. Stable needs placement.      This chart was likely completed using voice recognition technology and may contain unintended grammatical , phraseology,and/or punctuation errors      Kelsea Ledesma MD 1 person assist

## 2023-12-12 NOTE — OCCUPATIONAL THERAPY INITIAL EVALUATION ADULT - PERTINENT HX OF CURRENT PROBLEM, REHAB EVAL
Patient is a 38y female with a past history of a Rt frontal IPH in June of 2023, requiring emergency decompressive rt craniotomy  with placement of EVD, eventual removal of EVD, rehab at , Rt sided cranioplasty in September at [facility], rehab at [place], home x 6 weeks, who is now being evaluated for fever to 102 x 36 hours at home.  She has a left sided hemiparesis and cognitive impairment. She had been evaluated 1 week ago for seizures, had her meds adjusted, had a normal wbc count.  She had episode of N/V on 12/10 with some diarrhea, spike to 103, was febrile yesterday and brought to ER.  She c/o pain all over, apparently this is not new. No use of antibiotics since discharge from [place], no new meds. She has been exposed to viral URI's at home. Her RVP and her narrow RVP were negative. She has not been experiencing N/V or diarrhea in the ER.  She was raised [place], no history of any serious medical  illness prior to ICH and no prodromal illness. Patient is a 38y female with a past history of a Rt frontal IPH in June of 2023, requiring emergency decompressive rt craniotomy  with placement of EVD, eventual removal of EVD, rehab at , Rt sided cranioplasty in September at [facility], rehab at [place], home x 6 weeks, who is now being evaluated for fever to 102 x 36 hours at home.  She has a left sided hemiparesis and cognitive impairment. She had been evaluated 1 week ago for seizures, had her meds adjusted, had a normal wbc count.  She had episode of N/V on 12/10 with some diarrhea, spike to 103, was febrile yesterday and brought to ER.  She c/o pain all over, apparently this is not new. No use of antibiotics since discharge from [place], no new meds. She has been exposed to viral URI's at home. Her RVP and her narrow RVP were negative. She has not been experiencing N/V or diarrhea in the ER.  She was raised [place], no history of any serious medical  illness prior to ICH and no prodromal illness.

## 2023-12-12 NOTE — PROGRESS NOTE ADULT - ASSESSMENT
38F hx of  R frontal IPH with IVH and hydrocephalus s/p emergent R hemicraniectomy w/ EVD placement (6/2023), s/p cranioplastysep/2023, left sided hemiparesis, baseline intermittent confusion since June events, Seizure earlier Dec 2023 and vertebral fractures.  Pt presents today with fever starting 12/10.     #Fever   #Leukocytosis with left shift  -admit to medicine  -RVP/COVID neg  -chest x ray negative  -FU UA and UC  -FU BC  -CT abdomen/pelvis (reported vomiting and nausea last week)  -Ofirmev. Judicious use of NSAIDs due to prior brain bleed  -Zosyn  -monitor for fever  -repeat labs in am     #large R frontal IPH with IVH and hydrocephalus  #right hemicraniectomy and EVD placement  #cranioplasty(sep/2023)  #Seizure  -home medication regimen   -regular diet   -fall precautions    #Hypothyroid  -synthroid     #GOC  Full code    #DVT ppx     Updated spouse Karma [phone] and mother at bedside.   38F hx of  R frontal IPH with IVH and hydrocephalus s/p emergent R hemicraniectomy w/ EVD placement (6/2023), s/p cranioplastysep/2023, left sided hemiparesis, baseline intermittent confusion since June events, Seizure earlier Dec 2023 and vertebral fractures.  Pt presents today with fever starting 12/10.     #Fever   #Leukocytosis with left shift  -admit to medicine  -RVP/COVID neg  -chest x ray negative  -FU UA and UC  -FU BC  -CT abdomen/pelvis (reported vomiting and nausea last week)  -Ofirmev. Judicious use of NSAIDs due to prior brain bleed  -Zosyn  -monitor for fever  -repeat labs in am     #large R frontal IPH with IVH and hydrocephalus  #right hemicraniectomy and EVD placement  #cranioplasty(sep/2023)  #Seizure  -home medication regimen   -regular diet   -fall precautions    #Hypothyroid  -synthroid     #GOC  Full code    #DVT ppx     Updated spouse Karma [phone] and mother at bedside.   38F hx of  R frontal IPH with IVH and hydrocephalus s/p emergent R hemicraniectomy w/ EVD placement (6/2023), s/p cranioplastysep/2023, left sided hemiparesis, baseline intermittent confusion since June events, Seizure earlier Dec 2023 and vertebral fractures.  Pt presents today with fever starting 12/10.     #Sepsis w/ unknown etiology  -POA w/ fever, tachycardia, leukocytosis  -Infectious work up thus far negative; RVP/COVID neg, UA negative  -chest x ray negative  -CT abdomen/pelvis negative for acute findings   -Follow up with Blood cultures  -Continue empiric treatment w/ Zosyn  -Start IVF  -ID recommendations noted    #large R frontal IPH with IVH and hydrocephalus  #right hemicraniectomy and EVD placement  #cranioplasty(sep/2023)  #Seizure  -home medication regimen   -regular diet   -fall precautions    #Hypothyroid  -synthroid     #GOC  Full code    #DVT ppx     Updated spouse Karma [phone] and mother at bedside.   38F hx of  R frontal IPH with IVH and hydrocephalus s/p emergent R hemicraniectomy w/ EVD placement (6/2023), s/p cranioplastysep/2023, left sided hemiparesis, baseline intermittent confusion since June events, Seizure earlier Dec 2023 and vertebral fractures.  Pt presents today with fever starting 12/10.     #Sepsis w/ unknown etiology  -POA w/ fever, tachycardia, leukocytosis  -Infectious work up thus far negative; RVP/COVID neg, UA negative  -chest x ray negative  -CT abdomen/pelvis negative for acute findings   -Follow up with Blood cultures  -Continue empiric treatment w/ Zosyn  -Start IVF  -ID recommendations noted    #large R frontal IPH with IVH and hydrocephalus  #right hemicraniectomy and EVD placement  #cranioplasty(sep/2023)  #Seizure  -home medication regimen   -regular diet   -fall precautions    #Hypothyroid  -synthroid     #GOC  Full code    #DVT ppx     Updated spouse Karma [phone] and mother at bedside.

## 2023-12-12 NOTE — OCCUPATIONAL THERAPY INITIAL EVALUATION ADULT - BED MOBILITY TRAINING, PT EVAL
Pt to perform supine to sit with moderate assist x1 person with cues for sequencing and body awareness within 2 weeks.

## 2023-12-12 NOTE — OCCUPATIONAL THERAPY INITIAL EVALUATION ADULT - NSOTDISCHREC_GEN_A_CORE
Short course [facility] [facility] vs. home - pt would benefit from short course AR stay when considered medically stable to improve ADL/transfer independence to reduce caregiver burden and improve quality of life VS. pt going home with assist at all times pending pt and pt's family's wants/needs./Acute Inpatient Rehab

## 2023-12-12 NOTE — PATIENT PROFILE ADULT - NSPROGENOTHERPROVIDER_GEN_A_NUR
I was called to bedside at 0325 to assess this patient due to acute deterioration of status. Upon entering the room, patient was found to be very anxious, dyspneic, and attempting, to bed. Assessment revealed tachycardia patient with moderate crackles bilaterally, anxious to the point where unable to answer orientation questions. Afterwards, stepped out the room to enter orders and the patient became apneic and subsequently pulseless. Code was called at [address], ACLS was performed, including successful intubation by RT, with multiple ROSC for only seconds. Dr. Shanae Rich was at bedside, code was called at [phone]. Please refer to code documentation for further details. none

## 2023-12-13 LAB
-  COAGULASE NEGATIVE STAPHYLOCOCCUS: SIGNIFICANT CHANGE UP
-  COAGULASE NEGATIVE STAPHYLOCOCCUS: SIGNIFICANT CHANGE UP
ANION GAP SERPL CALC-SCNC: 9 MMOL/L — SIGNIFICANT CHANGE UP (ref 5–17)
ANION GAP SERPL CALC-SCNC: 9 MMOL/L — SIGNIFICANT CHANGE UP (ref 5–17)
BUN SERPL-MCNC: 5 MG/DL — LOW (ref 7–23)
BUN SERPL-MCNC: 5 MG/DL — LOW (ref 7–23)
CALCIUM SERPL-MCNC: 8.7 MG/DL — SIGNIFICANT CHANGE UP (ref 8.4–10.5)
CALCIUM SERPL-MCNC: 8.7 MG/DL — SIGNIFICANT CHANGE UP (ref 8.4–10.5)
CHLORIDE SERPL-SCNC: 104 MMOL/L — SIGNIFICANT CHANGE UP (ref 96–108)
CHLORIDE SERPL-SCNC: 104 MMOL/L — SIGNIFICANT CHANGE UP (ref 96–108)
CO2 SERPL-SCNC: 28 MMOL/L — SIGNIFICANT CHANGE UP (ref 22–31)
CO2 SERPL-SCNC: 28 MMOL/L — SIGNIFICANT CHANGE UP (ref 22–31)
CREAT SERPL-MCNC: 0.52 MG/DL — SIGNIFICANT CHANGE UP (ref 0.5–1.3)
CREAT SERPL-MCNC: 0.52 MG/DL — SIGNIFICANT CHANGE UP (ref 0.5–1.3)
EGFR: 121 ML/MIN/1.73M2 — SIGNIFICANT CHANGE UP
EGFR: 121 ML/MIN/1.73M2 — SIGNIFICANT CHANGE UP
GLUCOSE SERPL-MCNC: 138 MG/DL — HIGH (ref 70–99)
GLUCOSE SERPL-MCNC: 138 MG/DL — HIGH (ref 70–99)
GRAM STN FLD: ABNORMAL
GRAM STN FLD: ABNORMAL
HCT VFR BLD CALC: 35.1 % — SIGNIFICANT CHANGE UP (ref 34.5–45)
HCT VFR BLD CALC: 35.1 % — SIGNIFICANT CHANGE UP (ref 34.5–45)
HGB BLD-MCNC: 11.7 G/DL — SIGNIFICANT CHANGE UP (ref 11.5–15.5)
HGB BLD-MCNC: 11.7 G/DL — SIGNIFICANT CHANGE UP (ref 11.5–15.5)
MCHC RBC-ENTMCNC: 31.9 PG — SIGNIFICANT CHANGE UP (ref 27–34)
MCHC RBC-ENTMCNC: 31.9 PG — SIGNIFICANT CHANGE UP (ref 27–34)
MCHC RBC-ENTMCNC: 33.3 GM/DL — SIGNIFICANT CHANGE UP (ref 32–36)
MCHC RBC-ENTMCNC: 33.3 GM/DL — SIGNIFICANT CHANGE UP (ref 32–36)
MCV RBC AUTO: 95.6 FL — SIGNIFICANT CHANGE UP (ref 80–100)
MCV RBC AUTO: 95.6 FL — SIGNIFICANT CHANGE UP (ref 80–100)
METHOD TYPE: SIGNIFICANT CHANGE UP
METHOD TYPE: SIGNIFICANT CHANGE UP
NRBC # BLD: 0 /100 WBCS — SIGNIFICANT CHANGE UP (ref 0–0)
NRBC # BLD: 0 /100 WBCS — SIGNIFICANT CHANGE UP (ref 0–0)
PLATELET # BLD AUTO: 185 K/UL — SIGNIFICANT CHANGE UP (ref 150–400)
PLATELET # BLD AUTO: 185 K/UL — SIGNIFICANT CHANGE UP (ref 150–400)
POTASSIUM SERPL-MCNC: 3.5 MMOL/L — SIGNIFICANT CHANGE UP (ref 3.5–5.3)
POTASSIUM SERPL-MCNC: 3.5 MMOL/L — SIGNIFICANT CHANGE UP (ref 3.5–5.3)
POTASSIUM SERPL-SCNC: 3.5 MMOL/L — SIGNIFICANT CHANGE UP (ref 3.5–5.3)
POTASSIUM SERPL-SCNC: 3.5 MMOL/L — SIGNIFICANT CHANGE UP (ref 3.5–5.3)
RBC # BLD: 3.67 M/UL — LOW (ref 3.8–5.2)
RBC # BLD: 3.67 M/UL — LOW (ref 3.8–5.2)
RBC # FLD: 13.8 % — SIGNIFICANT CHANGE UP (ref 10.3–14.5)
RBC # FLD: 13.8 % — SIGNIFICANT CHANGE UP (ref 10.3–14.5)
S PYO DNA THROAT QL NAA+PROBE: SIGNIFICANT CHANGE UP
S PYO DNA THROAT QL NAA+PROBE: SIGNIFICANT CHANGE UP
SODIUM SERPL-SCNC: 141 MMOL/L — SIGNIFICANT CHANGE UP (ref 135–145)
SODIUM SERPL-SCNC: 141 MMOL/L — SIGNIFICANT CHANGE UP (ref 135–145)
WBC # BLD: 18.04 K/UL — HIGH (ref 3.8–10.5)
WBC # BLD: 18.04 K/UL — HIGH (ref 3.8–10.5)
WBC # FLD AUTO: 18.04 K/UL — HIGH (ref 3.8–10.5)
WBC # FLD AUTO: 18.04 K/UL — HIGH (ref 3.8–10.5)

## 2023-12-13 PROCEDURE — 99233 SBSQ HOSP IP/OBS HIGH 50: CPT

## 2023-12-13 RX ORDER — BENZOCAINE AND MENTHOL 5; 1 G/100ML; G/100ML
1 LIQUID ORAL ONCE
Refills: 0 | Status: COMPLETED | OUTPATIENT
Start: 2023-12-13 | End: 2023-12-13

## 2023-12-13 RX ADMIN — GABAPENTIN 600 MILLIGRAM(S): 400 CAPSULE ORAL at 21:40

## 2023-12-13 RX ADMIN — Medication 650 MILLIGRAM(S): at 15:22

## 2023-12-13 RX ADMIN — LACOSAMIDE 150 MILLIGRAM(S): 50 TABLET ORAL at 05:14

## 2023-12-13 RX ADMIN — LACOSAMIDE 150 MILLIGRAM(S): 50 TABLET ORAL at 19:10

## 2023-12-13 RX ADMIN — PIPERACILLIN AND TAZOBACTAM 25 GRAM(S): 4; .5 INJECTION, POWDER, LYOPHILIZED, FOR SOLUTION INTRAVENOUS at 05:15

## 2023-12-13 RX ADMIN — BENZOCAINE AND MENTHOL 1 LOZENGE: 5; 1 LIQUID ORAL at 05:14

## 2023-12-13 RX ADMIN — Medication 75 MICROGRAM(S): at 05:15

## 2023-12-13 RX ADMIN — GABAPENTIN 600 MILLIGRAM(S): 400 CAPSULE ORAL at 05:14

## 2023-12-13 RX ADMIN — Medication 650 MILLIGRAM(S): at 20:24

## 2023-12-13 RX ADMIN — DIVALPROEX SODIUM 750 MILLIGRAM(S): 500 TABLET, DELAYED RELEASE ORAL at 21:40

## 2023-12-13 RX ADMIN — ESCITALOPRAM OXALATE 20 MILLIGRAM(S): 10 TABLET, FILM COATED ORAL at 12:29

## 2023-12-13 RX ADMIN — Medication 10 MILLIGRAM(S): at 05:14

## 2023-12-13 RX ADMIN — Medication 650 MILLIGRAM(S): at 14:52

## 2023-12-13 RX ADMIN — Medication 3 MILLIGRAM(S): at 21:40

## 2023-12-13 RX ADMIN — PIPERACILLIN AND TAZOBACTAM 25 GRAM(S): 4; .5 INJECTION, POWDER, LYOPHILIZED, FOR SOLUTION INTRAVENOUS at 21:41

## 2023-12-13 RX ADMIN — Medication 5 MILLIGRAM(S): at 14:52

## 2023-12-13 RX ADMIN — DIVALPROEX SODIUM 750 MILLIGRAM(S): 500 TABLET, DELAYED RELEASE ORAL at 05:43

## 2023-12-13 RX ADMIN — Medication 650 MILLIGRAM(S): at 05:45

## 2023-12-13 RX ADMIN — Medication 650 MILLIGRAM(S): at 05:15

## 2023-12-13 RX ADMIN — Medication 5 MILLIGRAM(S): at 05:14

## 2023-12-13 RX ADMIN — PIPERACILLIN AND TAZOBACTAM 25 GRAM(S): 4; .5 INJECTION, POWDER, LYOPHILIZED, FOR SOLUTION INTRAVENOUS at 14:54

## 2023-12-13 RX ADMIN — Medication 5 MILLIGRAM(S): at 21:40

## 2023-12-13 NOTE — PROGRESS NOTE ADULT - ASSESSMENT
38F hx of  R frontal IPH with IVH and hydrocephalus s/p emergent R hemicraniectomy w/ EVD placement (6/2023), s/p cranioplastysep/2023, left sided hemiparesis, baseline intermittent confusion since June events, Seizure earlier Dec 2023 and vertebral fractures.  Pt came to ED with fevers, tmax 103, and elevated wbc.     #Sepsis w/ unknown etiology  -POA w/ fever, tachycardia, leukocytosis  -Infectious work up thus far negative; RVP/COVID neg, UA neg, CXR neg  -CT abdomen/pelvis negative for acute findings   -BC so far NGTD  -Continue empiric treatment w/ Zosyn per ID  -ID recommendations noted    #hx of large R frontal IPH with IVH and hydrocephalus  #right hemicraniectomy and EVD placement  #cranioplasty(sep/2023)  #Seizure disorder  -cont home medication regimen   -fall precautions    #Hypothyroid  -synthroid     #GOC  Full code    #DVT ppx     Updated spouse Karma [phone] and mother at bedside.   38F hx of  R frontal IPH with IVH and hydrocephalus s/p emergent R hemicraniectomy w/ EVD placement (6/2023), s/p cranioplastysep/2023, left sided hemiparesis, baseline intermittent confusion since June events, Seizure earlier Dec 2023 and vertebral fractures.  Pt came to ED with fevers, tmax 103, and elevated wbc.     #Sepsis w/ unknown etiology  -POA w/ fever, tachycardia, leukocytosis  -Infectious work up thus far negative; RVP/COVID neg, UA neg, CXR neg  -CT abdomen/pelvis negative for acute findings   -BC so far NGTD  -Continue empiric treatment w/ Zosyn per ID  -ID recommendations noted    #hx of large R frontal IPH with IVH and hydrocephalus  #right hemicraniectomy and EVD placement  #cranioplasty(sep/2023)  #Seizure disorder  -cont home medication regimen   -fall precautions    #Hypothyroid  -synthroid     #GOC  Full code    #DVT ppx     Updated spouse Karma [phone] and mother at bedside.   38F hx of  R frontal IPH with IVH and hydrocephalus s/p emergent R hemicraniectomy w/ EVD placement (6/2023), s/p cranioplastysep/2023, left sided hemiparesis, baseline intermittent confusion since June events, Seizure earlier Dec 2023 and vertebral fractures.  Pt came to ED with fevers, tmax 103, and elevated wbc.     #Sepsis w/ unknown etiology  -POA w/ fever, tachycardia, leukocytosis  -wbc still elevated this am 18.0, pt has been afebrile overnight  -Infectious work up thus far negative; RVP/COVID neg, UA neg, CXR neg  -CT abdomen/pelvis negative for acute findings   -BC so far NGTD  -Continue empiric treatment w/ Zosyn per ID  -ID recommendations noted  -Strep throat pcr testing     #hx of large R frontal IPH with IVH and hydrocephalus  #right hemicraniectomy and EVD placement  #cranioplasty(sep/2023)  #Seizure disorder  -cont home medication regimen   -fall precautions    #Hypothyroid  -synthroid     #GOC  Full code    #DVT ppx - no chemical rx due to recent brain bleed    Dispo:  pt's Spouse, Karma at bedside, all questions answered, understands plan of care     Karma [phone]    38F hx of  R frontal IPH with IVH and hydrocephalus s/p emergent R hemicraniectomy w/ EVD placement (6/2023), s/p cranioplastysep/2023, left sided hemiparesis, baseline intermittent confusion since June events, Seizure earlier Dec 2023 and vertebral fractures.  Pt came to ED with fevers, tmax 103, and elevated wbc.     #Sepsis w/ unknown etiology  -POA w/ fever, tachycardia, leukocytosis  -wbc still elevated this am 18.0, pt has been afebrile overnight  -Infectious work up thus far negative; RVP/COVID neg, UA neg, CXR neg  -CT abdomen/pelvis negative for acute findings   -BC so far NGTD  -Continue empiric treatment w/ Zosyn per ID  -ID recommendations noted  -Strep throat pcr testing     #hx of large R frontal IPH with IVH and hydrocephalus  #right hemicraniectomy and EVD placement  #cranioplasty(sep/2023)  #Seizure disorder  -cont home medication regimen   -fall precautions    #Hypothyroid  -synthroid     #GOC  Full code    #DVT ppx - no chemical rx due to recent brain bleed    Dispo:  pt's Spouse, Karma at bedside, all questions answered, understands plan of care     Karma [phone]

## 2023-12-13 NOTE — PROGRESS NOTE ADULT - SUBJECTIVE AND OBJECTIVE BOX
Patient is a 38y old  Female who presents with a chief complaint of fever (12 Dec 2023 09:30)      Patient seen and examined at bedside. No overnight events reported.     ALLERGIES:  No Known Allergies    MEDICATIONS  (STANDING):  baclofen 5 milliGRAM(s) Oral every 8 hours  dextrose 5% + sodium chloride 0.9%. 1000 milliLiter(s) (75 mL/Hr) IV Continuous <Continuous>  divalproex  milliGRAM(s) Oral two times a day  escitalopram 20 milliGRAM(s) Oral daily  gabapentin 600 milliGRAM(s) Oral two times a day  influenza   Vaccine 0.5 milliLiter(s) IntraMuscular once  lacosamide 150 milliGRAM(s) Oral two times a day  levothyroxine 75 MICROGram(s) Oral daily  methylphenidate 10 milliGRAM(s) Oral <User Schedule>  piperacillin/tazobactam IVPB.. 3.375 Gram(s) IV Intermittent every 8 hours    MEDICATIONS  (PRN):  acetaminophen     Tablet .. 650 milliGRAM(s) Oral every 6 hours PRN Temp greater or equal to 38C (100.4F), Mild Pain (1 - 3)  aluminum hydroxide/magnesium hydroxide/simethicone Suspension 30 milliLiter(s) Oral every 4 hours PRN Dyspepsia  melatonin 3 milliGRAM(s) Oral at bedtime PRN Insomnia  ondansetron Injectable 4 milliGRAM(s) IV Push every 8 hours PRN Nausea and/or Vomiting    Vital Signs Last 24 Hrs  T(F): 98.2 (13 Dec 2023 05:09), Max: 101 (12 Dec 2023 09:44)  HR: 68 (13 Dec 2023 05:09) (68 - 119)  BP: 124/85 (13 Dec 2023 05:09) (105/71 - 142/92)  RR: 18 (13 Dec 2023 05:09) (17 - 18)  SpO2: 96% (13 Dec 2023 05:09) (95% - 97%)  I&O's Summary    12 Dec 2023 07:01  -  13 Dec 2023 07:00  --------------------------------------------------------  IN: 0 mL / OUT: 300 mL / NET: -300 mL      PHYSICAL EXAM:  General: NAD, A/O x 3  ENT: No gross hearing impairment, Moist mucous membranes, no thrush  Neck: Supple, No JVD  Lungs: Clear to auscultation bilaterally, good air entry, non-labored breathing  Cardio: RRR, S1/S2, No murmur  Abdomen: Soft, Nontender, Nondistended; Bowel sounds present  Extremities: No calf tenderness, No cyanosis, No pitting edema  Psych: Appropriate mood and affect    LABS:                        11.9   19.52 )-----------( 203      ( 12 Dec 2023 07:25 )             35.1     12-12    139  |  104  |  8   ----------------------------<  108  3.8   |  25  |  0.55    Ca    8.7      12 Dec 2023 07:25    TPro  5.9  /  Alb  2.2  /  TBili  0.2  /  DBili  x   /  AST  10  /  ALT  10  /  AlkPhos  75  12-12          PT/INR - ( 11 Dec 2023 15:53 )   PT: 13.4 sec;   INR: 1.18 ratio         PTT - ( 11 Dec 2023 15:53 )  PTT:34.1 sec  Lactate, Blood: 1.0 mmol/L (12-11 @ 15:53)                            Urinalysis Basic - ( 12 Dec 2023 07:25 )    Color: x / Appearance: x / SG: x / pH: x  Gluc: 108 mg/dL / Ketone: x  / Bili: x / Urobili: x   Blood: x / Protein: x / Nitrite: x   Leuk Esterase: x / RBC: x / WBC x   Sq Epi: x / Non Sq Epi: x / Bacteria: x        Culture - Urine (collected 11 Dec 2023 20:17)  Source: Clean Catch Clean Catch (Midstream)  Final Report (12 Dec 2023 23:01):    <10,000 CFU/mL Normal Urogenital Amanda    Culture - Blood (collected 11 Dec 2023 15:53)  Source: .Blood Blood-Peripheral  Preliminary Report (12 Dec 2023 23:02):    No growth at 24 hours    Culture - Blood (collected 11 Dec 2023 15:23)  Source: .Blood Blood-Peripheral  Preliminary Report (12 Dec 2023 23:02):    No growth at 24 hours        RADIOLOGY & ADDITIONAL TESTS:  < from: CT Abdomen and Pelvis No Cont (12.11.23 @ 19:23) >    IMPRESSION:  A few small nonobstructive calculi in the left kidney. No evidence for a   ureteral calculus. No hydronephrosis.    New mild age indeterminate compression fractures at the superior endplate   of L1, L3, L4, and L5.    < end of copied text >    Care Discussed with Consultants/Other Providers:    Patient is a 38y old  Female who presents with a chief complaint of fever (12 Dec 2023 09:30)      Patient seen and examined at bedside. No overnight events reported.  Pt with no diarrhea per Nurse report.  Pt. c/o sore throat.    ALLERGIES:  No Known Allergies    MEDICATIONS  (STANDING):  baclofen 5 milliGRAM(s) Oral every 8 hours  dextrose 5% + sodium chloride 0.9%. 1000 milliLiter(s) (75 mL/Hr) IV Continuous <Continuous>  divalproex  milliGRAM(s) Oral two times a day  escitalopram 20 milliGRAM(s) Oral daily  gabapentin 600 milliGRAM(s) Oral two times a day  influenza   Vaccine 0.5 milliLiter(s) IntraMuscular once  lacosamide 150 milliGRAM(s) Oral two times a day  levothyroxine 75 MICROGram(s) Oral daily  methylphenidate 10 milliGRAM(s) Oral <User Schedule>  piperacillin/tazobactam IVPB.. 3.375 Gram(s) IV Intermittent every 8 hours    MEDICATIONS  (PRN):  acetaminophen     Tablet .. 650 milliGRAM(s) Oral every 6 hours PRN Temp greater or equal to 38C (100.4F), Mild Pain (1 - 3)  aluminum hydroxide/magnesium hydroxide/simethicone Suspension 30 milliLiter(s) Oral every 4 hours PRN Dyspepsia  melatonin 3 milliGRAM(s) Oral at bedtime PRN Insomnia  ondansetron Injectable 4 milliGRAM(s) IV Push every 8 hours PRN Nausea and/or Vomiting    Vital Signs Last 24 Hrs  T(F): 98.2 (13 Dec 2023 05:09), Max: 101 (12 Dec 2023 09:44)  HR: 68 (13 Dec 2023 05:09) (68 - 119)  BP: 124/85 (13 Dec 2023 05:09) (105/71 - 142/92)  RR: 18 (13 Dec 2023 05:09) (17 - 18)  SpO2: 96% (13 Dec 2023 05:09) (95% - 97%)  I&O's Summary    12 Dec 2023 07:01  -  13 Dec 2023 07:00  --------------------------------------------------------  IN: 0 mL / OUT: 300 mL / NET: -300 mL      PHYSICAL EXAM:  General: NAD, A/O x 2  ENT: No gross hearing impairment, Moist mucous membranes, no thrush  Neck: Supple, No JVD  Lungs: Clear to auscultation bilaterally, good air entry, non-labored breathing  Cardio: RRR, S1/S2, No murmur  Abdomen: Soft, Nontender, Nondistended; Bowel sounds present  Extremities: No calf tenderness, No cyanosis, No pitting edema  Neuro: speech aphasic, left hemiparesis, can follow commands    LABS:                        11.9   19.52 )-----------( 203      ( 12 Dec 2023 07:25 )             35.1     12-12    139  |  104  |  8   ----------------------------<  108  3.8   |  25  |  0.55    Ca    8.7      12 Dec 2023 07:25    TPro  5.9  /  Alb  2.2  /  TBili  0.2  /  DBili  x   /  AST  10  /  ALT  10  /  AlkPhos  75  12-12          PT/INR - ( 11 Dec 2023 15:53 )   PT: 13.4 sec;   INR: 1.18 ratio         PTT - ( 11 Dec 2023 15:53 )  PTT:34.1 sec  Lactate, Blood: 1.0 mmol/L (12-11 @ 15:53)                            Urinalysis Basic - ( 12 Dec 2023 07:25 )    Color: x / Appearance: x / SG: x / pH: x  Gluc: 108 mg/dL / Ketone: x  / Bili: x / Urobili: x   Blood: x / Protein: x / Nitrite: x   Leuk Esterase: x / RBC: x / WBC x   Sq Epi: x / Non Sq Epi: x / Bacteria: x        Culture - Urine (collected 11 Dec 2023 20:17)  Source: Clean Catch Clean Catch (Midstream)  Final Report (12 Dec 2023 23:01):    <10,000 CFU/mL Normal Urogenital Amanda    Culture - Blood (collected 11 Dec 2023 15:53)  Source: .Blood Blood-Peripheral  Preliminary Report (12 Dec 2023 23:02):    No growth at 24 hours    Culture - Blood (collected 11 Dec 2023 15:23)  Source: .Blood Blood-Peripheral  Preliminary Report (12 Dec 2023 23:02):    No growth at 24 hours        RADIOLOGY & ADDITIONAL TESTS:  < from: CT Abdomen and Pelvis No Cont (12.11.23 @ 19:23) >    IMPRESSION:  A few small nonobstructive calculi in the left kidney. No evidence for a   ureteral calculus. No hydronephrosis.    New mild age indeterminate compression fractures at the superior endplate   of L1, L3, L4, and L5.    < end of copied text >    Care Discussed with Consultants/Other Providers:    Patient is a 38y old  Female who presents with a chief complaint of fever (12 Dec 2023 09:30)      Patient seen and examined at bedside. No overnight events reported.  Pt with no diarrhea per Nurse report.  Pt. c/o sore throat.    ALLERGIES:  No Known Allergies    MEDICATIONS  (STANDING):  baclofen 5 milliGRAM(s) Oral every 8 hours  dextrose 5% + sodium chloride 0.9%. 1000 milliLiter(s) (75 mL/Hr) IV Continuous <Continuous>  divalproex  milliGRAM(s) Oral two times a day  escitalopram 20 milliGRAM(s) Oral daily  gabapentin 600 milliGRAM(s) Oral two times a day  influenza   Vaccine 0.5 milliLiter(s) IntraMuscular once  lacosamide 150 milliGRAM(s) Oral two times a day  levothyroxine 75 MICROGram(s) Oral daily  methylphenidate 10 milliGRAM(s) Oral <User Schedule>  piperacillin/tazobactam IVPB.. 3.375 Gram(s) IV Intermittent every 8 hours    MEDICATIONS  (PRN):  acetaminophen     Tablet .. 650 milliGRAM(s) Oral every 6 hours PRN Temp greater or equal to 38C (100.4F), Mild Pain (1 - 3)  aluminum hydroxide/magnesium hydroxide/simethicone Suspension 30 milliLiter(s) Oral every 4 hours PRN Dyspepsia  melatonin 3 milliGRAM(s) Oral at bedtime PRN Insomnia  ondansetron Injectable 4 milliGRAM(s) IV Push every 8 hours PRN Nausea and/or Vomiting    Vital Signs Last 24 Hrs  T(F): 98.2 (13 Dec 2023 05:09), Max: 101 (12 Dec 2023 09:44)  HR: 68 (13 Dec 2023 05:09) (68 - 119)  BP: 124/85 (13 Dec 2023 05:09) (105/71 - 142/92)  RR: 18 (13 Dec 2023 05:09) (17 - 18)  SpO2: 96% (13 Dec 2023 05:09) (95% - 97%)  I&O's Summary    12 Dec 2023 07:01  -  13 Dec 2023 07:00  --------------------------------------------------------  IN: 0 mL / OUT: 300 mL / NET: -300 mL      PHYSICAL EXAM:  General: NAD, A/O x 2  ENT: No gross hearing impairment, Moist mucous membranes, no thrush  Neck: Supple, No JVD  Lungs: Clear to auscultation bilaterally, good air entry, non-labored breathing  Cardio: RRR, S1/S2, No murmur  Abdomen: Soft, Nontender, Nondistended; Bowel sounds present  Extremities: No calf tenderness, No cyanosis, No pitting edema  Neuro: speech aphasic, left hemiparesis, can follow commands    LABS:                        11.9   19.52 )-----------( 203      ( 12 Dec 2023 07:25 )             35.1     12-12    139  |  104  |  8   ----------------------------<  108  3.8   |  25  |  0.55    Ca    8.7      12 Dec 2023 07:25    TPro  5.9  /  Alb  2.2  /  TBili  0.2  /  DBili  x   /  AST  10  /  ALT  10  /  AlkPhos  75  12-12          PT/INR - ( 11 Dec 2023 15:53 )   PT: 13.4 sec;   INR: 1.18 ratio         PTT - ( 11 Dec 2023 15:53 )  PTT:34.1 sec  Lactate, Blood: 1.0 mmol/L (12-11 @ 15:53)                            Urinalysis Basic - ( 12 Dec 2023 07:25 )    Color: x / Appearance: x / SG: x / pH: x  Gluc: 108 mg/dL / Ketone: x  / Bili: x / Urobili: x   Blood: x / Protein: x / Nitrite: x   Leuk Esterase: x / RBC: x / WBC x   Sq Epi: x / Non Sq Epi: x / Bacteria: x        Culture - Urine (collected 11 Dec 2023 20:17)  Source: Clean Catch Clean Catch (Midstream)  Final Report (12 Dec 2023 23:01):    <10,000 CFU/mL Normal Urogenital Amanda    Culture - Blood (collected 11 Dec 2023 15:53)  Source: .Blood Blood-Peripheral  Preliminary Report (12 Dec 2023 23:02):    No growth at 24 hours    Culture - Blood (collected 11 Dec 2023 15:23)  Source: .Blood Blood-Peripheral  Preliminary Report (12 Dec 2023 23:02):    No growth at 24 hours        RADIOLOGY & ADDITIONAL TESTS:  < from: CT Abdomen and Pelvis No Cont (12.11.23 @ 19:23) >    IMPRESSION:  A few small nonobstructive calculi in the left kidney. No evidence for a   ureteral calculus. No hydronephrosis.    New mild age indeterminate compression fractures at the superior endplate   of L1, L3, L4, and L5.    < end of copied text >    Care Discussed with Consultants/Other Providers: yes with ID

## 2023-12-13 NOTE — PHYSICAL THERAPY INITIAL EVALUATION ADULT - DISCHARGE PLANNER MADE AWARE
LM for pt to scheduled MWV with PCP.  Last MWV was 6/17/2020, appt missed 6/21/2021, ok to schedule anytime.    Outreach attempt was made to schedule a Medicare Wellness Visit. This was the first attempt. Contact was not made, left message.  
Outreach attempt was made to schedule a Medicare Wellness Visit. This was the second attempt. Contact was made, MWV appointment scheduled.  
yes

## 2023-12-13 NOTE — PROGRESS NOTE ADULT - SUBJECTIVE AND OBJECTIVE BOX
CC: f/u for fever and leukocytosis    Patient reports: she is more alert and attentive than yesterday. She is noting a sore throat, mild headache, Rt leg pain    REVIEW OF SYSTEMS:  All other review of systems negative (Comprehensive ROS)    Antimicrobials Day #  :day 2  piperacillin/tazobactam IVPB.. 3.375 Gram(s) IV Intermittent every 8 hours    Other Medications Reviewed  MEDICATIONS  (STANDING):  baclofen 5 milliGRAM(s) Oral every 8 hours  dextromethorphan 20 mG/quinidine sulfate 10 mG 1 Capsule(s) Oral daily  divalproex  milliGRAM(s) Oral two times a day  escitalopram 20 milliGRAM(s) Oral daily  gabapentin 600 milliGRAM(s) Oral two times a day  influenza   Vaccine 0.5 milliLiter(s) IntraMuscular once  lacosamide 150 milliGRAM(s) Oral two times a day  levothyroxine 75 MICROGram(s) Oral daily  methylphenidate 10 milliGRAM(s) Oral <User Schedule>  piperacillin/tazobactam IVPB.. 3.375 Gram(s) IV Intermittent every 8 hours    T(F): 98.1 (12-13-23 @ 12:16), Max: 98.8 (12-12-23 @ 17:28)  HR: 84 (12-13-23 @ 12:16)  BP: 116/81 (12-13-23 @ 12:16)  RR: 18 (12-13-23 @ 12:16)  SpO2: 98% (12-13-23 @ 12:16)  Wt(kg): --    PHYSICAL EXAM:  General: alert, no acute distress  Eyes:  anicteric, no conjunctival injection, no discharge  Oropharynx: no lesions or injection , dry mucosa	  Neck: supple, without adenopathy  Lungs: clear to auscultation  Heart: regular rate and rhythm; no murmur, rubs or gallops  Abdomen: soft, nondistended, nontender, without mass or organomegaly  Skin: no lesions  Extremities: no clubbing, cyanosis, or edema  Neurologic: alert, oriented, left sided hemiparesis    LAB RESULTS:                        11.7   18.04 )-----------( 185      ( 13 Dec 2023 10:08 )             35.1     12-13    141  |  104  |  5<L>  ----------------------------<  138<H>  3.5   |  28  |  0.52    Ca    8.7      13 Dec 2023 10:08    TPro  5.9<L>  /  Alb  2.2<L>  /  TBili  0.2  /  DBili  x   /  AST  10  /  ALT  10  /  AlkPhos  75  12-12    LIVER FUNCTIONS - ( 12 Dec 2023 07:25 )  Alb: 2.2 g/dL / Pro: 5.9 g/dL / ALK PHOS: 75 U/L / ALT: 10 U/L / AST: 10 U/L / GGT: x                 MICROBIOLOGY:  RECENT CULTURES:  12-11 @ 20:17 Clean Catch Clean Catch (Midstream)     <10,000 CFU/mL Normal Urogenital Amanda      12-11 @ 15:53 .Blood Blood-Peripheral Blood Culture PCR    Growth in aerobic bottle: Gram positive cocci in pairs  Direct identification is available within approximately 3-5  hours either by Blood Panel Multiplexed PCR or Direct  MALDI-TOF. Details: [email or web address]    Growth in aerobic bottle: Gram positive cocci in pairs    12-11 @ 15:23 .Blood Blood-Peripheral     No growth at 24 hours          RADIOLOGY REVIEWED:    < from: CT Abdomen and Pelvis No Cont (12.11.23 @ 19:23) >  IMPRESSION:  A few small nonobstructive calculi in the left kidney. No evidence for a   ureteral calculus. No hydronephrosis.    New mild age indeterminate compression fractures at the superior endplate   of L1, L3, L4, and L5.    --- End of Report --    < end of copied text >   CC: f/u for fever and leukocytosis    Patient reports: she is more alert and attentive than yesterday. She is noting a sore throat, mild headache, Rt leg pain    REVIEW OF SYSTEMS:  All other review of systems negative (Comprehensive ROS)    Antimicrobials Day #  :day 2  piperacillin/tazobactam IVPB.. 3.375 Gram(s) IV Intermittent every 8 hours    Other Medications Reviewed  MEDICATIONS  (STANDING):  baclofen 5 milliGRAM(s) Oral every 8 hours  dextromethorphan 20 mG/quinidine sulfate 10 mG 1 Capsule(s) Oral daily  divalproex  milliGRAM(s) Oral two times a day  escitalopram 20 milliGRAM(s) Oral daily  gabapentin 600 milliGRAM(s) Oral two times a day  influenza   Vaccine 0.5 milliLiter(s) IntraMuscular once  lacosamide 150 milliGRAM(s) Oral two times a day  levothyroxine 75 MICROGram(s) Oral daily  methylphenidate 10 milliGRAM(s) Oral <User Schedule>  piperacillin/tazobactam IVPB.. 3.375 Gram(s) IV Intermittent every 8 hours    T(F): 98.1 (12-13-23 @ 12:16), Max: 98.8 (12-12-23 @ 17:28)  HR: 84 (12-13-23 @ 12:16)  BP: 116/81 (12-13-23 @ 12:16)  RR: 18 (12-13-23 @ 12:16)  SpO2: 98% (12-13-23 @ 12:16)  Wt(kg): --    PHYSICAL EXAM:  General: alert, no acute distress  Eyes:  anicteric, no conjunctival injection, no discharge  Oropharynx: no lesions or injection , dry mucosa	  Neck: supple, without adenopathy  Lungs: clear to auscultation  Heart: regular rate and rhythm; no murmur, rubs or gallops  Abdomen: soft, nondistended, nontender, without mass or organomegaly  Skin: no lesions  Extremities: no clubbing, cyanosis, or edema  Neurologic: alert, oriented, left sided hemiparesis    LAB RESULTS:                        11.7   18.04 )-----------( 185      ( 13 Dec 2023 10:08 )             35.1     12-13    141  |  104  |  5<L>  ----------------------------<  138<H>  3.5   |  28  |  0.52    Ca    8.7      13 Dec 2023 10:08    TPro  5.9<L>  /  Alb  2.2<L>  /  TBili  0.2  /  DBili  x   /  AST  10  /  ALT  10  /  AlkPhos  75  12-12    LIVER FUNCTIONS - ( 12 Dec 2023 07:25 )  Alb: 2.2 g/dL / Pro: 5.9 g/dL / ALK PHOS: 75 U/L / ALT: 10 U/L / AST: 10 U/L / GGT: x                 MICROBIOLOGY:  RECENT CULTURES:  12-11 @ 20:17 Clean Catch Clean Catch (Midstream)     <10,000 CFU/mL Normal Urogenital Amanda      12-11 @ 15:53 .Blood Blood-Peripheral Blood Culture PCR    Growth in aerobic bottle: Gram positive cocci in pairs  Direct identification is available within approximately 3-5  hours either by Blood Panel Multiplexed PCR or Direct  MALDI-TOF. Details: [email or web address]    Growth in aerobic bottle: Gram positive cocci in pairs    12-11 @ 15:23 .Blood Blood-Peripheral     No growth at 24 hours          RADIOLOGY REVIEWED:    < from: CT Abdomen and Pelvis No Cont (12.11.23 @ 19:23) >  IMPRESSION:  A few small nonobstructive calculi in the left kidney. No evidence for a   ureteral calculus. No hydronephrosis.    New mild age indeterminate compression fractures at the superior endplate   of L1, L3, L4, and L5.    --- End of Report --    < end of copied text >

## 2023-12-13 NOTE — PROGRESS NOTE ADULT - ASSESSMENT
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  She has a left sided hemiparesis and cognitive impairment. She had been evaluated 1 week ago for seizures, had her meds adjusted, had a normal wbc count.  She had episode of N/V on 12/10 with some diarrhea, spike to 103, was febrile yesterday and brought to ER.  She c/o pain all over, apparently this is not new. No use of antibiotics since discharge from [place], no new meds. She has been exposed to viral URI's at home.Her RVP and her narrow RVP were negative. She has not been experiencing N/V or diarrhea in the ER.  She was raised on [place], no history of any serious medical  illness prior to ICH and no prodromal illness.  She was started on zosyn in ER. Her UA is negative, CXR is negative, CT scan of A/P with nonobstructive rt sided stones and head CT without acute changes.  A Viral/bacterial  GI syndrome possible although her GI symptoms have subsided and  CT of A/P without any bowel pathology.  Elevation in wbc is a little higher than typically seen with viral infections, yet this could be stress mediated.  Her admission blood cultures are growing a coag negative staph in 1/4 bottles, this is likely a contaminant.  I can not localize her site of infection. Her fever is moderating and she is much more alert and coherent.  Suggest:  1 Continue  zosyn pending blood cultures, negative at 24 hours  2 No further diarrhea, hence limited GI w/u  3 If all micro remains negative, will have an antibiotic time out in 24-48 hours to consider d/c  4 Additional w/u pending clinical course. Case reviewed with mother today and father at bedside.  5 Strep PCR sent earlier , level of clinical suspicion for this entity is low. 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  She has a left sided hemiparesis and cognitive impairment. She had been evaluated 1 week ago for seizures, had her meds adjusted, had a normal wbc count.  She had episode of N/V on 12/10 with some diarrhea, spike to 103, was febrile yesterday and brought to ER.  She c/o pain all over, apparently this is not new. No use of antibiotics since discharge from [place], no new meds. She has been exposed to viral URI's at home.Her RVP and her narrow RVP were negative. She has not been experiencing N/V or diarrhea in the ER.  She was raised on [place], no history of any serious medical  illness prior to ICH and no prodromal illness.  She was started on zosyn in ER. Her UA is negative, CXR is negative, CT scan of A/P with nonobstructive rt sided stones and head CT without acute changes.  A Viral/bacterial  GI syndrome possible although her GI symptoms have subsided and  CT of A/P without any bowel pathology.  Elevation in wbc is a little higher than typically seen with viral infections, yet this could be stress mediated.  Her admission blood cultures are growing a coag negative staph in 1/4 bottles, this is likely a contaminant.  I can not localize her site of infection. Her fever is moderating and she is much more alert and coherent.  Suggest:  1 Continue  zosyn pending blood cultures, negative at 24 hours  2 No further diarrhea, hence limited GI w/u  3 If all micro remains negative, will have an antibiotic time out in 24-48 hours to consider d/c  4 Additional w/u pending clinical course. Case reviewed with mother today and father at bedside.  5 Strep PCR sent earlier , level of clinical suspicion for this entity is low.

## 2023-12-14 ENCOUNTER — TRANSCRIPTION ENCOUNTER (OUTPATIENT)
Age: 38
End: 2023-12-14

## 2023-12-14 VITALS
SYSTOLIC BLOOD PRESSURE: 115 MMHG | HEART RATE: 70 BPM | OXYGEN SATURATION: 95 % | DIASTOLIC BLOOD PRESSURE: 80 MMHG | RESPIRATION RATE: 18 BRPM | TEMPERATURE: 98 F

## 2023-12-14 LAB
ANION GAP SERPL CALC-SCNC: 7 MMOL/L — SIGNIFICANT CHANGE UP (ref 5–17)
ANION GAP SERPL CALC-SCNC: 7 MMOL/L — SIGNIFICANT CHANGE UP (ref 5–17)
BUN SERPL-MCNC: 4 MG/DL — LOW (ref 7–23)
BUN SERPL-MCNC: 4 MG/DL — LOW (ref 7–23)
CALCIUM SERPL-MCNC: 8.8 MG/DL — SIGNIFICANT CHANGE UP (ref 8.4–10.5)
CALCIUM SERPL-MCNC: 8.8 MG/DL — SIGNIFICANT CHANGE UP (ref 8.4–10.5)
CHLORIDE SERPL-SCNC: 103 MMOL/L — SIGNIFICANT CHANGE UP (ref 96–108)
CHLORIDE SERPL-SCNC: 103 MMOL/L — SIGNIFICANT CHANGE UP (ref 96–108)
CO2 SERPL-SCNC: 32 MMOL/L — HIGH (ref 22–31)
CO2 SERPL-SCNC: 32 MMOL/L — HIGH (ref 22–31)
CREAT SERPL-MCNC: 0.47 MG/DL — LOW (ref 0.5–1.3)
CREAT SERPL-MCNC: 0.47 MG/DL — LOW (ref 0.5–1.3)
CULTURE RESULTS: ABNORMAL
CULTURE RESULTS: ABNORMAL
EGFR: 124 ML/MIN/1.73M2 — SIGNIFICANT CHANGE UP
EGFR: 124 ML/MIN/1.73M2 — SIGNIFICANT CHANGE UP
GLUCOSE SERPL-MCNC: 105 MG/DL — HIGH (ref 70–99)
GLUCOSE SERPL-MCNC: 105 MG/DL — HIGH (ref 70–99)
HCT VFR BLD CALC: 34.2 % — LOW (ref 34.5–45)
HCT VFR BLD CALC: 34.2 % — LOW (ref 34.5–45)
HGB BLD-MCNC: 11.4 G/DL — LOW (ref 11.5–15.5)
HGB BLD-MCNC: 11.4 G/DL — LOW (ref 11.5–15.5)
MCHC RBC-ENTMCNC: 31.2 PG — SIGNIFICANT CHANGE UP (ref 27–34)
MCHC RBC-ENTMCNC: 31.2 PG — SIGNIFICANT CHANGE UP (ref 27–34)
MCHC RBC-ENTMCNC: 33.3 GM/DL — SIGNIFICANT CHANGE UP (ref 32–36)
MCHC RBC-ENTMCNC: 33.3 GM/DL — SIGNIFICANT CHANGE UP (ref 32–36)
MCV RBC AUTO: 93.7 FL — SIGNIFICANT CHANGE UP (ref 80–100)
MCV RBC AUTO: 93.7 FL — SIGNIFICANT CHANGE UP (ref 80–100)
NRBC # BLD: 0 /100 WBCS — SIGNIFICANT CHANGE UP (ref 0–0)
NRBC # BLD: 0 /100 WBCS — SIGNIFICANT CHANGE UP (ref 0–0)
ORGANISM # SPEC MICROSCOPIC CNT: ABNORMAL
ORGANISM # SPEC MICROSCOPIC CNT: ABNORMAL
ORGANISM # SPEC MICROSCOPIC CNT: SIGNIFICANT CHANGE UP
ORGANISM # SPEC MICROSCOPIC CNT: SIGNIFICANT CHANGE UP
PLATELET # BLD AUTO: 208 K/UL — SIGNIFICANT CHANGE UP (ref 150–400)
PLATELET # BLD AUTO: 208 K/UL — SIGNIFICANT CHANGE UP (ref 150–400)
POTASSIUM SERPL-MCNC: 3.4 MMOL/L — LOW (ref 3.5–5.3)
POTASSIUM SERPL-MCNC: 3.4 MMOL/L — LOW (ref 3.5–5.3)
POTASSIUM SERPL-SCNC: 3.4 MMOL/L — LOW (ref 3.5–5.3)
POTASSIUM SERPL-SCNC: 3.4 MMOL/L — LOW (ref 3.5–5.3)
RBC # BLD: 3.65 M/UL — LOW (ref 3.8–5.2)
RBC # BLD: 3.65 M/UL — LOW (ref 3.8–5.2)
RBC # FLD: 13.5 % — SIGNIFICANT CHANGE UP (ref 10.3–14.5)
RBC # FLD: 13.5 % — SIGNIFICANT CHANGE UP (ref 10.3–14.5)
SODIUM SERPL-SCNC: 142 MMOL/L — SIGNIFICANT CHANGE UP (ref 135–145)
SODIUM SERPL-SCNC: 142 MMOL/L — SIGNIFICANT CHANGE UP (ref 135–145)
SPECIMEN SOURCE: SIGNIFICANT CHANGE UP
SPECIMEN SOURCE: SIGNIFICANT CHANGE UP
WBC # BLD: 10.11 K/UL — SIGNIFICANT CHANGE UP (ref 3.8–10.5)
WBC # BLD: 10.11 K/UL — SIGNIFICANT CHANGE UP (ref 3.8–10.5)
WBC # FLD AUTO: 10.11 K/UL — SIGNIFICANT CHANGE UP (ref 3.8–10.5)
WBC # FLD AUTO: 10.11 K/UL — SIGNIFICANT CHANGE UP (ref 3.8–10.5)

## 2023-12-14 PROCEDURE — 87077 CULTURE AEROBIC IDENTIFY: CPT

## 2023-12-14 PROCEDURE — 85025 COMPLETE CBC W/AUTO DIFF WBC: CPT

## 2023-12-14 PROCEDURE — 80048 BASIC METABOLIC PNL TOTAL CA: CPT

## 2023-12-14 PROCEDURE — 99233 SBSQ HOSP IP/OBS HIGH 50: CPT

## 2023-12-14 PROCEDURE — 83605 ASSAY OF LACTIC ACID: CPT

## 2023-12-14 PROCEDURE — 87040 BLOOD CULTURE FOR BACTERIA: CPT

## 2023-12-14 PROCEDURE — 74176 CT ABD & PELVIS W/O CONTRAST: CPT

## 2023-12-14 PROCEDURE — 99285 EMERGENCY DEPT VISIT HI MDM: CPT | Mod: 25

## 2023-12-14 PROCEDURE — 97167 OT EVAL HIGH COMPLEX 60 MIN: CPT

## 2023-12-14 PROCEDURE — 93005 ELECTROCARDIOGRAM TRACING: CPT

## 2023-12-14 PROCEDURE — 85730 THROMBOPLASTIN TIME PARTIAL: CPT

## 2023-12-14 PROCEDURE — 87798 DETECT AGENT NOS DNA AMP: CPT

## 2023-12-14 PROCEDURE — 87651 STREP A DNA AMP PROBE: CPT

## 2023-12-14 PROCEDURE — 71045 X-RAY EXAM CHEST 1 VIEW: CPT

## 2023-12-14 PROCEDURE — 0225U NFCT DS DNA&RNA 21 SARSCOV2: CPT

## 2023-12-14 PROCEDURE — 85027 COMPLETE CBC AUTOMATED: CPT

## 2023-12-14 PROCEDURE — 87150 DNA/RNA AMPLIFIED PROBE: CPT

## 2023-12-14 PROCEDURE — 96374 THER/PROPH/DIAG INJ IV PUSH: CPT

## 2023-12-14 PROCEDURE — 36415 COLL VENOUS BLD VENIPUNCTURE: CPT

## 2023-12-14 PROCEDURE — 97162 PT EVAL MOD COMPLEX 30 MIN: CPT

## 2023-12-14 PROCEDURE — 85610 PROTHROMBIN TIME: CPT

## 2023-12-14 PROCEDURE — 80053 COMPREHEN METABOLIC PANEL: CPT

## 2023-12-14 PROCEDURE — 87086 URINE CULTURE/COLONY COUNT: CPT

## 2023-12-14 RX ORDER — ACETAMINOPHEN 500 MG
2 TABLET ORAL
Qty: 0 | Refills: 0 | DISCHARGE
Start: 2023-12-14

## 2023-12-14 RX ORDER — BENZOCAINE AND MENTHOL 5; 1 G/100ML; G/100ML
1 LIQUID ORAL THREE TIMES A DAY
Refills: 0 | Status: DISCONTINUED | OUTPATIENT
Start: 2023-12-14 | End: 2023-12-14

## 2023-12-14 RX ORDER — POTASSIUM CHLORIDE 20 MEQ
40 PACKET (EA) ORAL ONCE
Refills: 0 | Status: DISCONTINUED | OUTPATIENT
Start: 2023-12-14 | End: 2023-12-14

## 2023-12-14 RX ADMIN — DIVALPROEX SODIUM 750 MILLIGRAM(S): 500 TABLET, DELAYED RELEASE ORAL at 09:18

## 2023-12-14 RX ADMIN — PIPERACILLIN AND TAZOBACTAM 25 GRAM(S): 4; .5 INJECTION, POWDER, LYOPHILIZED, FOR SOLUTION INTRAVENOUS at 06:08

## 2023-12-14 RX ADMIN — LACOSAMIDE 150 MILLIGRAM(S): 50 TABLET ORAL at 17:11

## 2023-12-14 RX ADMIN — Medication 650 MILLIGRAM(S): at 01:12

## 2023-12-14 RX ADMIN — PIPERACILLIN AND TAZOBACTAM 25 GRAM(S): 4; .5 INJECTION, POWDER, LYOPHILIZED, FOR SOLUTION INTRAVENOUS at 13:48

## 2023-12-14 RX ADMIN — Medication 5 MILLIGRAM(S): at 06:04

## 2023-12-14 RX ADMIN — ESCITALOPRAM OXALATE 20 MILLIGRAM(S): 10 TABLET, FILM COATED ORAL at 09:20

## 2023-12-14 RX ADMIN — Medication 650 MILLIGRAM(S): at 10:45

## 2023-12-14 RX ADMIN — Medication 650 MILLIGRAM(S): at 09:16

## 2023-12-14 RX ADMIN — GABAPENTIN 600 MILLIGRAM(S): 400 CAPSULE ORAL at 13:47

## 2023-12-14 RX ADMIN — Medication 75 MICROGRAM(S): at 06:04

## 2023-12-14 RX ADMIN — LACOSAMIDE 150 MILLIGRAM(S): 50 TABLET ORAL at 06:05

## 2023-12-14 NOTE — DISCHARGE NOTE NURSING/CASE MANAGEMENT/SOCIAL WORK - NSDCFUADDAPPT_GEN_ALL_CORE_FT
We made you a hospital followup appointment with Dr. Mcmillan on 12/21/23 at 1:00pm, office #[phone]. We made you a hospital followup appointment with Dr. Mcmillan on 12/21/23 at 1:00pm, office #[phone].

## 2023-12-14 NOTE — DISCHARGE NOTE NURSING/CASE MANAGEMENT/SOCIAL WORK - PATIENT PORTAL LINK FT
You can access the FollowMyHealth Patient Portal offered by [facility] by registering at the following website: [email or web address]. By joining [employer]’s FollowMyHealth portal, you will also be able to view your health information using other applications (apps) compatible with our system. You can access the FollowMyHealth Patient Portal offered by [facility] by registering at the following website: [email or web address]. By joining [employer]’s FollowMyHealth portal, you will also be able to view your health information using other applications (apps) compatible with our system.

## 2023-12-14 NOTE — DISCHARGE NOTE PROVIDER - NSDCMRMEDTOKEN_GEN_ALL_CORE_FT
acetaminophen 325 mg oral tablet: 2 tab(s) orally every 6 hours As needed Temp greater or equal to 38C (100.4F), Mild Pain (1 - 3)  amoxicillin-clavulanate 875 mg-125 mg oral tablet: 875 milligram(s) orally 2 times a day  baclofen 5 mg oral tablet: 1 tab(s) orally 3 times a day  Depakote 250 mg oral delayed release tablet: 3 tab(s) orally 2 times a day  gabapentin 300 mg oral tablet: 2 tab(s) orally 2 times a day  levothyroxine 75 mcg (0.075 mg) oral tablet: 1 tab(s) orally once a day  Lexapro 20 mg oral tablet: 1 tab(s) orally once a day  Nuedexta 20 mg-10 mg oral capsule: 1 cap(s) orally once a day  OT evaluate and treat: ICD I69.952  PT evaluate and treat: ICD I69.952  Ritalin 10 mg oral tablet: 1 tab(s) orally 2 times a day  Speech Language Pathology evaluate and treat: ICD I63.9  Vimpat 150 mg oral tablet: 1 tab(s) orally 2 times a day

## 2023-12-14 NOTE — DISCHARGE NOTE PROVIDER - PROVIDER TOKENS
PROVIDER:[TOKEN:[[id number]]],PROVIDER:[TOKEN:[[id number]]] PROVIDER:[TOKEN:[[id number]]],PROVIDER:[TOKEN:[[id number]]]

## 2023-12-14 NOTE — PROGRESS NOTE ADULT - NS ATTEND AMEND GEN_ALL_CORE FT
Sepsis (POA - fever, tachycardia, leukocytosis) w/ unknown etiology  Hypokalemia  Hx of IPH      - Infectious work up thus far negative; RVP/COVID neg, UA neg, CXR neg  - CT abdomen/pelvis negative for acute findings   - Strep throat swab negative   - Admission blood cultures are growing a coag negative staph in 1/4 bottles, this is likely a contaminant.  - WBC improved today, though patient is afebrile since 12/12  - Continue empiric treatment w/ Zosyn per ID - day 3  - ID following, will discuss abx duration today     Possible discharge home today pending ID recommendations  DVT PPX: ICD
Leukocytosis still prominent on CBC this AM. BMP stable. BCx positive, spoke with ID - will Continue IV abx. Will f/u AM CBC and BMP ordered

## 2023-12-14 NOTE — PROGRESS NOTE ADULT - ASSESSMENT
38F hx of R frontal IPH with IVH and hydrocephalus s/p emergent R hemicraniectomy w/ EVD placement (6/2023), s/p cranioplastysep/2023, left sided hemiparesis, baseline intermittent confusion since June events, seizure earlier Dec 2023 and vertebral fractures. Pt came to ED with fevers, tmax 103, and elevated wbc.     #Sepsis (POA - fever, tachycardia, leukocytosis) w/ unknown etiology  - Infectious work up thus far negative; RVP/COVID neg, UA neg, CXR neg  - CT abdomen/pelvis negative for acute findings   - Strep throat swab negative   - Admission blood cultures are growing a coag negative staph in 1/4 bottles, this is likely a contaminant.  - WBC still elevated, though patient is afebrile   - Continue empiric treatment w/ Zosyn per ID - day 3  - ID following, will discuss abx duration today     #hx of large R frontal IPH with IVH and hydrocephalus  #Right hemicraniectomy and EVD placement  #Cranioplasty (Sept 2023)  #Seizure disorder  - Cont home medication regimen - depakote, vimpat, neudexta, gabapentin, baclofen, ritalin, lexapro   - Fall precautions    #Hypothyroid  - Synthroid     #DVT ppx   - No chemical rx due to recent brain bleed    GOC: Full code    Dispo:        Karma (spouse) [phone]    38F hx of R frontal IPH with IVH and hydrocephalus s/p emergent R hemicraniectomy w/ EVD placement (6/2023), s/p cranioplastysep/2023, left sided hemiparesis, baseline intermittent confusion since June events, seizure earlier Dec 2023 and vertebral fractures. Pt came to ED with fevers, tmax 103, and elevated wbc.     #Sepsis (POA - fever, tachycardia, leukocytosis) w/ unknown etiology  - Infectious work up thus far negative; RVP/COVID neg, UA neg, CXR neg  - CT abdomen/pelvis negative for acute findings   - Strep throat swab negative   - Admission blood cultures are growing a coag negative staph in 1/4 bottles, this is likely a contaminant.  - WBC still elevated, though patient is afebrile   - Continue empiric treatment w/ Zosyn per ID - day 3  - ID following, will discuss abx duration today     #hx of large R frontal IPH with IVH and hydrocephalus  #Right hemicraniectomy and EVD placement  #Cranioplasty (Sept 2023)  #Seizure disorder  - Cont home medication regimen - depakote, vimpat, neudexta, gabapentin, baclofen, ritalin, lexapro   - Fall precautions    #Hypothyroid  - Synthroid     #DVT ppx   - No chemical rx due to recent brain bleed    GOC: Full code    Dispo:        Karma (spouse) [phone]    38F hx of R frontal IPH with IVH and hydrocephalus s/p emergent R hemicraniectomy w/ EVD placement (6/2023), s/p cranioplastysep/2023, left sided hemiparesis, baseline intermittent confusion since June events, seizure earlier Dec 2023 and vertebral fractures. Pt came to ED with fevers, tmax 103, and elevated wbc.     #Sepsis (POA - fever, tachycardia, leukocytosis) w/ unknown etiology  - Infectious work up thus far negative; RVP/COVID neg, UA neg, CXR neg  - CT abdomen/pelvis negative for acute findings   - Strep throat swab negative   - Admission blood cultures are growing a coag negative staph in 1/4 bottles, this is likely a contaminant.  - WBC improved today, though patient is afebrile since 12/12  - Continue empiric treatment w/ Zosyn per ID - day 3  - ID following, will discuss abx duration today     #hx of large R frontal IPH with IVH and hydrocephalus  #Right hemicraniectomy and EVD placement  #Cranioplasty (Sept 2023)  #Seizure disorder  - Cont home medication regimen - depakote, vimpat, neudexta, gabapentin, baclofen, ritalin, lexapro   - Fall precautions    #Hypothyroid  - Synthroid     #DVT ppx   - No chemical rx due to recent brain bleed    GOC: Full code    Dispo: Pending ID recs        12/14: Updated patient's parents at bedside      Karma (spouse) [phone]    38F hx of R frontal IPH with IVH and hydrocephalus s/p emergent R hemicraniectomy w/ EVD placement (6/2023), s/p cranioplastysep/2023, left sided hemiparesis, baseline intermittent confusion since June events, seizure earlier Dec 2023 and vertebral fractures. Pt came to ED with fevers, tmax 103, and elevated wbc.     #Sepsis (POA - fever, tachycardia, leukocytosis) w/ unknown etiology  - Infectious work up thus far negative; RVP/COVID neg, UA neg, CXR neg  - CT abdomen/pelvis negative for acute findings   - Strep throat swab negative   - Admission blood cultures are growing a coag negative staph in 1/4 bottles, this is likely a contaminant.  - WBC improved today, though patient is afebrile since 12/12  - Continue empiric treatment w/ Zosyn per ID - day 3  - ID following, will discuss abx duration today     #hx of large R frontal IPH with IVH and hydrocephalus  #Right hemicraniectomy and EVD placement  #Cranioplasty (Sept 2023)  #Seizure disorder  - Cont home medication regimen - depakote, vimpat, neudexta, gabapentin, baclofen, ritalin, lexapro   - Fall precautions    #Hypothyroid  - Synthroid     #DVT ppx   - No chemical rx due to recent brain bleed    GOC: Full code    Dispo: Pending ID recs        12/14: Updated patient's parents at bedside      Karma (spouse) [phone]

## 2023-12-14 NOTE — PROGRESS NOTE ADULT - SUBJECTIVE AND OBJECTIVE BOX
CC: f/u for  fever, leukocytosis  Patient reports  she has her usual back and neck pain  REVIEW OF SYSTEMS:  All other review of systems negative (Comprehensive ROS)    Antimicrobials Day #  :4/7  piperacillin/tazobactam IVPB.. 3.375 Gram(s) IV Intermittent every 8 hours    Other Medications Reviewed    T(F): 98.1 (12-14-23 @ 12:40), Max: 98.1 (12-14-23 @ 12:40)  HR: 78 (12-14-23 @ 12:40)  BP: 121/87 (12-14-23 @ 12:40)  RR: 18 (12-14-23 @ 12:40)  SpO2: 96% (12-14-23 @ 12:40)  Wt(kg): --    PHYSICAL EXAM:  General: alert, no acute distress  Eyes:  anicteric, no conjunctival injection, no discharge  Oropharynx: no lesions or injection 	  Neck: supple, without adenopathy  Lungs: clear to auscultation  Heart: regular rate and rhythm; no murmur, rubs or gallops  Abdomen: soft, nondistended, nontender, without mass or organomegaly  Skin: no lesions  Extremities: no clubbing, cyanosis, or edema  Neurologic: alert, , moves right  extremities    LAB RESULTS:                        11.4   10.11 )-----------( 208      ( 14 Dec 2023 07:35 )             34.2     12-14    142  |  103  |  4<L>  ----------------------------<  105<H>  3.4<L>   |  32<H>  |  0.47<L>    Ca    8.8      14 Dec 2023 07:35        Urinalysis Basic - ( 14 Dec 2023 07:35 )    Color: x / Appearance: x / SG: x / pH: x  Gluc: 105 mg/dL / Ketone: x  / Bili: x / Urobili: x   Blood: x / Protein: x / Nitrite: x   Leuk Esterase: x / RBC: x / WBC x   Sq Epi: x / Non Sq Epi: x / Bacteria: x      MICROBIOLOGY:  RECENT CULTURES:  12-11 @ 20:17 Clean Catch Clean Catch (Midstream)     <10,000 CFU/mL Normal Urogenital Amanda      12-11 @ 15:53 .Blood Blood-Peripheral Blood Culture PCR    Growth in aerobic bottle: Gram positive cocci in pairs  Direct identification is available within approximately 3-5  hours either by Blood Panel Multiplexed PCR or Direct  MALDI-TOF. Details: [email or web address]    Growth in aerobic bottle: Gram positive cocci in pairs    12-11 @ 15:23 .Blood Blood-Peripheral     No growth at 48 Hours          RADIOLOGY REVIEWED:    < from: CT Abdomen and Pelvis No Cont (12.11.23 @ 19:23) >    ACC: [id number] EXAM:  CT ABDOMEN AND PELVIS   ORDERED BY: LAVONNE LOPEZ     PROCEDURE DATE:  12/11/2023          INTERPRETATION:  CLINICAL INFORMATION: Nausea and vomiting. Back pain.   Rule out infection versus stone.    COMPARISON: 6/15/2023    CONTRAST/COMPLICATIONS:  IV Contrast: NONE  Oral Contrast: NONE  Complications: None reported at time of study completion    PROCEDURE:  CT of the Abdomen and Pelvis was performed.  Sagittal and coronal reformats were performed.    FINDINGS:  LOWER CHEST: Small bilateral atelectasis.    LIVER: Within normal limits.  BILE DUCTS: Normal caliber.  GALLBLADDER: Within normal limits.  SPLEEN: Within normal limits.  PANCREAS: Within normal limits.  ADRENALS: Within normal limits.  KIDNEYS/URETERS: The right kidney appears unremarkable. A few small   nonobstructive calculi in the left kidney. No evidence for a ureteral   calculus. No hydronephrosis.    BLADDER: Within normal limits.  REPRODUCTIVE ORGANS: The uterus and adnexa appear grossly unremarkable.    BOWEL: No bowel obstruction. Appendix within normal limits.  PERITONEUM: No free air or ascites.  VESSELS: Within normal limits.  RETROPERITONEUM/LYMPH NODES: No lymphadenopathy.  ABDOMINAL WALL: Within normal limits.  BONES: New mild age indeterminate compression fractures at the superior   endplate of L1, L3, L4, and L5.    IMPRESSION:  A few small nonobstructive calculi in the left kidney. No evidence for a   ureteral calculus. No hydronephrosis.    New mild age indeterminate compression fractures at the superior endplate   of L1, L3, L4, and L5.    --- End of Report ---        < end of copied text >            Assessment:  Patient s/p large iph back in June s/p emergency craniectomy, eventually underwent cranioplasty in september, did rehab, now home. She developed some vomiting , diarrhea about a week ago then about 4 days ago became febrile, lethargic. She is doing much better on zosyn. she has a normal ct a/p except for some nonobstructing stones in kidney and cxr clear, cx all neg, exam unrevealing. I suspect she had aspiration pneumonia related to the recent vomiting and is doing much better now.   Plan:  can change to po augmentin 875 mg po bid for 3 more days  no ID objection to discharge home  F/u in office in 3 weeks CC: f/u for  fever, leukocytosis  Patient reports  she has her usual back and neck pain  REVIEW OF SYSTEMS:  All other review of systems negative (Comprehensive ROS)    Antimicrobials Day #  :4/7  piperacillin/tazobactam IVPB.. 3.375 Gram(s) IV Intermittent every 8 hours    Other Medications Reviewed    T(F): 98.1 (12-14-23 @ 12:40), Max: 98.1 (12-14-23 @ 12:40)  HR: 78 (12-14-23 @ 12:40)  BP: 121/87 (12-14-23 @ 12:40)  RR: 18 (12-14-23 @ 12:40)  SpO2: 96% (12-14-23 @ 12:40)  Wt(kg): --    PHYSICAL EXAM:  General: alert, no acute distress  Eyes:  anicteric, no conjunctival injection, no discharge  Oropharynx: no lesions or injection 	  Neck: supple, without adenopathy  Lungs: clear to auscultation  Heart: regular rate and rhythm; no murmur, rubs or gallops  Abdomen: soft, nondistended, nontender, without mass or organomegaly  Skin: no lesions  Extremities: no clubbing, cyanosis, or edema  Neurologic: alert, , moves right  extremities    LAB RESULTS:                        11.4   10.11 )-----------( 208      ( 14 Dec 2023 07:35 )             34.2     12-14    142  |  103  |  4<L>  ----------------------------<  105<H>  3.4<L>   |  32<H>  |  0.47<L>    Ca    8.8      14 Dec 2023 07:35        Urinalysis Basic - ( 14 Dec 2023 07:35 )    Color: x / Appearance: x / SG: x / pH: x  Gluc: 105 mg/dL / Ketone: x  / Bili: x / Urobili: x   Blood: x / Protein: x / Nitrite: x   Leuk Esterase: x / RBC: x / WBC x   Sq Epi: x / Non Sq Epi: x / Bacteria: x      MICROBIOLOGY:  RECENT CULTURES:  12-11 @ 20:17 Clean Catch Clean Catch (Midstream)     <10,000 CFU/mL Normal Urogenital Amanda      12-11 @ 15:53 .Blood Blood-Peripheral Blood Culture PCR    Growth in aerobic bottle: Gram positive cocci in pairs  Direct identification is available within approximately 3-5  hours either by Blood Panel Multiplexed PCR or Direct  MALDI-TOF. Details: [email or web address]    Growth in aerobic bottle: Gram positive cocci in pairs    12-11 @ 15:23 .Blood Blood-Peripheral     No growth at 48 Hours          RADIOLOGY REVIEWED:    < from: CT Abdomen and Pelvis No Cont (12.11.23 @ 19:23) >    ACC: [id number] EXAM:  CT ABDOMEN AND PELVIS   ORDERED BY: LAVONNE LOPEZ     PROCEDURE DATE:  12/11/2023          INTERPRETATION:  CLINICAL INFORMATION: Nausea and vomiting. Back pain.   Rule out infection versus stone.    COMPARISON: 6/15/2023    CONTRAST/COMPLICATIONS:  IV Contrast: NONE  Oral Contrast: NONE  Complications: None reported at time of study completion    PROCEDURE:  CT of the Abdomen and Pelvis was performed.  Sagittal and coronal reformats were performed.    FINDINGS:  LOWER CHEST: Small bilateral atelectasis.    LIVER: Within normal limits.  BILE DUCTS: Normal caliber.  GALLBLADDER: Within normal limits.  SPLEEN: Within normal limits.  PANCREAS: Within normal limits.  ADRENALS: Within normal limits.  KIDNEYS/URETERS: The right kidney appears unremarkable. A few small   nonobstructive calculi in the left kidney. No evidence for a ureteral   calculus. No hydronephrosis.    BLADDER: Within normal limits.  REPRODUCTIVE ORGANS: The uterus and adnexa appear grossly unremarkable.    BOWEL: No bowel obstruction. Appendix within normal limits.  PERITONEUM: No free air or ascites.  VESSELS: Within normal limits.  RETROPERITONEUM/LYMPH NODES: No lymphadenopathy.  ABDOMINAL WALL: Within normal limits.  BONES: New mild age indeterminate compression fractures at the superior   endplate of L1, L3, L4, and L5.    IMPRESSION:  A few small nonobstructive calculi in the left kidney. No evidence for a   ureteral calculus. No hydronephrosis.    New mild age indeterminate compression fractures at the superior endplate   of L1, L3, L4, and L5.    --- End of Report ---        < end of copied text >            Assessment:  Patient s/p large iph back in June s/p emergency craniectomy, eventually underwent cranioplasty in september, did rehab, now home. She developed some vomiting , diarrhea about a week ago then about 4 days ago became febrile, lethargic. She is doing much better on zosyn. she has a normal ct a/p except for some nonobstructing stones in kidney and cxr clear, cx all neg, exam unrevealing. I suspect she had aspiration pneumonia related to the recent vomiting and is doing much better now.   Plan:  can change to po augmentin 875 mg po bid for 3 more days  no ID objection to discharge home  F/u in office in 3 weeks

## 2023-12-14 NOTE — DISCHARGE NOTE NURSING/CASE MANAGEMENT/SOCIAL WORK - NSDCPEFALRISK_GEN_ALL_CORE
For information on Fall & Injury Prevention, visit: [email or web address] OR  [email or web address] OR  https://www.cdc.gov/steadi/patient.html For information on Fall & Injury Prevention, visit: [email or web address] OR  [email or web address] OR  https://www.cdc.gov/steadi/patient.html

## 2023-12-14 NOTE — PROGRESS NOTE ADULT - SUBJECTIVE AND OBJECTIVE BOX
Patient is a 38y old  Female who presents with a chief complaint of fever (14 Dec 2023 08:20)    Patient seen and examined at bedside. No overnight events reported.     ALLERGIES:  No Known Allergies    MEDICATIONS  (STANDING):  baclofen 5 milliGRAM(s) Oral every 8 hours  dextromethorphan 20 mG/quinidine sulfate 10 mG 1 Capsule(s) Oral daily  divalproex  milliGRAM(s) Oral two times a day  escitalopram 20 milliGRAM(s) Oral daily  gabapentin 600 milliGRAM(s) Oral two times a day  influenza   Vaccine 0.5 milliLiter(s) IntraMuscular once  lacosamide 150 milliGRAM(s) Oral two times a day  levothyroxine 75 MICROGram(s) Oral daily  methylphenidate 10 milliGRAM(s) Oral <User Schedule>  piperacillin/tazobactam IVPB.. 3.375 Gram(s) IV Intermittent every 8 hours    MEDICATIONS  (PRN):  acetaminophen     Tablet .. 650 milliGRAM(s) Oral every 6 hours PRN Temp greater or equal to 38C (100.4F), Mild Pain (1 - 3)  aluminum hydroxide/magnesium hydroxide/simethicone Suspension 30 milliLiter(s) Oral every 4 hours PRN Dyspepsia  benzocaine/menthol Lozenge 1 Lozenge Oral three times a day PRN Sore Throat  melatonin 3 milliGRAM(s) Oral at bedtime PRN Insomnia  ondansetron Injectable 4 milliGRAM(s) IV Push every 8 hours PRN Nausea and/or Vomiting    Vital Signs Last 24 Hrs  T(F): 97.9 (14 Dec 2023 05:00), Max: 98.1 (13 Dec 2023 12:16)  HR: 73 (14 Dec 2023 05:00) (73 - 84)  BP: 118/74 (14 Dec 2023 05:00) (116/81 - 139/85)  RR: 18 (14 Dec 2023 05:00) (18 - 18)  SpO2: 97% (14 Dec 2023 05:00) (96% - 98%)  I&O's Summary    13 Dec 2023 07:01  -  14 Dec 2023 07:00  --------------------------------------------------------  IN: 240 mL / OUT: 0 mL / NET: 240 mL    14 Dec 2023 07:01  -  14 Dec 2023 12:08  --------------------------------------------------------  IN: 240 mL / OUT: 0 mL / NET: 240 mL      PHYSICAL EXAM:  General: NAD, A/O x 3  ENT: No gross hearing impairment, Moist mucous membranes, no thrush  Neck: Supple, No JVD  Lungs: Clear to auscultation bilaterally, good air entry, non-labored breathing  Cardio: RRR, S1/S2, No murmur  Abdomen: Soft, Nontender, Nondistended; Bowel sounds present  Extremities: No calf tenderness, No cyanosis, No pitting edema  Psych: Appropriate mood and affect    LABS:                        11.4   10.11 )-----------( 208      ( 14 Dec 2023 07:35 )             34.2     12-14    142  |  103  |  4   ----------------------------<  105  3.4   |  32  |  0.47    Ca    8.8      14 Dec 2023 07:35    TPro  5.9  /  Alb  2.2  /  TBili  0.2  /  DBili  x   /  AST  10  /  ALT  10  /  AlkPhos  75  12-12          PT/INR - ( 11 Dec 2023 15:53 )   PT: 13.4 sec;   INR: 1.18 ratio         PTT - ( 11 Dec 2023 15:53 )  PTT:34.1 sec  Lactate, Blood: 1.0 mmol/L (12-11 @ 15:53)                            Urinalysis Basic - ( 14 Dec 2023 07:35 )    Color: x / Appearance: x / SG: x / pH: x  Gluc: 105 mg/dL / Ketone: x  / Bili: x / Urobili: x   Blood: x / Protein: x / Nitrite: x   Leuk Esterase: x / RBC: x / WBC x   Sq Epi: x / Non Sq Epi: x / Bacteria: x        Culture - Urine (collected 11 Dec 2023 20:17)  Source: Clean Catch Clean Catch (Midstream)  Final Report (12 Dec 2023 23:01):    <10,000 CFU/mL Normal Urogenital Amanda    Culture - Blood (collected 11 Dec 2023 15:53)  Source: .Blood Blood-Peripheral  Gram Stain (13 Dec 2023 10:45):    Growth in aerobic bottle: Gram positive cocci in pairs  Preliminary Report (13 Dec 2023 10:45):    Growth in aerobic bottle: Gram positive cocci in pairs    Direct identification is available within approximately 3-5    hours either by Blood Panel Multiplexed PCR or Direct    MALDI-TOF. Details: [email or web address]  Organism: Blood Culture PCR (13 Dec 2023 11:40)  Organism: Blood Culture PCR (13 Dec 2023 11:40)      Method Type: PCR      -  Coagulase negative Staphylococcus: Detec    Culture - Blood (collected 11 Dec 2023 15:23)  Source: .Blood Blood-Peripheral  Preliminary Report (13 Dec 2023 23:01):    No growth at 48 Hours        RADIOLOGY & ADDITIONAL TESTS:    Care Discussed with Consultants/Other Providers:    Patient is a 38y old  Female who presents with a chief complaint of fever (14 Dec 2023 08:20)    Patient seen and examined at bedside. No overnight events reported.     ALLERGIES:  No Known Allergies    MEDICATIONS  (STANDING):  baclofen 5 milliGRAM(s) Oral every 8 hours  dextromethorphan 20 mG/quinidine sulfate 10 mG 1 Capsule(s) Oral daily  divalproex  milliGRAM(s) Oral two times a day  escitalopram 20 milliGRAM(s) Oral daily  gabapentin 600 milliGRAM(s) Oral two times a day  influenza   Vaccine 0.5 milliLiter(s) IntraMuscular once  lacosamide 150 milliGRAM(s) Oral two times a day  levothyroxine 75 MICROGram(s) Oral daily  methylphenidate 10 milliGRAM(s) Oral <User Schedule>  piperacillin/tazobactam IVPB.. 3.375 Gram(s) IV Intermittent every 8 hours    MEDICATIONS  (PRN):  acetaminophen     Tablet .. 650 milliGRAM(s) Oral every 6 hours PRN Temp greater or equal to 38C (100.4F), Mild Pain (1 - 3)  aluminum hydroxide/magnesium hydroxide/simethicone Suspension 30 milliLiter(s) Oral every 4 hours PRN Dyspepsia  benzocaine/menthol Lozenge 1 Lozenge Oral three times a day PRN Sore Throat  melatonin 3 milliGRAM(s) Oral at bedtime PRN Insomnia  ondansetron Injectable 4 milliGRAM(s) IV Push every 8 hours PRN Nausea and/or Vomiting    Vital Signs Last 24 Hrs  T(F): 97.9 (14 Dec 2023 05:00), Max: 98.1 (13 Dec 2023 12:16)  HR: 73 (14 Dec 2023 05:00) (73 - 84)  BP: 118/74 (14 Dec 2023 05:00) (116/81 - 139/85)  RR: 18 (14 Dec 2023 05:00) (18 - 18)  SpO2: 97% (14 Dec 2023 05:00) (96% - 98%)  I&O's Summary    13 Dec 2023 07:01  -  14 Dec 2023 07:00  --------------------------------------------------------  IN: 240 mL / OUT: 0 mL / NET: 240 mL    14 Dec 2023 07:01  -  14 Dec 2023 12:08  --------------------------------------------------------  IN: 240 mL / OUT: 0 mL / NET: 240 mL      PHYSICAL EXAM:  General: NAD, A/O x 3  ENT: No gross hearing impairment, Moist mucous membranes, no thrush  Neck: Supple, No JVD  Lungs: Clear to auscultation bilaterally, good air entry, non-labored breathing  Cardio: RRR, S1/S2, No murmur  Abdomen: Soft, Nontender, Nondistended; Bowel sounds present  Extremities: No calf tenderness, No cyanosis, No pitting edema  Psych: Appropriate mood and affect    LABS:                        11.4   10.11 )-----------( 208      ( 14 Dec 2023 07:35 )             34.2     12-14    142  |  103  |  4   ----------------------------<  105  3.4   |  32  |  0.47    Ca    8.8      14 Dec 2023 07:35    TPro  5.9  /  Alb  2.2  /  TBili  0.2  /  DBili  x   /  AST  10  /  ALT  10  /  AlkPhos  75  12-12          PT/INR - ( 11 Dec 2023 15:53 )   PT: 13.4 sec;   INR: 1.18 ratio         PTT - ( 11 Dec 2023 15:53 )  PTT:34.1 sec  Lactate, Blood: 1.0 mmol/L (12-11 @ 15:53)                            Urinalysis Basic - ( 14 Dec 2023 07:35 )    Color: x / Appearance: x / SG: x / pH: x  Gluc: 105 mg/dL / Ketone: x  / Bili: x / Urobili: x   Blood: x / Protein: x / Nitrite: x   Leuk Esterase: x / RBC: x / WBC x   Sq Epi: x / Non Sq Epi: x / Bacteria: x        Culture - Urine (collected 11 Dec 2023 20:17)  Source: Clean Catch Clean Catch (Midstream)  Final Report (12 Dec 2023 23:01):    <10,000 CFU/mL Normal Urogenital Amanda    Culture - Blood (collected 11 Dec 2023 15:53)  Source: .Blood Blood-Peripheral  Gram Stain (13 Dec 2023 10:45):    Growth in aerobic bottle: Gram positive cocci in pairs  Preliminary Report (13 Dec 2023 10:45):    Growth in aerobic bottle: Gram positive cocci in pairs    Direct identification is available within approximately 3-5    hours either by Blood Panel Multiplexed PCR or Direct    MALDI-TOF. Details: [email or web address]  Organism: Blood Culture PCR (13 Dec 2023 11:40)  Organism: Blood Culture PCR (13 Dec 2023 11:40)      Method Type: PCR      -  Coagulase negative Staphylococcus: Detec    Culture - Blood (collected 11 Dec 2023 15:23)  Source: .Blood Blood-Peripheral  Preliminary Report (13 Dec 2023 23:01):    No growth at 48 Hours        RADIOLOGY & ADDITIONAL TESTS:    Care Discussed with Consultants/Other Providers:    Patient is a 38y old  Female who presents with a chief complaint of fever (14 Dec 2023 08:20)    Patient seen and examined at bedside. No overnight events reported. Patient c/o sore throat     ALLERGIES:  No Known Allergies    MEDICATIONS  (STANDING):  baclofen 5 milliGRAM(s) Oral every 8 hours  dextromethorphan 20 mG/quinidine sulfate 10 mG 1 Capsule(s) Oral daily  divalproex  milliGRAM(s) Oral two times a day  escitalopram 20 milliGRAM(s) Oral daily  gabapentin 600 milliGRAM(s) Oral two times a day  influenza   Vaccine 0.5 milliLiter(s) IntraMuscular once  lacosamide 150 milliGRAM(s) Oral two times a day  levothyroxine 75 MICROGram(s) Oral daily  methylphenidate 10 milliGRAM(s) Oral <User Schedule>  piperacillin/tazobactam IVPB.. 3.375 Gram(s) IV Intermittent every 8 hours    MEDICATIONS  (PRN):  acetaminophen     Tablet .. 650 milliGRAM(s) Oral every 6 hours PRN Temp greater or equal to 38C (100.4F), Mild Pain (1 - 3)  aluminum hydroxide/magnesium hydroxide/simethicone Suspension 30 milliLiter(s) Oral every 4 hours PRN Dyspepsia  benzocaine/menthol Lozenge 1 Lozenge Oral three times a day PRN Sore Throat  melatonin 3 milliGRAM(s) Oral at bedtime PRN Insomnia  ondansetron Injectable 4 milliGRAM(s) IV Push every 8 hours PRN Nausea and/or Vomiting    Vital Signs Last 24 Hrs  T(F): 97.9 (14 Dec 2023 05:00), Max: 98.1 (13 Dec 2023 12:16)  HR: 73 (14 Dec 2023 05:00) (73 - 84)  BP: 118/74 (14 Dec 2023 05:00) (116/81 - 139/85)  RR: 18 (14 Dec 2023 05:00) (18 - 18)  SpO2: 97% (14 Dec 2023 05:00) (96% - 98%)    I&O's Summary  13 Dec 2023 07:01  -  14 Dec 2023 07:00  --------------------------------------------------------  IN: 240 mL / OUT: 0 mL / NET: 240 mL    14 Dec 2023 07:01  -  14 Dec 2023 12:08  --------------------------------------------------------  IN: 240 mL / OUT: 0 mL / NET: 240 mL    PHYSICAL EXAM:  General: NAD, A/O x 2  ENT: No gross hearing impairment, Moist mucous membranes, no thrush  Neck: Supple, No JVD  Lungs: Clear to auscultation bilaterally, good air entry, non-labored breathing  Cardio: RRR, S1/S2, No murmur  Abdomen: Soft, Nontender, Nondistended; Bowel sounds present  Extremities: No calf tenderness, No cyanosis, No pitting edema  Neuro: speech aphasic, left hemiparesis, can follow commands    LABS:                        11.4   10.11 )-----------( 208      ( 14 Dec 2023 07:35 )             34.2     12-14    142  |  103  |  4   ----------------------------<  105  3.4   |  32  |  0.47    Ca    8.8      14 Dec 2023 07:35    TPro  5.9  /  Alb  2.2  /  TBili  0.2  /  DBili  x   /  AST  10  /  ALT  10  /  AlkPhos  75  12-12    PT/INR - ( 11 Dec 2023 15:53 )   PT: 13.4 sec;   INR: 1.18 ratio      PTT - ( 11 Dec 2023 15:53 )  PTT:34.1 sec  Lactate, Blood: 1.0 mmol/L (12-11 @ 15:53)    Urinalysis Basic - ( 14 Dec 2023 07:35 )    Color: x / Appearance: x / SG: x / pH: x  Gluc: 105 mg/dL / Ketone: x  / Bili: x / Urobili: x   Blood: x / Protein: x / Nitrite: x   Leuk Esterase: x / RBC: x / WBC x   Sq Epi: x / Non Sq Epi: x / Bacteria: x    Culture - Urine (collected 11 Dec 2023 20:17)  Source: Clean Catch Clean Catch (Midstream)  Final Report (12 Dec 2023 23:01):    <10,000 CFU/mL Normal Urogenital Amanda    Culture - Blood (collected 11 Dec 2023 15:53)  Source: .Blood Blood-Peripheral  Gram Stain (13 Dec 2023 10:45):    Growth in aerobic bottle: Gram positive cocci in pairs  Preliminary Report (13 Dec 2023 10:45):    Growth in aerobic bottle: Gram positive cocci in pairs    Direct identification is available within approximately 3-5    hours either by Blood Panel Multiplexed PCR or Direct    MALDI-TOF. Details: [email or web address]  Organism: Blood Culture PCR (13 Dec 2023 11:40)  Organism: Blood Culture PCR (13 Dec 2023 11:40)      Method Type: PCR      -  Coagulase negative Staphylococcus: Detec    Culture - Blood (collected 11 Dec 2023 15:23)  Source: .Blood Blood-Peripheral  Preliminary Report (13 Dec 2023 23:01):    No growth at 48 Hours    RADIOLOGY & ADDITIONAL TESTS:    Care Discussed with Consultants/Other Providers:    Patient is a 38y old  Female who presents with a chief complaint of fever (14 Dec 2023 08:20)    Patient seen and examined at bedside. No overnight events reported. Patient c/o sore throat     ALLERGIES:  No Known Allergies    MEDICATIONS  (STANDING):  baclofen 5 milliGRAM(s) Oral every 8 hours  dextromethorphan 20 mG/quinidine sulfate 10 mG 1 Capsule(s) Oral daily  divalproex  milliGRAM(s) Oral two times a day  escitalopram 20 milliGRAM(s) Oral daily  gabapentin 600 milliGRAM(s) Oral two times a day  influenza   Vaccine 0.5 milliLiter(s) IntraMuscular once  lacosamide 150 milliGRAM(s) Oral two times a day  levothyroxine 75 MICROGram(s) Oral daily  methylphenidate 10 milliGRAM(s) Oral <User Schedule>  piperacillin/tazobactam IVPB.. 3.375 Gram(s) IV Intermittent every 8 hours    MEDICATIONS  (PRN):  acetaminophen     Tablet .. 650 milliGRAM(s) Oral every 6 hours PRN Temp greater or equal to 38C (100.4F), Mild Pain (1 - 3)  aluminum hydroxide/magnesium hydroxide/simethicone Suspension 30 milliLiter(s) Oral every 4 hours PRN Dyspepsia  benzocaine/menthol Lozenge 1 Lozenge Oral three times a day PRN Sore Throat  melatonin 3 milliGRAM(s) Oral at bedtime PRN Insomnia  ondansetron Injectable 4 milliGRAM(s) IV Push every 8 hours PRN Nausea and/or Vomiting    Vital Signs Last 24 Hrs  T(F): 97.9 (14 Dec 2023 05:00), Max: 98.1 (13 Dec 2023 12:16)  HR: 73 (14 Dec 2023 05:00) (73 - 84)  BP: 118/74 (14 Dec 2023 05:00) (116/81 - 139/85)  RR: 18 (14 Dec 2023 05:00) (18 - 18)  SpO2: 97% (14 Dec 2023 05:00) (96% - 98%)    I&O's Summary  13 Dec 2023 07:01  -  14 Dec 2023 07:00  --------------------------------------------------------  IN: 240 mL / OUT: 0 mL / NET: 240 mL    14 Dec 2023 07:01  -  14 Dec 2023 12:08  --------------------------------------------------------  IN: 240 mL / OUT: 0 mL / NET: 240 mL    PHYSICAL EXAM:  General: NAD, A/O x 2  ENT: No gross hearing impairment, Moist mucous membranes, no thrush  Neck: Supple, No JVD  Lungs: Clear to auscultation bilaterally, good air entry, non-labored breathing  Cardio: RRR, S1/S2, No murmur  Abdomen: Soft, Nontender, Nondistended; Bowel sounds present  Extremities: No calf tenderness, No cyanosis, No pitting edema  Neuro: speech aphasic, left hemiparesis, can follow commands    LABS:                        11.4   10.11 )-----------( 208      ( 14 Dec 2023 07:35 )             34.2     12-14    142  |  103  |  4   ----------------------------<  105  3.4   |  32  |  0.47    Ca    8.8      14 Dec 2023 07:35    TPro  5.9  /  Alb  2.2  /  TBili  0.2  /  DBili  x   /  AST  10  /  ALT  10  /  AlkPhos  75  12-12    PT/INR - ( 11 Dec 2023 15:53 )   PT: 13.4 sec;   INR: 1.18 ratio      PTT - ( 11 Dec 2023 15:53 )  PTT:34.1 sec  Lactate, Blood: 1.0 mmol/L (12-11 @ 15:53)    Urinalysis Basic - ( 14 Dec 2023 07:35 )    Color: x / Appearance: x / SG: x / pH: x  Gluc: 105 mg/dL / Ketone: x  / Bili: x / Urobili: x   Blood: x / Protein: x / Nitrite: x   Leuk Esterase: x / RBC: x / WBC x   Sq Epi: x / Non Sq Epi: x / Bacteria: x    Culture - Urine (collected 11 Dec 2023 20:17)  Source: Clean Catch Clean Catch (Midstream)  Final Report (12 Dec 2023 23:01):    <10,000 CFU/mL Normal Urogenital Amanda    Culture - Blood (collected 11 Dec 2023 15:53)  Source: .Blood Blood-Peripheral  Gram Stain (13 Dec 2023 10:45):    Growth in aerobic bottle: Gram positive cocci in pairs  Preliminary Report (13 Dec 2023 10:45):    Growth in aerobic bottle: Gram positive cocci in pairs    Direct identification is available within approximately 3-5    hours either by Blood Panel Multiplexed PCR or Direct    MALDI-TOF. Details: [email or web address]  Organism: Blood Culture PCR (13 Dec 2023 11:40)  Organism: Blood Culture PCR (13 Dec 2023 11:40)      Method Type: PCR      -  Coagulase negative Staphylococcus: Detec    Culture - Blood (collected 11 Dec 2023 15:23)  Source: .Blood Blood-Peripheral  Preliminary Report (13 Dec 2023 23:01):    No growth at 48 Hours    RADIOLOGY & ADDITIONAL TESTS:    Care Discussed with Consultants/Other Providers:    Patient is a 38y old  Female who presents with a chief complaint of fever (14 Dec 2023 08:20)    Patient seen and examined at bedside. No overnight events reported. Patient c/o sore throat     ALLERGIES:  No Known Allergies    MEDICATIONS  (STANDING):  baclofen 5 milliGRAM(s) Oral every 8 hours  dextromethorphan 20 mG/quinidine sulfate 10 mG 1 Capsule(s) Oral daily  divalproex  milliGRAM(s) Oral two times a day  escitalopram 20 milliGRAM(s) Oral daily  gabapentin 600 milliGRAM(s) Oral two times a day  influenza   Vaccine 0.5 milliLiter(s) IntraMuscular once  lacosamide 150 milliGRAM(s) Oral two times a day  levothyroxine 75 MICROGram(s) Oral daily  methylphenidate 10 milliGRAM(s) Oral <User Schedule>  piperacillin/tazobactam IVPB.. 3.375 Gram(s) IV Intermittent every 8 hours    MEDICATIONS  (PRN):  acetaminophen     Tablet .. 650 milliGRAM(s) Oral every 6 hours PRN Temp greater or equal to 38C (100.4F), Mild Pain (1 - 3)  aluminum hydroxide/magnesium hydroxide/simethicone Suspension 30 milliLiter(s) Oral every 4 hours PRN Dyspepsia  benzocaine/menthol Lozenge 1 Lozenge Oral three times a day PRN Sore Throat  melatonin 3 milliGRAM(s) Oral at bedtime PRN Insomnia  ondansetron Injectable 4 milliGRAM(s) IV Push every 8 hours PRN Nausea and/or Vomiting    Vital Signs Last 24 Hrs  T(F): 97.9 (14 Dec 2023 05:00), Max: 98.1 (13 Dec 2023 12:16)  HR: 73 (14 Dec 2023 05:00) (73 - 84)  BP: 118/74 (14 Dec 2023 05:00) (116/81 - 139/85)  RR: 18 (14 Dec 2023 05:00) (18 - 18)  SpO2: 97% (14 Dec 2023 05:00) (96% - 98%)    I&O's Summary  13 Dec 2023 07:01  -  14 Dec 2023 07:00  --------------------------------------------------------  IN: 240 mL / OUT: 0 mL / NET: 240 mL    14 Dec 2023 07:01  -  14 Dec 2023 12:08  --------------------------------------------------------  IN: 240 mL / OUT: 0 mL / NET: 240 mL    PHYSICAL EXAM:  General: NAD, A/O x 2  ENT: No gross hearing impairment, Moist mucous membranes, no thrush  Neck: Supple, No JVD  Lungs: Clear to auscultation bilaterally, good air entry, non-labored breathing  Cardio: RRR, S1/S2, No murmur  Abdomen: Soft, Nontender, Nondistended; Bowel sounds present  Extremities: No calf tenderness, No cyanosis, No pitting edema  Neuro: speech aphasic, left hemiparesis, can follow commands    LABS:                        11.4   10.11 )-----------( 208      ( 14 Dec 2023 07:35 )             34.2     12-14    142  |  103  |  4   ----------------------------<  105  3.4   |  32  |  0.47    Ca    8.8      14 Dec 2023 07:35    TPro  5.9  /  Alb  2.2  /  TBili  0.2  /  DBili  x   /  AST  10  /  ALT  10  /  AlkPhos  75  12-12    PT/INR - ( 11 Dec 2023 15:53 )   PT: 13.4 sec;   INR: 1.18 ratio      PTT - ( 11 Dec 2023 15:53 )  PTT:34.1 sec  Lactate, Blood: 1.0 mmol/L (12-11 @ 15:53)    Urinalysis Basic - ( 14 Dec 2023 07:35 )    Color: x / Appearance: x / SG: x / pH: x  Gluc: 105 mg/dL / Ketone: x  / Bili: x / Urobili: x   Blood: x / Protein: x / Nitrite: x   Leuk Esterase: x / RBC: x / WBC x   Sq Epi: x / Non Sq Epi: x / Bacteria: x    Culture - Urine (collected 11 Dec 2023 20:17)  Source: Clean Catch Clean Catch (Midstream)  Final Report (12 Dec 2023 23:01):    <10,000 CFU/mL Normal Urogenital Amanda    Culture - Blood (collected 11 Dec 2023 15:53)  Source: .Blood Blood-Peripheral  Gram Stain (13 Dec 2023 10:45):    Growth in aerobic bottle: Gram positive cocci in pairs  Preliminary Report (13 Dec 2023 10:45):    Growth in aerobic bottle: Gram positive cocci in pairs    Direct identification is available within approximately 3-5    hours either by Blood Panel Multiplexed PCR or Direct    MALDI-TOF. Details: [email or web address]  Organism: Blood Culture PCR (13 Dec 2023 11:40)  Organism: Blood Culture PCR (13 Dec 2023 11:40)      Method Type: PCR      -  Coagulase negative Staphylococcus: Detec    Culture - Blood (collected 11 Dec 2023 15:23)  Source: .Blood Blood-Peripheral  Preliminary Report (13 Dec 2023 23:01):    No growth at 48 Hours     Patient is a 38y old  Female who presents with a chief complaint of fever (14 Dec 2023 08:20)    Patient seen and examined at bedside. No overnight events reported. Patient c/o sore throat     ALLERGIES:  No Known Allergies    MEDICATIONS  (STANDING):  baclofen 5 milliGRAM(s) Oral every 8 hours  dextromethorphan 20 mG/quinidine sulfate 10 mG 1 Capsule(s) Oral daily  divalproex  milliGRAM(s) Oral two times a day  escitalopram 20 milliGRAM(s) Oral daily  gabapentin 600 milliGRAM(s) Oral two times a day  influenza   Vaccine 0.5 milliLiter(s) IntraMuscular once  lacosamide 150 milliGRAM(s) Oral two times a day  levothyroxine 75 MICROGram(s) Oral daily  methylphenidate 10 milliGRAM(s) Oral <User Schedule>  piperacillin/tazobactam IVPB.. 3.375 Gram(s) IV Intermittent every 8 hours    MEDICATIONS  (PRN):  acetaminophen     Tablet .. 650 milliGRAM(s) Oral every 6 hours PRN Temp greater or equal to 38C (100.4F), Mild Pain (1 - 3)  aluminum hydroxide/magnesium hydroxide/simethicone Suspension 30 milliLiter(s) Oral every 4 hours PRN Dyspepsia  benzocaine/menthol Lozenge 1 Lozenge Oral three times a day PRN Sore Throat  melatonin 3 milliGRAM(s) Oral at bedtime PRN Insomnia  ondansetron Injectable 4 milliGRAM(s) IV Push every 8 hours PRN Nausea and/or Vomiting    Vital Signs Last 24 Hrs  T(F): 97.9 (14 Dec 2023 05:00), Max: 98.1 (13 Dec 2023 12:16)  HR: 73 (14 Dec 2023 05:00) (73 - 84)  BP: 118/74 (14 Dec 2023 05:00) (116/81 - 139/85)  RR: 18 (14 Dec 2023 05:00) (18 - 18)  SpO2: 97% (14 Dec 2023 05:00) (96% - 98%)    I&O's Summary  13 Dec 2023 07:01  -  14 Dec 2023 07:00  --------------------------------------------------------  IN: 240 mL / OUT: 0 mL / NET: 240 mL    14 Dec 2023 07:01  -  14 Dec 2023 12:08  --------------------------------------------------------  IN: 240 mL / OUT: 0 mL / NET: 240 mL    PHYSICAL EXAM:  General: NAD, A/O x 2  ENT: No gross hearing impairment, Moist mucous membranes, no thrush  Neck: Supple, No JVD  Lungs: Clear to auscultation bilaterally, good air entry, non-labored breathing  Cardio: RRR, S1/S2, No murmur  Abdomen: Soft, Nontender, Nondistended; Bowel sounds present  Extremities: No calf tenderness, No cyanosis, No pitting edema  Neuro: speech aphasic, left hemiparesis, can follow commands    LABS:                        11.4   10.11 )-----------( 208      ( 14 Dec 2023 07:35 )             34.2     12-14    142  |  103  |  4   ----------------------------<  105  3.4   |  32  |  0.47    Ca    8.8      14 Dec 2023 07:35    TPro  5.9  /  Alb  2.2  /  TBili  0.2  /  DBili  x   /  AST  10  /  ALT  10  /  AlkPhos  75  12-12    PT/INR - ( 11 Dec 2023 15:53 )   PT: 13.4 sec;   INR: 1.18 ratio      PTT - ( 11 Dec 2023 15:53 )  PTT:34.1 sec  Lactate, Blood: 1.0 mmol/L (12-11 @ 15:53)    Urinalysis Basic - ( 14 Dec 2023 07:35 )    Color: x / Appearance: x / SG: x / pH: x  Gluc: 105 mg/dL / Ketone: x  / Bili: x / Urobili: x   Blood: x / Protein: x / Nitrite: x   Leuk Esterase: x / RBC: x / WBC x   Sq Epi: x / Non Sq Epi: x / Bacteria: x    Culture - Urine (collected 11 Dec 2023 20:17)  Source: Clean Catch Clean Catch (Midstream)  Final Report (12 Dec 2023 23:01):    <10,000 CFU/mL Normal Urogenital Amanda    Culture - Blood (collected 11 Dec 2023 15:53)  Source: .Blood Blood-Peripheral  Gram Stain (13 Dec 2023 10:45):    Growth in aerobic bottle: Gram positive cocci in pairs  Preliminary Report (13 Dec 2023 10:45):    Growth in aerobic bottle: Gram positive cocci in pairs    Direct identification is available within approximately 3-5    hours either by Blood Panel Multiplexed PCR or Direct    MALDI-TOF. Details: [email or web address] Center/test/[id number]  Organism: Blood Culture PCR (13 Dec 2023 11:40)  Organism: Blood Culture PCR (13 Dec 2023 11:40)      Method Type: PCR      -  Coagulase negative Staphylococcus: Detec    Culture - Blood (collected 11 Dec 2023 15:23)  Source: .Blood Blood-Peripheral  Preliminary Report (13 Dec 2023 23:01):    No growth at 48 Hours

## 2023-12-14 NOTE — DISCHARGE NOTE PROVIDER - HOSPITAL COURSE
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  Pt presents today with fever starting yesterday as high as 103 minimally relieved with Tylenol. Pt c/o diffuse body pain, although family state this is normal for her but now slightly worse. Wife Karma reports other family members ill with viral like symptoms. Of note patient c/o nausea and vomiting last week. Patient and family at bedside deny further complaints including chest pain, shortness of breath, seizure like activity, dysuria or urinary complaints. (11 Dec 2023 18:21)    Patient was admitted to medicine for sepsis (POA - fever, tachycardia, leukocytosis) due to unknown etiology. Infectious workup negative; RVP/COVID neg, UA neg, CXR neg. CT abdomen/pelvis negative for acute findings. Strep throat swab negative. Admission blood cultures are grew a coag negative staph in 1/4 bottles, this is likely a contaminant. Patient was treated with IV zosyn, and improved clinically. WBC normalized. She is to be transitioned to PO augmentin upon DC. Prescriptions provided to resume outpatient PT/OT/SLP. Transport arranged by . Patient stable for DC home with close OP follow up     Source of Infection: Unknown source  Antibiotic / Last Day: Augmentin x3 more days     Palliative Care / Advanced Care Planning  Code Status: Full code     Discharging Provider:  Bee Talley NP  Contact Info: Cell [phone] - Please call with any questions or concerns.    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  Pt presents today with fever starting yesterday as high as 103 minimally relieved with Tylenol. Pt c/o diffuse body pain, although family state this is normal for her but now slightly worse. Wife Karma reports other family members ill with viral like symptoms. Of note patient c/o nausea and vomiting last week. Patient and family at bedside deny further complaints including chest pain, shortness of breath, seizure like activity, dysuria or urinary complaints. (11 Dec 2023 18:21)    Patient was admitted to medicine for sepsis (POA - fever, tachycardia, leukocytosis) due to unknown etiology. Infectious workup negative; RVP/COVID neg, UA neg, CXR neg. CT abdomen/pelvis negative for acute findings. Strep throat swab negative. Admission blood cultures are grew a coag negative staph in 1/4 bottles, this is likely a contaminant. Patient was treated with IV zosyn, and improved clinically. WBC normalized. She is to be transitioned to PO augmentin upon DC. Prescriptions provided to resume outpatient PT/OT/SLP. Transport arranged by . Patient stable for DC home with close OP follow up     Source of Infection: Unknown source  Antibiotic / Last Day: Augmentin x3 more days     Palliative Care / Advanced Care Planning  Code Status: Full code     Discharging Provider:  Bee Talley NP  Contact Info: Cell [phone] - Please call with any questions or concerns.    Outpatient Provider: Dr Mcmillan - notified

## 2023-12-14 NOTE — DISCHARGE NOTE PROVIDER - CARE PROVIDER_API CALL
Jessika Mcmillan  Family Medicine [address] [place] [address]  Phone: [phone]  Fax: [phone]  Follow Up Time:     Mati Nassar  Infectious Disease  [address] [facility] [address]  [place] [address]  Phone: [phone]  Fax: [phone]  Follow Up Time:    Jessika Mcmillan  Family Medicine [address] [place] [address]  Phone: [phone]  Fax: [phone]  Follow Up Time:     Mati Nassar  Infectious Disease  [address] [facility] [address]  [place] [address]  Phone: [phone]  Fax: [phone]  Follow Up Time:

## 2023-12-14 NOTE — DISCHARGE NOTE PROVIDER - NSDCFUSCHEDAPPT_GEN_ALL_CORE_FT
May Sanford  [place] [address]  Scheduled Appointment: 12/19/2023    Alexx Mahan  [facility]  NEUROLOGY [address] [facility]  Scheduled Appointment: 01/05/2024    May Sanford  [place] [address]  Scheduled Appointment: 03/05/2024     May Sanford  [place] [address]  Scheduled Appointment: 12/19/2023    Alexx Mahan  [facility]  NEUROLOGY 1 [facility]  Scheduled Appointment: 01/05/2024    May Sanford  [place] [address]  Scheduled Appointment: 03/05/2024     May Sanford  [facility] Physician [facility] [address] [facility] [address]  Scheduled Appointment: 12/19/2023    Jessika Mcmillan  [facility] Physician [address]  Scheduled Appointment: 12/21/2023    Alexx Mahan  [facility] Physician [facility]  NEUROLOGY [address] [place] [address]  Scheduled Appointment: 01/05/2024    May Sanford  [facility] [address] [facility] [address]  Scheduled Appointment: 03/05/2024     May Sanford  [facility] Physician [facility] [address] [facility] [address]  Scheduled Appointment: 12/19/2023    Jessika Mcmillan  [facility] Physician [address]  Scheduled Appointment: 12/21/2023    Alexx Mahan  [facility] Physician [facility]  NEUROLOGY [address] [place] [address]  Scheduled Appointment: 01/05/2024    May Sanford  [facility] [address] [facility] [address]  Scheduled Appointment: 03/05/2024

## 2023-12-21 ENCOUNTER — APPOINTMENT (OUTPATIENT)
Dept: FAMILY MEDICINE | Facility: CLINIC | Age: 38
End: 2023-12-21
Payer: MEDICAID

## 2023-12-21 DIAGNOSIS — A41.9 SEPSIS, UNSPECIFIED ORGANISM: ICD-10-CM

## 2023-12-21 DIAGNOSIS — Z09 ENCOUNTER FOR FOLLOW-UP EXAMINATION AFTER COMPLETED TREATMENT FOR CONDITIONS OTHER THAN MALIGNANT NEOPLASM: ICD-10-CM

## 2023-12-21 PROCEDURE — 99214 OFFICE O/P EST MOD 30 MIN: CPT | Mod: 95

## 2023-12-21 NOTE — ASSESSMENT
[FreeTextEntry1] : Has upcoming appointment with neurology to establish care. Will follow up consult note.

## 2023-12-21 NOTE — PHYSICAL EXAM
[No Acute Distress] : no acute distress [Well Nourished] : well nourished [Well Developed] : well developed [Well-Appearing] : well-appearing [No Respiratory Distress] : no respiratory distress  [Normal Affect] : the affect was normal

## 2023-12-21 NOTE — REVIEW OF SYSTEMS
[Fever] : fever [Negative] : Respiratory [FreeTextEntry2] : poor historian [FreeTextEntry7] : some diarrhea with antibiotic use

## 2023-12-21 NOTE — HISTORY OF PRESENT ILLNESS
[Post-hospitalization from ___ Hospital] : Post-hospitalization from [unfilled] Hospital [Admitted on: ___] : The patient was admitted on [unfilled] [Discharged on ___] : discharged on [unfilled] [Discharge Summary] : discharge summary [Pertinent Labs] : pertinent labs [Home] : at home, [unfilled] , at the time of the visit. 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 She was admitted with sepsis due to unknown etiology. Infectious workup was negative; RVP/COVID neg, UA neg, CXR neg. CT abdomen/pelvis negative for acute findings. Strep throat swab negative. She was treated with IV zosyn, and improved clinically. WBC normalized. She completed three additional days of augmentin at home. She is now feeling much better. Patient, her mom and her wife all state Kellee is back to baseline.

## 2024-01-04 ENCOUNTER — APPOINTMENT (OUTPATIENT)
Dept: PHYSICAL MEDICINE AND REHAB | Facility: CLINIC | Age: 39
End: 2024-01-04
Payer: MEDICAID

## 2024-01-04 VITALS
TEMPERATURE: 97.8 F | OXYGEN SATURATION: 92 % | SYSTOLIC BLOOD PRESSURE: 116 MMHG | DIASTOLIC BLOOD PRESSURE: 83 MMHG | HEIGHT: 62 IN | BODY MASS INDEX: 23 KG/M2 | WEIGHT: 125 LBS | HEART RATE: 84 BPM

## 2024-01-04 PROCEDURE — 99214 OFFICE O/P EST MOD 30 MIN: CPT

## 2024-01-05 ENCOUNTER — APPOINTMENT (OUTPATIENT)
Dept: NEUROLOGY | Facility: CLINIC | Age: 39
End: 2024-01-05
Payer: MEDICAID

## 2024-01-05 VITALS
DIASTOLIC BLOOD PRESSURE: 82 MMHG | WEIGHT: 124 LBS | HEART RATE: 81 BPM | HEIGHT: 62 IN | BODY MASS INDEX: 22.82 KG/M2 | SYSTOLIC BLOOD PRESSURE: 113 MMHG

## 2024-01-05 PROCEDURE — 99204 OFFICE O/P NEW MOD 45 MIN: CPT

## 2024-01-05 RX ORDER — DIVALPROEX SODIUM 500 MG/1
500 TABLET, DELAYED RELEASE ORAL
Refills: 0 | Status: DISCONTINUED | COMMUNITY
End: 2024-01-05

## 2024-01-05 RX ORDER — LACOSAMIDE 100 MG/1
100 TABLET, FILM COATED ORAL
Refills: 0 | Status: DISCONTINUED | COMMUNITY
End: 2024-01-05

## 2024-01-05 RX ORDER — GABAPENTIN 600 MG/1
600 TABLET, COATED ORAL TWICE DAILY
Refills: 0 | Status: DISCONTINUED | COMMUNITY
End: 2024-01-05

## 2024-01-05 RX ORDER — DIVALPROEX SODIUM 125 MG/1
125 CAPSULE, COATED PELLETS ORAL
Qty: 360 | Refills: 5 | Status: DISCONTINUED | COMMUNITY
Start: 2023-12-02 | End: 2024-01-05

## 2024-01-05 RX ORDER — ONABOTULINUMTOXINA 100 [USP'U]/1
100 INJECTION, POWDER, LYOPHILIZED, FOR SOLUTION INTRADERMAL; INTRAMUSCULAR
Qty: 5 | Refills: 0 | Status: ACTIVE | OUTPATIENT
Start: 2024-01-05

## 2024-01-05 RX ORDER — METHYLPHENIDATE HYDROCHLORIDE 10 MG/1
10 TABLET ORAL DAILY
Refills: 0 | Status: DISCONTINUED | COMMUNITY
Start: 2023-10-30 | End: 2024-01-05

## 2024-01-05 RX ORDER — DEXTROMETHORPHAN HYDROBROMIDE AND QUINIDINE SULFATE 20; 10 MG/1; MG/1
20-10 CAPSULE, GELATIN COATED ORAL
Qty: 30 | Refills: 5 | Status: ACTIVE | COMMUNITY
Start: 2024-01-05

## 2024-01-05 RX ORDER — TRAZODONE HYDROCHLORIDE 50 MG/1
50 TABLET ORAL
Qty: 30 | Refills: 0 | Status: DISCONTINUED | COMMUNITY
End: 2024-01-05

## 2024-01-05 NOTE — HISTORY OF PRESENT ILLNESS
[FreeTextEntry1] : [facility] Admission 26-Jun-2023 to 8/1/2023  38 year old female w/ PMHx hypothyroidism who was admitted to [facility] 6/6 after being found on floor at home, with CT revealing large right-sided ICH. She required emergent right frontal craniectomy for decompression. Patient was discharged to [facility]. She was transferred from the subacute to [facility] on September 10, 2023 for her cranioplasty which was performed on 9/12.   Patient presents today for follow-up after Botox injections to her left side 11/30/23.  botox administered in following dosing-- left Pectoralis Major was injected with 130 Units . EMG Guidance was used during the procedure. The left Biceps was injected with 100 Units . EMG Guidance was used during the procedure. The left Brachialis was injected with 50 Units . EMG Guidance was used during the procedure. The left Flexor Carpi Radialis was injected with 20 Units . EMG Guidance was used during the procedure. The left Flexor Digitorum Superficialis was injected with 50 Units . EMG Guidance was used during the procedure. The left Soleus was injected with 50 Units . EMG Guidance was used during the procedure. In total, 400 units Patient and family report her tone is much better. they are happy with the results.  pain is much better.    Pt is much calmer and her mood has improved from her last visit here.  She still has some emotional lability but it is significantly improved compared to last visit.  Family notes that this is consistent at home.  Patient is on gabapentin 600 mg twice a day in the early afternoon and nightly for neuropathic pain.  Patient's wife notes that patient tends to be more sensitive to touch and pain in the a.m. she notes patient seems to be tolerating gabapentin dosing well and does not seem to be significantly sedated or dizzy after receiving dose  Patient is attending OT & PT and speech therapy at [facility] twice a week. OT note reviewed 1/3/2024-working on passive range of motion and stretching-90 degrees for shoulder flexion and abduction and approximately 30 to 45 degrees external rotation is the max tolerable range.  She engaged in ADL training focusing on cutting food with stimulation using rocker knife and focus on container management using Dycem.  Able to open water bottle with supervision and verbal cues for technique.  Improved participation in upper body dressing with education for Jose technique.  Doffed pullover sweatshirt with moderate assistance.  Requires max verbal cues and encouragement for sit to stand and benefits from reassurance noting fear of falling and fear of pain.  Benefits from distraction during passive range of motion due to anticipation  Speech therapy note 12/27 reviewed-social conversation-about 80% accuracy content/20+ exchanges given mod assist-engage in a structured conversation regarding the past holiday celebrations and future plans.  Benefited from increased time to respond and follow-up questions to provide prompt for more detail/clarity and responses.  Demonstrated improved turn-taking skills by asking questions and occasionally pausing for response when appropriate.   Immediate memory; paragraph- 5/7 given moderate assistance-test with recalling information about verbally presented By answering open ended questions.  Benefited from increased processing time and semantic cues.  Demonstrated perseveration of previous topics of conversation.  PT note 1/3-perform stand pivot from wheelchair to table at max assist with cues.  Performed supine to sit with mod to max assist x 4 with verbal cues for sequencing.  Increased time for all transfers for patient education and participation in transfers.  Max assist stand pivot transfer-sitting edge of mat unsupported for about 2 minutes.  All procedures completed to improve functional mobility, improve left lower extremity strength, decreased risk of falls, decrease caregiver burden and maximize functional capacity.  Required motivation to participate in standing balance activity but demonstrates improve left swing initiation with gait training and good carryover for sequencing.  Function: transfers with 1 person assist-- 50-75%, not a functional ambulator; needs 2 person assist to get on toilet as toilet seat is deep.  Need Mod A with UBD, and LBD needs 90% assist.  Shower-- 2 p assist for transfer to shower chair and needs 85% assist with bathing.  Set up eating and grooming.    No falls.   Mood is better-- less labile.   Stopped ritalin and attention is better.    Sleeps through the night as per patient's family

## 2024-01-05 NOTE — PHYSICAL EXAM
[FreeTextEntry1] :  status post Right craniectomy  General Constitutional: alert and in no acute distress.  Psychiatric: affect normal, insight and judgment intact. Neurologic:  Orientation: oriented to person, place, and time  Attention: poor focus, distracted, Left hemineglect. Language: no difficulty naming common objects, repeating a phrase, writing a sentence; fluency, comprehension, and reading intact.  Fund of knowledge: displays adequate knowledge of personal past history.  Cranial Nerves: visual acuity intact bilaterally, visual fields with left hemidefect, pupils equal round and reactive to light, extraocular motion intact, facial sensation intact symmetrically, face asymmetrical, L NLF down, hearing was intact bilaterally,  palate midline, head turning.  No shoulder shrug  on left Motor-- LUE plegic.  left hip 1-2/5, otherwise 0/5 right UE and LE 5/5. Tone: MAS 3/4 left shoulder, elbow and FF. 2/4 Sensation-- impaired on left, neuropathic pain  Deep tendon reflexes:  Biceps right 2+. Biceps left 2+.   Triceps right 2+. Triceps left 2+.   Brachioradialis right 2+. Brachioradialis left 2+.   Patella right 2+. Patella left 2+.   Ankle jerk right 2+. Ankle jerk left 2+.  Eyes: the sclera and conjunctiva were normal.  Neck: the appearance of the neck was normal.  Musculoskeletal: no clubbing or cyanosis of the fingernails.  Skin: no lesions, rash

## 2024-01-05 NOTE — REASON FOR VISIT
[Follow-Up] : a follow-up visit [Spouse] : spouse [Family Member] : family member [FreeTextEntry1] :  right ICH.

## 2024-01-05 NOTE — HISTORY OF PRESENT ILLNESS
[FreeTextEntry1] : Medlcation-- depakote- 750mg bid  vimpat 150mg bid gabapentin 600mg tid lexapro 20mg synthroid 75mcg baclofen 5mg tid nudexta 1 cap bid memantine 5mg bid  Trials: ritalin, trazadone   HPI: 38-year-old female Initially found down and  brought to [facility]. Found to have large right hemispheric IPH. S/p right hemicraniectomy 6/6/23 by Dr. Bernstein.  EVD was placed then removed Transferred from the subacute to [facility] on September 10, 2023 for her cranioplasty which was performed on 9/12/23 Additional smaller seizure noted p cranioplasty.  Noted to have right frontal seizures on EEG  postoperatively  Discharged on: depakote 500mg/750mg , lexapro 15mg and trazodone 50mg started on Ritalin and Neudexta at [place]  Possible seizure x2 12/1/2023 p botox earlier in day, and VPA dose increase.  Vomited next day.  Had fever following week. Pt with no recollection, or aura. Mom describes blanking/zoning out x1min.   history of  subsequent spasticity, trials of Botox  history of labile mood chronic pain, pins needles  FmHx: Bro with seizures with TSC.  colitis.  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

## 2024-01-05 NOTE — DISCUSSION/SUMMARY
[FreeTextEntry1] : 38F RH p R ICH p craniotomy L spastic plegia. Some cognitive issues, confabulation, emotional lability. memory impaired Subsequent seizures.  On nuedextra for PBA referred to Rose Manzo from psych if insurance accepted  Change med dosing to TID to avoid 6AM wakeup no drug interactions with VPA, GBP, LAC.  agree with PT/OT/speech  Labs , levels requested fuv 4-5mo

## 2024-01-05 NOTE — PHYSICAL EXAM
[FreeTextEntry1] : AAOx 2 Emotionally labile  Motor-- left hip 2/5, kE trace, otherwise 0/5,  Left shoulder 1/5, EF 1/5. --. right UE and LE 5/5. Sensation-- impaired on left,  Tone: MAS 3/4 left shoulder, 2/elbow flexors.  1+ FF. 1+/4 WFs, 3/4 PFs. WC mobility.

## 2024-01-05 NOTE — ASSESSMENT
[FreeTextEntry1] : Patient benefited from Botox injections to left upper and lower extremity.  Discussed with family we will trial increase to left plantar flexors with increased dosing as patient had shown minimal improvement with low-dose Botox-discussed possibility that plantar flexors may have some contracture.  - Follow-up for next Botox in March-will increase dosing to 500 units-increase to plantarflexors (150-160 units), increase to Pectoralis to 150 units, and to EFs to 170-180 units.   -- Patient making progress in therapy-continue outpatient PT, OT and speech therapy-new prescription provided.  - Neuropathic pain-recommend increase gabapentin to 600 mg 3 times a day.  Risks/ benefits discussed with family  All questions answered.

## 2024-01-11 DIAGNOSIS — F32.A ANXIETY DISORDER, UNSPECIFIED: ICD-10-CM

## 2024-01-11 DIAGNOSIS — F41.9 ANXIETY DISORDER, UNSPECIFIED: ICD-10-CM

## 2024-01-11 RX ORDER — ESCITALOPRAM OXALATE 5 MG/1
TABLET, FILM COATED ORAL
Refills: 0 | Status: DISCONTINUED | COMMUNITY
End: 2024-01-11

## 2024-01-23 NOTE — PROGRESS NOTE ADULT - SUBJECTIVE AND OBJECTIVE BOX
Patient provided food and fluids, denies any other needs. Updated on plan of care.    HPI:    SURGERY: Decompressive craniectomy      PAST MEDICAL HISTORY:   PAST SURGICAL HISTORY:   FAMILY HISTORY:    ALLERGIES: No Known Allergies    **************************************  **************************************    OVERNIGHT EVENTS: [] None    ROS  Unobtainable due to mental status[] Negative []  Positives:    ADMISSION SCORES: GCS: HH: MF: NIHSS: RASS: CAM-ICU: ICP:    ICU Vital Signs Last 24 Hrs  T(C): 37.2 (17 Jun 2023 07:00), Max: 37.3 (16 Jun 2023 15:00)  T(F): 99 (17 Jun 2023 07:00), Max: 99.1 (16 Jun 2023 15:00)  HR: 95 (17 Jun 2023 11:46) (76 - 102)  BP: --  BP(mean): --  ABP: 128/81 (17 Jun 2023 11:00) (100/57 - 173/99)  ABP(mean): 100 (17 Jun 2023 11:00) (73 - 129)  RR: 21 (17 Jun 2023 11:00) (15 - 25)  SpO2: 99% (17 Jun 2023 11:46) (92% - 100%)    O2 Parameters below as of 17 Jun 2023 11:46  Patient On (Oxygen Delivery Method): nasal cannula           06-16 @ 07:01  -  06-17 @ 07:00  --------------------------------------------------------  IN: 2150 mL / OUT: 2820 mL / NET: -670 mL    06-17 @ 07:01  -  06-17 @ 12:50  --------------------------------------------------------  IN: 200 mL / OUT: 0 mL / NET: 200 mL           DEVICES: [] Restraints [] HAROON/HMV []LD [] ET tube [] Trach [] Chest Tube [] A-line [] Trevino [] NGT [] Rectal Tube [] EVD [] CVL  [] ICP/LiCOx    NEUROIMAGING:     EEG REPORT:     MEDICATIONS:  acetaminophen   Oral Liquid .. 650 milliGRAM(s) Oral every 6 hours PRN  acetylcysteine 20%  Inhalation 4 milliLiter(s) Inhalation every 12 hours  albuterol/ipratropium for Nebulization 3 milliLiter(s) Nebulizer every 6 hours  amLODIPine   Tablet 5 milliGRAM(s) Oral daily  brivaracetam 100 milliGRAM(s) Oral two times a day  chlorhexidine 4% Liquid 1 Application(s) Topical daily  cloNIDine 0.1 milliGRAM(s) Oral every 8 hours  enoxaparin Injectable 40 milliGRAM(s) SubCutaneous <User Schedule>  insulin lispro (ADMELOG) corrective regimen sliding scale   SubCutaneous every 6 hours  lacosamide IVPB 150 milliGRAM(s) IV Intermittent every 12 hours  levothyroxine 75 MICROGram(s) Oral daily  lisinopril 40 milliGRAM(s) Enteral Tube daily  nicotine - 21 mG/24Hr(s) Patch 1 Patch Transdermal daily  ondansetron Injectable 4 milliGRAM(s) IV Push every 6 hours PRN  oxyCODONE    Solution 10 milliGRAM(s) Oral every 6 hours PRN      PHYSICAL EXAM:  eyes close but awake, aphasic; will follow commands on right with showing two fingers and wiggling toes; left HP  pupils=      LABS:                        11.6   21.35 )-----------( 486      ( 16 Jun 2023 23:38 )             35.6    06-16    144  |  107  |  11  ----------------------------<  134<H>  4.1   |  23  |  0.42<L>    Ca    8.8      16 Jun 2023 23:38  Phos  4.1     06-16  Mg     2.2     06-16     ABG - ( 16 Jun 2023 23:08 )  pH, Arterial: 7.47  pH, Blood: x     /  pCO2: 37    /  pO2: 112   / HCO3: 27    / Base Excess: 3.2   /  SaO2: 100.0

## 2024-02-06 NOTE — PROGRESS NOTE ADULT - ATTENDING COMMENTS
Detail Level: Zone Detail Level: Simple Pt. seen with fellow.  Agree with documentation above as per fellow with amendments made as appropriate. Patient medically stable. Making progress towards rehab goals.     right ICH s/p hemicraniectomy  cont. Memantine 5mg BID-> monitor-- d/w mother Detail Level: Detailed

## 2024-03-01 ENCOUNTER — APPOINTMENT (OUTPATIENT)
Dept: NEUROLOGY | Facility: CLINIC | Age: 39
End: 2024-03-01
Payer: MEDICAID

## 2024-03-01 PROCEDURE — G2211 COMPLEX E/M VISIT ADD ON: CPT | Mod: NC,1L

## 2024-03-01 PROCEDURE — 99213 OFFICE O/P EST LOW 20 MIN: CPT | Mod: 95

## 2024-03-01 NOTE — HISTORY OF PRESENT ILLNESS
[FreeTextEntry1] : Telemedicine Visit Note:  Patient consented to discussion of medical condition via remote telehealth from home [address] [place] [address] .  Visit conducted in this manner due to the current pandemic by Doctor MARILYN DE from remote location. Due to the coronavirus outbreak, we are attempting to mitigate exposure to vulnerable patients at risk for a face to face/elective visit.  We will plan to move the scheduled in person appointments  forward to the next scheduled interval if the patient is stable.  Medication supply and refills were addressed.  Discussed with patient current degree of clinical stability.  Patient/caregiver were given opportunity to discuss questions, which were answered. [phone]  x min= 21  Medlcation-- depakote- 750mg bid  vimpat 150mg bid gabapentin 600mg tid lexapro 20mg synthroid 75mcg baclofen 5mg tid nudexta 1 cap bid memantine 5mg bid  Trials: ritalin, trazadone  Updates: concern for cognitive slowing, being overmedicated.  recent increase in GBP family asking about decreasing meds.   no seizures since prior admission 12/2023  HPI: 38-year-old female.  Initially found down and  brought to [facility]. Found to have large right hemispheric IPH. S/p right hemicraniectomy 6/6/23 by Dr. Bernstein.  EVD was placed then removed.  Transferred from the subacute to [facility] on September 10, 2023 for her cranioplasty which was performed on 9/12/23.  f/u angio apparently not revealing.  Additional smaller seizure noted p cranioplasty. Noted to have right frontal seizures on EEG  postoperatively  Discharged on: depakote 500mg/750mg , lexapro 15mg and trazodone 50mg started on Ritalin and Neudexta at [place]  Possible seizure x2 12/1/2023 p botox earlier in day, and VPA dose increase.  Vomited next day.  Had fever following week. Pt with no recollection, or aura. Mom describes blanking/zoning out x1min.   history of  subsequent spasticity, trials of Botox  history of labile mood chronic pain, pins needles  FmHx: Bro with seizures with TSC.  colitis.  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

## 2024-03-01 NOTE — DISCUSSION/SUMMARY
[FreeTextEntry1] : 38F RH p R ICH p craniotomy L spastic plegia. Some cognitive issues, confabulation, emotional lability. memory impaired Subsequent seizures.  On nuedextra for PBA referred to Rose Manzo / Bill Ureña from psych if insurance accepted  Change med dosing to TID to avoid 6AM wakeup no drug interactions with VPA, GBP, LAC.  Agree with PT/OT/speech; PMR f/u Family wants to try to taper ASM regimen.  Agree with reduction in VPA Vascular neuro f/u   Labs , levels requested fuv 4-6mo x time 21m

## 2024-03-01 NOTE — PHYSICAL EXAM
[FreeTextEntry1] : Exam limited on TH - alert, says a few words, sitting in WC Prior exam for reference:   status post Right craniectomy  General Constitutional: alert and in no acute distress.  Psychiatric: affect normal, insight and judgment intact. Neurologic:  Orientation: oriented to person, place, and time  Attention: poor focus, distracted, Left hemineglect. Language: no difficulty naming common objects, repeating a phrase, writing a sentence; fluency, comprehension, and reading intact.  Fund of knowledge: displays adequate knowledge of personal past history.  Cranial Nerves: visual acuity intact bilaterally, visual fields with left hemidefect, pupils equal round and reactive to light, extraocular motion intact, facial sensation intact symmetrically, face asymmetrical, L NLF down, hearing was intact bilaterally,  palate midline, head turning.  No shoulder shrug  on left Motor-- LUE plegic.  left hip 1-2/5, otherwise 0/5 right UE and LE 5/5. Tone: MAS 3/4 left shoulder, elbow and FF. 2/4 Sensation-- impaired on left, neuropathic pain  Deep tendon reflexes:  Biceps right 2+. Biceps left 2+.   Triceps right 2+. Triceps left 2+.   Brachioradialis right 2+. Brachioradialis left 2+.   Patella right 2+. Patella left 2+.   Ankle jerk right 2+. Ankle jerk left 2+.  Eyes: the sclera and conjunctiva were normal.  Neck: the appearance of the neck was normal.  Musculoskeletal: no clubbing or cyanosis of the fingernails.  Skin: no lesions, rash

## 2024-03-05 ENCOUNTER — APPOINTMENT (OUTPATIENT)
Dept: PHYSICAL MEDICINE AND REHAB | Facility: CLINIC | Age: 39
End: 2024-03-05
Payer: MEDICAID

## 2024-03-05 DIAGNOSIS — M62.838 OTHER MUSCLE SPASM: ICD-10-CM

## 2024-03-05 PROCEDURE — 64642 CHEMODENERV 1 EXTREMITY 1-4: CPT

## 2024-03-05 PROCEDURE — 95874 GUIDE NERV DESTR NEEDLE EMG: CPT

## 2024-03-05 PROCEDURE — 64643 CHEMODENERV 1 EXTREM 1-4 EA: CPT

## 2024-03-08 NOTE — PROCEDURE
[Consent] : consent was given by patient or guardian [Site Verification] : the injection site was verified [Post-Injection Instructions Provided] : post-injection instructions were provided [Total Units: ___] : [unfilled] units [Total Vials: ___] : [unfilled] vials were used [Total Waste: ___] : with [unfilled] wasted [] : EMG Guidance was used during the procedure [de-identified] : Sodium Chloride LOT#[id number] Exp8/2025 Botox 100mg LOT#[id number] Exp 3/2026 [TWNoteComboBox2] : 150 Units [TWNoteComboBox1] : Pectoralis Major [de-identified] : Brachialis [de-identified] : Flexor Carpi Radialis [de-identified] : 40 Units [de-identified] : Flexor Digitorum Superficialis [de-identified] : 40 Units [de-identified] : Lateral Gastrocnemius [de-identified] : 50 Units [de-identified] : 35 Units [de-identified] : 35 Units [de-identified] : Medial Gastrocnemius [de-identified] : Soleus [de-identified] : 30 Units

## 2024-03-08 NOTE — ASSESSMENT
[FreeTextEntry1] : -Patient with improved spasticity, less botox required for optimal results -Patient tolerated procedure well without complications, post-procedure instructions given in clinic  -Return to clinic in one month for assessment of spasticity  -Return to clinic in three months for additional botox  -Continue PT/OT/SLP x2 per week Transitions  -Continue current medication regimen, will address pain medications upon next visit

## 2024-03-12 ENCOUNTER — APPOINTMENT (OUTPATIENT)
Dept: NEUROLOGY | Facility: CLINIC | Age: 39
End: 2024-03-12

## 2024-04-02 ENCOUNTER — APPOINTMENT (OUTPATIENT)
Dept: PHYSICAL MEDICINE AND REHAB | Facility: CLINIC | Age: 39
End: 2024-04-02
Payer: MEDICAID

## 2024-04-02 VITALS
HEIGHT: 62 IN | OXYGEN SATURATION: 99 % | SYSTOLIC BLOOD PRESSURE: 100 MMHG | DIASTOLIC BLOOD PRESSURE: 71 MMHG | BODY MASS INDEX: 24.84 KG/M2 | WEIGHT: 135 LBS | HEART RATE: 79 BPM | TEMPERATURE: 97.4 F

## 2024-04-02 DIAGNOSIS — M79.2 NEURALGIA AND NEURITIS, UNSPECIFIED: ICD-10-CM

## 2024-04-02 PROCEDURE — 99214 OFFICE O/P EST MOD 30 MIN: CPT

## 2024-04-02 RX ORDER — ESCITALOPRAM OXALATE 20 MG/1
20 TABLET ORAL
Qty: 30 | Refills: 2 | Status: ACTIVE | COMMUNITY
Start: 2024-04-02 | End: 1900-01-01

## 2024-04-02 RX ORDER — ONABOTULINUMTOXINA 100 [USP'U]/1
100 INJECTION, POWDER, LYOPHILIZED, FOR SOLUTION INTRADERMAL; INTRAMUSCULAR
Qty: 4 | Refills: 0 | Status: ACTIVE | OUTPATIENT
Start: 2024-04-02

## 2024-04-02 NOTE — HISTORY OF PRESENT ILLNESS
[FreeTextEntry1] : [facility] Admission 26-Jun-2023 to 8/1/2023  8 year old female w/ PMHx hypothyroidism who was admitted to [facility] 6/6 after being found on floor at home, with CT revealing large right-sided ICH. She required emergent right frontal craniectomy for decompression. [facility] was [facility] to [facility]. She was transferred from the subacute to [facility] on September 10, 2023 for her cranioplasty which was performed on 9/12.  Patient presents today for follow-up after Botox injections to her left side 3/5/24 in dosing as below:  left Pectoralis Major was injected with 150 Units .. The left Brachialis was injected with 40 Units . The left Flexor Carpi Radialis was injected with 40 Units .  The left Flexor Digitorum Superficialis was injected with 50 Units .  The left Lateral Gastrocnemius was injected with 35 Units . The left Medial Gastrocnemius was injected with 35 Units .  The left Soleus was injected with 30 Units .  In total, 380 units, 5 vials were used with 120 wasted. (L Gastrocsoleus complex - 100u EMG guided)  Pt. feels her tone is better and is happy with results.  has been able to tolerate OT better due to improvement in tone.    Attends PT, OT, and speech at [facility].   PT note 4/1-continues to require assistance for left lower extremity advancement during forward ambulation along parallel bars with right upper extremity support at moderate assistance at trunk despite even up on right lower extremity.  Able to demonstrate hip flexion with compensatory trunk movement to kick ball and right upper extremity support outside of parallel bars.  Forward ambulation attempted after kicking ball with poor carryover of hip flexion and advancing left lower extremity.  Family educated on continue assessment of carryover for left lower extremity advancement without assistance.   Speech therapy 3/27-although session performance has been variable and impacted by pain intolerance, avoidance, perseverations and reduce attention there has been consistent improvement in ability to attend to tasks and reduction in avoidance behaviors.  Family members have not needed to be in session to assist with redirection to therapy.  With minimum to maximum encouragement from clinician she is able to utilize external supports to answer questions regarding orientation, session timeline and personal information.  Memory and auditory processing test performance has been improving focusing on improving memory and with the use of visual supports.  It should be noted that with improved attention there has been more evidence of confabulation.  Expressive goals continue to improve with better attention and expressive skills continue to be a strength.  She continues to benefit from increase time, repetitions, semantic cues and simplifications.  OT note 3/27-continues to perseverate on pain throughout body and demonstrate avoidance behaviors and was not able to be redirected for duration of session despite max attempts for encouragement and redirection.  Treatment session focused on improve functional use of left upper extremity and improve ADL safety and independence.  Utilizing neuromuscular electrical stimulation on left wrist and digits extensor  Function: transfers with 1 person assist-- 25%, not a functional ambulator; needs 2 person assist to get on toilet as toilet seat is deep. Need Mod A with UBD, and LBD needs 90% assist. Shower-- 2 p assist for transfer to shower chair and needs 85% assist with bathing. Set up eating and grooming.  No falls.   Patient was seen by neurology on 3/1-usual meds adjusted.  Referred to neuropsychiatry- Rose Manzo / Bill Ureña from psych if insurance accepted Valproic acid decreased to 250mg bid from 500mg bid. Weaning off.  Vimpat cont. 150mg bid,   Saw Dr. Ureña-- early March -- 3-4 weeks ago.  decreased gabapentin 300mg in AM and BID 600mg in afternoon and qhs.   --lexapro 20mg in AM synthroid 75mcg baclofen 5mg tid nudexta 1 cap in AM Was on memantine 5mg bid in [facility], & [facility]-- Was stopped at [place].

## 2024-04-02 NOTE — PHYSICAL EXAM
[FreeTextEntry1] : AAOx 2 Emotionally labile  Motor-- left hip 2/5, kE 2/5, otherwise 0/5, Left shoulder 1/5, EF 1/5. --. right UE and LE 5/5. Sensation-- impaired on left, Tone: MAS 2/4 left shoulder, 0 elbow flexors. 1+ FF. 1+/4 WFs, 2/4 PFs. WC mobility.

## 2024-04-02 NOTE — ASSESSMENT
[FreeTextEntry1] : Patient benefiting from Botox injections-next visit will order 400 units to be administered in the same dosing except will increase slightly in left pectorals or consider injecting into teres major.  Cont. PT, OT and speech--Rxs renewed  Labs ordered for neuroendocrine workup  Spasticity has improved so discussed tapering baclofen to 10 mg nightly as may help improve cognition and fatigue during the day.  Renewed Lexapro-  Will follow-up with neuropsychology at [facility] regarding any updates from neuropsychiatry that may have been discussed.  Follow-up on or after June 5 for next Botox injections.  Above plan of care discussed with patient's family.  All questions answered

## 2024-04-08 ENCOUNTER — APPOINTMENT (OUTPATIENT)
Dept: FAMILY MEDICINE | Facility: CLINIC | Age: 39
End: 2024-04-08

## 2024-04-16 ENCOUNTER — APPOINTMENT (OUTPATIENT)
Dept: NEUROLOGY | Facility: CLINIC | Age: 39
End: 2024-04-16

## 2024-04-16 DIAGNOSIS — M95.2 OTHER ACQUIRED DEFORMITY OF HEAD: ICD-10-CM

## 2024-04-23 ENCOUNTER — APPOINTMENT (OUTPATIENT)
Dept: CT IMAGING | Facility: HOSPITAL | Age: 39
End: 2024-04-23
Payer: MEDICAID

## 2024-04-23 ENCOUNTER — OUTPATIENT (OUTPATIENT)
Dept: OUTPATIENT SERVICES | Facility: HOSPITAL | Age: 39
LOS: 1 days | End: 2024-04-23
Payer: MEDICAID

## 2024-04-23 DIAGNOSIS — R41.89 OTHER SYMPTOMS AND SIGNS INVOLVING COGNITIVE FUNCTIONS AND AWARENESS: ICD-10-CM

## 2024-04-23 DIAGNOSIS — M95.2 OTHER ACQUIRED DEFORMITY OF HEAD: ICD-10-CM

## 2024-04-23 DIAGNOSIS — Z98.890 OTHER SPECIFIED POSTPROCEDURAL STATES: Chronic | ICD-10-CM

## 2024-04-23 DIAGNOSIS — G40.909 EPILEPSY, UNSPECIFIED, NOT INTRACTABLE, WITHOUT STATUS EPILEPTICUS: ICD-10-CM

## 2024-04-23 DIAGNOSIS — N83.209 UNSPECIFIED OVARIAN CYST, UNSPECIFIED SIDE: Chronic | ICD-10-CM

## 2024-04-23 PROCEDURE — 70450 CT HEAD/BRAIN W/O DYE: CPT

## 2024-04-23 PROCEDURE — 70450 CT HEAD/BRAIN W/O DYE: CPT | Mod: 26

## 2024-04-23 RX ORDER — GABAPENTIN 300 MG/1
300 CAPSULE ORAL 3 TIMES DAILY
Qty: 180 | Refills: 0 | Status: DISCONTINUED | COMMUNITY
Start: 2024-04-23 | End: 2024-04-23

## 2024-04-24 ENCOUNTER — TRANSCRIPTION ENCOUNTER (OUTPATIENT)
Age: 39
End: 2024-04-24

## 2024-05-03 ENCOUNTER — NON-APPOINTMENT (OUTPATIENT)
Age: 39
End: 2024-05-03

## 2024-05-17 ENCOUNTER — NON-APPOINTMENT (OUTPATIENT)
Age: 39
End: 2024-05-17

## 2024-05-17 RX ORDER — GABAPENTIN 600 MG/1
600 TABLET, COATED ORAL
Qty: 90 | Refills: 0 | Status: ACTIVE | COMMUNITY
Start: 2024-05-17 | End: 1900-01-01

## 2024-05-17 RX ORDER — BACLOFEN 5 MG/1
5 TABLET ORAL
Qty: 60 | Refills: 0 | Status: ACTIVE | COMMUNITY
Start: 2024-05-17 | End: 1900-01-01

## 2024-05-17 RX ORDER — BACLOFEN 5 MG/1
5 TABLET ORAL EVERY 8 HOURS
Refills: 0 | Status: DISCONTINUED | COMMUNITY
Start: 2023-10-30 | End: 2024-05-17

## 2024-06-06 ENCOUNTER — APPOINTMENT (OUTPATIENT)
Dept: PHYSICAL MEDICINE AND REHAB | Facility: CLINIC | Age: 39
End: 2024-06-06
Payer: MEDICAID

## 2024-06-06 VITALS
HEIGHT: 62 IN | BODY MASS INDEX: 24.84 KG/M2 | HEART RATE: 88 BPM | DIASTOLIC BLOOD PRESSURE: 82 MMHG | OXYGEN SATURATION: 93 % | TEMPERATURE: 97.3 F | WEIGHT: 135 LBS | SYSTOLIC BLOOD PRESSURE: 114 MMHG

## 2024-06-06 PROCEDURE — 95874 GUIDE NERV DESTR NEEDLE EMG: CPT

## 2024-06-06 PROCEDURE — 64643 CHEMODENERV 1 EXTREM 1-4 EA: CPT

## 2024-06-06 PROCEDURE — 64644 CHEMODENERV 1 EXTREM 5/> MUS: CPT

## 2024-06-11 ENCOUNTER — APPOINTMENT (OUTPATIENT)
Dept: NEUROLOGY | Facility: CLINIC | Age: 39
End: 2024-06-11
Payer: MEDICAID

## 2024-06-11 VITALS
WEIGHT: 135 LBS | HEIGHT: 62 IN | BODY MASS INDEX: 24.84 KG/M2 | DIASTOLIC BLOOD PRESSURE: 70 MMHG | SYSTOLIC BLOOD PRESSURE: 100 MMHG | HEART RATE: 76 BPM

## 2024-06-11 DIAGNOSIS — G40.909 EPILEPSY, UNSPECIFIED, NOT INTRACTABLE, W/OUT STATUS EPILEPTICUS: ICD-10-CM

## 2024-06-11 DIAGNOSIS — R45.86 EMOTIONAL LABILITY: ICD-10-CM

## 2024-06-11 PROCEDURE — G2211 COMPLEX E/M VISIT ADD ON: CPT | Mod: NC,1L

## 2024-06-11 PROCEDURE — 99214 OFFICE O/P EST MOD 30 MIN: CPT

## 2024-06-11 RX ORDER — BACLOFEN 5 MG/1
5 TABLET ORAL 3 TIMES DAILY
Qty: 270 | Refills: 1 | Status: DISCONTINUED | COMMUNITY
Start: 2024-01-11 | End: 2024-06-11

## 2024-06-11 RX ORDER — ESCITALOPRAM OXALATE 20 MG/1
20 TABLET ORAL
Qty: 90 | Refills: 1 | Status: DISCONTINUED | COMMUNITY
Start: 2024-01-11 | End: 2024-06-11

## 2024-06-11 RX ORDER — GABAPENTIN 600 MG/1
600 TABLET, COATED ORAL
Qty: 90 | Refills: 0 | Status: DISCONTINUED | COMMUNITY
Start: 2024-01-04 | End: 2024-06-11

## 2024-06-11 RX ORDER — BACLOFEN 5 MG/1
5 TABLET ORAL AT BEDTIME
Qty: 60 | Refills: 2 | Status: DISCONTINUED | COMMUNITY
Start: 2024-04-02 | End: 2024-06-11

## 2024-06-11 NOTE — PROCEDURE
[Consent] : consent was given by patient or guardian [] : EMG Guidance was used during the procedure [Total Units: ___] : [unfilled] units [Total Vials: ___] : [unfilled] vials were used [TWNoteComboBox1] : Teres Major [TWNoteComboBox2] : 70 Units [de-identified] : Pectoralis Major [de-identified] : 95 Units [de-identified] : Brachialis [de-identified] : 40 Units [de-identified] : Flexor Carpi Radialis [de-identified] : 40 Units [de-identified] : Flexor Digitorum Superficialis [de-identified] : 55 Units [de-identified] : Lateral Gastrocnemius [de-identified] : 35 Units [de-identified] : Medial Gastrocnemius [de-identified] : 35 Units [de-identified] : Soleus [de-identified] : 30 Units

## 2024-06-11 NOTE — ASSESSMENT
[FreeTextEntry1] : Patient tolerated Botox injections well.  Follow-up next month to assess affect and review progress in therapy.  All questions answered.

## 2024-06-11 NOTE — DISCUSSION/SUMMARY
[FreeTextEntry1] : 39F RH p R ICH p craniotomy L spastic plegia. Some cognitive issues, confabulation, emotional lability. memory impaired Subsequent seizures.  On nuedextra for PBA, emotional incontinence -> improved referred to Rose Manzo from psych   Change med dosing to TID to avoid 6AM wakeup no drug interactions with VPA, GBP, LAC. TAY trial to replace LAC/VPA  Agree with PT/OT/speech; PMR f/u Family wants to try to simplify ASM regimen.   GBP reduction to 300/300/600mg for daytime sedation. Vascular neuro f/u   Labs, levels requested fuv 4-6mo  x time 35m

## 2024-06-11 NOTE — HISTORY OF PRESENT ILLNESS
[FreeTextEntry1] : Medication: depakote- 500/750mg  vimpat 200mg bid gabapentin 300/600/600mg  lexapro 20mg synthroid 75mcg baclofen 10mg qhs nuedexta 1 cap bid  DC memantine 5mg bid  Trials: ritalin, trazadone, agitation on LEV.  Updates:  5/31/24  seizure:  eyes to left initially, stops talking x 45 s, right sided leg and arm shaking.  talking afterwards. 4/16/2024 seizure on /500mg.  Dose increased back to 500/750mg.  concern for cognitive slowing, intention tremor recent increase in GBP family asking about decreasing meds.   no seizures since prior admission 12/2023  Some chronic spasticity/pain LUE.  RLE some paraesthesias/pain in AM.  HPI: 39-year-old female.  Initially found down and  brought to [facility]. Found to have large right hemispheric IPH. S/p right hemicraniectomy 6/6/23 by Dr. Bernstein.  EVD was placed then removed.  Transferred from the subacute to [facility] on September 10, 2023 for her cranioplasty which was performed on 9/12/23.  f/u angio apparently not revealing.  Additional smaller seizure noted p cranioplasty. Noted to have right frontal seizures on EEG  postoperatively  Discharged on: depakote 500mg/750mg , lexapro 15mg and trazodone 50mg started on Ritalin and Neudexta at [place]  Possible seizure x2 12/1/2023 p botox earlier in day, and VPA dose increase.  Vomited next day.  Had fever following week. Pt with no recollection, or aura. Mom describes blanking/zoning out x1min.   history of  subsequent spasticity, trials of Botox  history of labile mood chronic pain, pins needles  FmHx: Bro with seizures with TSC.  colitis.  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

## 2024-07-02 ENCOUNTER — APPOINTMENT (OUTPATIENT)
Dept: PHYSICAL MEDICINE AND REHAB | Facility: CLINIC | Age: 39
End: 2024-07-02
Payer: MEDICAID

## 2024-07-02 ENCOUNTER — TRANSCRIPTION ENCOUNTER (OUTPATIENT)
Age: 39
End: 2024-07-02

## 2024-07-02 VITALS
WEIGHT: 135 LBS | SYSTOLIC BLOOD PRESSURE: 93 MMHG | DIASTOLIC BLOOD PRESSURE: 65 MMHG | BODY MASS INDEX: 24.84 KG/M2 | TEMPERATURE: 97.9 F | HEART RATE: 84 BPM | HEIGHT: 62 IN | OXYGEN SATURATION: 98 %

## 2024-07-02 DIAGNOSIS — I61.9 NONTRAUMATIC INTRACEREBRAL HEMORRHAGE, UNSPECIFIED: ICD-10-CM

## 2024-07-02 DIAGNOSIS — I69.054: ICD-10-CM

## 2024-07-02 DIAGNOSIS — R41.89 OTHER SYMPTOMS AND SIGNS INVOLVING COGNITIVE FUNCTIONS AND AWARENESS: ICD-10-CM

## 2024-07-02 DIAGNOSIS — R26.9 UNSPECIFIED ABNORMALITIES OF GAIT AND MOBILITY: ICD-10-CM

## 2024-07-02 PROCEDURE — 99214 OFFICE O/P EST MOD 30 MIN: CPT

## 2024-07-02 RX ORDER — ONABOTULINUMTOXINA 100 [USP'U]/1
100 INJECTION, POWDER, LYOPHILIZED, FOR SOLUTION INTRADERMAL; INTRAMUSCULAR
Qty: 5 | Refills: 0 | Status: ACTIVE | OUTPATIENT
Start: 2024-07-02

## 2024-07-02 RX ORDER — BACLOFEN 10 MG/1
10 TABLET ORAL
Qty: 30 | Refills: 3 | Status: ACTIVE | COMMUNITY
Start: 2024-07-02 | End: 1900-01-01

## 2024-07-05 ENCOUNTER — APPOINTMENT (OUTPATIENT)
Dept: NEUROLOGY | Facility: CLINIC | Age: 39
End: 2024-07-05
Payer: MEDICAID

## 2024-07-05 PROCEDURE — 95816 EEG AWAKE AND DROWSY: CPT

## 2024-07-06 PROCEDURE — 95700 EEG CONT REC W/VID EEG TECH: CPT

## 2024-07-06 PROCEDURE — 95719 EEG PHYS/QHP EA INCR W/O VID: CPT

## 2024-07-06 PROCEDURE — 95708 EEG WO VID EA 12-26HR UNMNTR: CPT

## 2024-07-08 ENCOUNTER — TRANSCRIPTION ENCOUNTER (OUTPATIENT)
Age: 39
End: 2024-07-08

## 2024-07-08 ENCOUNTER — RX RENEWAL (OUTPATIENT)
Age: 39
End: 2024-07-08

## 2024-07-12 ENCOUNTER — TRANSCRIPTION ENCOUNTER (OUTPATIENT)
Age: 39
End: 2024-07-12

## 2024-07-16 ENCOUNTER — APPOINTMENT (OUTPATIENT)
Dept: NEUROLOGY | Facility: CLINIC | Age: 39
End: 2024-07-16

## 2024-07-17 ENCOUNTER — TRANSCRIPTION ENCOUNTER (OUTPATIENT)
Age: 39
End: 2024-07-17

## 2024-07-18 ENCOUNTER — APPOINTMENT (OUTPATIENT)
Dept: NEUROLOGY | Facility: CLINIC | Age: 39
End: 2024-07-18

## 2024-08-13 NOTE — OCCUPATIONAL THERAPY INITIAL EVALUATION ADULT - PATIENT/FAMILY/SIGNIFICANT OTHER GOALS STATEMENT, OT EVAL
Situation:   Returned call     Key Assessments:   Writer returned Will's call, Isreal had chest pain that started around 3 A.M., he took nitro around that time which he states did not help. He states his chest pain is about the same as it usually is. He is at work now and he is rating his chest pain about 3/4. He took nitro last around 5 A.M. He did not want to go get checked out due to his grand daughter being with him at home before going into work.  He denies any new or worsening symptoms at this time. Isreal was advised to move slowly, especially when changing positions and to rest when able to. He is currently at work and unable to check his vitals, he also states he has some chest discomfort he rates as a 3/4 out of 10 but, doesn't feel he needs to go to the ED for it at this time. He is going to take another dose of nitro that he has at work with him, he is working for another hour and is going to take it easy an re-evaluate if he needs to be checked out or will need to go home after the next nitro. He will continue to monitor symptoms at this time and was directed to the ED if any new or worsening symptoms arise. No further questions at this time. Writer will send update to Natty RN with [facility] and Dr. Yeboah's RN pool for review.     Actions Taken:   Focused Assessment     Program plan:   Continue to monitor RPM and follow up as scheduled or sooner if needed      Will returned Writers call back, left voicemail for writer. Writer called Will back but patient did not answer. Writer left second voicemail for patient to return call back.       Attempted to reach patient for:  RPM alert answered \"Yes\" to having symptoms of headache, change in vision, dizziness, or chest pain     Outcome:  Voicemail left for Will to return call, if having new or worsening symptoms either contact PCP or head to ED for evaluation     Next Follow Up:  As needed for RPM     See hyperlinks within encounter for full  documentation     as per family, to get better

## 2024-09-19 ENCOUNTER — APPOINTMENT (OUTPATIENT)
Dept: FAMILY MEDICINE | Facility: CLINIC | Age: 39
End: 2024-09-19
Payer: MEDICAID

## 2024-09-19 ENCOUNTER — APPOINTMENT (OUTPATIENT)
Dept: PHYSICAL MEDICINE AND REHAB | Facility: CLINIC | Age: 39
End: 2024-09-19
Payer: MEDICAID

## 2024-09-19 VITALS
WEIGHT: 135 LBS | HEART RATE: 84 BPM | DIASTOLIC BLOOD PRESSURE: 81 MMHG | SYSTOLIC BLOOD PRESSURE: 115 MMHG | TEMPERATURE: 98.6 F | RESPIRATION RATE: 16 BRPM | BODY MASS INDEX: 24.69 KG/M2 | OXYGEN SATURATION: 95 %

## 2024-09-19 VITALS
TEMPERATURE: 98.4 F | SYSTOLIC BLOOD PRESSURE: 93 MMHG | HEART RATE: 99 BPM | DIASTOLIC BLOOD PRESSURE: 70 MMHG | OXYGEN SATURATION: 94 % | BODY MASS INDEX: 24.84 KG/M2 | HEIGHT: 62 IN | WEIGHT: 135 LBS

## 2024-09-19 DIAGNOSIS — F32.A ANXIETY DISORDER, UNSPECIFIED: ICD-10-CM

## 2024-09-19 DIAGNOSIS — S32.000D WEDGE COMPRESSION FRACTURE OF UNSPECIFIED LUMBAR VERTEBRA, SUBSEQUENT ENCOUNTER FOR FRACTURE WITH ROUTINE HEALING: ICD-10-CM

## 2024-09-19 DIAGNOSIS — F41.9 ANXIETY DISORDER, UNSPECIFIED: ICD-10-CM

## 2024-09-19 DIAGNOSIS — R26.9 UNSPECIFIED ABNORMALITIES OF GAIT AND MOBILITY: ICD-10-CM

## 2024-09-19 DIAGNOSIS — F48.2 PSEUDOBULBAR AFFECT: ICD-10-CM

## 2024-09-19 DIAGNOSIS — I69.054: ICD-10-CM

## 2024-09-19 DIAGNOSIS — I61.9 NONTRAUMATIC INTRACEREBRAL HEMORRHAGE, UNSPECIFIED: ICD-10-CM

## 2024-09-19 DIAGNOSIS — G40.909 EPILEPSY, UNSPECIFIED, NOT INTRACTABLE, W/OUT STATUS EPILEPTICUS: ICD-10-CM

## 2024-09-19 DIAGNOSIS — R41.89 OTHER SYMPTOMS AND SIGNS INVOLVING COGNITIVE FUNCTIONS AND AWARENESS: ICD-10-CM

## 2024-09-19 PROCEDURE — 64644 CHEMODENERV 1 EXTREM 5/> MUS: CPT

## 2024-09-19 PROCEDURE — 95874 GUIDE NERV DESTR NEEDLE EMG: CPT

## 2024-09-19 PROCEDURE — 64643 CHEMODENERV 1 EXTREM 1-4 EA: CPT

## 2024-09-19 PROCEDURE — 99214 OFFICE O/P EST MOD 30 MIN: CPT

## 2024-09-19 PROCEDURE — 99213 OFFICE O/P EST LOW 20 MIN: CPT | Mod: 25

## 2024-09-19 RX ORDER — BACLOFEN 5 MG/1
5 TABLET ORAL
Qty: 30 | Refills: 1 | Status: ACTIVE | COMMUNITY
Start: 2024-09-19 | End: 1900-01-01

## 2024-09-19 RX ORDER — GABAPENTIN 600 MG/1
600 TABLET, COATED ORAL AT BEDTIME
Qty: 30 | Refills: 1 | Status: ACTIVE | COMMUNITY
Start: 2024-09-19 | End: 1900-01-01

## 2024-09-23 PROBLEM — F48.2 PSEUDOBULBAR AFFECT: Status: ACTIVE | Noted: 2024-09-23

## 2024-09-23 RX ORDER — DEXTROMETHORPHAN HYDROBROMIDE AND QUINIDINE SULFATE 20; 10 MG/1; MG/1
20-10 CAPSULE, GELATIN COATED ORAL DAILY
Qty: 30 | Refills: 2 | Status: ACTIVE | COMMUNITY
Start: 2024-09-23 | End: 1900-01-01

## 2024-09-23 NOTE — HISTORY OF PRESENT ILLNESS
[FreeTextEntry1] : [facility] Admission 26-Jun-2023 to 8/1/2023  39 year old female w/ PMHx hypothyroidism who was admitted to [facility] 6/6 after being found on floor at home, with CT revealing large right-sided ICH. She required emergent right frontal craniectomy for decompression. [facility] was [facility] to [facility]. She was transferred from the subacute to [facility] on September 10, 2023 for her cranioplasty which was performed on 9/12.      9/19/2024 Interval History Patient presents today for Botox injections to her left side, last injections were on 6/6/24, 400 Units. mother states her tone is very "tight" right now and she is "ready for botox".  Ms Mandujano's mother states that Kellee is having good days and bad days in therapy.  Has some anxiety about falling, and that due to this anxiety she is having a hard time doing her PT, and Kellee is also having a lot of daytime fatigue which limits her function.  Mother is asking about the effect of her medications and if any could be causing her to be fatigued during the day.   Pt. sleeps through the night.   Mom is amenable to reducing the baclofen dosing from 10mg to 5mg.  Kellee states that she has "had better days".  Kellee and her mom are amenable to her taking only 600 of gabapentin at night, and getting rid of the 300mg of gabapentin in the morning to reduce her daytime fatigue.     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  Based on standardized and subjective testing, patient presents with deficits in the area of attention - mainly sustained attention often with distractibility of external and internal stressors which then cause perseveration, benefits from being redirected occasionally.  Per memory, patient with intact immediate memory however with reduced short term memory recall.  Furthermore, mild deficits in executive function; reduced initiation, mental manipulation, and disorganization during linguistic and nonlinguistic tasks with concrete rigidity in her thinking and mild visuospatial skills with reduced mental flexibility and visual discrimination.  These cog changes are negatively affecting her ability to communicate in the areas of receptive language skills - with reduced auditory comprehension tasks requiring repetitive instructions to complete tasks as well as expressive language skills with reduced ability to have meaningful discourse, make medical decisions, and make complex wants / needs known.  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      Function: transfers with 1 person assist-- 25%, not a functional ambulator--WC mobility; needs 1 person assist to get on toilet with Max A. Need Mod A with UBD, and LBD needs 90% assist. Shower-- 2 p assist for transfer to shower chair and needs 85% assist with bathing. Set up eating and grooming.  No falls.

## 2024-09-23 NOTE — PROCEDURE
[Consent] : consent was given by patient or guardian [Site Verification] : the injection site was verified [Post-Injection Instructions Provided] : post-injection instructions were provided [Other ___] : [unfilled] [] : EMG Guidance was used during the procedure [Total Units: ___] : [unfilled] units [Total Vials: ___] : [unfilled] vials were used [Total Waste: ___] : with [unfilled] wasted [de-identified] : Sodium Chloride LOT#[id number] Exp 2/2026 NDC#40402-750-63 Botox 100mg X5 LOT#[id number] Exp 7/2026 NDC#8723-2671-58 [TWNoteComboBox1] : Teres Major [TWNoteComboBox2] : 40 Units [de-identified] : Pectoralis Major [de-identified] : 100 Units [de-identified] : Brachialis [de-identified] : 40 Units [de-identified] : Flexor Digitorum Superficialis [de-identified] : 60 Units [de-identified] : Flexor Carpi Radialis [de-identified] : 10 Units [de-identified] : Lateral Gastrocnemius [de-identified] : 55 Units [de-identified] : Medial Gastrocnemius [de-identified] : 100 Units [de-identified] : Soleus [de-identified] : 50 Units [de-identified] : 15 Units

## 2024-09-23 NOTE — PHYSICAL EXAM
[FreeTextEntry1] : AAOx 2 Emotionally labile  Motor-- left hip 2/5, kE 2/5, PF trace, otherwise 0/5, Left shoulder 1/5, EF 0-1/5. --. right UE and LE 5/5. Sensation-- impaired on left, Tone: MAS 3/4 left shoulder, 1+ elbow flexors. 1+ triceps. 0/4 WFs, FF 2/4, 3/4 PFs.--Knee extension-- -7 deg. from full extension, with knee flexion-- neutral, Quad-- 2/4

## 2024-09-23 NOTE — ASSESSMENT
[FreeTextEntry1] : Patient is benefiting from Botox injections-will return in one month to assess 9/19/2024 botox injections, 500U.    Continue PT, OT and speech therapy-scripts renewed  Reduce baclofen to 5 mg nightly-scripts provided. Reduce gabapentin from 300mg am/600mg pm to only 600mg pm.   --d/w pt's mother who agrees with plan of care  --next visit will assess if pt. will benefit from memantine trial .   All questions answered.

## 2024-09-23 NOTE — HISTORY OF PRESENT ILLNESS
[FreeTextEntry1] : [facility] Admission 26-Jun-2023 to 8/1/2023  39 year old female w/ PMHx hypothyroidism who was admitted to [facility] 6/6 after being found on floor at home, with CT revealing large right-sided ICH. She required emergent right frontal craniectomy for decompression. [facility] was [facility] to [facility]. She was transferred from the subacute to [facility] on September 10, 2023 for her cranioplasty which was performed on 9/12.      9/19/2024 Interval History Patient presents today for Botox injections to her left side, last injections were on 6/6/24, 400 Units. mother states her tone is very "tight" right now and she is "ready for botox".  Ms Mandujano's mother states that Kellee is having good days and bad days in therapy.  Has some anxiety about falling, and that due to this anxiety she is having a hard time doing her PT, and Kellee is also having a lot of daytime fatigue which limits her function.  Mother is asking about the effect of her medications and if any could be causing her to be fatigued during the day.   Pt. sleeps through the night.   Mom is amenable to reducing the baclofen dosing from 10mg to 5mg.  Kellee states that she has "had better days".  Kellee and her mom are amenable to her taking only 600 of gabapentin at night, and getting rid of the 300mg of gabapentin in the morning to reduce her daytime fatigue.     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  Based on standardized and subjective testing, patient presents with deficits in the area of attention - mainly sustained attention often with distractibility of external and internal stressors which then cause perseveration, benefits from being redirected occasionally.  Per memory, patient with intact immediate memory however with reduced short term memory recall.  Furthermore, mild deficits in executive function; reduced initiation, mental manipulation, and disorganization during linguistic and nonlinguistic tasks with concrete rigidity in her thinking and mild visuospatial skills with reduced mental flexibility and visual discrimination.  These cog changes are negatively affecting her ability to communicate in the areas of receptive language skills - with reduced auditory comprehension tasks requiring repetitive instructions to complete tasks as well as expressive language skills with reduced ability to have meaningful discourse, make medical decisions, and make complex wants / needs known.  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      Function: transfers with 1 person assist-- 25%, not a functional ambulator--WC mobility; needs 1 person assist to get on toilet with Max A. Need Mod A with UBD, and LBD needs 90% assist. Shower-- 2 p assist for transfer to shower chair and needs 85% assist with bathing. Set up eating and grooming.  No falls.

## 2024-09-23 NOTE — PROCEDURE
[Consent] : consent was given by patient or guardian [Site Verification] : the injection site was verified [Post-Injection Instructions Provided] : post-injection instructions were provided [Other ___] : [unfilled] [] : EMG Guidance was used during the procedure [Total Units: ___] : [unfilled] units [Total Vials: ___] : [unfilled] vials were used [Total Waste: ___] : with [unfilled] wasted [de-identified] : Sodium Chloride LOT#[id number] Exp 2/2026 NDC#26195-968-78 Botox 100mg X5 LOT#[id number] Exp 7/2026 NDC#3992-3639-54 [TWNoteComboBox1] : Teres Major [TWNoteComboBox2] : 40 Units [de-identified] : Pectoralis Major [de-identified] : 100 Units [de-identified] : Brachialis [de-identified] : 40 Units [de-identified] : Flexor Digitorum Superficialis [de-identified] : 60 Units [de-identified] : Flexor Carpi Radialis [de-identified] : 10 Units [de-identified] : Lateral Gastrocnemius [de-identified] : 55 Units [de-identified] : Medial Gastrocnemius [de-identified] : 100 Units [de-identified] : Soleus [de-identified] : 50 Units [de-identified] : 15 Units

## 2024-09-24 NOTE — PHYSICAL EXAM
[Normal] : normal rate, regular rhythm, normal S1 and S2 and no murmur heard [de-identified] : wheelchair bound, mildly irritable today [de-identified] : positive tenderness to palpation of lumbar spine

## 2024-09-24 NOTE — REVIEW OF SYSTEMS
[Joint Pain] : joint pain [Muscle Pain] : muscle pain [Back Pain] : back pain [Memory Loss] : memory loss [Unsteady Walking] : ataxia [Negative] : Respiratory

## 2024-09-24 NOTE — HISTORY OF PRESENT ILLNESS
[FreeTextEntry8] : 39-year-old female, accompanied by her mother, presents for ongoing management of chronic medical conditions. History of a brain hemorrhage which has affected her motor response and cognitive ability. She has also been hospitalized twice in 2023. She underwent several head surgeries, including craniotomy and cranioplasty. Furthermore, she has a history of seizures and was last seen having a minor one about a week ago. She also experiences pain in the head and lower back, and has a lumbar compression fracture. Takes Gabapentin (600mg) - One tablet in the morning and two at night. Mom needs papers filled out.

## 2024-10-02 ENCOUNTER — TRANSCRIPTION ENCOUNTER (OUTPATIENT)
Age: 39
End: 2024-10-02

## 2024-10-02 ENCOUNTER — NON-APPOINTMENT (OUTPATIENT)
Age: 39
End: 2024-10-02

## 2024-10-03 ENCOUNTER — TRANSCRIPTION ENCOUNTER (OUTPATIENT)
Age: 39
End: 2024-10-03

## 2024-10-07 ENCOUNTER — TRANSCRIPTION ENCOUNTER (OUTPATIENT)
Age: 39
End: 2024-10-07

## 2024-10-08 ENCOUNTER — APPOINTMENT (OUTPATIENT)
Dept: NEUROSURGERY | Facility: CLINIC | Age: 39
End: 2024-10-08
Payer: MEDICAID

## 2024-10-08 DIAGNOSIS — Z98.890 OTHER SPECIFIED POSTPROCEDURAL STATES: ICD-10-CM

## 2024-10-08 PROCEDURE — 99213 OFFICE O/P EST LOW 20 MIN: CPT

## 2024-10-09 ENCOUNTER — TRANSCRIPTION ENCOUNTER (OUTPATIENT)
Age: 39
End: 2024-10-09

## 2024-10-09 ENCOUNTER — NON-APPOINTMENT (OUTPATIENT)
Age: 39
End: 2024-10-09

## 2024-10-15 ENCOUNTER — TRANSCRIPTION ENCOUNTER (OUTPATIENT)
Age: 39
End: 2024-10-15

## 2024-10-17 ENCOUNTER — APPOINTMENT (OUTPATIENT)
Dept: PHYSICAL MEDICINE AND REHAB | Facility: CLINIC | Age: 39
End: 2024-10-17
Payer: MEDICAID

## 2024-10-17 VITALS
HEIGHT: 62 IN | SYSTOLIC BLOOD PRESSURE: 121 MMHG | DIASTOLIC BLOOD PRESSURE: 83 MMHG | OXYGEN SATURATION: 95 % | HEART RATE: 94 BPM | TEMPERATURE: 98.4 F

## 2024-10-17 DIAGNOSIS — I69.054: ICD-10-CM

## 2024-10-17 DIAGNOSIS — R26.9 UNSPECIFIED ABNORMALITIES OF GAIT AND MOBILITY: ICD-10-CM

## 2024-10-17 DIAGNOSIS — M79.2 NEURALGIA AND NEURITIS, UNSPECIFIED: ICD-10-CM

## 2024-10-17 DIAGNOSIS — I61.9 NONTRAUMATIC INTRACEREBRAL HEMORRHAGE, UNSPECIFIED: ICD-10-CM

## 2024-10-17 PROCEDURE — 99214 OFFICE O/P EST MOD 30 MIN: CPT

## 2024-10-21 RX ORDER — BACLOFEN 5 MG/1
5 TABLET ORAL
Qty: 30 | Refills: 2 | Status: ACTIVE | COMMUNITY
Start: 2024-10-21 | End: 1900-01-01

## 2024-10-21 RX ORDER — ONABOTULINUMTOXINA 100 [USP'U]/1
100 INJECTION, POWDER, LYOPHILIZED, FOR SOLUTION INTRADERMAL; INTRAMUSCULAR
Qty: 5 | Refills: 0 | Status: ACTIVE | OUTPATIENT
Start: 2024-10-21

## 2024-10-21 RX ORDER — GABAPENTIN 300 MG/1
300 CAPSULE ORAL
Qty: 60 | Refills: 2 | Status: ACTIVE | COMMUNITY
Start: 2024-10-21 | End: 1900-01-01

## 2024-10-29 ENCOUNTER — APPOINTMENT (OUTPATIENT)
Dept: NEUROLOGY | Facility: CLINIC | Age: 39
End: 2024-10-29
Payer: MEDICAID

## 2024-10-29 DIAGNOSIS — G40.909 EPILEPSY, UNSPECIFIED, NOT INTRACTABLE, W/OUT STATUS EPILEPTICUS: ICD-10-CM

## 2024-10-29 DIAGNOSIS — I69.054: ICD-10-CM

## 2024-10-29 PROCEDURE — G2211 COMPLEX E/M VISIT ADD ON: CPT | Mod: NC

## 2024-10-29 PROCEDURE — 99214 OFFICE O/P EST MOD 30 MIN: CPT

## 2024-11-13 ENCOUNTER — APPOINTMENT (OUTPATIENT)
Dept: NEUROLOGY | Facility: CLINIC | Age: 39
End: 2024-11-13

## 2024-11-15 ENCOUNTER — RX RENEWAL (OUTPATIENT)
Age: 39
End: 2024-11-15

## 2024-11-21 ENCOUNTER — APPOINTMENT (OUTPATIENT)
Dept: PHYSICAL MEDICINE AND REHAB | Facility: CLINIC | Age: 39
End: 2024-11-21
Payer: MEDICAID

## 2024-11-21 VITALS
BODY MASS INDEX: 23.92 KG/M2 | SYSTOLIC BLOOD PRESSURE: 114 MMHG | HEIGHT: 62 IN | HEART RATE: 94 BPM | TEMPERATURE: 98 F | DIASTOLIC BLOOD PRESSURE: 82 MMHG | OXYGEN SATURATION: 99 % | WEIGHT: 130 LBS

## 2024-11-21 DIAGNOSIS — F48.2 PSEUDOBULBAR AFFECT: ICD-10-CM

## 2024-11-21 DIAGNOSIS — I69.054: ICD-10-CM

## 2024-11-21 DIAGNOSIS — I61.9 NONTRAUMATIC INTRACEREBRAL HEMORRHAGE, UNSPECIFIED: ICD-10-CM

## 2024-11-21 DIAGNOSIS — R41.89 OTHER SYMPTOMS AND SIGNS INVOLVING COGNITIVE FUNCTIONS AND AWARENESS: ICD-10-CM

## 2024-11-21 PROCEDURE — 99214 OFFICE O/P EST MOD 30 MIN: CPT

## 2024-11-21 RX ORDER — MEMANTINE HYDROCHLORIDE 5 MG/1
5 TABLET, FILM COATED ORAL EVERY MORNING
Qty: 30 | Refills: 1 | Status: ACTIVE | COMMUNITY
Start: 2024-11-21 | End: 1900-01-01

## 2024-12-23 ENCOUNTER — APPOINTMENT (OUTPATIENT)
Dept: PHYSICAL MEDICINE AND REHAB | Facility: CLINIC | Age: 39
End: 2024-12-23
Payer: COMMERCIAL

## 2024-12-23 VITALS
TEMPERATURE: 97.2 F | HEART RATE: 98 BPM | BODY MASS INDEX: 23.92 KG/M2 | DIASTOLIC BLOOD PRESSURE: 85 MMHG | HEIGHT: 62 IN | SYSTOLIC BLOOD PRESSURE: 114 MMHG | OXYGEN SATURATION: 99 % | WEIGHT: 130 LBS

## 2024-12-23 DIAGNOSIS — I69.054: ICD-10-CM

## 2024-12-23 DIAGNOSIS — R26.9 UNSPECIFIED ABNORMALITIES OF GAIT AND MOBILITY: ICD-10-CM

## 2024-12-23 DIAGNOSIS — I61.9 NONTRAUMATIC INTRACEREBRAL HEMORRHAGE, UNSPECIFIED: ICD-10-CM

## 2024-12-23 PROCEDURE — 95874 GUIDE NERV DESTR NEEDLE EMG: CPT

## 2024-12-23 PROCEDURE — 64643 CHEMODENERV 1 EXTREM 1-4 EA: CPT

## 2024-12-23 PROCEDURE — 64644 CHEMODENERV 1 EXTREM 5/> MUS: CPT

## 2025-01-23 ENCOUNTER — APPOINTMENT (OUTPATIENT)
Dept: PHYSICAL MEDICINE AND REHAB | Facility: CLINIC | Age: 40
End: 2025-01-23
Payer: COMMERCIAL

## 2025-01-23 ENCOUNTER — NON-APPOINTMENT (OUTPATIENT)
Age: 40
End: 2025-01-23

## 2025-01-23 VITALS
BODY MASS INDEX: 24.84 KG/M2 | TEMPERATURE: 98.4 F | DIASTOLIC BLOOD PRESSURE: 87 MMHG | HEART RATE: 98 BPM | WEIGHT: 135 LBS | OXYGEN SATURATION: 98 % | SYSTOLIC BLOOD PRESSURE: 119 MMHG | HEIGHT: 62 IN

## 2025-01-23 DIAGNOSIS — I69.054: ICD-10-CM

## 2025-01-23 DIAGNOSIS — I61.9 NONTRAUMATIC INTRACEREBRAL HEMORRHAGE, UNSPECIFIED: ICD-10-CM

## 2025-01-23 DIAGNOSIS — R41.89 OTHER SYMPTOMS AND SIGNS INVOLVING COGNITIVE FUNCTIONS AND AWARENESS: ICD-10-CM

## 2025-01-23 DIAGNOSIS — M79.2 NEURALGIA AND NEURITIS, UNSPECIFIED: ICD-10-CM

## 2025-01-23 DIAGNOSIS — R26.9 UNSPECIFIED ABNORMALITIES OF GAIT AND MOBILITY: ICD-10-CM

## 2025-01-23 PROCEDURE — 99214 OFFICE O/P EST MOD 30 MIN: CPT

## 2025-01-23 RX ORDER — MEMANTINE HYDROCHLORIDE 5 MG/1
5 TABLET, FILM COATED ORAL
Qty: 90 | Refills: 0 | Status: ACTIVE | COMMUNITY
Start: 2025-01-23 | End: 1900-01-01

## 2025-01-23 RX ORDER — ONABOTULINUMTOXINA 100 [USP'U]/1
100 INJECTION, POWDER, LYOPHILIZED, FOR SOLUTION INTRADERMAL; INTRAMUSCULAR
Qty: 5 | Refills: 0 | Status: ACTIVE | OUTPATIENT
Start: 2025-01-23

## 2025-01-27 RX ORDER — GABAPENTIN 300 MG/1
300 CAPSULE ORAL
Qty: 60 | Refills: 2 | Status: ACTIVE | COMMUNITY
Start: 2025-01-27 | End: 1900-01-01

## 2025-01-27 RX ORDER — BACLOFEN 5 MG/1
5 TABLET ORAL
Qty: 30 | Refills: 2 | Status: ACTIVE | COMMUNITY
Start: 2025-01-27 | End: 1900-01-01

## 2025-01-28 ENCOUNTER — TRANSCRIPTION ENCOUNTER (OUTPATIENT)
Age: 40
End: 2025-01-28

## 2025-01-28 NOTE — PROGRESS NOTE ADULT - PROBLEM SELECTOR PLAN 1
with brain compression, course as above  postop management per neurosurgery  patient intermittently responsive. repeat CT head (6/20) with "resolving right frontal hemorrhage with similar mass effect and unchanged minimal leftward midline shift. No hydrocephalus."  repeat EEG with no epileptiform abnormalities  continue with AED per neuro.
with brain compression, course as above  postop management per neurosurgery  patient intermittently responsive. repeat CT head (6/22) with stable changes  repeat EEG with no epileptiform abnormalities  continue with AED per neuro.
[FreeTextEntry1] : 34-year-old woman, with a past medical history of hyperlipidemia, prolactinoma, and scoliosis, presenting for follow up. Patient had experienced about 1 month of intermittent chest discomfort prior to her initial visit. 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 These symptoms have since resolved. Patient otherwise denies shortness of breath/dyspnea on exertion, palpitations, dizziness/loss of consciousness, paroxysmal nocturnal dyspnea, orthopnea, headaches, abdominal pain, and lower extremity swelling. 
with brain compression, course as above  postop management per neurosurgery  patient intermittently responsive. repeat CT head (6/22) with stable changes  repeat EEG with no epileptiform abnormalities  continue with AED per neuro.
with brain compression, course as above  postop management per neurosurgery  patient remains minimally responsive. repeat CT head (6/20) with "resolving right frontal hemorrhage with similar mass effect and unchanged minimal leftward midline shift. No hydrocephalus."  repeat EEG with no epileptiform abnormalities  continue with AED per neuro.
with brain compression, course as above    patient intermittently responsive. repeat CT head (6/22) with stable changes    repeat EEG with no epileptiform abnormalities    On Amantadine neurostimulant 50 mg in AM, anti-epileptic management w/ briviact 150 BID, and Vimpat 150 BID    Helmet when OOB

## 2025-01-29 ENCOUNTER — TRANSCRIPTION ENCOUNTER (OUTPATIENT)
Age: 40
End: 2025-01-29

## 2025-02-18 ENCOUNTER — TRANSCRIPTION ENCOUNTER (OUTPATIENT)
Age: 40
End: 2025-02-18

## 2025-02-19 ENCOUNTER — TRANSCRIPTION ENCOUNTER (OUTPATIENT)
Age: 40
End: 2025-02-19

## 2025-02-21 DIAGNOSIS — R39.89 OTHER SYMPTOMS AND SIGNS INVOLVING THE GENITOURINARY SYSTEM: ICD-10-CM

## 2025-02-21 DIAGNOSIS — F17.200 NICOTINE DEPENDENCE, UNSPECIFIED, UNCOMPLICATED: ICD-10-CM

## 2025-02-21 DIAGNOSIS — R51.9 HEADACHE, UNSPECIFIED: ICD-10-CM

## 2025-02-21 RX ORDER — NITROFURANTOIN (MONOHYDRATE/MACROCRYSTALS) 25; 75 MG/1; MG/1
100 CAPSULE ORAL
Qty: 10 | Refills: 0 | Status: ACTIVE | COMMUNITY
Start: 2025-02-21 | End: 1900-01-01

## 2025-02-25 LAB
APPEARANCE: CLEAR
BACTERIA UR CULT: ABNORMAL
BACTERIA: ABNORMAL /HPF
BILIRUBIN URINE: NEGATIVE
BLOOD URINE: ABNORMAL
CALCIUM OXALATE CRYSTALS: PRESENT
CAST: 1 /LPF
COLOR: NORMAL
EPITHELIAL CELLS: 24 /HPF
GLUCOSE QUALITATIVE U: NEGATIVE MG/DL
KETONES URINE: ABNORMAL MG/DL
LEUKOCYTE ESTERASE URINE: NEGATIVE
MICROSCOPIC-UA: NORMAL
NITRITE URINE: NEGATIVE
PH URINE: 6
PROTEIN URINE: NORMAL MG/DL
RED BLOOD CELLS URINE: 2 /HPF
REVIEW: NORMAL
SPECIFIC GRAVITY URINE: >1.03
UROBILINOGEN URINE: 1 MG/DL
WHITE BLOOD CELLS URINE: 5 /HPF
YEAST-LIKE CELLS: PRESENT

## 2025-03-27 ENCOUNTER — APPOINTMENT (OUTPATIENT)
Dept: PHYSICAL MEDICINE AND REHAB | Facility: CLINIC | Age: 40
End: 2025-03-27

## 2025-03-27 VITALS
BODY MASS INDEX: 24.84 KG/M2 | DIASTOLIC BLOOD PRESSURE: 90 MMHG | HEART RATE: 91 BPM | WEIGHT: 135 LBS | SYSTOLIC BLOOD PRESSURE: 124 MMHG | TEMPERATURE: 98 F | HEIGHT: 62 IN

## 2025-03-27 DIAGNOSIS — I61.9 NONTRAUMATIC INTRACEREBRAL HEMORRHAGE, UNSPECIFIED: ICD-10-CM

## 2025-03-27 DIAGNOSIS — R26.9 UNSPECIFIED ABNORMALITIES OF GAIT AND MOBILITY: ICD-10-CM

## 2025-03-27 DIAGNOSIS — I69.054: ICD-10-CM

## 2025-03-27 DIAGNOSIS — R41.89 OTHER SYMPTOMS AND SIGNS INVOLVING COGNITIVE FUNCTIONS AND AWARENESS: ICD-10-CM

## 2025-03-27 PROCEDURE — 64643 CHEMODENERV 1 EXTREM 1-4 EA: CPT

## 2025-03-27 PROCEDURE — 64642 CHEMODENERV 1 EXTREMITY 1-4: CPT

## 2025-03-27 PROCEDURE — 95874 GUIDE NERV DESTR NEEDLE EMG: CPT

## 2025-04-02 ENCOUNTER — TRANSCRIPTION ENCOUNTER (OUTPATIENT)
Age: 40
End: 2025-04-02

## 2025-04-03 ENCOUNTER — APPOINTMENT (OUTPATIENT)
Dept: DERMATOLOGY | Facility: CLINIC | Age: 40
End: 2025-04-03

## 2025-04-16 ENCOUNTER — TRANSCRIPTION ENCOUNTER (OUTPATIENT)
Age: 40
End: 2025-04-16

## 2025-04-17 ENCOUNTER — APPOINTMENT (OUTPATIENT)
Dept: FAMILY MEDICINE | Facility: CLINIC | Age: 40
End: 2025-04-17
Payer: MEDICAID

## 2025-04-17 VITALS
RESPIRATION RATE: 16 BRPM | OXYGEN SATURATION: 99 % | TEMPERATURE: 97.7 F | SYSTOLIC BLOOD PRESSURE: 108 MMHG | DIASTOLIC BLOOD PRESSURE: 78 MMHG | HEART RATE: 83 BPM

## 2025-04-17 DIAGNOSIS — D75.89 OTHER SPECIFIED DISEASES OF BLOOD AND BLOOD-FORMING ORGANS: ICD-10-CM

## 2025-04-17 DIAGNOSIS — Z87.898 PERSONAL HISTORY OF OTHER SPECIFIED CONDITIONS: ICD-10-CM

## 2025-04-17 DIAGNOSIS — Z86.2 PERSONAL HISTORY OF DISEASES OF THE BLOOD AND BLOOD-FORMING ORGANS AND CERTAIN DISORDERS INVOLVING THE IMMUNE MECHANISM: ICD-10-CM

## 2025-04-17 DIAGNOSIS — E03.9 HYPOTHYROIDISM, UNSPECIFIED: ICD-10-CM

## 2025-04-17 DIAGNOSIS — A41.9 SEPSIS, UNSPECIFIED ORGANISM: ICD-10-CM

## 2025-04-17 DIAGNOSIS — R39.89 OTHER SYMPTOMS AND SIGNS INVOLVING THE GENITOURINARY SYSTEM: ICD-10-CM

## 2025-04-17 DIAGNOSIS — Z09 ENCOUNTER FOR FOLLOW-UP EXAMINATION AFTER COMPLETED TREATMENT FOR CONDITIONS OTHER THAN MALIGNANT NEOPLASM: ICD-10-CM

## 2025-04-17 DIAGNOSIS — E87.0 HYPEROSMOLALITY AND HYPERNATREMIA: ICD-10-CM

## 2025-04-17 PROCEDURE — 99214 OFFICE O/P EST MOD 30 MIN: CPT

## 2025-04-17 PROCEDURE — G2211 COMPLEX E/M VISIT ADD ON: CPT | Mod: NC

## 2025-04-21 ENCOUNTER — TRANSCRIPTION ENCOUNTER (OUTPATIENT)
Age: 40
End: 2025-04-21

## 2025-04-22 RX ORDER — GABAPENTIN 300 MG/1
300 CAPSULE ORAL
Qty: 60 | Refills: 2 | Status: ACTIVE | COMMUNITY
Start: 2025-04-22 | End: 1900-01-01

## 2025-04-22 RX ORDER — MEMANTINE 5 MG/1
5 TABLET ORAL
Qty: 60 | Refills: 2 | Status: ACTIVE | COMMUNITY
Start: 2025-04-22 | End: 1900-01-01

## 2025-04-25 ENCOUNTER — APPOINTMENT (OUTPATIENT)
Dept: NEUROLOGY | Facility: CLINIC | Age: 40
End: 2025-04-25
Payer: MEDICAID

## 2025-04-25 VITALS
SYSTOLIC BLOOD PRESSURE: 103 MMHG | HEIGHT: 62 IN | WEIGHT: 135 LBS | HEART RATE: 86 BPM | DIASTOLIC BLOOD PRESSURE: 73 MMHG | BODY MASS INDEX: 24.84 KG/M2

## 2025-04-25 DIAGNOSIS — E55.9 VITAMIN D DEFICIENCY, UNSPECIFIED: ICD-10-CM

## 2025-04-25 DIAGNOSIS — G40.909 EPILEPSY, UNSPECIFIED, NOT INTRACTABLE, W/OUT STATUS EPILEPTICUS: ICD-10-CM

## 2025-04-25 PROCEDURE — 99213 OFFICE O/P EST LOW 20 MIN: CPT

## 2025-04-25 PROCEDURE — G2211 COMPLEX E/M VISIT ADD ON: CPT | Mod: NC

## 2025-05-02 ENCOUNTER — APPOINTMENT (OUTPATIENT)
Dept: DERMATOLOGY | Facility: CLINIC | Age: 40
End: 2025-05-02
Payer: MEDICAID

## 2025-05-02 DIAGNOSIS — L81.4 OTHER MELANIN HYPERPIGMENTATION: ICD-10-CM

## 2025-05-02 DIAGNOSIS — D22.9 MELANOCYTIC NEVI, UNSPECIFIED: ICD-10-CM

## 2025-05-02 DIAGNOSIS — Z12.83 ENCOUNTER FOR SCREENING FOR MALIGNANT NEOPLASM OF SKIN: ICD-10-CM

## 2025-05-02 DIAGNOSIS — L82.1 OTHER SEBORRHEIC KERATOSIS: ICD-10-CM

## 2025-05-02 LAB
25(OH)D3 SERPL-MCNC: 59.8 NG/ML
ALBUMIN SERPL ELPH-MCNC: 3.6 G/DL
ALP BLD-CCNC: 67 U/L
ALT SERPL-CCNC: 18 U/L
ANION GAP SERPL CALC-SCNC: 13 MMOL/L
AST SERPL-CCNC: 25 U/L
BILIRUB SERPL-MCNC: 0.2 MG/DL
BUN SERPL-MCNC: 18 MG/DL
CALCIUM SERPL-MCNC: 9 MG/DL
CHLORIDE SERPL-SCNC: 103 MMOL/L
CO2 SERPL-SCNC: 25 MMOL/L
CREAT SERPL-MCNC: 0.44 MG/DL
EGFRCR SERPLBLD CKD-EPI 2021: 125 ML/MIN/1.73M2
GLUCOSE SERPL-MCNC: 88 MG/DL
HCT VFR BLD CALC: 43 %
HGB BLD-MCNC: 13.7 G/DL
MCHC RBC-ENTMCNC: 31.9 G/DL
MCHC RBC-ENTMCNC: 34 PG
MCV RBC AUTO: 106.7 FL
PLATELET # BLD AUTO: 147 K/UL
POTASSIUM SERPL-SCNC: 4.6 MMOL/L
PROT SERPL-MCNC: 6.3 G/DL
RBC # BLD: 4.03 M/UL
RBC # FLD: 14.4 %
SODIUM SERPL-SCNC: 141 MMOL/L
WBC # FLD AUTO: 5.94 K/UL

## 2025-05-02 PROCEDURE — 99203 OFFICE O/P NEW LOW 30 MIN: CPT

## 2025-05-06 ENCOUNTER — TRANSCRIPTION ENCOUNTER (OUTPATIENT)
Age: 40
End: 2025-05-06

## 2025-05-08 ENCOUNTER — APPOINTMENT (OUTPATIENT)
Dept: PHYSICAL MEDICINE AND REHAB | Facility: CLINIC | Age: 40
End: 2025-05-08

## 2025-05-08 VITALS
HEART RATE: 104 BPM | SYSTOLIC BLOOD PRESSURE: 104 MMHG | OXYGEN SATURATION: 98 % | TEMPERATURE: 98.1 F | DIASTOLIC BLOOD PRESSURE: 74 MMHG

## 2025-05-08 DIAGNOSIS — R41.89 OTHER SYMPTOMS AND SIGNS INVOLVING COGNITIVE FUNCTIONS AND AWARENESS: ICD-10-CM

## 2025-05-08 DIAGNOSIS — G47.19 OTHER HYPERSOMNIA: ICD-10-CM

## 2025-05-08 DIAGNOSIS — R26.9 UNSPECIFIED ABNORMALITIES OF GAIT AND MOBILITY: ICD-10-CM

## 2025-05-08 DIAGNOSIS — I69.054: ICD-10-CM

## 2025-05-08 DIAGNOSIS — I61.9 NONTRAUMATIC INTRACEREBRAL HEMORRHAGE, UNSPECIFIED: ICD-10-CM

## 2025-05-08 PROCEDURE — 99214 OFFICE O/P EST MOD 30 MIN: CPT

## 2025-05-08 RX ORDER — MODAFINIL 100 MG/1
100 TABLET ORAL
Qty: 30 | Refills: 0 | Status: ACTIVE | COMMUNITY
Start: 2025-05-08 | End: 1900-01-01

## 2025-05-09 ENCOUNTER — TRANSCRIPTION ENCOUNTER (OUTPATIENT)
Age: 40
End: 2025-05-09

## 2025-05-27 NOTE — PROGRESS NOTE ADULT - PROBLEM SELECTOR PLAN 2
s/p PEG 6/21  continue with tube feed to goal
s/p PEG 6/21  to start tube feed to goal
appreciate GI input  plan for PEG today
s/p PEG 6/21    continue with tube feed to goal
(0) Normal
s/p PEG 6/21  continue with tube feed to goal

## 2025-06-09 ENCOUNTER — RX RENEWAL (OUTPATIENT)
Age: 40
End: 2025-06-09

## 2025-06-16 ENCOUNTER — EMERGENCY (EMERGENCY)
Facility: HOSPITAL | Age: 40
LOS: 1 days | End: 2025-06-16
Attending: STUDENT IN AN ORGANIZED HEALTH CARE EDUCATION/TRAINING PROGRAM | Admitting: STUDENT IN AN ORGANIZED HEALTH CARE EDUCATION/TRAINING PROGRAM
Payer: MEDICAID

## 2025-06-16 VITALS
TEMPERATURE: 97 F | DIASTOLIC BLOOD PRESSURE: 83 MMHG | SYSTOLIC BLOOD PRESSURE: 103 MMHG | RESPIRATION RATE: 16 BRPM | HEIGHT: 62 IN | OXYGEN SATURATION: 98 % | HEART RATE: 87 BPM | WEIGHT: 130.07 LBS

## 2025-06-16 DIAGNOSIS — Z98.890 OTHER SPECIFIED POSTPROCEDURAL STATES: Chronic | ICD-10-CM

## 2025-06-16 DIAGNOSIS — N83.209 UNSPECIFIED OVARIAN CYST, UNSPECIFIED SIDE: Chronic | ICD-10-CM

## 2025-06-16 PROCEDURE — 72131 CT LUMBAR SPINE W/O DYE: CPT | Mod: 26

## 2025-06-16 PROCEDURE — 99284 EMERGENCY DEPT VISIT MOD MDM: CPT

## 2025-06-16 PROCEDURE — 99284 EMERGENCY DEPT VISIT MOD MDM: CPT | Mod: 25

## 2025-06-16 PROCEDURE — 72131 CT LUMBAR SPINE W/O DYE: CPT

## 2025-06-16 NOTE — ED ADULT TRIAGE NOTE - CHIEF COMPLAINT QUOTE
c/o worsening back pain s/p her wife "hearing a crack in her back when she got off the commode" 2 days ago. PMH of brain bleed, left sided weakness/ paralysis. Last took tylenol 730 am.

## 2025-06-16 NOTE — ED PROVIDER NOTE - NSFOLLOWUPINSTRUCTIONS_ED_ALL_ED_FT
Back Pain    Back pain is very common in adults. The cause of back pain is rarely dangerous and the pain often gets better over time. The cause of your back pain may not be known and may include strain of muscles or ligaments, degeneration of the spinal disks, or arthritis. Occasionally the pain may radiate down your leg(s). Over-the-counter medicines to reduce pain and inflammation are often the most helpful. Stretching and remaining active frequently helps the healing process.     SEEK IMMEDIATE MEDICAL CARE IF YOU HAVE ANY OF THE FOLLOWING SYMPTOMS: bowel or bladder control problems, unusual weakness or numbness in your arms or legs, nausea or vomiting, abdominal pain, fever, dizziness/lightheadedness.     Rest, drink plenty of fluids.  Advance activity as tolerated.  Continue all previously prescribed medications as directed.  Follow up with your PMD 2-3 days and bring copies of your results.  Return to the ER for worsening symptoms, weakness, numbness/tingling, vomiting, chest pain or new concerning symptoms.

## 2025-06-16 NOTE — ED PROVIDER NOTE - PATIENT PORTAL LINK FT
You can access the FollowMyHealth Patient Portal offered by [facility] by registering at the following website: [email or web address]. By joining [facility]’s FollowMyHealth portal, you will also be able to view your health information using other applications (apps) compatible with our system.

## 2025-06-16 NOTE — ED ADULT NURSE NOTE - OBJECTIVE STATEMENT
Pt presents to the ED with low back pain after feeling a crack when sitting up from the commode x 2 days ago.

## 2025-06-16 NOTE — ED PROVIDER NOTE - OBJECTIVE STATEMENT
Show Applicator Variable?: Yes Post-Care Instructions: I reviewed with the patient in detail post-care instructions. Patient is to wear sunprotection, and avoid picking at any of the treated lesions. Pt may apply Vaseline to crusted or scabbing areas. Add 52 Modifier (Optional): no Spray Paint Text: The liquid nitrogen was applied to the skin utilizing a spray paint frosting technique. Medical Necessity Clause: This procedure was medically necessary because the lesions that were treated were: Number Of Freeze-Thaw Cycles: 2 freeze-thaw cycles Consent: The patient's consent was obtained including but not limited to risks of crusting, scabbing, blistering, scarring, darker or lighter pigmentary change, recurrence, incomplete removal and infection. Duration Of Freeze Thaw-Cycle (Seconds): 3 Detail Level: Zone Medical Necessity Information: It is in your best interest to select a reason for this procedure from the list below. All of these items fulfill various CMS LCD requirements except the new and changing color options. see mdm

## 2025-06-16 NOTE — ED PROVIDER NOTE - CLINICAL SUMMARY MEDICAL DECISION MAKING FREE TEXT BOX
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 She was trying to adjust herself and felt a crack to her lower back with resultant pain has gradually worsened.  Denies any new neurological deficits per wife at the bedside.  Has been trying over-the-counter medications for pain control.  Denies any recent falls or traumatic injuries or urinary incontinence or urinary retention or bowel retention or bowel incontinence.  However they have been complain about constipation but this has been ongoing since before the  episode.  Denies any chest pain, abdominal pain, shortness of breath, nausea/vomiting, headaches, fevers, chills,   syncope,   urinary symptoms, subjective neurological deficits.   The differential diagnosis includes but is not limited to: rule out traumatic injuries, back pain, msk pain  AP: CT lumbar spine and pain control prn.   - CT findings of age indeterminate compression fx lumbar spine and anterior inferior fx of vertebral body.   -  Case discussed with Dr. Villa neurosurgery spine on-call, recommend outpatient follow-up for TLSO brace.  This was discussed with the patient and the patient's partner at the bedside who verbalized understanding.  Prescribed a TLSO brace.  Will arrange for spine fu outpatient.

## 2025-06-16 NOTE — ED PROVIDER NOTE - PHYSICAL EXAMINATION
VITAL SIGNS: I have reviewed nursing notes and confirm.   GEN: Well-developed; well-nourished; in no acute distress. Speaking full sentences.  SKIN: Warm, pink, no rash, no diaphoresis, no cyanosis, well perfused.   HEAD: Normocephalic; atraumatic.    NECK: Supple; non tender. Full range of motion.   EYES: Pupils 3mm equal, round, reactive to light and accomodation, conjunctiva and sclera clear. Extra-ocular movements intact bilaterally.  ENT: No nasal discharge; airway clear. Trachea is midline.    CV: RRR. S1, S2 normal; no murmurs, gallops, or rubs. Capillary refill < 2 seconds throughout. Distal pulses intact 2+ throughout.  RESP: CTA bilaterally. No wheezes, rales, or rhonchi.   ABD: Normal bowel sounds, soft, non-distended, non-tender, no rebound, no guarding, no rigidity   MSK: (+) LEFT sided contracted upper extremity and left lower extremity paralysis, at baseline.  BACK: (+) BL lower lumbar ttp mild; mild midline ttp lumbar, no thoracic ttp.     NEURO: Alert & oriented x name, mild aphasia at baseline, LEFT > RIGHT LE weakness at baseline, sensory mild tingling at bl LE to soft touch at baseline. LEFT hemiparesis at baseline.

## 2025-06-24 ENCOUNTER — OUTPATIENT (OUTPATIENT)
Dept: OUTPATIENT SERVICES | Facility: HOSPITAL | Age: 40
LOS: 1 days | End: 2025-06-24
Payer: MEDICAID

## 2025-06-24 ENCOUNTER — APPOINTMENT (OUTPATIENT)
Dept: MRI IMAGING | Facility: HOSPITAL | Age: 40
End: 2025-06-24
Payer: MEDICAID

## 2025-06-24 DIAGNOSIS — N83.209 UNSPECIFIED OVARIAN CYST, UNSPECIFIED SIDE: Chronic | ICD-10-CM

## 2025-06-24 DIAGNOSIS — S32.000D WEDGE COMPRESSION FRACTURE OF UNSPECIFIED LUMBAR VERTEBRA, SUBSEQUENT ENCOUNTER FOR FRACTURE WITH ROUTINE HEALING: ICD-10-CM

## 2025-06-24 PROCEDURE — 72148 MRI LUMBAR SPINE W/O DYE: CPT

## 2025-06-24 PROCEDURE — 72148 MRI LUMBAR SPINE W/O DYE: CPT | Mod: 26

## 2025-06-30 ENCOUNTER — APPOINTMENT (OUTPATIENT)
Dept: ORTHOPEDIC SURGERY | Facility: CLINIC | Age: 40
End: 2025-06-30
Payer: MEDICAID

## 2025-06-30 PROBLEM — M54.9 ACUTE BACK PAIN, UNSPECIFIED BACK LOCATION, UNSPECIFIED BACK PAIN LATERALITY: Status: ACTIVE | Noted: 2025-06-29

## 2025-06-30 PROCEDURE — 99204 OFFICE O/P NEW MOD 45 MIN: CPT

## 2025-07-02 ENCOUNTER — APPOINTMENT (OUTPATIENT)
Dept: PHYSICAL MEDICINE AND REHAB | Facility: CLINIC | Age: 40
End: 2025-07-02

## 2025-07-02 VITALS
HEIGHT: 62 IN | OXYGEN SATURATION: 98 % | SYSTOLIC BLOOD PRESSURE: 94 MMHG | HEART RATE: 115 BPM | TEMPERATURE: 97.4 F | DIASTOLIC BLOOD PRESSURE: 75 MMHG | WEIGHT: 135 LBS | BODY MASS INDEX: 24.84 KG/M2

## 2025-07-02 PROCEDURE — 95874 GUIDE NERV DESTR NEEDLE EMG: CPT

## 2025-07-02 PROCEDURE — 64643 CHEMODENERV 1 EXTREM 1-4 EA: CPT

## 2025-07-02 PROCEDURE — 64644 CHEMODENERV 1 EXTREM 5/> MUS: CPT

## 2025-07-11 RX ORDER — GABAPENTIN 300 MG/1
300 CAPSULE ORAL
Qty: 30 | Refills: 1 | Status: ACTIVE | COMMUNITY
Start: 2025-07-11 | End: 1900-01-01

## 2025-08-01 ENCOUNTER — NON-APPOINTMENT (OUTPATIENT)
Age: 40
End: 2025-08-01

## 2025-08-04 ENCOUNTER — APPOINTMENT (OUTPATIENT)
Dept: FAMILY MEDICINE | Facility: CLINIC | Age: 40
End: 2025-08-04
Payer: MEDICAID

## 2025-08-04 ENCOUNTER — APPOINTMENT (OUTPATIENT)
Dept: PHYSICAL MEDICINE AND REHAB | Facility: CLINIC | Age: 40
End: 2025-08-04
Payer: MEDICAID

## 2025-08-04 VITALS
HEIGHT: 62 IN | HEART RATE: 70 BPM | BODY MASS INDEX: 25.21 KG/M2 | WEIGHT: 137 LBS | SYSTOLIC BLOOD PRESSURE: 111 MMHG | OXYGEN SATURATION: 97 % | DIASTOLIC BLOOD PRESSURE: 79 MMHG | TEMPERATURE: 98.2 F

## 2025-08-04 VITALS
WEIGHT: 137 LBS | SYSTOLIC BLOOD PRESSURE: 108 MMHG | TEMPERATURE: 98.2 F | DIASTOLIC BLOOD PRESSURE: 78 MMHG | RESPIRATION RATE: 14 BRPM | HEART RATE: 100 BPM | OXYGEN SATURATION: 99 % | BODY MASS INDEX: 25.06 KG/M2

## 2025-08-04 DIAGNOSIS — I69.054: ICD-10-CM

## 2025-08-04 DIAGNOSIS — F32.A ANXIETY DISORDER, UNSPECIFIED: ICD-10-CM

## 2025-08-04 DIAGNOSIS — F41.9 ANXIETY DISORDER, UNSPECIFIED: ICD-10-CM

## 2025-08-04 DIAGNOSIS — R32 UNSPECIFIED URINARY INCONTINENCE: ICD-10-CM

## 2025-08-04 DIAGNOSIS — I61.9 NONTRAUMATIC INTRACEREBRAL HEMORRHAGE, UNSPECIFIED: ICD-10-CM

## 2025-08-04 DIAGNOSIS — R26.9 UNSPECIFIED ABNORMALITIES OF GAIT AND MOBILITY: ICD-10-CM

## 2025-08-04 DIAGNOSIS — R41.89 OTHER SYMPTOMS AND SIGNS INVOLVING COGNITIVE FUNCTIONS AND AWARENESS: ICD-10-CM

## 2025-08-04 DIAGNOSIS — K59.00 CONSTIPATION, UNSPECIFIED: ICD-10-CM

## 2025-08-04 PROCEDURE — 99214 OFFICE O/P EST MOD 30 MIN: CPT

## 2025-08-04 PROCEDURE — G2211 COMPLEX E/M VISIT ADD ON: CPT | Mod: NC

## 2025-08-04 RX ORDER — MEMANTINE 10 MG/1
10 TABLET ORAL AT BEDTIME
Qty: 90 | Refills: 0 | Status: ACTIVE | COMMUNITY
Start: 2025-08-04 | End: 1900-01-01

## 2025-08-05 PROBLEM — R32 INCONTINENCE: Status: ACTIVE | Noted: 2025-08-05

## 2025-08-05 PROBLEM — K59.00 CONSTIPATION, UNSPECIFIED CONSTIPATION TYPE: Status: ACTIVE | Noted: 2025-08-05 | Resolved: 2025-09-04

## 2025-09-09 RX ORDER — BACLOFEN 5 MG/1
5 TABLET ORAL
Qty: 30 | Refills: 2 | Status: ACTIVE | COMMUNITY
Start: 2025-09-09 | End: 1900-01-01

## 2025-09-26 PROBLEM — E78.00 ELEVATED LDL CHOLESTEROL LEVEL: Status: ACTIVE | Noted: 2025-09-26

## 2025-09-26 PROBLEM — Z82.49 FAMILY HISTORY OF ATRIAL FIBRILLATION: Status: ACTIVE | Noted: 2025-09-26

## 2025-09-26 PROBLEM — Z82.49 FAMILY HISTORY OF MITRAL VALVE PROLAPSE: Status: ACTIVE | Noted: 2025-09-26

## 2025-09-26 PROBLEM — Z82.49 FAMILY HISTORY OF ESSENTIAL HYPERTENSION: Status: ACTIVE | Noted: 2025-09-26

## 2025-09-26 PROBLEM — I63.81 LACUNAR INFARCTION: Status: ACTIVE | Noted: 2025-09-26

## 2025-09-26 PROBLEM — R79.89 ELEVATED TROPONIN: Status: ACTIVE | Noted: 2025-09-26

## (undated) DEVICE — DRAPE TOWEL BLUE 17" X 24"

## (undated) DEVICE — Device

## (undated) DEVICE — MIDAS REX LEGEND LUBRICANT DIFFUSER CARTRIDGE

## (undated) DEVICE — CATH IV SAFE BC 22G X 1" (BLUE)

## (undated) DEVICE — DRSG XEROFORM 1 X 8"

## (undated) DEVICE — CATH IV SAFE BC 20G X 1.16" (PINK)

## (undated) DEVICE — WARMING BLANKET LOWER ADULT

## (undated) DEVICE — SPECIMEN CONTAINER 100ML

## (undated) DEVICE — TUBING SUCTION CONN 6FT STERILE

## (undated) DEVICE — SOL IRR POUR H2O 250ML

## (undated) DEVICE — PACK CRANIOTOMY LNX SURGICOUNT

## (undated) DEVICE — SUT PDS II 3-0 27" SH UNDYED

## (undated) DEVICE — MARKING PEN W RULER

## (undated) DEVICE — SOL ANTI FOG

## (undated) DEVICE — CODMAN PERFORATOR 14MM (BLUE)

## (undated) DEVICE — CONTAINER FORMALIN BUFF 10% 60ML

## (undated) DEVICE — DRAIN SILICONE DRAIN

## (undated) DEVICE — PREP DURAPREP 26CC

## (undated) DEVICE — FORCEP RADIAL JAW 4 W NDL 2.4MM 2.8MM 240CM ORANGE DISP

## (undated) DEVICE — AESCULAP SCALPFIX 10 CLIPS

## (undated) DEVICE — DRSG TELFA 3 X 8

## (undated) DEVICE — MEDICATION LABELS W MARKER

## (undated) DEVICE — TUBING IV SET GRAVITY 3Y 100" MACRO

## (undated) DEVICE — TUBING SUCTION 20FT

## (undated) DEVICE — GLV 6.5 PROTEXIS (WHITE)

## (undated) DEVICE — BALLOON US ENDO

## (undated) DEVICE — TUBING CODMAN INTEGRATED BIPOLAR CORD & TUBING SET FLYING LEADS

## (undated) DEVICE — FOLEY TRAY 16FR 5CC LF UMETER CLOSED

## (undated) DEVICE — BITE BLOCK ADULT 20 X 27MM (GREEN)

## (undated) DEVICE — VISITEC 4X4

## (undated) DEVICE — MIDAS REX MR8 BALL FLUTED SM BORE 5MM X 10CM

## (undated) DEVICE — DRAIN JACKSON PRATT 10MM FLAT FULL NO TROCAR

## (undated) DEVICE — SUT VICRYL PLUS 3-0 27" RB-1 UNDYED

## (undated) DEVICE — DRAPE 1/2 SHEET 40X57"

## (undated) DEVICE — GLV 8.5 PROTEXIS (WHITE)

## (undated) DEVICE — FOLEY TRAY 16FR 5CC LTX UMETER CLOSED

## (undated) DEVICE — TAPE SILK 3"

## (undated) DEVICE — MIDAS REX MR8 BALL FLUTED LG BORE 5MM X 9CM

## (undated) DEVICE — DRAIN RESERVOIR FOR JACKSON PRATT 100CC CARDINAL

## (undated) DEVICE — MIDAS REX MR8 BALL DIAMOND LG BORE 4MM X 9CM

## (undated) DEVICE — SYR LUER LOK 50CC

## (undated) DEVICE — DRAPE INSTRUMENT POUCH 6.75" X 11"

## (undated) DEVICE — POSITIONER FOAM EGG CRATE ULNAR 2PCS (PINK)

## (undated) DEVICE — SUCTION YANKAUER NO CONTROL VENT

## (undated) DEVICE — CRANIAL MASK TRACKER

## (undated) DEVICE — SUT ETHILON 3-0 18" PS-1

## (undated) DEVICE — GOWN TRIMAX LG

## (undated) DEVICE — FOLEY HOLDER STATLOCK 2 WAY ADULT

## (undated) DEVICE — SOL IRR POUR NS 0.9% 500ML

## (undated) DEVICE — PREP CHLORAPREP HI-LITE ORANGE 26ML

## (undated) DEVICE — SENSOR O2 FINGER ADULT

## (undated) DEVICE — STAPLER SKIN VISI-STAT 35 WIDE

## (undated) DEVICE — VENODYNE/SCD SLEEVE CALF LARGE

## (undated) DEVICE — DRSG TAPE HYPAFIX 4"

## (undated) DEVICE — SUT MONOSOF 3-0 18" C-14

## (undated) DEVICE — DRSG CURITY GAUZE SPONGE 4 X 4" 12-PLY

## (undated) DEVICE — VENODYNE/SCD SLEEVE CALF MEDIUM

## (undated) DEVICE — SOL IRR POUR H2O 1000ML

## (undated) DEVICE — SUT VICRYL 2-0 18" CP-2 UNDYED (POP-OFF)

## (undated) DEVICE — DRAIN JACKSON PRATT 7MM FLAT FULL NO TROCAR

## (undated) DEVICE — SYR ALLIANCE II INFLATION 60ML

## (undated) DEVICE — DRAPE 3/4 SHEET W REINFORCEMENT 56X77"

## (undated) DEVICE — ABDOMINAL BINDER MED/LG 9" X 36"-64"

## (undated) DEVICE — SOL INJ NS 0.9% 500ML 2 PORT

## (undated) DEVICE — GLV 8 PROTEXIS (WHITE)

## (undated) DEVICE — GLV 7.5 PROTEXIS (WHITE)

## (undated) DEVICE — DRAIN JACKSON PRATT 7FR ROUND END NO TROCAR

## (undated) DEVICE — SUT ETHILON 2-0 18" PS

## (undated) DEVICE — TUBING CAP SET ERBEFLO CLEVERCAP HYBRID CO2 FOR OLYMPUS SCOPES AND UCR

## (undated) DEVICE — ADAPTER LUER STUB 15G

## (undated) DEVICE — GLV 7 PROTEXIS (WHITE)

## (undated) DEVICE — DRAPE MAYO STAND 30"

## (undated) DEVICE — KIT ENDO PROCEDURE CUST W/VLV

## (undated) DEVICE — PACK IV START WITH CHG

## (undated) DEVICE — SYR ASEPTO